# Patient Record
Sex: MALE | Race: WHITE | NOT HISPANIC OR LATINO | Employment: FULL TIME | URBAN - METROPOLITAN AREA
[De-identification: names, ages, dates, MRNs, and addresses within clinical notes are randomized per-mention and may not be internally consistent; named-entity substitution may affect disease eponyms.]

---

## 2018-01-22 ENCOUNTER — OFFICE VISIT CONVERTED (OUTPATIENT)
Dept: PULMONOLOGY | Facility: CLINIC | Age: 58
End: 2018-01-22
Attending: INTERNAL MEDICINE

## 2018-01-31 ENCOUNTER — OFFICE VISIT CONVERTED (OUTPATIENT)
Dept: FAMILY MEDICINE CLINIC | Age: 58
End: 2018-01-31
Attending: NURSE PRACTITIONER

## 2018-10-03 ENCOUNTER — OFFICE VISIT CONVERTED (OUTPATIENT)
Dept: FAMILY MEDICINE CLINIC | Age: 58
End: 2018-10-03
Attending: NURSE PRACTITIONER

## 2018-10-29 ENCOUNTER — OFFICE VISIT CONVERTED (OUTPATIENT)
Dept: PULMONOLOGY | Facility: CLINIC | Age: 58
End: 2018-10-29
Attending: INTERNAL MEDICINE

## 2019-06-04 ENCOUNTER — OFFICE VISIT CONVERTED (OUTPATIENT)
Dept: FAMILY MEDICINE CLINIC | Age: 59
End: 2019-06-04
Attending: NURSE PRACTITIONER

## 2019-06-08 ENCOUNTER — HOSPITAL ENCOUNTER (OUTPATIENT)
Dept: OTHER | Facility: HOSPITAL | Age: 59
Discharge: HOME | End: 2019-06-08
Attending: NURSE PRACTITIONER

## 2019-06-08 LAB
ALBUMIN SERPL-MCNC: 4.5 G/DL (ref 3.5–5)
ALBUMIN/GLOB SERPL: 1.7 {RATIO} (ref 1.4–2.6)
ALP SERPL-CCNC: 108 U/L (ref 56–119)
ALT SERPL-CCNC: 18 U/L (ref 10–40)
ANION GAP SERPL CALC-SCNC: 16 MMOL/L (ref 8–19)
AST SERPL-CCNC: 25 U/L (ref 15–50)
BILIRUB SERPL-MCNC: 0.62 MG/DL (ref 0.2–1.3)
BUN SERPL-MCNC: 11 MG/DL (ref 5–25)
BUN/CREAT SERPL: 13 {RATIO} (ref 6–20)
CALCIUM SERPL-MCNC: 9.7 MG/DL (ref 8.7–10.4)
CHLORIDE SERPL-SCNC: 99 MMOL/L (ref 99–111)
CHOLEST SERPL-MCNC: 143 MG/DL (ref 107–200)
CHOLEST/HDLC SERPL: 2.6 {RATIO} (ref 3–6)
CONV CO2: 31 MMOL/L (ref 22–32)
CONV TOTAL PROTEIN: 7.2 G/DL (ref 6.3–8.2)
CREAT UR-MCNC: 0.83 MG/DL (ref 0.7–1.2)
GFR SERPLBLD BASED ON 1.73 SQ M-ARVRAT: >60 ML/MIN/{1.73_M2}
GLOBULIN UR ELPH-MCNC: 2.7 G/DL (ref 2–3.5)
GLUCOSE SERPL-MCNC: 92 MG/DL (ref 70–99)
HDLC SERPL-MCNC: 54 MG/DL (ref 40–60)
LDLC SERPL CALC-MCNC: 74 MG/DL (ref 70–100)
OSMOLALITY SERPL CALC.SUM OF ELEC: 293 MOSM/KG (ref 273–304)
POTASSIUM SERPL-SCNC: 3.8 MMOL/L (ref 3.5–5.3)
SODIUM SERPL-SCNC: 142 MMOL/L (ref 135–147)
TRIGL SERPL-MCNC: 73 MG/DL (ref 40–150)
VLDLC SERPL-MCNC: 15 MG/DL (ref 5–37)

## 2019-10-28 ENCOUNTER — OFFICE VISIT CONVERTED (OUTPATIENT)
Dept: PULMONOLOGY | Facility: CLINIC | Age: 59
End: 2019-10-28
Attending: INTERNAL MEDICINE

## 2020-01-15 ENCOUNTER — OFFICE VISIT CONVERTED (OUTPATIENT)
Dept: FAMILY MEDICINE CLINIC | Age: 60
End: 2020-01-15
Attending: NURSE PRACTITIONER

## 2020-01-25 ENCOUNTER — HOSPITAL ENCOUNTER (OUTPATIENT)
Dept: OTHER | Facility: HOSPITAL | Age: 60
Discharge: HOME | End: 2020-01-25
Attending: NURSE PRACTITIONER

## 2020-01-27 LAB
ALBUMIN SERPL-MCNC: 4.2 G/DL (ref 3.5–5)
ALBUMIN/GLOB SERPL: 1.4 {RATIO} (ref 1.4–2.6)
ALP SERPL-CCNC: 114 U/L (ref 56–119)
ALT SERPL-CCNC: 20 U/L (ref 10–40)
ANION GAP SERPL CALC-SCNC: 18 MMOL/L (ref 8–19)
AST SERPL-CCNC: 28 U/L (ref 15–50)
BILIRUB SERPL-MCNC: 0.41 MG/DL (ref 0.2–1.3)
BUN SERPL-MCNC: 13 MG/DL (ref 5–25)
BUN/CREAT SERPL: 14 {RATIO} (ref 6–20)
CALCIUM SERPL-MCNC: 9.7 MG/DL (ref 8.7–10.4)
CHLORIDE SERPL-SCNC: 98 MMOL/L (ref 99–111)
CHOLEST SERPL-MCNC: 138 MG/DL (ref 107–200)
CHOLEST/HDLC SERPL: 2.9 {RATIO} (ref 3–6)
CONV CO2: 26 MMOL/L (ref 22–32)
CONV TOTAL PROTEIN: 7.1 G/DL (ref 6.3–8.2)
CREAT UR-MCNC: 0.92 MG/DL (ref 0.7–1.2)
GFR SERPLBLD BASED ON 1.73 SQ M-ARVRAT: >60 ML/MIN/{1.73_M2}
GLOBULIN UR ELPH-MCNC: 2.9 G/DL (ref 2–3.5)
GLUCOSE SERPL-MCNC: 85 MG/DL (ref 70–99)
HDLC SERPL-MCNC: 48 MG/DL (ref 40–60)
LDLC SERPL CALC-MCNC: 75 MG/DL (ref 70–100)
OSMOLALITY SERPL CALC.SUM OF ELEC: 285 MOSM/KG (ref 273–304)
POTASSIUM SERPL-SCNC: 4.2 MMOL/L (ref 3.5–5.3)
SODIUM SERPL-SCNC: 138 MMOL/L (ref 135–147)
TRIGL SERPL-MCNC: 74 MG/DL (ref 40–150)
VLDLC SERPL-MCNC: 15 MG/DL (ref 5–37)

## 2020-01-28 ENCOUNTER — HOSPITAL ENCOUNTER (OUTPATIENT)
Dept: OTHER | Facility: HOSPITAL | Age: 60
Discharge: HOME | End: 2020-01-28
Attending: NURSE PRACTITIONER

## 2020-01-31 ENCOUNTER — HOSPITAL ENCOUNTER (OUTPATIENT)
Dept: OTHER | Facility: HOSPITAL | Age: 60
Discharge: HOME | End: 2020-01-31
Attending: NURSE PRACTITIONER

## 2020-02-12 ENCOUNTER — HOSPITAL ENCOUNTER (OUTPATIENT)
Dept: PET IMAGING | Facility: HOSPITAL | Age: 60
Discharge: HOME | End: 2020-02-12
Attending: INTERNAL MEDICINE

## 2020-02-17 ENCOUNTER — OFFICE VISIT CONVERTED (OUTPATIENT)
Dept: PULMONOLOGY | Facility: CLINIC | Age: 60
End: 2020-02-17
Attending: INTERNAL MEDICINE

## 2020-04-21 ENCOUNTER — HOSPITAL ENCOUNTER (OUTPATIENT)
Dept: OTHER | Facility: HOSPITAL | Age: 60
Discharge: HOME | End: 2020-04-21
Attending: INTERNAL MEDICINE

## 2020-05-07 ENCOUNTER — OFFICE VISIT CONVERTED (OUTPATIENT)
Dept: PULMONOLOGY | Facility: CLINIC | Age: 60
End: 2020-05-07
Attending: INTERNAL MEDICINE

## 2020-07-15 ENCOUNTER — OFFICE VISIT CONVERTED (OUTPATIENT)
Dept: FAMILY MEDICINE CLINIC | Age: 60
End: 2020-07-15
Attending: NURSE PRACTITIONER

## 2020-07-18 ENCOUNTER — HOSPITAL ENCOUNTER (OUTPATIENT)
Dept: OTHER | Facility: HOSPITAL | Age: 60
Discharge: HOME | End: 2020-07-18
Attending: NURSE PRACTITIONER

## 2020-07-18 LAB
ALBUMIN SERPL-MCNC: 4.2 G/DL (ref 3.5–5)
ALBUMIN/GLOB SERPL: 1.5 {RATIO} (ref 1.4–2.6)
ALP SERPL-CCNC: 106 U/L (ref 56–119)
ALT SERPL-CCNC: 22 U/L (ref 10–40)
ANION GAP SERPL CALC-SCNC: 16 MMOL/L (ref 8–19)
AST SERPL-CCNC: 29 U/L (ref 15–50)
BILIRUB SERPL-MCNC: 0.45 MG/DL (ref 0.2–1.3)
BUN SERPL-MCNC: 11 MG/DL (ref 5–25)
BUN/CREAT SERPL: 11 {RATIO} (ref 6–20)
CALCIUM SERPL-MCNC: 9.8 MG/DL (ref 8.7–10.4)
CHLORIDE SERPL-SCNC: 99 MMOL/L (ref 99–111)
CHOLEST SERPL-MCNC: 137 MG/DL (ref 107–200)
CHOLEST/HDLC SERPL: 2.9 {RATIO} (ref 3–6)
CONV CO2: 29 MMOL/L (ref 22–32)
CONV TOTAL PROTEIN: 7 G/DL (ref 6.3–8.2)
CREAT UR-MCNC: 0.96 MG/DL (ref 0.7–1.2)
GFR SERPLBLD BASED ON 1.73 SQ M-ARVRAT: >60 ML/MIN/{1.73_M2}
GLOBULIN UR ELPH-MCNC: 2.8 G/DL (ref 2–3.5)
GLUCOSE SERPL-MCNC: 108 MG/DL (ref 70–99)
HDLC SERPL-MCNC: 48 MG/DL (ref 40–60)
LDLC SERPL CALC-MCNC: 70 MG/DL (ref 70–100)
OSMOLALITY SERPL CALC.SUM OF ELEC: 290 MOSM/KG (ref 273–304)
POTASSIUM SERPL-SCNC: 3.8 MMOL/L (ref 3.5–5.3)
SODIUM SERPL-SCNC: 140 MMOL/L (ref 135–147)
TRIGL SERPL-MCNC: 96 MG/DL (ref 40–150)
VLDLC SERPL-MCNC: 19 MG/DL (ref 5–37)

## 2020-09-01 ENCOUNTER — HOSPITAL ENCOUNTER (OUTPATIENT)
Dept: OTHER | Facility: HOSPITAL | Age: 60
Discharge: HOME | End: 2020-09-01
Attending: INTERNAL MEDICINE

## 2020-10-22 ENCOUNTER — OFFICE VISIT CONVERTED (OUTPATIENT)
Dept: PULMONOLOGY | Facility: CLINIC | Age: 60
End: 2020-10-22
Attending: NURSE PRACTITIONER

## 2021-03-02 ENCOUNTER — HOSPITAL ENCOUNTER (OUTPATIENT)
Dept: OTHER | Facility: HOSPITAL | Age: 61
Discharge: HOME | End: 2021-03-02
Attending: NURSE PRACTITIONER

## 2021-03-09 ENCOUNTER — OFFICE VISIT CONVERTED (OUTPATIENT)
Dept: FAMILY MEDICINE CLINIC | Age: 61
End: 2021-03-09
Attending: NURSE PRACTITIONER

## 2021-03-13 ENCOUNTER — HOSPITAL ENCOUNTER (OUTPATIENT)
Dept: OTHER | Facility: HOSPITAL | Age: 61
Discharge: HOME | End: 2021-03-13
Attending: NURSE PRACTITIONER

## 2021-03-13 LAB
ALBUMIN SERPL-MCNC: 4.3 G/DL (ref 3.5–5)
ALBUMIN/GLOB SERPL: 1.7 {RATIO} (ref 1.4–2.6)
ALP SERPL-CCNC: 113 U/L (ref 56–119)
ALT SERPL-CCNC: 20 U/L (ref 10–40)
ANION GAP SERPL CALC-SCNC: 11 MMOL/L (ref 8–19)
AST SERPL-CCNC: 30 U/L (ref 15–50)
BILIRUB SERPL-MCNC: 0.37 MG/DL (ref 0.2–1.3)
BUN SERPL-MCNC: 11 MG/DL (ref 5–25)
BUN/CREAT SERPL: 13 {RATIO} (ref 6–20)
CALCIUM SERPL-MCNC: 9.5 MG/DL (ref 8.7–10.4)
CHLORIDE SERPL-SCNC: 100 MMOL/L (ref 99–111)
CHOLEST SERPL-MCNC: 138 MG/DL (ref 107–200)
CHOLEST/HDLC SERPL: 2.4 {RATIO} (ref 3–6)
CONV CO2: 32 MMOL/L (ref 22–32)
CONV TOTAL PROTEIN: 6.9 G/DL (ref 6.3–8.2)
CREAT UR-MCNC: 0.83 MG/DL (ref 0.7–1.2)
GFR SERPLBLD BASED ON 1.73 SQ M-ARVRAT: >60 ML/MIN/{1.73_M2}
GLOBULIN UR ELPH-MCNC: 2.6 G/DL (ref 2–3.5)
GLUCOSE SERPL-MCNC: 103 MG/DL (ref 70–99)
HDLC SERPL-MCNC: 57 MG/DL (ref 40–60)
LDLC SERPL CALC-MCNC: 67 MG/DL (ref 70–100)
OSMOLALITY SERPL CALC.SUM OF ELEC: 288 MOSM/KG (ref 273–304)
POTASSIUM SERPL-SCNC: 4.1 MMOL/L (ref 3.5–5.3)
SODIUM SERPL-SCNC: 139 MMOL/L (ref 135–147)
TRIGL SERPL-MCNC: 72 MG/DL (ref 40–150)
VLDLC SERPL-MCNC: 14 MG/DL (ref 5–37)

## 2021-04-08 ENCOUNTER — OFFICE VISIT CONVERTED (OUTPATIENT)
Dept: PULMONOLOGY | Facility: CLINIC | Age: 61
End: 2021-04-08
Attending: NURSE PRACTITIONER

## 2021-05-18 NOTE — PROGRESS NOTES
Sumeet Gomes  1960     Office/Outpatient Visit    Visit Date: Wed, Silviano 15, 2020 04:05 pm    Provider: Mirtha Alexander N.P. (Assistant: Miladis Vu MA)    Location: [facility]        Electronically signed by Mirtha Alexander N.P. on  01/15/2020 05:56:19 PM                             Electronically signed by Mirtha Alexander N.P. on  01/31/2020 03:42:16 PM                             Subjective:        CC: Olman is a 59 year old White male.  This is a follow-up visit.  check up         HPI:           PHQ-9 Depression Screening: Completed form scanned and in chart; Total Score 1           In regard to the essential (primary) hypertension, his current cardiac medication regimen includes a diuretic ( Hydrochlorothiazide (HCTZ) ).  Olman does not check his blood pressure other than at his clinic appointments.  He is tolerating the medication well without side effects.  Compliance with treatment has been good; he takes his medication as directed.            Hyperlipidemia, unspecified details; current treatment includes Zocor.  Compliance with treatment has been good; he takes his medication as directed.  He denies experiencing any hypercholesterolemia related symptoms.  Most recent lab tests include Total Cholesterol:  143 (mg/dL) (06/08/2019), HDL:  54 (mg/dL) (06/08/2019), Triglycerides:  73 (mg/dL) (06/08/2019), LDL:  74 (mg/dL) (06/08/2019), Glucose, Serum:  92 (mg/dL) (06/08/2019), ALT (SGPT):  18 (U/L) (06/08/2019).      ROS:     CONSTITUTIONAL:  Negative for chills, fatigue, fever, and weight change.      CARDIOVASCULAR:  Negative for chest pain, palpitations, tachycardia, orthopnea, and edema.      RESPIRATORY:  Positive for quit 1-1-20 smoking using wellbutrin, continuing.   Negative for dyspnea.      NEUROLOGICAL:  Negative for dizziness, headaches, paresthesias, and weakness.          Past Medical History / Family History / Social History:         Last Reviewed on 1/15/2020 05:52  PM by Mirtha Alexander    Past Medical History:             PAST MEDICAL HISTORY         COPD     Hospitalizations: back surgery         PREVENTIVE HEALTH MAINTENANCE             Hepatitis C Medicare Screening: was last done ; negative         Surgical History:         Arthroscopy: left knee  and right shoulder ;     Vasectomy     Other Surgeries:    Laminectomy: lumbar region; ;     Procedures:    Colonoscopy ( 12 )     COLONOSCOPY: was last done 12 with normal results Boo         Family History:     Father: Hypertension     Mother:  at age 54; Cause of death was stroke;  Hypertension;  COPD         Social History:     Occupation: [employer]     Marital Status:      Children: 3 children         Tobacco/Alcohol/Supplements:     Last Reviewed on 2019 03:56 PM by Miladis Vu    Tobacco: He has a past history of cigarette smoking; quit date:  20.          Alcohol: Frequency: Socially He typically consumes beer.          Substance Abuse History:     Last Reviewed on 10/03/2018 01:02 PM by Raquel Alexander    None         Mental Health History:     Last Reviewed on 10/03/2018 01:02 PM by Raquel Alexander        Communicable Diseases (eg STDs):     Last Reviewed on 10/03/2018 01:02 PM by Raquel Alexander        Immunizations:     Prevnar 13 (Pneumococcal PCV 13) 2018    Fluzone Quadrivalent (3+ years) 10/3/2017    Fluzone High-Dose pf (>=65 yr) 10/3/2018    Adacel (Tdap) 2012    Zostavax (Zoster live) 2012        Allergies:     Last Reviewed on 1/15/2020 04:08 PM by Miladis Vu    No Known Allergies.        Current Medications:     Last Reviewed on 1/15/2020 04:08 PM by Miladis Vu    hydroCHLOROthiazide 25 mg oral tablet [TAKE 1 TABLET BY MOUTH ONCE DAILY]    simvastatin 20 mg oral tablet [TAKE 1 TABLET BY MOUTH ONCE DAILY]    Stiolto Respimat 2.5-2.5 mcg/actuation Inhalation Mist [One puff daily]    Arnuity Ellipta 100  mcg/actuation Inhalation Blister, With Inhalation Device [Take 1 inhalation(s) by mouth daily]    buPROPion  mg oral tablet, sustained-release 12 hr [take 1 tablet (150 mg) by oral route once daily]        Objective:        Vitals:         Current: 1/15/2020 4:09:08 PM    Ht:  5 ft, 7 in;  Wt: 165.8 lbs;  BMI: 26.0T: 98.2 F (tympanic);  BP: 121/74 mm Hg (right arm, sitting);  P: 80 bpm (right arm (BP Cuff), sitting);  sCr: 0.83 mg/dL;  GFR: 87.90        Exams:     PHYSICAL EXAM:     GENERAL: Vitals recorded well developed, well nourished;  well groomed;  no apparent distress;     NECK: carotid exam reveals no bruits;     RESPIRATORY: normal respiratory rate and pattern with no distress; inspiratory wheezes in the heard faintly;     CARDIOVASCULAR: normal rate; rhythm is regular;  no systolic murmur; no edema;     PSYCHIATRIC:  appropriate affect and demeanor; normal speech pattern; grossly normal memory;         Assessment:         Z13.31   Encounter for screening for depression       I10   Essential (primary) hypertension       E78.5   Hyperlipidemia, unspecified       J44.1   Chronic obstructive pulmonary disease with (acute) exacerbation           ORDERS:         Meds Prescribed:       [Refilled] hydroCHLOROthiazide 25 mg oral tablet [TAKE 1 TABLET BY MOUTH ONCE DAILY], #90 (ninety) each, Refills: 1 (one)       [Refilled] simvastatin 20 mg oral tablet [TAKE 1 TABLET BY MOUTH ONCE DAILY], #90 (ninety) each, Refills: 0 (zero)         Lab Orders:       FUTURE  Future order to be done at patients convenience  (Send-Out)            66431  [facility] CMP AND LIPID: [id number]  (Send-Out)              Procedures Ordered:         Low Dose CT scan (LDCT) for lung cancer screening  (Send-Out)              Other Orders:         Depression screen negative  (In-House)                      Plan:         Encounter for screening for depression    MIPS PHQ-9 Depression Screening: Completed form scanned and in  chart; Total Score 1; Negative Depression Screen           Orders:         Depression screen negative  (In-House)              [facility]        FOLLOW-UP TESTING #1: FOLLOW-UP LABORATORY:  Labs to be scheduled in the future include HTN/Lipid Panel: CMP, Lipid.            Prescriptions:       [Refilled] hydroCHLOROthiazide 25 mg oral tablet [TAKE 1 TABLET BY MOUTH ONCE DAILY], #90 (ninety) each, Refills: 1 (one)           Orders:       FUTURE  Future order to be done at patients convenience  (Send-Out)            91961  [facility] CMP AND LIPID: [id number]  (Send-Out)              Hyperlipidemia, unspecified        RECOMMENDATIONS given include: exercise and low cholesterol/low fat diet.      FOLLOW-UP: fasting for labs in the next week           Prescriptions:       [Refilled] simvastatin 20 mg oral tablet [TAKE 1 TABLET BY MOUTH ONCE DAILY], #90 (ninety) each, Refills: 0 (zero)         Chronic obstructive pulmonary disease with (acute) exacerbationreviewed last pulm note, congratulated him on smoking cessation, the pulm put him on home oxygen at night (he also ordered LDCT chest but he has not had, will set this up        RADIOLOGY:  I have ordered Low Dose CT scan for lung cancer screening to be done today.            Orders:         Low Dose CT scan (LDCT) for lung cancer screening  (Send-Out)                  Patient Recommendations:        For  Essential (primary) hypertension:            The following laboratory testing has been ordered:         For  Hyperlipidemia, unspecified:    Maintain a regular exercise program. Reduce the amount of cholesterol and saturated fat in your diet.              Charge Capture:         Primary Diagnosis:     Z13.31  Encounter for screening for depression           Orders:      68334  Office/outpatient visit; established patient, level 4  (In-House)              Depression screen negative  (In-House)               I10  Essential (primary) hypertension     E78.5  Hyperlipidemia, unspecified     J44.1  Chronic obstructive pulmonary disease with (acute) exacerbation         ADDENDUMS:      ____________________________________    Addendum: 02/06/2020 11:28 AM - Two, Team         Visit Note Faxed to:        Collins Chapa  (Pulmonary Critical Care Medicine); Number [phone]       Collins Chapa  (Pulmonary Critical Care Medicine); Number [phone]

## 2021-05-18 NOTE — PROGRESS NOTES
Sumeet Gomes 1960     Office/Outpatient Visit    Visit Date:  04:46 pm    Provider: Mirtha Alexander N.P. (Assistant: Cassie Joe MA)    Location: [facility]        Electronically signed by Mirtha Alexander N.P. on  2018 05:33:53 PM                             SUBJECTIVE:        CC:     Olman is a 57 year old White male.  This is a follow-up visit.  check up for med refills         HPI:         Olman presents with hypertension.  His current cardiac medication regimen includes a diuretic ( HCTZ ).  He is tolerating the medication well without side effects.  Compliance with treatment has been good; he takes his medication as directed.          Additionally, he presents with history of hyperlipidemia.  current treatment includes Zocor.  Compliance with treatment has been good; he takes his medication as directed.  He denies experiencing any hypercholesterolemia related symptoms.      ROS:     CONSTITUTIONAL:  Negative for chills, fatigue, fever, and weight change.      CARDIOVASCULAR:  Negative for chest pain, palpitations, tachycardia, orthopnea, and edema.      RESPIRATORY:  Positive for wheezing ( at times ).      NEUROLOGICAL:  Negative for dizziness, headaches, paresthesias, and weakness.          PMH/FM/SH:     Last Reviewed on 2018 05:32 PM by Mirtha Alexander    Past Medical History:             PAST MEDICAL HISTORY         COPD     Hospitalizations: back surgery         Surgical History:         Arthroscopy: left knee  and right shoulder ;     Vasectomy     Other Surgeries:    Laminectomy: lumbar region; ;     Procedures:    Colonoscopy ( 12 )     COLONOSCOPY: was last done 12 with normal results Haddad         Family History:     Father: Hypertension     Mother:  at age 54; Cause of death was stroke;  Hypertension;  COPD         Social History:     Occupation: [employer]     Marital Status:      Children: 3 children          Tobacco/Alcohol/Supplements:     Last Reviewed on 1/31/2018 04:49 PM by Cassie Joe    Tobacco: Current Smoker: He currently smokes some days, 1-5 cigarettes per day.          Alcohol: Frequency: Socially He typically consumes beer.              Immunizations:     Adacel (Tdap) 6/13/2012     Zostavax (Zoster) 6/13/2012         Allergies:     Last Reviewed on 1/31/2018 04:49 PM by Cassie Joe      No Known Drug Allergies.         Current Medications:     Last Reviewed on 1/31/2018 04:50 PM by Cassie Joe    Simvastatin 20mg Tablet 1 tab daily     Hydrochlorothiazide (HCTZ) 25mg Tablet 1 tab daily     ProAir HFA 90mcg/1actuation Oral Inhaler 1-2 puffs qid prn     Stiolto Respimat 2.5mcg/2.5mcg per 1actuation Oral Inhaler One puff daily     Arnuity Ellipta 100mcg/1actuation Inhalation Powder Take 1 inhalation(s) by mouth daily         OBJECTIVE:        Vitals:         Current: 1/31/2018 4:48:46 PM    Ht:  5 ft, 7 in;  Wt: 177 lbs;  BMI: 27.7    T: 96.8 F (tympanic);  BP: 126/78 mm Hg (left arm, sitting);  P: 81 bpm (left arm (BP Cuff), sitting);  sCr: 0.81 mg/dL;  GFR: 94.80        Exams:     PHYSICAL EXAM:     GENERAL: Vitals recorded well developed, well nourished;  well groomed;  no apparent distress;     NECK: carotid exam reveals no bruits;     RESPIRATORY: normal respiratory rate and pattern with no distress; expiratory wheezes in the LLL;     CARDIOVASCULAR: normal rate; rhythm is regular;  no systolic murmur; no edema;     PSYCHIATRIC:  appropriate affect and demeanor; normal speech pattern; grossly normal memory;         ASSESSMENT           401.1   I10  Hypertension              DDx:     272.4   E78.2  Hyperlipidemia              DDx:     V69.8   Z72.89  Other problems related to lifestyle              DDx:         ORDERS:         Meds Prescribed:       Refill of: Simvastatin 20mg Tablet 1 tab daily  #90 (Ninety) tablet(s) Refills: 1       Refill of: Hydrochlorothiazide  (HCTZ) 25mg Tablet 1 tab daily  #90 (Ninety) tablet(s) Refills: 1         Lab Orders:       FUTURE  Future order to be done at patients convenience  (Send-Out)         74202  [facility] CMP AND LIPID: [id number], [id number]  (Send-Out)         94263  [facility] Hepatitis C antibody  (Send-Out)           Other Orders:       4004F  Pt scrnd tobacco use rcvd tobacco cessation talk  (In-House)                   PLAN:          Hypertension     MIPS Smoking cessation encouraged. Counseling for less than 3 minutes.      FOLLOW-UP: Schedule a follow-up visit in 6 months..      FOLLOW-UP TESTING #1: FOLLOW-UP LABORATORY:  Labs to be scheduled in the future include HTN/Lipid Panel: CMP, Lipid.            Prescriptions:       Refill of: Hydrochlorothiazide (HCTZ) 25mg Tablet 1 tab daily  #90 (Ninety) tablet(s) Refills: 1           Orders:       FUTURE  Future order to be done at patients convenience  (Send-Out)         24913  [facility] CMP AND LIPID: [id number], [id number]  (Send-Out)         4004F  Pt scrnd tobacco use rcvd tobacco cessation talk  (In-House)            Hyperlipidemia           Prescriptions:       Refill of: Simvastatin 20mg Tablet 1 tab daily  #90 (Ninety) tablet(s) Refills: 1          Other problems related to lifestyle     LABORATORY:  Labs ordered to be performed today include Hepatitis C Antibody.            Orders:       18753  [facility] Hepatitis C antibody  (Send-Out)             Patient Education Handouts:       Hepatitis C              Patient Recommendations:        For  Hypertension:     Schedule a follow-up visit in 6 months.                APPOINTMENT INFORMATION:        Monday Tuesday Wednesday Thursday Friday Saturday Sunday            Time:___________________AM  PM   Date:_____________________         The following laboratory testing has been ordered:             CHARGE CAPTURE           **Please note: ICD descriptions below are intended for billing purposes only and may not represent clinical  diagnoses**        Primary Diagnosis:         401.1 Hypertension            I10    Essential (primary) hypertension              Orders:          00358   Office/outpatient visit; established patient, level 4  (In-House)             4004F   Pt scrnd tobacco use rcvd tobacco cessation talk  (In-House)           272.4 Hyperlipidemia            E78.2    Mixed hyperlipidemia    V69.8 Other problems related to lifestyle            Z72.89    Other problems related to lifestyle        ADDENDUMS:      ____________________________________    Addendum: 03/02/2018 09:52 AM - Two, Team         Visit Note Faxed to:        User Entered Recipient; Number [phone]            Addendum: 03/02/2018 09:53 AM - Two, Team         Visit Note Faxed to:        User Entered Recipient; Number [phone]            Addendum: 03/02/2018 09:56 AM - Two, Team        Faxed to Brandyn [phone] and [phone]/er

## 2021-05-18 NOTE — PROGRESS NOTES
Sumeet Gomes 1960     Office/Outpatient Visit    Visit Date: Wed, Oct 3, 2018 01:02 pm    Provider: Mirtha Alexander N.P. (Assistant: Raquel Alexander MA)    Location: [facility]        Electronically signed by Mirtha Alexander N.P. on  10/03/2018 01:48:45 PM                             SUBJECTIVE:        CC:     Olman is a 57 year old White male.  Patient is here for routine check up and medication refills.          HPI:         Patient presents with hypertension.  His current cardiac medication regimen includes a diuretic ( HCTZ ).  He is tolerating the medication well without side effects.  Compliance with treatment has been good; he takes his medication as directed.          Concerning hyperlipidemia, current treatment includes Zocor.  Compliance with treatment has been good; he takes his medication as directed.  He denies experiencing any hypercholesterolemia related symptoms.  Most recent lab tests include Total Cholesterol:  152 (mg/dL) (01/31/2018), HDL:  56 (mg/dL) (01/31/2018), Triglycerides:  68 (mg/dL) (01/31/2018), LDL:  82 (mg/dL) (01/31/2018), VLDL Cholesterol:  14 (mg/dl) (01/31/2018), CHOL/HDLC RATIO:  2.7 (NA) (01/31/2018), ALT (SGPT):  25 (U/L) (01/31/2018), Glucose, Serum:  88 (mg/dL) (01/31/2018).      ROS:     CONSTITUTIONAL:  Negative for chills, fatigue, fever, and weight change.      CARDIOVASCULAR:  Negative for chest pain, palpitations, tachycardia, orthopnea, and edema.      RESPIRATORY:  Positive for COPD.      NEUROLOGICAL:  Negative for dizziness, headaches, paresthesias, and weakness.          PMH/FMH/SH:     Last Reviewed on 10/03/2018 01:19 PM by Mirtha Alexander    Past Medical History:             PAST MEDICAL HISTORY         COPD     Hospitalizations: back surgery         PREVENTIVE HEALTH MAINTENANCE             Hepatitis C Medicare Screening: was last done 1-2018; negative         Surgical History:         Arthroscopy: left knee 1995 and right shoulder  ;     Vasectomy     Other Surgeries:    Laminectomy: lumbar region; ;     Procedures:    Colonoscopy ( 12 )     COLONOSCOPY: was last done 12 with normal results Haddad         Family History:     Father: Hypertension     Mother:  at age 54; Cause of death was stroke;  Hypertension;  COPD         Social History:     Occupation: [employer]     Marital Status:      Children: 3 children         Tobacco/Alcohol/Supplements:     Last Reviewed on 10/03/2018 01:05 PM by Raquel Alexadner    Tobacco: Current Smoker: He currently smokes some days, 1-5 cigarettes per day.          Alcohol: Frequency: Socially He typically consumes beer.          Substance Abuse History:     Last Reviewed on 10/03/2018 01:02 PM by Raquel Alexander    None         Mental Health History:     Last Reviewed on 10/03/2018 01:02 PM by Raquel Alexander        Communicable Diseases (eg STDs):     Last Reviewed on 10/03/2018 01:02 PM by Raquel Alexander            Immunizations:     Adacel (Tdap) 2012     Zostavax (Zoster) 2012         Allergies:     Last Reviewed on 10/03/2018 01:02 PM by Raquel Alexander      No Known Drug Allergies.         Current Medications:     Last Reviewed on 10/03/2018 01:02 PM by Raquel Alexander    Simvastatin 20mg Tablet 1 tab daily     Hydrochlorothiazide (HCTZ) 25mg Tablet 1 tab daily     ProAir HFA 90mcg/1actuation Oral Inhaler 1-2 puffs qid prn     Arnuity Ellipta 100mcg/1actuation Inhalation Powder Take 1 inhalation(s) by mouth daily     Stiolto Respimat 2.5mcg/2.5mcg per 1actuation Oral Inhaler One puff daily         OBJECTIVE:        Vitals:         Current: 10/3/2018 1:03:32 PM    Ht:  5 ft, 7 in;  Wt: 170.8 lbs;  BMI: 26.8    T: 98 F (tympanic);  BP: 131/82 mm Hg (left arm, sitting);  P: 82 bpm (left arm (BP Cuff), sitting);  sCr: 0.86 mg/dL;  GFR: 87.95        Exams:     PHYSICAL EXAM:     GENERAL: Vitals recorded well developed, well nourished;  well groomed;  no  apparent distress;     NECK: carotid exam reveals no bruits;     RESPIRATORY: normal respiratory rate and pattern with no distress; inspiratory wheezes in the apices;     CARDIOVASCULAR: normal rate; rhythm is regular;  no systolic murmur; no edema;     PSYCHIATRIC:  appropriate affect and demeanor; normal speech pattern; grossly normal memory;         Procedures:     Vaccination against other viral diseases, Influenza     1. Influenza high dose 0.5 ml unit dose given IM in the left upper arm; administered by mlb;  lot number [id number]; expires April 29, 2019; Information sheet given ./mlb Regarding contraindications to an Influenza vaccine: Denies moderate/severe illness with/without fever; serious reaction to eggs, egg proteins, gentamicin, gelatin, arginine, neomycin or polymixin; serious reaction after recieving previous influenza vaccines; and history of Guillain-Worth Syndrome.              ASSESSMENT           V04.81   Z23  Vaccination against other viral diseases, Influenza              DDx:     401.1   I10  Hypertension              DDx:     272.4   E78.2  Hyperlipidemia              DDx:         ORDERS:         Meds Prescribed:       Refill of: Simvastatin 20mg Tablet 1 tab daily  #90 (Ninety) tablet(s) Refills: 0       Refill of: Hydrochlorothiazide (HCTZ) 25mg Tablet 1 tab daily  #90 (Ninety) tablet(s) Refills: 0         Lab Orders:       FUTURE  Future order to be done at patients convenience  (Send-Out)         47452  [facility] CMP AND LIPID: [id number], [id number]  (Send-Out)           Procedures Ordered:       77677  Immunization administration; one vaccine  (In-House)         88272  Fluzone High Dose  (In-House)           Other Orders:       4004F  Pt scrnd tobacco use rcvd tobacco cessation talk  (In-House)                   PLAN:          Vaccination against other viral diseases, Influenza           Orders:       16145  Immunization administration; one vaccine  (In-House)         47057  Fluzone High Dose   (In-House)            Hypertension advised to get hep a vaccines, says he rarely eats out     MIPS Smoking cessation encouraged. Counseling for less than 3 minutes.      FOLLOW-UP TESTING #1: FOLLOW-UP LABORATORY:  Labs to be scheduled in the future include HTN/Lipid Panel: CMP, Lipid.            Prescriptions:       Refill of: Hydrochlorothiazide (HCTZ) 25mg Tablet 1 tab daily  #90 (Ninety) tablet(s) Refills: 0           Orders:       4004F  Pt scrnd tobacco use rcvd tobacco cessation talk  (In-House)         FUTURE  Future order to be done at patients convenience  (Send-Out)         59519  [facility] CMP AND LIPID: [id number], [id number]  (Send-Out)             Patient Education Handouts:       Hepatitis A           Hyperlipidemia         RECOMMENDATIONS given include: low cholesterol/low fat diet.      FOLLOW-UP: fasting for labs in the next week           Prescriptions:       Refill of: Simvastatin 20mg Tablet 1 tab daily  #90 (Ninety) tablet(s) Refills: 0             Patient Recommendations:        For  Hypertension:             The following laboratory testing has been ordered:         For  Hyperlipidemia:     Reduce the amount of cholesterol and saturated fat in your diet.              CHARGE CAPTURE           **Please note: ICD descriptions below are intended for billing purposes only and may not represent clinical diagnoses**        Primary Diagnosis:         V04.81 Vaccination against other viral diseases, Influenza            Z23    Encounter for immunization              Orders:          76783   Office/outpatient visit; established patient, level 4  (In-House)             84774   Immunization administration; one vaccine  (In-House)             79584   Fluzone High Dose  (In-House)           401.1 Hypertension            I10    Essential (primary) hypertension              Orders:          4004F   Pt scrnd tobacco use rcvd tobacco cessation talk  (In-House)           272.4 Hyperlipidemia            E78.2    Mixed  hyperlipidemia

## 2021-05-18 NOTE — PROGRESS NOTES
Sumeet Gomes  1960     Office/Outpatient Visit    Visit Date: Wed, Jul 15, 2020 04:22 pm    Provider: Mirtha Alexander N.P. (Assistant: Miladis Vu MA)    Location: [facility]        Electronically signed by Mirtha Alexander N.P. on  07/15/2020 07:04:38 PM                             Subjective:        CC: Olman is a 59 year old White male.  This is a follow-up visit.  check up         HPI:           Patient to be evaluated for essential (primary) hypertension.  His current cardiac medication regimen includes a diuretic ( Hydrochlorothiazide (HCTZ) ).  Olman does not check his blood pressure other than at his clinic appointments.  He is tolerating the medication well without side effects.  Compliance with treatment has been good; he takes his medication as directed.            Dx with hyperlipidemia, unspecified; current treatment includes Zocor.  Compliance with treatment has been good; he takes his medication as directed.  He denies experiencing any hypercholesterolemia related symptoms.  Most recent lab tests include Total Cholesterol:  138 (mg/dL) (01/25/2020), HDL:  48 (mg/dL) (01/25/2020), Triglycerides:  74 (mg/dL) (01/25/2020), LDL:  75 (mg/dL) (01/25/2020), Glucose, Serum:  85 (mg/dL) (01/25/2020), ALT (SGPT):  20 (U/L) (01/25/2020), Weight (lb):  164.6 (07/15/2020).      ROS:     CONSTITUTIONAL:  Negative for chills, fatigue, fever, and weight change.      CARDIOVASCULAR:  Negative for chest pain, palpitations, tachycardia, orthopnea, and edema.      RESPIRATORY:  Positive for history of pulmonary nodule / sees pulm.   Negative for recent cough.      NEUROLOGICAL:  Negative for dizziness, headaches, paresthesias, and weakness.      PSYCHIATRIC:  Positive for was on wellbutrin, was also trying to quit smoking, helped, ran out rx.          Past Medical History / Family History / Social History:         Last Reviewed on 7/15/2020 07:01 PM by Mirtha Alexander    Past Medical  History:             PAST MEDICAL HISTORY         COPD     Hospitalizations: back surgery         PREVENTIVE HEALTH MAINTENANCE             Hepatitis C Medicare Screening: was last done ; negative         Surgical History:         Arthroscopy: left knee  and right shoulder ;     Vasectomy     Other Surgeries:    Laminectomy: lumbar region; ;     Procedures:    Colonoscopy ( 12 )     COLONOSCOPY: was last done 12 with normal results Boo         Family History:     Father: Hypertension     Mother:  at age 54; Cause of death was stroke;  Hypertension;  COPD         Social History:     Occupation: [employer]     Marital Status:      Children: 3 children         Tobacco/Alcohol/Supplements:     Last Reviewed on 2019 03:56 PM by Miladis Vu    Tobacco: He has a past history of cigarette smoking; quit date:  20.          Alcohol: Frequency: Socially He typically consumes beer.          Substance Abuse History:     Last Reviewed on 10/03/2018 01:02 PM by Raquel Alexander    None         Mental Health History:     Last Reviewed on 10/03/2018 01:02 PM by Raquel Alexander        Communicable Diseases (eg STDs):     Last Reviewed on 10/03/2018 01:02 PM by Raquel Alexander        Immunizations:     Prevnar 13 (Pneumococcal PCV 13) 2018    Fluzone Quadrivalent (3+ years) 10/3/2017    Fluzone High-Dose pf (>=65 yr) 10/3/2018    Adacel (Tdap) 2012    Zostavax (Zoster live) 2012        Allergies:     Last Reviewed on 7/15/2020 04:24 PM by Miladis Vu    No Known Allergies.        Current Medications:     Last Reviewed on 7/15/2020 04:23 PM by Miladis Vu    buPROPion  mg oral tablet, sustained-release 12 hr [take 1 tablet (150 mg) by oral route once daily]not taking / ran out     hydroCHLOROthiazide 25 mg oral tablet [TAKE 1 TABLET BY MOUTH ONCE DAILY]    simvastatin 20 mg oral tablet [Take 1 tablet by mouth once daily]    ANORO  AOPIWM51.5-25 AER [INHALE 1 PUFF BY MOUTH ONCE DAILY]    FLOVENT HFA 220MCG  AER [INHALE 2 PUFFS BY MOUTH TWICE DAILY (TO REPLACE ARNUITY)]    ALBUTEROL    AER HFA        Objective:        Vitals:         Current: 7/15/2020 4:23:14 PM    Ht:  5 ft, 7 in;  Wt: 164.6 lbs;  BMI: 25.8T: 98 F (temporal);  BP: 119/76 mm Hg (right arm, sitting);  P: 86 bpm (right arm (BP Cuff), sitting);  sCr: 0.92 mg/dL;  GFR: 79.06        Exams:     PHYSICAL EXAM:     GENERAL: Vitals recorded well developed, well nourished;  well groomed;  no apparent distress;     NECK: carotid exam reveals no bruits;     RESPIRATORY: normal respiratory rate and pattern with no distress; normal breath sounds with no rales, rhonchi, wheezes or rubs;     CARDIOVASCULAR: normal rate; rhythm is regular;  no systolic murmur; no edema;     PSYCHIATRIC:  appropriate affect and demeanor; normal speech pattern; grossly normal memory;         Assessment:         I10   Essential (primary) hypertension       E78.5   Hyperlipidemia, unspecified       J44.9   Chronic obstructive pulmonary disease, unspecified           ORDERS:         Meds Prescribed:       [Refilled] buPROPion  mg oral tablet, sustained-release 12 hr [take 1 tablet (150 mg) by oral route once daily], #90 (ninety) tablets, Refills: 3 (three)       [Refilled] hydroCHLOROthiazide 25 mg oral tablet [TAKE 1 TABLET BY MOUTH ONCE DAILY], #90 (ninety) each, Refills: 1 (one)       [Refilled] simvastatin 20 mg oral tablet [Take 1 tablet by mouth once daily], #90 (ninety) tablets, Refills: 1 (one)         Lab Orders:       [id number]  [facility] Comp. Metabolic Panel  (Send-Out)            [id number]  [facility] Lipid Panel  (Send-Out)                      Plan:         Essential (primary) hypertension    LABORATORY:  Labs ordered to be performed today include Comprehensive metabolic panel and lipid panel.  MIPS Smoking cessation encouraged. Counseling for less than 3 minutes.      RECOMMENDATIONS given include:  perform routine monitoring of blood pressure with home blood pressure cuff.      FOLLOW-UP: fasting for labs           Prescriptions:       [Refilled] hydroCHLOROthiazide 25 mg oral tablet [TAKE 1 TABLET BY MOUTH ONCE DAILY], #90 (ninety) each, Refills: 1 (one)           Orders:       [id number]  [facility] Comp. Metabolic Panel  (Send-Out)            [id number]  [facility] Lipid Panel  (Send-Out)              Hyperlipidemia, unspecified        RECOMMENDATIONS given include: smoking cessation and low cholesterol/low fat diet.            Prescriptions:       [Refilled] simvastatin 20 mg oral tablet [Take 1 tablet by mouth once daily], #90 (ninety) tablets, Refills: 1 (one)         Chronic obstructive pulmonary disease, unspecifiedcontinuing to monitor pulmonary nodule, follow up with pulm, reviewed previous CXR and CT of chest, 2008, copies of report given /  will refill wellbutrin, discussed smoking cessation            Other Prescriptions:       [Refilled] buPROPion  mg oral tablet, sustained-release 12 hr [take 1 tablet (150 mg) by oral route once daily], #90 (ninety) tablets, Refills: 3 (three)         Patient Recommendations:        For  Essential (primary) hypertension:    Begin monitoring your blood pressure by brief nurse visits at our office, a home blood pressure monitor, or by checking on the machines in pharmacies or stores.  Keep a log of the readings.          For  Hyperlipidemia, unspecified:    Stop smoking! Reduce the amount of cholesterol and saturated fat in your diet.              Charge Capture:         Primary Diagnosis:     I10  Essential (primary) hypertension           Orders:      [id number]  Office/outpatient visit; established patient, level 4  (In-House)              E78.5  Hyperlipidemia, unspecified     J44.9  Chronic obstructive pulmonary disease, unspecified

## 2021-05-18 NOTE — PROGRESS NOTES
Sumeet Gomes 1960     Office/Outpatient Visit    Visit Date: Tue, Jun 4, 2019 03:53 pm    Provider: Mirtha Alexander N.P. (Assistant: Miladis Vu MA)    Location: [facility]        Electronically signed by Mirtha Alexander N.P. on  06/04/2019 06:16:55 PM                             SUBJECTIVE:        CC:     Olman is a 58 year old White male.  This is a follow-up visit.  check up         HPI:         Olman presents with hypertension.  His current cardiac medication regimen includes a diuretic ( HCTZ ).  Olman does not check his blood pressure other than at his clinic appointments.  He is tolerating the medication well without side effects.  Compliance with treatment has been good; he takes his medication as directed.          Additionally, he presents with history of hyperlipidemia.  current treatment includes Zocor.  Compliance with treatment has been good; he takes his medication as directed.  Most recent lab tests include Total Cholesterol:  148 (mg/dL) (10/06/2018), HDL:  49 (mg/dL) (10/06/2018), Triglycerides:  65 (mg/dL) (10/06/2018), LDL:  86 (mg/dL) (10/06/2018), Glucose, Serum:  103 (mg/dL) (10/06/2018), ALT (SGPT):  22 (U/L) (10/06/2018).      ROS:     CONSTITUTIONAL:  Negative for chills, fatigue, fever, and weight change.      CARDIOVASCULAR:  Negative for chest pain, palpitations, tachycardia, orthopnea, and edema.      RESPIRATORY:  Negative for cough, dyspnea, and hemoptysis.      INTEGUMENTARY:  Positive for rash on left hand, is right handed / itches some, tiny bumps on sides of  fingers, had 1-2 years, is intermittent, palm only.      NEUROLOGICAL:  Negative for dizziness, headaches, paresthesias, and weakness.          PMH/FMH/SH:     Last Reviewed on 10/03/2018 01:19 PM by Mirtha Alexander    Past Medical History:             PAST MEDICAL HISTORY         COPD     Hospitalizations: back surgery         PREVENTIVE HEALTH MAINTENANCE             Hepatitis C Medicare  Screening: was last done ; negative         Surgical History:         Arthroscopy: left knee  and right shoulder ;     Vasectomy     Other Surgeries:    Laminectomy: lumbar region; ;     Procedures:    Colonoscopy ( 12 )     COLONOSCOPY: was last done 12 with normal results Boo         Family History:     Father: Hypertension     Mother:  at age 54; Cause of death was stroke;  Hypertension;  COPD         Social History:     Occupation: [employer]     Marital Status:      Children: 3 children         Tobacco/Alcohol/Supplements:     Last Reviewed on 10/03/2018 01:05 PM by Raquel Alexander    Tobacco: Current Smoker: He currently smokes some days, 1-5 cigarettes per day.          Alcohol: Frequency: Socially He typically consumes beer.          Substance Abuse History:     Last Reviewed on 10/03/2018 01:02 PM by Raquel Alexander    None         Mental Health History:     Last Reviewed on 10/03/2018 01:02 PM by Raquel Alexander        Communicable Diseases (eg STDs):     Last Reviewed on 10/03/2018 01:02 PM by Raquel Alexander            Immunizations:     Prevnar 13 (Pneumococcal PCV 13) 2018     Fluzone Quadrivalent (3+ years) 10/3/2017     Fluzone High-Dose pf (>=65 yr) 10/3/2018     Adacel (Tdap) 2012     Zostavax (Zoster live) 2012         Allergies:     Last Reviewed on 2019 03:56 PM by Miladis Vu      No Known Drug Allergies.         Current Medications:     Last Reviewed on 2019 03:56 PM by Miladis Vu    Hydrochlorothiazide (HCTZ) 25mg Tablet 1 tab daily     Simvastatin 20mg Tablet 1 tab daily     Arnuity Ellipta 100mcg/1actuation Inhalation Powder Take 1 inhalation(s) by mouth daily     Stiolto Respimat 2.5mcg/2.5mcg per 1actuation Oral Inhaler One puff daily         OBJECTIVE:        Vitals:         Current: 2019 3:56:58 PM    Ht:  5 ft, 7 in;  Wt: 170.6 lbs;  BMI: 26.7    T: 98.4 F (tympanic);  BP: 130/75 mm Hg (left  arm, sitting);  P: 87 bpm (left arm (BP Cuff), sitting);  sCr: 0.77 mg/dL;  GFR: 97.04        Exams:     PHYSICAL EXAM:     GENERAL: Vitals recorded well developed, well nourished;  well groomed;  no apparent distress;     NECK: carotid exam reveals no bruits;     RESPIRATORY: normal respiratory rate and pattern with no distress; normal breath sounds with no rales, rhonchi, wheezes or rubs;     CARDIOVASCULAR: normal rate; rhythm is regular;  no systolic murmur; no edema;     SKIN: a rash is noted on the left palm;  the color is mainly white;  it is best characterized as macular to scaling;     PSYCHIATRIC:  appropriate affect and demeanor; normal speech pattern; grossly normal memory;         ASSESSMENT           401.1   I10  Hypertension              DDx:     272.4   E78.5  Hyperlipidemia              DDx:     782.1   R21  Rash              DDx:         ORDERS:         Meds Prescribed:       Refill of: Hydrochlorothiazide (HCTZ) 25mg Tablet 1 tab daily  #90 (Ninety) tablet(s) Refills: 0       Refill of: Simvastatin 20mg Tablet 1 tab daily  #90 (Ninety) tablet(s) Refills: 0       Betamethasone/Clotrimazole 0.05%/1% Cream Apply small amount to affected area bid  #45 (Forty Five) gm Refills: 2         Lab Orders:       FUTURE  Future order to be done at patients convenience  (Send-Out)         [id number]  [facility] Comp. Metabolic Panel  (Send-Out)         [id number]  [facility] Lipid Panel  (Send-Out)           Other Orders:       1101F  Pt screen for fall risk; document no falls in past year or only 1 fall w/o injury in past year (TEJA)  (In-House)         4004F  Pt scrnd tobacco use rcvd tobacco cessation talk  (In-House)                   PLAN:          Hypertension     MIPS Has had no falls in the past year   Smoking cessation encouraged. Counseling for less than 3 minutes.      FOLLOW-UP TESTING #1: FOLLOW-UP LABORATORY:  Labs to be scheduled in the future include CMP and lipid panel.      RECOMMENDATIONS given include:  perform routine monitoring of blood pressure with home blood pressure cuff.      FOLLOW-UP: fasting for labs           Prescriptions:       Refill of: Hydrochlorothiazide (HCTZ) 25mg Tablet 1 tab daily  #90 (Ninety) tablet(s) Refills: 0           Orders:       FUTURE  Future order to be done at patients convenience  (Send-Out)         54931  [facility] Comp. Metabolic Panel  (Send-Out)         72013  [facility] Lipid Panel  (Send-Out)         1101F  Pt screen for fall risk; document no falls in past year or only 1 fall w/o injury in past year (TEJA)  (In-House)         4004F  Pt scrnd tobacco use rcvd tobacco cessation talk  (In-House)            Hyperlipidemia           Prescriptions:       Refill of: Simvastatin 20mg Tablet 1 tab daily  #90 (Ninety) tablet(s) Refills: 0          Rash call me if rash not clear in a month, will send to derm; discussed exposures, d eczema vs fungal rash            Prescriptions:       Betamethasone/Clotrimazole 0.05%/1% Cream Apply small amount to affected area bid  #45 (Forty Five) gm Refills: 2           Patient Education Handouts:       Eczema (Atopic Dermatitis)              Patient Recommendations:        For  Hypertension:             The following laboratory testing has been ordered: metabolic panel, comprehensive lipid panel Begin monitoring your blood pressure by brief nurse visits at our office, a home blood pressure monitor, or by checking on the machines in pharmacies or stores.  Keep a log of the readings.              CHARGE CAPTURE           **Please note: ICD descriptions below are intended for billing purposes only and may not represent clinical diagnoses**        Primary Diagnosis:         401.1 Hypertension            I10    Essential (primary) hypertension              Orders:          38899   Office/outpatient visit; established patient, level 4  (In-House)             1101F   Pt screen for fall risk; document no falls in past year or only 1 fall w/o injury in  past year (TEJA)  (In-House)             4004F   Pt scrnd tobacco use rcvd tobacco cessation talk  (In-House)           272.4 Hyperlipidemia            E78.5    Hyperlipidemia, unspecified    782.1 Rash            R21    Rash and other nonspecific skin eruption

## 2021-05-18 NOTE — PROGRESS NOTES
Sumeet Gomes  1960     Office/Outpatient Visit    Visit Date: Tue, Mar 9, 2021 02:43 pm    Provider: Mirtha Alexander N.P. (Assistant: Nadine Arnold MA)    Location: [facility]        Electronically signed by Mirtha Alexander N.P. on  03/09/2021 03:12:03 PM                             Subjective:        CC: Olman is a 60 year old White male.  This is a follow-up visit.          HPI:           Patient to be evaluated for hyperlipidemia, unspecified.  Current treatment includes Zocor.  Compliance with treatment has been good; he takes his medication as directed.  He denies experiencing any hypercholesterolemia related symptoms.  Most recent lab tests include Total Cholesterol:  137 (mg/dL) (07/18/2020), HDL:  48 (mg/dL) (07/18/2020), Triglycerides:  96 (mg/dL) (07/18/2020), LDL:  70 (mg/dL) (07/18/2020), Glucose, Serum:  108 (mg/dL) (07/18/2020), ALT (SGPT):  22 (U/L) (07/18/2020).            With regard to the essential (primary) hypertension, his current cardiac medication regimen includes a diuretic ( Hydrochlorothiazide (HCTZ) ).  Olman does not check his blood pressure other than at his clinic appointments.  He is tolerating the medication well without side effects.  Compliance with treatment has been good; he takes his medication as directed.      ROS:     CONSTITUTIONAL:  Negative for fever.      CARDIOVASCULAR:  Negative for chest pain, palpitations, tachycardia, orthopnea, and edema.      RESPIRATORY:  Negative for recent cough and dyspnea.      NEUROLOGICAL:  Negative for dizziness, headaches, paresthesias, and weakness.          Past Medical History / Family History / Social History:         Last Reviewed on 3/09/2021 02:58 PM by Mirtha Alexander    Past Medical History:             PAST MEDICAL HISTORY         COPD     Hospitalizations: back surgery         PREVENTIVE HEALTH MAINTENANCE             Hepatitis C Medicare Screening: was last done 1-2018; negative          Surgical History:         Arthroscopy: left knee  and right shoulder ;     Vasectomy     Other Surgeries:    Laminectomy: lumbar region; ;     Procedures:    Colonoscopy ( 12 )     COLONOSCOPY: was last done 12 with normal results Boo         Family History:     Father: Hypertension;  Dementia     Mother:  at age 54; Cause of death was stroke;  Hypertension;  COPD     Brother(s): Healthy; 3 brother(s) total     Sister(s): 2 sister(s) total         Social History:     Occupation: [employer]     Marital Status:      Children: 3 children         Tobacco/Alcohol/Supplements:     Last Reviewed on 3/09/2021 02:45 PM by Nadine Arnold    Tobacco: Current Smoker: He currently smokes every day, 1/2 pack per day.  smokes about 7 per day         Alcohol: Frequency: Socially He typically consumes beer.          Substance Abuse History:     Last Reviewed on 10/03/2018 01:02 PM by Raquel Alexander    None         Mental Health History:     Last Reviewed on 10/03/2018 01:02 PM by Raquel Alexander        Communicable Diseases (eg STDs):     Last Reviewed on 10/03/2018 01:02 PM by Raquel Alexander        Immunizations:     Prevnar 13 (Pneumococcal PCV 13) 2018    Fluzone Quadrivalent (3+ years) 10/3/2017    Fluzone High-Dose pf (>=65 yr) 10/3/2018    Adacel (Tdap) 2012    Zostavax (Zoster live) 2012        Allergies:     Last Reviewed on 3/09/2021 02:45 PM by Nadine Arnold    No Known Allergies.        Current Medications:     Last Reviewed on 3/09/2021 02:45 PM by Nadine Arnold    hydroCHLOROthiazide 25 mg oral tablet [TAKE 1 TABLET BY MOUTH EVERY DAY]    simvastatin 20 mg oral tablet [Take 1 tablet by mouth once daily]    ANORO PITFEJ71.5-25 AER  [INHALE 1 PUFF BY MOUTH ONCE DAILY]    FLOVENT HFA 220MCG  AER  [INHALE 2 PUFFS BY MOUTH TWICE DAILY (TO REPLACE ARNUITY)]    ALBUTEROL    AER HFA     buPROPion  mg oral tablet, sustained-release 12 hr [take 1  tablet (150 mg) by oral route once daily]        Objective:        Vitals:         Current: 3/9/2021 2:47:15 PM    Ht:  5 ft, 7 in;  Wt: 164.6 lbs;  BMI: 25.8T: 97.7 F (temporal);  BP: 132/85 mm Hg (right arm, sitting);  P: 86 bpm (right arm (BP Cuff), sitting);  sCr: 0.96 mg/dL;  GFR: 74.87        Exams:     PHYSICAL EXAM:     GENERAL: Vitals recorded well developed, well nourished;  well groomed;  no apparent distress;     NECK: carotid exam reveals no bruits;     RESPIRATORY: normal respiratory rate and pattern with no distress; normal breath sounds with no rales, rhonchi, wheezes or rubs;     CARDIOVASCULAR: normal rate; rhythm is regular;  no systolic murmur; no edema;     PSYCHIATRIC:  appropriate affect and demeanor; normal speech pattern; grossly normal memory;         Assessment:         E78.5   Hyperlipidemia, unspecified       I10   Essential (primary) hypertension       J44.9   Chronic obstructive pulmonary disease, unspecified           ORDERS:         Meds Prescribed:       [Refilled] simvastatin 20 mg oral tablet [Take 1 tablet by mouth once daily], #90 (ninety) tablets, Refills: 1 (one)       [Refilled] hydroCHLOROthiazide 25 mg oral tablet [TAKE 1 TABLET BY MOUTH EVERY DAY], #90 (ninety) each, Refills: 1 (one)         Lab Orders:       FUTURE  Future order to be done at patients convenience  (Send-Out)            73094  [facility] CMP AND LIPID: [id number], [id number]  (Send-Out)                      Plan:         Hyperlipidemia, unspecifiedhe is off work this Saturday / advised to get covid vaccines    MIPS Smoking cessation encouraged. Counseling for less than 3 minutes.      FOLLOW-UP TESTING #1: FOLLOW-UP LABORATORY:  Labs to be scheduled in the future include HTN/Lipid Panel: CMP, Lipid.      RECOMMENDATIONS given include: smoking cessation, exercise, and low cholesterol/low fat diet.      FOLLOW-UP: fasting for labs           Prescriptions:       [Refilled] simvastatin 20 mg oral tablet [Take 1 tablet  by mouth once daily], #90 (ninety) tablets, Refills: 1 (one)           Orders:       FUTURE  Future order to be done at patients convenience  (Send-Out)            02430  [facility] CMP AND LIPID: [id number], [id number]  (Send-Out)              Essential (primary) hypertension        RECOMMENDATIONS given include: perform routine monitoring of blood pressure with home blood pressure cuff.            Prescriptions:       [Refilled] hydroCHLOROthiazide 25 mg oral tablet [TAKE 1 TABLET BY MOUTH EVERY DAY], #90 (ninety) each, Refills: 1 (one)         Chronic obstructive pulmonary disease, unspecifiedlast pulm note /send for CT  chest / recent CT, has started smoking again            Patient Recommendations:        For  Hyperlipidemia, unspecified:            The following laboratory testing has been ordered: Stop smoking! Maintain a regular exercise program. Reduce the amount of cholesterol and saturated fat in your diet.          For  Essential (primary) hypertension:    Begin monitoring your blood pressure by brief nurse visits at our office, a home blood pressure monitor, or by checking on the machines in pharmacies or stores.  Keep a log of the readings.              Charge Capture:         Primary Diagnosis:     E78.5  Hyperlipidemia, unspecified           Orders:      [id number]  Office/outpatient visit; established patient, level 4  (In-House)              I10  Essential (primary) hypertension     J44.9  Chronic obstructive pulmonary disease, unspecified

## 2021-05-23 ENCOUNTER — TRANSCRIBE ORDERS (OUTPATIENT)
Dept: ADMINISTRATIVE | Facility: HOSPITAL | Age: 61
End: 2021-05-23

## 2021-05-23 DIAGNOSIS — R91.1 PULMONARY NODULE: Primary | ICD-10-CM

## 2021-05-28 VITALS
DIASTOLIC BLOOD PRESSURE: 76 MMHG | TEMPERATURE: 97.9 F | SYSTOLIC BLOOD PRESSURE: 128 MMHG | DIASTOLIC BLOOD PRESSURE: 77 MMHG | OXYGEN SATURATION: 90 % | SYSTOLIC BLOOD PRESSURE: 124 MMHG | WEIGHT: 167 LBS | HEIGHT: 67 IN | HEART RATE: 85 BPM | HEIGHT: 67 IN | WEIGHT: 163.12 LBS | OXYGEN SATURATION: 93 % | BODY MASS INDEX: 25.6 KG/M2 | HEART RATE: 84 BPM | RESPIRATION RATE: 16 BRPM | TEMPERATURE: 97.4 F | RESPIRATION RATE: 15 BRPM | BODY MASS INDEX: 26.21 KG/M2

## 2021-05-28 VITALS
BODY MASS INDEX: 25.94 KG/M2 | HEART RATE: 75 BPM | SYSTOLIC BLOOD PRESSURE: 120 MMHG | OXYGEN SATURATION: 92 % | RESPIRATION RATE: 14 BRPM | DIASTOLIC BLOOD PRESSURE: 74 MMHG | TEMPERATURE: 98 F | HEART RATE: 94 BPM | TEMPERATURE: 97.2 F | SYSTOLIC BLOOD PRESSURE: 113 MMHG | BODY MASS INDEX: 25.76 KG/M2 | TEMPERATURE: 98.2 F | HEIGHT: 67 IN | RESPIRATION RATE: 12 BRPM | WEIGHT: 171.12 LBS | BODY MASS INDEX: 30.32 KG/M2 | HEART RATE: 80 BPM | TEMPERATURE: 98 F | HEIGHT: 63 IN | RESPIRATION RATE: 14 BRPM | HEIGHT: 67 IN | DIASTOLIC BLOOD PRESSURE: 79 MMHG | SYSTOLIC BLOOD PRESSURE: 114 MMHG | WEIGHT: 164.12 LBS | WEIGHT: 176.12 LBS | WEIGHT: 165.25 LBS | OXYGEN SATURATION: 91 % | HEART RATE: 91 BPM | OXYGEN SATURATION: 92 % | DIASTOLIC BLOOD PRESSURE: 81 MMHG | RESPIRATION RATE: 12 BRPM | OXYGEN SATURATION: 92 % | DIASTOLIC BLOOD PRESSURE: 74 MMHG | HEIGHT: 63 IN | BODY MASS INDEX: 31.21 KG/M2 | SYSTOLIC BLOOD PRESSURE: 111 MMHG

## 2021-05-28 NOTE — PROGRESS NOTES
Patient: SUMEET GOMES     Acct: [id number]     Report: #[id number]  UNIT #: [id number]     : 1960    Encounter Date:2020  PRIMARY CARE: SHEA MCFADDEN  ***Signed***  --------------------------------------------------------------------------------------------------------------------  TELEHEALTH NOTE      History of Present Illness      Chief Complaint: f/u chest ct results            Sumeet Gomes is presenting for evaluation via Telehealth visit. Verbal consent     obtained before beginning visit.            Provider spent 21 minutes with the patient during telehealth visit including     discussing the case with the patient over the phone and personally reviewing all    pertinent labs, imaging and provider notes.            The following staff were present during the visit: lakesha/ kamran leslie/ md            The patient is a 59 year old  male former cigarettes smoker with severe    chronic obstructive pulmonary disease and solitary lung nodule  who presents for    Telehealth visit today. At his last office visit we noticed a new 9 mm nodule in    the left lower lobe. I did an interval 2 month follow up CT scan of the chest     showing no change in size. He is on anoro, arnuity and his symptoms are at     baseline. 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     He gets short of breath walking about 500-750 feet, he can go up a flight of     step without having to stop. His dyspnea is moderate in severity, worse with ex    ertion, relieved with rest. He has occasional dry hacking cough but no wheezing,    sputum production or hemoptysis. He denies any chest pain. He denies any fever     or chills, headaches and hemoptysis or sick contacts. He has not had a cigarette    in 9 months. He is able to perform his ADL's without difficulty and denies any     swollen glands in his lymph nodes, head or neck.            I personally reviewed Review of  Systems, family, social, surgical and medical     history and agree with their findings.            Physical exam is deferred due to Telehealth visit.                           Past Med History               [facility] Diagnostic [facility]                PACS RADIOLOGY REPORT            Patient: UMU OWENS   Acct: #[id number]   Report: #[id number]            UNIT #: [id number]    DOS: 20 0858      INSURANCE:[employer] - O   ORDER #:[id number]      LOCATION:[facility]     : 1960            PROVIDERS      ADMITTING:     ATTENDING: LUKAS BABCOCK      FAMILY:  NONE,MD   ORDERING:  LUKAS BABCOCK         OTHER:    DICTATING:  DIAMOND GARCIA MD            REQ #:[id number]   [facility]      REASON FOR EXAM:  SOLITARY PULMONARY NODULE      REASON FOR VISIT:  SOL PULM NODULE            *******Signed******         PROCEDURE:   CT CHEST WITHOUT CONTRAST             COMPARISON:   [facility], CT, CT LOW DOSE CHEST SCREENING,     3/29/2017, 16:58.  [facility], CT, CT LOW DOSE CHEST SCREENING, 2020, 15:06.             INDICATIONS:   FOLLOW UP EXAM FOR A SOLITARY PULMONARY NODULE             TECHNIQUE:   CT images were created without the administration of contrast     material.               PROTOCOL:     Standard imaging protocol performed                RADIATION:     DLP: 370  mGy*cm          Automated exposure control was utilized to minimize radiation dose.              FINDINGS:            The 8 mm nodule in the left lower lobe near the bronchovascular structures on    image 39 is       unchanged.  There are no new pulmonary nodules or masses.  The patient has mild     emphysematous       changes.  There are no layering pleural effusions.  There is stable scarring in     the right middle       lobe, in the anterior basilar segment of the right lower lobe, and within the     inferior lingula.    "     There are calcified mediastinal and left hilar lymph nodes indicating old healed    granulomatous       disease.  There are atherosclerotic vascular calcifications which includes     involvement of the       coronary arteries.  There are no acute findings beneath the hemidiaphragms.      There are no       suspicious osteolytic or sclerotic lesions within the bony thorax.             CONCLUSION:         1. Stable 8 mm nodule in the left lower lobe.  I would recommend continued     follow-up to confirm 2       year stability.  Another follow-up exam in 9 months would seem reasonable.      2. Emphysema with chronic scarring.      3. Chronic calcific granulomatous disease.              DIAMOND GARCIA MD             Electronically Signed and Approved By: DIAMOND GARCIA MD on 4/21/2020 at 12:15                        Until signed, this is an unconfirmed preliminary report that may contain      errors and is subject to change.                                              WORBR:      D:04/21/20 1215      Overview of Symptoms      pt states \"  I am not soa coughing wheezing\"            Allergies/Medications      Allergies:        Coded Allergies:             NO KNOWN ALLERGIES (Unverified , 5/7/20)      Medications    Last Reconciled on 5/7/20 11:09 by LUKAS BABCOCK MD      Umeclidinium/Vilanterol 62.5-25 Mcg Inh (Anoro Ellipta 62.5-25 Mcg Inh) 1 Each     Blst.w.dev      1 PUFF INH QDAY, #1 INH 3 Refills         Prov: LUKAS BABCOCK         4/7/20       buPROPion SR (buPROPion SR) 150 Mg Tab.er.12h      150 MG PO ASDIR, #57 TAB.ER.12H         Prov: DENISA MANCERA [facility]         10/28/19       MDI-Albuterol (Proair HFA) 8.5 Gm Hfa.aer.ad      2 PUFFS INH Q4-6H PRN for SHORTNESS OF BREATH, #1 MDI 5 Refills         Prov: DENISA MANCERA [facility]         10/28/19       Fluticasone Furoate (Arnuity Ellipta) 200 Mcg Blst.w.dev      1 PUFF INH RTQDAY, #1 INH         Reported         10/28/19       Simvastatin (Simvastatin*) 20 Mg " Tablet      20 MG PO HS, #30 TAB 0 Refills         Reported         2/13/17       Hctz (hydroCHLOROthiazide) Unknown Strength Tablet      PO QDAY, #30 TAB 0 Refills         Reported         2/13/17            Plan/Instructions      Ambulatory Assessment/Plan:        Solitary lung nodule - R91.1            Notes      New Diagnostics      * Chest W/O Cont CT, 4 Months         Dx: Solitary lung nodule - R91.1      Plan/Instructions      * Plan Of Care: ()            * Chronic conditions reviewed and taken into consideration for today's treatment       plan.      * Patient instructed to seek medical attention urgently for new or worsening       symptoms.      * Patient was educated/instructed on their diagnosis, treatment and medications       prior to discharge from the clinic today.            IMPRESSION:      1. 9 mm solitary left lower lobe lung nodule stable in size X 2 months. He will     need close follow up given high risk of malignancy given this patient's     emphysema and prior smoking history. Too low to detect on PET scan for PET     avidity.       2. Severe chronic obstructive pulmonary disease with FEV1 30% predicted. He does     have significant bronchodilator response and alpha 1 antitrypsin heterozygote.     He is not smoking anymore. GOLD stage C chronic obstructive pulmonary disease     well controlled on triple inhaler therapy. Chronic obstructive pulmonary disease     assessment test score is 7 today signifying adequate disease control.       3. Alpha 1 antitrypsin heterozygote, genotype MZ and level 166, not a candidate     for treatment.       4. Chronic hypoxic respiratory failure.       5. Tobacco abuse of cigarettes in remission after greater than 50 pack smoking     history. He has not had a cigarette in about 8-9 months.             PLAN:      1. Repeat noncontrast CT scan of the chest in 4 months. If this nodule happens     to enlarge in size, we will take the patient for SBRT. Given the  location of the     nodule risk of pneumothorax would be high with biopsy and given enlarging lung     nodule and his severity in lung function, SBRT would be the most reasonable     option. If it is stable in size, we will follow up for 2 years stability per     Fleischner criteria in this high risk patient.       2. Continue arnuity, anoro and albuterol as needed.       3. Continued smoking cessation applauded.       4. I recommend he wear a mask at work and encouraged him to ambulate.       5. Up to date with  flu, Prevnar and Pneumovax.         6. Follow up with us in 4 months.      Codes:  Phone Eval 21-30 mi [id number]            Electronically signed by LUKAS BABCOCK  05/28/2020 18:29       Disclaimer: Converted document may not contain table formatting or lab diagrams. Please see [email or web address] for the authenticated document.

## 2021-05-28 NOTE — PROGRESS NOTES
Patient: UMU OWENS     Acct: [id number]     Report: #[id number]  UNIT #: [id number]     : 1960    Encounter Date:10/22/2020  PRIMARY CARE: SHEA MCFADDEN  ***Signed***  --------------------------------------------------------------------------------------------------------------------  Chief Complaint      Encounter Date      Oct 22, 2020            Primary Care Provider      SHEA MCFADDEN            Referring Provider      SHEA MCFADDEN            Patient Complaint      Patient is complaining of      PT here today for F/U, COPD, Pulmonary Nodule            VITALS      Height 5 ft 7 in / 170.18 cm      Weight 167 lbs  / 75.188096 kg      BSA 1.87 m2      BMI 26.2 kg/m2      Temperature 97.9 F / 36.61 C - Temporal      Pulse 84      Respirations 15      Blood Pressure 124/76 Sitting, Right Arm      Pulse Oximetry 93%, room air            HPI      The patient is a 59 year old male patient of Dr. Chapa who is a cigarette     smoker with severe COPD and solitary lung nodule who presents for follow up     visit today. The patient had a repeat chest CT scan recently completed on     2020 and it showed a stable CT scan of the chest, no evidence for acute     intrathoracic abnormality.  It showed a stable 8 mm nodule in the central aspect    of the left lower lobe adjacent to bronchi and vessels. The patient states he is    currently taking Anoro and Arnuity and uses albuterol inhaler as needed. The     patient states he does not have a nebulizer machine or nebulizer treatment to     complete as needed and would like to have those. The patient states     unfortunately he went back to smoking and is smoking seven cigarettes a day,     however he is taking Wellbutrin to try and quit smoking. The patient would like     to also have some nicotine gum as well. The patient states he does have     intermittent cough and wheezing at times. The patient denies any fever, chills,     night sweats, hemoptysis,  purulent sputum production, chest pain, chest     tightness, swollen glands in the head and neck, nausea, vomiting or diarrhea.      The patient denies any headaches, myalgias, sore throat, changes in sense of     taste and/or smell or any other coronavirus or flu-like symptoms.  The patient     states that he does have oxygen that he does wear at bedtime. The patient states    he has not had to take any antibiotics or steroids since last visit and denies     any hospitalizations since last visit. The patient states he is able to perform     his ADLs.            I have personally reviewed the review of systems, past family, social, surgical     and medical histories and I agree with those as entered in the chart.      Copies To:   LUKAS BABCOCK      Constitutional:  Denies: Fatigue, Fever, Weight gain, Weight loss, Chills,     Insomnia, Other      Respiratory/Breathing:  Complains of: Shortness of air, Wheezing, Cough; Denies:    Hemoptysis, Pleuritic pain, Other      Endocrine:  Denies: Polydipsia, Polyuria, Heat/cold intolerance, Diabetes, Other      Eyes:  Denies: Blurred vision, Vision Changes, Other      Ears, nose, mouth, throat:  Denies: Congestion, Dysphagia, Hearing Changes, Nose    Bleeding, Nasal Discharge, Throat pain, Tinnitus, Other      Cardiovascular:  Denies: Chest Pain, Exertional dyspnea, Peripheral Edema,     Palpitations, Syncope, Wake up Gasping for air, Orthopnea, Tachycardia, Other      Gastrointestinal:  Denies: Abdominal pain/cramping, Bloody stools, Constipation,    Diarrhea, Melena, Nausea, Vomiting, Other      Genitourinary:  Denies: Dysuria, Urinary frequency, Incontinence, Hematuria,     Urgency, Other      Musculoskeletal:  Denies: Joint Pain, Joint Stiffness, Joint Swelling, Myalgias,    Other      Hematologic/lymphatic:  DENIES: Lymphadenopathy, Bruising, Bleeding tendencies,     Other      Neurologic:  Denies: Headache, Numbness, Weakness, Seizures, Other       Psychiatric:  Denies: Anxiety, Appropriate Effect, Depression, Other      Sleep:  No: Excessive daytime sleep, Morning Headache?, Snoring, Insomnia?, Stop    breathing at sleep?, Other      Integumentary:  Denies: Rash, Dry skin, Skin Warm to Touch, Other            FAMILY/SOCIAL/MEDICAL HX      Surgical History:  Yes: Orthopedic Surgery (back surgerie 1990 knee 1995     shoulder 2007)      Stroke - Family Hx:  Mother      Heart - Family Hx:  Father      Other Family Medical History:  Mother      Smoking status:  Current every day smoker ( (.5 ppd x 40 y quit 1/20) but now     smokes 7-10 ciggs per day), Former smoker ( (.5 ppd x 40 y quit 1/20))      Anticoagulation Therapy:  No      Antibiotic Prophylaxis:  No      Medical History:  Yes: High Blood Pressure; No: Sinus Trouble      Psychiatric History      none            PREVENTION      Hx Influenza Vaccination:  Yes      Date Influenza Vaccine Given:  Nov 1, 2019      Influenza Vaccine Declined:  No      2 or More Falls in Past Year?:  No      Fall Past Year with Injury?:  No      Hx Pneumococcal Vaccination:  Yes      Encouraged to follow-up with:  PCP regarding preventative exams.      Chart initiated by      Mely Craft CMA            ALLERGIES/MEDICATIONS      Allergies:        Coded Allergies:             NO KNOWN ALLERGIES (Unverified , 10/22/20)      Medications    Last Reconciled on 10/22/20 15:37 by DENISA MANCERA       Albuterol/Ipratropium (Duoneb) 3 Ml Ampul.neb      3 ML INH Q4H PRN for SHORTNESS OF BREATH, #120 NEB 5 Refills         [facility]         10/22/20       Nicotine Polacrilex (Nicorette) 2 Mg Gum      2 MG PO Q4-6H, #180 BOX         Prov: DENISA MANCERA [facility]         10/22/20       MDI-Albuterol (Proair HFA) 8.5 Gm Hfa.aer.ad      2 PUFFS INH Q4-6H PRN for SHORTNESS OF BREATH, #1 MDI 3 Refills         Prov: LUKAS BABCOCK         7/16/20       Fluticasone Furoate (Arnuity Ellipta) 200 Mcg Blst.w.dev      1 PUFF  INH RTQDAY, #1 INH 3 Refills         Prov: LUKAS CHAPA         7/16/20       Umeclidinium/Vilanterol 62.5-25 Mcg Inh (Anoro Ellipta 62.5-25 Mcg Inh) 1 Each     Blst.w.dev      1 PUFF INH QDAY, #1 INH 3 Refills         Prov: LUKAS CHAPA         7/16/20       Simvastatin (Simvastatin*) 20 Mg Tablet      20 MG PO HS, #30 TAB 0 Refills         Reported         2/13/17       Hctz (hydroCHLOROthiazide) Unknown Strength Tablet      PO QDAY, #30 TAB 0 Refills         Reported         2/13/17      Current Medications      Current Medications Reviewed 10/22/20            EXAM      Vital Signs Reviewed.      General:  WDWN, Alert, NAD.      HEENT: PERRL, EOMI.  OP, nares clear, no sinus tenderness.      Neck: Supple, no JVD, no thyromegaly.      Lymph: No axillary, cervical, supraclavicular lymphadenopathy noted bilaterally.      Chest: Barrel chested, mildly decreased breath sounds throughout, no wheezes,     rales or rhonchi appreciated, normal work of breathing noted.  Patient is able     to speak full sentences without difficulty.        CV: RRR, no MGR, pulses 2+, equal.        Abd: Soft, NT, ND, +BS, no HSM.      EXT: No clubbing, no cyanosis, no edema, no joint tenderness.        Neuro:  A  Skin: No rashes or lesions.      Vitals      Vitals:             Height 5 ft 7 in / 170.18 cm           Weight 167 lbs  / 75.507550 kg           BSA 1.87 m2           BMI 26.2 kg/m2           Temperature 97.9 F / 36.61 C - Temporal           Pulse 84           Respirations 15           Blood Pressure 124/76 Sitting, Right Arm           Pulse Oximetry 93%, room air            REVIEW      Results Reviewed      PCCS Results Reviewed?:  Yes Prev Lab Results, Yes Prev Radiology Results, Yes     Previous Mecial Records      Lab Results      I reviewed patient's noncontrast chest CT dated for 09/01/2020.  It shows stable    lung nodule at 8 mm.  I reviewed Dr. Chapa's last office visit note.      Radiographic Results                [facility]                PACS RADIOLOGY REPORT            Patient: UMU OWENS   Acct: #[id number]   Report: #[id number]            UNIT #: [id number]    DOS: 20 1348      INSURANCE:[employer] - O   ORDER #:[id number]      LOCATION:[facility]     : 1960            PROVIDERS      ADMITTING:     ATTENDING: LUKAS BABCOCK      FAMILY:  SHEA MCFADDEN   ORDERING:  LUKAS BABCOCK         OTHER:    DICTATING:  Len Ayala MD            REQ #:[id number]   EXAM:[facility] - CT CHEST without CONTRAST      REASON FOR EXAM:  R91.1      REASON FOR VISIT:  SOLITARY PULM NODULE            *******Signed******         PROCEDURE:   CT CHEST WITHOUT CONTRAST             COMPARISON:   [facility], CT, CHEST W/O CONTRAST, 2020,     9:08.             INDICATIONS:   FOLLOW UP LUNG NODULE             TECHNIQUE:   CT images were created without the administration of contrast     material.               PROTOCOL:     Standard imaging protocol performed                RADIATION:     DLP: 373.4 mGy*cm          Automated exposure control was utilized to minimize radiation dose.              FINDINGS:      Moderate changes of emphysema are noted.  No new well-defined consolidations or     significant pleural       effusions are observed.  There is a stable 8 mm nodule in the central aspect of     the left lower lobe       adjacent to the bronchi and vessels.  No new dominant pulmonary nodules or abno    rmal pulmonary       masses are seen.              Note is made of mucoid secretions within the right lower lobe bronchi.             No significant hilar, mediastinal, or axillary lymphadenopathy is seen. A normal    aortic arch       branching pattern is noted. The thyroid gland is unremarkable. The esophagus is     unremarkable.             The limited evaluation of the upper abdomen demonstrates no definitive acute     abnormalities.  The        upper abdomen is stable.             No acute osseous abnormalities are seen.                CONCLUSION:         1. Stable CT of the chest.  No evidence for acute intrathoracic abnormality.      2. Stable 8 mm nodule in the central aspect of the left lower lobe adjacent to     the bronchi and       vessels.              JOSE JUAN HERNANDEZ MD             Electronically Signed and Approved By: JOSE JUAN HERNANDEZ MD on 9/01/2020 at 16:40                                  Until signed, this is an unconfirmed preliminary report that may contain      errors and is subject to change.                                              HAZDA:      D:09/01/20 1640            Assessment      Solitary lung nodule - R91.1            Severe chronic obstructive pulmonary disease - J44.9            Notes      New Medications      * Nicotine Polacrilex (Nicorette) 2 MG GUM: 2 MG PO Q4-6H #180      * Albuterol/Ipratropium (Duoneb) 3 ML AMPUL.NEB: 3 ML INH Q4H PRN SHORTNESS OF       BREATH #120         Instructions: J44.9         Dx: Severe chronic obstructive pulmonary disease - J44.9      * Umeclidinium/Vilanterol 62.5-25 Mcg Inh (Anoro Ellipta 62.5-25 Mcg Inh) 1 EACH      BLST.W.DEV          Sample - Qty 3      Discontinued Medications      * buPROPion  MG TAB.ER.12H: 150 MG PO ASDIR #57      * Umeclidinium/Vilanterol 62.5-25 Mcg Inh (Anoro Ellipta 62.5-25 Mcg Inh) 1 EACH      BLST.W.DEV: 1 PUFF INH QDAY #1      New Diagnostics      * Chest W/O Cont CT, 6 Months         Dx: Solitary lung nodule - R91.1      IMPRESSION:      1.  8 mm solitary left lower lobe nodule, stable in size.  The patient will need    close follow up given high risk of malignancy given this patient's emphysema and    prior smoking history.        2.  High risk of malignancy given patient's emphysema and prior smoking history.     Two low to detect on PET scan for PET avidity.      3. Severe COPD with FEV1 of 30% of predicted. The patient does have significant      bronchodilator response and alpha 1 antitrypsin heterozygote.  Gold stage C COPD    on triple inhaler therapy.      4. Alpha 1 antitrypsin heterozygote genotype MZ, level 166, not a candidate for     treatment.      5. Chronic hypoxic respiratory.  The patient on oxygen at bedtime.      6. Tobacco abuse with cigarettes.  The patient states he did start smoking again    and is smoking seven cigarettes a day.              PLAN:      1.  The patient to continue Arnuity and Anoro everyday as prescribed and rinse     his mouth out after each use.      2. I will prescribe patient nebulizer machine and use DuoNebs up to four times a    day as needed.      3. The patient to continue albuterol inhaler as needed.      4. I spent five minutes today counseling the patient on smoking cessation.  I     counseled the patient on the risks of continued smoking including the risk of     lung cancer, head and neck cancer, renal cancer, heart disease, stroke, and     early death.  Nicotine gum sent to the pharmacy today. The patient is advised to    decrease the number of cigarettes he is smoking up to the point to where he can     quit.      5. The patient again is encouraged to wear a mask at work and encouraged patient    to ambulate.      6. I will repeat a chest CT scan again in six months.      7. Patient is advised to call the office, 911 or go to the ER with any new or     worsening symptoms.      8. Up-to-date with flu, Prevnar and Pneumovax.      9. Follow up in 4-6 months, sooner if needed.            Patient Education      Tobacco Cessation Counseling:  for 3 - 10 minutes      Patient Education Provided:  COPD, Smoking Cessation      Time Spent:  > 50% /Coord Care            Electronically signed by DENISA MANCERA [facility]  10/26/2020 20:07       Disclaimer: Converted document may not contain table formatting or lab diagrams. Please see [employer] for the authenticated document.

## 2021-05-28 NOTE — PROGRESS NOTES
Patient: UMU OWENS     Acct: [id number]     Report: #[id number]  UNIT #: [id number]     : 1960    Encounter Date:2018  PRIMARY CARE: SHEA MCFADDEN  ***Signed***  --------------------------------------------------------------------------------------------------------------------  Chief Complaint      Encounter Date      2018            Referring Provider      SHEA MCFADDEN            Patient Complaint      Patient is complaining of      6 mo f/u            VITALS      Height 5 ft 3 in / 160.02 cm      Weight 176 lbs 2.000 oz / 79.758885 kg      BSA 1.91 m2      BMI 31.2 kg/m2      Temperature 97.2 F / 36.22 C - Temporal      Pulse 91      Respirations 12      Blood Pressure 114/74 Sitting, Left Arm      Pulse Oximetry 91%, room air      Exhaled Nitrous Oxide Testin            HPI      The patient is a very pleasant 57 year old  male cigarettes smoker who     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             He has severe chronic obstructive pulmonary disease and is currently on arnuity     as well as Stiolto. He notes he is doing much better and overall he hardly even     notices any breathing issues at this point. He can work without having any     issues with his breathing. He gets short of breath walking about 2000 feet, it     is worse with exertion, better with rest and inhalers. He has not had any cough     or sputum production. He denies any wheezing, chest pain or hemoptysis. He     denies any nausea and vomiting, fevers or chills or headaches. He only uses his     rescue inhaler roughly 1-2 days per week. At his last visit it was only using     it once a day.             He is still smoking 2-3 cigarettes a day. He is able to perform his activities     of daily living without difficulty and denies any swollen lymph nodes or glands     in her head and neck.             I  have personally reviewed the Review of Systems, past family, social, surgical     and medical histories and I agree with the findings.            ROS      Constitutional:  Denies: Fatigue, Fever, Weight gain, Weight loss, Chills,     Insomnia, Other      Respiratory/Breathing:  Complains of: Shortness of air, Wheezing, Cough, Denies    : Hemoptysis, Pleuritic pain, Other      Eyes:  Denies: Blurred vision, Vision Changes, Other      Ears, nose, mouth, throat:  Denies: Mouth lesions, Thrush, Throat pain,     Hoarseness, Allergies/Hay Fever, Post Nasal Drip, Headaches, Recent Head Injury    , Nose Bleeding, Neck Stiffness, Thyroid Mass, Hearing Loss, Ear Fullness, Dry     Mouth, Nasal or Sinus Pain, Dry Lips, Nasal discharge, Nasal congestion, Other      Cardiovascular:  Denies: Palpitations, Syncope, Claudication, Chest Pain, Wake     up Gasping for air, Leg Swelling, Irregular Heart Rate, Cyanosis, Dyspnea on     Exertion, Other      Gastrointestinal:  Denies: Nausea, Constipation, Diarrhea, Abdominal pain,     Vomiting, Difficulty Swallowing, Reflux/Heartburn, Dysphagia, Jaundice, Bloating    , Melena, Bloody stools, Other      Genitourinary:  Denies: Urinary frequency, Incontinence, Hematuria, Urgency,     Nocturia, Dysuria, Testicular problems, Other      Musculoskeletal:  Denies: Joint Pain, Joint Stiffness, Joint Swelling, Myalgias    , Other      Hematologic/lymphatic:  DENIES: Lymphadenopathy, Bruising, Bleeding tendencies,     Other      Neurological:  Denies: Headache, Numbness, Weakness, Seizures, Other      Psychiatric:  Denies: Anxiety, Appropriate Effect, Depression, Other      Sleep:  No: Excessive daytime sleep, Morning Headache?, Snoring, Insomnia?,     Stop breathing at sleep?, Other      Integumentary:  Denies: Rash, Dry skin, Skin Warm to Touch, Other      Immunologic/Allergic:  Denies: Latex allergy, Seasonal allergies, Asthma,     Urticaria, Eczema, Other      Immunization status:  No: Up to  date            FAMILY/SOCIAL/MEDICAL HX      Surgical History:  Yes: Orthopedic Surgery (back surgerie 1990 knee 1995     shoulder 2007)      Stroke - Family Hx:  Mother      Heart - Family Hx:  Father      Other Family Medical History:  Mother (copd)      Is Father Still Living?:  Yes      Is Mother Still Living?:  No      Social History:  Tobacco Use, No Alcohol Use, No Recreational Drug use      Smoking status:  Current every day smoker (1/2 ppd for 40 years )      Smoking history:  25-50 pack years      Anticoagulation Therapy:  No      Antibiotic Prophylaxis:  No      Medical History:  Yes: High Blood Pressure, No: Sinus Trouble            Hx Influenza Vaccination:  Yes      Date Influenza Vaccine Given:  Dec 1, 2017      Influenza Vaccine Declined:  No      2 or More Falls Past Year?:  No      Fall Past Year with Injury?:  No      Hx Pneumococcal Vaccination:  Yes      Encouraged to follow-up with:  PCP regarding preventative exams.      Chart initiated by      hiral andrew            ALLERGIES/MEDICATIONS      Allergies:        Coded Allergies:             NO KNOWN ALLERGIES (Unverified , 1/22/18)      Medications    Last Reconciled on 1/22/18 15:30 by LUKAS BABCOCK MD      Fluticasone Furoate (Arnuity Ellipta) 100 Mcg Blst.w.dev      1 PUFF INH RTQDAY, #1 INH 5 Refills         Prov: LUKAS BABCOCK         1/22/18       Tiotropium Br/Olodaterol HCl (Stiolto Respimat Inhal Spray) 4 Gm Mist.inhal      2 PUFFS INH RTQDAY, #1 INH 6 Refills         Prov: LUKAS BABCOCK         1/22/18       Simvastatin (Simvastatin*) 20 Mg Tablet      20 MG PO HS, #30 TAB 0 Refills         Reported         2/13/17       Hydrochlorothiazide (Hydrochlorothiazide*) Unknown Strength Tablet      PO QDAY, #30 TAB 0 Refills         Reported         2/13/17      Current Medications      Current Medications Reviewed 1/22/18            EXAM      Vital Signs Reviewed      Gen: WDWN, Alert, NAD.        HEENT:  PERRL, EOMI.  OP, nares  clear      Chest:  Good aeration, clear to auscultation bilaterally, tympanic to     percussion bilaterally, no work of breathing noted.      CV:  RRR, no MGR, pulses 2+, equal.      Abd:  Soft, NT, ND, + BS, no HSM.      EXT:  No clubbing, no cyanosis, no edema      Skin: No rashes or lesions.      Vtials      Vitals:             Height 5 ft 3 in / 160.02 cm           Weight 176 lbs 2.000 oz / 79.718656 kg           BSA 1.91 m2           BMI 31.2 kg/m2           Temperature 97.2 F / 36.22 C - Temporal           Pulse 91           Respirations 12           Blood Pressure 114/74 Sitting, Left Arm           Pulse Oximetry 91%, room air            REVIEW      Results Reviewed      [facility] Results Reviewed?:  [facility]            PLAN      Assessment      Notes      Renewed Medications      * Tiotropium Br/Olodaterol HCl (Stiolto Respimat Inhal [place]) 4 GM MIST.INHAL:     2 PUFFS INH RTQDAY #1       Instructions: to replace anoro      * Fluticasone Furoate (Arnuity Ellipta) 100 MCG BLST.W.DEV: 1 PUFF INH RTQDAY #1      IMPRESSION:       1. Severe chronic obstructive pulmonary disease. FEV1 37% predicted with     significant bronchodilator response, alpha-1 antitrypsin heterozygote, chronic     bronchitis phenotype, GOLD stage C chronic obstructive pulmonary disease. On     triple inhaler therapy. Chronic obstructive pulmonary disease assessment test     score markedly improved form 22 down to 3 today.       2. Alpha-1 antitrypsin heterozygote.  Genotype MZ with level 166. Not a     candidate for treatment.       3. Chronic hypercapnic respiratory failure secondary to chronic obstructive     pulmonary disease.       4. Tobacco abuse of cigarettes. Patient with greater than 50 pack year smoking     history and smoking 3 cigarettes a day and continues to cut back.             PLAN:       1. Patient is overall doing much better.       2. Continue Stiolto and arnuity at current does. Daily use encouraged.        3. Encouraged the patient to completely get off cigarettes given his alpha-1     antitrypsin mutation and severity of chronic obstructive pulmonary disease. He     verbalized understanding and continues to try.       4. Smoking cessation counseling provided. I spent 6 minutes today counseling     the patient on risks of smoking, including throat cancer, lung cancer, COPD,     heart disease and death. I also discussed the benefits of quitting. Cut back 1     cigarettes every week. 1-800 QUIT NOW number provided.       5. Refuses overnight oximetry and nocturnal oxygen.       6. Refuses pulmonary rehabilitation.       7.  Up to date with flu and Pneumovax. Prevnar when he is 66.       8. Patient to follow up with me in 9-12 months.            Patient Education      Patient Education Provided:  COPD, Smoking Cessation                 Disclaimer: Converted document may not contain table formatting or lab diagrams. Please see [employer] [facility] for the authenticated document.

## 2021-05-28 NOTE — PROGRESS NOTES
Patient: UMU OWENS     Acct: [id number]     Report: #[id number]  UNIT #: [id number]     : 1960    Encounter Date:2021  PRIMARY CARE: SHEA MCFADDEN  ***Signed***  --------------------------------------------------------------------------------------------------------------------  Chief Complaint      Encounter Date      2021            Primary Care Provider      SHEA MCFADDEN            Referring Provider      SHEA MCFADDEN            Patient Complaint      Patient is complaining of      Pt here for 6m f/u, ct results, copd            VITALS      Height 5 ft 7 in / 170.18 cm      Weight 163 lbs 2 oz / 73.11282 kg      BSA 1.85 m2      BMI 25.5 kg/m2      Temperature 97.4 F / 36.33 C - Temporal      Pulse 85      Respirations 16      Blood Pressure 128/77 Sitting, Right Arm      Pulse Oximetry 90%, room air            HPI      The patient is a 60 year old male patient of Dr. Chapa's who is a current     cigarette smoker with severe COPD and solitary lung nodule who presents for     follow up visit today.  The patient had a repeat chest CT scan completed on     2021 and it showed a stable 8 mm left lower lobe pulmonary nodule and     showed a new right upper lobe 8 mm ground glass nodule recommending a six month     follow up.  The patient is already scheduled to have repeat chest CT scan on     2021 at 10:45 a.m.  The patient states that since last visit his breathing    is doing well. The patient states at times he will get short of breath that is     worse with exertion, moderate in severity and improved with rest.  The patient     states at times he has intermittent cough and wheezing. The patient states he is    taking Anoro and Arnuity everyday as prescribed and uses DuoNebs and albuterol     inhaler as needed. The patient currently denies any fever, chills, night sweats,    hemoptysis, purulent sputum production, chest pain, chest tightness, swollen     glands in the  head and neck, abdominal pain, nausea, vomiting or diarrhea.   The    patient denies any headaches, myalgias, sore throat, changes in sense of taste     and/or smell or any other coronavirus or flu-like symptoms.  The patient denies     any leg swelling, paroxysmal nocturnal dyspnea or orthopnea.  The patient states    he is able to perform ADLs without difficulty.  The patient states he is still     smoking and he will try to quit on his own as he is getting ready to start     Wellbutrin.            I have personally reviewed the review of systems, past family, social, surgical     and medical histories and I agree with those as entered in the chart.      Copies To:   LUKAS BABCOCK      Constitutional:  Denies: Fatigue, Fever, Weight gain, Weight loss, Chills,     Insomnia, Other      Respiratory/Breathing:  Complains of: Shortness of air, Wheezing, Cough; Denies:    Hemoptysis, Pleuritic pain, Other      Endocrine:  Denies: Polydipsia, Polyuria, Heat/cold intolerance, Diabetes, Other      Eyes:  Denies: Blurred vision, Vision Changes, Other      Ears, nose, mouth, throat:  Denies: Congestion, Dysphagia, Hearing Changes, Nose    Bleeding, Nasal Discharge, Throat pain, Tinnitus, Other      Cardiovascular:  Denies: Chest Pain, Exertional dyspnea, Peripheral Edema,     Palpitations, Syncope, Wake up Gasping for air, Orthopnea, Tachycardia, Other      Gastrointestinal:  Denies: Abdominal pain/cramping, Bloody stools, Constipation,    Diarrhea, Melena, Nausea, Vomiting, Other      Genitourinary:  Denies: Dysuria, Urinary frequency, Incontinence, Hematuria,     Urgency, Other      Musculoskeletal:  Denies: Joint Pain, Joint Stiffness, Joint Swelling, Myalgias,    Other      Hematologic/lymphatic:  DENIES: Lymphadenopathy, Bruising, Bleeding tendencies,     Other      Neurologic:  Denies: Headache, Numbness, Weakness, Seizures, Other      Psychiatric:  Denies: Anxiety, Appropriate Effect, Depression, Other       Sleep:  No: Excessive daytime sleep, Morning Headache?, Snoring, Insomnia?, Stop    breathing at sleep?, Other      Integumentary:  Denies: Rash, Dry skin, Skin Warm to Touch, Other            FAMILY/SOCIAL/MEDICAL HX      Surgical History:  Yes: Orthopedic Surgery (back surgerie 1990 knee 1995     shoulder 2007)      Stroke - Family Hx:  Mother      Heart - Family Hx:  Father      Other Family Medical History:  Mother      Is Father Still Living?:  No      Is Mother Still Living?:  No      Social History:  Tobacco Use; No Alcohol Use, No Recreational Drug use      Smoking status:  Current every day smoker ( (.5 ppd x 40 y quit 1/20) but now     smokes 7-10 ciggs per day)      Anticoagulation Therapy:  No      Antibiotic Prophylaxis:  No      Medical History:  Yes: High Blood Pressure; No: Sinus Trouble      Psychiatric History      None            PREVENTION      Hx Influenza Vaccination:  Yes      Date Influenza Vaccine Given:  Nov 1, 2020      Influenza Vaccine Declined:  No      2 or More Falls in Past Year?:  No      Fall Past Year with Injury?:  No      Hx Pneumococcal Vaccination:  Yes      Encouraged to follow-up with:  PCP regarding preventative exams.      Chart initiated by      Catherine Adkins MA            ALLERGIES/MEDICATIONS      Allergies:        Coded Allergies:             NO KNOWN ALLERGIES (Unverified , 4/8/21)      Medications    Last Reconciled on 4/8/21 14:58 by DENISA MANCERA       Fluticasone (Flovent HFA) 220 Mcg Inhaler      2 PUFF INH BID for 30 Days, #1 INH 11 Refills         Prov: DENISA MANCERA [facility]         4/8/21       Umeclidinium/Vilanterol 62.5-25 Mcg Inh (Anoro Ellipta 62.5-25 Mcg Inh) 1 Each     Blst.w.dev      1 PUFF INH QDAY for 30 Days, #1 INH 11 Refills         Prov: DENISA MANCERA [facility]         4/8/21       MDI-Albuterol (Proair HFA) 8.5 Gm Hfa.aer.ad      2 PUFFS INH Q4-6H PRN for SHORTNESS OF BREATH for 30 Days, #1 MDI 11 Refills         Prov: DENISA MANCERA  [facility]S         4/8/21       Albuterol/Ipratropium (Duoneb) 3 Ml Ampul.neb      3 ML INH Q4H PRN for SHORTNESS OF BREATH, #120 NEB 5 Refills         Prov: DENISA MANCERA [facility]         4/8/21       Simvastatin (Simvastatin*) 20 Mg Tablet      20 MG PO HS, #30 TAB 0 Refills         Reported         2/13/17       Hctz (hydroCHLOROthiazide) Unknown Strength Tablet      PO QDAY, #30 TAB 0 Refills         Reported         2/13/17      Current Medications      Current Medications Reviewed 4/8/21            EXAM      Vital Signs Reviewed.      General:  WDWN, Alert, NAD.      HEENT: PERRL, EOMI.  OP, nares clear, no sinus tenderness.      Neck: Supple, no JVD, no thyromegaly.      Lymph: No axillary, cervical, supraclavicular lymphadenopathy noted bilaterally.      Chest: Barrel chested, mildly decreased breath sounds throughout, no wheezes,     rales or rhonchi appreciated, normal work of breathing noted.  Patient is able     to speak full sentences without difficulty.        CV: RRR, no MGR, pulses 2+, equal.        Abd: Soft, NT, ND, +BS, no HSM.      EXT: No clubbing, no cyanosis, no edema, no joint tenderness.        Neuro:  A  Skin: No rashes or lesions.      Vitals      Vitals:             Height 5 ft 7 in / 170.18 cm           Weight 163 lbs 2 oz / 73.14053 kg           BSA 1.85 m2           BMI 25.5 kg/m2           Temperature 97.4 F / 36.33 C - Temporal           Pulse 85           Respirations 16           Blood Pressure 128/77 Sitting, Right Arm           Pulse Oximetry 90%, room air            REVIEW      Results Reviewed      PCCS Results Reviewed?:  Yes Prev Lab Results, Yes Prev Radiology Results, Yes     [facility]      Lab Results      I reviewed my last office visit note.      Radiographic Results               [facility] Diagnostic [facility]                PACS RADIOLOGY REPORT            Patient: UMU OWENS   Acct: #[id number]   Report:  #[id number]            UNIT #: [id number]    DOS: 21 1030      INSURANCE:BLUE ACCESS [facility]   ORDER #:[id number]      LOCATION:[place]     : 1960            PROVIDERS      ADMITTING:     ATTENDING: DENISA MANCERA      FAMILY:  SHEA MCFADDEN   ORDERING:  DENISA MANCERA PCCS         OTHER:    DICTATING:  Isai Mckeon MD            REQ #:[id number]   EXAM:WO - CT CHEST without CONTRAST      REASON FOR EXAM:  SOLITARY PULMONARY NODULE R91.1      REASON FOR VISIT:  PULMONARY NODULE            *******Signed******         PROCEDURE:   CT CHEST WITHOUT CONTRAST             [facility]     9[facility]08.  [facility] 2020, 14:33.             INDICATIONS:   SOLITARY PULMONARY NODULE R91.1             TECHNIQUE:   CT images were created without the administration of contrast     material.               PROTOCOL:     Standard imaging protocol performed                RADIATION:     DLP: 335.3mGy*cm          Automated exposure control was utilized to minimize radiation dose.              FINDINGS:         8 mm nodule in the left lower lobe along the bronchovascular bundle on image     number 40 is unchanged       compared to multiple prior exams.  There is a new ground-glass nodule measuring     8 mm in the right       upper lobe on image number 26. Changes of emphysema are stable.  Thyroid is     normal.  No axillary or       mediastinal adenopathy.  Aorta and pulmonary artery are normal in caliber.  The     esophagus is       unremarkable.  Limited evaluation of the upper abdomen is unremarkable.  No     aggressive appearing       lytic or sclerotic bone lesions are identified.             CONCLUSION:         1. Stable 8 mm left lower lobe pulmonary nodule      2. New right upper lobe 8 mm ground-glass nodule.  Recommend 6 month follow-up.              ISAI MCKEON MD              Electronically Signed and Approved By: BETO STUART MD on 3/02/2021 at 11:34                               Until signed, this is an unconfirmed preliminary report that may contain      errors and is subject to change.                                              DUNS1:      D:03/02/21 1134            Assessment      Notes      Renewed Medications      * Albuterol/Ipratropium (Duoneb) 3 ML AMPUL.NEB: 3 ML INH Q4H PRN SHORTNESS OF       BREATH #120         Instructions: J44.9         Dx: Severe chronic obstructive pulmonary disease - J44.9      * MDI-Albuterol (Proair HFA) 8.5 GM HFA.AER.AD: 2 PUFFS INH Q4-6H PRN SHORTNESS       OF BREATH 30 Days #1      * Umeclidinium/Vilanterol 62.5-25 Mcg Inh (Anoro Ellipta 62.5-25 Mcg Inh) 1 EACH      BLST.W.DEV: 1 PUFF INH QDAY 30 Days #1      Changed Medications      * Fluticasone (Flovent HFA) 220 MCG INHALER:         From: 2 PUFF INH RTBID 30 Days #1         To: 2 PUFF INH BID 30 Days #1      Discontinued Medications      * Nicotine Polacrilex (Nicorette) 2 MG GUM: 2 MG PO Q4-6H #180      * Umeclidinium/Vilanterol 62.5-25 Mcg Inh (Anoro Ellipta 62.5-25 Mcg Inh) 1 EACH      BLST.W.DEV          Sample - Qty 3      * Fluticasone Furoate (Arnuity Ellipta) 200 MCG BLST.W.DEV: 1 PUFF INH RTQDAY #1      IMPRESSION:      1. 8 mm solitary left lower lobe nodule, stable in size.  The patient will need     close follow up given high risk of malignancy given this patient's emphysema and    prior smoking history.      2.  New right upper lobe 8 mm ground glass nodule. The patient is already     scheduled for repeat chest CT scan on 09/09/2021 at 10:45 a.m.      3.  High risk of malignancy given patient's emphysema and prior smoking history     to little to detect on PET scan for PET avidity.      4. Severe COPD with FEV1 of 30% of predicted. The patient does have significant     bronchodilator response.  Alpha 1 antitrypsin heterozygote, gold stage C COPD.     The patient on triple  inhaler therapy.      5. Alpha 1 antitrypsin heterozygote genotype MZ level 166, not a candidate for     treatment.      6. Chronic hypoxic respiratory failure, patient on oxygen at bedtime.      7. Tobacco abuse with cigarettes ongoing. The patient states he is getting ready    to restart Wellbutrin and will try to quit smoking on his own.              PLAN:      1.  The patient to continue Arnuity and Anoro everyday as prescribed and rinse     his mouth out after each use.      2. The patient to continue DuoNebs and albuterol inhaler as needed.      3. I spent three minutes today counseling the patient on smoking cessation.  I     counseled the patient on the risks of continued smoking including the risk of     lung cancer, head and neck cancer, renal cancer, heart disease, stroke, and     early death. The patient states he has Wellbutrin and is getting ready to     restart.  Patient is advised to decrease the number of cigarettes he is smoking     up to the point where he can quit.        4. Continue to encourage patient to wear a mask at work and encouraged patient     to ambulate.      5. The patient is already scheduled to have a repeat chest CT scan on 09/09/2021    at 10:45 a.m. and patient is advised to have this completed as scheduled.      6. Patient is advised to call the office, 911 or go to the ER with any new or     worsening symptoms.      7.  Up-to-date with flu, Prevnar and Pneumovax.  The patient is advised to     receive the COVID19 vaccine when it becomes available and to continue to follow     CDC recommendations of social distancing, wearing a mask and washing hands for     at least 20 seconds.        8.  Follow up in September with Dr. Chapa after CT scan completed, sooner if     needed.            Patient Education      Tobacco Cessation Counseling:  for 3 - 10 minutes      Patient Education Provided:  COPD, Smoking Cessation      Time Spent:  > 50% /[facility]             Electronically signed by DENISA MANCERA [facility]  04/13/2021 15:48       Disclaimer: Converted document may not contain table formatting or lab diagrams. Please see [employer] [facility] for the authenticated document.

## 2021-05-28 NOTE — PROGRESS NOTES
Patient: UMU OWENS     Acct: [id number]     Report: #[id number]  UNIT #: [id number]     : 1960    Encounter Date:10/28/2019  PRIMARY CARE: SHEA MCFADDEN  ***Signed***  --------------------------------------------------------------------------------------------------------------------  Chief Complaint      Encounter Date      Oct 28, 2019            Primary Care Provider      SHEA MCFADDEN            Referring Provider      SHEA MCFADDEN            Patient Complaint      Patient is complaining of      f/u copd            VITALS      Height 5 ft 7.00 in / 170.18 cm      Weight 165 lbs 4.000 oz / 74.919924 kg      BSA 1.86 m2      BMI 25.9 kg/m2      Temperature 98.0 F / 36.67 C - Oral      Pulse 94      Respirations 14      Blood Pressure 111/79 Sitting, Right Arm      Pulse Oximetry 92%, roomair      Initial Exhaled Nitrous Oxide      Exhaled Nitrous Oxide Results:  17            HPI      The patient is a very pleasant 68 year old  male cigarette smoker with     very severe COPD here for follow up. He has been doing well on triple inhaler     therapy with Arnuity and Anoro. He has no changes in symptoms since last year.     He gets very short of breath walking up a flight of steps when carrying heavy     cabinets. He has cough intermittent and dry with no sputum production or     hemoptysis.  He denies any wheezing. He gets short of breath on flat ground     walking about 500-600 feet, moderate in severity.  He denies any coughing,     wheezing, headaches, chest pain or hemoptysis. He is still smoking about one     pack of cigarettes a day and is trying to cut back.  He did have a sleep study t    hat was negative. He is asking about overnight oximetry to see if he needs     nighttime oxygen therapy.  He is able to perform his ADLs without difficulty.      Denies any swollen glands or lymph nodes of the head and neck.            I have personally reviewed the review of systems, past  family, social, surgical     and medical histories and I agree with the findings.            ROS      Constitutional:  Denies: Fatigue, Fever, Weight gain, Weight loss, Chills,     Insomnia, Other      Respiratory/Breathing:  Complains of: Shortness of air, Wheezing, Cough; Denies:    Hemoptysis, Pleuritic pain, Other      Endocrine:  Denies: Polydipsia, Polyuria, Heat/cold intolerance, Diabetes, Other      Eyes:  Denies: Blurred vision, Vision Changes, Other      Ears, nose, mouth, throat:  Denies: Congestion, Dysphagia, Hearing Changes, Nose    Bleeding, Nasal Discharge, Throat pain, Tinnitus, Other      Cardiovascular:  Denies: Chest Pain, Exertional dyspnea, Peripheral Edema,     Palpitations, Syncope, Wake up Gasping for air, Orthopnea, Tachycardia, Other      Gastrointestinal:  Denies: Abdominal pain/cramping, Bloody stools, Constipation,    Diarrhea, Melena, Nausea, Vomiting, Other      Genitourinary:  Denies: Dysuria, Urinary frequency, Incontinence, Hematuria,     Urgency, Other      Musculoskeletal:  Denies: Joint Pain, Joint Stiffness, Joint Swelling, Myalgias,    Other      Hematologic/lymphatic:  DENIES: Lymphadenopathy, Bruising, Bleeding tendencies,     Other      Neurologic:  Denies: Headache, Numbness, Weakness, Seizures, Other      Psychiatric:  Denies: Anxiety, Appropriate Effect, Depression, Other      Sleep:  No: Excessive daytime sleep, Morning Headache?, Snoring, Insomnia?, Stop    breathing at sleep?, Other      Integumentary:  Denies: Rash, Dry skin, Skin Warm to Touch, Other            FAMILY/SOCIAL/MEDICAL HX      Surgical History:  Yes: Orthopedic Surgery (back surgerie 1990 knee 1995     shoulder 2007)      Stroke - Family Hx:  Mother      Heart - Family Hx:  Father      Other Family Medical History:  Mother (copd)      Is Father Still Living?:  Yes      Is Mother Still Living?:  No       Family History:  Yes      Social History:  Tobacco Use; No Alcohol Use, No Recreational Drug use       Smoking status:  Current every day smoker (1/2 ppd for 40 years )      Smoking history:  25-50 pack years      Anticoagulation Therapy:  No      Antibiotic Prophylaxis:  No      Medical History:  Yes: High Blood Pressure; No: Sinus Trouble      Psychiatric History      none            PREVENTION      Date Influenza Vaccine Given:  Nov 1, 2018      Influenza Vaccine Declined:  No      2 or More Falls Past Year?:  No      Fall Past Year with Injury?:  No      Hx Pneumococcal Vaccination:  Yes      Encouraged to follow-up with:  PCP regarding preventative exams.      Chart initiated by      lakesha/ ma            ALLERGIES/MEDICATIONS      Allergies:        Coded Allergies:             NO KNOWN ALLERGIES (Unverified , 10/28/19)      Medications    Last Reconciled on 10/28/19 16:13 by LUKAS BABCOCK MD      buPROPion HCl (buPROPion HCl) 150 Mg Tab.er.12h      150 MG PO ASDIR, #57 TAB.ER.12H         Prov: DENISA MANCERA [facility]         10/28/19       MDI-Albuterol (Proair HFA) 8.5 Gm Hfa.aer.ad      2 PUFFS INH Q4-6H PRN for SHORTNESS OF BREATH, #1 MDI 5 Refills         Prov: DENISA MANCERA [facility]         10/28/19       Fluticasone Furoate (Arnuity Ellipta) 200 Mcg Blst.w.dev      1 PUFF INH RTQDAY, #1 INH         Reported         10/28/19       Umeclidinium/Vilanterol 62.5-25 Mcg Inh (Anoro Ellipta 62.5-25 Mcg Inh) 1 Each     Blst.w.dev      1 PUFF INH QDAY, #1 INH 0 Refills         Reported         10/28/19       Simvastatin (Simvastatin*) 20 Mg Tablet      20 MG PO HS, #30 TAB 0 Refills         Reported         2/13/17       Hydrochlorothiazide (Hydrochlorothiazide*) Unknown Strength Tablet      PO QDAY, #30 TAB 0 Refills         Reported         2/13/17      Current Medications      Current Medications Reviewed 10/28/19            EXAM      Vital Signs Reviewed.      General:  WDWN, Alert, NAD.      HEENT: PERRL, EOMI.  OP, nares clear, no sinus tenderness.      Chest: Good aeration, clear to auscultation  bilaterally, tympanic to percussion     bilaterally, no work of breathing noted.      CV: RRR, no MGR, pulses 2+, equal.        Abd: Soft, NT, ND, +BS, no HSM.      EXT: No clubbing, no cyanosis, no edema.        Neuro:  A  Skin: No rashes or lesions.      Vitals      Vitals:             Height 5 ft 7.00 in / 170.18 cm           Weight 165 lbs 4.000 oz / 74.707968 kg           BSA 1.86 m2           BMI 25.9 kg/m2           Temperature 98.0 F / 36.67 C - Oral           Pulse 94           Respirations 14           Blood Pressure 111/79 Sitting, Right Arm           Pulse Oximetry 92%, roomair            REVIEW      Results Reviewed      PCCS Results Reviewed?:  Yes Prev Lab Results, Yes Prev Radiology Results, Yes     Previous Mecial Records      Lab Results      I reviewed my last office note. I also personally reviewed labs from 06/2019     which are suggestive of chronic hypercapnic respiratory failure.            Assessment      Tobacco abuse - Z72.0            Severe chronic obstructive pulmonary disease - J44.9            Notes      New Medications      * Umeclidinium/Vilanterol 62.5-25 Mcg Inh (Anoro Ellipta 62.5-25 Mcg Inh) 1 EACH      BLST.W.DEV: 1 PUFF INH QDAY #1      * Fluticasone Furoate (Arnuity Ellipta) 200 MCG BLST.W.DEV: 1 PUFF INH RTQDAY #1      * MDI-Albuterol (Proair HFA) 8.5 GM HFA.AER.AD: 2 PUFFS INH Q4-6H PRN SHORTNESS       OF BREATH #1      * buPROPion HCl 150 MG TAB.ER.12H: 150 MG PO ASDIR #57      New Diagnostics      * LDCT for lung ca screening, SCHEDULED PROCEDURE         Dx: Tobacco abuse - Z72.0      * Overnight Oximetry, Routine         Dx: Severe chronic obstructive pulmonary disease - J44.9      New Office Procedures      * Flu Vacc Fluarix Quadrivalent, As Soon As Possible         Flu Vacc [id number](6Mos Up)/Pf (Fluarix Quadrivalent [id number] Syringe) 60        MCG/0.5 ML SYRINGE: 60 MICROGRAM INTRAMUSCULARLY Qty 1 SYRINGE      IMPRESSION:      1. Severe COPD.  FEV1 37% with  significant bronchodilator response. Alpha 1     antitrypsin heterozygote and still smoking. Gold stage C COPD on triple inhaler     therapy. COPD assessment test score is 12 today signifying borderline poor     control of underlying disease.      2. Alpha 1 antitrypsin heterozygote.  Genotype MZ level 166 and not a candidate     for treatment.      3. Chronic hypercapnic respiratory failure.      4. Tobacco abuse with cigarettes, greater than 50 pack year smoking history and     still smoking 5-10 cigarettes a day.              PLAN:      1.  Continue Stiolto and Arnuity at current dose.      2.  I offered pulmonary rehab, but he declined.      3. Encouraged ambulation and continue working.      4. I ordered 2018 LDCT screen for lung cancer screening.      5. Smoking cessation counseling provided.  I spent 5 minutes today counseling     the patient on risks of smoking, including throat cancer, lung cancer, COPD,     heart disease and death. I also discussed the benefits of quitting.  I started     him on Zyban.  Declines nicotine replacement therapy.      6. He is up-to-date with Prevnar and Pneumovax. I gave him flu vaccine today.      7. We will order overnight oximetry. Sleep workup including sleep study was     negative per patient report.      8. Follow up with me in one year.            Patient Education      Tobacco Cessation Counseling:  for 3 - 10 minutes      Patient Education Provided:  COPD            Electronically signed by LUKAS BABCOCK  11/01/2019 10:05       Disclaimer: Converted document may not contain table formatting or lab diagrams. Please see [employer] for the authenticated document.

## 2021-05-28 NOTE — PROGRESS NOTES
Patient: UMU OWENS     Acct: [id number]     Report: #[id number]  UNIT #: [id number]     : 1960    Encounter Date:2020  PRIMARY CARE: SHEA MCFADDEN  ***Signed***  --------------------------------------------------------------------------------------------------------------------  Chief Complaint      Encounter Date      2020            Primary Care Provider      SHEA MCFADDEN            Referring Provider      SHEA MCFADDEN            Patient Complaint      Patient is complaining of      Patient here today for f/u, COPD            VITALS      Height 5 ft 7.00 in / 170.18 cm      Weight 164 lbs 2.000 oz / 74.411065 kg      BSA 1.86 m2      BMI 25.7 kg/m2      Temperature 98.0 F / 36.67 C - Oral      Pulse 80      Respirations 12      Blood Pressure 113/81 Sitting, Right Arm      Pulse Oximetry 92%, roomair      Initial Exhaled Nitrous Oxide      Exhaled Nitrous Oxide Results:  17            HPI      The patient is a very pleasant 59 year old former cigarette smoker with very     severe COPD here for follow up. He has been doing well on triple inhaler therapy    with Anoro and Arnuity. He continues to work at a [employer] facility where he     inhales saw dust nonstop.  Respiratory status is at baseline. He gets short of     breath walking about 500 feet or up a flight of steps, moderate in severity,     worse with exertion and relieved with rest.  He denies any coughing, wheezing,     headaches, chest pain or hemoptysis. He has not had a cigarette in about five     months.  Since last office visit he had an LDCT screen for lung cancer screening    showing compared to the previous one a new 8 mm spiculated nodule. It is     abutting of subsegmental bronchus and a part of the left lower lobe.  PET scan     was done showing no PET uptake in any of these areas.  He denies any weight     loss, hemoptysis, fevers, chills or night sweats.  He denies any bony pain or     headaches.  He is  able to perform ADLs without difficulty.  Denies any swollen     glands or lymph nodes of the head and neck.            I have personally reviewed the review of systems, past family, social, surgical     and medical histories and I agree with the findings.            ROS      Constitutional:  Denies: Fatigue, Fever, Weight gain, Weight loss, Chills, Inso    mnia, Other      Respiratory/Breathing:  Complains of: Cough; Denies: Shortness of air, Wheezing,    Hemoptysis, Pleuritic pain, Other      Endocrine:  Denies: Polydipsia, Polyuria, Heat/cold intolerance, Diabetes, Other      Eyes:  Denies: Blurred vision, Vision Changes, Other      Ears, nose, mouth, throat:  Denies: Congestion, Dysphagia, Hearing Changes, Nose    Bleeding, Nasal Discharge, Throat pain, Tinnitus, Other      Cardiovascular:  Denies: Chest Pain, Exertional dyspnea, Peripheral Edema,     Palpitations, Syncope, Wake up Gasping for air, Orthopnea, Tachycardia, Other      Gastrointestinal:  Denies: Abdominal pain/cramping, Bloody stools, Constipation,    Diarrhea, Melena, Nausea, Vomiting, Other      Genitourinary:  Denies: Dysuria, Urinary frequency, Incontinence, Hematuria,     Urgency, Other      Musculoskeletal:  Denies: Joint Pain, Joint Stiffness, Joint Swelling, Myalgias,    Other      Hematologic/lymphatic:  DENIES: Lymphadenopathy, Bruising, Bleeding tendencies,     Other      Neurologic:  Denies: Headache, Numbness, Weakness, Seizures, Other      Psychiatric:  Denies: Anxiety, Appropriate Effect, Depression, Other      Sleep:  No: Excessive daytime sleep, Morning Headache?, Snoring, Insomnia?, Stop    breathing at sleep?, Other      Integumentary:  Denies: Rash, Dry skin, Skin Warm to Touch, Other            FAMILY/SOCIAL/MEDICAL HX      Surgical History:  Yes: Orthopedic Surgery (back surgerie 1990 knee 1995     shoulder 2007)      Stroke - Family Hx:  Mother      Heart - Family Hx:  Father      Other Family Medical History:  Mother (copd)       Is Father Still Living?:  Yes      Is Mother Still Living?:  No       Family History:  Yes      Social History:  No Tobacco Use, No Alcohol Use, No Recreational Drug use      Smoking status:  Former smoker (.5 ppd x 40 y quit 1/20)      Smoking history:  25-50 pack years      Anticoagulation Therapy:  No      Antibiotic Prophylaxis:  No      Medical History:  Yes: High Blood Pressure; No: Sinus Trouble      Psychiatric History      none            PREVENTION      Date Influenza Vaccine Given:  Nov 1, 2019      Influenza Vaccine Declined:  No      2 or More Falls Past Year?:  No      Fall Past Year with Injury?:  No      Hx Pneumococcal Vaccination:  Yes      Encouraged to follow-up with:  PCP regarding preventative exams.      Chart initiated by      Jose Steele MA            ALLERGIES/MEDICATIONS      Allergies:        Coded Allergies:             NO KNOWN ALLERGIES (Unverified , 2/17/20)      Medications    Last Reconciled on 2/17/20 14:31 by LUKAS BABCOCK MD      Umeclidinium/Vilanterol 62.5-25 Mcg Inh (Anoro Ellipta 62.5-25 Mcg Inh) 1 Each     Blst.w.dev      1 PUFF INH QDAY, #1 INH 3 Refills         Prov: LUKAS BABCOCK         12/17/19       buPROPion SR (buPROPion SR) 150 Mg Tab.er.12h      150 MG PO ASDIR, #57 TAB.ER.12H         Prov: DENISA MANCERA [facility]         10/28/19       MDI-Albuterol (Proair HFA) 8.5 Gm Hfa.aer.ad      2 PUFFS INH Q4-6H PRN for SHORTNESS OF BREATH, #1 MDI 5 Refills         Prov: DENISA MANCERA [facility]         10/28/19       Fluticasone Furoate (Arnuity Ellipta) 200 Mcg Blst.w.dev      1 PUFF INH RTQDAY, #1 INH         Reported         10/28/19       Simvastatin (Simvastatin*) 20 Mg Tablet      20 MG PO HS, #30 TAB 0 Refills         Reported         2/13/17       Hctz (hydroCHLOROthiazide) Unknown Strength Tablet      PO QDAY, #30 TAB 0 Refills         Reported         2/13/17      Current Medications      Current Medications Reviewed 2/17/20            EXAM      Vital Signs  Reviewed.      General:  WDWN, Alert, NAD.      HEENT: PERRL, EOMI.  OP, nares clear, no sinus tenderness.      Neck: Supple, no JVD, no thyromegaly.      Lymph: No axillary, cervical, supraclavicular lymphadenopathy noted bilaterally.      Chest: Barrel chested, poor aeration, clear to auscultation bilaterally, no work    of breathing noted.      CV: RRR, no MGR, pulses 2+, equal.        Abd: Soft, NT, ND, +BS, no HSM.      EXT: No clubbing, no cyanosis, no edema, no joint tenderness.        Neuro:  A  Skin: No rashes or lesions.      Vitals      Vitals:             Height 5 ft 7.00 in / 170.18 cm           Weight 164 lbs 2.000 oz / 74.583532 kg           BSA 1.86 m2           BMI 25.7 kg/m2           Temperature 98.0 F / 36.67 C - Oral           Pulse 80           Respirations 12           Blood Pressure 113/81 Sitting, Right Arm           Pulse Oximetry 92%, roomair            REVIEW      Results Reviewed      [facility]  Yes Prev Lab Results, Yes Prev Radiology Results, Yes     [facility]      Lab Results      I personally reviewed my last office note. I reviewed Mirtha Alexander's office     notes.  I personally reviewed a 2020 noncontrast chest CT and compared it to     the previous one.  I also reviewed a 2020 noncontrast chest CT.  I also     personally reviewed labs from 2020 showing no chronic hypercapnia. I also     reviewed a pulmonary function test from 2017 showing severe airflow obstruction     with FEV1 of 45% of predicted.      Radiographic Results               [facility]                PACS RADIOLOGY REPORT            Patient: UMU OWENS   Acct: #[id number]   Report: #[id number]            UNIT #: [id number]    DOS: 20 1211      INSURANCE:BLUE ACCESS NETWORK - O   ORDER #:[id number]      LOCATION:[facility]     : 1960            PROVIDERS      ADMITTING:     ATTENDING: LUKAS BABCOCK       FAMILY:  SHEA MCFADDEN   ORDERING:  LUKAS BABCOCK         OTHER:    DICTATING:  Ishmael Vargas MD            REQ #:[id number]   EXAM:ISKBSMIDTH - SKULL BASE-MIDTHIGH INIT fdg      REASON FOR EXAM:  R91.1      REASON FOR VISIT:  R91.1            *******Signed******         PROCEDURE:   PET CT SKULL BASE TO MID THIGH INITIAL             COMPARISON:   [facility], CT, CT LOW DOSE CHEST SCREENING,     1/31/2020, 15:06.             INDICATIONS:   SPN             TECHNIQUE:   F-18 FDG was administered intravenously.  A PET scan with     concurrent CT imaging was       performed from the skull to the thigh with multiplanar imaging.             RADIONUCLIDE:     12.95 MCI   F18 FDG- I.V.      LABS:                          Blood Glucose 95 mg/dl      UPTAKE TIME:        54 mins      Mediastinal background:   SUV 2             FINDINGS:         Physiologic uptake is seen in the head and neck.             The 8 mm spiculated nodule in the superior segment, left lower lobe is well seen    on series 2 image       108. No increased activity is evident.  This remains suspicious, and represents     a LUNG-RADS 4 B       lesion.  Follow-up CT scan of the chest is recommended in 3 months, if more     aggressive evaluation       is not undertaken.             No abnormal activity is seen in the abdomen and pelvis.             No abnormal skeletal uptake is evident.               CONCLUSION:         PET-CT demonstrating no abnormal disease specific uptake.             8 mm spiculated nodule in the superior segment, left lower lobe remains     suspicious.  Follow-up CT       scan of the chest is recommended in 3 months, if more aggressive evaluation is     not undertaken.              ISHMAEL VARGAS MD             Electronically Signed and Approved By: ISHMAEL VARGAS MD on 2/12/2020 at 14:58                           Until signed, this is an unconfirmed preliminary report that may contain      errors and is subject to  change.                                              COUST:      D:20 1458               [facility]                PACS RADIOLOGY REPORT            Patient: UMU OWENS   Acct: #[id number]   Report: #[id number]            UNIT #: [id number]    DOS: 20 1503      INSURANCE:BLUE ACCESS NETWORK - PPO   ORDER #:[id number]      LOCATION:[facility]     : 1960            PROVIDERS      ADMITTING:     ATTENDING: SHEA MCFADDEN      FAMILY:  NONE,MD   ORDERING:  SHEA MCFADDEN         OTHER:    DICTATING:  RAISA HUMPHREYS MD            REQ #:[id number]   [facility]      REASON FOR EXAM:  FORMER SMOKER      REASON FOR VISIT:  FORMER SMOKER            *******Signed******         PROCEDURE:   CT LOW DOSE CHEST SCREENING             COMPARISON:   [facility], CT, CT LOW DOSE CHEST SCREENING,     2018, 15:40.             REASON FOR SCREENING:   Patient is 59 years of age, asymptomatic, and has a     smoking history of more       than 30 pack years.      SMOKING STATUS:   Former smoker.  Years since quittin                SCREENING VISIT:   Year 2.                   TECHNIQUE:   Axial unenhanced LDCT images from the apices through mid-kidney     were obtained.       Evaluation of solid organs and vascular structures is suboptimal due to the lack    of IV contrast.       Imaging was performed on a [employer] 128 CT scanner.             RADIATION:   CT Dose Index Vol (CTDIvol):    3.085  mGy         Dose Length Product (DLP):    130.5  mGy-cm             DIAGNOSTIC QUALITY:   Satisfactory             FINDINGS:         LUNG NODULES:   On axial image number 71, there is an 8 mm spiculated pulmonary     nodule which appears       to be arising from the superior segment left lower lobe pulmonary bronchus.      This is also seen on       sagittal image 135 and coronal image 101.       LUNGS:          COPD:   There is mild diffuse emphysema      FIBROSIS:   None      LYMPH NODES:   None.        OTHER FINDINGS:   None.               RIGHT PLEURAL SPACE:         EFFUSION:   None.      CALCIFICATION:   None.      THICKENING:   None.      PNEUMOTHORAX:   None.             LEFT PLEURAL SPACE:         EFFUSION:   None.      CALCIFICATION:   None.      THICKENING:   None.      PNEUMOTHORAX:   None.             HEART:         SIZE:   Normal.      CORONARY CALC:   There is mild coronary artery calcific atherosclerosis.      PERICARDIAL EFFUSION:   None.      OTHER FINDINGS:   None.               UPPER ABDOMEN:   None.        THORAX:   None.        BASE OF NECK:   None.               LUNG-RADS CLASSIFICATION:   4 B. Solid pulmonary nodule, new and equal to 8 mm.              CONCLUSION:         1. Spiculated 8 mm superior segment left lower lobe pulmonary nodule lung rads     category 4 B.       Additional evaluation with nuclear medicine PET scanning and pulmonary     consultation recommended.              RAISA HUMPHREYS MD             Electronically Signed and Approved By: RAISA HUMPHREYS MD on 1/31/2020 at 16:31                        Until signed, this is an unconfirmed preliminary report that may contain      errors and is subject to change.                                              SERKE:      D:01/31/20 1631            Assessment      Solitary lung nodule - R91.1            Notes      New Diagnostics      * Chest W/O Cont CT, 2 Months         Dx: Solitary lung nodule - R91.1      IMPRESSION:      1.  Solitary lung nodule, new on lung cancer screening, 8 mm and partly     spiculated.  PET negative, but below the sensitivity for PET detection.      2.  Severe COPD with FEV1 of 30% of predicted with significant bronchodilator     response.  Alpha 1 antitrypsin heterozygote, has finally stopped smoking, gold     stage C COPD on triple inhaler therapy.  COPD assessment test score is 6 today.      3.  Alpha 1 antitrypsin  heterozygote with genotype MZ level of 166, not a     candidate for treatment.      4. Chronic hypoxemic respiratory failure.      5. Tobacco abuse with cigarettes, greater than 50 pack year smoking history and     quit smoking about five months ago, now in remission.              PLAN:      1.  Reviewed chest CT with patient.  This lung nodule is new and 8 mm in this     high risk patient, is also partly spiculated.  With that being said it is PET     negative. It is behold the threshold for PET detectability.  Also this area is     very deep within the lung and a transthoracic needle biopsy would almost     certainly result in pneumothorax and a high likelihood of not getting us an     answer.  Also this is next to an airway, but given how far out it is I think     radial probe ultrasound would be of little benefit as would navigation.      2.  At this point, we will do watchful waiting and recommend a noncontrast chest    CT in eight weeks. Given location and size of nodule, if at any point it     enlarges, we will just go ahead and refer the patient for SBRT therapy.  Given     this patient's lung function, he would not tolerate any lung resection.  Given     the depth of this nodule, I do think if it enlarges doing a biopsy would be     reasonable as it will certainly result in a pneumothorax and the only     intervention recommended would be SBRT anyway.      3. Continue triple inhaler therapy with albuterol as needed.      4. Encouraged ambulation and continue working.      5. Applauded patient for smoking cessation.      6. Up-to-date with flu, Prevnar and Pneumovax.      7. Follow up with me in eight weeks with noncontrast chest CT.            Patient Education      Patient Education Provided:  COPD, Lung Cancer            Electronically signed by LUKAS BABCOCK  02/26/2020 12:16       Disclaimer: Converted document may not contain table formatting or lab diagrams. Please see [employer] for the authenticated document.

## 2021-05-28 NOTE — PROGRESS NOTES
Patient: UMU OWENS     Acct: [id number]     Report: #[id number]  UNIT #: [id number]     : 1960    Encounter Date:10/29/2018  PRIMARY CARE: SHEA MCFADDEN  ***Signed***  --------------------------------------------------------------------------------------------------------------------  Chief Complaint      Encounter Date      Oct 29, 2018            Primary Care Provider      SHEA MCFADDEN            Referring Provider      SHEA MCFADDEN            Patient Complaint      Patient is complaining of      f/u copd            VITALS      Height 5 ft 3 in / 160.02 cm      Weight 171 lbs 2 oz / 77.036380 kg      BSA 1.81 m2      BMI 30.3 kg/m2      Temperature 98.2 F / 36.78 C - Oral      Pulse 75      Respirations 14      Blood Pressure 120/74 Sitting, Left Arm      Pulse Oximetry 92%, roomair      Initial Exhaled Nitrous Oxide      Exhaled Nitrous Oxide Results:  17            HPI      The patient is a very pleasant 57 year old  male cigarettes smoker with    severe chronic obstructive pulmonary disease here for follow up.             He has been doing well on Stiolto and Arnuity. He gets short of breath walking     about 5472-7600 feet, worse with exertion, moderate in severity, relieved with     rest. He denies any coughing, wheezing, chest pain or hemoptysis. He denies any     nausea and vomiting, fevers or chills or headaches. His rescue inhaler use is     roughly once a week and usually he only uses it at night. He is still smoking     about 3 cigarettes a day and is going on a stephanie and hopes to stop all together.    He is able to perform his activities of daily living without difficulty and     denies any swollen lymph nodes or glands in his head and neck.             I have personally reviewed the Review of Systems, past family, social, surgical     and medical histories and I agree with the findings.            ROS      Constitutional:  Denies: Fatigue, Fever, Weight gain, Weight loss,  Chills,     Insomnia, Other      Respiratory/Breathing:  Complains of: Shortness of air, Wheezing, Cough; Denies:    Hemoptysis, Pleuritic pain, Other      Endocrine:  Denies: Polydipsia, Polyuria, Heat/cold intolerance, Diabetes, Other      Eyes:  Denies: Blurred vision, Vision Changes, Other      Ears, nose, mouth, throat:  Denies: Congestion, Dysphagia, Hearing Changes, Nose    Bleeding, Nasal Discharge, Throat pain, Tinnitus, Other      Cardiovascular:  Denies: Chest Pain, Exertional dyspnea, Peripheral Edema,     Palpitations, Syncope, Wake up Gasping for air, Orthopnea, Tachycardia, Other      Gastrointestinal:  Denies: Abdominal pain/cramping, Bloody stools, Constipation,    Diarrhea, Melena, Nausea, Vomiting, Other      Genitourinary:  Denies: Dysuria, Urinary frequency, Incontinence, Hematuria,     Urgency, Other      Musculoskeletal:  Denies: Joint Pain, Joint Stiffness, Joint Swelling, Myalgias,    Other      Hematologic/lymphatic:  DENIES: Lymphadenopathy, Bruising, Bleeding tendencies,     Other      Neurologic:  Denies: Headache, Numbness, Weakness, Seizures, Other      Psychiatric:  Denies: Anxiety, Appropriate Effect, Depression, Other      Sleep:  No: Excessive daytime sleep, Morning Headache?, Snoring, Insomnia?, Stop    breathing at sleep?, Other      Integumentary:  Denies: Rash, Dry skin, Skin Warm to Touch, Other            FAMILY/SOCIAL/MEDICAL HX      Surgical History:  Yes: Orthopedic Surgery (back surgerie 1990 knee 1995     shoulder 2007)      Stroke - Family Hx:  Mother      Heart - Family Hx:  Father      Other Family Medical History:  Mother (copd)      Is Father Still Living?:  Yes      Is Mother Still Living?:  No      Social History:  Tobacco Use; No Alcohol Use, No Recreational Drug use      Smoking status:  Current every day smoker (1/2 ppd for 40 years )      Smoking history:  25-50 pack years      Anticoagulation Therapy:  No      Antibiotic Prophylaxis:  No      Medical History:   Yes: High Blood Pressure; No: Sinus Trouble      Psychiatric History      none            PREVENTION      Hx Influenza Vaccination:  Yes      Date Influenza Vaccine Given:  Nov 1, 2018      Influenza Vaccine Declined:  No      2 or More Falls Past Year?:  No      Fall Past Year with Injury?:  No      Hx Pneumococcal Vaccination:  Yes      Encouraged to follow-up with:  PCP regarding preventative exams.      Chart initiated by      kelly bach/ ma            ALLERGIES/MEDICATIONS      Allergies:        Coded Allergies:             NO KNOWN ALLERGIES (Unverified , 10/29/18)      Medications    Last Reconciled on 10/29/18 16:37 by LUKAS BABCOCK MD      Fluticasone Furoate (Arnuity Ellipta) 100 Mcg Blst.w.dev      1 PUFF INH RTQDAY, #1 INH 5 Refills         Prov: LUKAS BABCOCK         1/22/18       Tiotropium Br/Olodaterol HCl (Stiolto Respimat Inhal Spray) 4 Gm Mist.inhal      2 PUFFS INH RTQDAY, #1 INH 6 Refills         Prov: LUKAS BABCOCK         1/22/18       Simvastatin (Simvastatin*) 20 Mg Tablet      20 MG PO HS, #30 TAB 0 Refills         Reported         2/13/17       Hydrochlorothiazide (Hydrochlorothiazide*) Unknown Strength Tablet      PO QDAY, #30 TAB 0 Refills         Reported         2/13/17      Current Medications      Current Medications Reviewed 10/29/18            EXAM      Vital Signs Reviewed      Gen: WDWN, Alert, NAD.        HEENT:  PERRL, EOMI.  OP, nares clear      Chest:  Good aeration, clear to auscultation bilaterally, tympanic to percussion    bilaterally, no work of breathing noted.      CV:  RRR, no MGR, pulses 2+, equal.      Abd:  Soft, NT, ND, + BS, no HSM.      EXT:  No clubbing, no cyanosis, no edema      Skin: No rashes or lesions.      Vitals      Vitals:             Height 5 ft 3 in / 160.02 cm           Weight 171 lbs 2 oz / 77.236267 kg           BSA 1.81 m2           BMI 30.3 kg/m2           Temperature 98.2 F / 36.78 C - Oral           Pulse 75           Respirations 14            Blood Pressure 120/74 Sitting, Left Arm           Pulse Oximetry 92%, roomair            REVIEW      Results Reviewed      PCCS Results Reviewed?:  Yes Prev Radiology Results, Yes Previous Mecial Records      Radiographic Results               [facility] Diagnostic [facility]                PACS RADIOLOGY REPORT            Patient: UMU OWENS   Acct: #[id number]   Report: #[id number]            UNIT #: [id number]    DOS: 18 1537      INSURANCE:[employer]   ORDER #:[id number]      LOCATION:[facility]     : 1960            PROVIDERS      ADMITTING:     ATTENDING: LUKAS BABCOCK      FAMILY:  SHEA MCFADDEN   ORDERING:  LUKAS BABCOCK         OTHER:    DICTATING:  Ishmael Grider MD            REQ #:[id number]   EXAM:[facility] - LOW DOSE CHEST CT SCREENING      REASON FOR EXAM:  CURRENT SMOKER      REASON FOR VISIT:  CURRENT SMOKER            *******Signed******         PROCEDURE:   CT LOW DOSE CHEST SCREENING             COMPARISON:   [facility], CT, CT LOW DOSE CHEST SCREENING,     3/29/2017, 16:58.             REASON FOR SCREENING:   Patient is 57 years of age, asymptomatic, and has a     smoking history of more       than 30 pack years.      SMOKING STATUS:   Current smoker.                  SCREENING VISIT:   Year 1.  This is the patient's 2nd low-dose screening CT scan    of the chest.                   TECHNIQUE:   Axial unenhanced LDCT images from the apices through mid-kidney     were obtained.       Evaluation of solid organs and vascular structures is suboptimal due to the lack    of IV contrast.       Imaging was performed on a Siemens Emotion CT scanner.             RADIATION:   CT Dose Index Vol (CTDIvol):    3.06  mGy         Dose Length Product (DLP):    114  mGy-cm             DIAGNOSTIC QUALITY:   Satisfactory.               FINDINGS:         Lung window images reveal moderate centrilobular emphysema.             Mild  scarring at the lung bases is stable.             Mediastinal windows reveal no mediastinal, hilar, or axillary adenopathy.      Coronary artery       calcifications are evident.             Mild degenerative spurring is seen in the thoracic spine.             CONTINUED ON NEXT PAGE...             CONCLUSION:         Low-dose screening CT scan of the chest demonstrating no suspicious lung     nodules.             Low-dose screening CT of the chest is recommended in a year.             Lung RADS 1              ALAN VARGAS MD             Electronically Signed and Approved By: ALAN VARGAS MD on 4/13/2018 at 17:56                           Until signed, this is an unconfirmed preliminary report that may contain      errors and is subject to change.                                              COUST:      D:04/13/18 1756            Assessment      Tobacco abuse - Z72.0            Chronic hypercapnic respiratory failure - J96.12            Severe chronic obstructive pulmonary disease - J44.9            Alpha-1-antitrypsin deficiency carrier - E88.01            Notes      New Office Procedures      * Prevnar 0.5 PCV13, As Soon As Possible         Pneumoc 13-Rubi Conj-Dip CRm/Pf (Prevnar 13 Syringe) 0.5 ML SYRINGE: 0.5        MILLILITER INTRAMUSCULARLY Qty 1 SYRINGE         Dx: Tobacco abuse - Z72.0      IMPRESSION:       1. Severe chronic obstructive pulmonary disease. FEV1 37% predicted with     significant bronchodilator response, alpha-1 antitrypsin heterozygote, chronic     bronchitis phenotype, GOLD stage C chronic obstructive pulmonary disease. On     triple inhaler therapy. Chronic obstructive pulmonary disease assessment test     score  improved to 2 today.       2. Alpha-1 antitrypsin heterozygote.  Genotype MZ with level 166. Not a     candidate for treatment.       3. Chronic hypercapnic respiratory failure secondary to chronic obstructive     pulmonary disease.       4. Tobacco abuse of cigarettes. Patient  with greater than 50 pack year smoking     history and smoking 3 cigarettes a day             PLAN:      1. Continue Stiolto and Arnuity at current dose.       2. Continue annual low dose lung cancer screening for lung cancer screening.       3. Smoking cessation counseling provided. I spent 3 minutes today counseling the    patient on risks of smoking, including throat cancer, lung cancer, COPD, heart     disease and death. I also discussed the benefits of quitting. Again he refuses     nicotine replacement therapy or pharmacotherapy.       4. Patient refuses pulmonary rehabilitation.       5. Patient is up to date with flu and Pneumovax. We will give him Prevnar     vaccine today.       6. Follow up in 1 year.            Patient Education      Tobacco Cessation Counseling:  for under 3 minutes      Patient Education Provided:  COPD, Smoking Cessation                 Disclaimer: Converted document may not contain table formatting or lab diagrams. Please see [employer] for the authenticated document.

## 2021-06-24 RX ORDER — BUPROPION HYDROCHLORIDE 150 MG/1
150 TABLET, EXTENDED RELEASE ORAL DAILY
COMMUNITY
End: 2021-06-24 | Stop reason: SDUPTHER

## 2021-06-24 NOTE — TELEPHONE ENCOUNTER
[facility] pharmacy requesting a refill of Bupropion 150mg one daily. LV- 03/09/21, LF- 07/15/20, #90, 3 refills.

## 2021-06-25 RX ORDER — BUPROPION HYDROCHLORIDE 150 MG/1
150 TABLET, EXTENDED RELEASE ORAL DAILY
Qty: 90 TABLET | Refills: 0 | Status: SHIPPED | OUTPATIENT
Start: 2021-06-25 | End: 2021-10-25

## 2021-07-01 VITALS
HEIGHT: 67 IN | SYSTOLIC BLOOD PRESSURE: 131 MMHG | HEART RATE: 82 BPM | TEMPERATURE: 98 F | WEIGHT: 170.8 LBS | DIASTOLIC BLOOD PRESSURE: 82 MMHG | BODY MASS INDEX: 26.81 KG/M2

## 2021-07-01 VITALS
BODY MASS INDEX: 26.78 KG/M2 | DIASTOLIC BLOOD PRESSURE: 75 MMHG | HEIGHT: 67 IN | HEART RATE: 87 BPM | SYSTOLIC BLOOD PRESSURE: 130 MMHG | TEMPERATURE: 98.4 F | WEIGHT: 170.6 LBS

## 2021-07-01 VITALS
HEART RATE: 81 BPM | TEMPERATURE: 96.8 F | WEIGHT: 177 LBS | BODY MASS INDEX: 27.78 KG/M2 | DIASTOLIC BLOOD PRESSURE: 78 MMHG | HEIGHT: 67 IN | SYSTOLIC BLOOD PRESSURE: 126 MMHG

## 2021-07-02 VITALS
WEIGHT: 165.8 LBS | DIASTOLIC BLOOD PRESSURE: 74 MMHG | HEART RATE: 80 BPM | HEIGHT: 67 IN | TEMPERATURE: 98.2 F | BODY MASS INDEX: 26.02 KG/M2 | SYSTOLIC BLOOD PRESSURE: 121 MMHG

## 2021-07-02 VITALS
TEMPERATURE: 98 F | WEIGHT: 164.6 LBS | HEART RATE: 86 BPM | DIASTOLIC BLOOD PRESSURE: 76 MMHG | BODY MASS INDEX: 25.83 KG/M2 | SYSTOLIC BLOOD PRESSURE: 119 MMHG | HEIGHT: 67 IN

## 2021-07-02 VITALS
HEART RATE: 86 BPM | TEMPERATURE: 97.7 F | HEIGHT: 67 IN | WEIGHT: 164.6 LBS | DIASTOLIC BLOOD PRESSURE: 85 MMHG | BODY MASS INDEX: 25.83 KG/M2 | SYSTOLIC BLOOD PRESSURE: 132 MMHG

## 2021-08-13 DIAGNOSIS — J44.9 CHRONIC OBSTRUCTIVE PULMONARY DISEASE, UNSPECIFIED COPD TYPE (HCC): Primary | ICD-10-CM

## 2021-08-17 DIAGNOSIS — J44.9 CHRONIC OBSTRUCTIVE PULMONARY DISEASE, UNSPECIFIED COPD TYPE (HCC): ICD-10-CM

## 2021-08-17 RX ORDER — FLUTICASONE PROPIONATE 220 UG/1
2 AEROSOL, METERED RESPIRATORY (INHALATION) 2 TIMES DAILY
Qty: 144 G | Refills: 0 | Status: SHIPPED | OUTPATIENT
Start: 2021-08-17 | End: 2021-08-20 | Stop reason: SDUPTHER

## 2021-08-17 NOTE — TELEPHONE ENCOUNTER
Incoming Refill Request      Medication requested (name and dose): ANORO INHALER,PROAIR, FLOVENT    Pharmacy where request should be sent: [facility]    Additional details provided by patient: PATIENT WOULD LIKE 90 DAY SUPPLY; PATIENT INSURANCE MAKING PATIENT CHANGE TO ABOVE PHARMACY     Best call back number: [phone]     Does the patient have less than a 3 day supply:  [] Yes  [x] No    Vani Valentine Rep  08/17/21, 15:11 EDT

## 2021-08-20 DIAGNOSIS — J44.9 CHRONIC OBSTRUCTIVE PULMONARY DISEASE, UNSPECIFIED COPD TYPE (HCC): Primary | ICD-10-CM

## 2021-08-20 RX ORDER — ALBUTEROL SULFATE 90 UG/1
2 AEROSOL, METERED RESPIRATORY (INHALATION)
Qty: 18 G | Refills: 2 | Status: SHIPPED | OUTPATIENT
Start: 2021-08-20 | End: 2022-01-12

## 2021-08-20 RX ORDER — FLUTICASONE PROPIONATE 220 UG/1
2 AEROSOL, METERED RESPIRATORY (INHALATION) 2 TIMES DAILY
Qty: 144 G | Refills: 3 | Status: SHIPPED | OUTPATIENT
Start: 2021-08-20 | End: 2022-02-11 | Stop reason: SDUPTHER

## 2021-09-03 ENCOUNTER — HOSPITAL ENCOUNTER (OUTPATIENT)
Dept: CT IMAGING | Facility: HOSPITAL | Age: 61
Discharge: HOME | End: 2021-09-03
Admitting: NURSE PRACTITIONER

## 2021-09-03 DIAGNOSIS — R91.1 PULMONARY NODULE: ICD-10-CM

## 2021-09-03 PROCEDURE — 71250 CT THORAX DX C-: CPT

## 2021-09-13 DIAGNOSIS — R91.1 LUNG NODULE: Primary | ICD-10-CM

## 2021-09-13 RX ORDER — SIMVASTATIN 20 MG
20 TABLET ORAL DAILY
Qty: 90 TABLET | Refills: 0 | Status: CANCELLED | OUTPATIENT
Start: 2021-09-13

## 2021-10-14 RX ORDER — HYDROCHLOROTHIAZIDE 25 MG/1
TABLET ORAL
Qty: 90 TABLET | Refills: 0 | OUTPATIENT
Start: 2021-10-14

## 2021-10-25 ENCOUNTER — OFFICE VISIT (OUTPATIENT)
Dept: FAMILY MEDICINE CLINIC | Age: 61
End: 2021-10-25

## 2021-10-25 VITALS
SYSTOLIC BLOOD PRESSURE: 118 MMHG | DIASTOLIC BLOOD PRESSURE: 71 MMHG | HEART RATE: 87 BPM | BODY MASS INDEX: 25.5 KG/M2 | WEIGHT: 162.8 LBS

## 2021-10-25 DIAGNOSIS — E78.49 OTHER HYPERLIPIDEMIA: Primary | ICD-10-CM

## 2021-10-25 DIAGNOSIS — I10 HYPERTENSION, UNSPECIFIED TYPE: ICD-10-CM

## 2021-10-25 DIAGNOSIS — Z23 NEED FOR INFLUENZA VACCINATION: ICD-10-CM

## 2021-10-25 PROCEDURE — 90471 IMMUNIZATION ADMIN: CPT | Performed by: NURSE PRACTITIONER

## 2021-10-25 PROCEDURE — 90686 IIV4 VACC NO PRSV 0.5 ML IM: CPT | Performed by: NURSE PRACTITIONER

## 2021-10-25 PROCEDURE — 99214 OFFICE O/P EST MOD 30 MIN: CPT | Performed by: NURSE PRACTITIONER

## 2021-10-25 RX ORDER — SIMVASTATIN 20 MG
20 TABLET ORAL DAILY
Qty: 90 TABLET | Refills: 0 | Status: SHIPPED | OUTPATIENT
Start: 2021-10-25 | End: 2021-12-20

## 2021-10-25 RX ORDER — HYDROCHLOROTHIAZIDE 25 MG/1
25 TABLET ORAL DAILY
Qty: 90 TABLET | Refills: 0 | Status: SHIPPED | OUTPATIENT
Start: 2021-10-25 | End: 2022-03-07

## 2021-10-25 NOTE — ASSESSMENT & PLAN NOTE
Continue current medication and efforts with diet and exercise.   Advised to quit smoking, reviewed EMD chart and last CT of chest in May 2021  
Hypertension is stable. Continue to monitor BP at home. Continue current meds. Continue to modify diet and lifestyle. Will need labs every 6 months and follow up.     
none

## 2021-10-25 NOTE — PROGRESS NOTES
Sumeet Gomes presents to [facility].    Chief Complaint:  Hyperlipidemia and Hypertension         History of Present Illness:  Hypertension:  Current medication HCTZ  Tolerating Medication: Yes  Checking BP at home and it is : occ   Needs refills: Yes  Labs   Lab Results       Component                Value               Date                       BUN                      11                  03/13/2021                 CREATININE               0.83                03/13/2021                 BCR                      13                  03/13/2021                 K                        4.1                 03/13/2021                 CO2                      32                  03/13/2021                 CALCIUM                  9.5                 03/13/2021                 ALBUMIN                  4.3                 03/13/2021                 LABIL2                   1.7                 03/13/2021                 AST                      30                  03/13/2021                 ALT                      20                  03/13/2021              Hyperlipidemia  Current medication zocor  Tolerating medication: Yes  Needs Refill: Y/N    Lab Results       Component                Value               Date                       CHLPL                    138                 03/13/2021                 TRIG                     72                  03/13/2021                 HDL                      57                  03/13/2021                 LDL                      67 (L)              03/13/2021              PMH changes since 3-2021: none      PAST MEDICAL HISTORY         COPD     Hospitalizations: back surgery         PREVENTIVE HEALTH MAINTENANCE             Hepatitis C Medicare Screening: was last done 1-2018; negative         Surgical History:         Arthroscopy: left knee 1995 and right shoulder 2008;     Vasectomy     Other Surgeries:    Laminectomy: lumbar region; 1991;      Procedures:    Colonoscopy ( 12 )     COLONOSCOPY: was last done 12 with normal results Boo         Family History:     Father: Hypertension;  Dementia     Mother:  at age 54; Cause of death was stroke;  Hypertension;  COPD     Brother(s): Healthy; 3 brother(s) total     Sister(s): 2 sister(s) total         Social History:     Occupation: [employer]     Marital Status:      Children: 3 children                     Review of Systems:  Review of Systems   Constitutional: Negative for fatigue and fever.   Respiratory: Negative for cough and shortness of breath.    Cardiovascular: Negative for chest pain, palpitations and leg swelling.   Neurological: Negative for numbness.          Vital Signs:   /71 (BP Location: Left arm, Patient Position: Sitting)   Pulse 87   Wt 73.8 kg (162 lb 12.8 oz)   BMI 25.50 kg/m²       Physical Exam:  Physical Exam  Vitals reviewed.   Constitutional:       General: He is not in acute distress.     Appearance: Normal appearance.   Neck:      Vascular: No carotid bruit.   Cardiovascular:      Rate and Rhythm: Normal rate and regular rhythm.      Heart sounds: Normal heart sounds. No murmur heard.      Pulmonary:      Effort: Pulmonary effort is normal. No respiratory distress.      Breath sounds: Normal breath sounds.   Musculoskeletal:      Right lower leg: No edema.      Left lower leg: No edema.   Neurological:      Mental Status: He is alert.   Psychiatric:         Mood and Affect: Mood normal.         Behavior: Behavior normal.         Result Review      The following data was reviewed by: ZULY Segundo on 10/25/2021:    No results found for this or any previous visit.            Assessment and Plan:          Diagnoses and all orders for this visit:    1. Other hyperlipidemia (Primary)  Assessment & Plan:  Continue current medication and efforts with diet and exercise.   Advised to quit smoking, reviewed EMD chart and last CT of chest  in May 2021    Orders:  -     Comprehensive Metabolic Panel  -     Lipid Panel  -     simvastatin (ZOCOR) 20 MG tablet; Take 1 tablet by mouth Daily.  Dispense: 90 tablet; Refill: 0    2. Hypertension, unspecified type  Assessment & Plan:  Hypertension is stable. Continue to monitor BP at home. Continue current meds. Continue to modify diet and lifestyle. Will need labs every 6 months and follow up.       Orders:  -     hydroCHLOROthiazide (HYDRODIURIL) 25 MG tablet; Take 1 tablet by mouth Daily.  Dispense: 90 tablet; Refill: 0    3. Need for influenza vaccination  -     FluLaval/Fluarix/Fluzone >6 Months        Follow Up   Return for fasting for labs, will return for labs later this week .  Patient was given instructions and counseling regarding his condition or for health maintenance advice. Please see specific information pulled into the AVS if appropriate.

## 2021-12-20 DIAGNOSIS — E78.49 OTHER HYPERLIPIDEMIA: ICD-10-CM

## 2021-12-20 RX ORDER — SIMVASTATIN 20 MG
TABLET ORAL
Qty: 90 TABLET | Refills: 0 | Status: SHIPPED | OUTPATIENT
Start: 2021-12-20 | End: 2022-04-06

## 2022-01-12 RX ORDER — ALBUTEROL SULFATE 90 UG/1
2 AEROSOL, METERED RESPIRATORY (INHALATION)
Qty: 18 G | Refills: 2 | Status: SHIPPED | OUTPATIENT
Start: 2022-01-12 | End: 2022-04-06

## 2022-01-25 ENCOUNTER — HOSPITAL ENCOUNTER (OUTPATIENT)
Dept: GENERAL RADIOLOGY | Facility: HOSPITAL | Age: 62
Discharge: HOME | End: 2022-01-25
Admitting: NURSE PRACTITIONER

## 2022-01-25 ENCOUNTER — CLINICAL SUPPORT (OUTPATIENT)
Dept: FAMILY MEDICINE CLINIC | Age: 62
End: 2022-01-25

## 2022-01-25 DIAGNOSIS — R05.9 COUGH: Primary | ICD-10-CM

## 2022-01-25 DIAGNOSIS — R05.9 COUGH: ICD-10-CM

## 2022-01-25 PROCEDURE — 71046 X-RAY EXAM CHEST 2 VIEWS: CPT

## 2022-01-25 PROCEDURE — 99211 OFF/OP EST MAY X REQ PHY/QHP: CPT | Performed by: NURSE PRACTITIONER

## 2022-01-26 PROCEDURE — U0004 COV-19 TEST NON-CDC HGH THRU: HCPCS | Performed by: NURSE PRACTITIONER

## 2022-01-27 DIAGNOSIS — J18.9 PNEUMONITIS: ICD-10-CM

## 2022-01-27 DIAGNOSIS — U07.1 COVID: Primary | ICD-10-CM

## 2022-01-27 DIAGNOSIS — U07.1 COVID-19: ICD-10-CM

## 2022-01-27 DIAGNOSIS — J44.9 COPD, SEVERE: Primary | ICD-10-CM

## 2022-01-27 LAB — SARS-COV-2 RNA PNL SPEC NAA+PROBE: DETECTED

## 2022-01-27 RX ORDER — DEXAMETHASONE 6 MG/1
6 TABLET ORAL
Qty: 10 TABLET | Refills: 0 | Status: SHIPPED | OUTPATIENT
Start: 2022-01-27 | End: 2022-02-11

## 2022-01-28 ENCOUNTER — TELEPHONE (OUTPATIENT)
Dept: FAMILY MEDICINE CLINIC | Age: 62
End: 2022-01-28

## 2022-01-28 DIAGNOSIS — U07.1 COVID: Primary | ICD-10-CM

## 2022-01-28 DIAGNOSIS — U07.1 COVID-19: Primary | ICD-10-CM

## 2022-01-28 NOTE — TELEPHONE ENCOUNTER
Consulted with Dr Biggs, called several pharmacies to see if they have the Paxlovid. Can not find it. Dr Biggs said since we cant find a pharmacy with it send in Moninupiravir 200mg.    A Alexander informed of Dr Biggs recommendations. She said that's fine send it to the [facility] in [place] that has it. Hold cholesterol med while taking. Moninupiravir 200mg four caps bid for 5 days. Left detailed message on pts vm.

## 2022-01-29 NOTE — TELEPHONE ENCOUNTER
Patient is improving, breathing improved, taking steroids, getting anti viral today, pharmacy closed yesterday before he could pick it up.

## 2022-02-05 DIAGNOSIS — R05.9 COUGH: Primary | ICD-10-CM

## 2022-02-07 ENCOUNTER — TELEPHONE (OUTPATIENT)
Dept: PULMONOLOGY | Facility: CLINIC | Age: 62
End: 2022-02-07

## 2022-02-07 ENCOUNTER — HOSPITAL ENCOUNTER (OUTPATIENT)
Dept: GENERAL RADIOLOGY | Facility: HOSPITAL | Age: 62
Discharge: HOME | End: 2022-02-07
Admitting: NURSE PRACTITIONER

## 2022-02-07 DIAGNOSIS — R05.9 COUGH: ICD-10-CM

## 2022-02-07 PROCEDURE — 71046 X-RAY EXAM CHEST 2 VIEWS: CPT

## 2022-02-07 NOTE — TELEPHONE ENCOUNTER
Patient left  stating that his o2 was staying at 85%. He contacted his PCP and they advised that he calls our office. He usually sees  but saw Amber last.     I called patient and checked on him. He states that he is only on oxygen at night and that his breathing seems to have improved, but that he had not checked his o2 recently since making his original call.   He also stated that Mirtha Alexander ordered a CXR and he had that done today as well. Our office set him up with an appointment to see Julia Chin on 02/11/2022. I advised patient that if he needs us sooner, to give us a call and if his breathing should get worse or if his o2 gets down to 85 again, that he should go to urgent care/ER. I did also advise patient that he could use his oxygen if need be as well.    Patient verbalized understanding.

## 2022-02-08 DIAGNOSIS — R93.89 ABNORMAL CHEST X-RAY: ICD-10-CM

## 2022-02-08 DIAGNOSIS — R06.09 DYSPNEA ON EXERTION: Primary | ICD-10-CM

## 2022-02-09 ENCOUNTER — HOSPITAL ENCOUNTER (OUTPATIENT)
Dept: CT IMAGING | Facility: HOSPITAL | Age: 62
Discharge: HOME | End: 2022-02-09
Admitting: INTERNAL MEDICINE

## 2022-02-09 DIAGNOSIS — R93.89 ABNORMAL CHEST X-RAY: ICD-10-CM

## 2022-02-09 DIAGNOSIS — R06.09 DYSPNEA ON EXERTION: ICD-10-CM

## 2022-02-09 PROCEDURE — 71250 CT THORAX DX C-: CPT

## 2022-02-11 ENCOUNTER — OFFICE VISIT (OUTPATIENT)
Dept: PULMONOLOGY | Facility: CLINIC | Age: 62
End: 2022-02-11

## 2022-02-11 VITALS
SYSTOLIC BLOOD PRESSURE: 129 MMHG | HEIGHT: 67 IN | OXYGEN SATURATION: 90 % | TEMPERATURE: 99.1 F | WEIGHT: 157.5 LBS | BODY MASS INDEX: 24.72 KG/M2 | HEART RATE: 102 BPM | DIASTOLIC BLOOD PRESSURE: 82 MMHG | RESPIRATION RATE: 16 BRPM

## 2022-02-11 DIAGNOSIS — J43.9 PULMONARY EMPHYSEMA, UNSPECIFIED EMPHYSEMA TYPE: Primary | ICD-10-CM

## 2022-02-11 DIAGNOSIS — J44.9 CHRONIC OBSTRUCTIVE PULMONARY DISEASE, UNSPECIFIED COPD TYPE: ICD-10-CM

## 2022-02-11 DIAGNOSIS — R06.09 DYSPNEA ON EXERTION: ICD-10-CM

## 2022-02-11 PROCEDURE — 99213 OFFICE O/P EST LOW 20 MIN: CPT | Performed by: NURSE PRACTITIONER

## 2022-02-11 RX ORDER — FLUTICASONE PROPIONATE 220 UG/1
2 AEROSOL, METERED RESPIRATORY (INHALATION)
Qty: 144 G | Refills: 3 | Status: SHIPPED | OUTPATIENT
Start: 2022-02-11 | End: 2022-05-20 | Stop reason: SDUPTHER

## 2022-02-11 NOTE — PROGRESS NOTES
Primary Care Provider  Mirtha Alexander APRN   Referring Provider  No ref. provider found    Patient Complaint  Post covid, COPD, Shortness of Breath, Wheezing, and Cough      SUBJECTIVE    History of Presenting Illness  Sumeet Gomes is a 61 y.o. male who presents to [facility] for 6-month follow-up.  Patient has diagnosis of emphysema/COPD.  He is currently on albuterol, DuoNebs, and Anoro.  Patient recently had Covid January 25, 2022 and received Paxlovid.  Patient states he does not recall running a fever but he did have sweats and fatigue for about a day or 2.  He has since returned back to work and feels that he is doing well.  Does use oxygen at night.  He does continue to smoke less than half a pack a day.  States he is slowly weaning himself down off of cigarettes.  He does have shortness of air with exertion at times moderate intensity but states he rests and recovers easily.  In addition he does have a cough mostly in the mornings thick white sputum.  Occasionally wheeze at times clears with coughing.    Denies headaches, chest pain, weight loss or hemoptysis. Denies fevers, chills and night sweats. Sumeet Gomes is able to perform ADLs without difficulties and denies any swollen glands/lymph nodes in the head or neck.    I have personally reviewed the review of systems, past family, social, medical and surgical histories; and agree with their findings.    Review of Systems  Constitutional symptoms:  Denied complaints   Ear, nose, throat: Denied complaints  Cardiovascular:  Denied complaints  Respiratory: Shortness of air with exertion, coughing and wheezing at times  Gastrointestinal: Denied complaints  Musculoskeletal: Denied complaints    Family History   Problem Relation Age of Onset   • Asthma Mother    • Emphysema Mother    • Stroke Mother         Social History     Socioeconomic History   • Marital status:    Tobacco Use   •  "Smoking status: Current Every Day Smoker     Packs/day: 0.50     Years: 40.00     Pack years: 20.00     Types: Cigarettes   • Smokeless tobacco: Never Used   Vaping Use   • Vaping Use: Never used   Substance and Sexual Activity   • Alcohol use: Not Currently   • Drug use: Never   • Sexual activity: Defer        Past Medical History:   Diagnosis Date   • COPD (chronic obstructive pulmonary disease) (HCC)         Immunization History   Administered Date(s) Administered   • COVID-19 (MODERNA) 1st, 2nd, 3rd Dose Only 06/18/2021, 06/18/2021, 07/23/2021, 07/23/2021   • Flu Vaccine Quad PF >36MO 11/08/2019   • FluLaval/Fluarix/Fluzone >6 10/25/2021   • Hepatitis A 11/13/2018   • Pneumococcal Polysaccharide (PPSV23) 02/13/2017, 02/13/2017       No Known Allergies       Current Outpatient Medications:   •  albuterol sulfate  (90 Base) MCG/ACT inhaler, INHALE 2 PUFFS BY MOUTH EVERY 4 TO 6 HOURS AS NEEDED FOR SHORTNESS OF BREATH, Disp: 18 g, Rfl: 2  •  fluticasone (Flovent HFA) 220 MCG/ACT inhaler, Inhale 2 puffs by mouth 2 (two) times a day., Disp: 144 g, Rfl: 3  •  hydroCHLOROthiazide (HYDRODIURIL) 25 MG tablet, Take 1 tablet by mouth Daily., Disp: 90 tablet, Rfl: 0  •  ipratropium-albuterol (DUO-NEB) 0.5-2.5 mg/3 ml nebulizer, USE 1 VIAL IN NEBULIZER EVERY 4 HOURS AS NEEDED FOR SHORTNESS OF BREATH, Disp: 1800 mL, Rfl: 0  •  simvastatin (ZOCOR) 20 MG tablet, TAKE 1 TABLET BY MOUTH EVERY DAY, Disp: 90 tablet, Rfl: 0  •  umeclidinium-vilanterol (Anoro Ellipta) 62.5-25 MCG/INH aerosol powder  inhaler, Inhale 1 puff Daily., Disp: 180 each, Rfl: 3       OBJECTIVE    Vital Signs   /82 (BP Location: Right arm, Patient Position: Sitting, Cuff Size: Adult)   Pulse 102   Temp 99.1 °F (37.3 °C) (Tympanic)   Resp 16   Ht 170.2 cm (67\")   Wt 71.4 kg (157 lb 8 oz)   SpO2 90% Comment: room air  BMI 24.67 kg/m²     Physical Exam  Vital Signs Reviewed   WDWN, Alert, NAD.    HEENT:  PERRL, EOMI.  OP, nares clear  Neck:  " Supple, no JVD, no thyromegaly  Chest:   Diminished breath sounds bilateral lung fields no wheezing no rhonchi no rales no work of breathing noted  CV: RRR, no MGR, pulses 2+, equal.  Abd:  Soft, NT, ND, + BS, no HSM  EXT:  no clubbing, no cyanosis, no edema  Neuro:  A&Ox3, CN grossly intact, no focal deficits.  Skin: No rashes or lesions noted    Results Review  I have personally reviewed the office notes of Dr. Chapa as well and his chest x-ray and CT scan with the following findings:  Study Result    Narrative & Impression   PROCEDURE:  CT CHEST WO CONTRAST DIAGNOSTIC     COMPARISON: [facility], CT, CT CHEST WO CONTRAST DIAGNOSTIC, 9/03/2021, 11:05.     INDICATIONS:  Shorntess of air, abnormal chest xray     TECHNIQUE:    CT images were created without the administration of contrast material.       PROTOCOL:     Standard imaging protocol performed                 RADIATION:      DLP: 316.2 mGy*cm               Automated exposure control was utilized to minimize radiation dose.      FINDINGS:          Significant emphysema.  No suspicious pulmonary nodule.  Linear areas of scarring scattered in both   lungs.  No adenopathy in the chest.  No acute finding in the included upper abdomen.  No aggressive   appearing bone change.     IMPRESSION:               Emphysema.  Scattered areas of linear scarring.            CRISTINA SMITH MD            Study Result    Narrative & Impression   PROCEDURE:  XR CHEST 2 VW     COMPARISON: [facility], CHEST PA/AP & LAT 2V, 3/09/2016, 12:11.  [facility], CR, XR CHEST 2 VW, 1/25/2022, 18:04.     INDICATIONS:  SHORTNESS OF BREATH X 2 WEEKS, COVID + X 2 WEEKS     FINDINGS:          The heart size is within normal limits.  Again seen are bilateral perihilar interstitial opacities.    Diffuse chronic interstitial changes are stable.  There is stable right basilar scarring.  There   are degenerative changes of the thoracic spine.      IMPRESSION:               Diffuse interstitial lung disease as before.  Is difficult to exclude superimposed   pneumonia.            RAISA HUMPHREYS MD         Electronically Signed and Approved By: RAISA HUMPHREYS MD on 2/07/2022 at 16:07       ASSESSMENT & PLAN    Patient Active Problem List   Diagnosis   • Other hyperlipidemia   • Hypertension       Diagnoses and all orders for this visit:    1. Pulmonary emphysema, unspecified emphysema type (HCC) (Primary)  -     fluticasone (Flovent HFA) 220 MCG/ACT inhaler; Inhale 2 puffs by mouth 2 (two) times a day.  Dispense: 144 g; Refill: 3    2. Chronic obstructive pulmonary disease, unspecified COPD type (HCC)  -     fluticasone (Flovent HFA) 220 MCG/ACT inhaler; Inhale 2 puffs by mouth 2 (two) times a day.  Dispense: 144 g; Refill: 3    3. Dyspnea on exertion        Plan:  We will refill his albuterol today.  Continue with current medications and inhalers and nebulizers as prescribed. Encouraged patient to quit smoke, verbalizes he will try.  Recommend patient to return to office in 3 months to follow-up on status.    Smoking status:current  Vaccination status: up to date  Medications personally reviewed    Follow Up  Return in about 3 months (around 5/11/2022).  Return to office sooner if needed.  Patient was given instructions and counseling regarding his condition or for health maintenance advice. Please see specific information pulled into the AVS if appropriate.

## 2022-03-05 DIAGNOSIS — I10 HYPERTENSION, UNSPECIFIED TYPE: ICD-10-CM

## 2022-03-07 RX ORDER — HYDROCHLOROTHIAZIDE 25 MG/1
TABLET ORAL
Qty: 90 TABLET | Refills: 0 | Status: SHIPPED | OUTPATIENT
Start: 2022-03-07 | End: 2022-04-25 | Stop reason: SDUPTHER

## 2022-03-14 ENCOUNTER — APPOINTMENT (OUTPATIENT)
Dept: CT IMAGING | Facility: HOSPITAL | Age: 62
End: 2022-03-14

## 2022-03-24 DIAGNOSIS — E78.49 OTHER HYPERLIPIDEMIA: Primary | ICD-10-CM

## 2022-03-28 ENCOUNTER — LAB (OUTPATIENT)
Dept: LAB | Facility: HOSPITAL | Age: 62
End: 2022-03-28

## 2022-03-28 DIAGNOSIS — E78.49 OTHER HYPERLIPIDEMIA: ICD-10-CM

## 2022-03-28 LAB
ALBUMIN SERPL-MCNC: 4.3 G/DL (ref 3.5–5.2)
ALBUMIN/GLOB SERPL: 1.6 G/DL
ALP SERPL-CCNC: 147 U/L (ref 39–117)
ALT SERPL W P-5'-P-CCNC: 13 U/L (ref 1–41)
ANION GAP SERPL CALCULATED.3IONS-SCNC: 9 MMOL/L (ref 5–15)
AST SERPL-CCNC: 19 U/L (ref 1–40)
BILIRUB SERPL-MCNC: 0.5 MG/DL (ref 0–1.2)
BUN SERPL-MCNC: 9 MG/DL (ref 8–23)
BUN/CREAT SERPL: 10.6 (ref 7–25)
CALCIUM SPEC-SCNC: 10.1 MG/DL (ref 8.6–10.5)
CHLORIDE SERPL-SCNC: 98 MMOL/L (ref 98–107)
CHOLEST SERPL-MCNC: 129 MG/DL (ref 0–200)
CO2 SERPL-SCNC: 34 MMOL/L (ref 22–29)
CREAT SERPL-MCNC: 0.85 MG/DL (ref 0.76–1.27)
EGFRCR SERPLBLD CKD-EPI 2021: 99.5 ML/MIN/1.73
GLOBULIN UR ELPH-MCNC: 2.7 GM/DL
GLUCOSE SERPL-MCNC: 109 MG/DL (ref 65–99)
HDLC SERPL-MCNC: 53 MG/DL (ref 40–60)
LDLC SERPL CALC-MCNC: 61 MG/DL (ref 0–100)
LDLC/HDLC SERPL: 1.14 {RATIO}
POTASSIUM SERPL-SCNC: 3.8 MMOL/L (ref 3.5–5.2)
PROT SERPL-MCNC: 7 G/DL (ref 6–8.5)
SODIUM SERPL-SCNC: 141 MMOL/L (ref 136–145)
TRIGL SERPL-MCNC: 77 MG/DL (ref 0–150)
VLDLC SERPL-MCNC: 15 MG/DL (ref 5–40)

## 2022-03-28 PROCEDURE — 80053 COMPREHEN METABOLIC PANEL: CPT

## 2022-03-28 PROCEDURE — 80061 LIPID PANEL: CPT | Performed by: NURSE PRACTITIONER

## 2022-03-28 PROCEDURE — 36415 COLL VENOUS BLD VENIPUNCTURE: CPT

## 2022-04-05 DIAGNOSIS — E78.49 OTHER HYPERLIPIDEMIA: ICD-10-CM

## 2022-04-06 RX ORDER — ALBUTEROL SULFATE 90 UG/1
2 AEROSOL, METERED RESPIRATORY (INHALATION)
Qty: 18 G | Refills: 0 | Status: SHIPPED | OUTPATIENT
Start: 2022-04-06 | End: 2022-04-25 | Stop reason: SDUPTHER

## 2022-04-06 RX ORDER — SIMVASTATIN 20 MG
TABLET ORAL
Qty: 90 TABLET | Refills: 0 | Status: SHIPPED | OUTPATIENT
Start: 2022-04-06 | End: 2022-04-25 | Stop reason: SDUPTHER

## 2022-04-25 ENCOUNTER — OFFICE VISIT (OUTPATIENT)
Dept: FAMILY MEDICINE CLINIC | Age: 62
End: 2022-04-25

## 2022-04-25 ENCOUNTER — LAB (OUTPATIENT)
Dept: LAB | Facility: HOSPITAL | Age: 62
End: 2022-04-25

## 2022-04-25 VITALS
HEIGHT: 67 IN | WEIGHT: 146 LBS | DIASTOLIC BLOOD PRESSURE: 84 MMHG | BODY MASS INDEX: 22.91 KG/M2 | SYSTOLIC BLOOD PRESSURE: 127 MMHG | HEART RATE: 114 BPM | TEMPERATURE: 98 F

## 2022-04-25 DIAGNOSIS — I10 ESSENTIAL HYPERTENSION: ICD-10-CM

## 2022-04-25 DIAGNOSIS — J43.8 OTHER EMPHYSEMA: ICD-10-CM

## 2022-04-25 DIAGNOSIS — E78.49 OTHER HYPERLIPIDEMIA: Primary | ICD-10-CM

## 2022-04-25 DIAGNOSIS — R53.83 FATIGUE, UNSPECIFIED TYPE: ICD-10-CM

## 2022-04-25 LAB
BASOPHILS # BLD AUTO: 0.02 10*3/MM3 (ref 0–0.2)
BASOPHILS NFR BLD AUTO: 0.3 % (ref 0–1.5)
DEPRECATED RDW RBC AUTO: 47.1 FL (ref 37–54)
EOSINOPHIL # BLD AUTO: 0.08 10*3/MM3 (ref 0–0.4)
EOSINOPHIL NFR BLD AUTO: 1.3 % (ref 0.3–6.2)
ERYTHROCYTE [DISTWIDTH] IN BLOOD BY AUTOMATED COUNT: 13.9 % (ref 12.3–15.4)
HCT VFR BLD AUTO: 53.6 % (ref 37.5–51)
HGB BLD-MCNC: 16.7 G/DL (ref 13–17.7)
IMM GRANULOCYTES # BLD AUTO: 0 10*3/MM3 (ref 0–0.05)
IMM GRANULOCYTES NFR BLD AUTO: 0 % (ref 0–0.5)
LYMPHOCYTES # BLD AUTO: 1.34 10*3/MM3 (ref 0.7–3.1)
LYMPHOCYTES NFR BLD AUTO: 21.5 % (ref 19.6–45.3)
MCH RBC QN AUTO: 28.3 PG (ref 26.6–33)
MCHC RBC AUTO-ENTMCNC: 31.2 G/DL (ref 31.5–35.7)
MCV RBC AUTO: 90.7 FL (ref 79–97)
MONOCYTES # BLD AUTO: 0.48 10*3/MM3 (ref 0.1–0.9)
MONOCYTES NFR BLD AUTO: 7.7 % (ref 5–12)
NEUTROPHILS NFR BLD AUTO: 4.31 10*3/MM3 (ref 1.7–7)
NEUTROPHILS NFR BLD AUTO: 69.2 % (ref 42.7–76)
PLATELET # BLD AUTO: 194 10*3/MM3 (ref 140–450)
PMV BLD AUTO: 10.2 FL (ref 6–12)
RBC # BLD AUTO: 5.91 10*6/MM3 (ref 4.14–5.8)
TSH SERPL DL<=0.05 MIU/L-ACNC: 2.03 UIU/ML (ref 0.27–4.2)
WBC NRBC COR # BLD: 6.23 10*3/MM3 (ref 3.4–10.8)

## 2022-04-25 PROCEDURE — 99214 OFFICE O/P EST MOD 30 MIN: CPT | Performed by: NURSE PRACTITIONER

## 2022-04-25 PROCEDURE — 85025 COMPLETE CBC W/AUTO DIFF WBC: CPT

## 2022-04-25 PROCEDURE — 84443 ASSAY THYROID STIM HORMONE: CPT

## 2022-04-25 PROCEDURE — 36415 COLL VENOUS BLD VENIPUNCTURE: CPT

## 2022-04-25 RX ORDER — ALBUTEROL SULFATE 90 UG/1
2 AEROSOL, METERED RESPIRATORY (INHALATION) EVERY 6 HOURS PRN
Qty: 36 G | Refills: 1 | Status: SHIPPED | OUTPATIENT
Start: 2022-04-25 | End: 2022-05-20 | Stop reason: SDUPTHER

## 2022-04-25 RX ORDER — HYDROCHLOROTHIAZIDE 25 MG/1
25 TABLET ORAL DAILY
Qty: 90 TABLET | Refills: 1 | Status: SHIPPED | OUTPATIENT
Start: 2022-04-25 | End: 2022-07-23 | Stop reason: SDUPTHER

## 2022-04-25 RX ORDER — SIMVASTATIN 20 MG
20 TABLET ORAL DAILY
Qty: 90 TABLET | Refills: 1 | Status: SHIPPED | OUTPATIENT
Start: 2022-04-25 | End: 2023-01-16

## 2022-04-25 NOTE — PROGRESS NOTES
Sumeet Gomes presents to [facility].    Chief Complaint:  Hypertension and Hyperlipidemia         History of Present Illness:  Hyperlipidemia  Current medication: zocor  Tolerating medication: Yes   [facility]eds Ref[facility]: [facility]    Lab Results       Component                Value               Date                       CHOL                     129                 03/28/2022                 CHLPL                    138                 03/13/2021                 TRIG                     77                  03/28/2022                 HDL                      53                  03/28/2022                 LDL                      61                  03/28/2022              Hypertension:  Current medication: HCTZ  Tolerating Medication: Yes   Checking BP at home and it is: not checking   Needs refills: [facility]  Labs:  Lab Results       Component                Value               Date                       GLUCOSE                  109 (H)             03/28/2022                 BUN                      9                   03/28/2022                 CREATININE               0.85                03/28/2022                 BCR                      10.6                03/28/2022                 K                        3.8                 03/28/2022                 CO2                      34.0 (H)            03/28/2022                 CALCIUM                  10.1                03/28/2022                 ALBUMIN                  4.30                03/28/2022                 LABIL2                   1.7                 03/13/2021                 AST                      19                  03/28/2022                 ALT                      13                  03/28/2022              PMH changes since : no surgery or hospitalizations (still smoking)       1-25-22 covid           COPD     Hospitalizations: back surgery         PREVENTIVE HEALTH MAINTENANCE             Hepatitis C Medicare  Screening: was last done ; negative         Surgical History:         Arthroscopy: left knee  and right shoulder ;     Vasectomy     Other Surgeries:    Laminectomy: lumbar region; ;     Procedures:    Colonoscopy ( 12 )     COLONOSCOPY: was last done 12 with normal results Boo         Family History:     Father: Hypertension;  Dementia     Mother:  at age 54; Cause of death was stroke;  Hypertension;  COPD     Brother(s): Healthy; 3 brother(s) total     Sister(s): 2 sister(s) total         Social History:     Occupation: [employer]     Marital Status:      Children: 3 children                           Review of Systems:  Review of Systems   Constitutional: Positive for fatigue and unexpected weight loss. Negative for fever.   Respiratory: Positive for shortness of breath (worse since covid ) and wheezing (occ ). Cough: same.    Cardiovascular: Negative for chest pain, palpitations and leg swelling.   Neurological: Negative for numbness.          Current Outpatient Medications:   •  albuterol sulfate  (90 Base) MCG/ACT inhaler, Inhale 2 puffs Every 6 (Six) Hours As Needed for Wheezing., Disp: 36 g, Rfl: 1  •  fluticasone (Flovent HFA) 220 MCG/ACT inhaler, Inhale 2 puffs by mouth 2 (two) times a day., Disp: 144 g, Rfl: 3  •  hydroCHLOROthiazide (HYDRODIURIL) 25 MG tablet, Take 1 tablet by mouth Daily., Disp: 90 tablet, Rfl: 1  •  ipratropium-albuterol (DUO-NEB) 0.5-2.5 mg/3 ml nebulizer, USE 1 VIAL IN NEBULIZER EVERY 4 HOURS AS NEEDED FOR SHORTNESS OF BREATH, Disp: 1800 mL, Rfl: 0  •  simvastatin (ZOCOR) 20 MG tablet, Take 1 tablet by mouth Daily., Disp: 90 tablet, Rfl: 1  •  umeclidinium-vilanterol (Anoro Ellipta) 62.5-25 MCG/INH aerosol powder  inhaler, Inhale 1 puff Daily., Disp: 180 each, Rfl: 3    Vital Signs:   Vitals:    22 1514   BP: 127/84   BP Location: Right arm   Patient Position: Sitting   Pulse: 114   Temp: 98 °F (36.7 °C)   TempSrc: Oral  "  Weight: 66.2 kg (146 lb)   Height: 170.2 cm (67\")         Physical Exam:  Physical Exam  Vitals reviewed.   Constitutional:       General: He is not in acute distress.     Appearance: Normal appearance.   Neck:      Vascular: No carotid bruit.   Cardiovascular:      Rate and Rhythm: Normal rate and regular rhythm.      Heart sounds: Normal heart sounds. No murmur heard.  Pulmonary:      Effort: Pulmonary effort is normal. No respiratory distress.      Breath sounds: Normal breath sounds.   Musculoskeletal:      Right lower leg: Edema (trace) present.      Left lower leg: Edema (trace) present.   Neurological:      Mental Status: He is alert.   Psychiatric:         Mood and Affect: Mood normal.         Behavior: Behavior normal.         Result Review      The following data was reviewed by: ZULY Segundo on 04/25/2022:    Results for orders placed or performed in visit on 03/28/22   Comprehensive Metabolic Panel    Specimen: Blood   Result Value Ref Range    Glucose 109 (H) 65 - 99 mg/dL    BUN 9 8 - 23 mg/dL    Creatinine 0.85 0.76 - 1.27 mg/dL    Sodium 141 136 - 145 mmol/L    Potassium 3.8 3.5 - 5.2 mmol/L    Chloride 98 98 - 107 mmol/L    CO2 34.0 (H) 22.0 - 29.0 mmol/L    Calcium 10.1 8.6 - 10.5 mg/dL    Total Protein 7.0 6.0 - 8.5 g/dL    Albumin 4.30 3.50 - 5.20 g/dL    ALT (SGPT) 13 1 - 41 U/L    AST (SGOT) 19 1 - 40 U/L    Alkaline Phosphatase 147 (H) 39 - 117 U/L    Total Bilirubin 0.5 0.0 - 1.2 mg/dL    Globulin 2.7 gm/dL    A/G Ratio 1.6 g/dL    BUN/Creatinine Ratio 10.6 7.0 - 25.0    Anion Gap 9.0 5.0 - 15.0 mmol/L    eGFR 99.5 >60.0 mL/min/1.73               Assessment and Plan:          Diagnoses and all orders for this visit:    1. Other hyperlipidemia (Primary)  Assessment & Plan:  Reviewed labs, continue rx    Orders:  -     simvastatin (ZOCOR) 20 MG tablet; Take 1 tablet by mouth Daily.  Dispense: 90 tablet; Refill: 1    2. Essential hypertension  Assessment & Plan:  Hypertension is " stable. Continue to monitor BP at home. Continue current meds. Continue to modify diet and lifestyle. Will need labs every 6 months and follow up.       Orders:  -     hydroCHLOROthiazide (HYDRODIURIL) 25 MG tablet; Take 1 tablet by mouth Daily.  Dispense: 90 tablet; Refill: 1    3. Other emphysema (HCC)  Assessment & Plan:  Advised to quit smoking, his appt is 5-12-22 with pulm, refilled his albuterol   Reviewed last pulm note       Orders:  -     albuterol sulfate  (90 Base) MCG/ACT inhaler; Inhale 2 puffs Every 6 (Six) Hours As Needed for Wheezing.  Dispense: 36 g; Refill: 1    4. Fatigue, unspecified type  Assessment & Plan:  Weight down 15 pounds since , checking some labs     Orders:  -     CBC w AUTO Differential; Future  -     TSH; Future        Follow Up   Return for followup pending lab results.  Patient was given instructions and counseling regarding his condition or for health maintenance advice. Please see specific information pulled into the AVS if appropriate.

## 2022-04-25 NOTE — ASSESSMENT & PLAN NOTE
Advised to quit smoking, his appt is 5-12-22 with pulm, refilled his albuterol   Reviewed last pulm note

## 2022-04-27 DIAGNOSIS — I10 ESSENTIAL HYPERTENSION: Primary | ICD-10-CM

## 2022-05-20 ENCOUNTER — OFFICE VISIT (OUTPATIENT)
Dept: PULMONOLOGY | Facility: CLINIC | Age: 62
End: 2022-05-20

## 2022-05-20 VITALS
RESPIRATION RATE: 16 BRPM | OXYGEN SATURATION: 88 % | HEIGHT: 67 IN | BODY MASS INDEX: 22.91 KG/M2 | TEMPERATURE: 98 F | SYSTOLIC BLOOD PRESSURE: 126 MMHG | DIASTOLIC BLOOD PRESSURE: 79 MMHG | WEIGHT: 146 LBS | HEART RATE: 61 BPM

## 2022-05-20 DIAGNOSIS — J43.9 PULMONARY EMPHYSEMA, UNSPECIFIED EMPHYSEMA TYPE: Primary | ICD-10-CM

## 2022-05-20 DIAGNOSIS — U09.9 POST COVID-19 CONDITION, UNSPECIFIED: ICD-10-CM

## 2022-05-20 DIAGNOSIS — J44.9 CHRONIC OBSTRUCTIVE PULMONARY DISEASE, UNSPECIFIED COPD TYPE: ICD-10-CM

## 2022-05-20 PROCEDURE — 99213 OFFICE O/P EST LOW 20 MIN: CPT | Performed by: NURSE PRACTITIONER

## 2022-05-20 RX ORDER — ALBUTEROL SULFATE 90 UG/1
2 AEROSOL, METERED RESPIRATORY (INHALATION) EVERY 6 HOURS PRN
Qty: 36 G | Refills: 1 | Status: SHIPPED | OUTPATIENT
Start: 2022-05-20 | End: 2022-07-27

## 2022-05-20 RX ORDER — FLUTICASONE PROPIONATE 220 UG/1
2 AEROSOL, METERED RESPIRATORY (INHALATION)
Qty: 144 G | Refills: 3 | Status: SHIPPED | OUTPATIENT
Start: 2022-05-20 | End: 2022-12-22 | Stop reason: SDUPTHER

## 2022-05-20 RX ORDER — IPRATROPIUM BROMIDE AND ALBUTEROL SULFATE 2.5; .5 MG/3ML; MG/3ML
SOLUTION RESPIRATORY (INHALATION)
Qty: 1800 ML | Refills: 0 | Status: SHIPPED | OUTPATIENT
Start: 2022-05-20 | End: 2022-07-23 | Stop reason: SDUPTHER

## 2022-05-20 NOTE — PROGRESS NOTES
Primary Care Provider  Mirtha Alexander APRN   Referring Provider  No ref. provider found    Patient Complaint  COPD, Follow-up (3 month follow up), Shortness of Breath, Cough (Productive- yellow, green), and Wheezing      SUBJECTIVE    History of Presenting Illness  Sumeet Gomes is a pleasant 61 y.o. male who presents to [facility] for follow-up.  I saw the patient last 2/11/2022.  Patient had COVID-pneumonia January 25, 2022 with hospitalization.  Continues to have shortness of air with exertion of moderate severity.  He does have oxygen which he uses when his saturation drops low 88% he states.  And then he recovers easily with rest and O2.  Patient is a smoker smoking less than half a pack a day for 47 years.  He is up-to-date on his vaccines.  He continues to use DuoNeb and oral Flovent and albuterol at this time.  He states he does not have any wheezing, headaches, chest pain, weight loss or hemoptysis. Denies fevers, chills and night sweats.  Ever he feels like he does not have quite the stamina or endurance as he did pre-COVID.  He tells he has improved quite a bit but he still fatigues easily.  He needs a work note stating that he needs to limit his hours at work to 8 hours only due to his fatigue and decreased endurance post-COVID.     Sumeet Gomes is able to perform ADLs without difficulties and denies any swollen glands/lymph nodes in the head or neck.    I have personally reviewed the review of systems, past family, social, medical and surgical histories; and agree with their findings.    Review of Systems   Constitutional: Positive for fatigue.   HENT: Negative.    Respiratory: Positive for shortness of breath.    Cardiovascular: Negative.    Musculoskeletal: Negative.    Skin: Negative.         Family History   Problem Relation Age of Onset   • Asthma Mother    • Emphysema Mother    • Stroke Mother         Social History     Socioeconomic  "History   • Marital status:    Tobacco Use   • Smoking status: Current Every Day Smoker     Packs/day: 0.50     Years: 47.00     Pack years: 23.50     Types: Cigarettes     Start date: 1975   • Smokeless tobacco: Never Used   Vaping Use   • Vaping Use: Never used   Substance and Sexual Activity   • Alcohol use: Not Currently   • Drug use: Never   • Sexual activity: Defer        Past Medical History:   Diagnosis Date   • COPD (chronic obstructive pulmonary disease) (HCC)         Immunization History   Administered Date(s) Administered   • COVID-19 (MODERNA) 1st, 2nd, 3rd Dose Only 06/18/2021, 06/18/2021, 07/23/2021, 07/23/2021   • Flu Vaccine Quad PF >36MO 11/08/2019   • FluLaval/Fluarix/Fluzone >6 10/25/2021   • Hepatitis A 11/13/2018   • Pneumococcal Polysaccharide (PPSV23) 02/13/2017, 02/13/2017       No Known Allergies       Current Outpatient Medications:   •  albuterol sulfate  (90 Base) MCG/ACT inhaler, Inhale 2 puffs Every 6 (Six) Hours As Needed for Wheezing., Disp: 36 g, Rfl: 1  •  fluticasone (Flovent HFA) 220 MCG/ACT inhaler, Inhale 2 puffs by mouth 2 (two) times a day., Disp: 144 g, Rfl: 3  •  hydroCHLOROthiazide (HYDRODIURIL) 25 MG tablet, Take 1 tablet by mouth Daily., Disp: 90 tablet, Rfl: 1  •  ipratropium-albuterol (DUO-NEB) 0.5-2.5 mg/3 ml nebulizer, USE 1 VIAL IN NEBULIZER EVERY 4 HOURS AS NEEDED FOR SHORTNESS OF BREATH, Disp: 1800 mL, Rfl: 0  •  simvastatin (ZOCOR) 20 MG tablet, Take 1 tablet by mouth Daily., Disp: 90 tablet, Rfl: 1  •  umeclidinium-vilanterol (Anoro Ellipta) 62.5-25 MCG/INH aerosol powder  inhaler, Inhale 1 puff Daily., Disp: 180 each, Rfl: 3       OBJECTIVE    Vital Signs   /79 (BP Location: Left arm, Patient Position: Sitting, Cuff Size: Adult)   Pulse 61   Temp 98 °F (36.7 °C) (Temporal)   Resp 16   Ht 170.2 cm (67\")   Wt 66.2 kg (146 lb)   SpO2 (!) 88% Comment: room air  BMI 22.87 kg/m²     Physical Exam  Vitals reviewed.   Constitutional:       " Appearance: Normal appearance.   HENT:      Head: Normocephalic and atraumatic.      Nose: Nose normal.      Mouth/Throat:      Mouth: Mucous membranes are moist.      Pharynx: Oropharynx is clear.   Eyes:      Extraocular Movements: Extraocular movements intact.      Conjunctiva/sclera: Conjunctivae normal.      Pupils: Pupils are equal, round, and reactive to light.   Cardiovascular:      Rate and Rhythm: Normal rate and regular rhythm.      Pulses: Normal pulses.      Heart sounds: Normal heart sounds.   Pulmonary:      Effort: Pulmonary effort is normal. No respiratory distress.      Breath sounds: Wheezing present. No rhonchi or rales.   Abdominal:      General: Bowel sounds are normal.   Musculoskeletal:         General: Normal range of motion.      Cervical back: Normal range of motion and neck supple.   Skin:     General: Skin is warm and dry.   Neurological:      General: No focal deficit present.      Mental Status: He is alert and oriented to person, place, and time.   Psychiatric:         Mood and Affect: Mood normal.         Behavior: Behavior normal.          Results Review  I have personally reviewed the CT scan from 2/9/2022 with the following:  Study Result    Narrative & Impression   PROCEDURE:  CT CHEST WO CONTRAST DIAGNOSTIC     COMPARISON: [facility], CT, CT CHEST WO CONTRAST DIAGNOSTIC, 9/03/2021, 11:05.     INDICATIONS:  Shorntess of air, abnormal chest xray     TECHNIQUE:    CT images were created without the administration of contrast material.       PROTOCOL:     Standard imaging protocol performed                 RADIATION:      DLP: 316.2 mGy*cm               Automated exposure control was utilized to minimize radiation dose.      FINDINGS:          Significant emphysema.  No suspicious pulmonary nodule.  Linear areas of scarring scattered in both   lungs.  No adenopathy in the chest.  No acute finding in the included upper abdomen.  No aggressive   appearing bone change.      IMPRESSION:               Emphysema.  Scattered areas of linear scarring.            RCISTINA SMITH MD         Electronically Signed and Approved By: CRISTINA SMITH MD on 2/09/2022 at 13:14                 ASSESSMENT & PLAN    Patient Active Problem List   Diagnosis   • Other hyperlipidemia   • Essential hypertension   • COPD (chronic obstructive pulmonary disease) (HCC)   • Fatigue       Diagnoses and all orders for this visit:    1. Pulmonary emphysema, unspecified emphysema type (HCC) (Primary)  -     albuterol sulfate  (90 Base) MCG/ACT inhaler; Inhale 2 puffs Every 6 (Six) Hours As Needed for Wheezing.  Dispense: 36 g; Refill: 1  -     fluticasone (Flovent HFA) 220 MCG/ACT inhaler; Inhale 2 puffs by mouth 2 (two) times a day.  Dispense: 144 g; Refill: 3  -     ipratropium-albuterol (DUO-NEB) 0.5-2.5 mg/3 ml nebulizer; USE 1 VIAL IN NEBULIZER EVERY 4 HOURS AS NEEDED FOR SHORTNESS OF BREATH  Dispense: 1800 mL; Refill: 0  -     Pulmonary Function Test; Future    2. Chronic obstructive pulmonary disease, unspecified COPD type (HCC)  -     fluticasone (Flovent HFA) 220 MCG/ACT inhaler; Inhale 2 puffs by mouth 2 (two) times a day.  Dispense: 144 g; Refill: 3  -     umeclidinium-vilanterol (Anoro Ellipta) 62.5-25 MCG/INH aerosol powder  inhaler; Inhale 1 puff Daily.  Dispense: 180 each; Refill: 3  -     Pulmonary Function Test; Future    3. Post covid-19 condition, unspecified  -     Pulmonary Function Test; Future       Plan:  At this time we will provide a work note for patient to limit hours of work to 8 hours only due to post COVID condition fatigue reduced endurance at this time.  We will reevaluate him at his follow-up in 3 months.  In addition we will schedule patient for pulmonary function test.  Patient to continue with his DuoNeb Anoro Flovent and albuterol as prescribed patient struck to rinse his mouth out after each use to prevent oral thrush.  Encourage patient in smoking cessation.    Smoking  status: Current  Vaccination status: Up to date  Medications personally reviewed    Follow Up  Return in about 3 months (around 8/20/2022).    Patient was given instructions and counseling regarding his condition or for health maintenance advice. Please see specific information pulled into the AVS if appropriate.

## 2022-06-08 ENCOUNTER — TELEPHONE (OUTPATIENT)
Dept: FAMILY MEDICINE CLINIC | Age: 62
End: 2022-06-08

## 2022-06-13 ENCOUNTER — TELEPHONE (OUTPATIENT)
Dept: FAMILY MEDICINE CLINIC | Age: 62
End: 2022-06-13

## 2022-06-13 NOTE — TELEPHONE ENCOUNTER
Called [employer] PIP claim is open. Insurance updated in chart and scanned in. Claim number is [id number]     [employer]  [address] [place] [address].

## 2022-06-13 NOTE — TELEPHONE ENCOUNTER
Hub to read     I have reached out to [employer] insurance [phone] ext [phone] in regards to seeing if PIP claim is open. Papers are in folder in office if Adjustor calls back. I did advise pt I would have to call to make sure PIP was open. PT appts were made.

## 2022-06-14 ENCOUNTER — OFFICE VISIT (OUTPATIENT)
Dept: FAMILY MEDICINE CLINIC | Age: 62
End: 2022-06-14

## 2022-06-14 VITALS
HEART RATE: 97 BPM | DIASTOLIC BLOOD PRESSURE: 70 MMHG | BODY MASS INDEX: 21.96 KG/M2 | SYSTOLIC BLOOD PRESSURE: 101 MMHG | WEIGHT: 140.2 LBS

## 2022-06-14 DIAGNOSIS — J43.8 OTHER EMPHYSEMA: ICD-10-CM

## 2022-06-14 DIAGNOSIS — S20.211A CHEST WALL CONTUSION, RIGHT, INITIAL ENCOUNTER: Primary | ICD-10-CM

## 2022-06-14 DIAGNOSIS — S80.01XA CONTUSION OF RIGHT KNEE, INITIAL ENCOUNTER: ICD-10-CM

## 2022-06-14 DIAGNOSIS — S32.2XXA CLOSED FRACTURE OF COCCYX, INITIAL ENCOUNTER: ICD-10-CM

## 2022-06-14 PROCEDURE — 99214 OFFICE O/P EST MOD 30 MIN: CPT | Performed by: NURSE PRACTITIONER

## 2022-06-14 NOTE — ASSESSMENT & PLAN NOTE
To use his pulm rx's as directed, spirometry regularly as directed , continue to monitor his oxygen, reviewed ER records
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
## 2022-06-14 NOTE — ASSESSMENT & PLAN NOTE
Reviewed ER records he brought, to use flexeril he has at home and the voltaren OTC topical that has helped, off work until 6-27-22, may try PT, may need to see neurosurgeon or ortho, Dr Mares at [facility]

## 2022-06-28 DIAGNOSIS — J43.9 PULMONARY EMPHYSEMA, UNSPECIFIED EMPHYSEMA TYPE: ICD-10-CM

## 2022-06-28 RX ORDER — ALBUTEROL SULFATE 90 UG/1
AEROSOL, METERED RESPIRATORY (INHALATION)
Refills: 1 | OUTPATIENT
Start: 2022-06-28

## 2022-06-29 ENCOUNTER — LAB (OUTPATIENT)
Dept: LAB | Facility: HOSPITAL | Age: 62
End: 2022-06-29

## 2022-06-29 DIAGNOSIS — I10 ESSENTIAL HYPERTENSION: ICD-10-CM

## 2022-06-29 PROCEDURE — 85027 COMPLETE CBC AUTOMATED: CPT

## 2022-06-29 PROCEDURE — 36415 COLL VENOUS BLD VENIPUNCTURE: CPT

## 2022-06-29 PROCEDURE — 85007 BL SMEAR W/DIFF WBC COUNT: CPT

## 2022-06-30 LAB
DEPRECATED RDW RBC AUTO: 41.2 FL (ref 37–54)
EOSINOPHIL # BLD MANUAL: 0.06 10*3/MM3 (ref 0–0.4)
EOSINOPHIL NFR BLD MANUAL: 1 % (ref 0.3–6.2)
ERYTHROCYTE [DISTWIDTH] IN BLOOD BY AUTOMATED COUNT: 13.7 % (ref 12.3–15.4)
HCT VFR BLD AUTO: 51.4 % (ref 37.5–51)
HGB BLD-MCNC: 17.4 G/DL (ref 13–17.7)
LYMPHOCYTES # BLD MANUAL: 1.62 10*3/MM3 (ref 0.7–3.1)
LYMPHOCYTES NFR BLD MANUAL: 8.3 % (ref 5–12)
MCH RBC QN AUTO: 28.4 PG (ref 26.6–33)
MCHC RBC AUTO-ENTMCNC: 33.9 G/DL (ref 31.5–35.7)
MCV RBC AUTO: 83.8 FL (ref 79–97)
MONOCYTES # BLD: 0.54 10*3/MM3 (ref 0.1–0.9)
NEUTROPHILS # BLD AUTO: 4.26 10*3/MM3 (ref 1.7–7)
NEUTROPHILS NFR BLD MANUAL: 65.6 % (ref 42.7–76)
PLAT MORPH BLD: NORMAL
PLATELET # BLD AUTO: 243 10*3/MM3 (ref 140–450)
PMV BLD AUTO: 10.5 FL (ref 6–12)
RBC # BLD AUTO: 6.13 10*6/MM3 (ref 4.14–5.8)
RBC MORPH BLD: NORMAL
VARIANT LYMPHS NFR BLD MANUAL: 25 % (ref 19.6–45.3)
WBC MORPH BLD: NORMAL
WBC NRBC COR # BLD: 6.49 10*3/MM3 (ref 3.4–10.8)

## 2022-07-23 DIAGNOSIS — I10 ESSENTIAL HYPERTENSION: ICD-10-CM

## 2022-07-23 DIAGNOSIS — J43.9 PULMONARY EMPHYSEMA, UNSPECIFIED EMPHYSEMA TYPE: ICD-10-CM

## 2022-07-25 RX ORDER — HYDROCHLOROTHIAZIDE 25 MG/1
25 TABLET ORAL DAILY
Qty: 90 TABLET | Refills: 1 | Status: SHIPPED | OUTPATIENT
Start: 2022-07-25 | End: 2022-09-18 | Stop reason: HOSPADM

## 2022-07-25 RX ORDER — IPRATROPIUM BROMIDE AND ALBUTEROL SULFATE 2.5; .5 MG/3ML; MG/3ML
SOLUTION RESPIRATORY (INHALATION)
Qty: 1800 ML | Refills: 0 | Status: SHIPPED | OUTPATIENT
Start: 2022-07-25 | End: 2022-11-23 | Stop reason: SDUPTHER

## 2022-07-26 DIAGNOSIS — J43.9 PULMONARY EMPHYSEMA, UNSPECIFIED EMPHYSEMA TYPE: ICD-10-CM

## 2022-07-27 RX ORDER — ALBUTEROL SULFATE 90 UG/1
AEROSOL, METERED RESPIRATORY (INHALATION)
Qty: 36 G | Refills: 1 | Status: SHIPPED | OUTPATIENT
Start: 2022-07-27 | End: 2022-11-08 | Stop reason: SDUPTHER

## 2022-07-27 NOTE — TELEPHONE ENCOUNTER
Rx Refill Note  Requested Prescriptions     Pending Prescriptions Disp Refills   • albuterol sulfate  (90 Base) MCG/ACT inhaler [Pharmacy Med Name: ALBUTEROL HFA (VENTOLIN) INH]  1     Sig: INHALE 2 PUFFS EVERY 6 HOURS AS NEEDED FOR WHEEZING      Last office visit with prescribing clinician: 6/14/2022      Next office visit with prescribing clinician: Visit date not found       Miladis Vu  07/27/22, 09:19 EDT

## 2022-09-12 ENCOUNTER — HOSPITAL ENCOUNTER (INPATIENT)
Facility: HOSPITAL | Age: 62
LOS: 6 days | Discharge: HOME | End: 2022-09-18
Attending: EMERGENCY MEDICINE | Admitting: FAMILY MEDICINE

## 2022-09-12 ENCOUNTER — APPOINTMENT (OUTPATIENT)
Dept: GENERAL RADIOLOGY | Facility: HOSPITAL | Age: 62
End: 2022-09-12

## 2022-09-12 DIAGNOSIS — J96.22 ACUTE ON CHRONIC RESPIRATORY FAILURE WITH HYPOXIA AND HYPERCAPNIA: ICD-10-CM

## 2022-09-12 DIAGNOSIS — J96.01 ACUTE RESPIRATORY FAILURE WITH HYPOXIA AND HYPERCAPNIA: ICD-10-CM

## 2022-09-12 DIAGNOSIS — I27.20 PULMONARY HYPERTENSION: ICD-10-CM

## 2022-09-12 DIAGNOSIS — J44.9 CHRONIC OBSTRUCTIVE PULMONARY DISEASE, UNSPECIFIED COPD TYPE: ICD-10-CM

## 2022-09-12 DIAGNOSIS — R26.2 DIFFICULTY WALKING: ICD-10-CM

## 2022-09-12 DIAGNOSIS — J96.02 ACUTE RESPIRATORY FAILURE WITH HYPOXIA AND HYPERCAPNIA: ICD-10-CM

## 2022-09-12 DIAGNOSIS — J96.21 ACUTE ON CHRONIC RESPIRATORY FAILURE WITH HYPOXIA AND HYPERCAPNIA: ICD-10-CM

## 2022-09-12 DIAGNOSIS — J43.8 OTHER EMPHYSEMA: ICD-10-CM

## 2022-09-12 DIAGNOSIS — I50.9 ACUTE CONGESTIVE HEART FAILURE, UNSPECIFIED HEART FAILURE TYPE: ICD-10-CM

## 2022-09-12 DIAGNOSIS — R09.02 HYPOXIA: Primary | ICD-10-CM

## 2022-09-12 PROBLEM — Z72.0 TOBACCO ABUSE: Status: ACTIVE | Noted: 2022-09-12

## 2022-09-12 LAB
ALBUMIN SERPL-MCNC: 4.2 G/DL (ref 3.5–5.2)
ALBUMIN/GLOB SERPL: 1.6 G/DL
ALP SERPL-CCNC: 156 U/L (ref 39–117)
ALT SERPL W P-5'-P-CCNC: 13 U/L (ref 1–41)
ANION GAP SERPL CALCULATED.3IONS-SCNC: 7.3 MMOL/L (ref 5–15)
AST SERPL-CCNC: 24 U/L (ref 1–40)
BASOPHILS # BLD AUTO: 0.02 10*3/MM3 (ref 0–0.2)
BASOPHILS NFR BLD AUTO: 0.2 % (ref 0–1.5)
BILIRUB SERPL-MCNC: 0.6 MG/DL (ref 0–1.2)
BUN SERPL-MCNC: 20 MG/DL (ref 8–23)
BUN/CREAT SERPL: 29 (ref 7–25)
CALCIUM SPEC-SCNC: 9.5 MG/DL (ref 8.6–10.5)
CHLORIDE SERPL-SCNC: 96 MMOL/L (ref 98–107)
CO2 SERPL-SCNC: 34.7 MMOL/L (ref 22–29)
CREAT SERPL-MCNC: 0.69 MG/DL (ref 0.76–1.27)
D DIMER PPP FEU-MCNC: 0.63 MCGFEU/ML (ref 0–0.57)
D-LACTATE SERPL-SCNC: 0.8 MMOL/L (ref 0.5–2)
DEPRECATED RDW RBC AUTO: 50 FL (ref 37–54)
EGFRCR SERPLBLD CKD-EPI 2021: 105.9 ML/MIN/1.73
EOSINOPHIL # BLD AUTO: 0 10*3/MM3 (ref 0–0.4)
EOSINOPHIL NFR BLD AUTO: 0 % (ref 0.3–6.2)
ERYTHROCYTE [DISTWIDTH] IN BLOOD BY AUTOMATED COUNT: 14.9 % (ref 12.3–15.4)
GLOBULIN UR ELPH-MCNC: 2.6 GM/DL
GLUCOSE SERPL-MCNC: 127 MG/DL (ref 65–99)
HCT VFR BLD AUTO: 51.2 % (ref 37.5–51)
HGB BLD-MCNC: 16.5 G/DL (ref 13–17.7)
HOLD SPECIMEN: NORMAL
HOLD SPECIMEN: NORMAL
IMM GRANULOCYTES # BLD AUTO: 0.02 10*3/MM3 (ref 0–0.05)
IMM GRANULOCYTES NFR BLD AUTO: 0.2 % (ref 0–0.5)
LYMPHOCYTES # BLD AUTO: 0.7 10*3/MM3 (ref 0.7–3.1)
LYMPHOCYTES NFR BLD AUTO: 8.6 % (ref 19.6–45.3)
MCH RBC QN AUTO: 29.4 PG (ref 26.6–33)
MCHC RBC AUTO-ENTMCNC: 32.2 G/DL (ref 31.5–35.7)
MCV RBC AUTO: 91.1 FL (ref 79–97)
MONOCYTES # BLD AUTO: 0.96 10*3/MM3 (ref 0.1–0.9)
MONOCYTES NFR BLD AUTO: 11.8 % (ref 5–12)
NEUTROPHILS NFR BLD AUTO: 6.42 10*3/MM3 (ref 1.7–7)
NEUTROPHILS NFR BLD AUTO: 79.2 % (ref 42.7–76)
NRBC BLD AUTO-RTO: 0 /100 WBC (ref 0–0.2)
NT-PROBNP SERPL-MCNC: 5360 PG/ML (ref 0–900)
PLATELET # BLD AUTO: 159 10*3/MM3 (ref 140–450)
PMV BLD AUTO: 10.9 FL (ref 6–12)
POTASSIUM SERPL-SCNC: 3.9 MMOL/L (ref 3.5–5.2)
PROT SERPL-MCNC: 6.8 G/DL (ref 6–8.5)
RBC # BLD AUTO: 5.62 10*6/MM3 (ref 4.14–5.8)
SODIUM SERPL-SCNC: 138 MMOL/L (ref 136–145)
TROPONIN T SERPL-MCNC: <0.01 NG/ML (ref 0–0.03)
WBC NRBC COR # BLD: 8.12 10*3/MM3 (ref 3.4–10.8)
WHOLE BLOOD HOLD COAG: NORMAL
WHOLE BLOOD HOLD SPECIMEN: NORMAL

## 2022-09-12 PROCEDURE — 83880 ASSAY OF NATRIURETIC PEPTIDE: CPT | Performed by: EMERGENCY MEDICINE

## 2022-09-12 PROCEDURE — 71045 X-RAY EXAM CHEST 1 VIEW: CPT

## 2022-09-12 PROCEDURE — 99285 EMERGENCY DEPT VISIT HI MDM: CPT

## 2022-09-12 PROCEDURE — 85025 COMPLETE CBC W/AUTO DIFF WBC: CPT | Performed by: EMERGENCY MEDICINE

## 2022-09-12 PROCEDURE — 93005 ELECTROCARDIOGRAM TRACING: CPT | Performed by: EMERGENCY MEDICINE

## 2022-09-12 PROCEDURE — 25010000002 METHYLPREDNISOLONE PER 125 MG: Performed by: EMERGENCY MEDICINE

## 2022-09-12 PROCEDURE — G0432 EIA HIV-1/HIV-2 SCREEN: HCPCS | Performed by: NURSE PRACTITIONER

## 2022-09-12 PROCEDURE — 85379 FIBRIN DEGRADATION QUANT: CPT | Performed by: FAMILY MEDICINE

## 2022-09-12 PROCEDURE — 25010000002 FUROSEMIDE PER 20 MG: Performed by: EMERGENCY MEDICINE

## 2022-09-12 PROCEDURE — 36415 COLL VENOUS BLD VENIPUNCTURE: CPT

## 2022-09-12 PROCEDURE — 82785 ASSAY OF IGE: CPT | Performed by: NURSE PRACTITIONER

## 2022-09-12 PROCEDURE — 94799 UNLISTED PULMONARY SVC/PX: CPT

## 2022-09-12 PROCEDURE — 93005 ELECTROCARDIOGRAM TRACING: CPT

## 2022-09-12 PROCEDURE — 93010 ELECTROCARDIOGRAM REPORT: CPT | Performed by: INTERNAL MEDICINE

## 2022-09-12 PROCEDURE — 94640 AIRWAY INHALATION TREATMENT: CPT

## 2022-09-12 PROCEDURE — 87040 BLOOD CULTURE FOR BACTERIA: CPT | Performed by: EMERGENCY MEDICINE

## 2022-09-12 PROCEDURE — 83605 ASSAY OF LACTIC ACID: CPT | Performed by: EMERGENCY MEDICINE

## 2022-09-12 PROCEDURE — U0004 COV-19 TEST NON-CDC HGH THRU: HCPCS | Performed by: EMERGENCY MEDICINE

## 2022-09-12 PROCEDURE — 80053 COMPREHEN METABOLIC PANEL: CPT | Performed by: EMERGENCY MEDICINE

## 2022-09-12 PROCEDURE — 84484 ASSAY OF TROPONIN QUANT: CPT | Performed by: EMERGENCY MEDICINE

## 2022-09-12 PROCEDURE — 99223 1ST HOSP IP/OBS HIGH 75: CPT | Performed by: FAMILY MEDICINE

## 2022-09-12 RX ORDER — SODIUM CHLORIDE 0.9 % (FLUSH) 0.9 %
10 SYRINGE (ML) INJECTION AS NEEDED
Status: DISCONTINUED | OUTPATIENT
Start: 2022-09-12 | End: 2022-09-18 | Stop reason: HOSPADM

## 2022-09-12 RX ORDER — METHYLPREDNISOLONE SODIUM SUCCINATE 125 MG/2ML
125 INJECTION, POWDER, LYOPHILIZED, FOR SOLUTION INTRAMUSCULAR; INTRAVENOUS ONCE
Status: COMPLETED | OUTPATIENT
Start: 2022-09-12 | End: 2022-09-12

## 2022-09-12 RX ORDER — FUROSEMIDE 10 MG/ML
40 INJECTION INTRAMUSCULAR; INTRAVENOUS ONCE
Status: COMPLETED | OUTPATIENT
Start: 2022-09-12 | End: 2022-09-12

## 2022-09-12 RX ORDER — IPRATROPIUM BROMIDE AND ALBUTEROL SULFATE 2.5; .5 MG/3ML; MG/3ML
3 SOLUTION RESPIRATORY (INHALATION) ONCE
Status: COMPLETED | OUTPATIENT
Start: 2022-09-12 | End: 2022-09-12

## 2022-09-12 RX ADMIN — IPRATROPIUM BROMIDE AND ALBUTEROL SULFATE 3 ML: 2.5; .5 SOLUTION RESPIRATORY (INHALATION) at 17:58

## 2022-09-12 RX ADMIN — FUROSEMIDE 40 MG: 10 INJECTION, SOLUTION INTRAMUSCULAR; INTRAVENOUS at 19:58

## 2022-09-12 RX ADMIN — METHYLPREDNISOLONE SODIUM SUCCINATE 125 MG: 125 INJECTION, POWDER, FOR SOLUTION INTRAMUSCULAR; INTRAVENOUS at 17:52

## 2022-09-12 NOTE — H&P
History and Physical   Sumeet Gomes , :  1960, MRN:  [id number]    Chief complaint: Shortness of breath    Assessment/Plan       Acute respiratory failure with hypoxia and hypercapnia (HCC)    Other hyperlipidemia    Essential hypertension    COPD (chronic obstructive pulmonary disease) (HCC)    Acute CHF (HCC)    Tobacco abuse      • Acute respiratory failure with hypoxia and hypercapnia: Presents from home with hypoxia, reports oxygen saturation in the mid 50s.  He states he typically runs in the 70s at home.  Likely multifactorial.  Continue on oxygen, wean as tolerated.  Likely will require home O2 based on history.    • Acute CHF unspecified: BNP 5360, bilateral lower extremity edema.  No pulmonary edema on chest x-ray.  No orthopnea.  Obtain echocardiogram in the morning for evaluation.  Will obtain D-dimer to evaluate for possible blood clot.    • COPD with acute exacerbation: Continues to smoke daily.  Resume home Anoro Ellipta.  Duo nebs as needed.  Prednisone daily.    • Tobacco abuse: Smokes half a pack of cigarettes per day.  Counseled on cessation.    • Hypertension: /68.  On hydrochlorothiazide at home.  We will continue.    • Hyperlipidemia: Resume home simvastatin.    • Clinical course will dictate further medical management.    DVT Prophylaxis: Lovenox  Code Status: Full    Reviewed patients labs and imaging, and discussed with patient and nurse at bedside.    Patient risk level:  Patient comorbidities and risk factors make patient a poor candidate for outpatient management due to concerns of deterioration.    Total time spent on admission: 70 minutes    History of Present illness     Sumeet Gomes is a 61 y.o. old male patient who presents with progressive shortness of breath over the past 2 to 3 days.  History of hypertension, chronic obstructive pulmonary disease, hyperlipidemia, chronic tobacco abuse.    Patient presents from home, reports increasing shortness of breath over  the past few days.  He reports he wears oxygen at night when he sleeps.  He monitors his O2 sats at home, reports that he is normally in the mid 70s on home pulse oximetry.  He reports over the weekend that his pulse ox dropped into the 50s and he had increasing shortness of breath.  He reports developing lower extremity edema over the past several months.  Denies orthopnea.  Denies fevers or chills.  Reports cough productive of yellow sputum.    ED course: In ED , RR 28, oxygen saturation 67% on room air.  Bicarbonate 35, BNP 5360.  Normal troponin.  Chest x-ray also normal.  Started on furosemide, duo nebs.    Old records were reviewed which revealed no previous admissions to the hospital.    Review of Systems     General:  Denies fevers, chills, weight loss/gain or night sweats.  HEENT: Denies dysphagia, hearing changes, rhinorrhea, visual changes or nasal congestion.  Respiratory: Reports productive cough, dyspnea.  Reports green/yellow sputum changes.  Reports stable wheezing.  Denies hemoptysis.  Denies pleuritic chest pain.  Cardiovascular: Denies chest pain, palpitations.  Reports dyspnea on exertion without orthopnea. Denies chest pressure.  Reports stable lower extremity edema.  Gastrointestinal: Denies abdominal pain, nausea, vomiting or diarrhea.  Denies bright red blood per rectum, melena or cramping.  Genitourinary: Denies dysuria, frequency or hesitancy. Denies incontinence, urgency or hematuria.  Musculoskeletal: Denies joint effusions, joint tenderness, joint stiffness or myalgias.  Endocrine: Denies heat or cold intolerance.  Reports fatigue.  Hematologic: Denies bleeding, bruising or swollen glands.  Psychiatric: Denies anxiety or depression.  Neurologic: Denies confusion, headaches, numbness or visual changes.  Skin: Denies rash or open wounds.    Past Medical/Surgical/Social/Family History/Allergies     Past Medical History:   Diagnosis Date   • COPD (chronic obstructive pulmonary disease)  ([facility])        Past Surgical History:   Procedure Laterality Date   • BACK SURGERY     • OTHER SURGICAL HISTORY      knee    • OTHER SURGICAL HISTORY      shoulder, rotator cuff       Social History     Socioeconomic History   • Marital status:    Tobacco Use   • Smoking status: Current Every Day Smoker     Packs/day: 0.50     Years: 47.00     Pack years: 23.50     Types: Cigarettes     Start date: 1975   • Smokeless tobacco: Never Used   Vaping Use   • Vaping Use: Never used   Substance and Sexual Activity   • Alcohol use: Not Currently   • Drug use: Never   • Sexual activity: Defer       Family History   Problem Relation Age of Onset   • Asthma Mother    • Emphysema Mother    • Stroke Mother        No Known Allergies    The above past medical history, past surgical history, social history, family history allergies and home medications were personally reviewed by me today and corrected as needed  Home Medications   (Not in a hospital admission)    Physical Exam   Vital signs:  Temperature Blood Pressure Heart Rate Resp Rate SpO2   Temp: 97.6 °F (36.4 °C) BP: 107/77 Heart Rate: 99 Resp: 20 SpO2: 93 %     HEENT: Head is atraumatic, extraocular movements are intact. Oropharynx is normal.  Neck: Supple, no cervical lymphadenopathy.  Cardiovascular: Tachycardic rate and regular rhythm. No murmurs, gallops or rubs.  2+ bilateral lower extremity pitting peripheral edema.   Lungs: Scattered expiratory wheezes auscultated.  Abdomen: Normal bowel sounds in all 4 quadrants. No rebound, guarding or tenderness.  No masses palpated.  Chest: Normal inspection. Equal rise and fall.  Increased work of breathing.  Constitutional: Well-nourished, well-developed, in no apparent distress.   Lymphatic: No cervical lymphadenopathy.  Extremities: 5/5 upper and lower extremity strength. Normal range of motion.  No clubbing, cyanosis.  Neurological: Alert and oriented x3. No numbness or tingling.  Psychological: Normal mood and affect.  Well kempt. Normal eye contact.  Skin: No rash, cellulitis or bruising.    Labs     Results from last 7 days   Lab Units 09/12/22  1656   WBC 10*3/mm3 8.12   HEMOGLOBIN g/dL 16.5   HEMATOCRIT % 51.2*   PLATELETS 10*3/mm3 159     Results from last 7 days   Lab Units 09/12/22  1656   SODIUM mmol/L 138   POTASSIUM mmol/L 3.9   CHLORIDE mmol/L 96*   CO2 mmol/L 34.7*   BUN mg/dL 20   CREATININE mg/dL 0.69*   CALCIUM mg/dL 9.5     Results from last 7 days   Lab Units 09/12/22  1656   ALT (SGPT) U/L 13   AST (SGOT) U/L 24   ALK PHOS U/L 156*   BILIRUBIN mg/dL 0.6                   Invalid input(s): CPK, MASSCKMB        I reviewed patient's labs from today and also previous labs while reviewing old records   Imaging and Other procedures     Chest X ray: My independent assessment showed no infiltrates or effusions  EKG: My independent evaluation showed heart rate 95, normal sinus rhythm, no ST -T changes    I reviewed patient's imaging and other testing from today and also in the past including but not limited to imaging, previous imaging, EKG, previous EKGs, echocardiogram, and other procedures if any and previously done by reviewing old records  Current Medications   Scheduled Meds:furosemide, 40 mg, Intravenous, Once      Continuous Infusions:     Prior to Admission Medications     Prescriptions Last Dose Informant Patient Reported? Taking?    albuterol sulfate  (90 Base) MCG/ACT inhaler 9/11/2022  No Yes    INHALE 2 PUFFS EVERY 6 HOURS AS NEEDED FOR WHEEZING    fluticasone (Flovent HFA) 220 MCG/ACT inhaler 9/11/2022  No Yes    Inhale 2 puffs by mouth 2 (two) times a day.    hydroCHLOROthiazide (HYDRODIURIL) 25 MG tablet 9/11/2022  No Yes    Take 1 tablet by mouth Daily.    ipratropium-albuterol (DUO-NEB) 0.5-2.5 mg/3 ml nebulizer 9/11/2022  No Yes    USE 1 VIAL IN NEBULIZER EVERY 4 HOURS AS NEEDED FOR SHORTNESS OF BREATH    simvastatin (ZOCOR) 20 MG tablet 9/11/2022  No Yes    Take 1 tablet by mouth Daily.     umeclidinium-vilanterol (Anoro Ellipta) 62.5-25 MCG/INH aerosol powder  inhaler 9/11/2022  No Yes    Inhale 1 puff Daily.        09/12/22  19:55 EDT

## 2022-09-12 NOTE — ED PROVIDER NOTES
Time: 5:16 PM EDT  Arrived by: private car  Chief Complaint: shortness of breath  History provided by: patient  History is limited by: N/A     History of Present Illness:  Patient is a 61 y.o. year old male who presents to the emergency department with shortness of breath. Pt has a medical history of chronic obstructive pulmonary disease and emphysema.     Pt complains of increasing shortness of breath with exertion for the last several days. Pt states that his oxygen saturations have been in the low 50's and 60's. Pt also reports a productive cough as well. Pt denies fever, chest pain, body aches, diarrhea, and chills.  Symptoms worse with exertion.    Pt claims tobacco use, but denies alcohol and drug use.     Similar Symptoms Previously: no  Recently seen: no      Patient Care Team  Primary Care Provider: Mirtha Alexander APRN    Past Medical History:     No Known Allergies  Past Medical History:   Diagnosis Date   • COPD (chronic obstructive pulmonary disease) (HCC)      Past Surgical History:   Procedure Laterality Date   • BACK SURGERY     • OTHER SURGICAL HISTORY      knee    • OTHER SURGICAL HISTORY      shoulder, rotator cuff     Family History   Problem Relation Age of Onset   • Asthma Mother    • Emphysema Mother    • Stroke Mother        Home Medications:  Prior to Admission medications    Medication Sig Start Date End Date Taking? Authorizing Provider   albuterol sulfate  (90 Base) MCG/ACT inhaler INHALE 2 PUFFS EVERY 6 HOURS AS NEEDED FOR WHEEZING 7/27/22   Mirtha Alexander APRN   fluticasone (Flovent HFA) 220 MCG/ACT inhaler Inhale 2 puffs by mouth 2 (two) times a day. 5/20/22   Julia Chin APRN   hydroCHLOROthiazide (HYDRODIURIL) 25 MG tablet Take 1 tablet by mouth Daily. 7/25/22   Mirtha Alexander APRN   ipratropium-albuterol (DUO-NEB) 0.5-2.5 mg/3 ml nebulizer USE 1 VIAL IN NEBULIZER EVERY 4 HOURS AS NEEDED FOR SHORTNESS OF BREATH 7/25/22   Julia Chin APRN  "  simvastatin (ZOCOR) 20 MG tablet Take 1 tablet by mouth Daily. 4/25/22   Mirtha Alexander APRN   umeclidinium-vilanterol (Anoro Ellipta) 62.5-25 MCG/INH aerosol powder  inhaler Inhale 1 puff Daily. 5/20/22   Julia Chin APRN        Social History:   Social History     Tobacco Use   • Smoking status: Current Every Day Smoker     Packs/day: 0.50     Years: 47.00     Pack years: 23.50     Types: Cigarettes     Start date: 1975   • Smokeless tobacco: Never Used   Vaping Use   • Vaping Use: Never used   Substance Use Topics   • Alcohol use: Not Currently   • Drug use: Never     Recent travel: not applicable     Review of Systems:  Review of Systems   Constitutional: Negative for chills and fever.   HENT: Negative for congestion, rhinorrhea and sore throat.    Eyes: Negative for photophobia.   Respiratory: Positive for cough and shortness of breath. Negative for apnea and chest tightness.    Cardiovascular: Negative for chest pain and palpitations.   Gastrointestinal: Negative for abdominal pain, diarrhea, nausea and vomiting.   Endocrine: Negative.    Genitourinary: Negative for difficulty urinating and dysuria.   Musculoskeletal: Negative for back pain, joint swelling and myalgias.   Skin: Negative for color change and wound.   Allergic/Immunologic: Negative.    Neurological: Negative for seizures and headaches.   Psychiatric/Behavioral: Negative.    All other systems reviewed and are negative.       Physical Exam:  /76   Pulse 101   Temp 97.6 °F (36.4 °C) (Oral)   Resp 18   Ht 170.2 cm (67\")   Wt 60.6 kg (133 lb 9.6 oz)   SpO2 97%   BMI 20.92 kg/m²     Physical Exam  Vitals and nursing note reviewed.   Constitutional:       General: He is awake.      Appearance: Normal appearance.   HENT:      Head: Normocephalic and atraumatic.      Nose: Nose normal.      Mouth/Throat:      Mouth: Mucous membranes are moist.   Eyes:      Extraocular Movements: Extraocular movements intact.      Pupils: Pupils " are equal, round, and reactive to light.   Cardiovascular:      Rate and Rhythm: Normal rate and regular rhythm.      Heart sounds: Normal heart sounds.   Pulmonary:      Effort: Pulmonary effort is normal. No respiratory distress.      Breath sounds: Examination of the right-upper field reveals wheezing. Examination of the left-upper field reveals wheezing. Examination of the right-middle field reveals wheezing. Examination of the left-middle field reveals wheezing. Examination of the right-lower field reveals wheezing. Examination of the left-lower field reveals wheezing. Wheezing present. No rhonchi or rales.      Comments: Bilateral expiratory wheezing.   Abdominal:      General: Bowel sounds are normal.      Palpations: Abdomen is soft.      Tenderness: There is no abdominal tenderness. There is no guarding or rebound.      Comments: No rigidity   Musculoskeletal:         General: No tenderness. Normal range of motion.      Cervical back: Normal range of motion and neck supple.      Comments: Mild non pitting edema of the ankles only bilaterally     Skin:     General: Skin is warm and dry.      Coloration: Skin is not jaundiced.   Neurological:      General: No focal deficit present.      Mental Status: He is alert and oriented to person, place, and time. Mental status is at baseline.      Sensory: Sensation is intact.      Motor: Motor function is intact.      Coordination: Coordination is intact.   Psychiatric:         Attention and Perception: Attention and perception normal.         Mood and Affect: Mood and affect normal.         Speech: Speech normal.         Behavior: Behavior normal.         Judgment: Judgment normal.                Medications in the Emergency Department:  Medications   sodium chloride 0.9 % flush 10 mL (has no administration in time range)   sodium chloride 0.9 % flush 10 mL (has no administration in time range)   ipratropium-albuterol (DUO-NEB) nebulizer solution 3 mL (has no  administration in time range)   predniSONE (DELTASONE) tablet 40 mg (has no administration in time range)   hydroCHLOROthiazide (HYDRODIURIL) tablet 25 mg (has no administration in time range)   atorvastatin (LIPITOR) tablet 10 mg (has no administration in time range)   ipratropium-albuterol (DUO-NEB) nebulizer solution 3 mL (has no administration in time range)   sodium chloride 0.9 % flush 10 mL (has no administration in time range)   sodium chloride 0.9 % flush 10 mL (has no administration in time range)   sodium chloride 0.9 % infusion 40 mL (has no administration in time range)   acetaminophen (TYLENOL) tablet 650 mg (has no administration in time range)     Or   acetaminophen (TYLENOL) 160 MG/5ML solution 650 mg (has no administration in time range)     Or   acetaminophen (TYLENOL) suppository 650 mg (has no administration in time range)   calcium carbonate (TUMS) chewable tablet 500 mg (200 mg elemental) (has no administration in time range)   sennosides-docusate (PERICOLACE) 8.6-50 MG per tablet 2 tablet (has no administration in time range)     And   polyethylene glycol (MIRALAX) packet 17 g (has no administration in time range)     And   bisacodyl (DULCOLAX) EC tablet 5 mg (has no administration in time range)     And   bisacodyl (DULCOLAX) suppository 10 mg (has no administration in time range)   ondansetron (ZOFRAN) tablet 4 mg (has no administration in time range)     Or   ondansetron (ZOFRAN) injection 4 mg (has no administration in time range)   melatonin tablet 5 mg (has no administration in time range)   Pharmacy to Dose enoxaparin (LOVENOX) (has no administration in time range)   Enoxaparin Sodium (LOVENOX) syringe 40 mg (has no administration in time range)   ipratropium-albuterol (DUO-NEB) nebulizer solution 3 mL (3 mL Nebulization Given 9/12/22 1758)   methylPREDNISolone sodium succinate (SOLU-Medrol) injection 125 mg (125 mg Intravenous Given 9/12/22 1752)   furosemide (LASIX) injection 40 mg  (40 mg Intravenous Given 9/12/22 1958)        Labs  Lab Results (last 24 hours)     Procedure Component Value Units Date/Time    CBC & Differential [[id number]]  (Abnormal) Collected: 09/12/22 1656    Specimen: Blood from Arm, Right Updated: 09/12/22 1721    Narrative:      The following orders were created for panel order CBC & Differential.  Procedure                               Abnormality         Status                     ---------                               -----------         ------                     CBC Auto Differential[[id number]]        Abnormal            Final result                 Please view results for these tests on the individual orders.    Comprehensive Metabolic Panel [[id number]]  (Abnormal) Collected: 09/12/22 1656    Specimen: Blood from Arm, Right Updated: 09/12/22 1740     Glucose 127 mg/dL      BUN 20 mg/dL      Creatinine 0.69 mg/dL      Sodium 138 mmol/L      Potassium 3.9 mmol/L      Chloride 96 mmol/L      CO2 34.7 mmol/L      Calcium 9.5 mg/dL      Total Protein 6.8 g/dL      Albumin 4.20 g/dL      ALT (SGPT) 13 U/L      AST (SGOT) 24 U/L      Alkaline Phosphatase 156 U/L      Total Bilirubin 0.6 mg/dL      Globulin 2.6 gm/dL      A/G Ratio 1.6 g/dL      BUN/Creatinine Ratio 29.0     Anion Gap 7.3 mmol/L      eGFR 105.9 mL/min/1.73      Comment: National Kidney Foundation and American Society of Nephrology (ASN) Task Force recommended calculation based on the Chronic Kidney Disease Epidemiology Collaboration (CKD-EPI) equation refit without adjustment for race.       Narrative:      GFR Normal >60  Chronic Kidney Disease <60  Kidney Failure <15      BNP [[id number]]  (Abnormal) Collected: 09/12/22 1656    Specimen: Blood from Arm, Right Updated: 09/12/22 1746     proBNP 5,360.0 pg/mL     Narrative:      Among patients with dyspnea, NT-proBNP is highly sensitive for the detection of acute congestive heart failure. In addition NT-proBNP of <300 pg/ml effectively rules out acute  congestive heart failure with 99% negative predictive value.    Results may be falsely decreased if patient taking Biotin.      Troponin [[id number]]  (Normal) Collected: 09/12/22 1656    Specimen: Blood from Arm, Right Updated: 09/12/22 1746     Troponin T <0.010 ng/mL     Narrative:      Troponin T Reference Range:  <= 0.03 ng/mL-   Negative for AMI  >0.03 ng/mL-     Abnormal for myocardial necrosis.  Clinicians would have to utilize clinical acumen, EKG, Troponin and serial changes to determine if it is an Acute Myocardial Infarction or myocardial injury due to an underlying chronic condition.       Results may be falsely decreased if patient taking Biotin.      Lactic Acid, Plasma [[id number]]  (Normal) Collected: 09/12/22 1656    Specimen: Blood from Arm, Right Updated: 09/12/22 1738     Lactate 0.8 mmol/L     Blood Culture - Blood, Arm, Right [[id number]] Collected: 09/12/22 1656    Specimen: Blood from Arm, Right Updated: 09/12/22 1718    CBC Auto Differential [[id number]]  (Abnormal) Collected: 09/12/22 1656    Specimen: Blood from Arm, Right Updated: 09/12/22 1721     WBC 8.12 10*3/mm3      RBC 5.62 10*6/mm3      Hemoglobin 16.5 g/dL      Hematocrit 51.2 %      MCV 91.1 fL      MCH 29.4 pg      MCHC 32.2 g/dL      RDW 14.9 %      RDW-SD 50.0 fl      MPV 10.9 fL      Platelets 159 10*3/mm3      Neutrophil % 79.2 %      Lymphocyte % 8.6 %      Monocyte % 11.8 %      Eosinophil % 0.0 %      Basophil % 0.2 %      Immature Grans % 0.2 %      Neutrophils, Absolute 6.42 10*3/mm3      Lymphocytes, Absolute 0.70 10*3/mm3      Monocytes, Absolute 0.96 10*3/mm3      Eosinophils, Absolute 0.00 10*3/mm3      Basophils, Absolute 0.02 10*3/mm3      Immature Grans, Absolute 0.02 10*3/mm3      nRBC 0.0 /100 WBC     Blood Culture - Blood, Arm, Left [[id number]] Collected: 09/12/22 1752    Specimen: Blood from Arm, Left Updated: 09/12/22 1800    COVID-19,APTIMA PANTHER(HERLINDA),KASHIF YOON/ MELIZA, NP/OP SWAB IN UTM/VTM/[facility] [employer]SPORT  MEDIA,24 HR TAT - Swab, Nasal Cavity [[id number]]  (Normal) Collected: 09/12/22 1752    Specimen: Swab from Nasal Cavity Updated: 09/13/22 0121     COVID19 Not Detected    Narrative:      Fact sheet for providers: https://www.fda.gov/media/233847/download     Fact sheet for patients: https://www.fda.gov/media/460712/download    Test performed by RT PCR.    D-dimer, Quantitative [[id number]]  (Abnormal) Collected: 09/12/22 2114    Specimen: Blood Updated: 09/12/22 2148     D-Dimer, Quantitative 0.63 MCGFEU/mL     Narrative:      The Stago D-Dimer test used in conjunction with a clinical pretest probability (PTP) assessment model, has been approved by the FDA to rule out the presence of venous thromboembolism (VTE) in outpatients suspected of deep venous thrombosis (DVT) or pulmonary embolism (PE). The cut-off for negative predictive value is <0.50 MCGFEU/mL.           Imaging:  XR Chest 1 View    Result Date: 9/12/2022  PROCEDURE: XR CHEST 1 VW  [facility] CHEST WO CONTRAST DIAGNOSTIC, 2/09/2022, 11:23.  [facility]ST 2 VW, 2/07/2022, 15:57.  INDICATIONS: SOA Triage Protocol  FINDINGS:   The lungs are well-expanded. The heart and pulmonary vasculature are within normal limits. No pleural effusions are identified. There are no active appearing infiltrates.  Emphysema is present.  IMPRESSION: No active disease.  PONCE ZAPATA MD       Electronically Signed and Approved By: PONCE ZAPATA MD on 9/12/2022 at 18:12               Procedures:  Procedures    Progress  ED Course as of 09/13/22 0317   Mon Sep 12, 2022   1933 EKG interpretation: Normal sinus rhythm, heart rate 95, normal OR and QT intervals, normal QRS duration, right axis deviation, nonspecific ST segment changes with no acute ischemia. [RP]      ED Course User Index  [RP] Tyron Copeland MD                            Medical Decision Making:  MDM  Number of Diagnoses or Management  Options  Acute congestive heart failure, unspecified heart failure type (HCC): new and requires workup  Chronic obstructive pulmonary disease, unspecified COPD type (HCC): established and worsening  Hypoxia: new and requires workup     Amount and/or Complexity of Data Reviewed  Clinical lab tests: reviewed  Tests in the medicine section of CPT®: reviewed  Independent visualization of images, tracings, or specimens: yes    Risk of Complications, Morbidity, and/or Mortality  Presenting problems: moderate  Management options: low    Patient Progress  Patient progress: stable       Final diagnoses:   Hypoxia   Acute congestive heart failure, unspecified heart failure type (HCC)   Chronic obstructive pulmonary disease, unspecified COPD type (HCC)        Disposition:  ED Disposition     ED Disposition   Decision to Admit    Condition   --    Comment   Level of Care: Telemetry [5]   Diagnosis: Hypoxia [497424]   Admitting Physician: NURY ROSE [[id number]]   Attending Physician: NURY ROSE [[id number]]   Isolate for COVID?: No [0]   Certification: I Certify That Inpatient Hospital Services Are Medically Necessary For Greater Than 2 Midnights               This medical record created using voice recognition software.        Documentation assistance provided by SAUL MURGUIA acting as scribe for Tyron Copeland MD. Information recorded by the scribe was done at my direction and has been verified and validated by me.          Saul Murguia  09/12/22 5260       Tyron Copeland MD  09/13/22 6412

## 2022-09-13 ENCOUNTER — APPOINTMENT (OUTPATIENT)
Dept: CT IMAGING | Facility: HOSPITAL | Age: 62
End: 2022-09-13

## 2022-09-13 ENCOUNTER — APPOINTMENT (OUTPATIENT)
Dept: CARDIOLOGY | Facility: HOSPITAL | Age: 62
End: 2022-09-13

## 2022-09-13 LAB
ALBUMIN SERPL-MCNC: 4.1 G/DL (ref 3.5–5.2)
ALBUMIN/GLOB SERPL: 1.9 G/DL
ALP SERPL-CCNC: 156 U/L (ref 39–117)
ALT SERPL W P-5'-P-CCNC: 14 U/L (ref 1–41)
ANION GAP SERPL CALCULATED.3IONS-SCNC: 5.9 MMOL/L (ref 5–15)
ARTERIAL PATENCY WRIST A: POSITIVE
AST SERPL-CCNC: 24 U/L (ref 1–40)
BASE EXCESS BLDA CALC-SCNC: 13.2 MMOL/L (ref -2–2)
BASOPHILS # BLD AUTO: 0 10*3/MM3 (ref 0–0.2)
BASOPHILS NFR BLD AUTO: 0 % (ref 0–1.5)
BDY SITE: ABNORMAL
BH CV ECHO MEAS - AO ROOT DIAM: 3.2 CM
BH CV ECHO MEAS - EDV(MOD-SP2): 165 ML
BH CV ECHO MEAS - EDV(MOD-SP4): 108 ML
BH CV ECHO MEAS - EF(MOD-BP): 56 %
BH CV ECHO MEAS - ESV(MOD-SP2): 83 ML
BH CV ECHO MEAS - ESV(MOD-SP4): 38 ML
BH CV ECHO MEAS - IVSD: 0.8 CM
BH CV ECHO MEAS - LA DIMENSION: 3.3 CM
BH CV ECHO MEAS - LAT PEAK E' VEL: 8.6 CM/SEC
BH CV ECHO MEAS - LVIDD: 4.4 CM
BH CV ECHO MEAS - LVIDS: 3.4 CM
BH CV ECHO MEAS - LVOT DIAM: 2 CM
BH CV ECHO MEAS - LVPWD: 0.9 CM
BH CV ECHO MEAS - MED PEAK E' VEL: 6.74 CM/SEC
BH CV ECHO MEAS - MV A MAX VEL: 77 CM/SEC
BH CV ECHO MEAS - MV DEC TIME: 232 MSEC
BH CV ECHO MEAS - MV E MAX VEL: 66 CM/SEC
BH CV ECHO MEAS - MV E/A: 0.9
BH CV ECHO MEAS - RAP SYSTOLE: 10 MMHG
BH CV ECHO MEAS - RVDD: 3.4 CM
BH CV ECHO MEAS - RVSP: 82 MMHG
BH CV ECHO MEAS - TR MAX PG: 72 MMHG
BH CV ECHO MEAS - TR MAX VEL: 425 CM/SEC
BH CV ECHO MEASUREMENTS AVERAGE E/E' RATIO: 8.6
BH CV XLRA - RV BASE: 4.9 CM
BH CV XLRA - RV LENGTH: 8.3 CM
BH CV XLRA - RV MID: 4.4 CM
BILIRUB SERPL-MCNC: 0.5 MG/DL (ref 0–1.2)
BUN SERPL-MCNC: 20 MG/DL (ref 8–23)
BUN/CREAT SERPL: 29.9 (ref 7–25)
CALCIUM SPEC-SCNC: 9.1 MG/DL (ref 8.6–10.5)
CHLORIDE SERPL-SCNC: 94 MMOL/L (ref 98–107)
CHROMATIN AB SERPL-ACNC: <10 IU/ML (ref 0–14)
CO2 SERPL-SCNC: 38.1 MMOL/L (ref 22–29)
COHGB MFR BLD: 2.6 % (ref 0–1.5)
CREAT SERPL-MCNC: 0.67 MG/DL (ref 0.76–1.27)
CRP SERPL-MCNC: 3.43 MG/DL (ref 0–0.5)
DEPRECATED RDW RBC AUTO: 48.4 FL (ref 37–54)
EGFRCR SERPLBLD CKD-EPI 2021: 106.9 ML/MIN/1.73
EOSINOPHIL # BLD AUTO: 0 10*3/MM3 (ref 0–0.4)
EOSINOPHIL NFR BLD AUTO: 0 % (ref 0.3–6.2)
ERYTHROCYTE [DISTWIDTH] IN BLOOD BY AUTOMATED COUNT: 14.4 % (ref 12.3–15.4)
ERYTHROCYTE [SEDIMENTATION RATE] IN BLOOD: 5 MM/HR (ref 0–20)
FHHB: 7.3 % (ref 0–5)
GAS FLOW AIRWAY: 3 LPM
GLOBULIN UR ELPH-MCNC: 2.2 GM/DL
GLUCOSE SERPL-MCNC: 118 MG/DL (ref 65–99)
HCO3 BLDA-SCNC: 43.4 MMOL/L (ref 22–26)
HCT VFR BLD AUTO: 50.9 % (ref 37.5–51)
HGB BLD-MCNC: 16.1 G/DL (ref 13–17.7)
HGB BLDA-MCNC: 16.5 G/DL (ref 13.8–16.4)
HIV1+2 AB SER QL: NORMAL
IGA1 MFR SER: 127 MG/DL (ref 70–400)
IMM GRANULOCYTES # BLD AUTO: 0.02 10*3/MM3 (ref 0–0.05)
IMM GRANULOCYTES NFR BLD AUTO: 0.3 % (ref 0–0.5)
INHALED O2 CONCENTRATION: 32 %
IVRT: 95 MSEC
L PNEUMO1 AG UR QL IA: NEGATIVE
LEFT ATRIUM VOLUME INDEX: 33.1 ML/M2
LYMPHOCYTES # BLD AUTO: 0.5 10*3/MM3 (ref 0.7–3.1)
LYMPHOCYTES NFR BLD AUTO: 6.9 % (ref 19.6–45.3)
MAXIMAL PREDICTED HEART RATE: 160 BPM
MCH RBC QN AUTO: 29.1 PG (ref 26.6–33)
MCHC RBC AUTO-ENTMCNC: 31.6 G/DL (ref 31.5–35.7)
MCV RBC AUTO: 91.9 FL (ref 79–97)
METHGB BLD QL: 0.3 % (ref 0–1.5)
MODALITY: ABNORMAL
MONOCYTES # BLD AUTO: 0.73 10*3/MM3 (ref 0.1–0.9)
MONOCYTES NFR BLD AUTO: 10.1 % (ref 5–12)
NEUTROPHILS NFR BLD AUTO: 5.96 10*3/MM3 (ref 1.7–7)
NEUTROPHILS NFR BLD AUTO: 82.7 % (ref 42.7–76)
NRBC BLD AUTO-RTO: 0 /100 WBC (ref 0–0.2)
OXYHGB MFR BLDV: 89.8 % (ref 94–99)
PCO2 BLDA: 79.3 MM HG (ref 35–45)
PH BLDA: 7.36 PH UNITS (ref 7.35–7.45)
PLATELET # BLD AUTO: 151 10*3/MM3 (ref 140–450)
PMV BLD AUTO: 10.4 FL (ref 6–12)
PO2 BLD: 203 MM[HG] (ref 0–500)
PO2 BLDA: 65 MM HG (ref 80–100)
POTASSIUM SERPL-SCNC: 4.3 MMOL/L (ref 3.5–5.2)
PROCALCITONIN SERPL-MCNC: 0.04 NG/ML (ref 0–0.25)
PROT SERPL-MCNC: 6.3 G/DL (ref 6–8.5)
RBC # BLD AUTO: 5.54 10*6/MM3 (ref 4.14–5.8)
S PNEUM AG SPEC QL LA: NEGATIVE
SAO2 % BLDCOA: 92.5 % (ref 95–99)
SARS-COV-2 RNA PNL SPEC NAA+PROBE: NOT DETECTED
SODIUM SERPL-SCNC: 138 MMOL/L (ref 136–145)
STRESS TARGET HR: 136 BPM
WBC NRBC COR # BLD: 7.21 10*3/MM3 (ref 3.4–10.8)

## 2022-09-13 PROCEDURE — 87449 NOS EACH ORGANISM AG IA: CPT | Performed by: NURSE PRACTITIONER

## 2022-09-13 PROCEDURE — 86235 NUCLEAR ANTIGEN ANTIBODY: CPT | Performed by: NURSE PRACTITIONER

## 2022-09-13 PROCEDURE — 86037 ANCA TITER EACH ANTIBODY: CPT | Performed by: NURSE PRACTITIONER

## 2022-09-13 PROCEDURE — 83516 IMMUNOASSAY NONANTIBODY: CPT | Performed by: NURSE PRACTITIONER

## 2022-09-13 PROCEDURE — 86038 ANTINUCLEAR ANTIBODIES: CPT | Performed by: NURSE PRACTITIONER

## 2022-09-13 PROCEDURE — 25010000002 ENOXAPARIN PER 10 MG: Performed by: FAMILY MEDICINE

## 2022-09-13 PROCEDURE — 94799 UNLISTED PULMONARY SVC/PX: CPT

## 2022-09-13 PROCEDURE — 86140 C-REACTIVE PROTEIN: CPT | Performed by: NURSE PRACTITIONER

## 2022-09-13 PROCEDURE — 0 IOPAMIDOL PER 1 ML: Performed by: FAMILY MEDICINE

## 2022-09-13 PROCEDURE — 87899 AGENT NOS ASSAY W/OPTIC: CPT | Performed by: NURSE PRACTITIONER

## 2022-09-13 PROCEDURE — 82784 ASSAY IGA/IGD/IGG/IGM EACH: CPT | Performed by: NURSE PRACTITIONER

## 2022-09-13 PROCEDURE — 83050 HGB METHEMOGLOBIN QUAN: CPT | Performed by: FAMILY MEDICINE

## 2022-09-13 PROCEDURE — 71260 CT THORAX DX C+: CPT

## 2022-09-13 PROCEDURE — 25010000002 CEFTRIAXONE PER 250 MG: Performed by: FAMILY MEDICINE

## 2022-09-13 PROCEDURE — 87070 CULTURE OTHR SPECIMN AEROBIC: CPT | Performed by: FAMILY MEDICINE

## 2022-09-13 PROCEDURE — 25010000002 SULFUR HEXAFLUORIDE MICROSPH 60.7-25 MG RECONSTITUTED SUSPENSION: Performed by: FAMILY MEDICINE

## 2022-09-13 PROCEDURE — 93306 TTE W/DOPPLER COMPLETE: CPT

## 2022-09-13 PROCEDURE — 82805 BLOOD GASES W/O2 SATURATION: CPT | Performed by: FAMILY MEDICINE

## 2022-09-13 PROCEDURE — 93306 TTE W/DOPPLER COMPLETE: CPT | Performed by: INTERNAL MEDICINE

## 2022-09-13 PROCEDURE — 85652 RBC SED RATE AUTOMATED: CPT | Performed by: NURSE PRACTITIONER

## 2022-09-13 PROCEDURE — 36600 WITHDRAWAL OF ARTERIAL BLOOD: CPT | Performed by: FAMILY MEDICINE

## 2022-09-13 PROCEDURE — 94760 N-INVAS EAR/PLS OXIMETRY 1: CPT

## 2022-09-13 PROCEDURE — 87205 SMEAR GRAM STAIN: CPT | Performed by: FAMILY MEDICINE

## 2022-09-13 PROCEDURE — 86431 RHEUMATOID FACTOR QUANT: CPT | Performed by: NURSE PRACTITIONER

## 2022-09-13 PROCEDURE — 25010000002 FUROSEMIDE PER 20 MG: Performed by: NURSE PRACTITIONER

## 2022-09-13 PROCEDURE — 82787 IGG 1 2 3 OR 4 EACH: CPT | Performed by: NURSE PRACTITIONER

## 2022-09-13 PROCEDURE — 85025 COMPLETE CBC W/AUTO DIFF WBC: CPT | Performed by: FAMILY MEDICINE

## 2022-09-13 PROCEDURE — 86200 CCP ANTIBODY: CPT | Performed by: NURSE PRACTITIONER

## 2022-09-13 PROCEDURE — 99233 SBSQ HOSP IP/OBS HIGH 50: CPT | Performed by: FAMILY MEDICINE

## 2022-09-13 PROCEDURE — 63710000001 PREDNISONE PER 1 MG: Performed by: FAMILY MEDICINE

## 2022-09-13 PROCEDURE — 94660 CPAP INITIATION&MGMT: CPT

## 2022-09-13 PROCEDURE — 94664 DEMO&/EVAL PT USE INHALER: CPT

## 2022-09-13 PROCEDURE — 25010000002 METHYLPREDNISOLONE PER 40 MG: Performed by: NURSE PRACTITIONER

## 2022-09-13 PROCEDURE — 80053 COMPREHEN METABOLIC PANEL: CPT | Performed by: FAMILY MEDICINE

## 2022-09-13 PROCEDURE — 82375 ASSAY CARBOXYHB QUANT: CPT | Performed by: FAMILY MEDICINE

## 2022-09-13 PROCEDURE — 84145 PROCALCITONIN (PCT): CPT | Performed by: NURSE PRACTITIONER

## 2022-09-13 RX ORDER — SODIUM CHLORIDE 0.9 % (FLUSH) 0.9 %
10 SYRINGE (ML) INJECTION EVERY 12 HOURS SCHEDULED
Status: DISCONTINUED | OUTPATIENT
Start: 2022-09-13 | End: 2022-09-18 | Stop reason: HOSPADM

## 2022-09-13 RX ORDER — IPRATROPIUM BROMIDE AND ALBUTEROL SULFATE 2.5; .5 MG/3ML; MG/3ML
3 SOLUTION RESPIRATORY (INHALATION)
Status: DISCONTINUED | OUTPATIENT
Start: 2022-09-13 | End: 2022-09-13

## 2022-09-13 RX ORDER — ACETAMINOPHEN 160 MG/5ML
650 SOLUTION ORAL EVERY 4 HOURS PRN
Status: DISCONTINUED | OUTPATIENT
Start: 2022-09-13 | End: 2022-09-18 | Stop reason: HOSPADM

## 2022-09-13 RX ORDER — CEFTRIAXONE SODIUM 1 G/50ML
1 INJECTION, SOLUTION INTRAVENOUS EVERY 24 HOURS
Status: COMPLETED | OUTPATIENT
Start: 2022-09-13 | End: 2022-09-18

## 2022-09-13 RX ORDER — ONDANSETRON 4 MG/1
4 TABLET, FILM COATED ORAL EVERY 6 HOURS PRN
Status: DISCONTINUED | OUTPATIENT
Start: 2022-09-13 | End: 2022-09-16 | Stop reason: SDUPTHER

## 2022-09-13 RX ORDER — FUROSEMIDE 10 MG/ML
40 INJECTION INTRAMUSCULAR; INTRAVENOUS EVERY 12 HOURS
Status: DISCONTINUED | OUTPATIENT
Start: 2022-09-13 | End: 2022-09-13

## 2022-09-13 RX ORDER — CALCIUM CARBONATE 200(500)MG
2 TABLET,CHEWABLE ORAL 2 TIMES DAILY PRN
Status: DISCONTINUED | OUTPATIENT
Start: 2022-09-13 | End: 2022-09-18 | Stop reason: HOSPADM

## 2022-09-13 RX ORDER — ACETAMINOPHEN 325 MG/1
650 TABLET ORAL EVERY 4 HOURS PRN
Status: DISCONTINUED | OUTPATIENT
Start: 2022-09-13 | End: 2022-09-18 | Stop reason: HOSPADM

## 2022-09-13 RX ORDER — SODIUM CHLORIDE 0.9 % (FLUSH) 0.9 %
10 SYRINGE (ML) INJECTION AS NEEDED
Status: DISCONTINUED | OUTPATIENT
Start: 2022-09-13 | End: 2022-09-18 | Stop reason: HOSPADM

## 2022-09-13 RX ORDER — POLYETHYLENE GLYCOL 3350 17 G
2 POWDER IN PACKET (EA) ORAL
Status: DISCONTINUED | OUTPATIENT
Start: 2022-09-13 | End: 2022-09-18 | Stop reason: HOSPADM

## 2022-09-13 RX ORDER — POLYETHYLENE GLYCOL 3350 17 G/17G
17 POWDER, FOR SOLUTION ORAL DAILY PRN
Status: DISCONTINUED | OUTPATIENT
Start: 2022-09-13 | End: 2022-09-18 | Stop reason: HOSPADM

## 2022-09-13 RX ORDER — IPRATROPIUM BROMIDE AND ALBUTEROL SULFATE 2.5; .5 MG/3ML; MG/3ML
3 SOLUTION RESPIRATORY (INHALATION)
Status: DISCONTINUED | OUTPATIENT
Start: 2022-09-13 | End: 2022-09-18 | Stop reason: HOSPADM

## 2022-09-13 RX ORDER — ACETAMINOPHEN 650 MG/1
650 SUPPOSITORY RECTAL EVERY 4 HOURS PRN
Status: DISCONTINUED | OUTPATIENT
Start: 2022-09-13 | End: 2022-09-18 | Stop reason: HOSPADM

## 2022-09-13 RX ORDER — NICOTINE 21 MG/24HR
1 PATCH, TRANSDERMAL 24 HOURS TRANSDERMAL DAILY PRN
Status: DISCONTINUED | OUTPATIENT
Start: 2022-09-13 | End: 2022-09-18 | Stop reason: HOSPADM

## 2022-09-13 RX ORDER — SODIUM CHLORIDE 9 MG/ML
40 INJECTION, SOLUTION INTRAVENOUS AS NEEDED
Status: DISCONTINUED | OUTPATIENT
Start: 2022-09-13 | End: 2022-09-18 | Stop reason: HOSPADM

## 2022-09-13 RX ORDER — ENOXAPARIN SODIUM 100 MG/ML
40 INJECTION SUBCUTANEOUS NIGHTLY
Status: DISCONTINUED | OUTPATIENT
Start: 2022-09-13 | End: 2022-09-13

## 2022-09-13 RX ORDER — FUROSEMIDE 10 MG/ML
40 INJECTION INTRAMUSCULAR; INTRAVENOUS
Status: DISCONTINUED | OUTPATIENT
Start: 2022-09-14 | End: 2022-09-16

## 2022-09-13 RX ORDER — ARFORMOTEROL TARTRATE 15 UG/2ML
15 SOLUTION RESPIRATORY (INHALATION)
Status: DISCONTINUED | OUTPATIENT
Start: 2022-09-13 | End: 2022-09-18 | Stop reason: HOSPADM

## 2022-09-13 RX ORDER — ENOXAPARIN SODIUM 100 MG/ML
40 INJECTION SUBCUTANEOUS DAILY
Status: DISCONTINUED | OUTPATIENT
Start: 2022-09-13 | End: 2022-09-15

## 2022-09-13 RX ORDER — ONDANSETRON 2 MG/ML
4 INJECTION INTRAMUSCULAR; INTRAVENOUS EVERY 6 HOURS PRN
Status: DISCONTINUED | OUTPATIENT
Start: 2022-09-13 | End: 2022-09-16 | Stop reason: SDUPTHER

## 2022-09-13 RX ORDER — HYDROCHLOROTHIAZIDE 25 MG/1
25 TABLET ORAL DAILY
Status: DISCONTINUED | OUTPATIENT
Start: 2022-09-13 | End: 2022-09-16

## 2022-09-13 RX ORDER — ATORVASTATIN CALCIUM 10 MG/1
10 TABLET, FILM COATED ORAL DAILY
Status: DISCONTINUED | OUTPATIENT
Start: 2022-09-13 | End: 2022-09-18 | Stop reason: HOSPADM

## 2022-09-13 RX ORDER — BUDESONIDE 0.5 MG/2ML
0.5 INHALANT ORAL
Status: DISCONTINUED | OUTPATIENT
Start: 2022-09-13 | End: 2022-09-18 | Stop reason: HOSPADM

## 2022-09-13 RX ORDER — METHYLPREDNISOLONE SODIUM SUCCINATE 40 MG/ML
40 INJECTION, POWDER, LYOPHILIZED, FOR SOLUTION INTRAMUSCULAR; INTRAVENOUS EVERY 12 HOURS
Status: DISCONTINUED | OUTPATIENT
Start: 2022-09-13 | End: 2022-09-17

## 2022-09-13 RX ORDER — CHOLECALCIFEROL (VITAMIN D3) 125 MCG
5 CAPSULE ORAL NIGHTLY PRN
Status: DISCONTINUED | OUTPATIENT
Start: 2022-09-13 | End: 2022-09-18 | Stop reason: HOSPADM

## 2022-09-13 RX ORDER — PREDNISONE 20 MG/1
40 TABLET ORAL
Status: DISCONTINUED | OUTPATIENT
Start: 2022-09-13 | End: 2022-09-13

## 2022-09-13 RX ORDER — ALBUTEROL SULFATE 2.5 MG/3ML
2.5 SOLUTION RESPIRATORY (INHALATION)
Status: DISCONTINUED | OUTPATIENT
Start: 2022-09-13 | End: 2022-09-18 | Stop reason: HOSPADM

## 2022-09-13 RX ORDER — IPRATROPIUM BROMIDE AND ALBUTEROL SULFATE 2.5; .5 MG/3ML; MG/3ML
3 SOLUTION RESPIRATORY (INHALATION) EVERY 4 HOURS PRN
Status: DISCONTINUED | OUTPATIENT
Start: 2022-09-13 | End: 2022-09-13

## 2022-09-13 RX ORDER — BISACODYL 10 MG
10 SUPPOSITORY, RECTAL RECTAL DAILY PRN
Status: DISCONTINUED | OUTPATIENT
Start: 2022-09-13 | End: 2022-09-18 | Stop reason: HOSPADM

## 2022-09-13 RX ORDER — BISACODYL 5 MG/1
5 TABLET, DELAYED RELEASE ORAL DAILY PRN
Status: DISCONTINUED | OUTPATIENT
Start: 2022-09-13 | End: 2022-09-18 | Stop reason: HOSPADM

## 2022-09-13 RX ORDER — AMOXICILLIN 250 MG
2 CAPSULE ORAL 2 TIMES DAILY
Status: DISCONTINUED | OUTPATIENT
Start: 2022-09-13 | End: 2022-09-18 | Stop reason: HOSPADM

## 2022-09-13 RX ADMIN — METHYLPREDNISOLONE SODIUM SUCCINATE 40 MG: 40 INJECTION, POWDER, FOR SOLUTION INTRAMUSCULAR; INTRAVENOUS at 18:10

## 2022-09-13 RX ADMIN — ATORVASTATIN CALCIUM 10 MG: 10 TABLET, FILM COATED ORAL at 08:33

## 2022-09-13 RX ADMIN — BUDESONIDE 0.5 MG: 0.5 SUSPENSION RESPIRATORY (INHALATION) at 11:12

## 2022-09-13 RX ADMIN — ARFORMOTEROL TARTRATE 15 MCG: 15 SOLUTION RESPIRATORY (INHALATION) at 11:12

## 2022-09-13 RX ADMIN — IPRATROPIUM BROMIDE AND ALBUTEROL SULFATE 3 ML: 2.5; .5 SOLUTION RESPIRATORY (INHALATION) at 14:02

## 2022-09-13 RX ADMIN — IOPAMIDOL 100 ML: 755 INJECTION, SOLUTION INTRAVENOUS at 20:14

## 2022-09-13 RX ADMIN — BUDESONIDE 0.5 MG: 0.5 SUSPENSION RESPIRATORY (INHALATION) at 18:35

## 2022-09-13 RX ADMIN — ACETAMINOPHEN 650 MG: 325 TABLET ORAL at 06:34

## 2022-09-13 RX ADMIN — SENNOSIDES AND DOCUSATE SODIUM 2 TABLET: 8.6; 5 TABLET ORAL at 08:32

## 2022-09-13 RX ADMIN — SULFUR HEXAFLUORIDE 5 ML: KIT at 08:31

## 2022-09-13 RX ADMIN — ARFORMOTEROL TARTRATE 15 MCG: 15 SOLUTION RESPIRATORY (INHALATION) at 18:35

## 2022-09-13 RX ADMIN — FUROSEMIDE 40 MG: 10 INJECTION, SOLUTION INTRAMUSCULAR; INTRAVENOUS at 18:10

## 2022-09-13 RX ADMIN — NICOTINE 1 PATCH: 21 PATCH, EXTENDED RELEASE TRANSDERMAL at 21:52

## 2022-09-13 RX ADMIN — Medication 10 ML: at 20:45

## 2022-09-13 RX ADMIN — IPRATROPIUM BROMIDE AND ALBUTEROL SULFATE 3 ML: 2.5; .5 SOLUTION RESPIRATORY (INHALATION) at 07:41

## 2022-09-13 RX ADMIN — CEFTRIAXONE SODIUM 1 G: 1 INJECTION, SOLUTION INTRAVENOUS at 12:03

## 2022-09-13 RX ADMIN — Medication 5 MG: at 21:46

## 2022-09-13 RX ADMIN — ENOXAPARIN SODIUM 40 MG: 100 INJECTION SUBCUTANEOUS at 12:03

## 2022-09-13 RX ADMIN — HYDROCHLOROTHIAZIDE 25 MG: 25 TABLET ORAL at 08:33

## 2022-09-13 RX ADMIN — Medication 10 ML: at 08:33

## 2022-09-13 RX ADMIN — PREDNISONE 40 MG: 20 TABLET ORAL at 08:32

## 2022-09-13 RX ADMIN — IPRATROPIUM BROMIDE AND ALBUTEROL SULFATE 3 ML: 2.5; .5 SOLUTION RESPIRATORY (INHALATION) at 18:35

## 2022-09-13 NOTE — PLAN OF CARE
Problem: Adult Inpatient Plan of Care  Goal: Plan of Care Review  Outcome: Ongoing, Progressing  Goal: Patient-Specific Goal (Individualized)  Outcome: Ongoing, Progressing  Goal: Absence of Hospital-Acquired Illness or Injury  Outcome: Ongoing, Progressing  Goal: Optimal Comfort and Wellbeing  Outcome: Ongoing, Progressing  Goal: Readiness for Transition of Care  Outcome: Ongoing, Progressing  Intervention: Mutually Develop Transition Plan  Recent Flowsheet Documentation  Taken 9/13/2022 0616 by Ember Jackson, RN  Transportation Anticipated: family or friend will provide  Patient/Family Anticipated Services at Transition: none  Patient/Family Anticipates Transition to: home  Taken 9/13/2022 0610 by Ember Jackson, RN  Equipment Currently Used at Home: none     Problem: Pain Acute  Goal: Acceptable Pain Control and Functional Ability  Outcome: Ongoing, Progressing   Goal Outcome Evaluation:

## 2022-09-13 NOTE — CASE MANAGEMENT/SOCIAL WORK
Discharge Planning Assessment   [facility]     Patient Name: Sumeet Gomes  MRN: [id number]  Today's Date: 9/13/2022    Admit Date: 9/12/2022     Discharge Needs Assessment     Row Name 09/13/22 0851       Living Environment    People in Home spouse;child(cyril), adult    Name(s) of People in Home LIVES WITH WIFE AND TWO DAUGHTER    Current Living Arrangements home    Primary Care Provided by self    Provides Primary Care For no one    Family Caregiver if Needed spouse    Quality of Family Relationships helpful;involved;supportive    Able to Return to Prior Arrangements yes       Resource/Environmental Concerns    Resource/Environmental Concerns none    Transportation Concerns none  PATIENT AND WIFE DRIVES       Transition Planning    Patient/Family Anticipates Transition to home with family    Patient/Family Anticipated Services at Transition none    Transportation Anticipated family or friend will provide       Discharge Needs Assessment    Readmission Within the Last 30 Days no previous admission in last 30 days    Equipment Currently Used at Home oxygen;nebulizer  HOME O2 2L/NC AT NIGHT ONLY, PRIVATE PURCHASE    Concerns to be Addressed discharge planning    Anticipated Changes Related to Illness none    Equipment Needed After Discharge none               Discharge Plan     Row Name 09/13/22 0951       Plan    Plan CM assessment completed in room with patient.  CM role explained and discharge planning discussed. Demographics, PCP and Pharmacy verified and updated as appropriate. Patient is current with listed PCP.    Patient sitting up in bed eating breakfast. Patient lives with wife and two daughters, works fulltime and drives. Patient uses O2 2L/NC at night. Patient currently on O2 at 3L/NC. Might need 6MWT prior to discharge.              Continued Care and Services - Admitted Since 9/12/2022    Coordination has not been started for this encounter.          Demographic Summary     Row Name 09/13/22 0849        General Information    Admission Type inpatient    Arrived From emergency department    Referral Source admission list    Reason for Consult discharge planning    Preferred Language English               Functional Status     Row Name 09/13/22 0849       Functional Status    Usual Activity Tolerance fair    Current Activity Tolerance poor       Functional Status, IADL    Medications independent    Meal Preparation independent    Housekeeping independent    Laundry independent    Shopping independent       Mental Status    General Appearance WDL WDL       Mental Status Summary    Recent Changes in Mental Status/Cognitive Functioning no changes       Employment/    Employment Status employed full-time               Psychosocial    No documentation.                Abuse/Neglect    No documentation.                Legal    No documentation.                Substance Abuse    No documentation.                Patient Forms    No documentation.                   Joan Montoya

## 2022-09-13 NOTE — CONSULTS
Pulmonary / Critical Care Consult Note      Patient Name: Sumeet Gomes  : 1960  MRN: [id number]  Primary Care Physician:  Mirtha Alexander APRN  Referring Physician: No ref. provider found  Date of admission: 2022    Subjective   Subjective     Reason for Consult/ Chief Complaint: Acute exacerbation of COPD.    HPI:  Sumeet Gomes is a 61 y.o. male with past medical history for COPD with FEV1 30%, chronic hypoxic respiratory failure requiring nocturnal O2 2 L, and current tobacco abuse presented to the ED with complaints of worsening shortness of breath with O2 sats dropping into the 70s.  In the ED, O2 sats were found to be 67% on room air and was placed on 4 L nasal cannula.  proBNP was elevated at 5360 given a dose of IV Lasix.  This a.m., ABG PCO2 78.  Echocardiogram-year-old right heart enlargement, EF 56%, and RVSP greater than 55.  Because of the above our services was consulted for further evaluation and treatment.  Upon exam, he is lying in bed on 4 L nasal cannula with mild distress noted.  Very audible wheezing.  According to wife he has been more sleepy at home over the last several days.  Patient reports worsening shortness of breath, with decreased O2 sats at home, cough with yellow sputum, wheezing, and leg swelling.  He denies any fever, chills, chest pain, hemoptysis, nausea, vomiting, or diarrhea.  Denies being around any sick contacts.  His last PFT was in  with FEV1 30%.  Scheduled to have repeat PFT today.  Continues to smoke approximately 1 pack/day.    Review of Systems  General: Fatigue, unintentional weight loss 40 pounds in last 8 months, otherwise denied complaints  HEENT: Denied complaints  Respiratory: Dyspnea, wheezing, cough with yellow sputum production, otherwise denied complaints  Cardiovascular: AGUILAR, leg swelling, orthopnea, otherwise denied complaints  GI: Denied complaints  : Denied complaints  MSK: Weakness, otherwise denied complaints  Endocrine:  Denied complaints  Hematologic: Denied complaints  Psychiatric: Denied complaints  Neurologic: Denied complaints  Skin: Denied complaints    Personal History     Past Medical History:   Diagnosis Date   • COPD (chronic obstructive pulmonary disease) (HCC)        Past Surgical History:   Procedure Laterality Date   • BACK SURGERY     • OTHER SURGICAL HISTORY      knee    • OTHER SURGICAL HISTORY      shoulder, rotator cuff       Family History: family history includes Asthma in his mother; Emphysema in his mother; Stroke in his mother.     Social History:  reports that he has been smoking cigarettes. He started smoking about 47 years ago. He has a 23.50 pack-year smoking history. He has never used smokeless tobacco. He reports previous alcohol use. He reports that he does not use drugs.  Current 1 pack/day smoker with 50 pack years smoking history.    Home Medications:  albuterol sulfate HFA, fluticasone, hydroCHLOROthiazide, ipratropium-albuterol, simvastatin, and umeclidinium-vilanterol    Allergies:  No Known Allergies    Objective    Objective     Vitals:   Temp:  [97.6 °F (36.4 °C)-98.4 °F (36.9 °C)] 98.4 °F (36.9 °C)  Heart Rate:  [] 90  Resp:  [18-28] 18  BP: ()/(61-82) 112/76  Flow (L/min):  [3-4.5] 3    Physical Exam:  Vital Signs Reviewed   General: Thin cachetic male, Awake and Alert, NAD on 4 L NC    HEENT:  PERRL, EOMI.  OP, nares clear, no sinus tenderness  Neck:  Supple, no JVD, no thyromegaly  Lymph: no axillary, cervical, supraclavicular lymphadenopathy noted bilaterally  Chest: barrel chested, poor aeration, coarse wheezing bilaterally, tympanic to percussion bilaterally, increased work of breathing noted, pursed lip breathing  CV: RRR, no MGR, pulses 2+, equal  Abd:  Soft, NT, ND, + BS, no HSM  EXT:  no clubbing, no cyanosis, trace BLE edema, no joint tenderness  Neuro:  A&Ox3, CN grossly intact, no focal deficits.  Skin: No rashes or lesions noted    Result Review    Result Review:  I  have personally reviewed the results from the time of this admission to 9/13/2022 15:42 EDT and agree with these findings:  [x]  Laboratory  [x]  Microbiology  [x]  Radiology  [x]  EKG/Telemetry   [x]  Cardiology/Vascular   []  Pathology  [x]  Old records  []  Other:  Most notable findings include: D-dimer 0.63  WBC 7.21, lactic 0.8  proBNP 5360  Potassium 4.3, creatinine 0.67    9/13 1120 ABG 7.35, 79.3, 65.0, 43.4 on 3 L nasal cannula    9/13 echocardiogram EF 56%, moderate tricuspid valve regurg, RVSP greater than 55    9/12 CXR chronic emphysema changes, no acute disease    9/12 COVID-negative  Blood cultures pending    2020 PFT with FEV1 30% of predicted    Assessment & Plan   Assessment / Plan     Active Hospital Problems:  Active Hospital Problems    Diagnosis    • **Acute respiratory failure with hypoxia and hypercapnia (HCC)    • Acute CHF (HCC)    • Tobacco abuse    • COPD (chronic obstructive pulmonary disease) (HCC)    • Essential hypertension    • Other hyperlipidemia      Impression:  Acute on chronic hypoxic respiratory failure: 2 L nasal cannula at home  Acute hypercapnic respiratory failure  Acute exacerbation of COPD  Volume overload  Acute decompensation of heart failure  Severe pulmonary hypertension: RVSP >55  Elevated D-dimer  Current tobacco abuse of cigarettes  History of COVID January 2022    Plan:  Continue to wean O2 to keep sats greater than 90%.  Baseline home O2 requirement is 2 L nasal cannula.  Will start on NIPPV 14/7.   Repeat ABG in a.m.  D-dimer 0.63.  Pending CT of chest with contrast to rule out PE.  May need to consider bronchoscopy if breathing does not improve.  Will discontinue Prednisone and start on Solu-medrol 40 mg IV BID.  proBNP 5600.  We will start on Lasix 40 mg IV twice daily.  Trend renal panel and electrolytes.  Echocardiogram with EF 56%, severely dilated right ventricle, moderate tricuspid valve regurgitation, RVSP >55.  Concerns for severe pulmonary  hypertension. Will need work-up.  Will check HIV, IgE, IgG subclasses, work-up for lupus and schleroderma, rheumatoid factor.  May need cardiology consult with [facility] to evaluate pulmonary hypertension.  We will check Pro-Saw.  Will obtain sputum culture.  Check urinary antigen for strep and Legionella.  Continue ceftriaxone.  Can de-escalate based on cultures.  Continue Brovana, Pulmicort, and DuoNebs.  Continue bronchopulmonary hygiene.  Add I-S and flutter valve.  Continue Lovenox for DVT prophylaxis.  Encourage activity.  Out of bed to chair.    Labs, microbiology, radiology, medications, and provider notes personally reviewed.  Discussed with primary services and bedside RN.    Thank you for this consult and allowing me to participate in the care of Mr. Gomes.    Electronically signed by ZULY Capps, 09/13/22, 5:01 PM EDT.    This visit was performed by BOTH a physician and an APC. I personally evaluated and examined the patient. I performed all aspects of MDM as documented. , I have reviewed and confirmed the accuracy of the patient's history as documented in this note. and I have reexamined the patient and the results are consistent with the previously documented exam. I have updated the documentation as necessary. I have spent more than 50% of time in assessing and decision making regarding this patients pulmonary issues.     Electronically signed by Ulices Moya MD, 09/14/22, 7:11 AM EDT.       Electronically signed by Ulices Moya MD, 9/13/2022, 15:42 EDT.

## 2022-09-14 ENCOUNTER — ANESTHESIA EVENT (OUTPATIENT)
Dept: GASTROENTEROLOGY | Facility: HOSPITAL | Age: 62
End: 2022-09-14

## 2022-09-14 ENCOUNTER — ANESTHESIA (OUTPATIENT)
Dept: GASTROENTEROLOGY | Facility: HOSPITAL | Age: 62
End: 2022-09-14

## 2022-09-14 LAB
ACB CMPLX DNA BAL NAA+NON-PRB-NCNCRNG: NOT DETECTED
ARTERIAL PATENCY WRIST A: POSITIVE
BASE EXCESS BLDA CALC-SCNC: 17.9 MMOL/L (ref -2–2)
BDY SITE: ABNORMAL
BLACTX-M ISLT/SPM QL: ABNORMAL
BLAIMP ISLT/SPM QL: ABNORMAL
BLAKPC ISLT/SPM QL: ABNORMAL
BLAOXA-48-LIKE ISLT/SPM QL: ABNORMAL
BLAVIM ISLT/SPM QL: ABNORMAL
C PNEUM DNA NPH QL NAA+NON-PROBE: NOT DETECTED
COHGB MFR BLD: 1.7 % (ref 0–1.5)
E CLOAC COMP DNA BAL NAA+NON-PRB-NCNCRNG: NOT DETECTED
E COLI DNA BAL NAA+NON-PRB-NCNCRNG: NOT DETECTED
FHHB: 6.6 % (ref 0–5)
FLUAV SUBTYP SPEC NAA+PROBE: NOT DETECTED
FLUBV RNA ISLT QL NAA+PROBE: NOT DETECTED
GP B STREP DNA BAL NAA+NON-PRB-NCNCRNG: NOT DETECTED
HADV DNA SPEC NAA+PROBE: NOT DETECTED
HAEM INFLU DNA BAL NAA+NON-PRB-NCNCRNG: DETECTED
HCO3 BLDA-SCNC: 47.6 MMOL/L (ref 22–26)
HCOV RNA LOWER RESP QL NAA+NON-PROBE: NOT DETECTED
HGB BLDA-MCNC: 16 G/DL (ref 13.8–16.4)
HMPV RNA NPH QL NAA+NON-PROBE: NOT DETECTED
HPIV RNA LOWER RESP QL NAA+NON-PROBE: NOT DETECTED
IGE SERPL-ACNC: 15.7 KU/L
INHALED O2 CONCENTRATION: 30 %
K AEROGENES DNA BAL NAA+NON-PRB-NCNCRNG: NOT DETECTED
K OXYTOCA DNA BAL NAA+NON-PRB-NCNCRNG: NOT DETECTED
K PNEU GRP DNA BAL NAA+NON-PRB-NCNCRNG: NOT DETECTED
L PNEUMO DNA LOWER RESP QL NAA+NON-PROBE: NOT DETECTED
LYMPHOCYTES NFR FLD MANUAL: 7 %
M CATARRHALIS DNA BAL NAA+NON-PRB-NCNCRNG: NOT DETECTED
M PNEUMO IGG SER IA-ACNC: NOT DETECTED
MECA+MECC ISLT/SPM QL: ABNORMAL
METHGB BLD QL: 0.3 % (ref 0–1.5)
MODALITY: ABNORMAL
NDM GENE: ABNORMAL
NEUTROPHILS NFR FLD MANUAL: 93 %
NOTE: ABNORMAL
OXYHGB MFR BLDV: 91.4 % (ref 94–99)
P AERUGINOSA DNA BAL NAA+NON-PRB-NCNCRNG: NOT DETECTED
PCO2 BLDA: 77 MM HG (ref 35–45)
PH BLDA: 7.41 PH UNITS (ref 7.35–7.45)
PO2 BLD: 218 MM[HG] (ref 0–500)
PO2 BLDA: 65.3 MM HG (ref 80–100)
PROTEUS SP DNA BAL NAA+NON-PRB-NCNCRNG: NOT DETECTED
RHINOVIRUS RNA SPEC NAA+PROBE: DETECTED
RSV RNA NPH QL NAA+NON-PROBE: NOT DETECTED
S AUREUS DNA BAL NAA+NON-PRB-NCNCRNG: NOT DETECTED
S MARCESCENS DNA BAL NAA+NON-PRB-NCNCRNG: NOT DETECTED
S PNEUM DNA BAL NAA+NON-PRB-NCNCRNG: DETECTED
S PYO DNA BAL NAA+NON-PRB-NCNCRNG: NOT DETECTED
SAO2 % BLDCOA: 93.3 % (ref 95–99)
VISUAL PRESENCE OF BLOOD: NORMAL

## 2022-09-14 PROCEDURE — 88312 SPECIAL STAINS GROUP 1: CPT | Performed by: INTERNAL MEDICINE

## 2022-09-14 PROCEDURE — 87206 SMEAR FLUORESCENT/ACID STAI: CPT | Performed by: INTERNAL MEDICINE

## 2022-09-14 PROCEDURE — 0BCB8ZZ EXTIRPATION OF MATTER FROM LEFT LOWER LOBE BRONCHUS, VIA NATURAL OR ARTIFICIAL OPENING ENDOSCOPIC: ICD-10-PCS | Performed by: INTERNAL MEDICINE

## 2022-09-14 PROCEDURE — 83050 HGB METHEMOGLOBIN QUAN: CPT | Performed by: NURSE PRACTITIONER

## 2022-09-14 PROCEDURE — 36600 WITHDRAWAL OF ARTERIAL BLOOD: CPT | Performed by: NURSE PRACTITIONER

## 2022-09-14 PROCEDURE — 87116 MYCOBACTERIA CULTURE: CPT | Performed by: INTERNAL MEDICINE

## 2022-09-14 PROCEDURE — 89051 BODY FLUID CELL COUNT: CPT | Performed by: INTERNAL MEDICINE

## 2022-09-14 PROCEDURE — 0BC88ZZ EXTIRPATION OF MATTER FROM LEFT UPPER LOBE BRONCHUS, VIA NATURAL OR ARTIFICIAL OPENING ENDOSCOPIC: ICD-10-PCS | Performed by: INTERNAL MEDICINE

## 2022-09-14 PROCEDURE — 87071 CULTURE AEROBIC QUANT OTHER: CPT | Performed by: INTERNAL MEDICINE

## 2022-09-14 PROCEDURE — 25010000002 CEFTRIAXONE PER 250 MG: Performed by: INTERNAL MEDICINE

## 2022-09-14 PROCEDURE — 25010000002 METHYLPREDNISOLONE PER 40 MG: Performed by: INTERNAL MEDICINE

## 2022-09-14 PROCEDURE — 0B9J8ZX DRAINAGE OF LEFT LOWER LUNG LOBE, VIA NATURAL OR ARTIFICIAL OPENING ENDOSCOPIC, DIAGNOSTIC: ICD-10-PCS | Performed by: INTERNAL MEDICINE

## 2022-09-14 PROCEDURE — 25010000002 PROPOFOL 10 MG/ML EMULSION: Performed by: NURSE ANESTHETIST, CERTIFIED REGISTERED

## 2022-09-14 PROCEDURE — 94799 UNLISTED PULMONARY SVC/PX: CPT

## 2022-09-14 PROCEDURE — 25010000002 FUROSEMIDE PER 20 MG: Performed by: INTERNAL MEDICINE

## 2022-09-14 PROCEDURE — 87205 SMEAR GRAM STAIN: CPT | Performed by: INTERNAL MEDICINE

## 2022-09-14 PROCEDURE — 99233 SBSQ HOSP IP/OBS HIGH 50: CPT | Performed by: INTERNAL MEDICINE

## 2022-09-14 PROCEDURE — 25010000002 ENOXAPARIN PER 10 MG: Performed by: INTERNAL MEDICINE

## 2022-09-14 PROCEDURE — 94761 N-INVAS EAR/PLS OXIMETRY MLT: CPT

## 2022-09-14 PROCEDURE — 82375 ASSAY CARBOXYHB QUANT: CPT | Performed by: NURSE PRACTITIONER

## 2022-09-14 PROCEDURE — 82805 BLOOD GASES W/O2 SATURATION: CPT | Performed by: NURSE PRACTITIONER

## 2022-09-14 PROCEDURE — 87102 FUNGUS ISOLATION CULTURE: CPT | Performed by: INTERNAL MEDICINE

## 2022-09-14 PROCEDURE — 87633 RESP VIRUS 12-25 TARGETS: CPT | Performed by: INTERNAL MEDICINE

## 2022-09-14 PROCEDURE — 0B918ZX DRAINAGE OF TRACHEA, VIA NATURAL OR ARTIFICIAL OPENING ENDOSCOPIC, DIAGNOSTIC: ICD-10-PCS | Performed by: INTERNAL MEDICINE

## 2022-09-14 PROCEDURE — 0BC48ZZ EXTIRPATION OF MATTER FROM RIGHT UPPER LOBE BRONCHUS, VIA NATURAL OR ARTIFICIAL OPENING ENDOSCOPIC: ICD-10-PCS | Performed by: INTERNAL MEDICINE

## 2022-09-14 PROCEDURE — 87070 CULTURE OTHR SPECIMN AEROBIC: CPT | Performed by: INTERNAL MEDICINE

## 2022-09-14 PROCEDURE — 31624 DX BRONCHOSCOPE/LAVAGE: CPT | Performed by: INTERNAL MEDICINE

## 2022-09-14 PROCEDURE — 25010000002 METHYLPREDNISOLONE PER 40 MG: Performed by: NURSE PRACTITIONER

## 2022-09-14 PROCEDURE — 88108 CYTOPATH CONCENTRATE TECH: CPT | Performed by: INTERNAL MEDICINE

## 2022-09-14 PROCEDURE — 0BC68ZZ EXTIRPATION OF MATTER FROM RIGHT LOWER LOBE BRONCHUS, VIA NATURAL OR ARTIFICIAL OPENING ENDOSCOPIC: ICD-10-PCS | Performed by: INTERNAL MEDICINE

## 2022-09-14 PROCEDURE — 87107 FUNGI IDENTIFICATION MOLD: CPT | Performed by: INTERNAL MEDICINE

## 2022-09-14 PROCEDURE — 99233 SBSQ HOSP IP/OBS HIGH 50: CPT | Performed by: FAMILY MEDICINE

## 2022-09-14 RX ORDER — MAGNESIUM HYDROXIDE 1200 MG/15ML
LIQUID ORAL AS NEEDED
Status: DISCONTINUED | OUTPATIENT
Start: 2022-09-14 | End: 2022-09-14 | Stop reason: HOSPADM

## 2022-09-14 RX ORDER — PROPOFOL 10 MG/ML
VIAL (ML) INTRAVENOUS AS NEEDED
Status: DISCONTINUED | OUTPATIENT
Start: 2022-09-14 | End: 2022-09-14 | Stop reason: SURG

## 2022-09-14 RX ORDER — LIDOCAINE HYDROCHLORIDE 40 MG/ML
INJECTION, SOLUTION RETROBULBAR; TOPICAL AS NEEDED
Status: DISCONTINUED | OUTPATIENT
Start: 2022-09-14 | End: 2022-09-14 | Stop reason: HOSPADM

## 2022-09-14 RX ORDER — IPRATROPIUM BROMIDE AND ALBUTEROL SULFATE 2.5; .5 MG/3ML; MG/3ML
3 SOLUTION RESPIRATORY (INHALATION) ONCE
Status: COMPLETED | OUTPATIENT
Start: 2022-09-14 | End: 2022-09-14

## 2022-09-14 RX ORDER — LIDOCAINE HYDROCHLORIDE 20 MG/ML
INJECTION, SOLUTION EPIDURAL; INFILTRATION; INTRACAUDAL; PERINEURAL AS NEEDED
Status: DISCONTINUED | OUTPATIENT
Start: 2022-09-14 | End: 2022-09-14 | Stop reason: SURG

## 2022-09-14 RX ORDER — SODIUM CHLORIDE, SODIUM LACTATE, POTASSIUM CHLORIDE, CALCIUM CHLORIDE 600; 310; 30; 20 MG/100ML; MG/100ML; MG/100ML; MG/100ML
30 INJECTION, SOLUTION INTRAVENOUS CONTINUOUS
Status: DISCONTINUED | OUTPATIENT
Start: 2022-09-14 | End: 2022-09-14

## 2022-09-14 RX ADMIN — IPRATROPIUM BROMIDE AND ALBUTEROL SULFATE 3 ML: 2.5; .5 SOLUTION RESPIRATORY (INHALATION) at 06:38

## 2022-09-14 RX ADMIN — Medication 10 ML: at 12:17

## 2022-09-14 RX ADMIN — METHYLPREDNISOLONE SODIUM SUCCINATE 40 MG: 40 INJECTION, POWDER, FOR SOLUTION INTRAMUSCULAR; INTRAVENOUS at 17:56

## 2022-09-14 RX ADMIN — NICOTINE 1 PATCH: 21 PATCH, EXTENDED RELEASE TRANSDERMAL at 21:00

## 2022-09-14 RX ADMIN — CEFTRIAXONE SODIUM 1 G: 1 INJECTION, SOLUTION INTRAVENOUS at 12:17

## 2022-09-14 RX ADMIN — LIDOCAINE HYDROCHLORIDE 100 MG: 20 INJECTION, SOLUTION EPIDURAL; INFILTRATION; INTRACAUDAL; PERINEURAL at 10:11

## 2022-09-14 RX ADMIN — IPRATROPIUM BROMIDE AND ALBUTEROL SULFATE 3 ML: 2.5; .5 SOLUTION RESPIRATORY (INHALATION) at 14:00

## 2022-09-14 RX ADMIN — ACETAMINOPHEN 650 MG: 325 TABLET ORAL at 08:22

## 2022-09-14 RX ADMIN — ATORVASTATIN CALCIUM 10 MG: 10 TABLET, FILM COATED ORAL at 12:17

## 2022-09-14 RX ADMIN — METHYLPREDNISOLONE SODIUM SUCCINATE 40 MG: 40 INJECTION, POWDER, FOR SOLUTION INTRAMUSCULAR; INTRAVENOUS at 05:59

## 2022-09-14 RX ADMIN — ENOXAPARIN SODIUM 40 MG: 100 INJECTION SUBCUTANEOUS at 12:16

## 2022-09-14 RX ADMIN — ARFORMOTEROL TARTRATE 15 MCG: 15 SOLUTION RESPIRATORY (INHALATION) at 06:38

## 2022-09-14 RX ADMIN — IPRATROPIUM BROMIDE AND ALBUTEROL SULFATE 3 ML: 2.5; .5 SOLUTION RESPIRATORY (INHALATION) at 09:33

## 2022-09-14 RX ADMIN — BUDESONIDE 0.5 MG: 0.5 SUSPENSION RESPIRATORY (INHALATION) at 06:38

## 2022-09-14 RX ADMIN — ARFORMOTEROL TARTRATE 15 MCG: 15 SOLUTION RESPIRATORY (INHALATION) at 19:04

## 2022-09-14 RX ADMIN — PROPOFOL 150 MCG/KG/MIN: 10 INJECTION, EMULSION INTRAVENOUS at 10:11

## 2022-09-14 RX ADMIN — FUROSEMIDE 40 MG: 10 INJECTION, SOLUTION INTRAMUSCULAR; INTRAVENOUS at 12:17

## 2022-09-14 RX ADMIN — HYDROCHLOROTHIAZIDE 25 MG: 25 TABLET ORAL at 12:17

## 2022-09-14 RX ADMIN — SODIUM CHLORIDE, POTASSIUM CHLORIDE, SODIUM LACTATE AND CALCIUM CHLORIDE 30 ML/HR: 600; 310; 30; 20 INJECTION, SOLUTION INTRAVENOUS at 14:27

## 2022-09-14 RX ADMIN — FUROSEMIDE 40 MG: 10 INJECTION, SOLUTION INTRAMUSCULAR; INTRAVENOUS at 17:56

## 2022-09-14 RX ADMIN — SENNOSIDES AND DOCUSATE SODIUM 2 TABLET: 8.6; 5 TABLET ORAL at 12:17

## 2022-09-14 RX ADMIN — IPRATROPIUM BROMIDE AND ALBUTEROL SULFATE 3 ML: 2.5; .5 SOLUTION RESPIRATORY (INHALATION) at 19:03

## 2022-09-14 RX ADMIN — SODIUM CHLORIDE, POTASSIUM CHLORIDE, SODIUM LACTATE AND CALCIUM CHLORIDE 30 ML/HR: 600; 310; 30; 20 INJECTION, SOLUTION INTRAVENOUS at 09:25

## 2022-09-14 RX ADMIN — BUDESONIDE 0.5 MG: 0.5 SUSPENSION RESPIRATORY (INHALATION) at 19:04

## 2022-09-14 RX ADMIN — Medication 10 ML: at 21:00

## 2022-09-14 RX ADMIN — PROPOFOL 50 MG: 10 INJECTION, EMULSION INTRAVENOUS at 10:11

## 2022-09-14 NOTE — PROGRESS NOTES
[facility]   Hospitalist Progress Note  Date: 2022  Patient Name: Sumeet Gomes  : 1960  MRN: [id number]  Date of admission: 2022      Subjective   Subjective     Chief Complaint: Shortness of breath    Summary: Sumeet Gomes is a 61 y.o. old male patient who presents with progressive shortness of breath over the past 2 to 3 days.  History of hypertension, chronic obstructive pulmonary disease, hyperlipidemia, chronic tobacco abuse.     Patient presents from home, reports increasing shortness of breath over the past few days.  He reports he wears oxygen at night when he sleeps.  He monitors his O2 sats at home, reports that he is normally in the mid 70s on home pulse oximetry.  He reports over the weekend that his pulse ox dropped into the 50s and he had increasing shortness of breath.  He reports developing lower extremity edema over the past several months.  Denies orthopnea.  Denies fevers or chills.  Reports cough productive of yellow sputum.     ED course: In ED , RR 28, oxygen saturation 67% on room air.  Bicarbonate 35, BNP 5360.  Normal troponin.  Chest x-ray also normal.  Started on furosemide, duo nebs.  ABG demonstrated pH 7.35, PCO2 79, PO2 65, bicarb 43 on 3 L nasal cannula.  Patient's pulmonology group consulted. Echocardiogram demonstrated normal left ventricular ejection fraction with elevated right ventricular systolic pressures greater than 55 mmHg.  CT of the chest reviewed and negative for PE.  Did demonstrate findings consistent with emphysema.     Interval Followup: Patient sitting up resting comfortably at the bedside with family with BiPAP on.  Patient states overall he is feeling pretty good. Continues to endorse productive cough.  Denies any further issues with lower extremity swelling.  Afebrile overnight.  Sinus rhythm 90s with a short burst of SVT on telemetry review.  Blood pressure within normal limits.  Requiring 3 to 6 L nasal cannula to keep sats  greater than 90%.  Satting well on 30% FiO2 on BiPAP.  Repeat ABG this morning continues to show chronic hypercapnia.  Bicarb trending up.    Bronchoscopy performed demonstrated mucous plugging in the trachea and bilateral bronchial tubes left more than right with scattered purulent secretions and friable airway.  BAL PCR positive for strep pneumo and haemophilus influenza as well as rhinovirus.  Blood cultures negative to date.   Sputum culture pending.  No other issues per nursing.    Review of Systems   Constitutional: Negative for fatigue and fever.   HENT: Negative for sore throat and trouble swallowing.    Eyes: Negative for pain and discharge.   Respiratory: Positive for cough and shortness of breath.    Cardiovascular: Negative for chest pain and palpitations.   Gastrointestinal: Negative for abdominal pain, nausea and vomiting.   Endocrine: Negative for cold intolerance and heat intolerance.   Genitourinary: Negative for difficulty urinating and dysuria.   Musculoskeletal: Negative for back pain and neck stiffness.   Skin: Negative for color change and rash.   Neurological: Negative for syncope and headaches.   Hematological: Negative for adenopathy.   Psychiatric/Behavioral: Negative for confusion and hallucinations.       Objective   Objective     Vitals:   Temp:  [97.5 °F (36.4 °C)-98.7 °F (37.1 °C)] 97.5 °F (36.4 °C)  Heart Rate:  [] 96  Resp:  [16-26] 20  BP: ()/(60-77) 104/71  Flow (L/min):  [3-6] 4  Physical Exam   Gen. well-developed appearing stated age in no acute distress, thin frail appearing  HEENT: Normocephalic atraumatic moist membranes pupils equal round reactive light, no scleral icterus no conjunctival injection  Cardiovascular: regular rate and rhythm no murmurs rubs or gallops S1-S2, no lower extremity edema appreciated  Pulmonary: Diminished to auscultation bilaterally bilateral expiratory wheezes no rales or rhonchi symmetric chest expansion, unlabored, no conversational  dyspnea appreciated, increased AP diameter  Gastrointestinal: Soft nontender nondistended positive bowel sounds all 4 quadrants no rebound or guarding  Musculoskeletal: No clubbing cyanosis, warm and well-perfused, calves soft symmetric nontender bilaterally  Skin: Clean dry without rashs  Neuro: Cranial nerves II through XII intact grossly no sensorimotor deficits appreciated bilateral upper and lower extremities  Psych: Patient is calm cooperative and appropriate with exam not responding to internal stimuli  : No Sharpe catheter no bladder distention no suprapubic tenderness    Result Review    Result Review:  I have personally reviewed the results from the time of this admission to 9/14/2022 18:30 EDT and agree with these findings:  [x]  Laboratory   LAB RESULTS:      Lab 09/13/22  0845 09/12/22  2114 09/12/22  1656   WBC 7.21  --  8.12   HEMOGLOBIN 16.1  --  16.5   HEMATOCRIT 50.9  --  51.2*   PLATELETS 151  --  159   NEUTROS ABS 5.96  --  6.42   IMMATURE GRANS (ABS) 0.02  --  0.02   LYMPHS ABS 0.50*  --  0.70   MONOS ABS 0.73  --  0.96*   EOS ABS 0.00  --  0.00   MCV 91.9  --  91.1   SED RATE 5  --   --    CRP 3.43*  --   --    PROCALCITONIN 0.04  --   --    LACTATE  --   --  0.8   D DIMER QUANT  --  0.63*  --          Lab 09/13/22  0845 09/12/22  1656   SODIUM 138 138   POTASSIUM 4.3 3.9   CHLORIDE 94* 96*   CO2 38.1* 34.7*   ANION GAP 5.9 7.3   BUN 20 20   CREATININE 0.67* 0.69*   EGFR 106.9 105.9   GLUCOSE 118* 127*   CALCIUM 9.1 9.5         Lab 09/13/22  0845 09/12/22  1656   TOTAL PROTEIN 6.3 6.8   ALBUMIN 4.10 4.20   GLOBULIN 2.2 2.6   ALT (SGPT) 14 13   AST (SGOT) 24 24   BILIRUBIN 0.5 0.6   ALK PHOS 156* 156*         Lab 09/12/22  1656   PROBNP 5,360.0*   TROPONIN T <0.010                 Lab 09/14/22  0651 09/13/22  1120   PH, ARTERIAL 7.409 7.356   PCO2, ARTERIAL 77.0* 79.3*   PO2 ART 65.3* 65.0*   O2 SATURATION ART 93.3* 92.5*   FIO2 30 32   HCO3 ART 47.6* 43.4*   BASE EXCESS ART 17.9* 13.2*    CARBOXYHEMOGLOBIN 1.7* 2.6*     Brief Urine Lab Results     None        Microbiology Results (last 10 days)     Procedure Component Value - Date/Time    Respiratory Culture - Wash, Bronchus [[id number]] Collected: 09/14/22 1023    Lab Status: Preliminary result Specimen: Wash from Bronchus Updated: 09/14/22 1240     Gram Stain Few (2+) WBCs seen      No organisms seen    AFB Culture - Lavage, Lung, Left Lower Lobe [[id number]] Collected: 09/14/22 1016    Lab Status: Preliminary result Specimen: Lavage from Lung, Left Lower Lobe Updated: 09/14/22 1456     AFB Stain No acid fast bacilli seen on direct smear    BAL Culture, Quantitative - Lavage, Lung, Left Lower Lobe [[id number]] Collected: 09/14/22 1016    Lab Status: Preliminary result Specimen: Lavage from Lung, Left Lower Lobe Updated: 09/14/22 1239     Gram Stain Many (4+) WBCs seen      Rare (1+) Yeast      Rare (1+) Fungal elements    Pneumonia Panel - Lavage, Lung, Left Lower Lobe [[id number]]  (Abnormal) Collected: 09/14/22 1016    Lab Status: Final result Specimen: Lavage from Lung, Left Lower Lobe Updated: 09/14/22 1339     Escherichia coli PCR Not Detected     Acinetobacter calcoaceticus-baumannii complex PCR Not Detected     Enterobacter cloacae PCR Not Detected     Klebsiella oxytoca PCR Not Detected     Klebsiella pneumoniae group PCR Not Detected     Klebsiella aerogenes PCR Not Detected     Moraxella catarrhalis PCR Not Detected     Proteus species PCR Not Detected     Pseudomonas aeroginosa PCR Not Detected     Serratia marcescens PCR Not Detected     Staphylococcus aureus PCR Not Detected     Streptococcus pyogenes PCR Not Detected     Haemophilus influenzae PCR Detected     Comment: 10^5 Bin copies/mL        Streptococcus agalactiae PCR Not Detected     Streptococcus pneumoniae PCR Detected     Comment: 10^6 Bin copies/mL        Chlamydophila pneumoniae PCR Not Detected     Legionella pneumophilia PCR Not Detected     Mycoplasma pneumo by PCR Not  Detected     ADENOVIRUS, PCR Not Detected     CTX-M Gene N/A     IMP Gene N/A     KPC Gene N/A     mecA/C and MREJ Gene N/A     NDM Gene N/A     OXA-48-like Gene N/A     VIM Gene N/A     Coronavirus Not Detected     Human Metapneumovirus Not Detected     Human Rhinovirus/Enterovirus Detected     Influenza A PCR Not Detected     Influenza B PCR Not Detected     RSV, PCR Not Detected     Parainfluenza virus PCR Not Detected    Respiratory Culture - Sputum, Cough [[id number]] Collected: 09/13/22 1852    Lab Status: Preliminary result Specimen: Sputum from Cough Updated: 09/14/22 1027     Respiratory Culture Growth present, too young to evaluate     Gram Stain Less than 10 Epithelial cells per low power field      Greater than 25 WBCs per low power field      Rare (1+) Gram positive cocci in pairs and clusters    S. Pneumo Ag Urine or CSF - Urine, Urine, Clean Catch [[id number]]  (Normal) Collected: 09/13/22 1817    Lab Status: Final result Specimen: Urine, Clean Catch Updated: 09/13/22 2011     Strep Pneumo Ag Negative    Legionella Antigen, Urine - Urine, Urine, Clean Catch [[id number]]  (Normal) Collected: 09/13/22 1817    Lab Status: Final result Specimen: Urine, Clean Catch Updated: 09/13/22 2010     LEGIONELLA ANTIGEN, URINE Negative    Blood Culture - Blood, Arm, Left [[id number]]  (Normal) Collected: 09/12/22 1752    Lab Status: Preliminary result Specimen: Blood from Arm, Left Updated: 09/14/22 1800     Blood Culture No growth at 2 days    COVID-19,APTIMA PANTHER(HERLINDA),[facility], NP/OP SWAB IN UTM/VTM/[facility] TRANSPORT MEDIA,24 HR TAT - Swab, Nasal Cavity [[id number]]  (Normal) Collected: 09/12/22 1752    Lab Status: Final result Specimen: Swab from Nasal Cavity Updated: 09/13/22 0121     COVID19 Not Detected    Narrative:      Fact sheet for providers: https://www.fda.gov/media/505304/download     Fact sheet for patients: https://www.fda.gov/media/874718/download    Test performed by RT PCR.    Blood Culture -  Blood, Arm, Right [[id number]]  (Normal) Collected: 09/12/22 1656    Lab Status: Preliminary result Specimen: Blood from Arm, Right Updated: 09/14/22 1732     Blood Culture No growth at 2 days          [x]  Microbiology  [x]  Radiology  CT Chest With Contrast Diagnostic    Result Date: 9/13/2022    CT scan of the chest with IV contrast demonstrating no findings of PE.  Emphysema.  Mild anterior wedging compression fracture of the superior endplate of a single midthoracic vertebral body with faint sclerosis, suggesting healing fracture.      ALAN VARGAS MD       Electronically Signed and Approved By: ALAN VARGAS MD on 9/13/2022 at 20:25               [x]  EKG/Telemetry  SR with brief bursts of supraventricular tachycardia  [x]  Cardiology/Vascular  Echo reviewed as above  []  Pathology  [x]  Old records pulmonology clinic notes  [x]  Other:  Scheduled Meds:arformoterol, 15 mcg, Nebulization, BID - RT  atorvastatin, 10 mg, Oral, Daily  budesonide, 0.5 mg, Nebulization, BID - RT  cefTRIAXone, 1 g, Intravenous, Q24H  enoxaparin, 40 mg, Subcutaneous, Daily  furosemide, 40 mg, Intravenous, BID  hydroCHLOROthiazide, 25 mg, Oral, Daily  ipratropium-albuterol, 3 mL, Nebulization, Q6H While Awake - RT  methylPREDNISolone sodium succinate, 40 mg, Intravenous, Q12H  senna-docusate sodium, 2 tablet, Oral, BID  sodium chloride, 10 mL, Intravenous, Q12H      Continuous Infusions:Pharmacy to Dose enoxaparin (LOVENOX),       PRN Meds:.•  acetaminophen **OR** acetaminophen **OR** acetaminophen  •  albuterol  •  senna-docusate sodium **AND** polyethylene glycol **AND** bisacodyl **AND** bisacodyl  •  calcium carbonate  •  melatonin  •  nicotine  •  nicotine polacrilex  •  ondansetron **OR** ondansetron  •  Pharmacy to Dose enoxaparin (LOVENOX)  •  sodium chloride  •  sodium chloride  •  sodium chloride  •  sodium chloride      Assessment & Plan   Assessment / Plan     Assessment/Plan:  COPD with acute exacerbation  Acute on  chronic hypoxic respiratory failure  Mucous plugging of the airway  Chronic hypercapnic respiratory failure  Volume overload  Acute decompensated diastolic heart failure  Severe pulmonary hypertension  Elevated D-dimer negative for PE on CT scan  Tobacco abuse of cigarettes ongoing  History of COVID-19 in January 2020    Patient admitted for further evaluation treatment  Pulmonology consulted thank you for your assistance  Continue Brovana, Pulmicort scheduled  Continue DuoNeb scheduled  Continue albuterol as needed  Continue Solu-Medrol and taper per pulmonology  Continue ceftriaxone due to increased periods.  Sputum culture pending  Continue bronchopulmonary hygiene protocol  Continue bronchodilator protocol  Continue supplemental oxygen titrate keep sats greater than 90%  Continue BiPAP at night and as needed per pulmonology  Consult respiratory case management for NIPPV at home and arrange outpatient PFTs per pulmonology  Continue furosemide 40 mg IV twice daily  Further work-up pulmonary hypertension per pulmonology  Continue Lovenox for DVT prophylaxis  Patient counseled importance of tobacco cessation  Will provide nicotine replacement patient  Further inpatient orders recommendations pending clinical course.         Discussed plan with RN as well as pulmonology attending.    DVT prophylaxis:  Medical and mechanical DVT prophylaxis orders are present.    CODE STATUS:   Level Of Support Discussed With: Patient  Code Status (Patient has no pulse and is not breathing): CPR (Attempt to Resuscitate)  Medical Interventions (Patient has pulse or is breathing): Full

## 2022-09-14 NOTE — CASE MANAGEMENT/SOCIAL WORK
Pt alert and oriented. Pt admitted for acute resp failure. Pt wearing pap device at time of visit. Pts son Jeff at bedside. BNP 5360 neg trop EF 56% CO2 79 on admission. Now 77. Pt had bronch today. Pulm following. Currently no cardiology consulted.     Pt lives a home with wife, independent. Pt is compliant with PCP appts and medications. Pt has supportive family. Pt is current smoker. Encouraged pt to quit smoking. Explained effects of nicotine and the heart.     Heart failure handouts and education provided. Discussed right sided heart failure and causes, treatment plan, medications, low Na diet, fluid restrictions, daily weights and s/s to report. Pt does not see cardiologist. Encouraged pt to call pcp with hf s/s. Pt and pts son v/u.     Pt started on lasix. Discussed mechanism of medication and importance of taking.     RT cm following for home pap setup.     Pt and pts son denies any questions at this time. Provided son with HF cm contact information to call if needed after discharge.     Will continue to follow.

## 2022-09-14 NOTE — OP NOTE
Bronchoscopy Procedure Note    Procedure:  Bronchoscopy with mucous plug removal  Bronchoscopy with bronchoalveolar lavage left lower lobe  Bronchoscopy with bronchial washings tracheobronchial tree    Pre-Operative Diagnosis: COPD exacerbation, persistent bronchitis  Post-Operative Diagnosis: COPD exacerbation, persistent bronchitis mucous plugging    Indication:  COPD exacerbation, mucous plugging    Anesthesia: MAC anesthesia    Procedure Details: Patient was consented for the procedure with all risks and benefits of the procedure explained in detail. Patient was given the opportunity to ask questions and all concerns were answered.    The bronchoscope was inserted into the main airway via the mouth. An anatomical survey was done of the main airways and the subsegmental bronchus. The findings are reported below.  Significant mucus plugging was seen bilaterally, starting from upper middle and lower bronchial tubes.  It was more in the left lower lobe bronchial tube.  A bronchoalveolar lavage was performed using 60 mL aliquots of normal saline x2 instilled into the airways then aspirated back from left lower lobe of lung with 45 mL serosanguineous fluid in return.  Bronchial washing was obtained from entire tracheobronchial tree.  No significant bleeding or oozing after the procedure.    Findings:  Significant mucus plugging in trachea, bilateral bronchial tubes, more so in left lower lobe  Friable mucosa, easy bleeding with suction  Scattered purulent secretions    Estimated Blood Loss: Insignificant    Specimens:  BAL left lower lobe  Bronchial brushing left lower lobe  Transbronchial biopsies left lower lobe  Bronchial washings tracheobronchial tree    Complications: None; patient tolerated the procedure well.    Disposition: To floor, once stable in recovery.    Patient tolerated the procedure well.      Electronically signed by Ulices Moya MD, 9/14/2022, 10:20 EDT.

## 2022-09-14 NOTE — ANESTHESIA POSTPROCEDURE EVALUATION
Patient: Sumeet Gomes    Procedure Summary     Date: 09/14/22 Room / Location: [facility] 3 / [facility]    Anesthesia Start: 1006 Anesthesia Stop: 1033    Procedure: BRONCHOSCOPY WITH BRONCHOALVEOLAR LAVAGE AND BRONCHIAL WASHINGS (N/A Bronchus) Diagnosis:       Acute respiratory failure with hypoxia and hypercapnia (HCC)      (Acute respiratory failure with hypoxia and hypercapnia (HCC) [J96.01, J96.02])    Surgeons: Ulices Moya MD Provider: Fadi Horton MD    Anesthesia Type: general ASA Status: 3          Anesthesia Type: general    Vitals  No vitals data found for the desired time range.          Post Anesthesia Care and Evaluation    Patient location during evaluation: bedside  Patient participation: complete - patient participated  Level of consciousness: awake  Pain management: adequate    Airway patency: patent  Anesthetic complications: No anesthetic complications  PONV Status: none  Cardiovascular status: acceptable  Respiratory status: acceptable  Hydration status: acceptable    Comments: An Anesthesiologist personally participated in the most demanding procedures (including induction and emergence if applicable) in the anesthesia plan, monitored the course of anesthesia administration at frequent intervals and remained physically present and available for immediate diagnosis and treatment of emergencies.

## 2022-09-14 NOTE — CONSULTS
RT CM consulted to arrange PFT and home NIV for patient with chronic respiratory failure secondary to COPD.  Mr Gomes states he has a known hx of COPD and has been following with Dr Chapa for management.  He is a current smoker of a half pack a day and has tried to quit numerous times using medications, NRT and hypnosis.  He is still working as a , but does endorse exertional dyspnea.  He states he is only able to ambulate about 100 feet before having to stop for breath, mMRC score 4, CAT 21. He has never attended pulmonary rehab. He states he has had a sleep study in the past, but was not found to have TAISHA.  He sleeps soundly on one pillow, no orthopnea.  He does use nocturnal oxygen, setting of 2 on pulse dose POC.  RT CM counseled pt on use of pulse dose with sleep as it is not recommended.  Last PFT was in 2020 with FEV1 noted to be 30%b of predicted.  He takes Anoro and Flovent daily and reports using his rescue Albuterol up to 6 times a day.  He states he is very fatigued throughout the day and has lost 40 lbs since January.  RT CM discussed use of NIV for treatment of respiratory failure with Mr Gomes and his son at the bedside.  He understands this treatment is crucial in improving his quality of life and is willing to do what is necessary.  RT CM will continue to follow and offer additional COPD education as necessary.

## 2022-09-14 NOTE — PROGRESS NOTES
Pulmonary / Critical Care Progress Note      Patient Name: Sumeet Gomes  : 1960  MRN: [id number]  Primary Care Physician:  Mirtha Alexander APRN  Date of admission: 2022    Subjective   Subjective   Follow-up for acute exacerbation of COPD.    Over past 24 hours, continues on steroids, nebulizers, and antibiotics.    No acute events overnight.     This morning,   Lying in bed on 2 L nasal cannula  Feels breathing is unchanged  Continues with productive cough with yellow sputum  Continues with coarse wheezing  Was able to wear NIPPV for a few hours  Diuresing well  No chest pain  No fever or chills  Weak and fatigued    Review of Systems  General: Fatigue, unintentional weight loss 40 pounds in last 8 months, otherwise denied complaints  HEENT: Denied complaints  Respiratory: Dyspnea, wheezing, cough with yellow sputum production, otherwise denied complaints  Cardiovascular: AGUILAR, leg swelling, orthopnea, otherwise denied complaints  GI: Denied complaints  : Denied complaints  MSK: Weakness, otherwise denied complaints    Objective   Objective     Vitals:   Temp:  [97.6 °F (36.4 °C)-98.7 °F (37.1 °C)] 97.6 °F (36.4 °C)  Heart Rate:  [] 90  Resp:  [16-24] 18  BP: ()/(61-76) 106/73  Flow (L/min):  [3-4] 3    Physical Exam   Vital Signs Reviewed   General: Thin cachetic male, Awake and Alert, NAD on 2 L NC    HEENT:  PERRL, EOMI.  OP, nares clear, no sinus tenderness  Neck:  Supple, no JVD, no thyromegaly  Lymph: no axillary, cervical, supraclavicular lymphadenopathy noted bilaterally  Chest: barrel chested, poor aeration, coarse wheezing bilaterally, tympanic to percussion bilaterally, increased work of breathing noted, pursed lip breathing  CV: RRR, no MGR, pulses 2+, equal  Abd:  Soft, NT, ND, + BS, no HSM  EXT:  no clubbing, no cyanosis, trace BLE edema, no joint tenderness  Neuro:  A&Ox3, CN grossly intact, no focal deficits  Skin: No rashes or lesions noted    Result Review     Result Review:  I have personally reviewed the results from the time of this admission to 9/14/2022 07:13 EDT and agree with these findings:  [x]  Laboratory  [x]  Microbiology  [x]  Radiology  [x]  EKG/Telemetry   []  Cardiology/Vascular   []  Pathology  [x]  Old records  []  Other:  Most notable findings include:  CRP 3.43, sed rate 5, Pro-Saw 0.04  Pending rheumatoid and lupus work-up    9/14 0650 ABG 7.40, 77, 65.3, 47.6 on BiPAP FiO2 30%  9/13 1120 ABG 7.35, 79.3, 65.0, 43.4 on 3 L nasal cannula    9/13 CT chest with contrast revealed no PE, emphysema changes    9/13 sputum culture pending  Strep and Legionella negative  Blood cultures no growth at 24 hours  COVID negative    Assessment & Plan   Assessment / Plan     Active Hospital Problems:  Active Hospital Problems    Diagnosis    • **Acute respiratory failure with hypoxia and hypercapnia (HCC)    • Acute CHF (HCC)    • Tobacco abuse    • COPD (chronic obstructive pulmonary disease) (HCC)    • Essential hypertension    • Other hyperlipidemia      Impression:   Acute on chronic hypoxic respiratory failure: 2 L nasal cannula at home  Acute hypercapnic respiratory failure  Acute exacerbation of COPD  Volume overload  Acute decompensation of heart failure  Severe pulmonary hypertension: RVSP >55  Elevated D-dimer  Current tobacco abuse of cigarettes  History of COVID January 2022    Plan:   Continues to have coarse wheezing.  CT of chest reviewed and discussed with patient revealing no pulmonary embolism, severe emphysema changes.  Discussed with patient proceeding forward with bronchoscopy this AM.  Will take for bronchoscopy with airway inspection, brushings, bronchoalveolar lavage.  I have discussed the risks of the procedure with the patient including pneumothorax, hemothorax, bleeding, hypoxia, required mechanical ventilation and death. The patient recognizes these findings, acknowledges these findings and is agreeable to the procedure.  We will make  NPO.  Hold Lovenox until after procedure.  Continue to wean O2 to keep sats greater than 90%.  Baseline home O2 requirement is 2 L nasal cannula.  Continue NIPPV 14/7 at night and as needed days.  Continue Solu-medrol 40 mg IV BID.  Continue Lasix 40 mg IV twice daily.  Trend renal panel and electrolytes.  Echocardiogram with EF 56%, severely dilated right ventricle, moderate tricuspid valve regurgitation, RVSP >55.Concerns for severe pulmonary hypertension. Will need work-up.  Pending HIV, IgE, IgG subclasses, work-up for lupus and schleroderma, rheumatoid factor.  May need cardiology consult with [facility] to evaluate pulmonary hypertension.  Infectious work-up negative to date. Continue ceftriaxone.  Can de-escalate based on cultures.  Continue Brovana, Pulmicort, and DuoNebs.  Continue bronchopulmonary hygiene.  Add I-S and flutter valve.  Continue Lovenox for DVT prophylaxis.  Encourage activity.  Out of bed to chair.  Will consult RT  for NIPPV set up for home and schedule outpatient PFTs.     DVT prophylaxis:  Medical and mechanical DVT prophylaxis orders are present.    CODE STATUS:   Level Of Support Discussed With: Patient  Code Status (Patient has no pulse and is not breathing): CPR (Attempt to Resuscitate)  Medical Interventions (Patient has pulse or is breathing): Full    I personally reviewed pertinent labs, imaging and provider notes. Discussed with bedside nurse and will discuss with primary service.     Electronically signed by ZULY Capps, 09/14/22, 7:55 AM EDT.    This visit was performed by BOTH a physician and an APC. I personally evaluated and examined the patient. I performed all aspects of MDM as documented. , I have reviewed and confirmed the accuracy of the patient's history as documented in this note. and I have reexamined the patient and the results are consistent with the previously documented exam. I have updated the documentation as necessary. I spent more than 50% of  clinical time in assessing and medical decision making for the patient.     Electronically signed by Ulices Moya MD, 09/14/22, 9:43 AM EDT.       Electronically signed by Ulices Moya MD, 9/14/2022, 07:13 EDT.

## 2022-09-14 NOTE — PROGRESS NOTES
[facility]   Hospitalist Progress Note  Date: 2022  Patient Name: Sumeet Gomes  : 1960  MRN: [id number]  Date of admission: 2022      Subjective   Subjective     Chief Complaint: Shortness of breath    Summary: Sumeet Gomes is a 61 y.o. old male patient who presents with progressive shortness of breath over the past 2 to 3 days.  History of hypertension, chronic obstructive pulmonary disease, hyperlipidemia, chronic tobacco abuse.     Patient presents from home, reports increasing shortness of breath over the past few days.  He reports he wears oxygen at night when he sleeps.  He monitors his O2 sats at home, reports that he is normally in the mid 70s on home pulse oximetry.  He reports over the weekend that his pulse ox dropped into the 50s and he had increasing shortness of breath.  He reports developing lower extremity edema over the past several months.  Denies orthopnea.  Denies fevers or chills.  Reports cough productive of yellow sputum.     ED course: In ED , RR 28, oxygen saturation 67% on room air.  Bicarbonate 35, BNP 5360.  Normal troponin.  Chest x-ray also normal.  Started on furosemide, duo nebs.  ABG demonstrated pH 7.35, PCO2 79, PO2 65, bicarb 43 on 3 L nasal cannula.  Patient's pulmonology group consulted.    Interval Followup: Patient sitting up resting comfortably at the bedside with family.  Patient states overall he is feeling better.  Continues to endorse shortness of breath with activity.  Continues to endorse productive cough.  States lower extremity swelling much improved.  Bicarb trending up.  Potassium within normal limits.  CRP elevated.  Pro-Saw within normal limits.  D-dimer slightly elevated to 0.63.  CBC within normal limits.  Echocardiogram demonstrated normal left ventricular ejection fraction with elevated right ventricular systolic pressures greater than 55 mmHg.  CT of the chest reviewed and negative for PE.  Did demonstrate findings consistent  with emphysema.  Strep pneumo urinary antigen and Legionella urinary antigen negative.  Blood cultures negative to date.  COVID not detected.  Sputum culture pending.  No other issues per nursing.    Review of Systems   Constitutional: Negative for fatigue and fever.   HENT: Negative for sore throat and trouble swallowing.    Eyes: Negative for pain and discharge.   Respiratory: Positive for cough and shortness of breath.    Cardiovascular: Negative for chest pain and palpitations.   Gastrointestinal: Negative for abdominal pain, nausea and vomiting.   Endocrine: Negative for cold intolerance and heat intolerance.   Genitourinary: Negative for difficulty urinating and dysuria.   Musculoskeletal: Negative for back pain and neck stiffness.   Skin: Negative for color change and rash.   Neurological: Negative for syncope and headaches.   Hematological: Negative for adenopathy.   Psychiatric/Behavioral: Negative for confusion and hallucinations.       Objective   Objective     Vitals:   Temp:  [97.9 °F (36.6 °C)-98.7 °F (37.1 °C)] 98.7 °F (37.1 °C)  Heart Rate:  [] 100  Resp:  [16-24] 18  BP: ()/(61-82) 108/68  Flow (L/min):  [3-4] 3  Physical Exam   Gen. well-developed appearing stated age in no acute distress, thin frail appearing  HEENT: Normocephalic atraumatic moist membranes pupils equal round reactive light, no scleral icterus no conjunctival injection  Cardiovascular: regular rate and rhythm no murmurs rubs or gallops S1-S2, no lower extremity edema appreciated  Pulmonary: Diminished to auscultation bilaterally bilateral expiratory wheezes no rales or rhonchi symmetric chest expansion, unlabored, no conversational dyspnea appreciated, increased AP diameter  Gastrointestinal: Soft nontender nondistended positive bowel sounds all 4 quadrants no rebound or guarding  Musculoskeletal: No clubbing cyanosis, warm and well-perfused, calves soft symmetric nontender bilaterally  Skin: Clean dry without  rashs  Neuro: Cranial nerves II through XII intact grossly no sensorimotor deficits appreciated bilateral upper and lower extremities  Psych: Patient is calm cooperative and appropriate with exam not responding to internal stimuli  : No Sharpe catheter no bladder distention no suprapubic tenderness    Result Review    Result Review:  I have personally reviewed the results from the time of this admission to 9/13/2022 20:41 EDT and agree with these findings:  [x]  Laboratory   LAB RESULTS:      Lab 09/13/22  0845 09/12/22  2114 09/12/22  1656   WBC 7.21  --  8.12   HEMOGLOBIN 16.1  --  16.5   HEMATOCRIT 50.9  --  51.2*   PLATELETS 151  --  159   NEUTROS ABS 5.96  --  6.42   IMMATURE GRANS (ABS) 0.02  --  0.02   LYMPHS ABS 0.50*  --  0.70   MONOS ABS 0.73  --  0.96*   EOS ABS 0.00  --  0.00   MCV 91.9  --  91.1   SED RATE 5  --   --    CRP 3.43*  --   --    PROCALCITONIN 0.04  --   --    LACTATE  --   --  0.8   D DIMER QUANT  --  0.63*  --          Lab 09/13/22  0845 09/12/22  1656   SODIUM 138 138   POTASSIUM 4.3 3.9   CHLORIDE 94* 96*   CO2 38.1* 34.7*   ANION GAP 5.9 7.3   BUN 20 20   CREATININE 0.67* 0.69*   EGFR 106.9 105.9   GLUCOSE 118* 127*   CALCIUM 9.1 9.5         Lab 09/13/22  0845 09/12/22  1656   TOTAL PROTEIN 6.3 6.8   ALBUMIN 4.10 4.20   GLOBULIN 2.2 2.6   ALT (SGPT) 14 13   AST (SGOT) 24 24   BILIRUBIN 0.5 0.6   ALK PHOS 156* 156*         Lab 09/12/22  1656   PROBNP 5,360.0*   TROPONIN T <0.010                 Lab 09/13/22  1120   PH, ARTERIAL 7.356   PCO2, ARTERIAL 79.3*   PO2 ART 65.0*   O2 SATURATION ART 92.5*   FIO2 32   HCO3 ART 43.4*   BASE EXCESS ART 13.2*   CARBOXYHEMOGLOBIN 2.6*     Brief Urine Lab Results     None        Microbiology Results (last 10 days)     Procedure Component Value - Date/Time    S. Pneumo Ag Urine or CSF - Urine, Urine, Clean Catch [[id number]]  (Normal) Collected: 09/13/22 1817    Lab Status: Final result Specimen: Urine, Clean Catch Updated: 09/13/22 2011     Strep  Pneumo Ag Negative    Legionella Antigen, Urine - Urine, Urine, Clean Catch [[id number]]  (Normal) Collected: 09/13/22 1817    Lab Status: Final result Specimen: Urine, Clean Catch Updated: 09/13/22 2010     LEGIONELLA ANTIGEN, URINE Negative    Blood Culture - Blood, Arm, Left [[id number]]  (Normal) Collected: 09/12/22 1752    Lab Status: Preliminary result Specimen: Blood from Arm, Left Updated: 09/13/22 1803     Blood Culture No growth at 24 hours    COVID-19,APTIMA PANTHER(HERLINDA),[facility], NP/OP SWAB IN UTM/VTM/SALINE TRANSPORT MEDIA,24 HR TAT - Swab, Nasal Cavity [[id number]]  (Normal) Collected: 09/12/22 1752    Lab Status: Final result Specimen: Swab from Nasal Cavity Updated: 09/13/22 0121     COVID19 Not Detected    Narrative:      Fact sheet for providers: https://www.fda.gov/media/956817/download     Fact sheet for patients: https://www.fda.gov/media/428473/download    Test performed by RT PCR.    Blood Culture - Blood, Arm, Right [[id number]]  (Normal) Collected: 09/12/22 1656    Lab Status: Preliminary result Specimen: Blood from Arm, Right Updated: 09/13/22 1734     Blood Culture No growth at 24 hours          [x]  Microbiology  [x]  Radiology  CT Chest With Contrast Diagnostic    Result Date: 9/13/2022    CT scan of the chest with IV contrast demonstrating no findings of PE.  Emphysema.  Mild anterior wedging compression fracture of the superior endplate of a single midthoracic vertebral body with faint sclerosis, suggesting healing fracture.      ALAN VARGAS MD       Electronically Signed and Approved By: ALAN VARGAS MD on 9/13/2022 at 20:25               [x]  EKG/Telemetry  SR with brief bursts of supraventricular tachycardia  [x]  Cardiology/Vascular  Echo reviewed as above  []  Pathology  [x]  Old records pulmonology clinic notes  [x]  Other:  Scheduled Meds:arformoterol, 15 mcg, Nebulization, BID - RT  atorvastatin, 10 mg, Oral, Daily  budesonide, 0.5 mg, Nebulization, BID - RT  cefTRIAXone,  1 g, Intravenous, Q24H  enoxaparin, 40 mg, Subcutaneous, Daily  furosemide, 40 mg, Intravenous, Q12H  hydroCHLOROthiazide, 25 mg, Oral, Daily  ipratropium-albuterol, 3 mL, Nebulization, Q6H While Awake - RT  methylPREDNISolone sodium succinate, 40 mg, Intravenous, Q12H  senna-docusate sodium, 2 tablet, Oral, BID  sodium chloride, 10 mL, Intravenous, Q12H      Continuous Infusions:Pharmacy to Dose enoxaparin (LOVENOX),       PRN Meds:.•  acetaminophen **OR** acetaminophen **OR** acetaminophen  •  albuterol  •  senna-docusate sodium **AND** polyethylene glycol **AND** bisacodyl **AND** bisacodyl  •  calcium carbonate  •  melatonin  •  ondansetron **OR** ondansetron  •  Pharmacy to Dose enoxaparin (LOVENOX)  •  sodium chloride  •  sodium chloride  •  sodium chloride  •  sodium chloride      Assessment & Plan   Assessment / Plan     Assessment/Plan:  COPD with acute exacerbation  Acute on chronic hypoxic respiratory failure  Chronic hypercapnic respiratory failure  Volume overload  Acute decompensated diastolic heart failure  Severe pulmonary hypertension  Elevated D-dimer negative for PE on CT scan  Tobacco abuse of cigarettes ongoing  History of COVID-19 in January 2020    Patient admitted for further evaluation treatment  Pulmonology consulted thank you for your assistance  Start Brovana, Pulmicort scheduled  Start DuoNeb scheduled  Start albuterol as needed  Start Solu-Medrol and taper per pulmonology  Start ceftriaxone due to increased periods.  Sputum culture pending  Start bronchopulmonary hygiene protocol  Start bronchodilator protocol  Continue supplemental oxygen titrate keep sats greater than 90%  Start BiPAP at night and as needed per pulmonology  Consult respiratory case management for possible BiPAP versus trilogy at home per pulmonology  Continue furosemide 40 mg IV twice daily  Further work-up pulmonary hypertension per pulmonology  Continue Lovenox for DVT prophylaxis  Patient counseled importance of  tobacco cessation  Will provide nicotine replacement patient  Further inpatient orders recommendations pending clinical course.         Discussed plan with RN as well as pulmonology attending.    DVT prophylaxis:  Medical and mechanical DVT prophylaxis orders are present.    CODE STATUS:   Level Of Support Discussed With: Patient  Code Status (Patient has no pulse and is not breathing): CPR (Attempt to Resuscitate)  Medical Interventions (Patient has pulse or is breathing): Full

## 2022-09-14 NOTE — ANESTHESIA PREPROCEDURE EVALUATION
Anesthesia Evaluation     Patient summary reviewed and Nursing notes reviewed   no history of anesthetic complications:  NPO Solid Status: > 8 hours  NPO Liquid Status: > 2 hours           Airway   Mallampati: I  TM distance: >3 FB  Neck ROM: full  No difficulty expected  Dental      Pulmonary - normal exam    breath sounds clear to auscultation  (+) COPD,   Cardiovascular - normal exam  Exercise tolerance: good (4-7 METS)    Rhythm: regular  Rate: normal    (+) hypertension, CHF , hyperlipidemia,       Neuro/Psych- negative ROS  GI/Hepatic/Renal/Endo - negative ROS     Musculoskeletal (-) negative ROS    Abdominal    Substance History - negative use     OB/GYN negative ob/gyn ROS         Other - negative ROS       ROS/Med Hx Other: PAT Nursing Notes unavailable.                   Anesthesia Plan    ASA 3     general     (Total IV Anesthesia    Patient understands anesthesia not responsible for dental damage.    Pt on oxygen at night  )  intravenous induction     Anesthetic plan, risks, benefits, and alternatives have been provided, discussed and informed consent has been obtained with: patient.    Plan discussed with CRNA.        CODE STATUS:    Level Of Support Discussed With: Patient  Code Status (Patient has no pulse and is not breathing): CPR (Attempt to Resuscitate)  Medical Interventions (Patient has pulse or is breathing): Full

## 2022-09-14 NOTE — PLAN OF CARE
Goal Outcome Evaluation:  Plan of Care Reviewed With: patient           Outcome Evaluation: Patient A/O x 4. Bronch procedure done today and tolerated well. VSS. Family at bedside attentive to patient. No complaints or issues throughout shift. Will continue with the plan of care.

## 2022-09-15 PROBLEM — E43 SEVERE MALNUTRITION: Status: ACTIVE | Noted: 2022-09-15

## 2022-09-15 LAB
ANA SER QL: NEGATIVE
ANION GAP SERPL CALCULATED.3IONS-SCNC: 5.5 MMOL/L (ref 5–15)
BACTERIA SPEC RESP CULT: NORMAL
BUN SERPL-MCNC: 22 MG/DL (ref 8–23)
BUN/CREAT SERPL: 31.9 (ref 7–25)
C-ANCA TITR SER IF: NORMAL TITER
CALCIUM SPEC-SCNC: 9.3 MG/DL (ref 8.6–10.5)
CHLORIDE SERPL-SCNC: 85 MMOL/L (ref 98–107)
CO2 SERPL-SCNC: 49.5 MMOL/L (ref 22–29)
CREAT SERPL-MCNC: 0.69 MG/DL (ref 0.76–1.27)
EGFRCR SERPLBLD CKD-EPI 2021: 105.9 ML/MIN/1.73
ENA SCL70 AB SER-ACNC: <0.2 AI (ref 0–0.9)
GLUCOSE SERPL-MCNC: 109 MG/DL (ref 65–99)
GRAM STN SPEC: NORMAL
P-ANCA ATYPICAL TITR SER IF: NORMAL TITER
P-ANCA TITR SER IF: NORMAL TITER
POTASSIUM SERPL-SCNC: 3.6 MMOL/L (ref 3.5–5.2)
SODIUM SERPL-SCNC: 140 MMOL/L (ref 136–145)
WHOLE BLOOD HOLD SPECIMEN: 11

## 2022-09-15 PROCEDURE — 94799 UNLISTED PULMONARY SVC/PX: CPT

## 2022-09-15 PROCEDURE — 25010000002 METHYLPREDNISOLONE PER 40 MG: Performed by: INTERNAL MEDICINE

## 2022-09-15 PROCEDURE — 25010000002 ENOXAPARIN PER 10 MG: Performed by: INTERNAL MEDICINE

## 2022-09-15 PROCEDURE — 99233 SBSQ HOSP IP/OBS HIGH 50: CPT | Performed by: FAMILY MEDICINE

## 2022-09-15 PROCEDURE — 25010000002 CEFTRIAXONE PER 250 MG: Performed by: INTERNAL MEDICINE

## 2022-09-15 PROCEDURE — 25010000002 FUROSEMIDE PER 20 MG: Performed by: INTERNAL MEDICINE

## 2022-09-15 PROCEDURE — 99233 SBSQ HOSP IP/OBS HIGH 50: CPT | Performed by: INTERNAL MEDICINE

## 2022-09-15 PROCEDURE — 80048 BASIC METABOLIC PNL TOTAL CA: CPT | Performed by: FAMILY MEDICINE

## 2022-09-15 PROCEDURE — 99221 1ST HOSP IP/OBS SF/LOW 40: CPT | Performed by: INTERNAL MEDICINE

## 2022-09-15 RX ORDER — ENOXAPARIN SODIUM 100 MG/ML
40 INJECTION SUBCUTANEOUS DAILY
Status: DISCONTINUED | OUTPATIENT
Start: 2022-09-17 | End: 2022-09-18 | Stop reason: HOSPADM

## 2022-09-15 RX ADMIN — METHYLPREDNISOLONE SODIUM SUCCINATE 40 MG: 40 INJECTION, POWDER, FOR SOLUTION INTRAMUSCULAR; INTRAVENOUS at 05:35

## 2022-09-15 RX ADMIN — IPRATROPIUM BROMIDE AND ALBUTEROL SULFATE 3 ML: 2.5; .5 SOLUTION RESPIRATORY (INHALATION) at 12:04

## 2022-09-15 RX ADMIN — ACETAMINOPHEN 650 MG: 325 TABLET ORAL at 03:12

## 2022-09-15 RX ADMIN — ENOXAPARIN SODIUM 40 MG: 100 INJECTION SUBCUTANEOUS at 09:09

## 2022-09-15 RX ADMIN — FUROSEMIDE 40 MG: 10 INJECTION, SOLUTION INTRAMUSCULAR; INTRAVENOUS at 09:09

## 2022-09-15 RX ADMIN — ATORVASTATIN CALCIUM 10 MG: 10 TABLET, FILM COATED ORAL at 09:09

## 2022-09-15 RX ADMIN — FUROSEMIDE 40 MG: 10 INJECTION, SOLUTION INTRAMUSCULAR; INTRAVENOUS at 17:27

## 2022-09-15 RX ADMIN — CEFTRIAXONE SODIUM 1 G: 1 INJECTION, SOLUTION INTRAVENOUS at 13:04

## 2022-09-15 RX ADMIN — Medication 10 ML: at 20:10

## 2022-09-15 RX ADMIN — ARFORMOTEROL TARTRATE 15 MCG: 15 SOLUTION RESPIRATORY (INHALATION) at 18:47

## 2022-09-15 RX ADMIN — Medication 10 ML: at 09:10

## 2022-09-15 RX ADMIN — METOPROLOL TARTRATE 12.5 MG: 25 TABLET, FILM COATED ORAL at 20:09

## 2022-09-15 RX ADMIN — IPRATROPIUM BROMIDE AND ALBUTEROL SULFATE 3 ML: 2.5; .5 SOLUTION RESPIRATORY (INHALATION) at 07:50

## 2022-09-15 RX ADMIN — METHYLPREDNISOLONE SODIUM SUCCINATE 40 MG: 40 INJECTION, POWDER, FOR SOLUTION INTRAMUSCULAR; INTRAVENOUS at 17:45

## 2022-09-15 RX ADMIN — ARFORMOTEROL TARTRATE 15 MCG: 15 SOLUTION RESPIRATORY (INHALATION) at 07:50

## 2022-09-15 RX ADMIN — HYDROCHLOROTHIAZIDE 25 MG: 25 TABLET ORAL at 09:09

## 2022-09-15 RX ADMIN — BUDESONIDE 0.5 MG: 0.5 SUSPENSION RESPIRATORY (INHALATION) at 07:50

## 2022-09-15 RX ADMIN — BUDESONIDE 0.5 MG: 0.5 SUSPENSION RESPIRATORY (INHALATION) at 18:47

## 2022-09-15 RX ADMIN — IPRATROPIUM BROMIDE AND ALBUTEROL SULFATE 3 ML: 2.5; .5 SOLUTION RESPIRATORY (INHALATION) at 18:47

## 2022-09-15 NOTE — CONSULTS
"Nutrition Services    Patient Name: Sumeet Gomes  YOB: 1960  MRN: [id number]  Admission date: 9/12/2022      CLINICAL NUTRITION ASSESSMENT      Reason for Assessment  Unintentional weight loss   H&P:    Past Medical History:   Diagnosis Date   • COPD (chronic obstructive pulmonary disease) (HCC)         Current Problems:   Active Hospital Problems    Diagnosis    • **Acute respiratory failure with hypoxia and hypercapnia (HCC)    • Acute CHF (HCC)    • Tobacco abuse    • COPD (chronic obstructive pulmonary disease) (HCC)    • Essential hypertension    • Other hyperlipidemia         Nutrition/Diet History         Narrative     RD consult for unintentional wt loss of 40 since January.  In talking with pt & review of chart UBW was 180# last year, however back in 2021 see a wt of only 162#.  Pt has lost 24# since February, a 15.3% clinically significant change.  Pt has hx of COPD, therefore stressed importance of small frequent meals & snacks to meet estimated needs.  NFPE completed showing areas of moderate & severe malnutrition.  Based on wt & NFPE pt meets ASPEN criteria for severe malnutrition.  Pt agreeable to snacks & trial of Boost [facility].  Pt stated had been wanting to trial a supplement just didn't want to pay the cost if didn't like.       Anthropometrics        Current Height, Weight Height: 170.2 cm (67\")  Weight: 60.6 kg (133 lb 9.6 oz)   Current BMI Body mass index is 20.92 kg/m².       Weight Hx  Wt Readings from Last 30 Encounters:   09/12/22 1628 60.6 kg (133 lb 9.6 oz)   06/14/22 1319 63.6 kg (140 lb 3.2 oz)   05/20/22 1431 66.2 kg (146 lb)   04/25/22 1514 66.2 kg (146 lb)   02/11/22 1353 71.4 kg (157 lb 8 oz)   10/25/21 1526 73.8 kg (162 lb 12.8 oz)   04/08/21 1434 74 kg (163 lb 2 oz)   03/09/21 1447 74.7 kg (164 lb 9.6 oz)   10/22/20 1519 75.7 kg (167 lb)   07/15/20 1623 74.7 kg (164 lb 9.6 oz)   02/17/20 1308 74.4 kg (164 lb 2 oz)   01/15/20 1609 75.2 kg (165 lb 12.8 oz)   10/28/19 " 1531 75 kg (165 lb 4 oz)   06/04/19 1556 77.4 kg (170 lb 9.6 oz)   10/29/18 1549 77.6 kg (171 lb 2 oz)   10/03/18 1303 77.5 kg (170 lb 12.8 oz)   01/31/18 1648 80.3 kg (177 lb)   01/22/18 1458 79.9 kg (176 lb 2 oz)            Wt Change Observation 24# wt loss since Feb, 15.3%     Estimated/Assessed Needs       Energy Requirements    EST Needs (kcal/day) 2314 (35 kcal/IBW)       Protein Requirements    EST Daily Needs (g/day) 66-99 (1-1.5)       Fluid Requirements     Estimated Needs (mL/day) 1653     Labs/Medications         Pertinent Labs Reviewed.   Results from last 7 days   Lab Units 09/15/22  0538 09/13/22  0845 09/12/22  1656   SODIUM mmol/L 140 138 138   POTASSIUM mmol/L 3.6 4.3 3.9   CHLORIDE mmol/L 85* 94* 96*   CO2 mmol/L 49.5* 38.1* 34.7*   BUN mg/dL 22 20 20   CREATININE mg/dL 0.69* 0.67* 0.69*   CALCIUM mg/dL 9.3 9.1 9.5   BILIRUBIN mg/dL  --  0.5 0.6   ALK PHOS U/L  --  156* 156*   ALT (SGPT) U/L  --  14 13   AST (SGOT) U/L  --  24 24   GLUCOSE mg/dL 109* 118* 127*     Results from last 7 days   Lab Units 09/13/22  0845   HEMOGLOBIN g/dL 16.1   HEMATOCRIT % 50.9       Pertinent Medications Reviewed.     Current Nutrition Orders & Evaluation of Intake       Oral Nutrition     Current PO Diet Diet Regular; Cardiac   Supplement No active supplement orders       Malnutrition Severity Assessment      Patient meets criteria for : Severe Malnutrition  Malnutrition Type (last 8 hours)     Malnutrition Severity Assessment     Row Name 09/15/22 1131       Malnutrition Severity Assessment    Malnutrition Type Chronic Disease - Related Malnutrition    Row Name 09/15/22 1131       Unintentional Weight Loss     Unintentional Weight Loss Findings Severe    Unintentional Weight Loss  Weight loss greater than 10% in six months    Row Name 09/15/22 1131       Muscle Loss    Loss of Muscle Mass Findings Severe    Pentecostal Region Moderate - slight depression    Clavicle Bone Region Severe - protruding prominent bone     Acromion Bone Region Severe - squared shoulders, bones, and acromion process protrusion prominent    Patellar Region Severe - prominent bone, square looking, very little muscle definition    Anterior Thigh Region Severe - line/depression along thigh, obviously thin    Posterior Calf Region Moderate - some roundness, slight firmness    Row Name 09/15/22 1131       Fat Loss    Subcutaneous Fat Loss Findings Severe    Orbital Region  Severe - pronounced hollowness/depression, dark circles, loose saggy skin    Upper Arm Region Severe - mostly skin, very little space between folds, fingers touch    Row Name 09/15/22 1131       Criteria Met (Must meet criteria for severity in at least 2 of these categories: M Wasting, Fat Loss, Fluid, Secondary Signs, Wt. Status, Intake)    Patient meets criteria for  Severe Malnutrition                 Nutrition Diagnosis         Nutrition Dx Problem 1 Severe malnutrition related to inadequate energy Intake as evidenced by unintended wt change., body composition changes. and patient report.     Nutrition Intervention         Continue cardiac diet  Add Boost [facility] BID (1060 kcal & 22 gm protein)  Snacks between meals at 2 & 8 pm     Medical Nutrition Therapy/Nutrition Education          Learner     Readiness Patient  Acceptance     Method     Response Explanation  Verbalizes understanding     Monitor/Evaluation        Monitor PO intake, Supplement intake, Pertinent labs, Weight       Nutrition Discharge Plan         small frequent meals & snacks; continue oral nutrition supplement     Electronically signed by:  Verona Lira RD  09/15/22 11:33 EDT

## 2022-09-15 NOTE — PLAN OF CARE
Problem: Adult Inpatient Plan of Care  Goal: Plan of Care Review  Outcome: Ongoing, Progressing  Flowsheets (Taken 9/15/2022 1518)  Progress: improving  Plan of Care Reviewed With: patient  Outcome Evaluation:   increased tolerance to activity   ambulates in room on 2-3liters nc   appetite good   plan for npo after mn for right sided heart cath tomorrow   tolerating breathing treatments and antibiotics with no adverse reactions   vital signs remain stable   will cont current treatment plan  Goal: Patient-Specific Goal (Individualized)  Outcome: Ongoing, Progressing  Goal: Absence of Hospital-Acquired Illness or Injury  Outcome: Ongoing, Progressing  Intervention: Identify and Manage Fall Risk  Recent Flowsheet Documentation  Taken 9/15/2022 1400 by Mari Schmid RN  Safety Promotion/Fall Prevention:   safety round/check completed   nonskid shoes/slippers when out of bed  Taken 9/15/2022 1000 by Mari Schmid RN  Safety Promotion/Fall Prevention:   safety round/check completed   nonskid shoes/slippers when out of bed  Intervention: Prevent Skin Injury  Recent Flowsheet Documentation  Taken 9/15/2022 1400 by Mari Schmid RN  Body Position: position changed independently  Taken 9/15/2022 1000 by Mari Schmid RN  Body Position: position changed independently  Intervention: Prevent and Manage VTE (Venous Thromboembolism) Risk  Recent Flowsheet Documentation  Taken 9/15/2022 1400 by Mari Schmid RN  Activity Management: activity adjusted per tolerance  Taken 9/15/2022 1000 by Mari Schmid RN  Activity Management: activity adjusted per tolerance  Range of Motion: active ROM (range of motion) encouraged  Intervention: Prevent Infection  Recent Flowsheet Documentation  Taken 9/15/2022 1400 by Mari Schmid RN  Infection Prevention:   hand hygiene promoted   single patient room provided  Taken 9/15/2022 1000 by Mari Schmid RN  Infection Prevention:   single patient room provided   hand hygiene  promoted  Goal: Optimal Comfort and Wellbeing  Outcome: Ongoing, Progressing  Intervention: Provide Person-Centered Care  Recent Flowsheet Documentation  Taken 9/15/2022 1000 by Mari Schmid RN  Trust Relationship/Rapport:   care explained   questions answered   questions encouraged   reassurance provided  Goal: Readiness for Transition of Care  Outcome: Ongoing, Progressing     Problem: Pain Acute  Goal: Acceptable Pain Control and Functional Ability  Outcome: Ongoing, Progressing  Intervention: Prevent or Manage Pain  Recent Flowsheet Documentation  Taken 9/15/2022 1400 by Mari Schmid RN  Medication Review/Management: medications reviewed  Taken 9/15/2022 1000 by Mari Schmid RN  Medication Review/Management: medications reviewed  Intervention: Optimize Psychosocial Wellbeing  Recent Flowsheet Documentation  Taken 9/15/2022 1000 by Mari Schmid RN  Supportive Measures:   active listening utilized   positive reinforcement provided   self-care encouraged   self-reflection promoted   verbalization of feelings encouraged  Diversional Activities:   smartphone   television     Problem: Adjustment to Illness (Heart Failure)  Goal: Optimal Coping  Outcome: Ongoing, Progressing  Intervention: Support Psychosocial Response  Recent Flowsheet Documentation  Taken 9/15/2022 1000 by Mari Schmid RN  Supportive Measures:   active listening utilized   positive reinforcement provided   self-care encouraged   self-reflection promoted   verbalization of feelings encouraged     Problem: Cardiac Output Decreased (Heart Failure)  Goal: Optimal Cardiac Output  Outcome: Ongoing, Progressing  Intervention: Optimize Cardiac Output  Recent Flowsheet Documentation  Taken 9/15/2022 1000 by Mari Schmid RN  Environmental Support:   calm environment promoted   rest periods encouraged     Problem: Dysrhythmia (Heart Failure)  Goal: Stable Heart Rate and Rhythm  Outcome: Ongoing, Progressing     Problem: Fluid Imbalance  (Heart Failure)  Goal: Fluid Balance  Outcome: Ongoing, Progressing     Problem: Functional Ability Impaired (Heart Failure)  Goal: Optimal Functional Ability  Outcome: Ongoing, Progressing  Intervention: Optimize Functional Ability  Recent Flowsheet Documentation  Taken 9/15/2022 1400 by Mari Schmid RN  Activity Management: activity adjusted per tolerance  Taken 9/15/2022 1000 by Mari Schmid RN  Activity Management: activity adjusted per tolerance     Problem: Oral Intake Inadequate (Heart Failure)  Goal: Optimal Nutrition Intake  Outcome: Ongoing, Progressing     Problem: Respiratory Compromise (Heart Failure)  Goal: Effective Oxygenation and Ventilation  Outcome: Ongoing, Progressing  Intervention: Promote Airway Secretion Clearance  Recent Flowsheet Documentation  Taken 9/15/2022 1000 by Mari Schmid RN  Breathing Techniques/Airway Clearance: deep/controlled cough encouraged  Cough And Deep Breathing: done independently per patient  Intervention: Optimize Oxygenation and Ventilation  Recent Flowsheet Documentation  Taken 9/15/2022 1000 by Mari Schmid RN  Airway/Ventilation Management: airway patency maintained     Problem: Sleep Disordered Breathing (Heart Failure)  Goal: Effective Breathing Pattern During Sleep  Outcome: Ongoing, Progressing   Goal Outcome Evaluation:  Plan of Care Reviewed With: patient        Progress: improving  Outcome Evaluation: increased tolerance to activity; ambulates in room on 2-3liters nc; appetite good; plan for npo after mn for right sided heart cath tomorrow; tolerating breathing treatments and antibiotics with no adverse reactions; vital signs remain stable; will cont current treatment plan

## 2022-09-15 NOTE — PROGRESS NOTES
[facility]   Hospitalist Progress Note  Date: 9/15/2022  Patient Name: Sumeet Gomes  : 1960  MRN: [id number]  Date of admission: 2022      Subjective   Subjective     Chief Complaint: Shortness of breath    Summary: Sumeet Gomes is a 61 y.o. old male patient who presents with progressive shortness of breath over the past 2 to 3 days.  History of hypertension, chronic obstructive pulmonary disease, hyperlipidemia, chronic tobacco abuse.     Patient presents from home, reports increasing shortness of breath over the past few days.  He reports he wears oxygen at night when he sleeps.  He monitors his O2 sats at home, reports that he is normally in the mid 70s on home pulse oximetry.  He reports over the weekend that his pulse ox dropped into the 50s and he had increasing shortness of breath.  He reports developing lower extremity edema over the past several months.  Denies orthopnea.  Denies fevers or chills.  Reports cough productive of yellow sputum.     ED course: In ED , RR 28, oxygen saturation 67% on room air.  Bicarbonate 35, BNP 5360.  Normal troponin.  Chest x-ray also normal.  Started on furosemide, duo nebs.  ABG demonstrated pH 7.35, PCO2 79, PO2 65, bicarb 43 on 3 L nasal cannula.  Patient's pulmonology group consulted. Echocardiogram demonstrated normal left ventricular ejection fraction with elevated right ventricular systolic pressures greater than 55 mmHg.  CT of the chest reviewed and negative for PE.  Did demonstrate findings consistent with emphysema. Bronchoscopy performed demonstrated mucous plugging in the trachea and bilateral bronchial tubes left more than right with scattered purulent secretions and friable airway.  BAL PCR positive for strep pneumo and haemophilus influenza as well as rhinovirus. Cardiology consulted for right heart cath.  Home NIPPV arranged by respiratory case management.    Interval Followup: Patient sitting up resting comfortably in bed.   Patient states overall he is feeling about the same.  States cough has become less productive. Afebrile overnight.  Sinus rhythm 90s with an 11 beat burst of  V. tach on telemetry review.  Blood pressure within normal limits.  Requiring 5 L nasal cannula to keep sats greater than 90%.  Tolerated bipap some overnight.  Bicarb trending up.  Blood cultures negative to date.   Sputum culture growing normal respiratory jessica on prelim results.  No other issues per nursing.    Review of Systems   Constitutional: Negative for fatigue and fever.   HENT: Negative for sore throat and trouble swallowing.    Eyes: Negative for pain and discharge.   Respiratory: Positive for cough and shortness of breath.    Cardiovascular: Negative for chest pain and palpitations.   Gastrointestinal: Negative for abdominal pain, nausea and vomiting.   Endocrine: Negative for cold intolerance and heat intolerance.   Genitourinary: Negative for difficulty urinating and dysuria.   Musculoskeletal: Negative for back pain and neck stiffness.   Skin: Negative for color change and rash.   Neurological: Negative for syncope and headaches.   Hematological: Negative for adenopathy.   Psychiatric/Behavioral: Negative for confusion and hallucinations.       Objective   Objective     Vitals:   Temp:  [97.9 °F (36.6 °C)-98.5 °F (36.9 °C)] 98.5 °F (36.9 °C)  Heart Rate:  [] 101  Resp:  [18-20] 20  BP: (110-123)/(68-84) 123/71  Flow (L/min):  [3-5] 5  Physical Exam   Gen. well-developed appearing stated age in no acute distress, thin frail appearing  HEENT: Normocephalic atraumatic moist membranes pupils equal round reactive light, no scleral icterus no conjunctival injection  Cardiovascular: regular rate and rhythm no murmurs rubs or gallops S1-S2, no lower extremity edema appreciated  Pulmonary: Diminished to auscultation bilaterally bilateral expiratory wheezes no rales or rhonchi symmetric chest expansion, unlabored, no conversational dyspnea  appreciated, increased AP diameter  Gastrointestinal: Soft nontender nondistended positive bowel sounds all 4 quadrants no rebound or guarding  Musculoskeletal: No clubbing cyanosis, warm and well-perfused, calves soft symmetric nontender bilaterally  Skin: Clean dry without rashs  Neuro: Cranial nerves II through XII intact grossly no sensorimotor deficits appreciated bilateral upper and lower extremities  Psych: Patient is calm cooperative and appropriate with exam not responding to internal stimuli  : No Sharpe catheter no bladder distention no suprapubic tenderness    Result Review    Result Review:  I have personally reviewed the results from the time of this admission to 9/15/2022 18:26 EDT and agree with these findings:  [x]  Laboratory   LAB RESULTS:      Lab 09/13/22  0845 09/12/22  2114 09/12/22  1656   WBC 7.21  --  8.12   HEMOGLOBIN 16.1  --  16.5   HEMATOCRIT 50.9  --  51.2*   PLATELETS 151  --  159   NEUTROS ABS 5.96  --  6.42   IMMATURE GRANS (ABS) 0.02  --  0.02   LYMPHS ABS 0.50*  --  0.70   MONOS ABS 0.73  --  0.96*   EOS ABS 0.00  --  0.00   MCV 91.9  --  91.1   SED RATE 5  --   --    CRP 3.43*  --   --    PROCALCITONIN 0.04  --   --    LACTATE  --   --  0.8   D DIMER QUANT  --  0.63*  --          Lab 09/15/22  0538 09/13/22  0845 09/12/22  1656   SODIUM 140 138 138   POTASSIUM 3.6 4.3 3.9   CHLORIDE 85* 94* 96*   CO2 49.5* 38.1* 34.7*   ANION GAP 5.5 5.9 7.3   BUN 22 20 20   CREATININE 0.69* 0.67* 0.69*   EGFR 105.9 106.9 105.9   GLUCOSE 109* 118* 127*   CALCIUM 9.3 9.1 9.5         Lab 09/13/22  0845 09/12/22  1656   TOTAL PROTEIN 6.3 6.8   ALBUMIN 4.10 4.20   GLOBULIN 2.2 2.6   ALT (SGPT) 14 13   AST (SGOT) 24 24   BILIRUBIN 0.5 0.6   ALK PHOS 156* 156*         Lab 09/12/22  1656   PROBNP 5,360.0*   TROPONIN T <0.010                 Lab 09/14/22  0651 09/13/22  1120   PH, ARTERIAL 7.409 7.356   PCO2, ARTERIAL 77.0* 79.3*   PO2 ART 65.3* 65.0*   O2 SATURATION ART 93.3* 92.5*   FIO2 30 32   HCO3  ART 47.6* 43.4*   BASE EXCESS ART 17.9* 13.2*   CARBOXYHEMOGLOBIN 1.7* 2.6*     Brief Urine Lab Results     None        Microbiology Results (last 10 days)     Procedure Component Value - Date/Time    Respiratory Culture - Wash, Bronchus [[id number]] Collected: 09/14/22 1023    Lab Status: Preliminary result Specimen: Wash from Bronchus Updated: 09/15/22 0944     Respiratory Culture Light growth (2+) The culture consists of normal respiratory jessica. This is a preliminary report; final report to follow.     Gram Stain Few (2+) WBCs seen      No organisms seen    AFB Culture - Lavage, Lung, Left Lower Lobe [[id number]] Collected: 09/14/22 1016    Lab Status: Preliminary result Specimen: Lavage from Lung, Left Lower Lobe Updated: 09/15/22 1144     AFB Stain No acid fast bacilli seen on direct smear      No acid fast bacilli seen on concentrated smear    BAL Culture, Quantitative - Lavage, Lung, Left Lower Lobe [[id number]] Collected: 09/14/22 1016    Lab Status: Preliminary result Specimen: Lavage from Lung, Left Lower Lobe Updated: 09/15/22 0954     BAL Culture <25,000 CFU/mL The culture consists of normal respiratory jessica. This is a preliminary report; final report to follow.     Gram Stain Many (4+) WBCs seen      Rare (1+) Yeast      Rare (1+) Fungal elements    Pneumonia Panel - Lavage, Lung, Left Lower Lobe [[id number]]  (Abnormal) Collected: 09/14/22 1016    Lab Status: Final result Specimen: Lavage from Lung, Left Lower Lobe Updated: 09/14/22 1339     Escherichia coli PCR Not Detected     Acinetobacter calcoaceticus-baumannii complex PCR Not Detected     Enterobacter cloacae PCR Not Detected     Klebsiella oxytoca PCR Not Detected     Klebsiella pneumoniae group PCR Not Detected     Klebsiella aerogenes PCR Not Detected     Moraxella catarrhalis PCR Not Detected     Proteus species PCR Not Detected     Pseudomonas aeroginosa PCR Not Detected     Serratia marcescens PCR Not Detected     Staphylococcus aureus PCR  Not Detected     Streptococcus pyogenes PCR Not Detected     Haemophilus influenzae PCR Detected     Comment: 10^5 Bin copies/mL        Streptococcus agalactiae PCR Not Detected     Streptococcus pneumoniae PCR Detected     Comment: 10^6 Bin copies/mL        Chlamydophila pneumoniae PCR Not Detected     Legionella pneumophilia PCR Not Detected     Mycoplasma pneumo by PCR Not Detected     ADENOVIRUS, PCR Not Detected     CTX-M Gene N/A     IMP Gene N/A     KPC Gene N/A     mecA/C and MREJ Gene N/A     NDM Gene N/A     OXA-48-like Gene N/A     VIM Gene N/A     Coronavirus Not Detected     Human Metapneumovirus Not Detected     Human Rhinovirus/Enterovirus Detected     Influenza A PCR Not Detected     Influenza B PCR Not Detected     RSV, PCR Not Detected     Parainfluenza virus PCR Not Detected    Respiratory Culture - Sputum, Cough [[id number]] Collected: 09/13/22 1852    Lab Status: Final result Specimen: Sputum from Cough Updated: 09/15/22 0936     Respiratory Culture Moderate growth (3+) Normal respiratory jessica. No S. aureus or Pseudomonas aeruginosa detected. Final report.     Gram Stain Less than 10 Epithelial cells per low power field      Greater than 25 WBCs per low power field      Rare (1+) Gram positive cocci in pairs and clusters    S. Pneumo Ag Urine or CSF - Urine, Urine, Clean Catch [[id number]]  (Normal) Collected: 09/13/22 1817    Lab Status: Final result Specimen: Urine, Clean Catch Updated: 09/13/22 2011     Strep Pneumo Ag Negative    Legionella Antigen, Urine - Urine, Urine, Clean Catch [[id number]]  (Normal) Collected: 09/13/22 1817    Lab Status: Final result Specimen: Urine, Clean Catch Updated: 09/13/22 2010     LEGIONELLA ANTIGEN, URINE Negative    Blood Culture - Blood, Arm, Left [[id number]]  (Normal) Collected: 09/12/22 1752    Lab Status: Preliminary result Specimen: Blood from Arm, Left Updated: 09/15/22 1802     Blood Culture No growth at 3 days    COVID-19,APTIMA PANTHER(HERLINDA),[facility], NP/OP SWAB IN UTM/VTM/SALINE TRANSPORT MEDIA,24 HR TAT - Swab, Nasal Cavity [[id number]]  (Normal) Collected: 09/12/22 1752    Lab Status: Final result Specimen: Swab from Nasal Cavity Updated: 09/13/22 0121     COVID19 Not Detected    Narrative:      Fact sheet for providers: https://www.fda.gov/media/436342/download     Fact sheet for patients: https://www.fda.gov/media/058004/download    Test performed by RT PCR.    Blood Culture - Blood, Arm, Right [[id number]]  (Normal) Collected: 09/12/22 1656    Lab Status: Preliminary result Specimen: Blood from Arm, Right Updated: 09/15/22 1733     Blood Culture No growth at 3 days          [x]  Microbiology  [x]  Radiology  CT Chest With Contrast Diagnostic    Result Date: 9/13/2022    CT scan of the chest with IV contrast demonstrating no findings of PE.  Emphysema.  Mild anterior wedging compression fracture of the superior endplate of a single midthoracic vertebral body with faint sclerosis, suggesting healing fracture.      ALAN VARGAS MD       Electronically Signed and Approved By: ALAN VARGAS MD on 9/13/2022 at 20:25               [x]  EKG/Telemetry   [x]  Cardiology/Vascular  Echo reviewed as above  []  Pathology  [x]  Old records pulmonology clinic notes  [x]  Other:  Scheduled Meds:arformoterol, 15 mcg, Nebulization, BID - RT  atorvastatin, 10 mg, Oral, Daily  budesonide, 0.5 mg, Nebulization, BID - RT  cefTRIAXone, 1 g, Intravenous, Q24H  enoxaparin, 40 mg, Subcutaneous, Daily  furosemide, 40 mg, Intravenous, BID  hydroCHLOROthiazide, 25 mg, Oral, Daily  ipratropium-albuterol, 3 mL, Nebulization, Q6H While Awake - RT  methylPREDNISolone sodium succinate, 40 mg, Intravenous, Q12H  senna-docusate sodium, 2 tablet, Oral, BID  sodium chloride, 10 mL, Intravenous, Q12H      Continuous Infusions:Pharmacy to Dose enoxaparin (LOVENOX),       PRN Meds:.•  acetaminophen **OR** acetaminophen **OR** acetaminophen  •  albuterol  •  senna-docusate sodium **AND**  polyethylene glycol **AND** bisacodyl **AND** bisacodyl  •  calcium carbonate  •  melatonin  •  nicotine  •  nicotine polacrilex  •  ondansetron **OR** ondansetron  •  Pharmacy to Dose enoxaparin (LOVENOX)  •  sodium chloride  •  sodium chloride  •  sodium chloride  •  sodium chloride      Assessment & Plan   Assessment / Plan     Assessment/Plan:  COPD with acute exacerbation  Acute on chronic hypoxic respiratory failure  Mucous plugging of the airway  Chronic hypercapnic respiratory failure  Volume overload  Acute decompensated diastolic heart failure  Severe pulmonary hypertension  Elevated D-dimer negative for PE on CT scan  Tobacco abuse of cigarettes ongoing  History of COVID-19 in January 2020  Nonsustained V. tach    Patient admitted for further evaluation treatment  Pulmonology and cardiology consulted thank you for your assistance  Continue Brovana, Pulmicort scheduled  Continue DuoNeb scheduled  Continue albuterol as needed  Continue Solu-Medrol and taper per pulmonology  Continue ceftriaxone due to increased periods.  Sputum culture pending  Continue bronchopulmonary hygiene protocol  Continue bronchodilator protocol  Continue supplemental oxygen titrate keep sats greater than 90%  Continue BiPAP at night and as needed per pulmonology  Consult respiratory case management for NIPPV at home and arrange outpatient PFTs per pulmonology  Continue furosemide 40 mg IV twice daily  Cardiology consulted for right heart cath tomorrow to better evaluate pulmonary hypertension and suitability for vasodilator trial  Further work-up pulmonary hypertension per pulmonology and cardiology  Continue Lovenox for DVT prophylaxis hold right heart cath tomorrow  Patient counseled importance of tobacco cessation  Will provide nicotine replacement patient  Start low-dose metoprolol due to nonsustained V. tach  Further inpatient orders recommendations pending clinical course.         Discussed plan with RN as well as pulmonology  and cardiology attendings.    DVT prophylaxis:  Medical and mechanical DVT prophylaxis orders are present.    CODE STATUS:   Level Of Support Discussed With: Patient  Code Status (Patient has no pulse and is not breathing): CPR (Attempt to Resuscitate)  Medical Interventions (Patient has pulse or is breathing): Full

## 2022-09-15 NOTE — CONSULTS
[facility]   Cardiology Consult Note    Patient Name: Sumeet Gomes  : 1960  MRN: [id number]  Primary Care Physician:  Mirtha Alexander APRN  Referring Physician: Paco Covington MD  Date of admission: 2022    Subjective   Subjective     Reason for Consultation : Pulmonary hypertension, evaluate for right heart cath    Chief Complaint : Shortness of breath    HPI:  Sumeet Gomes is a 61 y.o. male with COPD, hyperlipidemia, tobacco use and hypertension who was admitted to the hospital on 2022 when he presented with shortness of breath.  On admission he was hypoxic and hypercarbic with an elevated proBNP as well.  He was started on treatment IV diuretics along with bronchodilators.  Subsequent bronchoscopy was performed which demonstrated mucous plugging.  BAL PCR is positive for strep pneumo and haemophilus influenza as well as rhinovirus.  An echocardiogram was performed which showed moderate to severe pulm hypertension.  Cardiology is consulted to evaluate the patient for right heart catheterization with vasodilator challenge.    Patient reports minimal improvement in shortness of breath since admission.  He is comfortable at rest on oxygen however gets significant shortness of breath on any activities.  He denies having any chest pain.  Denies palpitations.  Pedal edema completely subsided since admission.  He has no history of any cardiac illness.    Review of Systems   All systems were reviewed and negative except for shortness of breath, cough, fatigue and weakness.    Personal History     Past Medical History:   Diagnosis Date   • COPD (chronic obstructive pulmonary disease) (HCC)    Hyperlipidemia      Family History: family history includes Asthma in his mother; Emphysema in his mother; Stroke in his mother. Otherwise pertinent FHx was reviewed and not pertinent to current issue.    Social History:  reports that he has been smoking cigarettes. He started smoking about 47  years ago. He has a 23.50 pack-year smoking history. He has never used smokeless tobacco. He reports previous alcohol use. He reports that he does not use drugs.    Home Medications:  albuterol sulfate HFA, fluticasone, hydroCHLOROthiazide, ipratropium-albuterol, simvastatin, and umeclidinium-vilanterol    Allergies:  No Known Allergies    Objective    Objective     Vitals:   Temp:  [97.9 °F (36.6 °C)-98.5 °F (36.9 °C)] 98.5 °F (36.9 °C)  Heart Rate:  [] 101  Resp:  [18-20] 20  BP: (110-123)/(68-84) 123/71  Flow (L/min):  [3-5] 5      Physical Exam:   Constitutional: Awake, alert, No acute distress    Eyes: PERRLA, sclerae anicteric, no conjunctival injection   HENT: NCAT, mucous membranes moist   Neck: Supple, no thyromegaly, no lymphadenopathy, trachea midline   Respiratory: Bilateral extensive wheezing heard, no crackles   Cardiovascular: RRR, no murmurs, rubs, or gallops, palpable pedal pulses bilaterally   Gastrointestinal: Positive bowel sounds, soft, nontender, nondistended   Musculoskeletal: No bilateral ankle edema, no clubbing or cyanosis to extremities   Psychiatric: Appropriate affect, cooperative   Neurologic: Oriented x 3, strength symmetric in all extremities, Cranial Nerves grossly intact to confrontation, speech clear   Skin: No rashes     Result Review    Result Review:  I have personally reviewed the results from the time of this admission to 9/15/2022 16:47 EDT and agree with these findings:  [x]  Laboratory  []  Microbiology  [x]  Radiology  [x]  EKG/Telemetry   [x]  Cardiology/Vascular   []  Pathology  [x]  Old records  []  Other:  Most notable findings include:     CMP    CMP 9/12/22 9/13/22 9/15/22   Glucose 127 (A) 118 (A) 109 (A)   BUN 20 20 22   Creatinine 0.69 (A) 0.67 (A) 0.69 (A)   Sodium 138 138 140   Potassium 3.9 4.3 3.6   Chloride 96 (A) 94 (A) 85 (A)   Calcium 9.5 9.1 9.3   Albumin 4.20 4.10    Total Bilirubin 0.6 0.5    Alkaline Phosphatase 156 (A) 156 (A)    AST (SGOT) 24  24    ALT (SGPT) 13 14    (A) Abnormal value             CBC    CBC 6/29/22 9/12/22 9/13/22   WBC 6.49 8.12 7.21   RBC 6.13 (A) 5.62 5.54   Hemoglobin 17.4 16.5 16.1   Hematocrit 51.4 (A) 51.2 (A) 50.9   MCV 83.8 91.1 91.9   MCH 28.4 29.4 29.1   MCHC 33.9 32.2 31.6   RDW 13.7 14.9 14.4   Platelets 243 159 151   (A) Abnormal value             Lab Results   Component Value Date    TROPONINT <0.010 09/12/2022         EKG done at the time of admission showed sinus rhythm, normal axis, right ventricular hypertrophy, borderline T wave normalities 90 leads, abnormal EKG    Results for orders placed during the hospital encounter of 09/12/22    Adult Transthoracic Echo Complete W/ Cont if Necessary Per Protocol    Interpretation Summary  · Calculated left ventricular EF = 56% Estimated left ventricular EF was in agreement with the calculated left ventricular EF.  · The right ventricular cavity is moderate to severely dilated.  · The right atrial cavity is moderately dilated.  · Moderate tricuspid valve regurgitation is present.  · Estimated right ventricular systolic pressure from tricuspid regurgitation is markedly elevated (>55 mmHg).  · Moderate to severe pulmonary hypertension is present.      Assessment & Plan   Assessment / Plan     Brief Patient Summary:  Sumeet Gomes is a 61 y.o. male with COPD, hyperlipidemia, tobacco use and hypertension who was admitted to the hospital on 9/12/2022 with shortness of breath.  Currently being treated for acute on chronic hypoxic respiratory failure, COPD exacerbation, diastolic heart failure and pulmonary hypertension.  BAL cultures growing H. influenzae and strep pneumo    Active Hospital Problems:  Active Hospital Problems    Diagnosis    • **Acute respiratory failure with hypoxia and hypercapnia (HCC)    • Severe malnutrition (HCC)    • Acute CHF (HCC)    • Tobacco abuse    • COPD (chronic obstructive pulmonary disease) (HCC)    • Essential hypertension    • Other  hyperlipidemia    Pulmonary hypertension  Chronic diastolic heart failure      Plan:     It is reasonable to proceed with right heart catheterization and vasodilator challenge if indicated for further evaluation of pulmonary hypertension.  The risks, benefits and alternatives the test were explained detail to the patient and spouse and they agreed to proceed.    We will keep n.p.o. after midnight  Hold morning dose of Lovenox    Continue IV diuretics  Check BNP, BMP a.m.    Electronically signed by Drew Rodriguez MD, 09/15/22, 4:47 PM EDT.

## 2022-09-15 NOTE — PROGRESS NOTES
Pulmonary / Critical Care Progress Note      Patient Name: Sumeet Gomes  : 1960  MRN: [id number]  Primary Care Physician:  Mirtha Alexander APRN  Date of admission: 2022    Subjective   Subjective   Follow-up for acute exacerbation of COPD.    Over past 24 hours, continues on steroids, nebulizers, and antibiotics. Had bronchoscopy with muchs plug removal. Remained on lasix 40 iv bid.     No acute events overnight.     This morning,   Lying in bed on 2 L nasal cannula  Feels breathing is some better.   Cough and phlegm improved.   Wheezing about the same.   Did wear bipap with sleep.   Diuresing well.  No chest pain  No fever or chills  Weak and fatigued    Review of Systems  General: Fatigue, unintentional weight loss 40 pounds in last 8 months, otherwise denied complaints  HEENT: Denied complaints  Respiratory: Dyspnea, wheezing, cough with yellow sputum production, otherwise denied complaints  Cardiovascular: AGUILAR, leg swelling, orthopnea, otherwise denied complaints  GI: Denied complaints  : Denied complaints  MSK: Weakness, otherwise denied complaints    Objective   Objective     Vitals:   Temp:  [97.5 °F (36.4 °C)-98.4 °F (36.9 °C)] 98 °F (36.7 °C)  Heart Rate:  [] 81  Resp:  [18-26] 18  BP: ()/(60-84) 118/84  Flow (L/min):  [3-6] 4    Physical Exam   Vital Signs Reviewed   General: Thin cachetic male, Awake and Alert, NAD on 2 L NC    HEENT:  PERRL, EOMI.  OP, nares clear, no sinus tenderness  Neck:  Supple, no JVD, no thyromegaly  Lymph: no axillary, cervical, supraclavicular lymphadenopathy noted bilaterally  Chest: barrel chested, poor aeration, coarse wheezing bilaterally, tympanic to percussion bilaterally, increased work of breathing noted, pursed lip breathing  CV: RRR, no MGR, pulses 2+, equal  Abd:  Soft, NT, ND, + BS, no HSM  EXT:  no clubbing, no cyanosis, trace BLE edema, no joint tenderness  Neuro:  A&Ox3, CN grossly intact, no focal deficits  Skin: No rashes or  lesions noted    Result Review    Result Review:  I have personally reviewed the results from the time of this admission to 9/15/2022 07:22 EDT and agree with these findings:  [x]  Laboratory  [x]  Microbiology  [x]  Radiology  [x]  EKG/Telemetry   []  Cardiology/Vascular   []  Pathology  [x]  Old records  []  Other:  Most notable findings include:  CRP 3.43, sed rate 5, Pro-Saw 0.04  Pending rheumatoid and lupus work-up    9/14 0650 ABG 7.40, 77, 65.3, 47.6 on BiPAP FiO2 30%  9/13 1120 ABG 7.35, 79.3, 65.0, 43.4 on 3 L nasal cannula    9/13 CT chest with contrast revealed no PE, emphysema changes    9/13 sputum culture pending  Strep and Legionella negative  Blood cultures no growth at 24 hours  COVID negative    Assessment & Plan   Assessment / Plan     Active Hospital Problems:  Active Hospital Problems    Diagnosis    • **Acute respiratory failure with hypoxia and hypercapnia (HCC)    • Acute CHF (HCC)    • Tobacco abuse    • COPD (chronic obstructive pulmonary disease) (HCC)    • Essential hypertension    • Other hyperlipidemia      Impression:   Acute on chronic hypoxic respiratory failure: 2 L nasal cannula at home  Acute hypercapnic respiratory failure  Acute exacerbation of COPD  Volume overload  Acute decompensation of heart failure  Severe pulmonary hypertension: RVSP >55  Elevated D-dimer  Current tobacco abuse of cigarettes  History of COVID January 2022    Plan:   Continues to have coarse wheezing.    S/p bronchoscopy with mucus plug removal.   Growing H influenzae and Strep pneumo.   Continue to wean O2 to keep sats greater than 90%.  Baseline home O2 requirement is 2 L nasal cannula.  Continue NIPPV 14/7 at night and as needed days. RT  consult. Would need bipap with sleep at home.   Continue Solu-medrol 40 mg IV BID.  Continue Lasix 40 mg IV twice daily.  Trend renal panel and electrolytes.  Echocardiogram with EF 56%, severely dilated right ventricle, moderate tricuspid valve  regurgitation, RVSP >55.Concerns for severe pulmonary hypertension. Will need work-up.  Pending HIV, IgE, IgG subclasses, work-up for lupus and schleroderma, rheumatoid factor.  Cardiology consult for posible right heart cath.   Infectious work-up negative to date. Continue ceftriaxone.  Can de-escalate based on cultures.  Continue Brovana, Pulmicort, and DuoNebs.  Continue bronchopulmonary hygiene.  Add I-S and flutter valve.  Continue Lovenox for DVT prophylaxis.  Encourage activity.  Out of bed to chair.  Will consult RT  for NIPPV set up for home and schedule outpatient PFTs.     DVT prophylaxis:  Medical and mechanical DVT prophylaxis orders are present.    CODE STATUS:   Level Of Support Discussed With: Patient  Code Status (Patient has no pulse and is not breathing): CPR (Attempt to Resuscitate)  Medical Interventions (Patient has pulse or is breathing): Full    I personally reviewed pertinent labs, imaging and provider notes.   Discussed with bedside nurse and will discuss with primary service.       Electronically signed by Ulices Moya MD, 9/15/2022, 07:22 EDT.

## 2022-09-16 PROBLEM — I27.20 PULMONARY HYPERTENSION: Status: ACTIVE | Noted: 2022-09-12

## 2022-09-16 LAB
ANION GAP SERPL CALCULATED.3IONS-SCNC: ABNORMAL MMOL/L
BACTERIA SPEC AEROBE CULT: NORMAL
BACTERIA SPEC RESP CULT: NORMAL
BASE EXCESS BLDV CALC-SCNC: 10.8 MMOL/L (ref -2–2)
BASE EXCESS BLDV CALC-SCNC: 14.2 MMOL/L (ref -2–2)
BDY SITE: ABNORMAL
BDY SITE: ABNORMAL
BUN SERPL-MCNC: 35 MG/DL (ref 8–23)
BUN/CREAT SERPL: 52.2 (ref 7–25)
CALCIUM SPEC-SCNC: 9.9 MG/DL (ref 8.6–10.5)
CCP IGA+IGG SERPL IA-ACNC: 1 UNITS (ref 0–19)
CHLORIDE SERPL-SCNC: 81 MMOL/L (ref 98–107)
CO2 SERPL-SCNC: >50 MMOL/L (ref 22–29)
COHGB MFR BLD: 0.7 % (ref 0–1.5)
COHGB MFR BLD: 0.7 % (ref 0–1.5)
CREAT SERPL-MCNC: 0.67 MG/DL (ref 0.76–1.27)
EGFRCR SERPLBLD CKD-EPI 2021: 106.9 ML/MIN/1.73
FHHB: 17.4 % (ref 0–5)
FHHB: 18 % (ref 0–5)
GLUCOSE SERPL-MCNC: 140 MG/DL (ref 65–99)
GRAM STN SPEC: NORMAL
HCO3 BLDV-SCNC: 42.9 MMOL/L (ref 22–26)
HCO3 BLDV-SCNC: 47.5 MMOL/L (ref 22–26)
HGB BLDA-MCNC: 16.7 G/DL (ref 13.8–16.4)
HGB BLDA-MCNC: 17.4 G/DL (ref 13.8–16.4)
IGG SERPL-MCNC: 880 MG/DL (ref 603–1613)
IGG1 SER-MCNC: 459 MG/DL (ref 248–810)
IGG2 SER-MCNC: 260 MG/DL (ref 130–555)
IGG3 SER-MCNC: 110 MG/DL (ref 15–102)
IGG4 SER-MCNC: 71 MG/DL (ref 2–96)
INHALED O2 CONCENTRATION: 28 %
INHALED O2 CONCENTRATION: 28 %
METHGB BLD QL: 0.3 % (ref 0–1.5)
METHGB BLD QL: 0.3 % (ref 0–1.5)
MODALITY: ABNORMAL
MODALITY: ABNORMAL
NOTE: ABNORMAL
NOTE: ABNORMAL
NT-PROBNP SERPL-MCNC: 926.1 PG/ML (ref 0–900)
OXYHGB MFR BLDV: 81 % (ref 94–99)
OXYHGB MFR BLDV: 81.6 % (ref 94–99)
PCO2 BLDV: 103.7 MM HG (ref 41–51)
PCO2 BLDV: 94.4 MM HG (ref 41–51)
PH BLDV: 7.28 PH UNITS (ref 7.31–7.41)
PH BLDV: 7.28 PH UNITS (ref 7.31–7.41)
PO2 BLDV: 47.9 MM HG (ref 35–42)
PO2 BLDV: 50.2 MM HG (ref 35–42)
POTASSIUM SERPL-SCNC: 4 MMOL/L (ref 3.5–5.2)
SAO2 % BLDCOV: 81.8 % (ref 45–75)
SAO2 % BLDCOV: 82.4 % (ref 45–75)
SODIUM SERPL-SCNC: 139 MMOL/L (ref 136–145)

## 2022-09-16 PROCEDURE — 25010000002 METHYLPREDNISOLONE PER 40 MG: Performed by: INTERNAL MEDICINE

## 2022-09-16 PROCEDURE — 80048 BASIC METABOLIC PNL TOTAL CA: CPT | Performed by: FAMILY MEDICINE

## 2022-09-16 PROCEDURE — 82820 HEMOGLOBIN-OXYGEN AFFINITY: CPT | Performed by: INTERNAL MEDICINE

## 2022-09-16 PROCEDURE — C1769 GUIDE WIRE: HCPCS | Performed by: INTERNAL MEDICINE

## 2022-09-16 PROCEDURE — 99233 SBSQ HOSP IP/OBS HIGH 50: CPT | Performed by: INTERNAL MEDICINE

## 2022-09-16 PROCEDURE — 93451 RIGHT HEART CATH: CPT | Performed by: INTERNAL MEDICINE

## 2022-09-16 PROCEDURE — 99233 SBSQ HOSP IP/OBS HIGH 50: CPT | Performed by: FAMILY MEDICINE

## 2022-09-16 PROCEDURE — C1894 INTRO/SHEATH, NON-LASER: HCPCS | Performed by: INTERNAL MEDICINE

## 2022-09-16 PROCEDURE — 94799 UNLISTED PULMONARY SVC/PX: CPT

## 2022-09-16 PROCEDURE — 25010000002 CEFTRIAXONE PER 250 MG: Performed by: INTERNAL MEDICINE

## 2022-09-16 PROCEDURE — 83880 ASSAY OF NATRIURETIC PEPTIDE: CPT | Performed by: INTERNAL MEDICINE

## 2022-09-16 PROCEDURE — 25010000002 ONDANSETRON PER 1 MG: Performed by: INTERNAL MEDICINE

## 2022-09-16 PROCEDURE — 99232 SBSQ HOSP IP/OBS MODERATE 35: CPT | Performed by: INTERNAL MEDICINE

## 2022-09-16 PROCEDURE — 25010000002 ADENOSINE (DIAGNOSTIC) PER 30 MG: Performed by: INTERNAL MEDICINE

## 2022-09-16 PROCEDURE — 4A023N6 MEASUREMENT OF CARDIAC SAMPLING AND PRESSURE, RIGHT HEART, PERCUTANEOUS APPROACH: ICD-10-PCS | Performed by: INTERNAL MEDICINE

## 2022-09-16 PROCEDURE — 94761 N-INVAS EAR/PLS OXIMETRY MLT: CPT

## 2022-09-16 PROCEDURE — 82805 BLOOD GASES W/O2 SATURATION: CPT | Performed by: INTERNAL MEDICINE

## 2022-09-16 RX ORDER — ONDANSETRON 2 MG/ML
4 INJECTION INTRAMUSCULAR; INTRAVENOUS EVERY 6 HOURS PRN
Status: DISCONTINUED | OUTPATIENT
Start: 2022-09-16 | End: 2022-09-18 | Stop reason: HOSPADM

## 2022-09-16 RX ORDER — LIDOCAINE HYDROCHLORIDE 20 MG/ML
INJECTION, SOLUTION INFILTRATION; PERINEURAL AS NEEDED
Status: DISCONTINUED | OUTPATIENT
Start: 2022-09-16 | End: 2022-09-16 | Stop reason: HOSPADM

## 2022-09-16 RX ORDER — ONDANSETRON 4 MG/1
4 TABLET, FILM COATED ORAL EVERY 6 HOURS PRN
Status: DISCONTINUED | OUTPATIENT
Start: 2022-09-16 | End: 2022-09-18 | Stop reason: HOSPADM

## 2022-09-16 RX ADMIN — BUDESONIDE 0.5 MG: 0.5 SUSPENSION RESPIRATORY (INHALATION) at 19:28

## 2022-09-16 RX ADMIN — METHYLPREDNISOLONE SODIUM SUCCINATE 40 MG: 40 INJECTION, POWDER, FOR SOLUTION INTRAMUSCULAR; INTRAVENOUS at 06:05

## 2022-09-16 RX ADMIN — IPRATROPIUM BROMIDE AND ALBUTEROL SULFATE 3 ML: 2.5; .5 SOLUTION RESPIRATORY (INHALATION) at 19:28

## 2022-09-16 RX ADMIN — CEFTRIAXONE SODIUM 1 G: 1 INJECTION, SOLUTION INTRAVENOUS at 11:00

## 2022-09-16 RX ADMIN — ONDANSETRON 4 MG: 2 INJECTION INTRAMUSCULAR; INTRAVENOUS at 19:56

## 2022-09-16 RX ADMIN — METOPROLOL TARTRATE 12.5 MG: 25 TABLET, FILM COATED ORAL at 20:00

## 2022-09-16 RX ADMIN — Medication 10 ML: at 20:00

## 2022-09-16 RX ADMIN — ARFORMOTEROL TARTRATE 15 MCG: 15 SOLUTION RESPIRATORY (INHALATION) at 19:28

## 2022-09-16 RX ADMIN — METHYLPREDNISOLONE SODIUM SUCCINATE 40 MG: 40 INJECTION, POWDER, FOR SOLUTION INTRAMUSCULAR; INTRAVENOUS at 17:59

## 2022-09-16 RX ADMIN — ACETAMINOPHEN 650 MG: 325 TABLET ORAL at 11:00

## 2022-09-16 RX ADMIN — IPRATROPIUM BROMIDE AND ALBUTEROL SULFATE 3 ML: 2.5; .5 SOLUTION RESPIRATORY (INHALATION) at 11:37

## 2022-09-16 NOTE — PROGRESS NOTES
[facility]   Hospitalist Progress Note  Date: 2022  Patient Name: Sumeet Gomes  : 1960  MRN: [id number]  Date of admission: 2022      Subjective   Subjective     Chief Complaint: Shortness of breath    Summary: Sumeet Gomes is a 61 y.o. old male patient who presents with progressive shortness of breath over the past 2 to 3 days.  History of hypertension, chronic obstructive pulmonary disease, hyperlipidemia, chronic tobacco abuse.     Patient presents from home, reports increasing shortness of breath over the past few days.  He reports he wears oxygen at night when he sleeps.  He monitors his O2 sats at home, reports that he is normally in the mid 70s on home pulse oximetry.  He reports over the weekend that his pulse ox dropped into the 50s and he had increasing shortness of breath.  He reports developing lower extremity edema over the past several months.  Denies orthopnea.  Denies fevers or chills.  Reports cough productive of yellow sputum.     ED course: In ED , RR 28, oxygen saturation 67% on room air.  Bicarbonate 35, BNP 5360.  Normal troponin.  Chest x-ray also normal.  Started on furosemide, duo nebs.  ABG demonstrated pH 7.35, PCO2 79, PO2 65, bicarb 43 on 3 L nasal cannula.  Patient's pulmonology group consulted. Echocardiogram demonstrated normal left ventricular ejection fraction with elevated right ventricular systolic pressures greater than 55 mmHg.  CT of the chest reviewed and negative for PE.  Did demonstrate findings consistent with emphysema. Bronchoscopy performed demonstrated mucous plugging in the trachea and bilateral bronchial tubes left more than right with scattered purulent secretions and friable airway.  BAL PCR positive for strep pneumo and haemophilus influenza as well as rhinovirus. Cardiology consulted for right heart cath.  Home NIPPV arranged by respiratory case management.  Patient taken for right heart cath which demonstrated moderate pulmonary  hypertension.    Interval Followup: Patient sitting up resting comfortably in bed resting with family at the bedside.  Patient very sleepy earlier this morning per staff prior to right heart cath and required minimal sedation.  Patient evidently fell asleep without his BiPAP on at some time over the night.  Patient states overall he is okay.  States cough has become less productive. Afebrile overnight.  Sinus rhythm 60s to 90s on telemetry review.  Blood pressure within normal limits.  Requiring 3 L nasal cannula to keep sats greater than 90%.   Bicarb trending up.  Blood cultures negative to date.   Sputum culture growing normal respiratory jessica on prelim results.  No other issues per nursing.    Review of Systems   Constitutional: Negative for fatigue and fever.   HENT: Negative for sore throat and trouble swallowing.    Eyes: Negative for pain and discharge.   Respiratory: Positive for cough and shortness of breath.    Cardiovascular: Negative for chest pain and palpitations.   Gastrointestinal: Negative for abdominal pain, nausea and vomiting.   Endocrine: Negative for cold intolerance and heat intolerance.   Genitourinary: Negative for difficulty urinating and dysuria.   Musculoskeletal: Negative for back pain and neck stiffness.   Skin: Negative for color change and rash.   Neurological: Negative for syncope and headaches.   Hematological: Negative for adenopathy.   Psychiatric/Behavioral: Negative for confusion and hallucinations.       Objective   Objective     Vitals:   Temp:  [97.2 °F (36.2 °C)-98.9 °F (37.2 °C)] 97.7 °F (36.5 °C)  Heart Rate:  [62-97] 84  Resp:  [16-22] 20  BP: (100-124)/(56-97) 117/73  Flow (L/min):  [3-5] 3  Physical Exam   Gen. well-developed appearing stated age in no acute distress, thin frail appearing  HEENT: Normocephalic atraumatic moist membranes pupils equal round reactive light, no scleral icterus no conjunctival injection  Cardiovascular: regular rate and rhythm no murmurs  rubs or gallops S1-S2, no lower extremity edema appreciated  Pulmonary: Diminished to auscultation bilaterally bilateral expiratory wheezes no rales or rhonchi symmetric chest expansion, unlabored, no conversational dyspnea appreciated, increased AP diameter  Gastrointestinal: Soft nontender nondistended positive bowel sounds all 4 quadrants no rebound or guarding  Musculoskeletal: No clubbing cyanosis, warm and well-perfused, calves soft symmetric nontender bilaterally  Skin: Clean dry without rashs  Neuro: Cranial nerves II through XII intact grossly no sensorimotor deficits appreciated bilateral upper and lower extremities  Psych: Patient is calm cooperative and appropriate with exam not responding to internal stimuli  : No Sharpe catheter no bladder distention no suprapubic tenderness    Result Review    Result Review:  I have personally reviewed the results from the time of this admission to 9/16/2022 16:50 EDT and agree with these findings:  [x]  Laboratory   LAB RESULTS:      Lab 09/13/22  0845 09/12/22  2114 09/12/22  1656   WBC 7.21  --  8.12   HEMOGLOBIN 16.1  --  16.5   HEMATOCRIT 50.9  --  51.2*   PLATELETS 151  --  159   NEUTROS ABS 5.96  --  6.42   IMMATURE GRANS (ABS) 0.02  --  0.02   LYMPHS ABS 0.50*  --  0.70   MONOS ABS 0.73  --  0.96*   EOS ABS 0.00  --  0.00   MCV 91.9  --  91.1   SED RATE 5  --   --    CRP 3.43*  --   --    PROCALCITONIN 0.04  --   --    LACTATE  --   --  0.8   D DIMER QUANT  --  0.63*  --          Lab 09/16/22  0638 09/15/22  0538 09/13/22  0845 09/12/22  1656   SODIUM 139 140 138 138   POTASSIUM 4.0 3.6 4.3 3.9   CHLORIDE 81* 85* 94* 96*   CO2 >50.0* 49.5* 38.1* 34.7*   ANION GAP  --  5.5 5.9 7.3   BUN 35* 22 20 20   CREATININE 0.67* 0.69* 0.67* 0.69*   EGFR 106.9 105.9 106.9 105.9   GLUCOSE 140* 109* 118* 127*   CALCIUM 9.9 9.3 9.1 9.5         Lab 09/13/22  0845 09/12/22  1656   TOTAL PROTEIN 6.3 6.8   ALBUMIN 4.10 4.20   GLOBULIN 2.2 2.6   ALT (SGPT) 14 13   AST (SGOT) 24  24   BILIRUBIN 0.5 0.6   ALK PHOS 156* 156*         Lab 09/16/22  0638 09/12/22  1656   PROBNP 926.1* 5,360.0*   TROPONIN T  --  <0.010                 Lab 09/16/22  0818 09/16/22  0814 09/14/22  0651 09/13/22  1120   PH, ARTERIAL  --   --  7.409 7.356   PCO2, ARTERIAL  --   --  77.0* 79.3*   PO2 ART  --   --  65.3* 65.0*   O2 SATURATION ART  --   --  93.3* 92.5*   FIO2 28 28 30 32   HCO3 ART  --   --  47.6* 43.4*   BASE EXCESS ART  --   --  17.9* 13.2*   CARBOXYHEMOGLOBIN 0.7 0.7 1.7* 2.6*     Brief Urine Lab Results     None        Microbiology Results (last 10 days)     Procedure Component Value - Date/Time    Respiratory Culture - Wash, Bronchus [[id number]] Collected: 09/14/22 1023    Lab Status: Final result Specimen: Wash from Bronchus Updated: 09/16/22 0931     Respiratory Culture Light growth (2+) Normal respiratory jessica. No S. aureus or Pseudomonas aeruginosa detected. Final report.     Gram Stain Few (2+) WBCs seen      No organisms seen    AFB Culture - Lavage, Lung, Left Lower Lobe [[id number]] Collected: 09/14/22 1016    Lab Status: Preliminary result Specimen: Lavage from Lung, Left Lower Lobe Updated: 09/15/22 1144     AFB Stain No acid fast bacilli seen on direct smear      No acid fast bacilli seen on concentrated smear    BAL Culture, Quantitative - Lavage, Lung, Left Lower Lobe [[id number]] Collected: 09/14/22 1016    Lab Status: Final result Specimen: Lavage from Lung, Left Lower Lobe Updated: 09/16/22 0925     BAL Culture <25,000 CFU/mL Normal respiratory jessica. No S. aureus or Pseudomonas aeruginosa detected. Final report.     Gram Stain Many (4+) WBCs seen      Rare (1+) Yeast      Rare (1+) Fungal elements    Pneumonia Panel - Lavage, Lung, Left Lower Lobe [[id number]]  (Abnormal) Collected: 09/14/22 1016    Lab Status: Final result Specimen: Lavage from Lung, Left Lower Lobe Updated: 09/14/22 1339     Escherichia coli PCR Not Detected     Acinetobacter calcoaceticus-baumannii complex PCR Not  Detected     Enterobacter cloacae PCR Not Detected     Klebsiella oxytoca PCR Not Detected     Klebsiella pneumoniae group PCR Not Detected     Klebsiella aerogenes PCR Not Detected     Moraxella catarrhalis PCR Not Detected     Proteus species PCR Not Detected     Pseudomonas aeroginosa PCR Not Detected     Serratia marcescens PCR Not Detected     Staphylococcus aureus PCR Not Detected     Streptococcus pyogenes PCR Not Detected     Haemophilus influenzae PCR Detected     Comment: 10^5 Bin copies/mL        Streptococcus agalactiae PCR Not Detected     Streptococcus pneumoniae PCR Detected     Comment: 10^6 Bin copies/mL        Chlamydophila pneumoniae PCR Not Detected     Legionella pneumophilia PCR Not Detected     Mycoplasma pneumo by PCR Not Detected     ADENOVIRUS, PCR Not Detected     CTX-M Gene N/A     IMP Gene N/A     KPC Gene N/A     mecA/C and MREJ Gene N/A     NDM Gene N/A     OXA-48-like Gene N/A     VIM Gene N/A     Coronavirus Not Detected     Human Metapneumovirus Not Detected     Human Rhinovirus/Enterovirus Detected     Influenza A PCR Not Detected     Influenza B PCR Not Detected     RSV, PCR Not Detected     Parainfluenza virus PCR Not Detected    Respiratory Culture - Sputum, Cough [[id number]] Collected: 09/13/22 1852    Lab Status: Final result Specimen: Sputum from Cough Updated: 09/15/22 0936     Respiratory Culture Moderate growth (3+) Normal respiratory jessica. No S. aureus or Pseudomonas aeruginosa detected. Final report.     Gram Stain Less than 10 Epithelial cells per low power field      Greater than 25 WBCs per low power field      Rare (1+) Gram positive cocci in pairs and clusters    S. Pneumo Ag Urine or CSF - Urine, Urine, Clean Catch [[id number]]  (Normal) Collected: 09/13/22 1817    Lab Status: Final result Specimen: Urine, Clean Catch Updated: 09/13/22 2011     Strep Pneumo Ag Negative    Legionella Antigen, Urine - Urine, Urine, Clean Catch [[id number]]  (Normal) Collected:  09/13/22 1817    Lab Status: Final result Specimen: Urine, Clean Catch Updated: 09/13/22 2010     LEGIONELLA ANTIGEN, URINE Negative    Blood Culture - Blood, Arm, Left [[id number]]  (Normal) Collected: 09/12/22 1752    Lab Status: Preliminary result Specimen: Blood from Arm, Left Updated: 09/15/22 1802     Blood Culture No growth at 3 days    COVID-19,APTIMA PANTHER(HERLINDA),[facility], NP/OP SWAB IN UTM/VTM/[facility] TRANSPORT MEDIA,24 HR TAT - Swab, Nasal Cavity [[id number]]  (Normal) Collected: 09/12/22 1752    Lab Status: Final result Specimen: Swab from Nasal Cavity Updated: 09/13/22 0121     COVID19 Not Detected    Narrative:      Fact sheet for providers: https://www.fda.gov/media/521538/download     Fact sheet for patients: https://www.fda.gov/media/245004/download    Test performed by RT PCR.    Blood Culture - Blood, Arm, Right [[id number]]  (Normal) Collected: 09/12/22 1656    Lab Status: Preliminary result Specimen: Blood from Arm, Right Updated: 09/15/22 1733     Blood Culture No growth at 3 days          [x]  Microbiology  [x]  Radiology  CT Chest With Contrast Diagnostic    Result Date: 9/13/2022    CT scan of the chest with IV contrast demonstrating no findings of PE.  Emphysema.  Mild anterior wedging compression fracture of the superior endplate of a single midthoracic vertebral body with faint sclerosis, suggesting healing fracture.      ALAN VARGAS MD       Electronically Signed and Approved By: ALAN VARGAS MD on 9/13/2022 at 20:25               [x]  EKG/Telemetry   [x]  Cardiology/Vascular  Echo reviewed as above  []  Pathology  [x]  Old records pulmonology clinic notes  [x]  Other:  Scheduled Meds:arformoterol, 15 mcg, Nebulization, BID - RT  atorvastatin, 10 mg, Oral, Daily  budesonide, 0.5 mg, Nebulization, BID - RT  cefTRIAXone, 1 g, Intravenous, Q24H  [START ON 9/17/2022] enoxaparin, 40 mg, Subcutaneous, Daily  ipratropium-albuterol, 3 mL, Nebulization, Q6H While Awake -  RT  methylPREDNISolone sodium succinate, 40 mg, Intravenous, Q12H  metoprolol tartrate, 12.5 mg, Oral, Q12H  senna-docusate sodium, 2 tablet, Oral, BID  sodium chloride, 10 mL, Intravenous, Q12H      Continuous Infusions:Pharmacy to Dose enoxaparin (LOVENOX),       PRN Meds:.•  acetaminophen **OR** acetaminophen **OR** acetaminophen  •  albuterol  •  senna-docusate sodium **AND** polyethylene glycol **AND** bisacodyl **AND** bisacodyl  •  calcium carbonate  •  melatonin  •  nicotine  •  nicotine polacrilex  •  ondansetron **OR** ondansetron  •  Pharmacy to Dose enoxaparin (LOVENOX)  •  sodium chloride  •  sodium chloride  •  sodium chloride  •  sodium chloride      Assessment & Plan   Assessment / Plan     Assessment/Plan:  COPD with acute exacerbation  Acute on chronic hypoxic respiratory failure  Mucous plugging of the airway  Chronic hypercapnic respiratory failure  Volume overload  Acute decompensated diastolic heart failure  Severe pulmonary hypertension  Elevated D-dimer negative for PE on CT scan  Tobacco abuse of cigarettes ongoing  History of COVID-19 in January 2020  Nonsustained V. tach    Patient admitted for further evaluation treatment  Pulmonology and cardiology consulted thank you for your assistance  Continue Brovana, Pulmicort scheduled  Continue DuoNeb scheduled  Continue albuterol as needed  Continue Solu-Medrol and taper per pulmonology  Continue ceftriaxone for haemophilus and strep pneumo coverage per PCR  Sputum culture negative  Continue bronchopulmonary hygiene protocol  Continue bronchodilator protocol  Continue supplemental oxygen titrate keep sats greater than 90%  Continue BiPAP at night and as needed per pulmonology  Respiratory case management has arranged for NIPPV at home which was delivered to the bedside today  Further work-up pulmonary hypertension per pulmonology and cardiology  Continue Lovenox for DVT prophylaxis  Patient counseled importance of tobacco cessation  Will provide  nicotine replacement patient  Continue low-dose metoprolol due to nonsustained V. tach  Further inpatient orders recommendations pending clinical course.         Discussed plan with RN as well as pulmonology and cardiology attendings.    DVT prophylaxis:  Medical and mechanical DVT prophylaxis orders are present.    CODE STATUS:   Level Of Support Discussed With: Patient  Code Status (Patient has no pulse and is not breathing): CPR (Attempt to Resuscitate)  Medical Interventions (Patient has pulse or is breathing): Full

## 2022-09-16 NOTE — PROGRESS NOTES
Pulmonary / Critical Care Progress Note      Patient Name: Sumeet Gomes  : 1960  MRN: [id number]  Primary Care Physician:  Mirtha Alexander APRN  Date of admission: 2022    Subjective   Subjective   Follow-up for acute exacerbation of COPD.    Over past 24 hours, went from 3-5 L nc during the day and NIPPV overnight.  Continues on steroids, nebulizers.      No acute events overnight.     This morning,   Lying in bed on 5 L nasal cannula  Wheezing about the same.   Did wear bipap with sleep.   No chest pain  No fever or chills  Weak and fatigued  Cardiology at bedside, plan for Right Heart Cath today     Review of Systems  General: Fatigue, unintentional weight loss 40 pounds in last 8 months, otherwise denied complaints  HEENT: Denied complaints  Respiratory: Dyspnea, wheezing, cough with yellow sputum production, otherwise denied complaints  Cardiovascular: AGUILAR, leg swelling, orthopnea, otherwise denied complaints  GI: Denied complaints  : Denied complaints  MSK: Weakness, otherwise denied complaints    Objective   Objective     Vitals:   Temp:  [98.2 °F (36.8 °C)-98.9 °F (37.2 °C)] 98.5 °F (36.9 °C)  Heart Rate:  [] 91  Resp:  [18-22] 18  BP: (100-123)/(56-80) 107/56  Flow (L/min):  [3-5] 3    Physical Exam   Vital Signs Reviewed   General: Thin cachetic male, Awake and Alert, NAD on 2 L NC    HEENT:  PERRL, EOMI.  OP, nares clear, no sinus tenderness  Neck:  Supple, no JVD, no thyromegaly  Lymph: no axillary, cervical, supraclavicular lymphadenopathy noted bilaterally  Chest: barrel chested, poor aeration, coarse wheezing bilaterally, tympanic to percussion bilaterally, increased work of breathing noted, pursed lip breathing  CV: RRR, no MGR, pulses 2+, equal  Abd:  Soft, NT, ND, + BS, no HSM  EXT:  no clubbing, no cyanosis, trace BLE edema, no joint tenderness  Neuro:  A&Ox3, CN grossly intact, no focal deficits  Skin: No rashes or lesions noted    Result Review    Result Review:  I  have personally reviewed the results from the time of this admission to 9/16/2022 07:25 EDT and agree with these findings:  [x]  Laboratory  [x]  Microbiology  [x]  Radiology  [x]  EKG/Telemetry   []  Cardiology/Vascular   []  Pathology  [x]  Old records  []  Other:  Most notable findings include: fluctuating between 3-5 L nc, pC02 103.7, pH 7.279, pO2 50.2, spo2 82.4., Right heart cath with signs of Moderate Pulmonary HTN  CRP 3.43, sed rate 5, Pro-Saw 0.04  Pending rheumatoid and lupus work-up    9/14 0650 ABG 7.40, 77, 65.3, 47.6 on BiPAP FiO2 30%  9/13 1120 ABG 7.35, 79.3, 65.0, 43.4 on 3 L nasal cannula    9/13 CT chest with contrast revealed no PE, emphysema changes    9/13 sputum culture pending  Strep and Legionella negative  Blood cultures no growth at 24 hours  COVID negative    Assessment & Plan   Assessment / Plan     Active Hospital Problems:  Active Hospital Problems    Diagnosis    • **Acute respiratory failure with hypoxia and hypercapnia (HCC)    • Severe malnutrition (HCC)    • Acute CHF (HCC)    • Tobacco abuse    • COPD (chronic obstructive pulmonary disease) (HCC)    • Essential hypertension    • Other hyperlipidemia      Impression:   Acute on chronic hypoxic respiratory failure: 2 L nasal cannula at home  Acute hypercapnic respiratory failure  Acute exacerbation of COPD  Volume overload  Acute decompensation of heart failure  Moderate pulmonary hypertension: RVSP >55  Elevated D-dimer  Current tobacco abuse of cigarettes  History of COVID January 2022    Plan:   With worsening Hypercapnia, consider adjusting BIPAP pressures, work-up in process, consider Flolan  Continues to have coarse wheezing.    S/p bronchoscopy with mucus plug removal.   Growing H influenzae and Strep pneumo.   Continue to wean O2 to keep sats greater than 90%.  Baseline home O2 requirement is 2 L nasal cannula.  Adjusting pressures, NIPPV 14/7 at night and as needed days. RT  consult. Would need bipap with  sleep at home.   Continue Solu-medrol 40 mg IV BID.  Continue Lasix 40 mg IV twice daily.  Trend renal panel and electrolytes.  Echocardiogram with EF 56%, severely dilated right ventricle, moderate tricuspid valve regurgitation, RVSP >55.Concerns for severe pulmonary hypertension. Will need work-up.  Pending HIV, IgE, IgG subclasses, work-up for lupus and schleroderma, rheumatoid factor.  Underwent right heart cath today, with signs of moderate pulmonary hypertension, likely related to his severe COPD.  Infectious work-up negative to date. Continue ceftriaxone.  Can de-escalate based on cultures.  Continue Brovana, Pulmicort, and DuoNebs.  Continue bronchopulmonary hygiene.  Add I-S and flutter valve.  Continue Lovenox for DVT prophylaxis.  Encourage activity.  Out of bed to chair.  Will consult RT  for NIPPV set up for home and schedule outpatient PFTs.  Patient has chronic hypoxic and hypercapnic respiratory failure with severe COPD.  He has been on BiPAP and so far has been suboptimal to help with his respiratory failure.  He will need astral vent to help him with work of breathing, hypercapnic respiratory failure and decreased hospitalization.  Inability to get noninvasive positive pressure ventilation with adjusted tidal volume will increase chances of his rehospitalization's and could be life-threatening and increased chance of mortality.     DVT prophylaxis:  Medical and mechanical DVT prophylaxis orders are present.    CODE STATUS:   Level Of Support Discussed With: Patient  Code Status (Patient has no pulse and is not breathing): CPR (Attempt to Resuscitate)  Medical Interventions (Patient has pulse or is breathing): Full    I personally reviewed pertinent labs, imaging and provider notes.   Discussed with bedside nurse and will discuss with primary service.     This visit was performed by BOTH a physician and an APC. I personally evaluated and examined the patient. I performed all aspects of  MDM as documented. , I have reviewed and confirmed the accuracy of the patient's history as documented in this note. and I have reexamined the patient and the results are consistent with the previously documented exam. I have updated the documentation as necessary.     Electronically signed by Ulices Moya MD, 09/16/22, 1:48 PM EDT.     Electronically signed by Ulices Moya MD, 9/16/2022, 07:25 EDT.

## 2022-09-16 NOTE — PRE-SEDATION DOCUMENTATION
Sedation Plan    ASA 3     Mallampati class: II.    Risks, benefits, and alternatives discussed with patient and spouse.

## 2022-09-16 NOTE — CASE MANAGEMENT/SOCIAL WORK
HF cm reviewed chart.     Followed up with pt and family at bedside.     Dr Rodriguez following Pt had cardiac cath today that showed pulm HTN with no response to vasodilator challenge.     Pt and family denies any questions.     Will continue to follow.

## 2022-09-16 NOTE — PROGRESS NOTES
[facility]     Cardiology Progress Note    Patient Name: Sumeet Gomes  : 1960  MRN: [id number]  Primary Care Physician:  Mirtha Alexander APRN  Date of admission: 2022    Subjective   Subjective      Chief Complaint: Follow-up visit for congestive heart failure, pulmonary hypertension    Interval HPI:    Patient reports feeling the same.  Shortness of breath slightly improved.  Reports cough.  Generalized weakness present denies any chest pain.    Review of Systems   All systems were reviewed and negative except for: Cough and shortness of breath along with wheezing    Objective   Objective     Vitals:   Temp:  [97.2 °F (36.2 °C)-98.9 °F (37.2 °C)] 97.3 °F (36.3 °C)  Heart Rate:  [] 62  Resp:  [16-22] 18  BP: (100-124)/(56-97) 108/78  Flow (L/min):  [3-5] 5  Physical Exam      General : Alert, awake, no acute distress  CVS : Regular rate and rhythm, no murmur, rubs or gallops  Lungs: Bilateral wheezing heard, no crackles  Abdomen: Soft, nontender, bowel sounds heard in all 4 quadrants  Extremities: Warm, well-perfused, no pedal edema    Scheduled Meds:arformoterol, 15 mcg, Nebulization, BID - RT  atorvastatin, 10 mg, Oral, Daily  budesonide, 0.5 mg, Nebulization, BID - RT  cefTRIAXone, 1 g, Intravenous, Q24H  [START ON 2022] enoxaparin, 40 mg, Subcutaneous, Daily  ipratropium-albuterol, 3 mL, Nebulization, Q6H While Awake - RT  methylPREDNISolone sodium succinate, 40 mg, Intravenous, Q12H  metoprolol tartrate, 12.5 mg, Oral, Q12H  senna-docusate sodium, 2 tablet, Oral, BID  sodium chloride, 10 mL, Intravenous, Q12H      Continuous Infusions:Pharmacy to Dose enoxaparin (LOVENOX),            Result Review    Result Review:  I have personally reviewed the results from the time of this admission to 2022 10:08 EDT and agree with these findings:  [x]  Laboratory  []  Microbiology  [x]  Radiology  [x]  EKG/Telemetry   [x]  Cardiology/Vascular   []  Pathology  []  Old records  []   Other:  Most notable findings include:     CBC    CBC 6/29/22 9/12/22 9/13/22   WBC 6.49 8.12 7.21   RBC 6.13 (A) 5.62 5.54   Hemoglobin 17.4 16.5 16.1   Hematocrit 51.4 (A) 51.2 (A) 50.9   MCV 83.8 91.1 91.9   MCH 28.4 29.4 29.1   MCHC 33.9 32.2 31.6   RDW 13.7 14.9 14.4   Platelets 243 159 151   (A) Abnormal value            CMP    CMP 9/13/22 9/15/22 9/16/22   Glucose 118 (A) 109 (A) 140 (A)   BUN 20 22 35 (A)   Creatinine 0.67 (A) 0.69 (A) 0.67 (A)   Sodium 138 140 139   Potassium 4.3 3.6 4.0   Chloride 94 (A) 85 (A) 81 (A)   Calcium 9.1 9.3 9.9   Albumin 4.10     Total Bilirubin 0.5     Alkaline Phosphatase 156 (A)     AST (SGOT) 24     ALT (SGPT) 14     (A) Abnormal value             CARDIAC LABS:      Lab 09/12/22  1656   PROBNP 5,360.0*   TROPONIN T <0.010        Right heart cath done today showed    Right Atrium: Mean right atrial pressure is 7 mmHg  Right Ventricle: RV systolic pressure is 49 mmHg  Pulmonary Artery: 48/18 with a mean of 31 mmHg  Pulmonary Wedge: Mean wedge pressure is 14 mmHg  Cardiac Output/Index by thermodilution method: 6.07 L/min with a cardiac index of 3.56 L/min/m2  Cardiac Output/Index by Kurtis's principle: 7.41 L/min with a cardiac index of 4.34 L/min/m2  PA Sat: 82%  RA Sat: 82%  AO Sat: 96%        Assessment & Plan   Assessment / Plan     Brief Patient Summary:  Sumeet Gomes is a 61 y.o. male with COPD, hyperlipidemia, tobacco use and hypertension who was admitted to the hospital on 9/12/2022 with shortness of breath.  Currently being treated for acute on chronic hypoxic respiratory failure, COPD exacerbation, diastolic heart failure and pulmonary hypertension.  BAL cultures growing H. influenzae and strep pneumo.  Echocardiogram showed evidence of pulmonary hypertension    Active Hospital Problems:  Active Hospital Problems    Diagnosis    • **Acute respiratory failure with hypoxia and hypercapnia (HCC)    • Pulmonary hypertension (HCC)      Added automatically from request for  surgery [id number]     • Severe malnutrition (HCC)    • Acute CHF (HCC)    • Tobacco abuse    • COPD (chronic obstructive pulmonary disease) (HCC)    • Essential hypertension    • Other hyperlipidemia        Pulmonary hypertension : Today's right heart cath showed moderate pulm hypertension with no response to vasodilator challenge.  Mean PA pressure is 31 mmHg.  LVEDP is 14    Plan:     Continue management of COPD, pneumonia hypoxic hypercarbic respiratory failure per pulmonology and primary team.    Will hold IV diuretics .  Oral diuretics can be initiated at the time of discharge    We will see as needed while inpatient.  Please call with questions       CODE STATUS:   Level Of Support Discussed With: Patient  Code Status (Patient has no pulse and is not breathing): CPR (Attempt to Resuscitate)  Medical Interventions (Patient has pulse or is breathing): Full      Electronically signed by Drew Rodriguez MD, 09/16/22, 10:06 AM EDT.

## 2022-09-16 NOTE — PLAN OF CARE
Goal Outcome Evaluation:  Plan of Care Reviewed With: patient        Progress: improving  Outcome Evaluation: NPO since 0000 for heart cath this am

## 2022-09-17 LAB
ANION GAP SERPL CALCULATED.3IONS-SCNC: ABNORMAL MMOL/L
BACTERIA SPEC AEROBE CULT: NORMAL
BACTERIA SPEC AEROBE CULT: NORMAL
BUN SERPL-MCNC: 39 MG/DL (ref 8–23)
BUN/CREAT SERPL: 60.9 (ref 7–25)
CALCIUM SPEC-SCNC: 10 MG/DL (ref 8.6–10.5)
CHLORIDE SERPL-SCNC: 84 MMOL/L (ref 98–107)
CO2 SERPL-SCNC: >50 MMOL/L (ref 22–29)
CREAT SERPL-MCNC: 0.64 MG/DL (ref 0.76–1.27)
CYTO UR: NORMAL
DEPRECATED RDW RBC AUTO: 46.8 FL (ref 37–54)
EGFRCR SERPLBLD CKD-EPI 2021: 108.4 ML/MIN/1.73
ERYTHROCYTE [DISTWIDTH] IN BLOOD BY AUTOMATED COUNT: 13.4 % (ref 12.3–15.4)
GLUCOSE SERPL-MCNC: 132 MG/DL (ref 65–99)
HCT VFR BLD AUTO: 54.8 % (ref 37.5–51)
HGB BLD-MCNC: 17 G/DL (ref 13–17.7)
LAB AP CASE REPORT: NORMAL
LAB AP CLINICAL INFORMATION: NORMAL
LAB AP SPECIAL STAINS: NORMAL
MCH RBC QN AUTO: 29.2 PG (ref 26.6–33)
MCHC RBC AUTO-ENTMCNC: 31 G/DL (ref 31.5–35.7)
MCV RBC AUTO: 94 FL (ref 79–97)
PATH REPORT.FINAL DX SPEC: NORMAL
PATH REPORT.GROSS SPEC: NORMAL
PLATELET # BLD AUTO: 179 10*3/MM3 (ref 140–450)
PMV BLD AUTO: 10.1 FL (ref 6–12)
POTASSIUM SERPL-SCNC: 4 MMOL/L (ref 3.5–5.2)
RBC # BLD AUTO: 5.83 10*6/MM3 (ref 4.14–5.8)
SODIUM SERPL-SCNC: 138 MMOL/L (ref 136–145)
STAT OF ADQ CVX/VAG CYTO-IMP: NORMAL
WBC NRBC COR # BLD: 8.05 10*3/MM3 (ref 3.4–10.8)

## 2022-09-17 PROCEDURE — 94799 UNLISTED PULMONARY SVC/PX: CPT

## 2022-09-17 PROCEDURE — 99233 SBSQ HOSP IP/OBS HIGH 50: CPT | Performed by: INTERNAL MEDICINE

## 2022-09-17 PROCEDURE — 85027 COMPLETE CBC AUTOMATED: CPT | Performed by: INTERNAL MEDICINE

## 2022-09-17 PROCEDURE — 25010000002 METHYLPREDNISOLONE PER 40 MG: Performed by: INTERNAL MEDICINE

## 2022-09-17 PROCEDURE — 25010000002 CEFTRIAXONE PER 250 MG: Performed by: INTERNAL MEDICINE

## 2022-09-17 PROCEDURE — 99233 SBSQ HOSP IP/OBS HIGH 50: CPT | Performed by: FAMILY MEDICINE

## 2022-09-17 PROCEDURE — 63710000001 PREDNISONE PER 1 MG: Performed by: INTERNAL MEDICINE

## 2022-09-17 PROCEDURE — 80048 BASIC METABOLIC PNL TOTAL CA: CPT | Performed by: INTERNAL MEDICINE

## 2022-09-17 PROCEDURE — 25010000002 ENOXAPARIN PER 10 MG: Performed by: INTERNAL MEDICINE

## 2022-09-17 RX ORDER — PREDNISONE 20 MG/1
40 TABLET ORAL
Status: DISCONTINUED | OUTPATIENT
Start: 2022-09-17 | End: 2022-09-18 | Stop reason: HOSPADM

## 2022-09-17 RX ORDER — CEFTRIAXONE SODIUM 1 G/50ML
1 INJECTION, SOLUTION INTRAVENOUS EVERY 24 HOURS
Status: DISCONTINUED | OUTPATIENT
Start: 2022-09-18 | End: 2022-09-18 | Stop reason: HOSPADM

## 2022-09-17 RX ADMIN — METHYLPREDNISOLONE SODIUM SUCCINATE 40 MG: 40 INJECTION, POWDER, FOR SOLUTION INTRAMUSCULAR; INTRAVENOUS at 05:41

## 2022-09-17 RX ADMIN — Medication 10 ML: at 20:23

## 2022-09-17 RX ADMIN — METOPROLOL TARTRATE 12.5 MG: 25 TABLET, FILM COATED ORAL at 20:23

## 2022-09-17 RX ADMIN — METOPROLOL TARTRATE 12.5 MG: 25 TABLET, FILM COATED ORAL at 09:35

## 2022-09-17 RX ADMIN — CEFTRIAXONE SODIUM 1 G: 1 INJECTION, SOLUTION INTRAVENOUS at 11:58

## 2022-09-17 RX ADMIN — SENNOSIDES AND DOCUSATE SODIUM 2 TABLET: 8.6; 5 TABLET ORAL at 20:23

## 2022-09-17 RX ADMIN — BUDESONIDE 0.5 MG: 0.5 SUSPENSION RESPIRATORY (INHALATION) at 18:30

## 2022-09-17 RX ADMIN — ATORVASTATIN CALCIUM 10 MG: 10 TABLET, FILM COATED ORAL at 09:35

## 2022-09-17 RX ADMIN — Medication 10 ML: at 09:35

## 2022-09-17 RX ADMIN — BUDESONIDE 0.5 MG: 0.5 SUSPENSION RESPIRATORY (INHALATION) at 07:10

## 2022-09-17 RX ADMIN — PREDNISONE 40 MG: 20 TABLET ORAL at 09:35

## 2022-09-17 RX ADMIN — ARFORMOTEROL TARTRATE 15 MCG: 15 SOLUTION RESPIRATORY (INHALATION) at 07:10

## 2022-09-17 RX ADMIN — ARFORMOTEROL TARTRATE 15 MCG: 15 SOLUTION RESPIRATORY (INHALATION) at 18:30

## 2022-09-17 RX ADMIN — IPRATROPIUM BROMIDE AND ALBUTEROL SULFATE 3 ML: 2.5; .5 SOLUTION RESPIRATORY (INHALATION) at 07:10

## 2022-09-17 RX ADMIN — IPRATROPIUM BROMIDE AND ALBUTEROL SULFATE 3 ML: 2.5; .5 SOLUTION RESPIRATORY (INHALATION) at 18:30

## 2022-09-17 RX ADMIN — ENOXAPARIN SODIUM 40 MG: 100 INJECTION SUBCUTANEOUS at 09:35

## 2022-09-17 RX ADMIN — IPRATROPIUM BROMIDE AND ALBUTEROL SULFATE 3 ML: 2.5; .5 SOLUTION RESPIRATORY (INHALATION) at 11:34

## 2022-09-17 NOTE — PROGRESS NOTES
Pulmonary / Critical Care Progress Note      Patient Name: Sumeet Gomes  : 1960  MRN: [id number]  Primary Care Physician:  Mirtha Alexander APRN  Date of admission: 2022    Subjective   Subjective   Follow-up for acute exacerbation of COPD.    Over past 24 hours, went from 3 L nc during the day and NIPPV overnight.  Underwent Heart cath that showed moderate Pulmonary HTN, with patient's COPD not a candidate for a pulmonary vasodilator     No acute events overnight.      This morning,   Lying in bed on 3 L nasal cannula  No wheezing  Dyspnea improved.  Also has dry cough with no sputum production or hemoptysis  Tolerating NIPPV at night  No chest pain  No fever or chills  Weak and fatigued  Inquiring about going home versus rehab       Review of Systems  General: Fatigue, unintentional weight loss 40 pounds in last 8 months, otherwise denied complaints  HEENT: Denied complaints  Respiratory: Dyspnea, wheezing, cough with yellow sputum production, otherwise denied complaints  Cardiovascular: AGUILAR, leg swelling, orthopnea, otherwise denied complaints  GI: Denied complaints  : Denied complaints  MSK: Weakness, otherwise denied complaints    Objective   Objective     Vitals:   Temp:  [97.2 °F (36.2 °C)-97.9 °F (36.6 °C)] 97.9 °F (36.6 °C)  Heart Rate:  [62-84] 67  Resp:  [16-20] 18  BP: (101-117)/(65-80) 104/75  Flow (L/min):  [3] 3    Physical Exam   Vital Signs Reviewed   General: Thin cachetic male, Awake and Alert, NAD  HEENT:  PERRL, EOMI.  OP, nares clear, no sinus tenderness  Neck:  Supple, no JVD, no thyromegaly  Chest: barrel chested, poor aeration, coarse wheezing bilaterally, tympanic to percussion bilaterally, increased work of breathing noted, pursed lip breathing  CV: RRR, no MGR, pulses 2+, equal  Abd:  Soft, NT, ND, + BS, no HSM  EXT:  no clubbing, no cyanosis, trace BLE edema, no joint tenderness, muscle wasting noted in all 4 extremities  Neuro:  A&Ox3, CN grossly intact, no focal  deficits  Skin: No rashes or lesions noted    Result Review    Result Review:  I have personally reviewed the results from the time of this admission to 9/17/2022 12:31 EDT and agree with these findings:  [x]  Laboratory  [x]  Microbiology  [x]  Radiology  [x]  EKG/Telemetry   [x]  Cardiology/Vascular   []  Pathology  [x]  Old records  []  Other:  Most notable findings include:    Right heart cath with signs of Moderate Pulmonary HTN, CO2 >50, RA <10, NORMA (-),   CRP 3.43, sed rate 5, Pro-Saw 0.04      9/14 0650 ABG 7.40, 77, 65.3, 47.6 on BiPAP FiO2 30%  9/13 1120 ABG 7.35, 79.3, 65.0, 43.4 on 3 L nasal cannula    9/13 CT chest with contrast revealed no PE, emphysema changes    9/13 sputum culture pending  Strep and Legionella negative  Blood cultures no growth at 24 hours  COVID negative        Assessment & Plan   Assessment / Plan     Active Hospital Problems:  Active Hospital Problems    Diagnosis    • **Acute respiratory failure with hypoxia and hypercapnia (HCC)    • Severe malnutrition (HCC)    • Acute CHF (HCC)    • Tobacco abuse    • Pulmonary hypertension (HCC)      Added automatically from request for surgery [id number]     • COPD (chronic obstructive pulmonary disease) (HCC)    • Essential hypertension    • Other hyperlipidemia      Impression:   Acute on chronic hypoxic respiratory failure: 2 L nasal cannula at home  Acute hypercapnic respiratory failure  Acute exacerbation of COPD  Volume overload  Acute decompensation of heart failure  Moderate pulmonary hypertension: RVSP >55.  Confirmed on right heart cath.  Secondary to WHO class III severe COPD and emphysema  Elevated D-dimer  Current tobacco abuse of cigarettes  History of COVID January 2022    Plan:   Right Heart cath on 9/16 showed moderate Pulmonary HTN secondary to WHO class III chronic lung disease with severe Iram.  Patient not a candidate for pulmonary vasodilator secondary to severe COPD  Transition steroids to prednisone 40 mg daily.   Decrease by 10 mg every 3 days until off steroids   Continue nebulizers and bronchopulmonary hygiene   Continue NIPPV nightly with naps on current settings.  RT  arranging home NIPPV  Consider Pulmonary Rehab.    S/p bronchoscopy with mucus plug removal.   Growing H influenzae and Strep pneumo.  Finish 7 days of Rocephin   Pending HIV, IgE pending, IgG subclasses 3 elevated, NORMA (-), rheumatoid factor (-)  Encourage activity.  Out of bed to chair.   Patient has chronic hypoxic and hypercapnic respiratory failure with severe COPD.  He has been on BiPAP and so far has been suboptimal to help with his respiratory failure.  He will need astral vent to help him with work of breathing, hypercapnic respiratory failure and decreased hospitalization.  Inability to get noninvasive positive pressure ventilation with adjusted tidal volume will increase chances of his rehospitalization's and could be life-threatening and increased chance of mortality.  Pulmonary Rehab versus Home, PT/OT consulted for evaluation      DVT prophylaxis:  Medical and mechanical DVT prophylaxis orders are present.    CODE STATUS:   Level Of Support Discussed With: Patient  Code Status (Patient has no pulse and is not breathing): CPR (Attempt to Resuscitate)  Medical Interventions (Patient has pulse or is breathing): Full    I personally reviewed pertinent labs, imaging and provider notes.   Discussed with bedside nurse and will discuss with primary service.     I, Dr. Collins Chapa, have spent more than 50% of the total time managing the patient in this encounter today.  This included personally reviewing all pertinent labs, imaging, microbiology and documentation. Also discussing the case with the patient and any available family, the admitting physician and any available ancillary staff.    Electronically signed by Collins Chapa MD, 09/17/22, 12:33 PM EDT.

## 2022-09-17 NOTE — PROGRESS NOTES
[facility]   Hospitalist Progress Note  Date: 2022  Patient Name: Sumeet Gomes  : 1960  MRN: [id number]  Date of admission: 2022      Subjective   Subjective     Chief Complaint: Shortness of breath    Summary: Sumeet Gomes is a 61 y.o. old male patient who presents with progressive shortness of breath over the past 2 to 3 days.  History of hypertension, chronic obstructive pulmonary disease, hyperlipidemia, chronic tobacco abuse.     Patient presents from home, reports increasing shortness of breath over the past few days.  He reports he wears oxygen at night when he sleeps.  He monitors his O2 sats at home, reports that he is normally in the mid 70s on home pulse oximetry.  He reports over the weekend that his pulse ox dropped into the 50s and he had increasing shortness of breath.  He reports developing lower extremity edema over the past several months.  Denies orthopnea.  Denies fevers or chills.  Reports cough productive of yellow sputum.     ED course: In ED , RR 28, oxygen saturation 67% on room air.  Bicarbonate 35, BNP 5360.  Normal troponin.  Chest x-ray also normal.  Started on furosemide, duo nebs.  ABG demonstrated pH 7.35, PCO2 79, PO2 65, bicarb 43 on 3 L nasal cannula.  Patient's pulmonology group consulted. Echocardiogram demonstrated normal left ventricular ejection fraction with elevated right ventricular systolic pressures greater than 55 mmHg.  CT of the chest reviewed and negative for PE.  Did demonstrate findings consistent with emphysema. Bronchoscopy performed demonstrated mucous plugging in the trachea and bilateral bronchial tubes left more than right with scattered purulent secretions and friable airway.  BAL PCR positive for strep pneumo and haemophilus influenza as well as rhinovirus. Cardiology consulted for right heart cath.  Home NIPPV arranged by respiratory case management.  Patient taken for right heart cath which demonstrated moderate pulmonary  hypertension.     Interval Followup: Patient sitting up resting comfortably in bed resting with family at the bedside.  Patient endorsing increased fatigue.  Patient states that his NIPPV mask kept falling off overnight.  Patient states overall he is okay.  States cough less productive. Afebrile overnight.  Sinus rhythm 60s to 80s on telemetry review with a brief burst of supraventricular tachycardia around 01:00.  Blood pressure within normal limits.  Requiring 3 L nasal cannula to keep sats greater than 90%.   Bicarb remains high.  Blood cultures negative to date.   Sputum culture growing normal respiratory jessica.  No other issues per nursing.    Review of Systems   Constitutional: Positive for fatigue. Negative for fever.   HENT: Negative for sore throat and trouble swallowing.    Eyes: Negative for pain and discharge.   Respiratory: Positive for cough and shortness of breath.    Cardiovascular: Negative for chest pain and palpitations.   Gastrointestinal: Negative for abdominal pain, nausea and vomiting.   Endocrine: Negative for cold intolerance and heat intolerance.   Genitourinary: Negative for difficulty urinating and dysuria.   Musculoskeletal: Negative for back pain and neck stiffness.   Skin: Negative for color change and rash.   Neurological: Negative for syncope and headaches.   Hematological: Negative for adenopathy.   Psychiatric/Behavioral: Negative for confusion and hallucinations.       Objective   Objective     Vitals:   Temp:  [97.2 °F (36.2 °C)-97.9 °F (36.6 °C)] 97.9 °F (36.6 °C)  Heart Rate:  [62-84] 67  Resp:  [16-20] 18  BP: (101-117)/(65-80) 104/75  Flow (L/min):  [3] 3  Physical Exam   Gen. well-developed appearing stated age in no acute distress, thin frail appearing  HEENT: Normocephalic atraumatic moist membranes pupils equal round reactive light, no scleral icterus no conjunctival injection  Cardiovascular: regular rate and rhythm no murmurs rubs or gallops S1-S2, no lower extremity  edema appreciated  Pulmonary: Diminished to auscultation bilaterally bilateral expiratory wheezes no rales or rhonchi symmetric chest expansion, unlabored, no conversational dyspnea appreciated, increased AP diameter  Gastrointestinal: Soft nontender nondistended positive bowel sounds all 4 quadrants no rebound or guarding  Musculoskeletal: No clubbing cyanosis, warm and well-perfused, calves soft symmetric nontender bilaterally  Skin: Clean dry without rashs  Neuro: Cranial nerves II through XII intact grossly no sensorimotor deficits appreciated bilateral upper and lower extremities  Psych: Patient is calm cooperative and appropriate with exam not responding to internal stimuli  : No Sharpe catheter no bladder distention no suprapubic tenderness    Result Review    Result Review:  I have personally reviewed the results from the time of this admission to 9/17/2022 14:06 EDT and agree with these findings:  [x]  Laboratory   LAB RESULTS:      Lab 09/17/22 0433 09/13/22  0845 09/12/22  2114 09/12/22  1656   WBC 8.05 7.21  --  8.12   HEMOGLOBIN 17.0 16.1  --  16.5   HEMATOCRIT 54.8* 50.9  --  51.2*   PLATELETS 179 151  --  159   NEUTROS ABS  --  5.96  --  6.42   IMMATURE GRANS (ABS)  --  0.02  --  0.02   LYMPHS ABS  --  0.50*  --  0.70   MONOS ABS  --  0.73  --  0.96*   EOS ABS  --  0.00  --  0.00   MCV 94.0 91.9  --  91.1   SED RATE  --  5  --   --    CRP  --  3.43*  --   --    PROCALCITONIN  --  0.04  --   --    LACTATE  --   --   --  0.8   D DIMER QUANT  --   --  0.63*  --          Lab 09/17/22  0433 09/16/22  0638 09/15/22  0538 09/13/22  0845 09/12/22  1656   SODIUM 138 139 140 138 138   POTASSIUM 4.0 4.0 3.6 4.3 3.9   CHLORIDE 84* 81* 85* 94* 96*   CO2 >50.0* >50.0* 49.5* 38.1* 34.7*   ANION GAP  --   --  5.5 5.9 7.3   BUN 39* 35* 22 20 20   CREATININE 0.64* 0.67* 0.69* 0.67* 0.69*   EGFR 108.4 106.9 105.9 106.9 105.9   GLUCOSE 132* 140* 109* 118* 127*   CALCIUM 10.0 9.9 9.3 9.1 9.5         Lab 09/13/22  0845  09/12/22  1656   TOTAL PROTEIN 6.3 6.8   ALBUMIN 4.10 4.20   GLOBULIN 2.2 2.6   ALT (SGPT) 14 13   AST (SGOT) 24 24   BILIRUBIN 0.5 0.6   ALK PHOS 156* 156*         Lab 09/16/22  0638 09/12/22  1656   PROBNP 926.1* 5,360.0*   TROPONIN T  --  <0.010                 Lab 09/16/22  0818 09/16/22  0814 09/14/22  0651 09/13/22  1120   PH, ARTERIAL  --   --  7.409 7.356   PCO2, ARTERIAL  --   --  77.0* 79.3*   PO2 ART  --   --  65.3* 65.0*   O2 SATURATION ART  --   --  93.3* 92.5*   FIO2 28 28 30 32   HCO3 ART  --   --  47.6* 43.4*   BASE EXCESS ART  --   --  17.9* 13.2*   CARBOXYHEMOGLOBIN 0.7 0.7 1.7* 2.6*     Brief Urine Lab Results     None        Microbiology Results (last 10 days)     Procedure Component Value - Date/Time    Respiratory Culture - Wash, Bronchus [[id number]] Collected: 09/14/22 1023    Lab Status: Final result Specimen: Wash from Bronchus Updated: 09/16/22 0931     Respiratory Culture Light growth (2+) Normal respiratory jessica. No S. aureus or Pseudomonas aeruginosa detected. Final report.     Gram Stain Few (2+) WBCs seen      No organisms seen    AFB Culture - Lavage, Lung, Left Lower Lobe [[id number]] Collected: 09/14/22 1016    Lab Status: Preliminary result Specimen: Lavage from Lung, Left Lower Lobe Updated: 09/15/22 1144     AFB Stain No acid fast bacilli seen on direct smear      No acid fast bacilli seen on concentrated smear    BAL Culture, Quantitative - Lavage, Lung, Left Lower Lobe [[id number]] Collected: 09/14/22 1016    Lab Status: Final result Specimen: Lavage from Lung, Left Lower Lobe Updated: 09/16/22 0925     BAL Culture <25,000 CFU/mL Normal respiratory jessica. No S. aureus or Pseudomonas aeruginosa detected. Final report.     Gram Stain Many (4+) WBCs seen      Rare (1+) Yeast      Rare (1+) Fungal elements    Pneumonia Panel - Lavage, Lung, Left Lower Lobe [[id number]]  (Abnormal) Collected: 09/14/22 1016    Lab Status: Final result Specimen: Lavage from Lung, Left Lower Lobe  Updated: 09/14/22 1339     Escherichia coli PCR Not Detected     Acinetobacter calcoaceticus-baumannii complex PCR Not Detected     Enterobacter cloacae PCR Not Detected     Klebsiella oxytoca PCR Not Detected     Klebsiella pneumoniae group PCR Not Detected     Klebsiella aerogenes PCR Not Detected     Moraxella catarrhalis PCR Not Detected     Proteus species PCR Not Detected     Pseudomonas aeroginosa PCR Not Detected     Serratia marcescens PCR Not Detected     Staphylococcus aureus PCR Not Detected     Streptococcus pyogenes PCR Not Detected     Haemophilus influenzae PCR Detected     Comment: 10^5 Bin copies/mL        Streptococcus agalactiae PCR Not Detected     Streptococcus pneumoniae PCR Detected     Comment: 10^6 Bin copies/mL        Chlamydophila pneumoniae PCR Not Detected     Legionella pneumophilia PCR Not Detected     Mycoplasma pneumo by PCR Not Detected     ADENOVIRUS, PCR Not Detected     CTX-M Gene N/A     IMP Gene N/A     KPC Gene N/A     mecA/C and MREJ Gene N/A     NDM Gene N/A     OXA-48-like Gene N/A     VIM Gene N/A     Coronavirus Not Detected     Human Metapneumovirus Not Detected     Human Rhinovirus/Enterovirus Detected     Influenza A PCR Not Detected     Influenza B PCR Not Detected     RSV, PCR Not Detected     Parainfluenza virus PCR Not Detected    Respiratory Culture - Sputum, Cough [[id number]] Collected: 09/13/22 1852    Lab Status: Final result Specimen: Sputum from Cough Updated: 09/15/22 0936     Respiratory Culture Moderate growth (3+) Normal respiratory jessica. No S. aureus or Pseudomonas aeruginosa detected. Final report.     Gram Stain Less than 10 Epithelial cells per low power field      Greater than 25 WBCs per low power field      Rare (1+) Gram positive cocci in pairs and clusters    S. Pneumo Ag Urine or CSF - Urine, Urine, Clean Catch [[id number]]  (Normal) Collected: 09/13/22 1817    Lab Status: Final result Specimen: Urine, Clean Catch Updated: 09/13/22 2011      Strep Pneumo Ag Negative    Legionella Antigen, Urine - Urine, Urine, Clean Catch [[id number]]  (Normal) Collected: 09/13/22 1817    Lab Status: Final result Specimen: Urine, Clean Catch Updated: 09/13/22 2010     LEGIONELLA ANTIGEN, URINE Negative    Blood Culture - Blood, Arm, Left [[id number]]  (Normal) Collected: 09/12/22 1752    Lab Status: Preliminary result Specimen: Blood from Arm, Left Updated: 09/16/22 1802     Blood Culture No growth at 4 days    COVID-19,APTIMA PANTHER(HERLINAD),[facility], NP/OP SWAB IN UTM/VTM/SALINE TRANSPORT MEDIA,24 HR TAT - Swab, Nasal Cavity [[id number]]  (Normal) Collected: 09/12/22 1752    Lab Status: Final result Specimen: Swab from Nasal Cavity Updated: 09/13/22 0121     COVID19 Not Detected    Narrative:      Fact sheet for providers: https://www.fda.gov/media/684672/download     Fact sheet for patients: https://www.fda.gov/media/810818/download    Test performed by RT PCR.    Blood Culture - Blood, Arm, Right [[id number]]  (Normal) Collected: 09/12/22 1656    Lab Status: Preliminary result Specimen: Blood from Arm, Right Updated: 09/16/22 1733     Blood Culture No growth at 4 days          [x]  Microbiology  [x]  Radiology  CT Chest With Contrast Diagnostic    Result Date: 9/13/2022    CT scan of the chest with IV contrast demonstrating no findings of PE.  Emphysema.  Mild anterior wedging compression fracture of the superior endplate of a single midthoracic vertebral body with faint sclerosis, suggesting healing fracture.      ALAN VARGAS MD       Electronically Signed and Approved By: ALAN VARGAS MD on 9/13/2022 at 20:25               [x]  EKG/Telemetry   [x]  Cardiology/Vascular  Echo reviewed as above  []  Pathology  [x]  Old records pulmonology clinic notes  [x]  Other:  Scheduled Meds:arformoterol, 15 mcg, Nebulization, BID - RT  atorvastatin, 10 mg, Oral, Daily  budesonide, 0.5 mg, Nebulization, BID - RT  enoxaparin, 40 mg, Subcutaneous, Daily  ipratropium-albuterol,  3 mL, Nebulization, Q6H While Awake - RT  metoprolol tartrate, 12.5 mg, Oral, Q12H  predniSONE, 40 mg, Oral, Daily With Breakfast  senna-docusate sodium, 2 tablet, Oral, BID  sodium chloride, 10 mL, Intravenous, Q12H      Continuous Infusions:Pharmacy to Dose enoxaparin (LOVENOX),       PRN Meds:.•  acetaminophen **OR** acetaminophen **OR** acetaminophen  •  albuterol  •  senna-docusate sodium **AND** polyethylene glycol **AND** bisacodyl **AND** bisacodyl  •  calcium carbonate  •  melatonin  •  nicotine  •  nicotine polacrilex  •  ondansetron **OR** ondansetron  •  Pharmacy to Dose enoxaparin (LOVENOX)  •  sodium chloride  •  sodium chloride  •  sodium chloride  •  sodium chloride      Assessment & Plan   Assessment / Plan     Assessment/Plan:  COPD with acute exacerbation  Acute on chronic hypoxic respiratory failure  Mucous plugging of the airway  Chronic hypercapnic respiratory failure  Volume overload  Acute decompensated diastolic heart failure  Severe pulmonary hypertension  Elevated D-dimer negative for PE on CT scan  Tobacco abuse of cigarettes ongoing  History of COVID-19 in January 2020  Nonsustained V. tach    Patient admitted for further evaluation treatment  Pulmonology and cardiology consulted thank you for your assistance  Continue Leeann Pulmicmari scheduled  Continue DuoNeb scheduled  Continue albuterol as needed  Stop Solu-Medrol per pulmonology  Start prednisone taper 40 mg daily and decrease by 10 mg every 3 days until off per pulmonology  Continue ceftriaxone for haemophilus and strep pneumo coverage per PCR to complete a 7-day course  Sputum culture negative  Continue bronchopulmonary hygiene protocol  Continue bronchodilator protocol  Continue supplemental oxygen titrate keep sats greater than 90%  Continue BiPAP at night and as needed per pulmonology  Respiratory case management has arranged for NIPPV at home which was delivered to the bedside   Mask keeps falling off during sleep per  patient.  Discharge planning contacted to try to get a hold of [employer] to have a technician come out and work to better fit the mask  Further work-up pulmonary hypertension per pulmonology and cardiology  Continue Lovenox for DVT prophylaxis  Patient counseled importance of tobacco cessation  Will provide nicotine replacement patient  Continue low-dose metoprolol due to nonsustained V. tach  Further inpatient orders recommendations pending clinical course.         Discussed plan with RN as well as pulmonology attending.    DVT prophylaxis:  Medical and mechanical DVT prophylaxis orders are present.    CODE STATUS:   Level Of Support Discussed With: Patient  Code Status (Patient has no pulse and is not breathing): CPR (Attempt to Resuscitate)  Medical Interventions (Patient has pulse or is breathing): Full

## 2022-09-18 ENCOUNTER — READMISSION MANAGEMENT (OUTPATIENT)
Dept: CALL CENTER | Facility: HOSPITAL | Age: 62
End: 2022-09-18

## 2022-09-18 VITALS
SYSTOLIC BLOOD PRESSURE: 103 MMHG | OXYGEN SATURATION: 95 % | HEART RATE: 64 BPM | TEMPERATURE: 97.3 F | DIASTOLIC BLOOD PRESSURE: 76 MMHG | RESPIRATION RATE: 16 BRPM | BODY MASS INDEX: 21.14 KG/M2 | WEIGHT: 134.7 LBS | HEIGHT: 67 IN

## 2022-09-18 PROCEDURE — 94799 UNLISTED PULMONARY SVC/PX: CPT

## 2022-09-18 PROCEDURE — 94761 N-INVAS EAR/PLS OXIMETRY MLT: CPT

## 2022-09-18 PROCEDURE — 99233 SBSQ HOSP IP/OBS HIGH 50: CPT | Performed by: INTERNAL MEDICINE

## 2022-09-18 PROCEDURE — 63710000001 PREDNISONE PER 1 MG: Performed by: INTERNAL MEDICINE

## 2022-09-18 PROCEDURE — 97161 PT EVAL LOW COMPLEX 20 MIN: CPT

## 2022-09-18 PROCEDURE — 94760 N-INVAS EAR/PLS OXIMETRY 1: CPT

## 2022-09-18 PROCEDURE — 25010000002 CEFTRIAXONE PER 250 MG: Performed by: FAMILY MEDICINE

## 2022-09-18 PROCEDURE — 99239 HOSP IP/OBS DSCHRG MGMT >30: CPT | Performed by: FAMILY MEDICINE

## 2022-09-18 PROCEDURE — 25010000002 ENOXAPARIN PER 10 MG: Performed by: INTERNAL MEDICINE

## 2022-09-18 PROCEDURE — 94618 PULMONARY STRESS TESTING: CPT

## 2022-09-18 RX ORDER — AMOXICILLIN AND CLAVULANATE POTASSIUM 875; 125 MG/1; MG/1
1 TABLET, FILM COATED ORAL 2 TIMES DAILY
Qty: 4 TABLET | Refills: 0 | Status: SHIPPED | OUTPATIENT
Start: 2022-09-19 | End: 2022-09-21

## 2022-09-18 RX ORDER — NICOTINE 21 MG/24HR
1 PATCH, TRANSDERMAL 24 HOURS TRANSDERMAL DAILY PRN
Qty: 30 PATCH | Refills: 0 | Status: SHIPPED | OUTPATIENT
Start: 2022-09-18 | End: 2022-10-18

## 2022-09-18 RX ORDER — PREDNISONE 10 MG/1
TABLET ORAL
Qty: 30 TABLET | Refills: 0 | Status: SHIPPED | OUTPATIENT
Start: 2022-09-18 | End: 2022-09-30

## 2022-09-18 RX ORDER — IPRATROPIUM BROMIDE AND ALBUTEROL SULFATE 2.5; .5 MG/3ML; MG/3ML
3 SOLUTION RESPIRATORY (INHALATION) EVERY 4 HOURS PRN
Qty: 540 ML | Refills: 0 | Status: SHIPPED | OUTPATIENT
Start: 2022-09-18 | End: 2022-09-20 | Stop reason: SDUPTHER

## 2022-09-18 RX ADMIN — PREDNISONE 40 MG: 20 TABLET ORAL at 08:35

## 2022-09-18 RX ADMIN — ENOXAPARIN SODIUM 40 MG: 100 INJECTION SUBCUTANEOUS at 08:36

## 2022-09-18 RX ADMIN — METOPROLOL TARTRATE 12.5 MG: 25 TABLET, FILM COATED ORAL at 08:35

## 2022-09-18 RX ADMIN — Medication 10 ML: at 08:36

## 2022-09-18 RX ADMIN — IPRATROPIUM BROMIDE AND ALBUTEROL SULFATE 3 ML: 2.5; .5 SOLUTION RESPIRATORY (INHALATION) at 06:16

## 2022-09-18 RX ADMIN — CEFTRIAXONE SODIUM 1 G: 1 INJECTION, SOLUTION INTRAVENOUS at 13:16

## 2022-09-18 RX ADMIN — IPRATROPIUM BROMIDE AND ALBUTEROL SULFATE 3 ML: 2.5; .5 SOLUTION RESPIRATORY (INHALATION) at 11:47

## 2022-09-18 RX ADMIN — SENNOSIDES AND DOCUSATE SODIUM 2 TABLET: 8.6; 5 TABLET ORAL at 08:36

## 2022-09-18 RX ADMIN — ARFORMOTEROL TARTRATE 15 MCG: 15 SOLUTION RESPIRATORY (INHALATION) at 06:17

## 2022-09-18 RX ADMIN — ATORVASTATIN CALCIUM 10 MG: 10 TABLET, FILM COATED ORAL at 08:35

## 2022-09-18 RX ADMIN — BUDESONIDE 0.5 MG: 0.5 SUSPENSION RESPIRATORY (INHALATION) at 06:17

## 2022-09-18 NOTE — NURSING NOTE
Exercise Oximetry    Patient Name:Sumeet Gomes   MRN: [id number]   Date: 09/18/22             ROOM AIR BASELINE   SpO2% 88   Heart Rate 67   Blood Pressure      EXERCISE ON ROOM AIR SpO2% EXERCISE ON O2 @ 3 LPM SpO2%   1 MINUTE 88 1 MINUTE    2 MINUTES 86 2 MINUTES    3 MINUTES  3 MINUTES 86   4 MINUTES  4 MINUTES 87   5 MINUTES  5 MINUTES 88   6 MINUTES  6 MINUTES 90              Distance Walked  1ft Distance Walked   Dyspnea (Laxmi Scale)   Dyspnea (Laxmi Scale)   Fatigue (Laxmi Scale)   Fatigue (Laxmi Scale)   SpO2% Post Exercise  90 SpO2% Post Exercise   HR Post Exercise  72 HR Post Exercise   Time to Recovery  4min Time to Recovery     Comments:

## 2022-09-18 NOTE — DISCHARGE SUMMARY
[facility]         HOSPITALIST  DISCHARGE SUMMARY    Patient Name: Sumeet Gomes  : 1960  MRN: [id number]    Date of Admission: 2022  Date of Discharge: 2022  Primary Care Physician: Mirtha Alexander APRN    Consults     Date and Time Order Name Status Description    9/15/2022  1:54 PM Inpatient Cardiology Consult Completed     2022 10:24 AM Inpatient Pulmonology Consult Completed     2022  7:42 PM Hospitalist (on-call MD unless specified)            Active and Resolved Hospital Problems:  COPD with acute exacerbation  Acute on chronic hypoxic respiratory failure  Mucous plugging of the airway  Chronic hypercapnic respiratory failure  Volume overload  Acute decompensated diastolic heart failure  Severe pulmonary hypertension  Elevated D-dimer negative for PE on CT scan  Tobacco abuse of cigarettes ongoing  History of COVID-19 in 2020  Nonsustained V. tach  Active Hospital Problems    Diagnosis POA   • **Acute respiratory failure with hypoxia and hypercapnia (HCC) [J96.01, J96.02] Yes   • Severe malnutrition (HCC) [E43] Yes   • Acute CHF (HCC) [I50.9] Yes   • Tobacco abuse [Z72.0] Yes   • Pulmonary hypertension (HCC) [I27.20] Yes   • COPD (chronic obstructive pulmonary disease) (HCC) [J44.9] Yes   • Essential hypertension [I10] Yes   • Other hyperlipidemia [E78.49] Yes      Resolved Hospital Problems   No resolved problems to display.       Hospital Course     Hospital Course:  Sumeet Gomes is a 61 y.o. male who presents with progressive shortness of breath over the past 2 to 3 days.  History of hypertension, chronic obstructive pulmonary disease, hyperlipidemia, chronic tobacco abuse.     Patient presents from home, reports increasing shortness of breath over the past few days.  He reports he wears oxygen at night when he sleeps.  He monitors his O2 sats at home, reports that he is normally in the mid 70s on home pulse oximetry.  He reports over the  weekend that his pulse ox dropped into the 50s and he had increasing shortness of breath.  He reports developing lower extremity edema over the past several months.  Denies orthopnea.  Denies fevers or chills.  Reports cough productive of yellow sputum.     ED course: In ED , RR 28, oxygen saturation 67% on room air.  Bicarbonate 35, BNP 5360.  Normal troponin.  Chest x-ray also normal.  Started on furosemide, duo nebs.  ABG demonstrated pH 7.35, PCO2 79, PO2 65, bicarb 43 on 3 L nasal cannula.  Patient's pulmonology group consulted. Echocardiogram demonstrated normal left ventricular ejection fraction with elevated right ventricular systolic pressures greater than 55 mmHg.  CT of the chest reviewed and negative for PE.  Did demonstrate findings consistent with emphysema. Bronchoscopy performed demonstrated mucous plugging in the trachea and bilateral bronchial tubes left more than right with scattered purulent secretions and friable airway.  BAL PCR positive for strep pneumo and haemophilus influenza as well as rhinovirus. Cardiology consulted for right heart cath.  Home NIPPV arranged by respiratory case management.  Patient taken for right heart cath which demonstrated moderate pulmonary hypertension not responsive to vasodilators.  Respiratory function stabilized.  Patient seen evaluated on day discharge by myself and Dr. Chapa pulmonology critical care and thought stable for discharge home on continuous home O2 with an NIMV at night to complete a prednisone taper, course of antibiotics follow-up with his primary care provider and pulmonology as well as pulmonary rehab.        DISCHARGE Follow Up Recommendations for labs and diagnostics: As above      Day of Discharge     Vital Signs:  Temp:  [97.3 °F (36.3 °C)-98.1 °F (36.7 °C)] 97.3 °F (36.3 °C)  Heart Rate:  [60-71] 64  Resp:  [16-18] 16  BP: ()/(65-76) 103/76  Flow (L/min):  [2-3] 2  Physical Exam:   Gen. well-developed appearing stated age in  no acute distress, thin frail appearing  HEENT: Normocephalic atraumatic moist membranes pupils equal round reactive light, no scleral icterus no conjunctival injection  Cardiovascular: regular rate and rhythm no murmurs rubs or gallops S1-S2, no lower extremity edema appreciated  Pulmonary: Diminished to auscultation bilaterally bilateral expiratory wheezes no rales or rhonchi symmetric chest expansion, unlabored, no conversational dyspnea appreciated, increased AP diameter  Gastrointestinal: Soft nontender nondistended positive bowel sounds all 4 quadrants no rebound or guarding  Musculoskeletal: No clubbing cyanosis, warm and well-perfused, calves soft symmetric nontender bilaterally  Skin: Clean dry without rashs  Neuro: Cranial nerves II through XII intact grossly no sensorimotor deficits appreciated bilateral upper and lower extremities  Psych: Patient is calm cooperative and appropriate with exam not responding to internal stimuli  : No Sharpe catheter no bladder distention no suprapubic tenderness      Discharge Details        Discharge Medications      New Medications      Instructions Start Date   amoxicillin-clavulanate 875-125 MG per tablet  Commonly known as: Augmentin   1 tablet, Oral, 2 Times Daily   Start Date: September 19, 2022     metoprolol tartrate 25 MG tablet  Commonly known as: LOPRESSOR   12.5 mg, Oral, Every 12 Hours Scheduled      nicotine 21 MG/24HR patch  Commonly known as: NICODERM CQ   1 patch, Transdermal, Daily PRN      predniSONE 10 MG tablet  Commonly known as: DELTASONE   Take 4 tablets by mouth Daily for 3 days, THEN 3 tablets Daily for 3 days, THEN 2 tablets Daily for 3 days, THEN 1 tablet Daily for 3 days.   Start Date: September 18, 2022        Changes to Medications      Instructions Start Date   ipratropium-albuterol 0.5-2.5 mg/3 ml nebulizer  Commonly known as: DUO-NEB  What changed: Another medication with the same name was added. Make sure you understand how and when to  take each.   USE 1 VIAL IN NEBULIZER EVERY 4 HOURS AS NEEDED FOR SHORTNESS OF BREATH      ipratropium-albuterol 0.5-2.5 mg/3 ml nebulizer  Commonly known as: DUO-NEB  What changed: You were already taking a medication with the same name, and this prescription was added. Make sure you understand how and when to take each.   3 mL, Nebulization, Every 4 Hours PRN         Continue These Medications      Instructions Start Date   albuterol sulfate  (90 Base) MCG/ACT inhaler  Commonly known as: PROVENTIL HFA;VENTOLIN HFA;PROAIR HFA   INHALE 2 PUFFS EVERY 6 HOURS AS NEEDED FOR WHEEZING      Anoro Ellipta 62.5-25 MCG/INH aerosol powder  inhaler  Generic drug: umeclidinium-vilanterol   1 puff, Inhalation, Daily      Flovent  MCG/ACT inhaler  Generic drug: fluticasone   Inhale 2 puffs by mouth 2 (two) times a day.      simvastatin 20 MG tablet  Commonly known as: ZOCOR   20 mg, Oral, Daily         Stop These Medications    hydroCHLOROthiazide 25 MG tablet  Commonly known as: HYDRODIURIL            No Known Allergies    Discharge Disposition:  Home or Self Care    Diet:  Hospital:  Diet Order   Procedures   • Diet Regular; Cardiac       Discharge Activity:   Activity Instructions     Gradually Increase Activity Until at Pre-Hospitalization Level      Work Restrictions      Type of Restriction: Work    May Return to Work: After Next Appointment    With / Without Restrictions: With Restrictions    Explain Work Restrictions: Avoid dust and noxious inhalants          CODE STATUS:  Code Status and Medical Interventions:   Ordered at: 09/12/22 2000     Level Of Support Discussed With:    Patient     Code Status (Patient has no pulse and is not breathing):    CPR (Attempt to Resuscitate)     Medical Interventions (Patient has pulse or is breathing):    Full         Future Appointments   Date Time Provider Department Center   9/19/2022  2:15 PM Julia Chin APRN [facility]       Additional Instructions for the  Follow-ups that You Need to Schedule     Ambulatory Referral to Pulmonary Rehab   As directed      Follow-up needed: Yes         Call MD With Problems / Concerns   As directed      Instructions: Seek immediate medical attention for increased shortness of breath, increased lethargy, fever, chills, chest pain, palpitations, increased lower extremity swelling    Order Comments: Instructions: Seek immediate medical attention for increased shortness of breath, increased lethargy, fever, chills, chest pain, palpitations, increased lower extremity swelling          Discharge Follow-up with PCP   As directed       Currently Documented PCP:    Mirtha Alexander APRN    PCP Phone Number:    [phone]     Follow Up Details: Hospital discharge follow-up 1 week for acute on chronic hypoxic and hypercapnic respiratory failure due to COPD with acute exacerbation         Discharge Follow-up with Specified Provider: Dr. Chapa; 2 Weeks   As directed      To: Dr. Chapa    Follow Up: 2 Weeks    Follow Up Details: Hospital discharge follow-up acute on chronic hypoxic hypercapnic respiratory failure, COPD, pulmonary hypertension         Renal Function Panel    Sep 25, 2022 (Approximate)      Please send results to Dr. Chapa    Order Comments: Please send results to Dr. Chapa     Release to patient: Routine Release               Pertinent  and/or Most Recent Results     PROCEDURES:     Ulices Moya MD    Physician   Pulmonology   Op Note      Signed   Date of Service:  09/14/22 1013   Creation Time:  09/14/22 1020           Case Time:  Procedures:  Surgeons:    09/14/22 1013 BRONCHOSCOPY WITH BRONCHOALVEOLAR LAVAGE AND BRONCHIAL WASHINGS    Ulices Moya MD               Signed              Show:Clear all  [x]Manual[x]Template[]Copied    Added by:  [x]Ulices Moya MD      []Janna for details    Bronchoscopy Procedure Note     Procedure:  Bronchoscopy with mucous plug removal  Bronchoscopy with bronchoalveolar  lavage left lower lobe  Bronchoscopy with bronchial washings tracheobronchial tree     Pre-Operative Diagnosis: COPD exacerbation, persistent bronchitis  Post-Operative Diagnosis: COPD exacerbation, persistent bronchitis mucous plugging     Indication:  COPD exacerbation, mucous plugging     Anesthesia: MAC anesthesia     Procedure Details: Patient was consented for the procedure with all risks and benefits of the procedure explained in detail. Patient was given the opportunity to ask questions and all concerns were answered.     The bronchoscope was inserted into the main airway via the mouth. An anatomical survey was done of the main airways and the subsegmental bronchus. The findings are reported below.  Significant mucus plugging was seen bilaterally, starting from upper middle and lower bronchial tubes.  It was more in the left lower lobe bronchial tube.  A bronchoalveolar lavage was performed using 60 mL aliquots of normal saline x2 instilled into the airways then aspirated back from left lower lobe of lung with 45 mL serosanguineous fluid in return.  Bronchial washing was obtained from entire tracheobronchial tree.  No significant bleeding or oozing after the procedure.     Findings:  Significant mucus plugging in trachea, bilateral bronchial tubes, more so in left lower lobe  Friable mucosa, easy bleeding with suction  Scattered purulent secretions     Estimated Blood Loss: Insignificant     Specimens:  BAL left lower lobe  Bronchial brushing left lower lobe  Transbronchial biopsies left lower lobe  Bronchial washings tracheobronchial tree     Complications: None; patient tolerated the procedure well.     Disposition: To floor, once stable in recovery.     Patient tolerated the procedure well.        Electronically signed by Ulices Moya MD, 2022, 10:20 EDT.                CARDIAC CATHETERIZATION PROCEDURE REPORT     Patient: Sumeet Gomes  : 1960  MRN: [id number]  Procedure Date:  09/16/22     Referring Physician:   Ulices Moya MD     Interventional Cardiologist:   Drew Rodriguez MD     Indication:  1. Severe pulmonary hypertension     Clinical Presentation:  Mr. Gomes has severe COPD and admitted with the hypoxic hypercarbic respiratory failure.  Echocardiogram was suggestive of severe pulmonary hypertension.  Today he was brought to the cardiac Cath Lab to perform right heart catheterization and vasodilator challenge if indicated.     Procedure performed:     1. Diagnostic Right Heart Catheterization  2. Vasodilator challenge using adenosine infusion        Access Sites:  1. Right femoral vein     Findings:     1. Hemodynamics:  Right Atrium: Mean right atrial pressure is 7 mmHg  Right Ventricle: RV systolic pressure is 49 mmHg  Pulmonary Artery: 48/18 with a mean of 31 mmHg  Pulmonary Wedge: Mean wedge pressure is 14 mmHg  Cardiac Output/Index by thermodilution method: 6.07 L/min with a cardiac index of 3.56 L/min/m2  Cardiac Output/Index by Kurtis's principle: 7.41 L/min with a cardiac index of 4.34 L/min/m2  PA Sat: 82%  RA Sat: 82%  AO Sat: 96%     2.  Vasodilator challenge:     There was no significant drop in coronary artery pressures with vasodilator challenge using graded adenosine infusion.     Conclusions:  1. Moderate pulmonary artery hypertension   2. Near normal left-sided filling pressures  3. Patient is a nonresponder to vasodilator challenge     Recommendations:   1. Continue management for pulmonary hypertension and respiratory failure per pulmonology          LAB RESULTS:      Lab 09/17/22  0433 09/13/22  0845 09/12/22  2114 09/12/22  1656   WBC 8.05 7.21  --  8.12   HEMOGLOBIN 17.0 16.1  --  16.5   HEMATOCRIT 54.8* 50.9  --  51.2*   PLATELETS 179 151  --  159   NEUTROS ABS  --  5.96  --  6.42   IMMATURE GRANS (ABS)  --  0.02  --  0.02   LYMPHS ABS  --  0.50*  --  0.70   MONOS ABS  --  0.73  --  0.96*   EOS ABS  --  0.00  --  0.00   MCV 94.0 91.9  --  91.1   SED RATE  --   5  --   --    CRP  --  3.43*  --   --    PROCALCITONIN  --  0.04  --   --    LACTATE  --   --   --  0.8   D DIMER QUANT  --   --  0.63*  --          Lab 09/17/22  0433 09/16/22  0638 09/15/22  0538 09/13/22  0845 09/12/22  1656   SODIUM 138 139 140 138 138   POTASSIUM 4.0 4.0 3.6 4.3 3.9   CHLORIDE 84* 81* 85* 94* 96*   CO2 >50.0* >50.0* 49.5* 38.1* 34.7*   ANION GAP  --   --  5.5 5.9 7.3   BUN 39* 35* 22 20 20   CREATININE 0.64* 0.67* 0.69* 0.67* 0.69*   EGFR 108.4 106.9 105.9 106.9 105.9   GLUCOSE 132* 140* 109* 118* 127*   CALCIUM 10.0 9.9 9.3 9.1 9.5         Lab 09/13/22  0845 09/12/22  1656   TOTAL PROTEIN 6.3 6.8   ALBUMIN 4.10 4.20   GLOBULIN 2.2 2.6   ALT (SGPT) 14 13   AST (SGOT) 24 24   BILIRUBIN 0.5 0.6   ALK PHOS 156* 156*         Lab 09/16/22  0638 09/12/22  1656   PROBNP 926.1* 5,360.0*   TROPONIN T  --  <0.010                 Lab 09/16/22  0818 09/16/22  0814 09/14/22  0651 09/13/22  1120   PH, ARTERIAL  --   --  7.409 7.356   PCO2, ARTERIAL  --   --  77.0* 79.3*   PO2 ART  --   --  65.3* 65.0*   O2 SATURATION ART  --   --  93.3* 92.5*   FIO2 28 28 30 32   HCO3 ART  --   --  47.6* 43.4*   BASE EXCESS ART  --   --  17.9* 13.2*   CARBOXYHEMOGLOBIN 0.7 0.7 1.7* 2.6*     Brief Urine Lab Results     None        Microbiology Results (last 10 days)     Procedure Component Value - Date/Time    Respiratory Culture - Wash, Bronchus [[id number]] Collected: 09/14/22 1023    Lab Status: Final result Specimen: Wash from Bronchus Updated: 09/16/22 0931     Respiratory Culture Light growth (2+) Normal respiratory jessica. No S. aureus or Pseudomonas aeruginosa detected. Final report.     Gram Stain Few (2+) WBCs seen      No organisms seen    AFB Culture - Lavage, Lung, Left Lower Lobe [[id number]] Collected: 09/14/22 1016    Lab Status: Preliminary result Specimen: Lavage from Lung, Left Lower Lobe Updated: 09/15/22 1144     AFB Stain No acid fast bacilli seen on direct smear      No acid fast bacilli seen on  concentrated smear    BAL Culture, Quantitative - Lavage, Lung, Left Lower Lobe [[id number]] Collected: 09/14/22 1016    Lab Status: Final result Specimen: Lavage from Lung, Left Lower Lobe Updated: 09/16/22 0925     BAL Culture <25,000 CFU/mL Normal respiratory jessica. No S. aureus or Pseudomonas aeruginosa detected. Final report.     Gram Stain Many (4+) WBCs seen      Rare (1+) Yeast      Rare (1+) Fungal elements    Pneumonia Panel - Lavage, Lung, Left Lower Lobe [[id number]]  (Abnormal) Collected: 09/14/22 1016    Lab Status: Final result Specimen: Lavage from Lung, Left Lower Lobe Updated: 09/14/22 1339     Escherichia coli PCR Not Detected     Acinetobacter calcoaceticus-baumannii complex PCR Not Detected     Enterobacter cloacae PCR Not Detected     Klebsiella oxytoca PCR Not Detected     Klebsiella pneumoniae group PCR Not Detected     Klebsiella aerogenes PCR Not Detected     Moraxella catarrhalis PCR Not Detected     Proteus species PCR Not Detected     Pseudomonas aeroginosa PCR Not Detected     Serratia marcescens PCR Not Detected     Staphylococcus aureus PCR Not Detected     Streptococcus pyogenes PCR Not Detected     Haemophilus influenzae PCR Detected     Comment: 10^5 Bin copies/mL        Streptococcus agalactiae PCR Not Detected     Streptococcus pneumoniae PCR Detected     Comment: 10^6 Bin copies/mL        Chlamydophila pneumoniae PCR Not Detected     Legionella pneumophilia PCR Not Detected     Mycoplasma pneumo by PCR Not Detected     ADENOVIRUS, PCR Not Detected     CTX-M Gene N/A     IMP Gene N/A     KPC Gene N/A     mecA/C and MREJ Gene N/A     NDM Gene N/A     OXA-48-like Gene N/A     VIM Gene N/A     Coronavirus Not Detected     Human Metapneumovirus Not Detected     Human Rhinovirus/Enterovirus Detected     Influenza A PCR Not Detected     Influenza B PCR Not Detected     RSV, PCR Not Detected     Parainfluenza virus PCR Not Detected    Respiratory Culture - Sputum, Cough [[id number]]  Collected: 09/13/22 1852    Lab Status: Final result Specimen: Sputum from Cough Updated: 09/15/22 0936     Respiratory Culture Moderate growth (3+) Normal respiratory jessica. No S. aureus or Pseudomonas aeruginosa detected. Final report.     Gram Stain Less than 10 Epithelial cells per low power field      Greater than 25 WBCs per low power field      Rare (1+) Gram positive cocci in pairs and clusters    S. Pneumo Ag Urine or CSF - Urine, Urine, Clean Catch [[id number]]  (Normal) Collected: 09/13/22 1817    Lab Status: Final result Specimen: Urine, Clean Catch Updated: 09/13/22 2011     Strep Pneumo Ag Negative    Legionella Antigen, Urine - Urine, Urine, Clean Catch [[id number]]  (Normal) Collected: 09/13/22 1817    Lab Status: Final result Specimen: Urine, Clean Catch Updated: 09/13/22 2010     LEGIONELLA ANTIGEN, URINE Negative    Blood Culture - Blood, Arm, Left [[id number]]  (Normal) Collected: 09/12/22 1752    Lab Status: Final result Specimen: Blood from Arm, Left Updated: 09/17/22 1803     Blood Culture No growth at 5 days    COVID-19,APTIMA PANTHER(HERLINDA),[facility], NP/OP SWAB IN UTM/VTM/[facility] TRANSPORT MEDIA,24 HR TAT - Swab, Nasal Cavity [[id number]]  (Normal) Collected: 09/12/22 1752    Lab Status: Final result Specimen: Swab from Nasal Cavity Updated: 09/13/22 0121     COVID19 Not Detected    Narrative:      Fact sheet for providers: https://www.fda.gov/media/978395/download     Fact sheet for patients: https://www.fda.gov/media/809302/download    Test performed by RT PCR.    Blood Culture - Blood, Arm, Right [[id number]]  (Normal) Collected: 09/12/22 1656    Lab Status: Final result Specimen: Blood from Arm, Right Updated: 09/17/22 1733     Blood Culture No growth at 5 days          CT Chest With Contrast Diagnostic    Result Date: 9/13/2022  Impression:   CT scan of the chest with IV contrast demonstrating no findings of PE.  Emphysema.  Mild anterior wedging compression fracture of the superior  endplate of a single midthoracic vertebral body with faint sclerosis, suggesting healing fracture.      ALAN VARGAS MD       Electronically Signed and Approved By: ALAN VARGAS MD on 9/13/2022 at 20:25                       Results for orders placed during the hospital encounter of 09/12/22    Adult Transthoracic Echo Complete W/ Cont if Necessary Per Protocol    Interpretation Summary  · Calculated left ventricular EF = 56% Estimated left ventricular EF was in agreement with the calculated left ventricular EF.  · The right ventricular cavity is moderate to severely dilated.  · The right atrial cavity is moderately dilated.  · Moderate tricuspid valve regurgitation is present.  · Estimated right ventricular systolic pressure from tricuspid regurgitation is markedly elevated (>55 mmHg).  · Moderate to severe pulmonary hypertension is present.      Labs Pending at Discharge:  Pending Labs     Order Current Status    Fungus Culture - Lavage, Lung, Left Lower Lobe In process    Myositis Panel III Plus In process    AFB Culture - Lavage, Lung, Left Lower Lobe Preliminary result            Time spent on Discharge including face to face service: Greater than 35 minutes    Electronically signed by Paco Covington MD, 09/18/22, 2:18 PM EDT.

## 2022-09-18 NOTE — PROGRESS NOTES
Pulmonary / Critical Care Progress Note      Patient Name: Sumeet Gomes  : 1960  MRN: [id number]  Primary Care Physician:  Mirtha Alexander APRN  Date of admission: 2022    Subjective   Subjective   Follow-up for acute exacerbation of COPD, Pulmonary Hypertension.      Over past 24 hours, remains stable on 2-3L nc.       No acute events overnight.      This morning,   Lying in bed on 2 L nasal cannula  No wheezing  Reports breathing about the same   No chest pain  No fever or chills  Weak and fatigued  Reports has not been out of bed.  Plan is for patient to go home with outpatient rehab       Review of Systems  General: Fatigue, unintentional weight loss 40 pounds in last 8 months, otherwise denied complaints  HEENT: Denied complaints  Respiratory: Dyspnea, cough, otherwise denied complaints  Cardiovascular: denied complaints  GI: Denied complaints  MSK: Weakness, otherwise denied complaints    Objective   Objective     Vitals:   Temp:  [97.3 °F (36.3 °C)-98.1 °F (36.7 °C)] 97.3 °F (36.3 °C)  Heart Rate:  [60-71] 64  Resp:  [16-18] 16  BP: ()/(65-76) 103/76  Flow (L/min):  [2-3] 2    Physical Exam   Vital Signs Reviewed   General: Thin cachetic male, Awake and Alert, NAD  HEENT:  PERRL, EOMI.  OP, nares clear, no sinus tenderness  Neck:  Supple, no JVD, no thyromegaly  Chest: barrel chested, poor aeration, decreased rhonchi bilaterally, tympanic to percussion bilaterally, pursed lip breathing  CV: RRR, no MGR, pulses 2+, equal  Abd:  Soft, NT, ND, + BS, no HSM  EXT:  no clubbing, no cyanosis, no edema, muscle wasting noted in all 4 extremities  Neuro:  A&Ox3, CN grossly intact, no focal deficits  Skin: No rashes or lesions noted    Result Review    Result Review:  I have personally reviewed the results from the time of this admission to 2022 12:57 EDT and agree with these findings:  [x]  Laboratory  [x]  Microbiology  [x]  Radiology  [x]  EKG/Telemetry   [x]  Cardiology/Vascular    []  Pathology  [x]  Old records  []  Other:  Most notable findings include:  Co2 >50   Right heart cath with signs of Moderate Pulmonary HTN, CO2 >50, RA <10, NORMA (-),   CRP 3.43, sed rate 5, Pro-Saw 0.04      9/14 0650 ABG 7.40, 77, 65.3, 47.6 on BiPAP FiO2 30%  9/13 1120 ABG 7.35, 79.3, 65.0, 43.4 on 3 L nasal cannula    9/13 CT chest with contrast revealed no PE, emphysema changes    9/13 sputum culture pending  Strep and Legionella negative  Blood cultures no growth at 24 hours  COVID negative        Lab 09/17/22  0433 09/16/22  0638 09/15/22  0538 09/13/22  0845 09/12/22  1656   WBC 8.05  --   --  7.21 8.12   HEMOGLOBIN 17.0  --   --  16.1 16.5   HEMATOCRIT 54.8*  --   --  50.9 51.2*   PLATELETS 179  --   --  151 159   SODIUM 138 139 140 138 138   POTASSIUM 4.0 4.0 3.6 4.3 3.9   CHLORIDE 84* 81* 85* 94* 96*   CO2 >50.0* >50.0* 49.5* 38.1* 34.7*   BUN 39* 35* 22 20 20   CREATININE 0.64* 0.67* 0.69* 0.67* 0.69*   GLUCOSE 132* 140* 109* 118* 127*   CALCIUM 10.0 9.9 9.3 9.1 9.5   TOTAL PROTEIN  --   --   --  6.3 6.8   ALBUMIN  --   --   --  4.10 4.20   GLOBULIN  --   --   --  2.2 2.6            Assessment & Plan   Assessment / Plan     Active Hospital Problems:  Active Hospital Problems    Diagnosis    • **Acute respiratory failure with hypoxia and hypercapnia (HCC)    • Severe malnutrition (HCC)    • Acute CHF (HCC)    • Tobacco abuse    • Pulmonary hypertension (HCC)      Added automatically from request for surgery [id number]     • COPD (chronic obstructive pulmonary disease) (HCC)    • Essential hypertension    • Other hyperlipidemia      Impression:   Acute on chronic hypoxic respiratory failure: 2 L nasal cannula at home  Acute hypercapnic respiratory failure  Acute exacerbation of COPD  Volume overload  Acute decompensation of heart failure  Moderate pulmonary hypertension: RVSP >55.  Confirmed on right heart cath.  Secondary to WHO class III severe COPD and emphysema  Elevated D-dimer  Current tobacco abuse  of cigarettes  History of COVID January 2022    Plan:   Right Heart cath on 9/16 showed moderate Pulmonary HTN secondary to WHO class III chronic lung disease with severe Iram.  Patient not a candidate for pulmonary vasodilator secondary to severe COPD  Continue prednisone 40 mg daily.  Decrease by 10 mg every 3 days until off steroids   Serum bicarb is markedly elevated.  He does not appear volume contracted so we will hold off on giving Diamox at this time.  This should auto equilibrate on its own over the the upcoming days.  Would repeat renal panel in about 1 week as an outpatient  Continue nebulizers and bronchopulmonary hygiene   Continue NIPPV nightly with naps on current settings.  RT  arranging home NIPPV  Would benefit from pulmonary rehab.  Also arranging home rehab  S/p bronchoscopy with mucus plug removal.   Finish 7 days of Rocephin for H. influenzae and strep pneumo pneumonia  HIV immunoglobulins with IgG subclasses, connective tissue disease serologies all negative  Encourage activity.  Out of bed to chair.   Patient has chronic hypoxic and hypercapnic respiratory failure with severe COPD.  He has been on BiPAP and so far has been suboptimal to help with his respiratory failure.  He will need astral vent to help him with work of breathing, hypercapnic respiratory failure and decreased hospitalization.  Inability to get noninvasive positive pressure ventilation with adjusted tidal volume will increase chances of his rehospitalization's and could be life-threatening and increased chance of mortality.     DVT prophylaxis:  Medical and mechanical DVT prophylaxis orders are present.    CODE STATUS:   Level Of Support Discussed With: Patient  Code Status (Patient has no pulse and is not breathing): CPR (Attempt to Resuscitate)  Medical Interventions (Patient has pulse or is breathing): Full    Okay to discharge from pulmonary perspective.  Follow-up with us as outpatient  1 week after  discharge      I personally reviewed pertinent labs, imaging and provider notes.   Discussed with bedside nurse and will discuss with primary service.     I, Dr. Collins Chapa, have spent more than 50% of the total time managing the patient in this encounter today.  This included personally reviewing all pertinent labs, imaging, microbiology and documentation. Also discussing the case with the patient and any available family, the admitting physician and any available ancillary staff.    Electronically signed by Collins Chapa MD, 09/18/22, 12:58 PM EDT.

## 2022-09-18 NOTE — OUTREACH NOTE
Prep Survey    Flowsheet Row Responses   [facility] patient discharged from? [facility]   Is LACE score < 7 ? No   Emergency Room discharge w/ pulse ox? No   Eligibility [facility]   Date of Admission 09/12/22   Date of Discharge 09/18/22   Discharge Disposition Home or Self Care   Discharge diagnosis COPD exacerbation   Does the patient have one of the following disease processes/diagnoses(primary or secondary)? COPD   Does the patient have Home health ordered? No   Is there a DME ordered? Yes   What DME was ordered? [employer] - [employer]   Prep survey completed? Yes          VLADIMIR FREEMAN - Registered Nurse

## 2022-09-18 NOTE — THERAPY EVALUATION
Acute Care - Physical Therapy Initial Evaluation   [facility]     Patient Name: Sumeet Gomes  : 1960  MRN: [id number]  Today's Date: 2022      Visit Dx:     ICD-10-CM ICD-9-CM   1. Hypoxia  R09.02 799.02   2. Acute congestive heart failure, unspecified heart failure type (HCC)  I50.9 428.0   3. Chronic obstructive pulmonary disease, unspecified COPD type (HCC)  J44.9 496   4. Acute respiratory failure with hypoxia and hypercapnia (HCC)  J96.01 518.81    J96.02    5. Pulmonary hypertension (HCC)  I27.20 416.8   6. Difficulty walking  R26.2 719.7     Patient Active Problem List   Diagnosis   • Other hyperlipidemia   • Essential hypertension   • COPD (chronic obstructive pulmonary disease) (HCC)   • Fatigue   • Chest wall contusion, right, initial encounter   • Closed fracture of coccyx (HCC)   • Contusion of right knee   • Acute respiratory failure with hypoxia and hypercapnia (HCC)   • Acute CHF (HCC)   • Tobacco abuse   • Hypoxia   • Severe malnutrition (HCC)   • Pulmonary hypertension (HCC)     Past Medical History:   Diagnosis Date   • COPD (chronic obstructive pulmonary disease) (HCC)      Past Surgical History:   Procedure Laterality Date   • BACK SURGERY     • BRONCHOSCOPY N/A 2022    Procedure: BRONCHOSCOPY WITH BRONCHOALVEOLAR LAVAGE AND BRONCHIAL WASHINGS;  Surgeon: Ulices Moya MD;  Location: [facility];  Service: Pulmonary;  Laterality: N/A;  MUCUS PLUGGING, COPD EXACERBATION   • OTHER SURGICAL HISTORY      knee    • OTHER SURGICAL HISTORY      shoulder, rotator cuff     PT Assessment (last 12 hours)     PT Evaluation and Treatment     Row Name 22 1100          Physical Therapy Time and Intention    Subjective Information complains of;fatigue  -DP     Document Type evaluation  -DP     Mode of Treatment individual therapy;physical therapy  -DP     Patient Effort good  -DP     Row Name 22 1100          General Information    Patient Profile Reviewed yes  -DP      Patient Observations alert;cooperative;agree to therapy  -DP     Prior Level of Function independent:;gait;transfer;bed mobility;ADL's  -DP     Equipment Currently Used at Home none;oxygen  2L at night  -DP     Existing Precautions/Restrictions oxygen therapy device and L/min  monitor O2 sats  -DP     Row Name 09/18/22 1100          Living Environment    Current Living Arrangements home  -DP     People in Home spouse  -DP     Row Name 09/18/22 1100          Range of Motion (ROM)    Range of Motion bilateral lower extremities;ROM is WFL  -DP     Row Name 09/18/22 1100          Strength (Manual Muscle Testing)    Strength (Manual Muscle Testing) bilateral lower extremities;strength is WFL  -DP     Row Name 09/18/22 1100          Bed Mobility    Bed Mobility supine-sit-supine  -DP     Supine-Sit-Supine Rooks (Bed Mobility) modified independence  -DP     Row Name 09/18/22 1100          Transfers    Transfers sit-stand transfer  -DP     Sit-Stand Rooks (Transfers) supervision  -DP     Row Name 09/18/22 1100          Gait/Stairs (Locomotion)    Gait/Stairs Locomotion gait/ambulation independence  -DP     Rooks Level (Gait) standby assist  -DP     Assistive Device (Gait) other (see comments)  none  -DP     Distance in Feet (Gait) 40  -DP     Comment, (Gait/Stairs) Patient ambulated on room air and his spO2 dropped to 81%  -DP     Row Name 09/18/22 1100          Balance    Balance Assessment standing dynamic balance  -DP     Dynamic Standing Balance supervision  -DP     Row Name 09/18/22 1100          Plan of Care Review    Plan of Care Reviewed With patient  -DP     Outcome Evaluation Patient is able tocomplete all transfesr independently in his room and able to ambulate ad derek in his room. Patient does get SOA easily and will benefit from O2 at home and pulmonary rehab upon retunr home.  -DP     Row Name 09/18/22 1100          Therapy Assessment/Plan (PT)    Criteria for Skilled Interventions Met (PT)  no  -DP     Therapy Frequency (PT) evaluation only  -DP     Row Name 09/18/22 1100          PT Evaluation Complexity    History, PT Evaluation Complexity no personal factors and/or comorbidities  -DP     Examination of Body Systems (PT Eval Complexity) total of 4 or more elements  -DP     Clinical Presentation (PT Evaluation Complexity) stable  -DP     Clinical Decision Making (PT Evaluation Complexity) low complexity  -DP     Overall Complexity (PT Evaluation Complexity) low complexity  -DP           User Key  (r) = Recorded By, (t) = Taken By, (c) = Cosigned By    Initials Name Provider Type    Ruslan Thomas, PT Physical Therapist                  PT Recommendation and Plan  Anticipated Discharge Disposition (PT): other (see comments), home with home health (pulmonary rehab)  Therapy Frequency (PT): evaluation only  Plan of Care Reviewed With: patient  Outcome Evaluation: Patient is able tocomplete all transfesr independently in his room and able to ambulate ad derek in his room. Patient does get SOA easily and will benefit from O2 at home and pulmonary rehab upon retunr home.   Outcome Measures     Row Name 09/18/22 1100             How much help from another person do you currently need...    Turning from your back to your side while in flat bed without using bedrails? 4  -DP      Moving from lying on back to sitting on the side of a flat bed without bedrails? 4  -DP      Moving to and from a bed to a chair (including a wheelchair)? 4  -DP      Standing up from a chair using your arms (e.g., wheelchair, bedside chair)? 4  -DP      Climbing 3-5 steps with a railing? 4  -DP      To walk in hospital room? 4  -DP      AM-PAC 6 Clicks Score (PT) 24  -DP              Functional Assessment    Outcome Measure Options AM-PAC 6 Clicks Basic Mobility (PT)  -DP            User Key  (r) = Recorded By, (t) = Taken By, (c) = Cosigned By    Initials Name Provider Type    Ruslan Thomas PT Physical Therapist                  Time Calculation:    PT Charges     Row Name 09/18/22 1126             Time Calculation    PT Received On 09/18/22  -DP              Untimed Charges    PT Eval/Re-eval Minutes 40  -DP              Total Minutes    Untimed Charges Total Minutes 40  -DP       Total Minutes 40  -DP            User Key  (r) = Recorded By, (t) = Taken By, (c) = Cosigned By    Initials Name Provider Type    DP Ruslan Brink, PT Physical Therapist              Therapy Charges for Today     Code Description Service Date Service Provider Modifiers Qty    98662626243 HC PT EVAL LOW COMPLEXITY 3 9/18/2022 Ruslan Brink, PT GP 1          PT G-Codes  Outcome Measure Options: AM-PAC 6 Clicks Basic Mobility (PT)  AM-PAC 6 Clicks Score (PT): 24    Ruslan Brink PT  9/18/2022

## 2022-09-18 NOTE — PLAN OF CARE
Goal Outcome Evaluation:  Plan of Care Reviewed With: patient           Outcome Evaluation: Patient is able tocomplete all transfesr independently in his room and able to ambulate ad derek in his room. Patient does get SOA easily and will benefit from O2 at home and pulmonary rehab upon retunr home.

## 2022-09-19 ENCOUNTER — TRANSITIONAL CARE MANAGEMENT TELEPHONE ENCOUNTER (OUTPATIENT)
Dept: CALL CENTER | Facility: HOSPITAL | Age: 62
End: 2022-09-19

## 2022-09-19 ENCOUNTER — TELEPHONE (OUTPATIENT)
Dept: FAMILY MEDICINE CLINIC | Age: 62
End: 2022-09-19

## 2022-09-19 NOTE — CASE MANAGEMENT/SOCIAL WORK
Pt discharged home over the weekend.     RT cm following up regarding PFT and pulmonary rehab after dc.     Pt has follow up appt with pcp 9/20 @ 2:30    No orders to follow up with Cardiology outpt.

## 2022-09-19 NOTE — OUTREACH NOTE
Call Center TCM Note    Flowsheet Row Responses   [facility] patient discharged from? [facility]   Does the patient have one of the following disease processes/diagnoses(primary or secondary)? COPD   TCM attempt successful? Yes   Call start time 1001   Call end time 1006   Discharge diagnosis COPD exacerbation   Meds reviewed with patient/caregiver? Yes   Is the patient having any side effects they believe may be caused by any medication additions or changes? No   Does the patient have all medications ordered at discharge? Yes   Is the patient taking all medications as directed (includes completed medication regime)? Yes   Comments HOSP DC FU appt 9/20/22 @ 2:30 pm.    Has home health visited the patient within 72 hours of discharge? N/A   Has all DME been delivered? Yes   Pulse Ox monitoring Intermittent   Pulse Ox device source Patient   O2 Sat: education provided Sat levels, Monitoring frequency, When to seek care   Psychosocial issues? No   Is the patient able to teach back COPD zones? Yes   Nursing interventions Education provided on various zones   Patient reports what zone on this call? Green Zone   Green Zone Reports doing well, Breathing without shortness of breath   Green Zone interventions: Take daily medications, Use oxygen as prescribed   TCM call completed? Yes   Wrap up additional comments Pt reports he is eating BRK at this time and seems to be doing well.           Junie Donaldson, DILMA    9/19/2022, 10:07 EDT

## 2022-09-19 NOTE — TELEPHONE ENCOUNTER
ED course: In ED , RR 28, oxygen saturation 67% on room air.  Bicarbonate 35, BNP 5360.  Normal troponin.  Chest x-ray also normal.  Started on furosemide, duo nebs.  ABG demonstrated pH 7.35, PCO2 79, PO2 65, bicarb 43 on 3 L nasal cannula.  Patient's pulmonology group consulted. Echocardiogram demonstrated normal left ventricular ejection fraction with elevated right ventricular systolic pressures greater than 55 mmHg.  CT of the chest reviewed and negative for PE.  Did demonstrate findings consistent with emphysema. Bronchoscopy performed demonstrated mucous plugging in the trachea and bilateral bronchial tubes left more than right with scattered purulent secretions and friable airway.  BAL PCR positive for strep pneumo and haemophilus influenza as well as rhinovirus. Cardiology consulted for right heart cath.  Home NIPPV arranged by respiratory case management.  Patient taken for right heart cath which demonstrated moderate pulmonary hypertension not responsive to vasodilators.  Respiratory function stabilized.  Patient seen evaluated on day discharge by myself and Dr. Chapa pulmonology critical care and thought stable for discharge home on continuous home O2 with an NIMV at night to complete a prednisone taper, course of antibiotics follow-up with his primary care provider and pulmonology as well as pulmonary rehab.

## 2022-09-20 ENCOUNTER — OFFICE VISIT (OUTPATIENT)
Dept: FAMILY MEDICINE CLINIC | Age: 62
End: 2022-09-20

## 2022-09-20 VITALS
WEIGHT: 127.2 LBS | RESPIRATION RATE: 16 BRPM | SYSTOLIC BLOOD PRESSURE: 98 MMHG | DIASTOLIC BLOOD PRESSURE: 62 MMHG | OXYGEN SATURATION: 96 % | HEART RATE: 71 BPM | HEIGHT: 67 IN | BODY MASS INDEX: 19.97 KG/M2

## 2022-09-20 DIAGNOSIS — E43 SEVERE MALNUTRITION: ICD-10-CM

## 2022-09-20 DIAGNOSIS — I27.20 PULMONARY HYPERTENSION: ICD-10-CM

## 2022-09-20 DIAGNOSIS — R53.83 OTHER FATIGUE: ICD-10-CM

## 2022-09-20 DIAGNOSIS — J44.1 CHRONIC OBSTRUCTIVE PULMONARY DISEASE WITH ACUTE EXACERBATION: Primary | ICD-10-CM

## 2022-09-20 PROCEDURE — 99495 TRANSJ CARE MGMT MOD F2F 14D: CPT | Performed by: NURSE PRACTITIONER

## 2022-09-20 NOTE — PROGRESS NOTES
Transitional Care Follow Up Visit  Subjective     Sumeet Gomes is a 61 y.o. male who presents for a transitional care management visit.      Within 48 business hours after discharge our office contacted him via telephone to coordinate his care and needs.      I reviewed and discussed the details of that call along with the discharge summary, hospital problems, inpatient lab results, inpatient diagnostic studies, and consultation reports with Sumeet.     Current outpatient and discharge medications have been reconciled for the patient.  Reviewed by: ZULY Segundo      Date of TCM Phone Call 9/18/2022   [facility]   Date of Admission 9/12/2022   Date of Discharge 9/18/2022   Discharge Disposition Home or Self Care       Risk for Readmission (LACE) Score: 11 (9/18/2022  6:00 AM)      History of Present Illness  MILA follow up for :   Acute congestive heart failure, unspecified heart failure type (HCC)           Chronic obstructive pulmonary disease, unspecified COPD type (HCC)          Acute respiratory failure with hypoxia and hypercapnia (HCC)          Pulmonary hypertension (HCC)          Difficulty walking          Other emphysema (HCC)          Acute on chronic respiratory failure with hypoxia and hypercapnia (HCC)       ED course: In ED , RR 28, oxygen saturation 67% on room air.  Bicarbonate 35, BNP 5360.  Normal troponin.  Chest x-ray also normal.  Started on furosemide, duo nebs.  ABG demonstrated pH 7.35, PCO2 79, PO2 65, bicarb 43 on 3 L nasal cannula.  Patient's pulmonology group consulted. Echocardiogram demonstrated normal left ventricular ejection fraction with elevated right ventricular systolic pressures greater than 55 mmHg.  CT of the chest reviewed and negative for PE.  Did demonstrate findings consistent with emphysema. Bronchoscopy performed demonstrated mucous plugging in the trachea and bilateral bronchial tubes left more than right with scattered  purulent secretions and friable airway.  BAL PCR positive for strep pneumo and haemophilus influenza as well as rhinovirus. Cardiology consulted for right heart cath.  Home NIPPV arranged by respiratory case management.  Patient taken for right heart cath which demonstrated moderate pulmonary hypertension not responsive to vasodilators.  Respiratory function stabilized.  Patient seen evaluated on day discharge by myself and Dr. Chapa pulmonology critical care and thought stable for discharge home on continuous home O2 with an NIMV at night to complete a prednisone taper, course of antibiotics follow-up with his primary care provider and pulmonology as well as pulmonary rehab.     condition : improved  He was discharged home on augmentin and prednisone taper  His wife is with him today.  She and his 3 children are helping.  He is using his pulm rx.  He has a pulm appt coming up next month.  Due labs first of next week.  Is suppose to be staring pulm rehab.  He has stayed OFF cigarettes and using nicoderm patches.  No appetite but family is making sure he is eating and drinking.  He is using home oxygen, has a big oxygen portable with him now, his wife would like a smaller portable oxygen for him to carry.      PMH changes since :        22 covid            COPD       Hospitalizations:   Copd   back surgery         PREVENTIVE HEALTH MAINTENANCE             Hepatitis C Medicare Screening: was last done ; negative         Surgical History:         Arthroscopy: left knee  and right shoulder ;     Vasectomy     Other Surgeries:    Laminectomy: lumbar region; ;     Procedures:    Colonoscopy ( 12 )     COLONOSCOPY: was last done 12 with normal results Haddad         Family History:     Father: Hypertension;  Dementia     Mother:  at age 54; Cause of death was stroke;  Hypertension;  COPD     Brother(s): Healthy; 3 brother(s) total     Sister(s): 2 sister(s) total          Social History:     Occupation: [employer]     Marital Status:      Children: 3 children                     Course During Hospital Stay(Copied from D/C Summary and reviewed during encounter on 09/20/2022 by ZULY Segundo):      The following portions of the patient's history were reviewed and updated as appropriate: allergies, current medications, past family history, past medical history, past social history, past surgical history and problem list.      Current Outpatient Medications:   •  albuterol sulfate  (90 Base) MCG/ACT inhaler, INHALE 2 PUFFS EVERY 6 HOURS AS NEEDED FOR WHEEZING, Disp: 36 g, Rfl: 1  •  amoxicillin-clavulanate (Augmentin) 875-125 MG per tablet, Take 1 tablet by mouth 2 (Two) Times a Day for 2 days., Disp: 4 tablet, Rfl: 0  •  fluticasone (Flovent HFA) 220 MCG/ACT inhaler, Inhale 2 puffs by mouth 2 (two) times a day., Disp: 144 g, Rfl: 3  •  ipratropium-albuterol (DUO-NEB) 0.5-2.5 mg/3 ml nebulizer, USE 1 VIAL IN NEBULIZER EVERY 4 HOURS AS NEEDED FOR SHORTNESS OF BREATH, Disp: 1800 mL, Rfl: 0  •  metoprolol tartrate (LOPRESSOR) 25 MG tablet, Take 0.5 tablets by mouth Every 12 (Twelve) Hours for 30 days., Disp: 30 tablet, Rfl: 0  •  nicotine (NICODERM CQ) 21 MG/24HR patch, Place 1 patch on the skin as directed by provider Daily As Needed (nicotine withdrawal) for up to 30 days., Disp: 30 patch, Rfl: 0  •  predniSONE (DELTASONE) 10 MG tablet, Take 4 tablets by mouth Daily for 3 days, THEN 3 tablets Daily for 3 days, THEN 2 tablets Daily for 3 days, THEN 1 tablet Daily for 3 days., Disp: 30 tablet, Rfl: 0  •  simvastatin (ZOCOR) 20 MG tablet, Take 1 tablet by mouth Daily., Disp: 90 tablet, Rfl: 1  •  umeclidinium-vilanterol (Anoro Ellipta) 62.5-25 MCG/INH aerosol powder  inhaler, Inhale 1 puff Daily., Disp: 180 each, Rfl: 3     Vitals:    09/20/22 1445   BP: 98/62   BP Location: Right arm   Patient Position: Sitting   Cuff Size: Adult   Pulse: 71   Resp: 16  "  SpO2: 96%  Comment: 2L 02   Weight: 57.7 kg (127 lb 3.2 oz)   Height: 170.2 cm (67\")   PainSc: 0-No pain       Advance Care Planning     Review of Systems   Constitutional: Positive for fatigue. Negative for fever.   Respiratory: Negative for wheezing.    Cardiovascular: Negative for chest pain, palpitations and leg swelling.        Objective   Physical Exam  Vitals reviewed.   Constitutional:       General: He is not in acute distress.     Comments: thin   Neck:      Vascular: No carotid bruit.   Cardiovascular:      Rate and Rhythm: Normal rate and regular rhythm.      Heart sounds: Normal heart sounds. No murmur heard.  Pulmonary:      Effort: Pulmonary effort is normal. No respiratory distress.      Breath sounds: Normal breath sounds.   Musculoskeletal:      Right lower leg: No edema.      Left lower leg: No edema.   Skin:     Findings: Bruising (on bilateral arms) present.   Neurological:      Mental Status: He is alert.   Psychiatric:         Mood and Affect: Mood normal.         Behavior: Behavior normal.         DATA REVIEWED: 9-20-22    Data Reviewed:H&P, discharge summary, heart cath, labs and CT     CT Chest With Contrast Diagnostic    Result Date: 9/13/2022  Impression:   CT scan of the chest with IV contrast demonstrating no findings of PE.  Emphysema.  Mild anterior wedging compression fracture of the superior endplate of a single midthoracic vertebral body with faint sclerosis, suggesting healing fracture.      ALAN VARGAS MD       Electronically Signed and Approved By: ALAN VARGAS MD on 9/13/2022 at 20:25               Assessment & Plan     Diagnoses and all orders for this visit:    1. Chronic obstructive pulmonary disease with acute exacerbation (HCC) (Primary)  Assessment & Plan:  Scheduling his 6 min walk test/ advised to get his pulm rehab scheduled   Congratulated him on smoking cessation, reviewed records, complete prednisone and Augmentin, contact either pulm or pulm and his oxygen " co to get a lighter portable oxygen tank; continue his other pulm rx/treatments as directed  Come in first of next week for labs   Completed a handicap parking form for him.       2. Pulmonary hypertension (HCC)  Assessment & Plan:  Reviewed cardiology procedure, he needs to follow up with cardiology, he said he is to see pulm first then to see cardiology, he was taken off HCTZ and put on beta blocker       3. Other fatigue  Assessment & Plan:  Reviewed labs, check a B12 and folic acid     Orders:  -     Vitamin B12; Future  -     Folate; Future    4. Severe malnutrition (HCC)  Assessment & Plan:  Reviewed his weight synopsis over last year, discussed his diet with he and his wife         Follow Up      Return if symptoms worsen or fail to improve, for follow up for labs 9-25-22.

## 2022-09-20 NOTE — ASSESSMENT & PLAN NOTE
Reviewed cardiology procedure, he needs to follow up with cardiology, he said he is to see pulm first then to see cardiology, he was taken off HCTZ and put on beta blocker

## 2022-09-20 NOTE — ASSESSMENT & PLAN NOTE
Scheduling his 6 min walk test/ advised to get his pulm rehab scheduled   Congratulated him on smoking cessation, reviewed records, complete prednisone and Augmentin, contact either pulm or pulm and his oxygen co to get a lighter portable oxygen tank; continue his other pulm rx/treatments as directed  Come in first of next week for labs   Completed a handicap parking form for him.

## 2022-09-26 ENCOUNTER — LAB (OUTPATIENT)
Dept: LAB | Facility: HOSPITAL | Age: 62
End: 2022-09-26

## 2022-09-26 DIAGNOSIS — R53.83 OTHER FATIGUE: ICD-10-CM

## 2022-09-26 DIAGNOSIS — J96.21 ACUTE ON CHRONIC RESPIRATORY FAILURE WITH HYPOXIA AND HYPERCAPNIA: ICD-10-CM

## 2022-09-26 DIAGNOSIS — J96.22 ACUTE ON CHRONIC RESPIRATORY FAILURE WITH HYPOXIA AND HYPERCAPNIA: ICD-10-CM

## 2022-09-26 LAB
ALBUMIN SERPL-MCNC: 3.9 G/DL (ref 3.5–5.2)
ANION GAP SERPL CALCULATED.3IONS-SCNC: 4 MMOL/L (ref 5–15)
BUN SERPL-MCNC: 19 MG/DL (ref 8–23)
BUN/CREAT SERPL: 30.2 (ref 7–25)
CALCIUM SPEC-SCNC: 9.2 MG/DL (ref 8.6–10.5)
CHLORIDE SERPL-SCNC: 100 MMOL/L (ref 98–107)
CO2 SERPL-SCNC: 33 MMOL/L (ref 22–29)
CREAT SERPL-MCNC: 0.63 MG/DL (ref 0.76–1.27)
EGFRCR SERPLBLD CKD-EPI 2021: 108.9 ML/MIN/1.73
FOLATE SERPL-MCNC: 8.38 NG/ML (ref 4.78–24.2)
GLUCOSE SERPL-MCNC: 87 MG/DL (ref 65–99)
PHOSPHATE SERPL-MCNC: 3.7 MG/DL (ref 2.5–4.5)
POTASSIUM SERPL-SCNC: 4.3 MMOL/L (ref 3.5–5.2)
SODIUM SERPL-SCNC: 137 MMOL/L (ref 136–145)
VIT B12 BLD-MCNC: 1082 PG/ML (ref 211–946)

## 2022-09-26 PROCEDURE — 36415 COLL VENOUS BLD VENIPUNCTURE: CPT

## 2022-09-26 PROCEDURE — 82607 VITAMIN B-12: CPT

## 2022-09-26 PROCEDURE — 82746 ASSAY OF FOLIC ACID SERUM: CPT

## 2022-09-26 PROCEDURE — 80069 RENAL FUNCTION PANEL: CPT

## 2022-09-29 LAB
EJ AB SER QL: NEGATIVE
ENA JO1 AB SER IA-ACNC: <20 UNITS
ENA PM/SCL AB SER-ACNC: <20 UNITS
ENA SS-A 52KD IGG SER IA-ACNC: <20 UNITS
FIBRILLARIN AB SER QL: NEGATIVE
KU AB SER QL: NEGATIVE
MDA5 AB SER LINE BLOT-ACNC: <20 UNITS
MI2 AB SER QL: NEGATIVE
MJ AB SER LINE BLOT-ACNC: <20 UNITS
OJ AB SER QL: NEGATIVE
PL12 AB SER QL: NEGATIVE
PL7 AB SER QL: NEGATIVE
QT INTERVAL: 379 MS
SAE1 IGG SER QL LINE BLOT: <20 UNITS
SRP AB SERPL QL: NEGATIVE
TIF1-GAMMA AB SER IA-ACNC: <20 UNITS
U1 SNRNP AB SER IA-ACNC: <20 UNITS
U2 SNRNP AB SER QL: NEGATIVE

## 2022-10-01 LAB
FUNGUS ISLT: NORMAL
FUNGUS ISLT: NORMAL

## 2022-10-05 LAB
FUNGUS WND CULT: ABNORMAL
FUNGUS WND CULT: ABNORMAL

## 2022-10-06 ENCOUNTER — OFFICE VISIT (OUTPATIENT)
Dept: PULMONOLOGY | Facility: CLINIC | Age: 62
End: 2022-10-06

## 2022-10-06 VITALS
HEART RATE: 72 BPM | WEIGHT: 139 LBS | HEIGHT: 67 IN | OXYGEN SATURATION: 90 % | DIASTOLIC BLOOD PRESSURE: 71 MMHG | RESPIRATION RATE: 18 BRPM | TEMPERATURE: 98.4 F | SYSTOLIC BLOOD PRESSURE: 116 MMHG | BODY MASS INDEX: 21.82 KG/M2

## 2022-10-06 DIAGNOSIS — J96.11 CHRONIC RESPIRATORY FAILURE WITH HYPOXIA AND HYPERCAPNIA: ICD-10-CM

## 2022-10-06 DIAGNOSIS — J43.9 PULMONARY EMPHYSEMA, UNSPECIFIED EMPHYSEMA TYPE: Primary | ICD-10-CM

## 2022-10-06 DIAGNOSIS — F17.201 TOBACCO ABUSE, IN REMISSION: ICD-10-CM

## 2022-10-06 DIAGNOSIS — I27.20 PULMONARY HYPERTENSION: ICD-10-CM

## 2022-10-06 DIAGNOSIS — J96.12 CHRONIC RESPIRATORY FAILURE WITH HYPOXIA AND HYPERCAPNIA: ICD-10-CM

## 2022-10-06 DIAGNOSIS — J44.9 CHRONIC OBSTRUCTIVE PULMONARY DISEASE, UNSPECIFIED COPD TYPE: ICD-10-CM

## 2022-10-06 PROCEDURE — 99214 OFFICE O/P EST MOD 30 MIN: CPT | Performed by: INTERNAL MEDICINE

## 2022-10-06 PROCEDURE — 90686 IIV4 VACC NO PRSV 0.5 ML IM: CPT | Performed by: INTERNAL MEDICINE

## 2022-10-06 PROCEDURE — 90471 IMMUNIZATION ADMIN: CPT | Performed by: INTERNAL MEDICINE

## 2022-10-06 NOTE — PROGRESS NOTES
Pulmonary Office Follow-up    Subjective     Sumeet Gomes is seen today at the office for   Chief Complaint   Patient presents with   • COPD   • Cough   • Wheezing   • Shortness of Breath         HPI  Sumeet Gomes is a 61 y.o. male with a PMH significant for severe COPD tobacco abuse and acute respiratory failure requiring hospitalization presents for follow-up patient seems to be doing better but does complain of dyspnea on exertion he is on home oxygen along with BiPAP and is compliant with his medications he has now quit smoking  Patient feels improvement in his energy and appetite and swelling of his extremities is almost resolved      Tobacco use history:  Former smoker      Patient Active Problem List   Diagnosis   • Other hyperlipidemia   • Essential hypertension   • COPD (chronic obstructive pulmonary disease) (HCC)   • Fatigue   • Chest wall contusion, right, initial encounter   • Closed fracture of coccyx (HCC)   • Contusion of right knee   • Acute respiratory failure with hypoxia and hypercapnia (HCC)   • Acute CHF (HCC)   • Tobacco abuse   • Hypoxia   • Severe malnutrition (HCC)   • Pulmonary hypertension (HCC)   • Other fatigue       Review of Systems  Review of Systems   Respiratory: Positive for cough, shortness of breath and wheezing.    All other systems reviewed and are negative.    As described in the HPI. Otherwise, remainder of ROS (14 systems) were negative.    Medications, Allergies, Social, and Family Histories reviewed as per EMR.    Objective     Vitals:    10/06/22 0901   BP:    Pulse:    Resp:    Temp:    SpO2: 90%         10/06/22  0855   Weight: 63 kg (139 lb)       Physical Exam  Vitals and nursing note reviewed.   Constitutional:       Appearance: He is ill-appearing.   HENT:      Head: Normocephalic and atraumatic.      Nose: Nose normal.      Mouth/Throat:      Mouth: Mucous membranes are moist.      Pharynx: Oropharynx is clear.   Eyes:      Conjunctiva/sclera: Conjunctivae  normal.      Pupils: Pupils are equal, round, and reactive to light.   Cardiovascular:      Rate and Rhythm: Normal rate and regular rhythm.      Pulses: Normal pulses.      Heart sounds: Normal heart sounds.   Pulmonary:      Effort: Pulmonary effort is normal.      Breath sounds: Wheezing, rhonchi and rales present.   Abdominal:      General: Abdomen is flat. Bowel sounds are normal.      Palpations: Abdomen is soft.   Musculoskeletal:         General: Normal range of motion.      Cervical back: Normal range of motion and neck supple.      Right lower leg: Edema present.      Left lower leg: Edema present.   Skin:     General: Skin is warm.      Capillary Refill: Capillary refill takes less than 2 seconds.   Neurological:      General: No focal deficit present.      Mental Status: He is alert and oriented to person, place, and time.   Psychiatric:         Mood and Affect: Mood normal.         Behavior: Behavior normal.         CT Chest With Contrast Diagnostic    Result Date: 9/13/2022    CT scan of the chest with IV contrast demonstrating no findings of PE.  Emphysema.  Mild anterior wedging compression fracture of the superior endplate of a single midthoracic vertebral body with faint sclerosis, suggesting healing fracture.      ALAN VARGAS MD       Electronically Signed and Approved By: ALAN VARGAS MD on 9/13/2022 at 20:25                Assessment & Plan     Diagnoses and all orders for this visit:    1. Pulmonary emphysema, unspecified emphysema type (HCC) (Primary)    2. Chronic obstructive pulmonary disease, unspecified COPD type (HCC)    3. Chronic respiratory failure with hypoxia and hypercapnia (HCC)    4. Pulmonary hypertension (HCC)    5. Tobacco abuse, in remission    Other orders  -     FluLaval/Fluzone >6 mos ([id number])         Discussion/ Recommendations:   Patient was counseled to avoid secondhand smoke  Patient is advised to continue his home medication including oxygen nebulizer  treatments  Continue BiPAP  Continue Anoro daily  Will order flu shot  Will order chest x-ray  Patient is scheduled for PFT later this month  Scheduled for 6-minute walk  X-rays and pathology were reviewed showing severe emphysema with severe bronchitis and mucous plugging requiring bronchoscopy  2D echo showed evidence of pulmonary hypertension which is likely secondary to his respiratory failure and severe COPD  Vaccinations discussed and recommended    BMI is within normal parameters. No other follow-up for BMI required.        Return in about 4 weeks (around 11/3/2022).          This document has been electronically signed by Uriel Root MD on October 6, 2022 09:14 EDT

## 2022-10-07 ENCOUNTER — HOSPITAL ENCOUNTER (OUTPATIENT)
Dept: GENERAL RADIOLOGY | Facility: HOSPITAL | Age: 62
Discharge: HOME | End: 2022-10-07
Admitting: INTERNAL MEDICINE

## 2022-10-07 DIAGNOSIS — J43.9 PULMONARY EMPHYSEMA, UNSPECIFIED EMPHYSEMA TYPE: ICD-10-CM

## 2022-10-07 DIAGNOSIS — J96.11 CHRONIC RESPIRATORY FAILURE WITH HYPOXIA AND HYPERCAPNIA: ICD-10-CM

## 2022-10-07 DIAGNOSIS — J44.9 CHRONIC OBSTRUCTIVE PULMONARY DISEASE, UNSPECIFIED COPD TYPE: ICD-10-CM

## 2022-10-07 DIAGNOSIS — J96.12 CHRONIC RESPIRATORY FAILURE WITH HYPOXIA AND HYPERCAPNIA: ICD-10-CM

## 2022-10-07 DIAGNOSIS — I27.20 PULMONARY HYPERTENSION: ICD-10-CM

## 2022-10-07 PROCEDURE — 71046 X-RAY EXAM CHEST 2 VIEWS: CPT

## 2022-10-13 ENCOUNTER — HOSPITAL ENCOUNTER (OUTPATIENT)
Dept: RESPIRATORY THERAPY | Facility: HOSPITAL | Age: 62
Discharge: HOME | End: 2022-10-13
Admitting: NURSE PRACTITIONER

## 2022-10-13 DIAGNOSIS — J44.9 CHRONIC OBSTRUCTIVE PULMONARY DISEASE, UNSPECIFIED COPD TYPE: ICD-10-CM

## 2022-10-13 PROCEDURE — 94729 DIFFUSING CAPACITY: CPT | Performed by: INTERNAL MEDICINE

## 2022-10-13 PROCEDURE — 94060 EVALUATION OF WHEEZING: CPT

## 2022-10-13 PROCEDURE — 94726 PLETHYSMOGRAPHY LUNG VOLUMES: CPT | Performed by: INTERNAL MEDICINE

## 2022-10-13 PROCEDURE — 94060 EVALUATION OF WHEEZING: CPT | Performed by: INTERNAL MEDICINE

## 2022-10-13 PROCEDURE — 94729 DIFFUSING CAPACITY: CPT

## 2022-10-13 PROCEDURE — 94726 PLETHYSMOGRAPHY LUNG VOLUMES: CPT

## 2022-10-13 RX ORDER — ALBUTEROL SULFATE 2.5 MG/3ML
2.5 SOLUTION RESPIRATORY (INHALATION) EVERY 6 HOURS PRN
Status: DISCONTINUED | OUTPATIENT
Start: 2022-10-13 | End: 2022-10-14 | Stop reason: HOSPADM

## 2022-10-13 RX ADMIN — ALBUTEROL SULFATE 2.5 MG: 2.5 SOLUTION RESPIRATORY (INHALATION) at 07:23

## 2022-10-18 ENCOUNTER — TELEPHONE (OUTPATIENT)
Dept: FAMILY MEDICINE CLINIC | Age: 62
End: 2022-10-18

## 2022-10-18 NOTE — TELEPHONE ENCOUNTER
Is there a nurse navigator for pulm medicine :   This pt was hospitalized and needs the results of PFT, to be set up with PULM rehab and to see cardiology, I believe the  or nurse navigator could help expedite this process.

## 2022-10-21 ENCOUNTER — OFFICE VISIT (OUTPATIENT)
Dept: CARDIAC REHAB | Facility: HOSPITAL | Age: 62
End: 2022-10-21

## 2022-10-21 VITALS
OXYGEN SATURATION: 93 % | SYSTOLIC BLOOD PRESSURE: 106 MMHG | RESPIRATION RATE: 18 BRPM | HEART RATE: 83 BPM | HEIGHT: 67 IN | BODY MASS INDEX: 21.49 KG/M2 | DIASTOLIC BLOOD PRESSURE: 60 MMHG | WEIGHT: 136.91 LBS

## 2022-10-21 DIAGNOSIS — J96.21 ACUTE ON CHRONIC RESPIRATORY FAILURE WITH HYPOXIA: ICD-10-CM

## 2022-10-21 DIAGNOSIS — J44.1 COPD WITH ACUTE EXACERBATION: Primary | ICD-10-CM

## 2022-10-21 PROCEDURE — 94626 PHY/QHP OP PULM RHB W/MNTR: CPT

## 2022-10-21 PROCEDURE — 94625 PHY/QHP OP PULM RHB W/O MNTR: CPT

## 2022-10-21 NOTE — PROGRESS NOTES
Pt tolerated exercises see scanned report. Pt instructed on the use of the Pulmonary Rehab equipment, pt is physically able to participate in the Pulmonary Rehab program at [facility].

## 2022-10-21 NOTE — PROGRESS NOTES
Phase II and III Education    Discipline Teaching:  Cardiac Rehab Phase II []      Cardiac Rehab Phase III []      Pulmonary Rehab II [x]      Pulmonary Rehab III []      Peripheral Artery Disease []        Class Given:  Asthma Management []      Beginners Cardiac Rehab []      Beginners Pulmonary Rehab []      CAD I []      CAD II []      CHF []      Diabetes Mellitus []     Diet & Cholesterol []     Diet Consult []      Energy Conservation []     Exercise []     Grocery Store Tour []     Harmonica Therapy []     High Blood Pressure []     Living with COPD [x]     Medication Safety [x]     Peripheral Artery Disease []     S & S of Infection []      Stress [x]        Education Topics:  Angina []      Arrhythmias []      Asthma Management []      Beginning Cardiac Rehab []      Blood Pressure []     Blood Sugar []     Breathing Retrainment [x]     CHF []     Cooking []     Daily Weight []     Diabetes Mellitus []      Diet []     Energy Conservation [x]     Exercise [x]      Foot Care []      Grocery Store Tour []      Heart Disease []      Heart Function []      Incision Care []     Living with COPD [x]     Medication Safety [x]     PAD Symptoms/Treatment []     Reconditioning Exercises []     S & S Infection []     Smoking Consult []     Stress Management [x]     Test Results []       Teaching Aids:  Video [x]      PAD Handout []      Pulmonary Workbook [x]      CAD Teaching Packet []      Stress Teaching Packet [x]     Exercise Teaching Packet [x]      Diet Teaching Packet []      CHF Teaching Packet []      Blood Pressure Teaching Packet []      Smoking Cessation Packet []      Medication Safety Handout []      Pflex Training []      Diabetes Teaching Packet []      Harmonica Therapy Packet []        Teaching Method:  Discussion [x]      Written Information [x]      Audio Visual [x]      Demonstration [x]        Teaching Recipient:  Patient [x]      Family []      Friend []      Legal Guardian []      Primary  Care Giver []      Significant Other []        Barriers To Learning:  None [x]      Auditory []      Cultural []      Emotional []      Financial []      Language []    Mental []      Motivation []      Physical []      Reading Skills []      Nondenominational []      Verbal/Cognitive []      Visual []      Written/Cognitive []        Teaching Response:  Verified Via Teach back [x]      Return Demo Via Teach Back [x]      Reinforcement Needed []      Unable to Return Demo []      Unable to Comprehend []      Declines Teaching  []        Additional Education Topics:       Follow Up Instruction Comment:

## 2022-10-25 ENCOUNTER — TREATMENT (OUTPATIENT)
Dept: CARDIAC REHAB | Facility: HOSPITAL | Age: 62
End: 2022-10-25

## 2022-10-25 DIAGNOSIS — J44.1 COPD WITH ACUTE EXACERBATION: Primary | ICD-10-CM

## 2022-10-25 PROCEDURE — 94625 PHY/QHP OP PULM RHB W/O MNTR: CPT

## 2022-10-26 LAB
MYCOBACTERIUM SPEC CULT: NORMAL
NIGHT BLUE STAIN TISS: NORMAL
NIGHT BLUE STAIN TISS: NORMAL

## 2022-10-27 ENCOUNTER — TREATMENT (OUTPATIENT)
Dept: CARDIAC REHAB | Facility: HOSPITAL | Age: 62
End: 2022-10-27

## 2022-10-27 DIAGNOSIS — J44.1 COPD WITH ACUTE EXACERBATION: Primary | ICD-10-CM

## 2022-10-27 PROCEDURE — 94625 PHY/QHP OP PULM RHB W/O MNTR: CPT

## 2022-11-01 ENCOUNTER — TREATMENT (OUTPATIENT)
Dept: CARDIAC REHAB | Facility: HOSPITAL | Age: 62
End: 2022-11-01

## 2022-11-01 DIAGNOSIS — J44.1 COPD WITH ACUTE EXACERBATION: Primary | ICD-10-CM

## 2022-11-01 PROCEDURE — 94625 PHY/QHP OP PULM RHB W/O MNTR: CPT

## 2022-11-03 ENCOUNTER — TREATMENT (OUTPATIENT)
Dept: CARDIAC REHAB | Facility: HOSPITAL | Age: 62
End: 2022-11-03

## 2022-11-03 DIAGNOSIS — J44.1 COPD WITH ACUTE EXACERBATION: Primary | ICD-10-CM

## 2022-11-03 PROCEDURE — 94625 PHY/QHP OP PULM RHB W/O MNTR: CPT

## 2022-11-07 NOTE — PROGRESS NOTES
Primary Care Provider  Mirtha Alexander APRN   Referring Provider  No ref. provider found    Patient Complaint  Follow-up, COPD, Shortness of Breath, Cough, Wheezing, and HYPOXIA (2 LITERS NC )      SUBJECTIVE    History of Presenting Illness  Sumeet Gomes is a pleasant 61 y.o. male who presents to [facility]ICINE for one month followup appointment.  Patient last saw Dr. Root on 10/6/2022.      Patient presented to [facility] ED 9/12/2022 with progressive shortness of breath with oxygen saturation dropping into the 60's. Patient was hospitalized 09/12/2022-09/18/2022. Hospital stay included the following:    In ED , RR 28, oxygen saturation 67% on room air.  Bicarbonate 35, BNP 5360.  Normal troponin.  Chest x-ray also normal.  Started on furosemide, duo nebs.  ABG demonstrated pH 7.35, PCO2 79, PO2 65, bicarb 43 on 3 L nasal cannula.  Patient's pulmonology group consulted. Echocardiogram demonstrated normal left ventricular ejection fraction with elevated right ventricular systolic pressures greater than 55 mmHg.  CT of the chest reviewed and negative for PE.  Did demonstrate findings consistent with emphysema. Bronchoscopy performed demonstrated mucous plugging in the trachea and bilateral bronchial tubes left more than right with scattered purulent secretions and friable airway.  BAL PCR positive for strep pneumo and haemophilus influenza as well as rhinovirus. Cardiology consulted for right heart cath.  Home NIPPV arranged by respiratory case management.  Patient taken for right heart cath which demonstrated moderate pulmonary hypertension not responsive to vasodilators.  Respiratory function stabilized.Patient was discharged home on continuous home O2 with an NIMV at night to complete a prednisone taper, course of antibiotics follow-up with his primary care provider and pulmonology as well as pulmonary rehab.    Additional history  includes patient had COVID-pneumonia 2022 with hospitalization.     Patient continues to go to pulmonary rehab. Patient continues on  at 2 l/m via NC. Patient has not returned to work and states he has applied for disability. Patient states he has not have a followup with cardiology as of yet.    Denies coughing, wheezing, headaches, chest pain, weight loss or hemoptysis. States his weight has been stable and has been taking protein shakes to increase caloric intake. Denies fevers, chills and night sweats. Sumeet Gomes is able to perform ADLs without difficulties and denies any swollen glands/lymph nodes in the head or neck.    I have personally reviewed the review of systems, past family, social, medical and surgical histories; and agree with their findings.    Review of Systems   Constitutional: Positive for fatigue. Negative for chills, diaphoresis, fever and unexpected weight change.   HENT: Negative.    Eyes: Negative.    Respiratory: Positive for shortness of breath. Negative for cough, wheezing and stridor.    Cardiovascular: Negative.  Negative for chest pain, palpitations and leg swelling.   Musculoskeletal: Negative.    Skin: Negative.         Family History   Problem Relation Age of Onset   • Asthma Mother    • Emphysema Mother    • Stroke Mother         Social History     Socioeconomic History   • Marital status:    Tobacco Use   • Smoking status: Every Day     Packs/day: 0.50     Years: 47.00     Pack years: 23.50     Types: Cigarettes     Start date:      Last attempt to quit: 2022     Years since quittin.1   • Smokeless tobacco: Never   Vaping Use   • Vaping Use: Never used   Substance and Sexual Activity   • Alcohol use: Not Currently   • Drug use: Never   • Sexual activity: Defer        Past Medical History:   Diagnosis Date   • COPD (chronic obstructive pulmonary disease) (HCC)         Immunization History   Administered Date(s) Administered   • COVID-19  "(MODERNA) 1st, 2nd, 3rd Dose Only 06/18/2021, 06/18/2021, 07/23/2021, 07/23/2021   • Flu Vaccine Quad PF >36MO 11/08/2019   • FluLaval/Fluzone >6mos 10/25/2021, 10/06/2022   • Hepatitis A 11/13/2018   • Pneumococcal Conjugate 20-Valent (PCV20) 11/08/2022   • Pneumococcal Polysaccharide (PPSV23) 02/13/2017, 02/13/2017       No Known Allergies       Current Outpatient Medications:   •  albuterol sulfate  (90 Base) MCG/ACT inhaler, Inhale 2 puffs Every 6 (Six) Hours As Needed for Wheezing or Shortness of Air. for wheezing, Disp: 36 g, Rfl: 5  •  fluticasone (Flovent HFA) 220 MCG/ACT inhaler, Inhale 2 puffs by mouth 2 (two) times a day., Disp: 144 g, Rfl: 3  •  ipratropium-albuterol (DUO-NEB) 0.5-2.5 mg/3 ml nebulizer, USE 1 VIAL IN NEBULIZER EVERY 4 HOURS AS NEEDED FOR SHORTNESS OF BREATH, Disp: 1800 mL, Rfl: 0  •  metoprolol tartrate (LOPRESSOR) 25 MG tablet, Take 0.5 tablets by mouth Every 12 (Twelve) Hours for 30 days., Disp: 30 tablet, Rfl: 1  •  simvastatin (ZOCOR) 20 MG tablet, Take 1 tablet by mouth Daily., Disp: 90 tablet, Rfl: 1  •  umeclidinium-vilanterol (Anoro Ellipta) 62.5-25 MCG/INH aerosol powder  inhaler, Inhale 1 puff Daily., Disp: 180 each, Rfl: 3  •  cefdinir (OMNICEF) 300 MG capsule, Take 1 capsule by mouth 2 (Two) Times a Day., Disp: 14 capsule, Rfl: 0  •  itraconazole (Sporanox) 100 MG capsule, Take 2 capsules by mouth 2 (Two) Times a Day., Disp: 120 capsule, Rfl: 3       OBJECTIVE    Vital Signs   BP 95/53 (BP Location: Right arm, Patient Position: Sitting, Cuff Size: Adult)   Pulse 62   Temp 98.2 °F (36.8 °C) (Tympanic)   Resp 18   Ht 170.2 cm (67\")   Wt 62.5 kg (137 lb 12.8 oz)   SpO2 95% Comment: room air  BMI 21.58 kg/m²     Physical Exam  Vitals reviewed.   Constitutional:       General: He is not in acute distress.     Appearance: Normal appearance. He is not ill-appearing.   HENT:      Head: Normocephalic and atraumatic.      Nose: Nose normal.      Mouth/Throat:      Mouth: " Mucous membranes are moist.      Pharynx: Oropharynx is clear.   Eyes:      Extraocular Movements: Extraocular movements intact.      Conjunctiva/sclera: Conjunctivae normal.      Pupils: Pupils are equal, round, and reactive to light.   Cardiovascular:      Rate and Rhythm: Normal rate and regular rhythm.      Pulses: Normal pulses.      Heart sounds: Normal heart sounds.   Pulmonary:      Effort: Pulmonary effort is normal. No respiratory distress.      Breath sounds: No stridor. No wheezing, rhonchi or rales.      Comments: Diminished breath sounds in bases of bilateral lung fields  Abdominal:      General: Bowel sounds are normal.   Musculoskeletal:         General: Normal range of motion.      Cervical back: Normal range of motion and neck supple.      Right lower leg: No edema.      Left lower leg: No edema.   Lymphadenopathy:      Cervical: No cervical adenopathy.   Skin:     General: Skin is warm and dry.   Neurological:      General: No focal deficit present.      Mental Status: He is alert and oriented to person, place, and time.   Psychiatric:         Mood and Affect: Mood normal.         Behavior: Behavior normal.          Results Review  I have personally reviewed the prior office notes, diagnostics as follows:  XR Chest 2 View [IMG36] (Order [id number])  Order  Status: Final result     Appointment Information    PACS Images     Radiology Images  Study Result    Narrative & Impression   PROCEDURE:  XR CHEST 2 VW     [facility]07/[facility], 15:57.     INDICATIONS:  SHORT OF BREATH     FINDINGS:          The cardiac silhouette is within normal limits.  Pulmonary vascularity appears normal.  There are   patchy interstitial opacities throughout the lung bases which could reflect mild acute infection or   bronchitis on superimposed chronic COPD/emphysema.  There is no significant pleural effusion.    There is no evidence of pneumothorax.  There are degenerative  changes of the thoracic spine.     IMPRESSION:                 1. Patchy bilateral interstitial opacities throughout the lung bases which could reflect mild   pneumonia or bronchitis superimposed on background chronic COPD/emphysema.              HELGA LOPEZ MD         Electronically Signed and Approved By: HELGA LOPEZ MD on 10/07/2022 at 11:05        CT Chest With Contrast Diagnostic [[id number]] (Order [id number])  Order  Status: Final result     Study Notes       Mirtha Way on 9/13/2022  8:14 PM EDT   Short of air   Hx COPD     Appointment Information    PACS Images     Radiology Images  Study Result    Narrative & Impression   PROCEDURE:  CT CHEST W CONTRAST DIAGNOSTIC     COMPARISON:  [facility], CR, XR CHEST 1 VW, 9/12/2022, 17:25.  [facility], CT, CT CHEST WO CONTRAST DIAGNOSTIC, 2/09/2022, 11:23.     INDICATIONS:  mildly elevated d-dimer, increased right ventricular systolic pressures     TECHNIQUE:    CT images were obtained with non-ionic intravenous contrast material.       PROTOCOL:     PE protocol performed.                 RADIATION:      DLP: 68mGy*cm               Automated exposure control was utilized to minimize radiation dose.   CONTRAST:      100cc Isovue 370 I.V.  LABS:   eGFR: >60ml/min/1.73m2     FINDINGS:          The pulmonary arteries are well opacified.  No filling defects are evident.  There are no findings   of PE.     Lung window images reveal moderately severe centrilobular emphysema.  No airspace disease is   evident.  No noncalcified lung nodule is seen.     Mediastinal windows reveal no mediastinal, hilar, or axillary adenopathy.  A few coronary artery   calcifications are evident.     Mild anterior wedging deformity of the superior endplate of a single mid thoracic vertebral body is   not evident on the prior study.  Faint sclerosis is evident, suggesting that this represents a   healing injury.     CONTINUED ON NEXT PAGE...                  IMPRESSION:                 CT scan of the chest with IV contrast demonstrating no findings of PE.     Emphysema.     Mild anterior wedging compression fracture of the superior endplate of a single midthoracic   vertebral body with faint sclerosis, suggesting healing fracture.              ALAN VARGAS MD         Electronically Signed and Approved By: ALAN VARGAS MD on 9/13/2022 at 20:25            Pulmonary Function Test: Patient Communication     Add Comments   Seen     Results  Pulmonary Function Test (Order [id number])  Order-Level Documents:    Scan on 10/13/2022 by Dacia Stauffer APRN: PULMONARY FUNCTION TEST, [facility], 10/13/2022         Author: -- Service: -- Author Type: --   Filed:  Date of Service:  Creation Time:    Status: (Other)   Pulmonary Function Test Interpretation  Sumeet Gomes  [id number]     10/25/22  09:38 EDT     Spirometry  Spirometry shows a forced vital capacity 1.38 which is 32% predicted FEV1 is 0.46 which is 14% of predicted FEV1 to FVC ratio was 33% postbronchodilator the FEV1 increased to 0.58 which is a change of 26%  Lung volumes show an increase of functional residual capacity with evidence of air trapping  Diffusion capacity was 4.84 which is 18% of predicted this is suggestive of severe airways obstruction with good response to bronchodilators and severe reduction in diffusion capacity  Flow volume loops are suggestive of severe airways obstruction         Electronically signed by Uriel Root MD, 10/25/22, 9:38 AM EDT.        Non-gynecologic Cytology: [id number]  Order: [id number]   Status: Final result      Visible to patient: Yes (not seen)      Next appt: 11/10/2022 at 09:00 AM in Cardiac Rehabilitation ([facility] REHAB/PULM REHAB - EXERCISE)      Dx: Acute respiratory failure with hypoxi...     Specimen Information: A: Lung, Left Lower Lobe; Lavage    B: Bronchus; Lavage    0 Result Notes  Component    Case Report   Medical Cytology Report                            Case: [id number]                                   Authorizing Provider:  Ulices Moya MD         Collected:           09/14/2022 10:16 AM           Ordering Location:     [facility] Received:            09/15/2022 09:21 AM                                  SUITES                                                                        Pathologist:           Collins Morales MD                                                             Specimens:   1) - Lung, Left Lower Lobe, LEFT LOWER LOBE BAL                                                      2) - Bronchus,  BRONCHIAL WASHINGS                                                         Specimen Adequacy Satisfactory for evaluation    Final Diagnosis   1. Lung, left lower lobe, BAL:               - Negative for malignant cells               - No viral inclusions               - Negative for fungal organisms on GMS special stain        2. Bronchial washings:               - Negative for malignant cells               - No viral inclusions               - Negative for fungal organisms on GMS special stain            Electronically signed by Collins Morales MD on 9/17/2022 at 1340        Contains abnormal data Fungus Culture - Lavage, Lung, Left Lower Lobe  Order: [id number]   Status: Final result      Visible to patient: Yes (not seen)      Next appt: 11/10/2022 at 09:00 AM in Cardiac Rehabilitation ([facility] CARD REHAB/PULM REHAB - EXERCISE)      Dx: Acute respiratory failure with hypoxi...     Specimen Information: Lung, Left Lower Lobe; Lavage    0 Result Notes  Fungus Culture Scant growth (1+) Aspergillus species Abnormal     Sent to reference lab for Id   Light growth (2+) Candida albicans Abnormal             Resulting Agency: [facility]           Specimen Collected: 09/14/22 10:16 EDT Last Resulted: 10/05/22 06:46 EDT           AFB Culture - Lavage, Lung, Left Lower Lobe  Order: [id number]   Status: Final result      Visible to patient: Yes (not seen)       Next appt: 11/10/2022 at 09:00 AM in Cardiac Rehabilitation ([facility] CARD REHAB/PULM REHAB - EXERCISE)      Dx: Acute respiratory failure with hypoxi...     Specimen Information: Lung, Left Lower Lobe; Lavage    0 Result Notes  AFB Culture No AFB isolated at 6 weeks                AFB Stain  No acid fast bacilli seen on direct smear      No acid fast bacilli seen on concentrated smear              Resulting Agency: [facility]           Specimen Collected: 09/14/22 10:16 EDT Last Resulted: 10/26/22 10:45 EDT           BAL Culture, Quantitative - Lavage, Lung, Left Lower Lobe  Order: [id number]   Status: Final result      Visible to patient: Yes (seen)      Next appt: 11/10/2022 at 09:00 AM in Cardiac Rehabilitation ([facility] CARD REHAB/PULM REHAB - EXERCISE)      Dx: Acute respiratory failure with hypoxi...     Specimen Information: Lung, Left Lower Lobe; Lavage    0 Result Notes  BAL Culture   Lab   <25,000 CFU/mL Normal respiratory jessica. No S. aureus or Pseudomonas aeruginosa detected. Final report.   [facility]               Gram Stain   Lab   Many (4+) WBCs seen [facility]      Rare (1+) Yeast [facility]      Rare (1+) Fungal elements [facility]                    Specimen Collected: 09/14/22 10:16 EDT Last Resulted: 09/16/22 09:25 EDT             Contains abnormal data Pneumonia Panel - Lavage, Lung, Left Lower Lobe  Order: [id number]   Status: Final result      Visible to patient: Yes (seen)      Next appt: 11/10/2022 at 09:00 AM in Cardiac Rehabilitation ([facility] CARD REHAB/PULM REHAB - EXERCISE)      Dx: Acute respiratory failure with hypoxi...     Specimen Information: Lung, Left Lower Lobe; Lavage    0 Result Notes  Component Ref Range & Units    Escherichia coli PCR Not Detected Not Detected    Acinetobacter calcoaceticus-baumannii complex PCR Not Detected Not Detected    Enterobacter cloacae PCR Not Detected Not Detected    Klebsiella oxytoca PCR Not Detected Not Detected    Klebsiella pneumoniae group PCR  Not Detected Not Detected    Klebsiella aerogenes PCR Not Detected Not Detected    Moraxella catarrhalis PCR Not Detected Not Detected    Proteus species PCR Not Detected Not Detected    Pseudomonas aeroginosa PCR Not Detected Not Detected    Serratia marcescens PCR Not Detected Not Detected    Staphylococcus aureus PCR Not Detected Not Detected    Streptococcus pyogenes PCR Not Detected Not Detected    Haemophilus influenzae PCR Not Detected Detected Abnormal     Comment: 10^5 Bin copies/mL   Streptococcus agalactiae PCR Not Detected Not Detected    Streptococcus pneumoniae PCR Not Detected Detected Abnormal     Comment: 10^6 Bin copies/mL   Chlamydophila pneumoniae PCR Not Detected Not Detected    Legionella pneumophilia PCR Not Detected Not Detected    Mycoplasma pneumo by PCR Not Detected Not Detected    ADENOVIRUS, PCR Not Detected Not Detected    CTX-M Gene Not Detected, N/A N/A    IMP Gene Not Detected, N/A N/A    KPC Gene Not Detected, N/A N/A    mecA/C and MREJ Gene Not Detected, N/A N/A    NDM Gene Not Detected, N/A N/A    OXA-48-like Gene Not Detected, N/A N/A    VIM Gene Not Detected, N/A N/A    Coronavirus Not Detected Not Detected    Human Metapneumovirus Not Detected Not Detected    Human Rhinovirus/Enterovirus Not Detected Detected Abnormal     Influenza A PCR Not Detected Not Detected    Influenza B PCR Not Detected Not Detected    RSV, PCR Not Detected Not Detected    Parainfluenza virus PCR Not Detected Not Detected    Resulting Agency  [facility]              Specimen Collected: 09/14/22 10:16 EDT Last Resulted: 09/14/22 13:39 EDT         Bronch Wash/BAL Differential - Lavage, Lung, Left Lower Lobe  Order: [id number]   Status: Final result      Visible to patient: Yes (seen)      Next appt: 11/10/2022 at 09:00 AM in Cardiac Rehabilitation ([facility] CARD REHAB/PULM REHAB - EXERCISE)      Dx: Acute respiratory failure with hypoxi...     Specimen Information: Lung, Left Lower Lobe; Lavage    0 Result  Notes  Component Ref Range & Units 1 mo ago    Visual Presence of Blood     Lymphocytes, Fluid % 7    Neutrophils, Fluid % 93    Resulting Agency  [facility]           Narrative  Performed by: [facility]  Normal Adults (Nonsmokers)     Alveolar Macrophages       >85%   Lymphocytes              10-15%   Neutrophils              <or=3%   Eosinophils              <or=1%   Bronchial Lining Cells   <or=5%       Clinical Interpretations for BAL     Eosinophils >or=25%       Eosinophilic Pneumoniae   Lymphocytes >or=50%       Consider Drug Rxn,Acute HP   Bloody lavage             Diffuse Alveolar Hemorrhage   Other Findings            Specific Dx or Non-diagnostic      Specimen Collected: 09/14/22 10:16 EDT Last Resulted: 09/14/22 11:55 EDT           Organism ID Mold - Isolate, Lung, Left Lower Lobe  Order: [id number]   Collected 9/14/2022 10:16      Status: Final result      Visible to patient: Yes (not seen)     Specimen Information: Lung, Left Lower Lobe; Isolate    0 Result Notes  Component    Organism ID, Mold Final report    Mold ID, Result 1 Aspergillus species    Resulting Agency [facility]           Narrative  Performed by: [facility]  Performed at:   - [facility]  [id number]   : Jonathan Flynn PhD, Phone:  [phone]      Specimen Collected: 09/14/22 10:16 EDT Last Resulted: 10/01/22 16:07 EDT             Respiratory Culture - Wash, Bronchus  Order: [id number]   Status: Final result      Visible to patient: Yes (seen)      Next appt: 11/10/2022 at 09:00 AM in Cardiac Rehabilitation ([facility] CARD REHAB/PULM REHAB - EXERCISE)      Dx: Acute respiratory failure with hypoxi...     Specimen Information: Bronchus; Wash    0 Result Notes  Respiratory Culture   Lab   Light growth (2+) Normal respiratory jessica. No S. aureus or Pseudomonas aeruginosa detected. Final report.   [facility]               Gram Stain   Lab   Few (2+) WBCs seen [facility]      No organisms seen [facility]                     Specimen Collected: 09/14/22 10:23 EDT Last Resulted: 09/16/22 09:31 EDT           Basic Metabolic Panel  Order: [id number]   Status: Final result      Visible to patient: Yes (seen)      Next appt: 11/10/2022 at 09:00 AM in Cardiac Rehabilitation ([facility] REHAB/PULM REHAB - EXERCISE)     Specimen Information: Blood    0 Result Notes  Component Ref Range & Units 1 mo ago   (9/17/22) 1 mo ago   (9/16/22) 1 mo ago   (9/15/22) 1 mo ago   (9/13/22) 1 mo ago   (9/12/22) 7 mo ago   (3/28/22) 1 yr ago   (3/13/21)   Glucose 65 - 99 mg/dL 132 High   140 High   109 High   118 High   127 High   109 High   103 High  R    BUN 8 - 23 mg/dL 39 High   35 High   22  20  20  9  11 R    Creatinine 0.76 - 1.27 mg/dL 0.64 Low   0.67 Low   0.69 Low   0.67 Low   0.69 Low   0.85  0.83 R    Sodium 136 - 145 mmol/L 138  139  140  138  138  141  139 R    Potassium 3.5 - 5.2 mmol/L 4.0  4.0  3.6  4.3  3.9  3.8 CM  4.1 R, CM    Chloride 98 - 107 mmol/L 84 Low   81 Low   85 Low   94 Low   96 Low   98  100 R    CO2 22.0 - 29.0 mmol/L >50.0 High   >50.0 High   49.5 High   38.1 High   34.7 High   34.0 High      Calcium 8.6 - 10.5 mg/dL 10.0  9.9  9.3  9.1  9.5  10.1  9.5 R    BUN/Creatinine Ratio 7.0 - 25.0 60.9 High   52.2 High   31.9 High   29.9 High   29.0 High   10.6  13 R    Anion Gap    CM  5.5 R  5.9 R  7.3 R  9.0 R  11 R    Comment: Unable to calculate Anion Gap.   eGFR >60.0 mL/min/1.73 108.4  106.9 CM  105.9 CM  106.9 CM  105.9 CM  99.5 CM     Comment: National Kidney Foundation and American Society of Nephrology (ASN) Task Force recommended calculation based on the Chronic Kidney Disease Epidemiology Collaboration (CKD-EPI) equation refit without adjustment for race.   Resulting Agency   [facility]  [facility]  [facility]  [facility]  [facility]  [facility]            Narrative  Performed by:  [facility]  GFR Normal >60   Chronic Kidney Disease <60   Kidney Failure <15       Specimen Collected: 09/17/22 04:33 EDT Last  Resulted: 09/17/22 05:57 EDT           Contains abnormal data CBC (No Diff)  Order: [id number]   Status: Final result      Visible to patient: Yes (seen)      Next appt: 11/10/2022 at 09:00 AM in Cardiac Rehabilitation ([facility] CARD REHAB/PULM REHAB - EXERCISE)     Specimen Information: Blood    0 Result Notes  Component Ref Range & Units 1 mo ago   (9/17/22) 1 mo ago   (9/13/22) 1 mo ago   (9/12/22) 4 mo ago   (6/29/22) 6 mo ago   (4/25/22)   WBC 3.40 - 10.80 10*3/mm3 8.05  7.21  8.12  6.49  6.23    RBC 4.14 - 5.80 10*6/mm3 5.83 High   5.54  5.62  6.13 High   5.91 High     Hemoglobin 13.0 - 17.7 g/dL 17.0  16.1  16.5  17.4  16.7    Hematocrit 37.5 - 51.0 % 54.8 High   50.9  51.2 High   51.4 High   53.6 High     MCV 79.0 - 97.0 fL 94.0  91.9  91.1  83.8  90.7    MCH 26.6 - 33.0 pg 29.2  29.1  29.4  28.4  28.3    MCHC 31.5 - 35.7 g/dL 31.0 Low   31.6  32.2  33.9  31.2 Low     RDW 12.3 - 15.4 % 13.4  14.4  14.9  13.7  13.9    RDW-SD 37.0 - 54.0 fl 46.8  48.4  50.0  41.2  47.1    MPV 6.0 - 12.0 fL 10.1  10.4  10.9  10.5  10.2    Platelets 140 - 450 10*3/mm3 179  151  159  243  194    Resulting Agency   [facility]  [facility]  [facility]  [facility]  [facility]              Specimen Collected: 09/17/22 04:33 EDT Last Resulted: 09/17/22 05:14 EDT           Contains abnormal data BNP  Order: [id number]   Status: Final result      Visible to patient: Yes (seen)      Next appt: 11/10/2022 at 09:00 AM in Cardiac Rehabilitation ([facility] CARD REHAB/PULM REHAB - EXERCISE)     Specimen Information: Blood    0 Result Notes  Component Ref Range & Units 1 mo ago   (9/16/22) 1 mo ago   (9/12/22)   proBNP 0.0 - 900.0 pg/mL 926.1 High   5,360.0 High     Resulting Agency   [facility]  [facility]           Narrative  Performed by: [facility]  Among patients with dyspnea, NT-proBNP is highly sensitive for the detection of acute congestive heart failure. In addition NT-proBNP of <300 pg/ml effectively rules out acute congestive heart failure with  99% negative predictive value.     Results may be falsely decreased if patient taking Biotin.       Specimen Collected: 09/16/22 06:38 EDT Last Resulted: 09/16/22 10:21 E               ASSESSMENT & PLAN    Patient Active Problem List   Diagnosis   • Other hyperlipidemia   • Essential hypertension   • COPD (chronic obstructive pulmonary disease) ([facility])   • Fatigue   • Chest wall contusion, right, initial encounter   • Closed fracture of coccyx (HCC)   • Contusion of right knee   • Acute respiratory failure with hypoxia and hypercapnia ([facility])   • Acute CHF ([facility])   • Tobacco abuse   • Hypoxia   • Severe malnutrition ([facility])   • Pulmonary hypertension (HCC)   • Other fatigue       Diagnoses and all orders for this visit:    1. Pulmonary emphysema, unspecified emphysema type ([facility]) (Primary)  -     albuterol sulfate  (90 Base) MCG/ACT inhaler; Inhale 2 puffs Every 6 (Six) Hours As Needed for Wheezing or Shortness of Air. for wheezing  Dispense: 36 g; Refill: 5    2. Chronic obstructive pulmonary disease, unspecified COPD type ([facility])    3. Chronic respiratory failure with hypoxia and hypercapnia ([facility])    4. Pulmonary hypertension ([facility])  -     Ambulatory Referral to Cardiology    5. Tobacco abuse, in remission    6. Pneumonia due to Streptococcus pneumoniae, unspecified laterality, unspecified part of lung ([facility])  -     cefdinir (OMNICEF) 300 MG capsule; Take 1 capsule by mouth 2 (Two) Times a Day.  Dispense: 14 capsule; Refill: 0  -     Comprehensive Metabolic Panel; Future  -     CBC & Differential; Future    7. Aspergillus (HCC)  -     itraconazole (Sporanox) 100 MG capsule; Take 2 capsules by mouth 2 (Two) Times a Day.  Dispense: 120 capsule; Refill: 3  -     Comprehensive Metabolic Panel; Future  -     CBC & Differential; Future    8. Acute diastolic congestive heart failure (HCC)  -     Ambulatory Referral to Cardiology    9. Medication management  -     Comprehensive Metabolic Panel; Future  -     CBC &  Differential; Future    10. Need for prophylactic vaccination with Streptococcus pneumoniae (Pneumococcus) and Influenza vaccines  -     Pneumococcal Conjugate Vaccine 20-Valent (PCV20)           Plan:  Patient to receive his Prevnar 20 vaccine today.  Discussed with patient findings from his bronchoscopy results.  We will treat patient with 7 days of cefdinir.  We will also treat patient for the Aspergillus with itraconazole twice daily for 3 months.  Patient is advised to stop Simvastatin while taking the itraconazole as it is contraindicated.Patient verbalizes understanding to stop Simvastatin at this time with treatment for Aspergillus with Itraconazole. We will get baseline labs today of CBC and CMP prior to starting treatment. Reviewed with patient his PFT results of severe airway obstruction. Referral to cardiology at this time.  Follow-up in 6 weeks to review response to medication treatment or sooner if needed    Smoking status:former  Vaccination status:up to date  Medications personally reviewed    Follow Up  Return in about 6 weeks (around 12/20/2022).    Patient was given instructions and counseling regarding his condition or for health maintenance advice. Please see specific information pulled into the AVS if appropriate.

## 2022-11-08 ENCOUNTER — OFFICE VISIT (OUTPATIENT)
Dept: PULMONOLOGY | Facility: CLINIC | Age: 62
End: 2022-11-08

## 2022-11-08 ENCOUNTER — TREATMENT (OUTPATIENT)
Dept: CARDIAC REHAB | Facility: HOSPITAL | Age: 62
End: 2022-11-08

## 2022-11-08 ENCOUNTER — LAB (OUTPATIENT)
Dept: LAB | Facility: HOSPITAL | Age: 62
End: 2022-11-08

## 2022-11-08 VITALS
RESPIRATION RATE: 18 BRPM | SYSTOLIC BLOOD PRESSURE: 95 MMHG | OXYGEN SATURATION: 95 % | HEART RATE: 62 BPM | BODY MASS INDEX: 21.63 KG/M2 | HEIGHT: 67 IN | TEMPERATURE: 98.2 F | WEIGHT: 137.8 LBS | DIASTOLIC BLOOD PRESSURE: 53 MMHG

## 2022-11-08 DIAGNOSIS — J43.9 PULMONARY EMPHYSEMA, UNSPECIFIED EMPHYSEMA TYPE: Primary | ICD-10-CM

## 2022-11-08 DIAGNOSIS — J44.1 COPD WITH ACUTE EXACERBATION: Primary | ICD-10-CM

## 2022-11-08 DIAGNOSIS — I50.31 ACUTE DIASTOLIC CONGESTIVE HEART FAILURE: ICD-10-CM

## 2022-11-08 DIAGNOSIS — J44.9 CHRONIC OBSTRUCTIVE PULMONARY DISEASE, UNSPECIFIED COPD TYPE: ICD-10-CM

## 2022-11-08 DIAGNOSIS — F17.201 TOBACCO ABUSE, IN REMISSION: ICD-10-CM

## 2022-11-08 DIAGNOSIS — J13 PNEUMONIA DUE TO STREPTOCOCCUS PNEUMONIAE, UNSPECIFIED LATERALITY, UNSPECIFIED PART OF LUNG: ICD-10-CM

## 2022-11-08 DIAGNOSIS — B44.9 ASPERGILLUS: ICD-10-CM

## 2022-11-08 DIAGNOSIS — Z79.899 MEDICATION MANAGEMENT: ICD-10-CM

## 2022-11-08 DIAGNOSIS — J96.12 CHRONIC RESPIRATORY FAILURE WITH HYPOXIA AND HYPERCAPNIA: ICD-10-CM

## 2022-11-08 DIAGNOSIS — I27.20 PULMONARY HYPERTENSION: ICD-10-CM

## 2022-11-08 DIAGNOSIS — Z23 NEED FOR PROPHYLACTIC VACCINATION WITH STREPTOCOCCUS PNEUMONIAE (PNEUMOCOCCUS) AND INFLUENZA VACCINES: ICD-10-CM

## 2022-11-08 DIAGNOSIS — J96.11 CHRONIC RESPIRATORY FAILURE WITH HYPOXIA AND HYPERCAPNIA: ICD-10-CM

## 2022-11-08 LAB
ALBUMIN SERPL-MCNC: 3.7 G/DL (ref 3.5–5.2)
ALBUMIN/GLOB SERPL: 1.3 G/DL
ALP SERPL-CCNC: 160 U/L (ref 39–117)
ALT SERPL W P-5'-P-CCNC: 13 U/L (ref 1–41)
ANION GAP SERPL CALCULATED.3IONS-SCNC: 6.5 MMOL/L (ref 5–15)
AST SERPL-CCNC: 21 U/L (ref 1–40)
BASOPHILS # BLD AUTO: 0.04 10*3/MM3 (ref 0–0.2)
BASOPHILS NFR BLD AUTO: 0.6 % (ref 0–1.5)
BILIRUB SERPL-MCNC: 0.3 MG/DL (ref 0–1.2)
BUN SERPL-MCNC: 15 MG/DL (ref 8–23)
BUN/CREAT SERPL: 20.8 (ref 7–25)
CALCIUM SPEC-SCNC: 9.6 MG/DL (ref 8.6–10.5)
CHLORIDE SERPL-SCNC: 101 MMOL/L (ref 98–107)
CO2 SERPL-SCNC: 33.5 MMOL/L (ref 22–29)
CREAT SERPL-MCNC: 0.72 MG/DL (ref 0.76–1.27)
DEPRECATED RDW RBC AUTO: 39 FL (ref 37–54)
EGFRCR SERPLBLD CKD-EPI 2021: 104.6 ML/MIN/1.73
EOSINOPHIL # BLD AUTO: 0.13 10*3/MM3 (ref 0–0.4)
EOSINOPHIL NFR BLD AUTO: 1.8 % (ref 0.3–6.2)
ERYTHROCYTE [DISTWIDTH] IN BLOOD BY AUTOMATED COUNT: 12.7 % (ref 12.3–15.4)
GLOBULIN UR ELPH-MCNC: 2.9 GM/DL
GLUCOSE SERPL-MCNC: 80 MG/DL (ref 65–99)
HCT VFR BLD AUTO: 47.1 % (ref 37.5–51)
HGB BLD-MCNC: 15.8 G/DL (ref 13–17.7)
IMM GRANULOCYTES # BLD AUTO: 0.02 10*3/MM3 (ref 0–0.05)
IMM GRANULOCYTES NFR BLD AUTO: 0.3 % (ref 0–0.5)
LYMPHOCYTES # BLD AUTO: 1.39 10*3/MM3 (ref 0.7–3.1)
LYMPHOCYTES NFR BLD AUTO: 19.5 % (ref 19.6–45.3)
MCH RBC QN AUTO: 29.2 PG (ref 26.6–33)
MCHC RBC AUTO-ENTMCNC: 33.5 G/DL (ref 31.5–35.7)
MCV RBC AUTO: 86.9 FL (ref 79–97)
MONOCYTES # BLD AUTO: 0.63 10*3/MM3 (ref 0.1–0.9)
MONOCYTES NFR BLD AUTO: 8.9 % (ref 5–12)
NEUTROPHILS NFR BLD AUTO: 4.9 10*3/MM3 (ref 1.7–7)
NEUTROPHILS NFR BLD AUTO: 68.9 % (ref 42.7–76)
NRBC BLD AUTO-RTO: 0 /100 WBC (ref 0–0.2)
PLATELET # BLD AUTO: 222 10*3/MM3 (ref 140–450)
PMV BLD AUTO: 10.1 FL (ref 6–12)
POTASSIUM SERPL-SCNC: 4.3 MMOL/L (ref 3.5–5.2)
PROT SERPL-MCNC: 6.6 G/DL (ref 6–8.5)
RBC # BLD AUTO: 5.42 10*6/MM3 (ref 4.14–5.8)
SODIUM SERPL-SCNC: 141 MMOL/L (ref 136–145)
WBC NRBC COR # BLD: 7.11 10*3/MM3 (ref 3.4–10.8)

## 2022-11-08 PROCEDURE — 85025 COMPLETE CBC W/AUTO DIFF WBC: CPT

## 2022-11-08 PROCEDURE — 90677 PCV20 VACCINE IM: CPT | Performed by: NURSE PRACTITIONER

## 2022-11-08 PROCEDURE — 94625 PHY/QHP OP PULM RHB W/O MNTR: CPT

## 2022-11-08 PROCEDURE — 99214 OFFICE O/P EST MOD 30 MIN: CPT | Performed by: NURSE PRACTITIONER

## 2022-11-08 PROCEDURE — 80053 COMPREHEN METABOLIC PANEL: CPT

## 2022-11-08 PROCEDURE — 36415 COLL VENOUS BLD VENIPUNCTURE: CPT

## 2022-11-08 PROCEDURE — 90471 IMMUNIZATION ADMIN: CPT | Performed by: NURSE PRACTITIONER

## 2022-11-08 RX ORDER — CEFDINIR 300 MG/1
300 CAPSULE ORAL 2 TIMES DAILY
Qty: 14 CAPSULE | Refills: 0 | Status: SHIPPED | OUTPATIENT
Start: 2022-11-08 | End: 2022-12-22

## 2022-11-08 RX ORDER — ALBUTEROL SULFATE 90 UG/1
2 AEROSOL, METERED RESPIRATORY (INHALATION) EVERY 6 HOURS PRN
Qty: 36 G | Refills: 5 | Status: SHIPPED | OUTPATIENT
Start: 2022-11-08 | End: 2022-12-22 | Stop reason: SDUPTHER

## 2022-11-08 RX ORDER — ITRACONAZOLE 100 MG/1
200 CAPSULE ORAL 2 TIMES DAILY
Qty: 120 CAPSULE | Refills: 3 | Status: SHIPPED | OUTPATIENT
Start: 2022-11-08 | End: 2023-02-27 | Stop reason: SDUPTHER

## 2022-11-10 ENCOUNTER — TREATMENT (OUTPATIENT)
Dept: CARDIAC REHAB | Facility: HOSPITAL | Age: 62
End: 2022-11-10

## 2022-11-10 DIAGNOSIS — J44.1 COPD WITH ACUTE EXACERBATION: Primary | ICD-10-CM

## 2022-11-10 PROCEDURE — 94625 PHY/QHP OP PULM RHB W/O MNTR: CPT

## 2022-11-14 ENCOUNTER — OFFICE VISIT (OUTPATIENT)
Dept: CARDIOLOGY | Facility: CLINIC | Age: 62
End: 2022-11-14

## 2022-11-14 VITALS
BODY MASS INDEX: 21.6 KG/M2 | HEART RATE: 74 BPM | SYSTOLIC BLOOD PRESSURE: 109 MMHG | DIASTOLIC BLOOD PRESSURE: 73 MMHG | HEIGHT: 67 IN | WEIGHT: 137.6 LBS

## 2022-11-14 DIAGNOSIS — Z99.81 ON HOME OXYGEN THERAPY: Primary | ICD-10-CM

## 2022-11-14 DIAGNOSIS — E78.49 OTHER HYPERLIPIDEMIA: ICD-10-CM

## 2022-11-14 DIAGNOSIS — I10 ESSENTIAL HYPERTENSION: ICD-10-CM

## 2022-11-14 PROCEDURE — 99214 OFFICE O/P EST MOD 30 MIN: CPT | Performed by: FAMILY MEDICINE

## 2022-11-15 ENCOUNTER — TELEPHONE (OUTPATIENT)
Dept: PULMONOLOGY | Facility: CLINIC | Age: 62
End: 2022-11-15

## 2022-11-15 ENCOUNTER — TREATMENT (OUTPATIENT)
Dept: CARDIAC REHAB | Facility: HOSPITAL | Age: 62
End: 2022-11-15

## 2022-11-15 DIAGNOSIS — J44.1 COPD WITH ACUTE EXACERBATION: Primary | ICD-10-CM

## 2022-11-15 PROCEDURE — 94625 PHY/QHP OP PULM RHB W/O MNTR: CPT

## 2022-11-15 NOTE — TELEPHONE ENCOUNTER
Authorization obtained for Itraconazole.  Auth # [id number] 11/15/2022 - 11/15/2023.  Called patient and advised prescription is available to [facility] in [place].    Advised patient, if copayment assistance is needed to contact this office to discuss further.  Patient verbalized understanding.

## 2022-11-17 ENCOUNTER — TREATMENT (OUTPATIENT)
Dept: CARDIAC REHAB | Facility: HOSPITAL | Age: 62
End: 2022-11-17

## 2022-11-17 DIAGNOSIS — J44.1 COPD WITH ACUTE EXACERBATION: Primary | ICD-10-CM

## 2022-11-17 PROCEDURE — 94625 PHY/QHP OP PULM RHB W/O MNTR: CPT

## 2022-11-17 NOTE — PROGRESS NOTES
Phase II and III Education    Discipline Teaching:  Cardiac Rehab Phase II []      Cardiac Rehab Phase III []      Pulmonary Rehab II [x]      Pulmonary Rehab III []      Peripheral Artery Disease []        Class Given:  Asthma Management []      Beginners Cardiac Rehab []      Beginners Pulmonary Rehab []      CAD I []      CAD II []      CHF []      Diabetes Mellitus []     Diet & Cholesterol []     Diet Consult []      Energy Conservation []     Exercise []     Grocery Store Tour []     Harmonica Therapy []     High Blood Pressure []     Living with COPD []     Medication Safety []     Peripheral Artery Disease []     S & S of Infection []      Stress []        Education Topics:  Angina []      Arrhythmias []      Asthma Management []      Beginning Cardiac Rehab []      Blood Pressure []     Blood Sugar []     Breathing Retrainment []     CHF []     Cooking []     Daily Weight []     Diabetes Mellitus []      Diet []     Energy Conservation []     Exercise []      Foot Care []      Grocery Store Tour []      Heart Disease []      Heart Function []      Incision Care []     Living with COPD []     Medication Safety []     PAD Symptoms/Treatment []     Reconditioning Exercises []     S & S Infection []     Smoking Consult []     Stress Management []     Test Results []       Teaching Aids:  Video []      PAD Handout []      Pulmonary Workbook []      CAD Teaching Packet []      Stress Teaching Packet []     Exercise Teaching Packet []      Diet Teaching Packet []      CHF Teaching Packet []      Blood Pressure Teaching Packet []      Smoking Cessation Packet []      Medication Safety Handout []      Pflex Training []      Diabetes Teaching Packet []      Harmonica Therapy Packet []        Teaching Method:  Discussion [x]      Written Information []      Audio Visual []      Demonstration [x]        Teaching Recipient:  Patient [x]      Family []      Friend []      Legal Guardian []      Primary Care Giver []       Significant Other []        Barriers To Learning:  None [x]      Auditory []      Cultural []      Emotional []      Financial []      Language []    Mental []      Motivation []      Physical []      Reading Skills []      Buddhism []      Verbal/Cognitive []      Visual []      Written/Cognitive []        Teaching Response:  Verified Via Teach back [x]      Return Demo Via Teach Back []      Reinforcement Needed []      Unable to Return Demo []      Unable to Comprehend []      Declines Teaching  []        Additional Education Topics:  aerochamber instruction and demonstration    Follow Up Instruction Comment:

## 2022-11-22 ENCOUNTER — TREATMENT (OUTPATIENT)
Dept: CARDIAC REHAB | Facility: HOSPITAL | Age: 62
End: 2022-11-22

## 2022-11-22 DIAGNOSIS — J44.1 COPD WITH ACUTE EXACERBATION: Primary | ICD-10-CM

## 2022-11-22 PROCEDURE — 94625 PHY/QHP OP PULM RHB W/O MNTR: CPT

## 2022-11-23 DIAGNOSIS — J43.9 PULMONARY EMPHYSEMA, UNSPECIFIED EMPHYSEMA TYPE: ICD-10-CM

## 2022-11-23 RX ORDER — IPRATROPIUM BROMIDE AND ALBUTEROL SULFATE 2.5; .5 MG/3ML; MG/3ML
3 SOLUTION RESPIRATORY (INHALATION)
Qty: 360 ML | Refills: 3 | Status: SHIPPED | OUTPATIENT
Start: 2022-11-23

## 2022-11-24 ENCOUNTER — APPOINTMENT (OUTPATIENT)
Dept: CARDIAC REHAB | Facility: HOSPITAL | Age: 62
End: 2022-11-24

## 2022-11-29 ENCOUNTER — TREATMENT (OUTPATIENT)
Dept: CARDIAC REHAB | Facility: HOSPITAL | Age: 62
End: 2022-11-29

## 2022-11-29 DIAGNOSIS — J44.1 COPD WITH ACUTE EXACERBATION: Primary | ICD-10-CM

## 2022-11-29 PROCEDURE — 94625 PHY/QHP OP PULM RHB W/O MNTR: CPT

## 2022-11-30 ENCOUNTER — OFFICE VISIT (OUTPATIENT)
Dept: PULMONOLOGY | Facility: CLINIC | Age: 62
End: 2022-11-30

## 2022-11-30 ENCOUNTER — HOSPITAL ENCOUNTER (EMERGENCY)
Facility: HOSPITAL | Age: 62
Discharge: HOME | End: 2022-11-30
Attending: EMERGENCY MEDICINE | Admitting: EMERGENCY MEDICINE

## 2022-11-30 ENCOUNTER — APPOINTMENT (OUTPATIENT)
Dept: GENERAL RADIOLOGY | Facility: HOSPITAL | Age: 62
End: 2022-11-30

## 2022-11-30 VITALS
WEIGHT: 138.8 LBS | RESPIRATION RATE: 16 BRPM | OXYGEN SATURATION: 83 % | HEIGHT: 67 IN | SYSTOLIC BLOOD PRESSURE: 99 MMHG | TEMPERATURE: 97.8 F | DIASTOLIC BLOOD PRESSURE: 43 MMHG | BODY MASS INDEX: 21.79 KG/M2 | HEART RATE: 67 BPM

## 2022-11-30 VITALS
HEART RATE: 65 BPM | SYSTOLIC BLOOD PRESSURE: 100 MMHG | OXYGEN SATURATION: 91 % | TEMPERATURE: 98.2 F | DIASTOLIC BLOOD PRESSURE: 65 MMHG | WEIGHT: 138.45 LBS | HEIGHT: 67 IN | RESPIRATION RATE: 20 BRPM | BODY MASS INDEX: 21.73 KG/M2

## 2022-11-30 DIAGNOSIS — J10.1 INFLUENZA DUE TO INFLUENZA VIRUS, TYPE A, HUMAN: Primary | ICD-10-CM

## 2022-11-30 DIAGNOSIS — R05.9 COUGH, UNSPECIFIED TYPE: ICD-10-CM

## 2022-11-30 DIAGNOSIS — J44.1 COPD EXACERBATION: ICD-10-CM

## 2022-11-30 DIAGNOSIS — J44.1 CHRONIC OBSTRUCTIVE PULMONARY DISEASE WITH ACUTE EXACERBATION: ICD-10-CM

## 2022-11-30 DIAGNOSIS — R06.00 DYSPNEA, UNSPECIFIED TYPE: ICD-10-CM

## 2022-11-30 DIAGNOSIS — R06.2 WHEEZING: ICD-10-CM

## 2022-11-30 DIAGNOSIS — I27.20 PULMONARY HYPERTENSION: Primary | ICD-10-CM

## 2022-11-30 DIAGNOSIS — B44.9 ASPERGILLUS: ICD-10-CM

## 2022-11-30 LAB
ALBUMIN SERPL-MCNC: 3.6 G/DL (ref 3.5–5.2)
ALBUMIN/GLOB SERPL: 1.4 G/DL
ALP SERPL-CCNC: 186 U/L (ref 39–117)
ALT SERPL W P-5'-P-CCNC: 22 U/L (ref 1–41)
ANION GAP SERPL CALCULATED.3IONS-SCNC: 7.7 MMOL/L (ref 5–15)
AST SERPL-CCNC: 28 U/L (ref 1–40)
BASOPHILS # BLD AUTO: 0.04 10*3/MM3 (ref 0–0.2)
BASOPHILS NFR BLD AUTO: 0.5 % (ref 0–1.5)
BILIRUB SERPL-MCNC: 0.8 MG/DL (ref 0–1.2)
BUN SERPL-MCNC: 15 MG/DL (ref 8–23)
BUN/CREAT SERPL: 23.4 (ref 7–25)
CALCIUM SPEC-SCNC: 8.9 MG/DL (ref 8.6–10.5)
CHLORIDE SERPL-SCNC: 100 MMOL/L (ref 98–107)
CO2 SERPL-SCNC: 29.3 MMOL/L (ref 22–29)
CREAT SERPL-MCNC: 0.64 MG/DL (ref 0.76–1.27)
DEPRECATED RDW RBC AUTO: 47.7 FL (ref 37–54)
EGFRCR SERPLBLD CKD-EPI 2021: 108.4 ML/MIN/1.73
EOSINOPHIL # BLD AUTO: 0 10*3/MM3 (ref 0–0.4)
EOSINOPHIL NFR BLD AUTO: 0 % (ref 0.3–6.2)
ERYTHROCYTE [DISTWIDTH] IN BLOOD BY AUTOMATED COUNT: 14.4 % (ref 12.3–15.4)
FLUAV AG NPH QL: POSITIVE
FLUBV AG NPH QL IA: NEGATIVE
GLOBULIN UR ELPH-MCNC: 2.5 GM/DL
GLUCOSE SERPL-MCNC: 105 MG/DL (ref 65–99)
HCT VFR BLD AUTO: 49.4 % (ref 37.5–51)
HGB BLD-MCNC: 15.7 G/DL (ref 13–17.7)
HOLD SPECIMEN: NORMAL
HOLD SPECIMEN: NORMAL
IMM GRANULOCYTES # BLD AUTO: 0.01 10*3/MM3 (ref 0–0.05)
IMM GRANULOCYTES NFR BLD AUTO: 0.1 % (ref 0–0.5)
LYMPHOCYTES # BLD AUTO: 0.53 10*3/MM3 (ref 0.7–3.1)
LYMPHOCYTES NFR BLD AUTO: 7 % (ref 19.6–45.3)
MCH RBC QN AUTO: 28.5 PG (ref 26.6–33)
MCHC RBC AUTO-ENTMCNC: 31.8 G/DL (ref 31.5–35.7)
MCV RBC AUTO: 89.8 FL (ref 79–97)
MONOCYTES # BLD AUTO: 1.01 10*3/MM3 (ref 0.1–0.9)
MONOCYTES NFR BLD AUTO: 13.4 % (ref 5–12)
NEUTROPHILS NFR BLD AUTO: 5.96 10*3/MM3 (ref 1.7–7)
NEUTROPHILS NFR BLD AUTO: 79 % (ref 42.7–76)
NRBC BLD AUTO-RTO: 0 /100 WBC (ref 0–0.2)
NT-PROBNP SERPL-MCNC: 3110 PG/ML (ref 0–900)
PLATELET # BLD AUTO: 147 10*3/MM3 (ref 140–450)
PMV BLD AUTO: 11.3 FL (ref 6–12)
POTASSIUM SERPL-SCNC: 4.5 MMOL/L (ref 3.5–5.2)
PROT SERPL-MCNC: 6.1 G/DL (ref 6–8.5)
RBC # BLD AUTO: 5.5 10*6/MM3 (ref 4.14–5.8)
SODIUM SERPL-SCNC: 137 MMOL/L (ref 136–145)
TROPONIN T SERPL-MCNC: <0.01 NG/ML (ref 0–0.03)
WBC NRBC COR # BLD: 7.55 10*3/MM3 (ref 3.4–10.8)
WHOLE BLOOD HOLD COAG: NORMAL
WHOLE BLOOD HOLD SPECIMEN: NORMAL

## 2022-11-30 PROCEDURE — 96374 THER/PROPH/DIAG INJ IV PUSH: CPT

## 2022-11-30 PROCEDURE — 84484 ASSAY OF TROPONIN QUANT: CPT

## 2022-11-30 PROCEDURE — 94640 AIRWAY INHALATION TREATMENT: CPT

## 2022-11-30 PROCEDURE — 93010 ELECTROCARDIOGRAM REPORT: CPT | Performed by: SPECIALIST

## 2022-11-30 PROCEDURE — 36415 COLL VENOUS BLD VENIPUNCTURE: CPT

## 2022-11-30 PROCEDURE — 93005 ELECTROCARDIOGRAM TRACING: CPT | Performed by: EMERGENCY MEDICINE

## 2022-11-30 PROCEDURE — 83880 ASSAY OF NATRIURETIC PEPTIDE: CPT

## 2022-11-30 PROCEDURE — 80053 COMPREHEN METABOLIC PANEL: CPT

## 2022-11-30 PROCEDURE — 85025 COMPLETE CBC W/AUTO DIFF WBC: CPT

## 2022-11-30 PROCEDURE — 93005 ELECTROCARDIOGRAM TRACING: CPT

## 2022-11-30 PROCEDURE — 99214 OFFICE O/P EST MOD 30 MIN: CPT | Performed by: NURSE PRACTITIONER

## 2022-11-30 PROCEDURE — 99284 EMERGENCY DEPT VISIT MOD MDM: CPT

## 2022-11-30 PROCEDURE — 71045 X-RAY EXAM CHEST 1 VIEW: CPT

## 2022-11-30 PROCEDURE — 25010000002 METHYLPREDNISOLONE PER 125 MG: Performed by: EMERGENCY MEDICINE

## 2022-11-30 PROCEDURE — 94799 UNLISTED PULMONARY SVC/PX: CPT

## 2022-11-30 PROCEDURE — 87804 INFLUENZA ASSAY W/OPTIC: CPT | Performed by: EMERGENCY MEDICINE

## 2022-11-30 PROCEDURE — 94664 DEMO&/EVAL PT USE INHALER: CPT

## 2022-11-30 RX ORDER — IPRATROPIUM BROMIDE AND ALBUTEROL SULFATE 2.5; .5 MG/3ML; MG/3ML
3 SOLUTION RESPIRATORY (INHALATION) ONCE
Status: COMPLETED | OUTPATIENT
Start: 2022-11-30 | End: 2022-11-30

## 2022-11-30 RX ORDER — OSELTAMIVIR PHOSPHATE 75 MG/1
75 CAPSULE ORAL 2 TIMES DAILY
Qty: 10 CAPSULE | Refills: 0 | Status: SHIPPED | OUTPATIENT
Start: 2022-11-30 | End: 2022-12-22

## 2022-11-30 RX ORDER — METHYLPREDNISOLONE 4 MG/1
TABLET ORAL
Qty: 21 TABLET | Refills: 0 | Status: SHIPPED | OUTPATIENT
Start: 2022-11-30 | End: 2022-12-22

## 2022-11-30 RX ORDER — SODIUM CHLORIDE 0.9 % (FLUSH) 0.9 %
10 SYRINGE (ML) INJECTION AS NEEDED
Status: DISCONTINUED | OUTPATIENT
Start: 2022-11-30 | End: 2022-12-01 | Stop reason: HOSPADM

## 2022-11-30 RX ORDER — METHYLPREDNISOLONE SODIUM SUCCINATE 125 MG/2ML
125 INJECTION, POWDER, LYOPHILIZED, FOR SOLUTION INTRAMUSCULAR; INTRAVENOUS ONCE
Status: COMPLETED | OUTPATIENT
Start: 2022-11-30 | End: 2022-11-30

## 2022-11-30 RX ADMIN — METHYLPREDNISOLONE SODIUM SUCCINATE 125 MG: 125 INJECTION, POWDER, FOR SOLUTION INTRAMUSCULAR; INTRAVENOUS at 16:14

## 2022-11-30 RX ADMIN — SODIUM CHLORIDE 500 ML: 9 INJECTION, SOLUTION INTRAVENOUS at 21:45

## 2022-11-30 RX ADMIN — IPRATROPIUM BROMIDE AND ALBUTEROL SULFATE 3 ML: .5; 3 SOLUTION RESPIRATORY (INHALATION) at 16:01

## 2022-11-30 NOTE — PROGRESS NOTES
Primary Care Provider  Mirtha Alexander APRN     Referring Provider  No ref. provider found     Chief Complaint  Cough, Shortness of Breath (Has has 2 liters of o2 on.), and Wheezing    Subjective          History of Presenting Illness  Patient is a 61-year-old male, patient of Dr. Figueroa who presents for management of pulmonary emphysema, strep pneumonia, and Aspergillus infection who presents for an acute visit today.  Patient's wife is present with the patient in the office today.  Patient states that since Thanksgiving he has been having increasing shortness of breath, productive cough with yellow to green sputum, and wheezing.  Patient states he is also having fatigue and weakness.  Patient states that his oxygen saturations at home on 2 L of oxygen per minute via nasal cannula is staying in the low 80s to high 70s.  Patient states that he does have NIPPV machine to use at night and with naps.  Patient states that he is taking Anoro and Flovent every day as prescribed and taking albuterol inhaler and DuoNeb nebulizer treatments as needed.  Patient is was treated with Omnicef for Streptococcus pneumonia and is currently taking Itraconazole for Aspergillus infection.  Patient denies any recent travel.  Patient denies any known exposure to any ill contacts.  Patient denies fever, chills, night sweats, swollen glands in the head and neck, unintentional weight loss, hemoptysis, dysphagia, chest pain, palpitations, abdominal pain, nausea, vomiting, and diarrhea.  Patient denies any leg swelling, orthopnea, paroxysmal nocturnal dyspnea.  Patient is able to perform activities of daily living.        Review of Systems   Constitutional: Positive for fatigue. Negative for activity change, appetite change, chills, diaphoresis, fever, unexpected weight gain and unexpected weight loss.        Negative for Insomnia   HENT: Negative for congestion (Nasal), mouth sores, nosebleeds, postnasal drip, sore throat, swollen  glands and trouble swallowing.         Negative for Thrush  Negative for Hoarseness  Negative for Allergies/Hay Fever  Negative for Recent Head injury  Negative for Ear Fullness  Negative for Nasal or Sinus pain  Negative for Dry lips  Negative for Nasal discharge   Respiratory: Positive for cough, chest tightness, shortness of breath and wheezing. Negative for apnea.         Negative for Hemoptysis  Negative for Pleuritic pain   Cardiovascular: Negative for chest pain, palpitations and leg swelling.        Negative for Claudication  Negative for Cyanosis  Negative for Dyspnea on exertion   Gastrointestinal: Negative for abdominal pain, diarrhea, nausea, vomiting and GERD.   Musculoskeletal: Negative for joint swelling and myalgias.        Negative for Joint pain  Negative for Joint stiffness   Skin: Negative for color change, dry skin, pallor and rash.   Neurological: Positive for weakness. Negative for syncope and headache.   Hematological: Negative for adenopathy. Does not bruise/bleed easily.        Family History   Problem Relation Age of Onset   • Asthma Mother    • Emphysema Mother    • Stroke Mother         Social History     Socioeconomic History   • Marital status:    Tobacco Use   • Smoking status: Every Day     Packs/day: 0.50     Years: 47.00     Pack years: 23.50     Types: Cigarettes     Start date:      Last attempt to quit: 2022     Years since quittin.2   • Smokeless tobacco: Never   Vaping Use   • Vaping Use: Never used   Substance and Sexual Activity   • Alcohol use: Not Currently   • Drug use: Never   • Sexual activity: Defer        Past Medical History:   Diagnosis Date   • COPD (chronic obstructive pulmonary disease) (HCC)         Immunization History   Administered Date(s) Administered   • COVID-19 (MODERNA) 1st, 2nd, 3rd Dose Only 2021, 2021, 2021, 2021   • Flu Vaccine Quad PF >36MO 2019   • FluLaval/Fluzone >6mos 10/25/2021, 10/06/2022   •  Hepatitis A 11/13/2018   • Pneumococcal Conjugate 20-Valent (PCV20) 11/08/2022   • Pneumococcal Polysaccharide (PPSV23) 02/13/2017, 02/13/2017       No Known Allergies     No current facility-administered medications for this visit.    Current Outpatient Medications:   •  albuterol sulfate  (90 Base) MCG/ACT inhaler, Inhale 2 puffs Every 6 (Six) Hours As Needed for Wheezing or Shortness of Air. for wheezing, Disp: 36 g, Rfl: 5  •  cefdinir (OMNICEF) 300 MG capsule, Take 1 capsule by mouth 2 (Two) Times a Day., Disp: 14 capsule, Rfl: 0  •  fluticasone (Flovent HFA) 220 MCG/ACT inhaler, Inhale 2 puffs by mouth 2 (two) times a day., Disp: 144 g, Rfl: 3  •  ipratropium-albuterol (DUO-NEB) 0.5-2.5 mg/3 ml nebulizer, Take 3 mL by nebulization 4 (Four) Times a Day., Disp: 360 mL, Rfl: 3  •  itraconazole (Sporanox) 100 MG capsule, Take 2 capsules by mouth 2 (Two) Times a Day., Disp: 120 capsule, Rfl: 3  •  umeclidinium-vilanterol (Anoro Ellipta) 62.5-25 MCG/INH aerosol powder  inhaler, Inhale 1 puff Daily., Disp: 180 each, Rfl: 3  •  metoprolol tartrate (LOPRESSOR) 25 MG tablet, Take 0.5 tablets by mouth Every 12 (Twelve) Hours for 30 days., Disp: 30 tablet, Rfl: 1  •  simvastatin (ZOCOR) 20 MG tablet, Take 1 tablet by mouth Daily., Disp: 90 tablet, Rfl: 1    Facility-Administered Medications Ordered in Other Visits:   •  sodium chloride 0.9 % flush 10 mL, 10 mL, Intravenous, PRN, Emergency, Triage Protocol, MD     Objective     Physical Exam  Constitutional:       Comments: Ill appearing, thin built   HENT:      Head: Normocephalic and atraumatic.      Mouth/Throat:      Mouth: Mucous membranes are moist.   Eyes:      Extraocular Movements: Extraocular movements intact.      Conjunctiva/sclera: Conjunctivae normal.      Pupils: Pupils are equal, round, and reactive to light.   Cardiovascular:      Rate and Rhythm: Normal rate and regular rhythm.   Pulmonary:      Breath sounds: Wheezing present.      Comments:  "Diminished breath sounds throughout, pursed lip breathing noted  Abdominal:      General: Bowel sounds are normal.      Palpations: Abdomen is soft.   Musculoskeletal:         General: Normal range of motion.   Skin:     General: Skin is warm and dry.   Neurological:      General: No focal deficit present.      Mental Status: He is alert and oriented to person, place, and time.         BP 99/43 (BP Location: Left arm, Patient Position: Sitting, Cuff Size: Small Adult)   Pulse 67   Temp 97.8 °F (36.6 °C) (Tympanic)   Resp 16   Ht 170.2 cm (67.01\")   Wt 63 kg (138 lb 12.8 oz)   SpO2 (!) 83% Comment: 2liters of o2  BMI 21.73 kg/m²         Result Review :   I have reviewed ZULY Chang last office visit note.    Procedures:         Assessment and Plan      Assessment:  1.  Pulmonary emphysema with acute exacerbation.  2.  Aspergillus infection.  Patient is on Itraconazole therapy.  3.  COPD with acute exacerbation.  4.  Pulmonary hypertension: Who class III secondary to severe COPD and emphysema.  Patient is not a candidate for pulmonary vasodilator therapy secondary to severe COPD.  5.  Dyspnea.  6.  Cough.  7.  Wheezing.  8.  Tobacco abuse of cigarettes in remission.      Plan:  1.  Patient's O2 saturation in the office on 2 L of oxygen per minute via nasal cannula is 83%.  Patient was placed on oxygen concentrator in the office today and 3 L were applied and oxygen saturation came back up to 90%.  Patient states that he still feels like he cannot get enough air in his lungs and feels like he is getting worse.  Blood pressure is low in the office today. Patient is advised to go to the ER immediately.  Did offer to call an ambulance, however patient declines and states that his wife will drive him.  Risk of refusing ambulance discussed with the patient and patient verbalized understanding.  Patient's wife states that she will take him to the ER immediately.  2.  Vaccination status: patient reports they " are up-to-date with flu, pneumonia, and Covid vaccines.  Patient is advised to continue to follow CDC recommendations such as social distancing wearing a mask and washing hands for at least 20 seconds.  3.  Follow-up after ER visit/hospital stay.            Follow Up   No follow-ups on file.  Patient was given instructions and counseling regarding his condition or for health maintenance advice. Please see specific information pulled into the AVS if appropriate.

## 2022-12-01 ENCOUNTER — APPOINTMENT (OUTPATIENT)
Dept: CARDIAC REHAB | Facility: HOSPITAL | Age: 62
End: 2022-12-01
Payer: COMMERCIAL

## 2022-12-01 LAB — QT INTERVAL: 432 MS

## 2022-12-01 NOTE — DISCHARGE INSTRUCTIONS
Rest at home.  Drink plenty of fluids.  Take Tylenol and/or Motrin as needed for any fevers or body aches.  Take prescriptions as prescribed.  Increase your home oxygen to 3 L if necessary.  Continue to use your normal home breathing medications and breathing treatments.  Take the prescriptions as prescribed.  Return to the ER for increasing shortness of breath, if you are unable to keep her oxygen levels greater than 90% after increasing your oxygen, or any other concerns issues that may arise.

## 2022-12-02 ENCOUNTER — TELEPHONE (OUTPATIENT)
Dept: PULMONOLOGY | Facility: CLINIC | Age: 62
End: 2022-12-02

## 2022-12-02 NOTE — TELEPHONE ENCOUNTER
Patient left a voicemail in regards to needing a follow up appointment, he was seen in office 11/30/2022 with Amber for an acute visit. He was sent to the ED and was discharged with Flu A and COPD exacerbation. He stated his paperwork from the ED wanted him to follow up in one week but he has a scheduled appointment with you on 12/22/2022. Would you like to get him in next week or wait until the 22nd? Please advise, thank you.

## 2022-12-05 RX ORDER — NICOTINE 21 MG/24HR
1 PATCH, TRANSDERMAL 24 HOURS TRANSDERMAL EVERY 24 HOURS
Qty: 28 EACH | Refills: 0 | Status: SHIPPED | OUTPATIENT
Start: 2022-12-05 | End: 2023-01-16

## 2022-12-06 ENCOUNTER — APPOINTMENT (OUTPATIENT)
Dept: CARDIAC REHAB | Facility: HOSPITAL | Age: 62
End: 2022-12-06
Payer: COMMERCIAL

## 2022-12-08 ENCOUNTER — TREATMENT (OUTPATIENT)
Dept: CARDIAC REHAB | Facility: HOSPITAL | Age: 62
End: 2022-12-08
Payer: COMMERCIAL

## 2022-12-08 DIAGNOSIS — J44.1 COPD WITH ACUTE EXACERBATION: Primary | ICD-10-CM

## 2022-12-08 PROCEDURE — 94625 PHY/QHP OP PULM RHB W/O MNTR: CPT

## 2022-12-13 ENCOUNTER — TREATMENT (OUTPATIENT)
Dept: CARDIAC REHAB | Facility: HOSPITAL | Age: 62
End: 2022-12-13
Payer: COMMERCIAL

## 2022-12-13 DIAGNOSIS — J44.1 COPD WITH ACUTE EXACERBATION: Primary | ICD-10-CM

## 2022-12-13 PROCEDURE — 94625 PHY/QHP OP PULM RHB W/O MNTR: CPT

## 2022-12-15 ENCOUNTER — TREATMENT (OUTPATIENT)
Dept: CARDIAC REHAB | Facility: HOSPITAL | Age: 62
End: 2022-12-15
Payer: COMMERCIAL

## 2022-12-15 DIAGNOSIS — J44.1 COPD WITH ACUTE EXACERBATION: Primary | ICD-10-CM

## 2022-12-15 PROCEDURE — 94625 PHY/QHP OP PULM RHB W/O MNTR: CPT

## 2022-12-15 NOTE — PROGRESS NOTES
Phase II and III Education    Discipline Teaching:  Cardiac Rehab Phase II []      Cardiac Rehab Phase III []      Pulmonary Rehab II [x]      Pulmonary Rehab III []      Peripheral Artery Disease []        Class Given:  Asthma Management []      Beginners Cardiac Rehab []      Beginners Pulmonary Rehab [x]      CAD I []      CAD II []      CHF []      Diabetes Mellitus []     Diet & Cholesterol []     Diet Consult []      Energy Conservation []     Exercise [x]     Grocery Store Tour []     Harmonica Therapy []     High Blood Pressure []     Living with COPD []     Medication Safety []     Peripheral Artery Disease []     S & S of Infection []      Stress []        Education Topics:  Angina []      Arrhythmias []      Asthma Management []      Beginning Cardiac Rehab []      Blood Pressure []     Blood Sugar []     Breathing Retrainment []     CHF []     Cooking []     Daily Weight []     Diabetes Mellitus []      Diet [x]     Energy Conservation []     Exercise [x]      Foot Care []      Grocery Store Tour []      Heart Disease []      Heart Function []      Incision Care []     Living with COPD []     Medication Safety []     PAD Symptoms/Treatment []     Reconditioning Exercises []     S & S Infection []     Smoking Consult []     Stress Management []     Test Results []       Teaching Aids:  Video []      PAD Handout []      Pulmonary Workbook []      CAD Teaching Packet []      Stress Teaching Packet []     Exercise Teaching Packet [x]      Diet Teaching Packet [x]      CHF Teaching Packet []      Blood Pressure Teaching Packet []      Smoking Cessation Packet []      Medication Safety Handout []      Pflex Training []      Diabetes Teaching Packet []      Harmonica Therapy Packet []        Teaching Method:  Discussion [x]      Written Information [x]      Audio Visual []      Demonstration [x]        Teaching Recipient:  Patient [x]      Family []      Friend []      Legal Guardian []      Primary Care Giver  []      Significant Other []        Barriers To Learning:  None [x]      Auditory []      Cultural []      Emotional []      Financial []      Language []    Mental []      Motivation []      Physical []      Reading Skills []      Yazidism []      Verbal/Cognitive []      Visual []      Written/Cognitive []        Teaching Response:  Verified Via Teach back [x]      Return Demo Via Teach Back []      Reinforcement Needed []      Unable to Return Demo []      Unable to Comprehend []      Declines Teaching  []        Additional Education Topics:  Instructed and educated patient on stretch and strengthening exercises and gave packet for him to do these at home.  Instructed and educated patient on high protein and high calorie diet. Gave written information and a print out of protein content in food information packet.       Follow Up Instruction Comment:

## 2022-12-20 ENCOUNTER — TREATMENT (OUTPATIENT)
Dept: CARDIAC REHAB | Facility: HOSPITAL | Age: 62
End: 2022-12-20
Payer: COMMERCIAL

## 2022-12-20 DIAGNOSIS — J44.1 COPD WITH ACUTE EXACERBATION: Primary | ICD-10-CM

## 2022-12-20 PROBLEM — Z99.81 ON HOME OXYGEN THERAPY: Status: ACTIVE | Noted: 2022-12-20

## 2022-12-20 PROCEDURE — 94625 PHY/QHP OP PULM RHB W/O MNTR: CPT

## 2022-12-20 NOTE — ASSESSMENT & PLAN NOTE
Echocardiogram shows EF of 56%   he appears euvolemic.  Continue his current beta-blocker, no current diuretics.  Encouraged him if he had worsening swelling, or weight gain to contact us.

## 2022-12-20 NOTE — ASSESSMENT & PLAN NOTE
LDL below goal at 61.  Continue simvastatin once he has completed his current course of antibiotics

## 2022-12-20 NOTE — PROGRESS NOTES
Primary Care Provider  Mirtha Alexander APRN   Referring Provider  No ref. provider found    Patient Complaint  Emphysema, Cough, Wheezing, OXYGEN, and Shortness of Breath      SUBJECTIVE    History of Presenting Illness  Sumeet Gomes is a pleasant 62 y.o. male who presents to [facility] for follow-up appointment.  Patient was last seen here 2022 by Amber Diaz NP.  He was sent to the emergency room where he was diagnosed with influenza A.  He was treated with Tamiflu Omnicef and Medrol Dosepak.  Patient states that today he feels much better than when he was here last visit.  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  He does have a diagnosis of pulmonary emphysema and is on an oral DuoNeb albuterol inhaler and Flovent.  Patient is on O2 at 2 L via nasal cannula.  Patient does continue to smoke half a pack a day.  He is up-to-date with his vaccines.  Patient does use a NIPPV and states he is compliant with its use.    He does have shortness of air with exertion of moderate severity but improves with O2 and rest.  At present time he is not having any coughing, wheezing, headaches, chest pain, weight loss or hemoptysis. Denies fevers, chills and night sweats. Sumeet Gomes is able to perform ADLs without difficulties and denies any swollen glands/lymph nodes in the head or neck.    I have personally reviewed the review of systems, past family, social, medical and surgical histories; and agree with their findings.    Review of Systems   Constitutional: Negative.    HENT: Negative.    Eyes: Negative.    Respiratory: Positive for shortness of breath. Negative for cough and wheezing.    Cardiovascular: Negative.    Musculoskeletal: Negative.    Skin: Negative.         Family History   Problem Relation Age of Onset   • Asthma Mother    • Emphysema Mother            • Stroke Mother         Social History     Socioeconomic  History   • Marital status:    Tobacco Use   • Smoking status: Every Day     Packs/day: 1.00     Years: 47.00     Pack years: 47.00     Types: Cigarettes     Start date: 1975     Last attempt to quit: 2022     Years since quittin.2   • Smokeless tobacco: Never   Vaping Use   • Vaping Use: Never used   Substance and Sexual Activity   • Alcohol use: Not Currently   • Drug use: Never   • Sexual activity: Defer        Past Medical History:   Diagnosis Date   • COPD (chronic obstructive pulmonary disease) (HCC)    • Coronary artery disease    • Diastolic heart failure    • Hyperlipidemia    • Hypertension    • Pulmonary hypertension (HCC)         Immunization History   Administered Date(s) Administered   • COVID-19 (MODERNA) 1st, 2nd, 3rd Dose Only 2021, 2021, 2021, 2021   • Flu Vaccine Quad PF >36MO 2019   • FluLaval/Fluzone >6mos 10/25/2021, 10/06/2022   • Hepatitis A 2018   • Pneumococcal Conjugate 20-Valent (PCV20) 2022   • Pneumococcal Polysaccharide (PPSV23) 2017, 2017       No Known Allergies       Current Outpatient Medications:   •  albuterol sulfate  (90 Base) MCG/ACT inhaler, Inhale 2 puffs Every 6 (Six) Hours As Needed for Wheezing or Shortness of Air. for wheezing, Disp: 36 g, Rfl: 5  •  fluticasone (Flovent HFA) 220 MCG/ACT inhaler, Inhale 2 puffs by mouth 2 (two) times a day., Disp: 144 g, Rfl: 3  •  ipratropium-albuterol (DUO-NEB) 0.5-2.5 mg/3 ml nebulizer, Take 3 mL by nebulization 4 (Four) Times a Day., Disp: 360 mL, Rfl: 3  •  itraconazole (Sporanox) 100 MG capsule, Take 2 capsules by mouth 2 (Two) Times a Day., Disp: 120 capsule, Rfl: 3  •  metoprolol tartrate (LOPRESSOR) 25 MG tablet, TAKE 1/2 TABLET BY MOUTH EVERY 12 HOURS, Disp: 30 tablet, Rfl: 0  •  nicotine (Nicoderm CQ) 14 MG/24HR patch, Place 1 patch on the skin as directed by provider Daily., Disp: 28 each, Rfl: 0  •  simvastatin (ZOCOR) 20 MG tablet, Take 1  "tablet by mouth Daily., Disp: 90 tablet, Rfl: 1  •  Umeclidinium-Vilanterol (Anoro Ellipta) 62.5-25 MCG/ACT aerosol powder  inhaler, Inhale 1 puff Daily., Disp: 180 each, Rfl: 3  •  cefdinir (OMNICEF) 300 MG capsule, Take 1 capsule by mouth 2 (Two) Times a Day., Disp: 20 capsule, Rfl: 0  •  predniSONE (DELTASONE) 10 MG tablet, 6 daily x 2 days, 5 daily x 2 days, 4 daily x 2 days, 3 daily x 2 days, 2 daily x 2 days, 1 daily x 2 days, Disp: 42 tablet, Rfl: 0       OBJECTIVE    Vital Signs   /76 (BP Location: Right arm, Patient Position: Sitting, Cuff Size: Adult)   Pulse 60   Temp 98 °F (36.7 °C) (Tympanic)   Resp 18   Ht 170.2 cm (67\")   Wt 63.1 kg (139 lb 3.2 oz)   SpO2 90% Comment: 2 LITERS  BMI 21.80 kg/m²     Physical Exam  Vitals reviewed.   Constitutional:       Appearance: Normal appearance.   HENT:      Head: Normocephalic and atraumatic.      Nose: Nose normal.      Mouth/Throat:      Mouth: Mucous membranes are moist.      Pharynx: Oropharynx is clear.   Eyes:      Extraocular Movements: Extraocular movements intact.      Conjunctiva/sclera: Conjunctivae normal.      Pupils: Pupils are equal, round, and reactive to light.   Cardiovascular:      Rate and Rhythm: Normal rate and regular rhythm.      Pulses: Normal pulses.      Heart sounds: Normal heart sounds.   Pulmonary:      Effort: Pulmonary effort is normal.      Breath sounds: Wheezing present.      Comments: Occasional inspiratory wheeze  Abdominal:      General: Bowel sounds are normal.   Musculoskeletal:         General: Normal range of motion.      Cervical back: Normal range of motion and neck supple.   Skin:     General: Skin is warm and dry.   Neurological:      Mental Status: He is alert and oriented to person, place, and time.   Psychiatric:         Behavior: Behavior normal.          Results Review  I have personally reviewed the prior office notes diagnostics last chest x-ray 11/30/2022 PFT 10/31/2022.      ASSESSMENT & " PLAN    Patient Active Problem List   Diagnosis   • Other hyperlipidemia   • Essential hypertension   • COPD (chronic obstructive pulmonary disease) ([facility])   • Fatigue   • Chest wall contusion, right, initial encounter   • Closed fracture of coccyx ([facility])   • Contusion of right knee   • Acute respiratory failure with hypoxia and hypercapnia ([facility])   • Acute CHF ([facility])   • Tobacco abuse   • Hypoxia   • Severe malnutrition ([facility])   • Pulmonary hypertension ([facility])   • Other fatigue   • Wheezing   • Dyspnea   • Cough   • Aspergillus ([facility])   • Diastolic heart failure       Diagnoses and all orders for this visit:    1. Pulmonary emphysema, unspecified emphysema type ([facility])  -     albuterol sulfate  (90 Base) MCG/ACT inhaler; Inhale 2 puffs Every 6 (Six) Hours As Needed for Wheezing or Shortness of Air. for wheezing  Dispense: 36 g; Refill: 5  -     fluticasone (Flovent HFA) 220 MCG/ACT inhaler; Inhale 2 puffs by mouth 2 (two) times a day.  Dispense: 144 g; Refill: 3  -     cefdinir (OMNICEF) 300 MG capsule; Take 1 capsule by mouth 2 (Two) Times a Day.  Dispense: 20 capsule; Refill: 0  -     predniSONE (DELTASONE) 10 MG tablet; 6 daily x 2 days, 5 daily x 2 days, 4 daily x 2 days, 3 daily x 2 days, 2 daily x 2 days, 1 daily x 2 days  Dispense: 42 tablet; Refill: 0    2. Chronic obstructive pulmonary disease, unspecified COPD type ([facility])  -     fluticasone (Flovent HFA) 220 MCG/ACT inhaler; Inhale 2 puffs by mouth 2 (two) times a day.  Dispense: 144 g; Refill: 3  -     Umeclidinium-Vilanterol (Anoro Ellipta) 62.5-25 MCG/ACT aerosol powder  inhaler; Inhale 1 puff Daily.  Dispense: 180 each; Refill: 3           Plan:  Patient to continue with Anoro DuoNeb albuterol Hailer and Flovent.  Patient to continue with his O2 titrate to keep his saturation above 90%.  Patient to continue with his NIPPV for naps and sleep at night.  Patient will follow back up in 2 months with MD or sooner if needed.    Smoking status:  Current  Vaccination status: Up to date  Medications personally reviewed    Follow Up  Return in about 2 months (around 2/22/2023) for with Dr. Moya.    Patient was given instructions and counseling regarding his condition or for health maintenance advice. Please see specific information pulled into the AVS if appropriate.

## 2022-12-22 ENCOUNTER — APPOINTMENT (OUTPATIENT)
Dept: CARDIAC REHAB | Facility: HOSPITAL | Age: 62
End: 2022-12-22
Payer: COMMERCIAL

## 2022-12-22 ENCOUNTER — OFFICE VISIT (OUTPATIENT)
Dept: PULMONOLOGY | Facility: CLINIC | Age: 62
End: 2022-12-22

## 2022-12-22 VITALS
TEMPERATURE: 98 F | HEART RATE: 60 BPM | SYSTOLIC BLOOD PRESSURE: 121 MMHG | WEIGHT: 139.2 LBS | DIASTOLIC BLOOD PRESSURE: 76 MMHG | BODY MASS INDEX: 21.85 KG/M2 | RESPIRATION RATE: 18 BRPM | HEIGHT: 67 IN | OXYGEN SATURATION: 90 %

## 2022-12-22 DIAGNOSIS — J44.9 CHRONIC OBSTRUCTIVE PULMONARY DISEASE, UNSPECIFIED COPD TYPE: ICD-10-CM

## 2022-12-22 DIAGNOSIS — J43.9 PULMONARY EMPHYSEMA, UNSPECIFIED EMPHYSEMA TYPE: ICD-10-CM

## 2022-12-22 PROCEDURE — 99213 OFFICE O/P EST LOW 20 MIN: CPT | Performed by: NURSE PRACTITIONER

## 2022-12-22 RX ORDER — CEFDINIR 300 MG/1
300 CAPSULE ORAL 2 TIMES DAILY
Qty: 20 CAPSULE | Refills: 0 | Status: SHIPPED | OUTPATIENT
Start: 2022-12-22 | End: 2023-02-01

## 2022-12-22 RX ORDER — PREDNISONE 10 MG/1
TABLET ORAL
Qty: 42 TABLET | Refills: 0 | Status: SHIPPED | OUTPATIENT
Start: 2022-12-22 | End: 2023-02-01

## 2022-12-22 RX ORDER — ALBUTEROL SULFATE 90 UG/1
2 AEROSOL, METERED RESPIRATORY (INHALATION) EVERY 6 HOURS PRN
Qty: 36 G | Refills: 5 | Status: SHIPPED | OUTPATIENT
Start: 2022-12-22

## 2022-12-22 RX ORDER — FLUTICASONE PROPIONATE 220 UG/1
2 AEROSOL, METERED RESPIRATORY (INHALATION)
Qty: 144 G | Refills: 3 | Status: SHIPPED | OUTPATIENT
Start: 2022-12-22

## 2022-12-27 ENCOUNTER — APPOINTMENT (OUTPATIENT)
Dept: CARDIAC REHAB | Facility: HOSPITAL | Age: 62
End: 2022-12-27
Payer: COMMERCIAL

## 2022-12-29 ENCOUNTER — APPOINTMENT (OUTPATIENT)
Dept: CARDIAC REHAB | Facility: HOSPITAL | Age: 62
End: 2022-12-29
Payer: COMMERCIAL

## 2023-01-03 ENCOUNTER — APPOINTMENT (OUTPATIENT)
Dept: CARDIAC REHAB | Facility: HOSPITAL | Age: 63
End: 2023-01-03
Payer: MEDICAID

## 2023-01-05 ENCOUNTER — APPOINTMENT (OUTPATIENT)
Dept: CARDIAC REHAB | Facility: HOSPITAL | Age: 63
End: 2023-01-05
Payer: MEDICAID

## 2023-01-10 ENCOUNTER — APPOINTMENT (OUTPATIENT)
Dept: CARDIAC REHAB | Facility: HOSPITAL | Age: 63
End: 2023-01-10
Payer: MEDICAID

## 2023-01-12 ENCOUNTER — APPOINTMENT (OUTPATIENT)
Dept: CARDIAC REHAB | Facility: HOSPITAL | Age: 63
End: 2023-01-12
Payer: MEDICAID

## 2023-01-14 DIAGNOSIS — E78.49 OTHER HYPERLIPIDEMIA: ICD-10-CM

## 2023-01-16 RX ORDER — NICOTINE 21 MG/24HR
1 PATCH, TRANSDERMAL 24 HOURS TRANSDERMAL EVERY 24 HOURS
Qty: 28 PATCH | Refills: 0 | Status: SHIPPED | OUTPATIENT
Start: 2023-01-16 | End: 2023-02-28 | Stop reason: DRUGHIGH

## 2023-01-16 RX ORDER — SIMVASTATIN 20 MG
TABLET ORAL
Qty: 90 TABLET | Refills: 1 | Status: SHIPPED | OUTPATIENT
Start: 2023-01-16 | End: 2023-02-01

## 2023-01-17 ENCOUNTER — APPOINTMENT (OUTPATIENT)
Dept: CARDIAC REHAB | Facility: HOSPITAL | Age: 63
End: 2023-01-17
Payer: MEDICAID

## 2023-01-19 ENCOUNTER — APPOINTMENT (OUTPATIENT)
Dept: CARDIAC REHAB | Facility: HOSPITAL | Age: 63
End: 2023-01-19
Payer: MEDICAID

## 2023-01-24 ENCOUNTER — TREATMENT (OUTPATIENT)
Dept: CARDIAC REHAB | Facility: HOSPITAL | Age: 63
End: 2023-01-24
Payer: MEDICAID

## 2023-01-24 DIAGNOSIS — J44.1 COPD WITH ACUTE EXACERBATION: Primary | ICD-10-CM

## 2023-01-24 PROCEDURE — 94625 PHY/QHP OP PULM RHB W/O MNTR: CPT

## 2023-01-26 ENCOUNTER — TREATMENT (OUTPATIENT)
Dept: CARDIAC REHAB | Facility: HOSPITAL | Age: 63
End: 2023-01-26
Payer: MEDICAID

## 2023-01-26 DIAGNOSIS — J44.1 COPD WITH ACUTE EXACERBATION: Primary | ICD-10-CM

## 2023-01-26 PROCEDURE — 94625 PHY/QHP OP PULM RHB W/O MNTR: CPT

## 2023-01-31 ENCOUNTER — APPOINTMENT (OUTPATIENT)
Dept: CARDIAC REHAB | Facility: HOSPITAL | Age: 63
End: 2023-01-31
Payer: MEDICAID

## 2023-02-01 ENCOUNTER — HOSPITAL ENCOUNTER (OUTPATIENT)
Dept: GENERAL RADIOLOGY | Facility: HOSPITAL | Age: 63
Discharge: HOME | End: 2023-02-01
Payer: MEDICAID

## 2023-02-01 ENCOUNTER — LAB (OUTPATIENT)
Dept: LAB | Facility: HOSPITAL | Age: 63
End: 2023-02-01
Payer: MEDICAID

## 2023-02-01 ENCOUNTER — OFFICE VISIT (OUTPATIENT)
Dept: FAMILY MEDICINE CLINIC | Age: 63
End: 2023-02-01
Payer: MEDICAID

## 2023-02-01 VITALS
OXYGEN SATURATION: 91 % | BODY MASS INDEX: 21.85 KG/M2 | SYSTOLIC BLOOD PRESSURE: 104 MMHG | WEIGHT: 139.2 LBS | RESPIRATION RATE: 18 BRPM | HEIGHT: 67 IN | HEART RATE: 62 BPM | DIASTOLIC BLOOD PRESSURE: 65 MMHG

## 2023-02-01 DIAGNOSIS — Z00.00 ROUTINE GENERAL MEDICAL EXAMINATION AT A HEALTH CARE FACILITY: Primary | ICD-10-CM

## 2023-02-01 DIAGNOSIS — Z12.5 SCREENING FOR PROSTATE CANCER: ICD-10-CM

## 2023-02-01 DIAGNOSIS — R05.3 CHRONIC COUGH: ICD-10-CM

## 2023-02-01 DIAGNOSIS — E78.49 OTHER HYPERLIPIDEMIA: ICD-10-CM

## 2023-02-01 DIAGNOSIS — Z00.00 ROUTINE GENERAL MEDICAL EXAMINATION AT A HEALTH CARE FACILITY: ICD-10-CM

## 2023-02-01 DIAGNOSIS — I27.20 PULMONARY HYPERTENSION: ICD-10-CM

## 2023-02-01 LAB
ALBUMIN SERPL-MCNC: 4.1 G/DL (ref 3.5–5.2)
ALBUMIN/GLOB SERPL: 2.6 G/DL
ALP SERPL-CCNC: 153 U/L (ref 39–117)
ALT SERPL W P-5'-P-CCNC: 32 U/L (ref 1–41)
ANION GAP SERPL CALCULATED.3IONS-SCNC: 3.8 MMOL/L (ref 5–15)
AST SERPL-CCNC: 31 U/L (ref 1–40)
BASOPHILS # BLD AUTO: 0.02 10*3/MM3 (ref 0–0.2)
BASOPHILS NFR BLD AUTO: 0.3 % (ref 0–1.5)
BILIRUB SERPL-MCNC: 0.7 MG/DL (ref 0–1.2)
BUN SERPL-MCNC: 14 MG/DL (ref 8–23)
BUN/CREAT SERPL: 20 (ref 7–25)
CALCIUM SPEC-SCNC: 9.4 MG/DL (ref 8.6–10.5)
CHLORIDE SERPL-SCNC: 99 MMOL/L (ref 98–107)
CHOLEST SERPL-MCNC: 185 MG/DL (ref 0–200)
CO2 SERPL-SCNC: 37.2 MMOL/L (ref 22–29)
CREAT SERPL-MCNC: 0.7 MG/DL (ref 0.76–1.27)
DEPRECATED RDW RBC AUTO: 54 FL (ref 37–54)
EGFRCR SERPLBLD CKD-EPI 2021: 104.8 ML/MIN/1.73
EOSINOPHIL # BLD AUTO: 0.03 10*3/MM3 (ref 0–0.4)
EOSINOPHIL NFR BLD AUTO: 0.4 % (ref 0.3–6.2)
ERYTHROCYTE [DISTWIDTH] IN BLOOD BY AUTOMATED COUNT: 16 % (ref 12.3–15.4)
GLOBULIN UR ELPH-MCNC: 1.6 GM/DL
GLUCOSE SERPL-MCNC: 107 MG/DL (ref 65–99)
HCT VFR BLD AUTO: 46.9 % (ref 37.5–51)
HDLC SERPL-MCNC: 87 MG/DL (ref 40–60)
HGB BLD-MCNC: 14.5 G/DL (ref 13–17.7)
IMM GRANULOCYTES # BLD AUTO: 0.01 10*3/MM3 (ref 0–0.05)
IMM GRANULOCYTES NFR BLD AUTO: 0.1 % (ref 0–0.5)
LDLC SERPL CALC-MCNC: 80 MG/DL (ref 0–100)
LDLC/HDLC SERPL: 0.9 {RATIO}
LYMPHOCYTES # BLD AUTO: 0.86 10*3/MM3 (ref 0.7–3.1)
LYMPHOCYTES NFR BLD AUTO: 11.1 % (ref 19.6–45.3)
MCH RBC QN AUTO: 28 PG (ref 26.6–33)
MCHC RBC AUTO-ENTMCNC: 30.9 G/DL (ref 31.5–35.7)
MCV RBC AUTO: 90.5 FL (ref 79–97)
MONOCYTES # BLD AUTO: 0.89 10*3/MM3 (ref 0.1–0.9)
MONOCYTES NFR BLD AUTO: 11.5 % (ref 5–12)
NEUTROPHILS NFR BLD AUTO: 5.95 10*3/MM3 (ref 1.7–7)
NEUTROPHILS NFR BLD AUTO: 76.6 % (ref 42.7–76)
PLATELET # BLD AUTO: 126 10*3/MM3 (ref 140–450)
PMV BLD AUTO: 10.7 FL (ref 6–12)
POTASSIUM SERPL-SCNC: 4.3 MMOL/L (ref 3.5–5.2)
PROT SERPL-MCNC: 5.7 G/DL (ref 6–8.5)
PSA SERPL-MCNC: 0.36 NG/ML (ref 0–4)
RBC # BLD AUTO: 5.18 10*6/MM3 (ref 4.14–5.8)
SODIUM SERPL-SCNC: 140 MMOL/L (ref 136–145)
TRIGL SERPL-MCNC: 100 MG/DL (ref 0–150)
TSH SERPL DL<=0.05 MIU/L-ACNC: 2.53 UIU/ML (ref 0.27–4.2)
VLDLC SERPL-MCNC: 18 MG/DL (ref 5–40)
WBC NRBC COR # BLD: 7.76 10*3/MM3 (ref 3.4–10.8)

## 2023-02-01 PROCEDURE — 80061 LIPID PANEL: CPT

## 2023-02-01 PROCEDURE — G0103 PSA SCREENING: HCPCS

## 2023-02-01 PROCEDURE — 71046 X-RAY EXAM CHEST 2 VIEWS: CPT

## 2023-02-01 PROCEDURE — 99396 PREV VISIT EST AGE 40-64: CPT | Performed by: NURSE PRACTITIONER

## 2023-02-01 PROCEDURE — 36415 COLL VENOUS BLD VENIPUNCTURE: CPT

## 2023-02-01 PROCEDURE — 80050 GENERAL HEALTH PANEL: CPT

## 2023-02-01 NOTE — TELEPHONE ENCOUNTER
Rx Refill Note  Requested Prescriptions     Pending Prescriptions Disp Refills   • metoprolol tartrate (LOPRESSOR) 25 MG tablet [Pharmacy Med Name: METOPROLOL TARTRATE 25 MG TAB] 30 tablet 0     Sig: TAKE 1/2 TABLET BY MOUTH EVERY 12 HOURS      Last office visit with prescribing clinician: 9/20/2022     Next office visit with prescribing clinician: Visit date not found    Erica Giang LPN  02/01/23, 08:54 EST     [facility] for an appt around 3/21/23

## 2023-02-01 NOTE — ASSESSMENT & PLAN NOTE
Follow a healthy diet and get regular exercise.  Always wear seat belts.    Discussed his weight, appetite, smoking, advised staying off cigarettes, he using nicoderm patches, try boost drinks  He did eat sausage egg biscuit this am  Discussed vaccines, to check with his pharmacy on Tdap   Discussed colonoscopy, declines at this time   Reviewed EMD chart and previous vaccines, would advise future shingrex vaccine

## 2023-02-01 NOTE — PROGRESS NOTES
Sumeet Gomes presents to [facility].    Chief Complaint:  Annual Exam/ here today with his wife Ida          History of Present Illness:  General Health Questions  Regular exercise as least 3 days a week: no but going to pulm rehab now again 2 days a week   Follows a healthy diet: no   Wears seat belt always: yes   Drinks Alcohol: none now   Uses Recreational drugs : no   Uses tobacco products: off an on   UTD on Tetanus vaccine: 2012  Last PSA: never   Last colon screen: normal     PMH changes since :         22 + covid            COPD /sees pulm next appt 2023 (he is on oral antifungal and is off zocor)        Hospitalizations:     Copd   back surgery         PREVENTIVE HEALTH MAINTENANCE             Hepatitis C Medicare Screening: was last done ; negative         Surgical History:         Heart cath   Arthroscopy: left knee  and right shoulder ;     Vasectomy     Other Surgeries:    Laminectomy: lumbar region; ;     Procedures:    Colonoscopy ( 12 )     COLONOSCOPY: was last done 12 with normal results Haddad         Family History:     Father: Hypertension;  Dementia     Mother:  at age 54; Cause of death was stroke;  Hypertension;  COPD     Brother(s): Healthy; 3 brother(s) total     Sister(s): 2 sister(s) total         Social History:     Occupation: [employer] /retired     Marital Status:      Children: 3 children                   Review of Systems:  Review of Systems   Constitutional: Negative for fatigue and fever.   HENT: Negative for ear pain and sore throat.    Eyes: Negative for blurred vision.   Respiratory: Positive for cough and shortness of breath (wears portable oxygen, pulse ox is 76-low 90's).         Occ sputum clear    Cardiovascular: Negative for chest pain, palpitations and leg swelling.   Gastrointestinal: Negative for abdominal pain, constipation, diarrhea, nausea  "and vomiting.   Musculoskeletal: Negative for arthralgias and myalgias.   Skin: Negative for rash.   Neurological: Negative for dizziness, weakness and headache.   Psychiatric/Behavioral: Negative for sleep disturbance and depressed mood.          Current Outpatient Medications:   •  albuterol sulfate  (90 Base) MCG/ACT inhaler, Inhale 2 puffs Every 6 (Six) Hours As Needed for Wheezing or Shortness of Air. for wheezing, Disp: 36 g, Rfl: 5  •  fluticasone (Flovent HFA) 220 MCG/ACT inhaler, Inhale 2 puffs by mouth 2 (two) times a day., Disp: 144 g, Rfl: 3  •  ipratropium-albuterol (DUO-NEB) 0.5-2.5 mg/3 ml nebulizer, Take 3 mL by nebulization 4 (Four) Times a Day., Disp: 360 mL, Rfl: 3  •  itraconazole (Sporanox) 100 MG capsule, Take 2 capsules by mouth 2 (Two) Times a Day., Disp: 120 capsule, Rfl: 3  •  metoprolol tartrate (LOPRESSOR) 25 MG tablet, TAKE 1/2 TABLET BY MOUTH EVERY 12 HOURS, Disp: 30 tablet, Rfl: 0  •  nicotine (NICODERM CQ) 14 MG/24HR patch, PLACE 1 PATCH ON THE SKIN AS DIRECTED BY PROVIDER DAILY., Disp: 28 patch, Rfl: 0  •  NON FORMULARY, V PAP, Disp: , Rfl:   •  Umeclidinium-Vilanterol (Anoro Ellipta) 62.5-25 MCG/ACT aerosol powder  inhaler, Inhale 1 puff Daily., Disp: 180 each, Rfl: 3    Vital Signs:   Vitals:    02/01/23 1403   BP: 104/65   BP Location: Right arm   Patient Position: Sitting   Cuff Size: Adult   Pulse: 62   Resp: 18   SpO2: 91%  Comment: 3 L O2   Weight: 63.1 kg (139 lb 3.2 oz)   Height: 170.2 cm (67\")   PainSc: 0-No pain         Physical Exam:  Physical Exam  Vitals reviewed.   Constitutional:       Appearance: Normal appearance. He is well-developed.      Comments: Thin    HENT:      Head: Normocephalic and atraumatic.      Right Ear: External ear normal.      Left Ear: External ear normal.      Mouth/Throat:      Pharynx: No oropharyngeal exudate.   Eyes:      Conjunctiva/sclera: Conjunctivae normal.      Pupils: Pupils are equal, round, and reactive to light. "   Cardiovascular:      Rate and Rhythm: Normal rate and regular rhythm.      Heart sounds: No murmur heard.    No friction rub. No gallop.   Pulmonary:      Effort: Pulmonary effort is normal.      Breath sounds: Decreased air movement (no adventitious sounds ) present. No wheezing or rhonchi.   Abdominal:      General: Bowel sounds are normal. There is no distension.      Palpations: Abdomen is soft.      Tenderness: There is no abdominal tenderness.   Skin:     General: Skin is warm and dry.      Findings: Bruising (scattered on his extremities ) present.   Neurological:      Mental Status: He is alert and oriented to person, place, and time.      Cranial Nerves: No cranial nerve deficit.   Psychiatric:         Mood and Affect: Mood and affect normal.         Behavior: Behavior normal.         Thought Content: Thought content normal.         Judgment: Judgment normal.         Result Review      The following data was reviewed by: ZULY Segundo on 02/01/2023:    Results for orders placed or performed during the hospital encounter of 11/30/22   Influenza Antigen, Rapid - Swab, Nasopharynx    Specimen: Nasopharynx; Swab   Result Value Ref Range    Influenza A Ag, EIA Positive (A) Negative    Influenza B Ag, EIA Negative Negative   Comprehensive Metabolic Panel    Specimen: Blood   Result Value Ref Range    Glucose 105 (H) 65 - 99 mg/dL    BUN 15 8 - 23 mg/dL    Creatinine 0.64 (L) 0.76 - 1.27 mg/dL    Sodium 137 136 - 145 mmol/L    Potassium 4.5 3.5 - 5.2 mmol/L    Chloride 100 98 - 107 mmol/L    CO2 29.3 (H) 22.0 - 29.0 mmol/L    Calcium 8.9 8.6 - 10.5 mg/dL    Total Protein 6.1 6.0 - 8.5 g/dL    Albumin 3.60 3.50 - 5.20 g/dL    ALT (SGPT) 22 1 - 41 U/L    AST (SGOT) 28 1 - 40 U/L    Alkaline Phosphatase 186 (H) 39 - 117 U/L    Total Bilirubin 0.8 0.0 - 1.2 mg/dL    Globulin 2.5 gm/dL    A/G Ratio 1.4 g/dL    BUN/Creatinine Ratio 23.4 7.0 - 25.0    Anion Gap 7.7 5.0 - 15.0 mmol/L    eGFR 108.4 >60.0  mL/min/1.73   BNP    Specimen: Blood   Result Value Ref Range    proBNP 3,110.0 (H) 0.0 - 900.0 pg/mL   Troponin    Specimen: Blood   Result Value Ref Range    Troponin T <0.010 0.000 - 0.030 ng/mL   CBC Auto Differential    Specimen: Blood   Result Value Ref Range    WBC 7.55 3.40 - 10.80 10*3/mm3    RBC 5.50 4.14 - 5.80 10*6/mm3    Hemoglobin 15.7 13.0 - 17.7 g/dL    Hematocrit 49.4 37.5 - 51.0 %    MCV 89.8 79.0 - 97.0 fL    MCH 28.5 26.6 - 33.0 pg    MCHC 31.8 31.5 - 35.7 g/dL    RDW 14.4 12.3 - 15.4 %    RDW-SD 47.7 37.0 - 54.0 fl    MPV 11.3 6.0 - 12.0 fL    Platelets 147 140 - 450 10*3/mm3    Neutrophil % 79.0 (H) 42.7 - 76.0 %    Lymphocyte % 7.0 (L) 19.6 - 45.3 %    Monocyte % 13.4 (H) 5.0 - 12.0 %    Eosinophil % 0.0 (L) 0.3 - 6.2 %    Basophil % 0.5 0.0 - 1.5 %    Immature Grans % 0.1 0.0 - 0.5 %    Neutrophils, Absolute 5.96 1.70 - 7.00 10*3/mm3    Lymphocytes, Absolute 0.53 (L) 0.70 - 3.10 10*3/mm3    Monocytes, Absolute 1.01 (H) 0.10 - 0.90 10*3/mm3    Eosinophils, Absolute 0.00 0.00 - 0.40 10*3/mm3    Basophils, Absolute 0.04 0.00 - 0.20 10*3/mm3    Immature Grans, Absolute 0.01 0.00 - 0.05 10*3/mm3    nRBC 0.0 0.0 - 0.2 /100 WBC   ECG 12 Lead ED Triage Standing Order; SOA   Result Value Ref Range    QT Interval 432 ms   Green Top (Gel)   Result Value Ref Range    Extra Tube Hold for add-ons.    Lavender Top   Result Value Ref Range    Extra Tube hold for add-on    Gold Top - SST   Result Value Ref Range    Extra Tube Hold for add-ons.    Light Blue Top   Result Value Ref Range    Extra Tube Hold for add-ons.                Assessment and Plan:          Diagnoses and all orders for this visit:    1. Routine general medical examination at a health care facility (Primary)  Assessment & Plan:  Follow a healthy diet and get regular exercise.  Always wear seat belts.    Discussed his weight, appetite, smoking, advised staying off cigarettes, he using nicoderm patches, try boost drinks  He did eat sausage  egg biscuit this am  Discussed vaccines, to check with his pharmacy on Tdap   Discussed colonoscopy, declines at this time   Reviewed EMD chart and previous vaccines, would advise future shingrex vaccine       Orders:  -     Comprehensive Metabolic Panel; Future  -     CBC & Differential; Future  -     TSH; Future  -     Lipid Panel; Future  -     PSA Screen; Future    2. Chronic cough  -     XR Chest 2 View    3. Screening for prostate cancer  Assessment & Plan:  Will screen for prostate cancer     Orders:  -     PSA Screen; Future    4. Other hyperlipidemia  Assessment & Plan:  He is off zocor, rechecking labs today       5. Pulmonary hypertension (HCC)  Assessment & Plan:  Continue to follow up with pulm and pulm rehab as directed, quit smoking           Follow Up   Return for followup pending lab results, follow up pending x-ray result.  Patient was given instructions and counseling regarding his condition or for health maintenance advice. Please see specific information pulled into the AVS if appropriate.

## 2023-02-02 ENCOUNTER — TREATMENT (OUTPATIENT)
Dept: CARDIAC REHAB | Facility: HOSPITAL | Age: 63
End: 2023-02-02
Payer: MEDICAID

## 2023-02-02 DIAGNOSIS — J44.1 COPD WITH ACUTE EXACERBATION: Primary | ICD-10-CM

## 2023-02-02 PROCEDURE — 94625 PHY/QHP OP PULM RHB W/O MNTR: CPT

## 2023-02-03 DIAGNOSIS — D69.6 THROMBOCYTOPENIA: Primary | ICD-10-CM

## 2023-02-07 ENCOUNTER — TREATMENT (OUTPATIENT)
Dept: CARDIAC REHAB | Facility: HOSPITAL | Age: 63
End: 2023-02-07
Payer: MEDICAID

## 2023-02-07 DIAGNOSIS — J44.1 COPD WITH ACUTE EXACERBATION: Primary | ICD-10-CM

## 2023-02-07 PROCEDURE — 94625 PHY/QHP OP PULM RHB W/O MNTR: CPT

## 2023-02-09 ENCOUNTER — TREATMENT (OUTPATIENT)
Dept: CARDIAC REHAB | Facility: HOSPITAL | Age: 63
End: 2023-02-09
Payer: MEDICAID

## 2023-02-09 DIAGNOSIS — J44.1 COPD WITH ACUTE EXACERBATION: Primary | ICD-10-CM

## 2023-02-09 PROCEDURE — 94625 PHY/QHP OP PULM RHB W/O MNTR: CPT

## 2023-02-14 ENCOUNTER — TREATMENT (OUTPATIENT)
Dept: CARDIAC REHAB | Facility: HOSPITAL | Age: 63
End: 2023-02-14
Payer: MEDICAID

## 2023-02-14 DIAGNOSIS — J44.1 COPD WITH ACUTE EXACERBATION: Primary | ICD-10-CM

## 2023-02-14 PROCEDURE — 94625 PHY/QHP OP PULM RHB W/O MNTR: CPT

## 2023-02-16 ENCOUNTER — TREATMENT (OUTPATIENT)
Dept: CARDIAC REHAB | Facility: HOSPITAL | Age: 63
End: 2023-02-16
Payer: MEDICAID

## 2023-02-16 DIAGNOSIS — J44.1 COPD WITH ACUTE EXACERBATION: Primary | ICD-10-CM

## 2023-02-16 PROCEDURE — 94625 PHY/QHP OP PULM RHB W/O MNTR: CPT

## 2023-02-20 ENCOUNTER — LAB (OUTPATIENT)
Dept: LAB | Facility: HOSPITAL | Age: 63
End: 2023-02-20
Payer: MEDICAID

## 2023-02-20 DIAGNOSIS — D69.6 THROMBOCYTOPENIA: ICD-10-CM

## 2023-02-20 LAB
BASOPHILS # BLD AUTO: 0.02 10*3/MM3 (ref 0–0.2)
BASOPHILS NFR BLD AUTO: 0.4 % (ref 0–1.5)
DEPRECATED RDW RBC AUTO: 55.7 FL (ref 37–54)
EOSINOPHIL # BLD AUTO: 0.03 10*3/MM3 (ref 0–0.4)
EOSINOPHIL NFR BLD AUTO: 0.6 % (ref 0.3–6.2)
ERYTHROCYTE [DISTWIDTH] IN BLOOD BY AUTOMATED COUNT: 16.5 % (ref 12.3–15.4)
HCT VFR BLD AUTO: 46.1 % (ref 37.5–51)
HGB BLD-MCNC: 14.5 G/DL (ref 13–17.7)
IMM GRANULOCYTES # BLD AUTO: 0.01 10*3/MM3 (ref 0–0.05)
IMM GRANULOCYTES NFR BLD AUTO: 0.2 % (ref 0–0.5)
LYMPHOCYTES # BLD AUTO: 1.06 10*3/MM3 (ref 0.7–3.1)
LYMPHOCYTES NFR BLD AUTO: 20.1 % (ref 19.6–45.3)
MCH RBC QN AUTO: 28.5 PG (ref 26.6–33)
MCHC RBC AUTO-ENTMCNC: 31.5 G/DL (ref 31.5–35.7)
MCV RBC AUTO: 90.6 FL (ref 79–97)
MONOCYTES # BLD AUTO: 0.58 10*3/MM3 (ref 0.1–0.9)
MONOCYTES NFR BLD AUTO: 11 % (ref 5–12)
NEUTROPHILS NFR BLD AUTO: 3.58 10*3/MM3 (ref 1.7–7)
NEUTROPHILS NFR BLD AUTO: 67.7 % (ref 42.7–76)
PLATELET # BLD AUTO: 129 10*3/MM3 (ref 140–450)
PMV BLD AUTO: 10.6 FL (ref 6–12)
RBC # BLD AUTO: 5.09 10*6/MM3 (ref 4.14–5.8)
WBC NRBC COR # BLD: 5.28 10*3/MM3 (ref 3.4–10.8)

## 2023-02-20 PROCEDURE — 36415 COLL VENOUS BLD VENIPUNCTURE: CPT

## 2023-02-20 PROCEDURE — 85025 COMPLETE CBC W/AUTO DIFF WBC: CPT

## 2023-02-21 ENCOUNTER — TREATMENT (OUTPATIENT)
Dept: CARDIAC REHAB | Facility: HOSPITAL | Age: 63
End: 2023-02-21
Payer: MEDICAID

## 2023-02-21 DIAGNOSIS — J44.1 COPD WITH ACUTE EXACERBATION: Primary | ICD-10-CM

## 2023-02-21 PROCEDURE — 94625 PHY/QHP OP PULM RHB W/O MNTR: CPT

## 2023-02-22 DIAGNOSIS — D69.6 THROMBOCYTOPENIA: Primary | ICD-10-CM

## 2023-02-23 ENCOUNTER — TREATMENT (OUTPATIENT)
Dept: CARDIAC REHAB | Facility: HOSPITAL | Age: 63
End: 2023-02-23
Payer: MEDICAID

## 2023-02-23 DIAGNOSIS — J44.1 COPD WITH ACUTE EXACERBATION: Primary | ICD-10-CM

## 2023-02-23 PROCEDURE — 94625 PHY/QHP OP PULM RHB W/O MNTR: CPT

## 2023-02-27 ENCOUNTER — OFFICE VISIT (OUTPATIENT)
Dept: PULMONOLOGY | Facility: CLINIC | Age: 63
End: 2023-02-27
Payer: MEDICAID

## 2023-02-27 VITALS
SYSTOLIC BLOOD PRESSURE: 111 MMHG | BODY MASS INDEX: 22.02 KG/M2 | TEMPERATURE: 98 F | DIASTOLIC BLOOD PRESSURE: 60 MMHG | HEIGHT: 67 IN | WEIGHT: 140.3 LBS | RESPIRATION RATE: 18 BRPM | HEART RATE: 68 BPM | OXYGEN SATURATION: 92 %

## 2023-02-27 DIAGNOSIS — R53.83 FATIGUE, UNSPECIFIED TYPE: ICD-10-CM

## 2023-02-27 DIAGNOSIS — R06.2 WHEEZING: ICD-10-CM

## 2023-02-27 DIAGNOSIS — J44.1 CHRONIC OBSTRUCTIVE PULMONARY DISEASE WITH ACUTE EXACERBATION: ICD-10-CM

## 2023-02-27 DIAGNOSIS — Z99.81 ON HOME OXYGEN THERAPY: ICD-10-CM

## 2023-02-27 DIAGNOSIS — E43 SEVERE MALNUTRITION: Primary | ICD-10-CM

## 2023-02-27 DIAGNOSIS — B44.9 ASPERGILLUS: ICD-10-CM

## 2023-02-27 DIAGNOSIS — R05.9 COUGH, UNSPECIFIED TYPE: ICD-10-CM

## 2023-02-27 DIAGNOSIS — R05.3 CHRONIC COUGH: ICD-10-CM

## 2023-02-27 PROCEDURE — 99214 OFFICE O/P EST MOD 30 MIN: CPT | Performed by: INTERNAL MEDICINE

## 2023-02-27 RX ORDER — ARFORMOTEROL TARTRATE 15 UG/2ML
15 SOLUTION RESPIRATORY (INHALATION)
Qty: 120 ML | Refills: 11 | Status: SHIPPED | OUTPATIENT
Start: 2023-02-27

## 2023-02-27 RX ORDER — FUROSEMIDE 20 MG/1
20 TABLET ORAL DAILY
Qty: 30 TABLET | Refills: 3 | Status: SHIPPED | OUTPATIENT
Start: 2023-02-27

## 2023-02-27 RX ORDER — VARENICLINE TARTRATE 1 MG/1
1 TABLET, FILM COATED ORAL 2 TIMES DAILY
Qty: 60 TABLET | Refills: 4 | Status: SHIPPED | OUTPATIENT
Start: 2023-02-27

## 2023-02-27 RX ORDER — PREDNISONE 10 MG/1
TABLET ORAL
Qty: 90 TABLET | Refills: 0 | Status: SHIPPED | OUTPATIENT
Start: 2023-02-27 | End: 2023-03-27

## 2023-02-27 RX ORDER — REVEFENACIN 175 UG/3ML
175 SOLUTION RESPIRATORY (INHALATION)
Qty: 60 ML | Refills: 11 | Status: SHIPPED | OUTPATIENT
Start: 2023-02-27

## 2023-02-27 RX ORDER — ITRACONAZOLE 100 MG/1
200 CAPSULE ORAL 2 TIMES DAILY
Qty: 120 CAPSULE | Refills: 3 | Status: SHIPPED | OUTPATIENT
Start: 2023-02-27

## 2023-02-27 RX ORDER — BUDESONIDE 0.5 MG/2ML
0.5 INHALANT ORAL
Qty: 60 EACH | Refills: 11 | Status: SHIPPED | OUTPATIENT
Start: 2023-02-27

## 2023-02-27 NOTE — PROGRESS NOTES
Primary Care Provider  Mirtha Alexander APRN     Referring Provider  No ref. provider found     Chief Complaint  COPD, Shortness of Breath, Wheezing, Cough, and Follow-up (2-3 month follow up)    Subjective          Sumeet Gomes presents to [facility]  History of Present Illness  Sumeet Gomes is a 62 y.o. male patient with history of emphysema, Aspergillus pneumonia, history of Streptococcus pneumonia in past, chronic smoking, severe protein calorie malnutrition, chronic hypoxic and hypercapnic respiratory failure.  Since his last office visit, patient has been on itraconazole twice daily for Aspergillus pneumonia.  He has taken it for 2 months now.  He has shortness of breath with minimal exertion.  He is currently using oxygen at 4 L/min with exertion at pulse dose.  He uses BiPAP with bleeding oxygen at 6 L/min with sleep.  He has shortness of breath with activities.  He is currently smoking about 3 cigarettes a day.  He continues to be very restricted with activities.  He has cough, does not produce much phlegm.  He has not needed any hospitalizations since last office visit.  He does feel like prednisone did help in past.  His oxygen saturation was in 80s when he walked to the office room today.  He is trying nicotine patch but has not been able to quit smoking.  He is willing to try Chantix.  His weight has been about stable.  He is complaining of significant leg swelling bilaterally which gets worse through the day.  He was on hydrochlorothiazide in past but has stopped using it.    Review of Systems     General:  No Fatigue, No Fever No weight loss or loss of appetite  HEENT: No dysphagia, No Visual Changes, no rhinorrhea  Respiratory:  + cough,+Dyspnea, + intermittent phlegm, No Pleuritic Pain, no wheezing, no hemoptysis.  Cardiovascular: Denies chest pain, denies palpitations,+AGUILAR, +Chest Pressure  Gastrointestinal:  No Abdominal Pain, No  Nausea, No Vomiting, No Diarrhea  Genitourinary:  No Dysuria, No Frequency, No Hesitancy  Musculoskeletal: No muscle pain or swelling  Endocrine:  No Heat Intolerance, No Cold Intolerance  Hematologic:  No Bleeding, No Bruising  Psychiatric:  No Anxiety, No Depression  Neurologic:  No Confusion, no Dysarthria, No Headaches  Skin:  No Rash, No Open Wounds    Family History   Problem Relation Age of Onset   • Asthma Mother    • Emphysema Mother            • Stroke Mother         Social History     Socioeconomic History   • Marital status:    Tobacco Use   • Smoking status: Every Day     Packs/day: 1.00     Years: 47.00     Pack years: 47.00     Types: Cigarettes     Start date: 1975     Last attempt to quit: 2022     Years since quittin.4   • Smokeless tobacco: Never   • Tobacco comments:     Pt stated he is smoking 3 cigarettes per day   Vaping Use   • Vaping Use: Never used   Substance and Sexual Activity   • Alcohol use: Not Currently   • Drug use: Never   • Sexual activity: Defer        Past Medical History:   Diagnosis Date   • COPD (chronic obstructive pulmonary disease) (HCC)    • Coronary artery disease    • Diastolic heart failure    • Hyperlipidemia    • Hypertension    • Pulmonary hypertension (HCC)         Immunization History   Administered Date(s) Administered   • COVID-19 (MODERNA) 1st, 2nd, 3rd Dose Only 2021, 2021, 2021, 2021   • Flu Vaccine Quad PF >36MO 2019   • FluLaval/Fluzone >6mos 10/25/2021, 10/06/2022   • Hepatitis A 2018   • Pneumococcal Conjugate 13-Valent (PCV13) 2018   • Pneumococcal Conjugate 20-Valent (PCV20) 2022   • Pneumococcal Polysaccharide (PPSV23) 2017   • Tdap 2012   • Zostavax 2012         No Known Allergies       Current Outpatient Medications:   •  albuterol sulfate  (90 Base) MCG/ACT inhaler, Inhale 2 puffs Every 6 (Six) Hours As Needed for Wheezing or Shortness of Air. for  "wheezing, Disp: 36 g, Rfl: 5  •  fluticasone (Flovent HFA) 220 MCG/ACT inhaler, Inhale 2 puffs by mouth 2 (two) times a day., Disp: 144 g, Rfl: 3  •  ipratropium-albuterol (DUO-NEB) 0.5-2.5 mg/3 ml nebulizer, Take 3 mL by nebulization 4 (Four) Times a Day., Disp: 360 mL, Rfl: 3  •  itraconazole (Sporanox) 100 MG capsule, Take 2 capsules by mouth 2 (Two) Times a Day., Disp: 120 capsule, Rfl: 3  •  metoprolol tartrate (LOPRESSOR) 25 MG tablet, TAKE 1/2 TABLET BY MOUTH EVERY 12 HOURS, Disp: 30 tablet, Rfl: 0  •  nicotine (NICODERM CQ) 14 MG/24HR patch, PLACE 1 PATCH ON THE SKIN AS DIRECTED BY PROVIDER DAILY., Disp: 28 patch, Rfl: 0  •  NON FORMULARY, V PAP, Disp: , Rfl:   •  Umeclidinium-Vilanterol (Anoro Ellipta) 62.5-25 MCG/ACT aerosol powder  inhaler, Inhale 1 puff Daily., Disp: 180 each, Rfl: 3  •  arformoterol (Brovana) 15 MCG/2ML nebulizer solution, Take 2 mL by nebulization 2 (Two) Times a Day., Disp: 120 mL, Rfl: 11  •  budesonide (Pulmicort) 0.5 MG/2ML nebulizer solution, Take 2 mL by nebulization Daily., Disp: 60 each, Rfl: 11  •  furosemide (Lasix) 20 MG tablet, Take 1 tablet by mouth Daily., Disp: 30 tablet, Rfl: 3  •  predniSONE (DELTASONE) 10 MG tablet, 50mg qday x 6 days, 40mg qday x 6 days, 30mg qday x 6 days, 20mg qday x 6 days, 10mg qday x 6 days, then stop, Disp: 90 tablet, Rfl: 0  •  revefenacin (Yupelri) 175 MCG/3ML nebulizer solution, Take 3 mL by nebulization Daily., Disp: 60 mL, Rfl: 11  •  varenicline (CHANTIX) 1 MG tablet, Take 1 tablet by mouth 2 (Two) Times a Day., Disp: 60 tablet, Rfl: 4     Objective   Vital Signs:   /60 (BP Location: Left arm, Patient Position: Sitting, Cuff Size: Adult)   Pulse 68   Temp 98 °F (36.7 °C) (Tympanic)   Resp 18   Ht 170.2 cm (67\")   Wt 63.6 kg (140 lb 4.8 oz)   SpO2 92% Comment: nasal cannula/ 2 liters continous  BMI 21.97 kg/m²     Mallampatti classification : 1  Physical Exam  Vital Signs Reviewed  Cachectic appearing male, in mild distress, " has conversational dyspnea, on nasal oxygen.   HEENT:  PERRL, EOMI.  OP, nares clear, no sinus tenderness  Neck:  Supple, no JVD, no thyromegaly  Lymph: no axillary, cervical, supraclavicular lymphadenopathy noted bilaterally  Chest: Bilateral severe diminished breath sounds, minimal end expiratory wheezing, no crackles or rhonchi, resonant to percussion bilaterally   CV: RRR, no MGR, pulses 2+, equal.  Abd:  Soft, NT, ND, + BS, no HSM  EXT:  no clubbing, no cyanosis, 1+ BLE edema  Neuro:  A&Ox3, CN grossly intact, no focal deficits.  Skin: No rashes or lesions noted     Result Review :   The following data was reviewed by: Ulices Moya MD on 02/27/2023:  Common labs    Common Labs 11/30/22 11/30/22 2/1/23 2/1/23 2/1/23 2/1/23 2/20/23    1611 1701 1458 1458 1458 1458    Glucose  105 (A)  107 (A)      BUN  15  14      Creatinine  0.64 (A)  0.70 (A)      Sodium  137  140      Potassium  4.5  4.3      Chloride  100  99      Calcium  8.9  9.4      Albumin  3.60  4.1      Total Bilirubin  0.8  0.7      Alkaline Phosphatase  186 (A)  153 (A)      AST (SGOT)  28  31      ALT (SGPT)  22  32      WBC 7.55  7.76    5.28   Hemoglobin 15.7  14.5    14.5   Hematocrit 49.4  46.9    46.1   Platelets 147  126 (A)    129 (A)   Total Cholesterol     185     Triglycerides     100     HDL Cholesterol     87 (A)     LDL Cholesterol      80     PSA      0.361    (A) Abnormal value       Comments are available for some flowsheets but are not being displayed.           CMP    CMP 11/8/22 11/30/22 2/1/23   Glucose 80 105 (A) 107 (A)   BUN 15 15 14   Creatinine 0.72 (A) 0.64 (A) 0.70 (A)   eGFR 104.6 108.4 104.8   Sodium 141 137 140   Potassium 4.3 4.5 4.3   Chloride 101 100 99   Calcium 9.6 8.9 9.4   Total Protein 6.6 6.1 5.7 (A)   Albumin 3.70 3.60 4.1   Globulin 2.9 2.5 1.6   Total Bilirubin 0.3 0.8 0.7   Alkaline Phosphatase 160 (A) 186 (A) 153 (A)   AST (SGOT) 21 28 31   ALT (SGPT) 13 22 32   Albumin/Globulin Ratio 1.3 1.4 2.6    BUN/Creatinine Ratio 20.8 23.4 20.0   Anion Gap 6.5 7.7 3.8 (A)   (A) Abnormal value       Comments are available for some flowsheets but are not being displayed.           CBC    CBC 11/30/22 2/1/23 2/20/23   WBC 7.55 7.76 5.28   RBC 5.50 5.18 5.09   Hemoglobin 15.7 14.5 14.5   Hematocrit 49.4 46.9 46.1   MCV 89.8 90.5 90.6   MCH 28.5 28.0 28.5   MCHC 31.8 30.9 (A) 31.5   RDW 14.4 16.0 (A) 16.5 (A)   Platelets 147 126 (A) 129 (A)   (A) Abnormal value            CBC w/diff    CBC w/Diff 11/30/22 2/1/23 2/20/23   WBC 7.55 7.76 5.28   RBC 5.50 5.18 5.09   Hemoglobin 15.7 14.5 14.5   Hematocrit 49.4 46.9 46.1   MCV 89.8 90.5 90.6   MCH 28.5 28.0 28.5   MCHC 31.8 30.9 (A) 31.5   RDW 14.4 16.0 (A) 16.5 (A)   Platelets 147 126 (A) 129 (A)   Neutrophil Rel % 79.0 (A) 76.6 (A) 67.7   Immature Granulocyte Rel % 0.1 0.1 0.2   Lymphocyte Rel % 7.0 (A) 11.1 (A) 20.1   Monocyte Rel % 13.4 (A) 11.5 11.0   Eosinophil Rel % 0.0 (A) 0.4 0.6   Basophil Rel % 0.5 0.3 0.4   (A) Abnormal value            Data reviewed: Radiologic studies Previous CT scan of the chest from September 2022 showed emphysema, no pulm embolism, mild anterior wedge compression fracture of the superior endplate of single mid thoracic vertebral body with faint sclerosis. and Cardiology studies Echocardiogram was concerning for severe pulm hypertension.  Right heart cath showed mild pulmonary hypertension with PA pressure of 31 mmHg.       Cardiac catheterization from September 2022 was reviewed.  Showed mean PA pressure of 31 mmHg with wedge pressure of 14.     Assessment and Plan    Diagnoses and all orders for this visit:    1. Severe malnutrition (HCC) (Primary)  -     arformoterol (Brovana) 15 MCG/2ML nebulizer solution; Take 2 mL by nebulization 2 (Two) Times a Day.  Dispense: 120 mL; Refill: 11  -     budesonide (Pulmicort) 0.5 MG/2ML nebulizer solution; Take 2 mL by nebulization Daily.  Dispense: 60 each; Refill: 11  -     revefenacin (Yupelri) 175  MCG/3ML nebulizer solution; Take 3 mL by nebulization Daily.  Dispense: 60 mL; Refill: 11  -     BNP; Future  -     Fungitell B-D Glucan; Future  -     predniSONE (DELTASONE) 10 MG tablet; 50mg qday x 6 days, 40mg qday x 6 days, 30mg qday x 6 days, 20mg qday x 6 days, 10mg qday x 6 days, then stop  Dispense: 90 tablet; Refill: 0  -     Overnight Sleep Oximetry Study; Future  -     varenicline (CHANTIX) 1 MG tablet; Take 1 tablet by mouth 2 (Two) Times a Day.  Dispense: 60 tablet; Refill: 4  -     furosemide (Lasix) 20 MG tablet; Take 1 tablet by mouth Daily.  Dispense: 30 tablet; Refill: 3  -     itraconazole (Sporanox) 100 MG capsule; Take 2 capsules by mouth 2 (Two) Times a Day.  Dispense: 120 capsule; Refill: 3  -     Comprehensive Metabolic Panel; Future  -     CT Chest Without Contrast; Future    2. Chronic obstructive pulmonary disease with acute exacerbation (HCC)  -     arformoterol (Brovana) 15 MCG/2ML nebulizer solution; Take 2 mL by nebulization 2 (Two) Times a Day.  Dispense: 120 mL; Refill: 11  -     budesonide (Pulmicort) 0.5 MG/2ML nebulizer solution; Take 2 mL by nebulization Daily.  Dispense: 60 each; Refill: 11  -     revefenacin (Yupelri) 175 MCG/3ML nebulizer solution; Take 3 mL by nebulization Daily.  Dispense: 60 mL; Refill: 11  -     BNP; Future  -     Fungitell B-D Glucan; Future  -     predniSONE (DELTASONE) 10 MG tablet; 50mg qday x 6 days, 40mg qday x 6 days, 30mg qday x 6 days, 20mg qday x 6 days, 10mg qday x 6 days, then stop  Dispense: 90 tablet; Refill: 0  -     Overnight Sleep Oximetry Study; Future  -     varenicline (CHANTIX) 1 MG tablet; Take 1 tablet by mouth 2 (Two) Times a Day.  Dispense: 60 tablet; Refill: 4  -     furosemide (Lasix) 20 MG tablet; Take 1 tablet by mouth Daily.  Dispense: 30 tablet; Refill: 3  -     itraconazole (Sporanox) 100 MG capsule; Take 2 capsules by mouth 2 (Two) Times a Day.  Dispense: 120 capsule; Refill: 3  -     Comprehensive Metabolic Panel;  Future  -     CT Chest Without Contrast; Future    3. Diastolic heart failure  -     arformoterol (Brovana) 15 MCG/2ML nebulizer solution; Take 2 mL by nebulization 2 (Two) Times a Day.  Dispense: 120 mL; Refill: 11  -     budesonide (Pulmicort) 0.5 MG/2ML nebulizer solution; Take 2 mL by nebulization Daily.  Dispense: 60 each; Refill: 11  -     revefenacin (Yupelri) 175 MCG/3ML nebulizer solution; Take 3 mL by nebulization Daily.  Dispense: 60 mL; Refill: 11  -     BNP; Future  -     Fungitell B-D Glucan; Future  -     predniSONE (DELTASONE) 10 MG tablet; 50mg qday x 6 days, 40mg qday x 6 days, 30mg qday x 6 days, 20mg qday x 6 days, 10mg qday x 6 days, then stop  Dispense: 90 tablet; Refill: 0  -     Overnight Sleep Oximetry Study; Future  -     varenicline (CHANTIX) 1 MG tablet; Take 1 tablet by mouth 2 (Two) Times a Day.  Dispense: 60 tablet; Refill: 4  -     furosemide (Lasix) 20 MG tablet; Take 1 tablet by mouth Daily.  Dispense: 30 tablet; Refill: 3  -     itraconazole (Sporanox) 100 MG capsule; Take 2 capsules by mouth 2 (Two) Times a Day.  Dispense: 120 capsule; Refill: 3  -     Comprehensive Metabolic Panel; Future    4. Chronic cough  -     arformoterol (Brovana) 15 MCG/2ML nebulizer solution; Take 2 mL by nebulization 2 (Two) Times a Day.  Dispense: 120 mL; Refill: 11  -     budesonide (Pulmicort) 0.5 MG/2ML nebulizer solution; Take 2 mL by nebulization Daily.  Dispense: 60 each; Refill: 11  -     revefenacin (Yupelri) 175 MCG/3ML nebulizer solution; Take 3 mL by nebulization Daily.  Dispense: 60 mL; Refill: 11  -     BNP; Future  -     Fungitell B-D Glucan; Future  -     predniSONE (DELTASONE) 10 MG tablet; 50mg qday x 6 days, 40mg qday x 6 days, 30mg qday x 6 days, 20mg qday x 6 days, 10mg qday x 6 days, then stop  Dispense: 90 tablet; Refill: 0  -     Overnight Sleep Oximetry Study; Future  -     varenicline (CHANTIX) 1 MG tablet; Take 1 tablet by mouth 2 (Two) Times a Day.  Dispense: 60 tablet;  Refill: 4  -     furosemide (Lasix) 20 MG tablet; Take 1 tablet by mouth Daily.  Dispense: 30 tablet; Refill: 3  -     itraconazole (Sporanox) 100 MG capsule; Take 2 capsules by mouth 2 (Two) Times a Day.  Dispense: 120 capsule; Refill: 3  -     Comprehensive Metabolic Panel; Future  -     CT Chest Without Contrast; Future    5. Cough, unspecified type  -     arformoterol (Brovana) 15 MCG/2ML nebulizer solution; Take 2 mL by nebulization 2 (Two) Times a Day.  Dispense: 120 mL; Refill: 11  -     budesonide (Pulmicort) 0.5 MG/2ML nebulizer solution; Take 2 mL by nebulization Daily.  Dispense: 60 each; Refill: 11  -     revefenacin (Yupelri) 175 MCG/3ML nebulizer solution; Take 3 mL by nebulization Daily.  Dispense: 60 mL; Refill: 11  -     BNP; Future  -     Fungitell B-D Glucan; Future  -     predniSONE (DELTASONE) 10 MG tablet; 50mg qday x 6 days, 40mg qday x 6 days, 30mg qday x 6 days, 20mg qday x 6 days, 10mg qday x 6 days, then stop  Dispense: 90 tablet; Refill: 0  -     Overnight Sleep Oximetry Study; Future  -     varenicline (CHANTIX) 1 MG tablet; Take 1 tablet by mouth 2 (Two) Times a Day.  Dispense: 60 tablet; Refill: 4  -     furosemide (Lasix) 20 MG tablet; Take 1 tablet by mouth Daily.  Dispense: 30 tablet; Refill: 3  -     itraconazole (Sporanox) 100 MG capsule; Take 2 capsules by mouth 2 (Two) Times a Day.  Dispense: 120 capsule; Refill: 3  -     Comprehensive Metabolic Panel; Future  -     CT Chest Without Contrast; Future    6. Wheezing  -     arformoterol (Brovana) 15 MCG/2ML nebulizer solution; Take 2 mL by nebulization 2 (Two) Times a Day.  Dispense: 120 mL; Refill: 11  -     budesonide (Pulmicort) 0.5 MG/2ML nebulizer solution; Take 2 mL by nebulization Daily.  Dispense: 60 each; Refill: 11  -     revefenacin (Yupelri) 175 MCG/3ML nebulizer solution; Take 3 mL by nebulization Daily.  Dispense: 60 mL; Refill: 11  -     BNP; Future  -     Fungitell B-D Glucan; Future  -     predniSONE (DELTASONE) 10  MG tablet; 50mg qday x 6 days, 40mg qday x 6 days, 30mg qday x 6 days, 20mg qday x 6 days, 10mg qday x 6 days, then stop  Dispense: 90 tablet; Refill: 0  -     Overnight Sleep Oximetry Study; Future  -     varenicline (CHANTIX) 1 MG tablet; Take 1 tablet by mouth 2 (Two) Times a Day.  Dispense: 60 tablet; Refill: 4  -     furosemide (Lasix) 20 MG tablet; Take 1 tablet by mouth Daily.  Dispense: 30 tablet; Refill: 3  -     CT Chest Without Contrast; Future    7. Fatigue, unspecified type  -     arformoterol (Brovana) 15 MCG/2ML nebulizer solution; Take 2 mL by nebulization 2 (Two) Times a Day.  Dispense: 120 mL; Refill: 11  -     budesonide (Pulmicort) 0.5 MG/2ML nebulizer solution; Take 2 mL by nebulization Daily.  Dispense: 60 each; Refill: 11  -     revefenacin (Yupelri) 175 MCG/3ML nebulizer solution; Take 3 mL by nebulization Daily.  Dispense: 60 mL; Refill: 11  -     BNP; Future  -     Fungitell B-D Glucan; Future  -     predniSONE (DELTASONE) 10 MG tablet; 50mg qday x 6 days, 40mg qday x 6 days, 30mg qday x 6 days, 20mg qday x 6 days, 10mg qday x 6 days, then stop  Dispense: 90 tablet; Refill: 0  -     Overnight Sleep Oximetry Study; Future  -     varenicline (CHANTIX) 1 MG tablet; Take 1 tablet by mouth 2 (Two) Times a Day.  Dispense: 60 tablet; Refill: 4  -     furosemide (Lasix) 20 MG tablet; Take 1 tablet by mouth Daily.  Dispense: 30 tablet; Refill: 3  -     itraconazole (Sporanox) 100 MG capsule; Take 2 capsules by mouth 2 (Two) Times a Day.  Dispense: 120 capsule; Refill: 3  -     Comprehensive Metabolic Panel; Future  -     CT Chest Without Contrast; Future    8. Aspergillus (HCC)  -     arformoterol (Brovana) 15 MCG/2ML nebulizer solution; Take 2 mL by nebulization 2 (Two) Times a Day.  Dispense: 120 mL; Refill: 11  -     budesonide (Pulmicort) 0.5 MG/2ML nebulizer solution; Take 2 mL by nebulization Daily.  Dispense: 60 each; Refill: 11  -     revefenacin (Yupelri) 175 MCG/3ML nebulizer solution;  Take 3 mL by nebulization Daily.  Dispense: 60 mL; Refill: 11  -     BNP; Future  -     Fungitell B-D Glucan; Future  -     predniSONE (DELTASONE) 10 MG tablet; 50mg qday x 6 days, 40mg qday x 6 days, 30mg qday x 6 days, 20mg qday x 6 days, 10mg qday x 6 days, then stop  Dispense: 90 tablet; Refill: 0  -     Overnight Sleep Oximetry Study; Future  -     varenicline (CHANTIX) 1 MG tablet; Take 1 tablet by mouth 2 (Two) Times a Day.  Dispense: 60 tablet; Refill: 4  -     furosemide (Lasix) 20 MG tablet; Take 1 tablet by mouth Daily.  Dispense: 30 tablet; Refill: 3  -     itraconazole (Sporanox) 100 MG capsule; Take 2 capsules by mouth 2 (Two) Times a Day.  Dispense: 120 capsule; Refill: 3  -     Comprehensive Metabolic Panel; Future  -     CT Chest Without Contrast; Future      Severe COPD: I will try to switch his Anoro and Flovent to Brovana, Pulmicort and Yupelri through [facility].  COPD exacerbation action plan was discussed in detail.  Have sent a prescription for prednisone taper to use now with worsening shortness of breath and hypoxia.    Chronic hypoxic and hypercapnic respiratory failure: Continue oxygen bleed in with BiPAP machine.  We will review the BiPAP usage data once available.  He is currently using oxygen at 6 L/min.  I advised him to decrease oxygen to 2 L/min time we will check overnight oximetry on BiPAP with oxygen at 2 L/min to see if it is enough for his oxygenation    Aspergillus pneumonia: Continue with itraconazole 200 mg twice daily.  He will need it for at least 5 to 6 months.  Check Fungitell.  Check CT scan of the chest without contrast.    Right heart failure, likely related to diastolic heart failure and COPD.  I will start him on Lasix 20 mg once daily.  Check NT proBNP.  Check CMP.    Smoking cessation counseling was done for more than 5 minutes.  He continues to use few cigarettes a day, he is willing to try Chantix twice daily.  He has tried nicotine patch but has not  helped.      Follow Up   Return in about 3 months (around 5/27/2023).  Patient was given instructions and counseling regarding his condition or for health maintenance advice. Please see specific information pulled into the AVS if appropriate.       Electronically signed by Ulices Moya MD, 2/27/2023, 09:25 EST.

## 2023-02-28 ENCOUNTER — TREATMENT (OUTPATIENT)
Dept: CARDIAC REHAB | Facility: HOSPITAL | Age: 63
End: 2023-02-28
Payer: MEDICAID

## 2023-02-28 ENCOUNTER — LAB (OUTPATIENT)
Dept: LAB | Facility: HOSPITAL | Age: 63
End: 2023-02-28
Payer: MEDICAID

## 2023-02-28 DIAGNOSIS — J44.1 CHRONIC OBSTRUCTIVE PULMONARY DISEASE WITH ACUTE EXACERBATION: ICD-10-CM

## 2023-02-28 DIAGNOSIS — J44.1 COPD WITH ACUTE EXACERBATION: Primary | ICD-10-CM

## 2023-02-28 DIAGNOSIS — B44.9 ASPERGILLUS: ICD-10-CM

## 2023-02-28 DIAGNOSIS — Z99.81 ON HOME OXYGEN THERAPY: ICD-10-CM

## 2023-02-28 DIAGNOSIS — E43 SEVERE MALNUTRITION: ICD-10-CM

## 2023-02-28 DIAGNOSIS — R06.2 WHEEZING: ICD-10-CM

## 2023-02-28 DIAGNOSIS — R05.9 COUGH, UNSPECIFIED TYPE: ICD-10-CM

## 2023-02-28 DIAGNOSIS — R53.83 FATIGUE, UNSPECIFIED TYPE: ICD-10-CM

## 2023-02-28 DIAGNOSIS — R05.3 CHRONIC COUGH: ICD-10-CM

## 2023-02-28 DIAGNOSIS — Z72.0 TOBACCO ABUSE: Primary | ICD-10-CM

## 2023-02-28 LAB
ALBUMIN SERPL-MCNC: 4.3 G/DL (ref 3.5–5.2)
ALBUMIN/GLOB SERPL: 2.2 G/DL
ALP SERPL-CCNC: 149 U/L (ref 39–117)
ALT SERPL W P-5'-P-CCNC: 23 U/L (ref 1–41)
ANION GAP SERPL CALCULATED.3IONS-SCNC: 7.6 MMOL/L (ref 5–15)
AST SERPL-CCNC: 31 U/L (ref 1–40)
BILIRUB SERPL-MCNC: 0.8 MG/DL (ref 0–1.2)
BUN SERPL-MCNC: 9 MG/DL (ref 8–23)
BUN/CREAT SERPL: 12 (ref 7–25)
CALCIUM SPEC-SCNC: 9.7 MG/DL (ref 8.6–10.5)
CHLORIDE SERPL-SCNC: 99 MMOL/L (ref 98–107)
CO2 SERPL-SCNC: 34.4 MMOL/L (ref 22–29)
CREAT SERPL-MCNC: 0.75 MG/DL (ref 0.76–1.27)
EGFRCR SERPLBLD CKD-EPI 2021: 102.7 ML/MIN/1.73
GLOBULIN UR ELPH-MCNC: 2 GM/DL
GLUCOSE SERPL-MCNC: 88 MG/DL (ref 65–99)
NT-PROBNP SERPL-MCNC: 2636 PG/ML (ref 0–900)
POTASSIUM SERPL-SCNC: 4.4 MMOL/L (ref 3.5–5.2)
PROT SERPL-MCNC: 6.3 G/DL (ref 6–8.5)
SODIUM SERPL-SCNC: 141 MMOL/L (ref 136–145)

## 2023-02-28 PROCEDURE — 94625 PHY/QHP OP PULM RHB W/O MNTR: CPT

## 2023-02-28 PROCEDURE — 87449 NOS EACH ORGANISM AG IA: CPT

## 2023-02-28 PROCEDURE — 36415 COLL VENOUS BLD VENIPUNCTURE: CPT

## 2023-02-28 PROCEDURE — 80053 COMPREHEN METABOLIC PANEL: CPT

## 2023-02-28 PROCEDURE — 83880 ASSAY OF NATRIURETIC PEPTIDE: CPT

## 2023-03-02 ENCOUNTER — TREATMENT (OUTPATIENT)
Dept: CARDIAC REHAB | Facility: HOSPITAL | Age: 63
End: 2023-03-02
Payer: MEDICAID

## 2023-03-02 DIAGNOSIS — J44.1 COPD WITH ACUTE EXACERBATION: Primary | ICD-10-CM

## 2023-03-02 LAB — 1,3 BETA GLUCAN SER-MCNC: <31 PG/ML

## 2023-03-02 PROCEDURE — 94625 PHY/QHP OP PULM RHB W/O MNTR: CPT

## 2023-03-07 ENCOUNTER — TREATMENT (OUTPATIENT)
Dept: CARDIAC REHAB | Facility: HOSPITAL | Age: 63
End: 2023-03-07
Payer: MEDICAID

## 2023-03-07 DIAGNOSIS — J44.1 COPD WITH ACUTE EXACERBATION: Primary | ICD-10-CM

## 2023-03-07 PROCEDURE — 94625 PHY/QHP OP PULM RHB W/O MNTR: CPT

## 2023-03-09 ENCOUNTER — TREATMENT (OUTPATIENT)
Dept: CARDIAC REHAB | Facility: HOSPITAL | Age: 63
End: 2023-03-09
Payer: MEDICAID

## 2023-03-09 ENCOUNTER — HOSPITAL ENCOUNTER (OUTPATIENT)
Dept: CT IMAGING | Facility: HOSPITAL | Age: 63
Discharge: HOME | End: 2023-03-09
Admitting: INTERNAL MEDICINE
Payer: MEDICAID

## 2023-03-09 DIAGNOSIS — B44.9 ASPERGILLUS: ICD-10-CM

## 2023-03-09 DIAGNOSIS — R06.2 WHEEZING: ICD-10-CM

## 2023-03-09 DIAGNOSIS — J44.1 COPD WITH ACUTE EXACERBATION: Primary | ICD-10-CM

## 2023-03-09 DIAGNOSIS — R05.9 COUGH, UNSPECIFIED TYPE: ICD-10-CM

## 2023-03-09 DIAGNOSIS — R05.3 CHRONIC COUGH: ICD-10-CM

## 2023-03-09 DIAGNOSIS — E43 SEVERE MALNUTRITION: ICD-10-CM

## 2023-03-09 DIAGNOSIS — J44.1 CHRONIC OBSTRUCTIVE PULMONARY DISEASE WITH ACUTE EXACERBATION: ICD-10-CM

## 2023-03-09 DIAGNOSIS — R53.83 FATIGUE, UNSPECIFIED TYPE: ICD-10-CM

## 2023-03-09 PROCEDURE — 94625 PHY/QHP OP PULM RHB W/O MNTR: CPT

## 2023-03-09 PROCEDURE — 71250 CT THORAX DX C-: CPT

## 2023-03-14 ENCOUNTER — TREATMENT (OUTPATIENT)
Dept: CARDIAC REHAB | Facility: HOSPITAL | Age: 63
End: 2023-03-14
Payer: MEDICAID

## 2023-03-14 DIAGNOSIS — J44.1 COPD WITH ACUTE EXACERBATION: Primary | ICD-10-CM

## 2023-03-14 PROCEDURE — 94625 PHY/QHP OP PULM RHB W/O MNTR: CPT

## 2023-03-16 ENCOUNTER — TREATMENT (OUTPATIENT)
Dept: CARDIAC REHAB | Facility: HOSPITAL | Age: 63
End: 2023-03-16
Payer: MEDICAID

## 2023-03-16 ENCOUNTER — CONSULT (OUTPATIENT)
Dept: ONCOLOGY | Facility: HOSPITAL | Age: 63
End: 2023-03-16
Payer: MEDICAID

## 2023-03-16 VITALS
DIASTOLIC BLOOD PRESSURE: 65 MMHG | RESPIRATION RATE: 18 BRPM | BODY MASS INDEX: 21.97 KG/M2 | HEIGHT: 67 IN | OXYGEN SATURATION: 99 % | TEMPERATURE: 97.7 F | WEIGHT: 139.99 LBS | SYSTOLIC BLOOD PRESSURE: 125 MMHG | HEART RATE: 76 BPM

## 2023-03-16 DIAGNOSIS — J44.1 COPD WITH ACUTE EXACERBATION: Primary | ICD-10-CM

## 2023-03-16 DIAGNOSIS — D69.6 THROMBOCYTOPENIA: Primary | ICD-10-CM

## 2023-03-16 PROCEDURE — 99214 OFFICE O/P EST MOD 30 MIN: CPT | Performed by: NURSE PRACTITIONER

## 2023-03-16 PROCEDURE — 94625 PHY/QHP OP PULM RHB W/O MNTR: CPT

## 2023-03-16 PROCEDURE — 3078F DIAST BP <80 MM HG: CPT | Performed by: NURSE PRACTITIONER

## 2023-03-16 PROCEDURE — G0463 HOSPITAL OUTPT CLINIC VISIT: HCPCS | Performed by: NURSE PRACTITIONER

## 2023-03-16 PROCEDURE — 3074F SYST BP LT 130 MM HG: CPT | Performed by: NURSE PRACTITIONER

## 2023-03-16 PROCEDURE — 1126F AMNT PAIN NOTED NONE PRSNT: CPT | Performed by: NURSE PRACTITIONER

## 2023-03-16 NOTE — PROGRESS NOTES
Chief Complaint/Reason for Referral:  thrombocytopenia    Benjamin, Mirtha Agarwal, *  Benjamin, Mirthabasilio Agarwal, APRN    Records Obtained:  Records of the patients history including those obtained from  were reviewed and summarized in detail.    Subjective    History of Present Illness   Mr. Sumeet Gomes is a very pleasant 62 year old  male presenting for new patient evaluation for mild thrombocytopenia.     He has co-morbid history of CAD, HLD, hypertension, CHF, COPD, hypoxia, pulmonary hypertension, tobacco abuse,  asperillosis pulmonary infection and has been taking Itraconazole for this. He has been following with Dr. Moya for his pulmonary issues. He has chronic hypoxia and uses continuous oxygen at 6 liters but his oxygen is sitting on 2 liters / NC current. Reports he is trying to quit smoking and is down to around 3 cigarettes a day using Chantix.     Recent lab work on 2/1/23 showed mild low platelet count down to 126,000 from 147,000 in November. Repeat lab work on 2/28/23 showed platelet count still decreased but slightly improved to 129,000. He has normal WBC and hemoglobin. Denies any persistent bleeding or bruising issues.       Oncology/Hematology History    No history exists.       Review of Systems   Constitutional: Positive for fatigue. Negative for appetite change, diaphoresis, fever, unexpected weight gain and unexpected weight loss.   HENT: Negative for hearing loss, sore throat and voice change.    Eyes: Negative for blurred vision, double vision, pain, redness and visual disturbance.   Respiratory: Negative for cough, shortness of breath and wheezing.    Cardiovascular: Negative for chest pain, palpitations and leg swelling.   Endocrine: Negative for cold intolerance, heat intolerance, polydipsia and polyuria.   Genitourinary: Negative for decreased urine volume, difficulty urinating, frequency and urinary incontinence.   Musculoskeletal: Negative for arthralgias, back pain, joint  swelling and myalgias.   Skin: Negative for color change, rash, skin lesions and wound.   Neurological: Negative for dizziness, seizures, numbness and headache.   Hematological: Negative for adenopathy. Does not bruise/bleed easily.   Psychiatric/Behavioral: Negative for depressed mood. The patient is not nervous/anxious.        Current Outpatient Medications on File Prior to Visit   Medication Sig Dispense Refill   • albuterol sulfate  (90 Base) MCG/ACT inhaler Inhale 2 puffs Every 6 (Six) Hours As Needed for Wheezing or Shortness of Air. for wheezing 36 g 5   • arformoterol (Brovana) 15 MCG/2ML nebulizer solution Take 2 mL by nebulization 2 (Two) Times a Day. 120 mL 11   • budesonide (Pulmicort) 0.5 MG/2ML nebulizer solution Take 2 mL by nebulization Daily. 60 each 11   • fluticasone (Flovent HFA) 220 MCG/ACT inhaler Inhale 2 puffs by mouth 2 (two) times a day. 144 g 3   • furosemide (Lasix) 20 MG tablet Take 1 tablet by mouth Daily. 30 tablet 3   • ipratropium-albuterol (DUO-NEB) 0.5-2.5 mg/3 ml nebulizer Take 3 mL by nebulization 4 (Four) Times a Day. 360 mL 3   • itraconazole (Sporanox) 100 MG capsule Take 2 capsules by mouth 2 (Two) Times a Day. 120 capsule 3   • metoprolol tartrate (LOPRESSOR) 25 MG tablet TAKE 1/2 TABLET BY MOUTH EVERY 12 HOURS 30 tablet 0   • nicotine (Nicoderm CQ) 7 MG/24HR patch Place 1 patch on the skin as directed by provider Daily. 28 patch 1   • NON FORMULARY V PAP     • predniSONE (DELTASONE) 10 MG tablet 50mg qday x 6 days, 40mg qday x 6 days, 30mg qday x 6 days, 20mg qday x 6 days, 10mg qday x 6 days, then stop 90 tablet 0   • revefenacin (Yupelri) 175 MCG/3ML nebulizer solution Take 3 mL by nebulization Daily. 60 mL 11   • tiotropium bromide monohydrate (SPIRIVA RESPIMAT) 2.5 MCG/ACT aerosol solution inhaler Inhale 2 puffs Daily. 1 each 11   • Umeclidinium-Vilanterol (Anoro Ellipta) 62.5-25 MCG/ACT aerosol powder  inhaler Inhale 1 puff Daily. 180 each 3   • varenicline  (CHANTIX) 1 MG tablet Take 1 tablet by mouth 2 (Two) Times a Day. 60 tablet 4     No current facility-administered medications on file prior to visit.       No Known Allergies  Past Medical History:   Diagnosis Date   • COPD (chronic obstructive pulmonary disease) (HCC)    • Coronary artery disease    • Diastolic heart failure    • Hyperlipidemia    • Hypertension    • Pulmonary hypertension (HCC)      Past Surgical History:   Procedure Laterality Date   • BACK SURGERY     • BRONCHOSCOPY N/A 2022    Procedure: BRONCHOSCOPY WITH BRONCHOALVEOLAR LAVAGE AND BRONCHIAL WASHINGS;  Surgeon: Ulices Moya MD;  Location: [facility] ENDOSCOPY;  Service: Pulmonary;  Laterality: N/A;  MUCUS PLUGGING, COPD EXACERBATION   • BRONCHOSCOPY     • CARDIAC CATHETERIZATION N/A 2022    Procedure: Right Heart Cath;  Surgeon: Drew Rodriguez MD;  Location: [facility] CATH INVASIVE LOCATION;  Service: Cardiovascular;  Laterality: N/A;   • OTHER SURGICAL HISTORY      knee    • OTHER SURGICAL HISTORY      shoulder, rotator cuff   • SHOULDER SURGERY Right 10/03/2022     Social History     Socioeconomic History   • Marital status:    Tobacco Use   • Smoking status: Every Day     Packs/day: 1.00     Years: 47.00     Pack years: 47.00     Types: Cigarettes     Start date: 1975     Last attempt to quit: 2022     Years since quittin.5   • Smokeless tobacco: Never   • Tobacco comments:     Pt stated he is smoking 3 cigarettes per day   Vaping Use   • Vaping Use: Never used   Substance and Sexual Activity   • Alcohol use: Not Currently   • Drug use: Never   • Sexual activity: Defer     Family History   Problem Relation Age of Onset   • Asthma Mother    • Emphysema Mother            • Stroke Mother      Immunization History   Administered Date(s) Administered   • COVID-19 (MODERNA) 1st, 2nd, 3rd Dose Only 2021, 2021, 2021, 2021   • Flu Vaccine Quad PF >36MO 2019   • FluLaval/Fluzone  ">6mos 10/25/2021, 10/06/2022   • Hepatitis A 11/13/2018   • Pneumococcal Conjugate 13-Valent (PCV13) 11/07/2018   • Pneumococcal Conjugate 20-Valent (PCV20) 11/08/2022   • Pneumococcal Polysaccharide (PPSV23) 02/13/2017   • Tdap 06/13/2012   • Zostavax 06/13/2012       Tobacco Use: High Risk   • Smoking Tobacco Use: Every Day   • Smokeless Tobacco Use: Never   • Passive Exposure: Not on file       Objective     Physical Exam  Vitals and nursing note reviewed.   Constitutional:       Appearance: Normal appearance. He is normal weight.   HENT:      Head: Normocephalic.      Nose: Nose normal.      Mouth/Throat:      Mouth: Mucous membranes are moist.   Eyes:      Pupils: Pupils are equal, round, and reactive to light.   Cardiovascular:      Rate and Rhythm: Normal rate and regular rhythm.      Pulses: Normal pulses.      Heart sounds: Normal heart sounds.   Pulmonary:      Effort: Pulmonary effort is normal.      Breath sounds: Normal breath sounds.   Abdominal:      General: Bowel sounds are normal. There is no distension.      Palpations: Abdomen is soft.      Tenderness: There is no abdominal tenderness.   Musculoskeletal:         General: Normal range of motion.      Cervical back: Normal range of motion and neck supple.   Skin:     General: Skin is warm and dry.      Capillary Refill: Capillary refill takes less than 2 seconds.      Findings: No bruising.   Neurological:      Mental Status: He is alert and oriented to person, place, and time.   Psychiatric:         Mood and Affect: Mood normal.         Behavior: Behavior normal.         Judgment: Judgment normal.         Vitals:    03/16/23 1059   BP: 125/65   Pulse: 76   Resp: 18   Temp: 97.7 °F (36.5 °C)   SpO2: 99%   Weight: 63.5 kg (139 lb 15.9 oz)   Height: 170.2 cm (67.01\")   PainSc: 0-No pain       Wt Readings from Last 3 Encounters:   03/16/23 63.5 kg (139 lb 15.9 oz)   02/27/23 63.6 kg (140 lb 4.8 oz)   02/01/23 63.1 kg (139 lb 3.2 oz)        BMI is " within normal parameters. No other follow-up for BMI required.       ECOG score: 0         ECOG: (1) Restricted in Physically Strenuous Activity, Ambulatory & Able to Do Work of Light Nature  Fall Risk Assessment was completed, and patient is at moderate risk for falls.  PHQ-9 Total Score:         The patient is  experiencing fatigue. Fatigue score: 5    PT/OT Functional Screening: PT fx screen: Difficulty Walking and No needs identified  Speech Functional Screening: Speech fx screen: No needs identified  Rehab to be ordered: Rehab to be ordered: No needs identified        Result Review :  The following data was reviewed by: ZULY Toro on 03/16/2023:  Lab Results   Component Value Date    HGB 14.5 02/20/2023    HCT 46.1 02/20/2023    MCV 90.6 02/20/2023     (L) 02/20/2023    WBC 5.28 02/20/2023    NEUTROABS 3.58 02/20/2023    LYMPHSABS 1.06 02/20/2023    MONOSABS 0.58 02/20/2023    EOSABS 0.03 02/20/2023    BASOSABS 0.02 02/20/2023     Lab Results   Component Value Date    GLUCOSE 88 02/28/2023    BUN 9 02/28/2023    CREATININE 0.75 (L) 02/28/2023     02/28/2023    K 4.4 02/28/2023    CL 99 02/28/2023    CO2 34.4 (H) 02/28/2023    CALCIUM 9.7 02/28/2023    PROTEINTOT 6.3 02/28/2023    ALBUMIN 4.3 02/28/2023    BILITOT 0.8 02/28/2023    ALKPHOS 149 (H) 02/28/2023    AST 31 02/28/2023    ALT 23 02/28/2023     Lab Results   Component Value Date    LTZYGSTI39 1,082 (H) 09/26/2022    FOLATE 8.38 09/26/2022     No results found for: IRON, LABIRON, TRANSFERRIN, TIBC  Lab Results   Component Value Date    JMAMFOGO02 1,082 (H) 09/26/2022    FOLATE 8.38 09/26/2022     Lab Results   Component Value Date    PSA 0.361 02/01/2023       XR Chest 2 View    Result Date: 2/1/2023    No acute cardiopulmonary process.      TERESE KILGORE MD       Electronically Signed and Approved By: TERESE KILGORE MD on 2/01/2023 at 15:09             CT Chest Without Contrast Diagnostic    Result Date: 3/9/2023    1. Mild  bibasilar atelectasis.  No evidence of active pneumonia. 2. Moderate emphysema.     JOSE JUAN GAN MD       Electronically Signed and Approved By: JOSE JUAN GAN MD on 3/09/2023 at 15:00             XR Chest 1 View    Result Date: 11/30/2022    1. Coarsened bilateral interstitial markings likely related to underlying chronic lung disease.  Findings of likely COPD/emphysema.        HELGA LOPEZ MD       Electronically Signed and Approved By: HELGA LOPEZ MD on 11/30/2022 at 17:23                    Assessment and Plan:  Diagnoses and all orders for this visit:    1. Thrombocytopenia (HCC) (Primary)  -     CBC & Differential; Future  -     Comprehensive Metabolic Panel; Future  -     Vitamin B12; Future  -     Folate; Future  -     Peripheral Blood Smear; Future      Mild thrombocytopenia in the range of 126 - 129,000. Previously platelet count was in the normal range with range of 147,000 to 243,000. Denies any acute bleeding or persistent bleeding issues. Thyroid function was normal on 2/1/23. May be secondary to his underlying chronic infection or possibly medication effect.  Platelet count is acceptable for hemostasis. Will recheck counts in 3 months 1 week prior to 3 month follow up with MD.     Will also check peripheral smear, folate and B-12.         I spent 30 minutes caring for Sumeet on this date of service. This time includes time spent by me in the following activities:preparing for the visit, reviewing tests, obtaining and/or reviewing a separately obtained history, performing a medically appropriate examination and/or evaluation , counseling and educating the patient/family/caregiver, ordering medications, tests, or procedures, referring and communicating with other health care professionals , documenting information in the medical record and independently interpreting results and communicating that information with the patient/family/caregiver    Patient Follow Up: 3 months with MD.      Patient was given instructions and counseling regarding his condition or for health maintenance advice. Please see specific information pulled into the AVS if appropriate.     Meggan Cuenca, APRN    3/16/2023    .tob

## 2023-03-16 NOTE — PROGRESS NOTES
Patient  Sumeet Gomes    Location  [facility] HEMATOLOGY & ONCOLOGY    Chief Complaint  thrombocytopenia    Referring Provider: VIRGINIA Segundo  PCP: Mirtha Alexander APRN    Subjective     {Problem List  Visit Diagnosis   Encounters  Notes  Medications  Labs  Result Review Imaging  Media :23}     Oncology/Hematology History    No history exists.   HPI  Mr. Sumeet Gomes presents for new patient evaluation for thrombocytopenia as referral from his PCP, ZULY Grande.     He has chronic co-morbid illness including COPD, asgergillus infection and currently is on Itraconazole. Other history includes: CAD, HLD, hypertension, acute respiratory failure, CHF, malnutrition, hypoxia, pulmonary hypertension, chronic fatigue. He is on continuous oxygen. He follows with Dr. Moya for his pulmonary issues.     Recently, on lab work found to have mild thrombocytopenia on 2/1/2023 with platelet count of 126,000. Recheck of lab work on 2/20/23 with slighly improving low platelet count back up to 129,000. Differential was normal on 2/20/23. Normal WBC count of 5.28 and normal hemoglobin of 14.5 and MCV of 90.     He denies any persistent bruising or GI blood loss or other type of blood loss noted. He is trying to quit smoking and is using Chantix and is down to about 3 cigs a day.       Review of Systems   Constitutional: Positive for fatigue (5/10). Negative for appetite change, diaphoresis, fever, unexpected weight gain and unexpected weight loss.   HENT: Negative for hearing loss, mouth sores, sore throat, swollen glands, trouble swallowing and voice change.    Eyes: Negative for blurred vision.   Respiratory: Positive for shortness of breath. Negative for cough and wheezing.    Cardiovascular: Negative for chest pain and palpitations.   Gastrointestinal: Negative for abdominal pain, blood in stool, constipation, diarrhea, nausea and vomiting.   Endocrine: Negative for cold  intolerance and heat intolerance.   Genitourinary: Negative for difficulty urinating, dysuria, frequency, hematuria and urinary incontinence.   Musculoskeletal: Negative for arthralgias, back pain and myalgias.   Skin: Negative for rash, skin lesions and wound.   Neurological: Negative for dizziness, seizures, weakness, numbness and headache.   Hematological: Does not bruise/bleed easily.   Psychiatric/Behavioral: Negative for depressed mood. The patient is not nervous/anxious.    All other systems reviewed and are negative.      Past Medical History:   Diagnosis Date   • COPD (chronic obstructive pulmonary disease) (HCC)    • Coronary artery disease    • Diastolic heart failure    • Hyperlipidemia    • Hypertension    • Pulmonary hypertension (HCC)      Past Surgical History:   Procedure Laterality Date   • BACK SURGERY     • BRONCHOSCOPY N/A 2022    Procedure: BRONCHOSCOPY WITH BRONCHOALVEOLAR LAVAGE AND BRONCHIAL WASHINGS;  Surgeon: Ulices Moya MD;  Location: [facility] ENDOSCOPY;  Service: Pulmonary;  Laterality: N/A;  MUCUS PLUGGING, COPD EXACERBATION   • BRONCHOSCOPY     • CARDIAC CATHETERIZATION N/A 2022    Procedure: Right Heart Cath;  Surgeon: Drew Rodriguez MD;  Location: [facility] CATH INVASIVE LOCATION;  Service: Cardiovascular;  Laterality: N/A;   • OTHER SURGICAL HISTORY      knee    • OTHER SURGICAL HISTORY      shoulder, rotator cuff   • SHOULDER SURGERY Right 10/03/2022     Social History     Socioeconomic History   • Marital status:    Tobacco Use   • Smoking status: Every Day     Packs/day: 1.00     Years: 47.00     Pack years: 47.00     Types: Cigarettes     Start date: 1975     Last attempt to quit: 2022     Years since quittin.5   • Smokeless tobacco: Never   • Tobacco comments:     Pt stated he is smoking 3 cigarettes per day   Vaping Use   • Vaping Use: Never used   Substance and Sexual Activity   • Alcohol use: Not Currently   • Drug use: Never   • Sexual  "activity: Defer     Family History   Problem Relation Age of Onset   • Asthma Mother    • Emphysema Mother            • Stroke Mother        Objective   Physical Exam  Vitals and nursing note reviewed.   Constitutional:       Appearance: Normal appearance. He is normal weight.   HENT:      Head: Normocephalic.      Nose: Nose normal.      Mouth/Throat:      Mouth: Mucous membranes are moist.   Eyes:      Pupils: Pupils are equal, round, and reactive to light.   Cardiovascular:      Rate and Rhythm: Normal rate and regular rhythm.      Pulses: Normal pulses.      Heart sounds: Normal heart sounds.   Pulmonary:      Effort: Pulmonary effort is normal. No respiratory distress.      Breath sounds: Normal breath sounds.   Abdominal:      General: Bowel sounds are normal.      Palpations: Abdomen is soft.   Musculoskeletal:         General: Normal range of motion.      Cervical back: Normal range of motion and neck supple.   Skin:     General: Skin is warm and dry.      Capillary Refill: Capillary refill takes less than 2 seconds.   Neurological:      General: No focal deficit present.      Mental Status: He is alert and oriented to person, place, and time.   Psychiatric:         Mood and Affect: Mood normal.         Behavior: Behavior normal.         Thought Content: Thought content normal.         Judgment: Judgment normal.       ***    Vitals:    23 1059   BP: 125/65   Pulse: 76   Resp: 18   Temp: 97.7 °F (36.5 °C)   SpO2: 99%   Weight: 63.5 kg (139 lb 15.9 oz)   Height: 170.2 cm (67.01\")   PainSc: 0-No pain     ECOG score: 0         PHQ-9 Total Score:         Result Review :{Labs  Result Review  Imaging  Med Tab  Media  Procedures :23}   The following data was reviewed by: ZULY Toro on 2023:  Lab Results   Component Value Date    HGB 14.5 2023    HCT 46.1 2023    MCV 90.6 2023     (L) 2023    WBC 5.28 2023    NEUTROABS 3.58 2023    " "LYMPHSABS 1.06 02/20/2023    MONOSABS 0.58 02/20/2023    EOSABS 0.03 02/20/2023    BASOSABS 0.02 02/20/2023     Lab Results   Component Value Date    GLUCOSE 88 02/28/2023    BUN 9 02/28/2023    CREATININE 0.75 (L) 02/28/2023     02/28/2023    K 4.4 02/28/2023    CL 99 02/28/2023    CO2 34.4 (H) 02/28/2023    CALCIUM 9.7 02/28/2023    PROTEINTOT 6.3 02/28/2023    ALBUMIN 4.3 02/28/2023    BILITOT 0.8 02/28/2023    ALKPHOS 149 (H) 02/28/2023    AST 31 02/28/2023    ALT 23 02/28/2023          Assessment and Plan {CC Problem List  Visit Diagnosis   ROS  Review (Popup)  Health Maintenance  Quality  BestPractice  Medications  SmartSets  SnapShot Encounters  Media :23}   Diagnoses and all orders for this visit:    1. Thrombocytopenia (HCC) (Primary)  -     CBC & Differential; Future  -     Comprehensive Metabolic Panel; Future  -     Vitamin B12; Future  -     Folate; Future  -     Peripheral Blood Smear; Future          Patient was given instructions and counseling regarding his condition or for health maintenance advice. Please see specific information pulled into the AVS if appropriate.       Transcribed from ambient dictation for ZULY Toro by Sofía Turner MA.  03/16/23   11:01 EDT    {[facility] Provider Statement:[id number]::\"Patient or patient representative verbalized consent to the visit recording.\",\"I have personally performed the services described in this document as transcribed by the above individual, and it is both accurate and complete.\"}      "

## 2023-03-21 ENCOUNTER — TREATMENT (OUTPATIENT)
Dept: CARDIAC REHAB | Facility: HOSPITAL | Age: 63
End: 2023-03-21
Payer: MEDICAID

## 2023-03-21 DIAGNOSIS — J44.1 COPD WITH ACUTE EXACERBATION: Primary | ICD-10-CM

## 2023-03-21 PROCEDURE — 94625 PHY/QHP OP PULM RHB W/O MNTR: CPT

## 2023-03-23 ENCOUNTER — TREATMENT (OUTPATIENT)
Dept: CARDIAC REHAB | Facility: HOSPITAL | Age: 63
End: 2023-03-23
Payer: MEDICAID

## 2023-03-23 DIAGNOSIS — J44.1 COPD WITH ACUTE EXACERBATION: Primary | ICD-10-CM

## 2023-03-23 PROCEDURE — 94625 PHY/QHP OP PULM RHB W/O MNTR: CPT

## 2023-03-27 DIAGNOSIS — B44.9 ASPERGILLUS: ICD-10-CM

## 2023-03-27 DIAGNOSIS — R05.3 CHRONIC COUGH: ICD-10-CM

## 2023-03-27 DIAGNOSIS — R53.83 FATIGUE, UNSPECIFIED TYPE: ICD-10-CM

## 2023-03-27 DIAGNOSIS — Z99.81 ON HOME OXYGEN THERAPY: ICD-10-CM

## 2023-03-27 DIAGNOSIS — R05.9 COUGH, UNSPECIFIED TYPE: ICD-10-CM

## 2023-03-27 DIAGNOSIS — J44.1 CHRONIC OBSTRUCTIVE PULMONARY DISEASE WITH ACUTE EXACERBATION: ICD-10-CM

## 2023-03-27 DIAGNOSIS — E43 SEVERE MALNUTRITION: ICD-10-CM

## 2023-03-27 DIAGNOSIS — R06.2 WHEEZING: ICD-10-CM

## 2023-03-27 RX ORDER — PREDNISONE 10 MG/1
TABLET ORAL
Qty: 90 TABLET | Refills: 0 | Status: SHIPPED | OUTPATIENT
Start: 2023-03-27

## 2023-03-28 ENCOUNTER — TREATMENT (OUTPATIENT)
Dept: CARDIAC REHAB | Facility: HOSPITAL | Age: 63
End: 2023-03-28
Payer: MEDICAID

## 2023-03-28 DIAGNOSIS — J44.1 COPD WITH ACUTE EXACERBATION: Primary | ICD-10-CM

## 2023-03-28 PROCEDURE — 94625 PHY/QHP OP PULM RHB W/O MNTR: CPT

## 2023-03-30 ENCOUNTER — TREATMENT (OUTPATIENT)
Dept: CARDIAC REHAB | Facility: HOSPITAL | Age: 63
End: 2023-03-30
Payer: MEDICAID

## 2023-03-30 DIAGNOSIS — J44.1 COPD WITH ACUTE EXACERBATION: Primary | ICD-10-CM

## 2023-03-30 PROCEDURE — 94625 PHY/QHP OP PULM RHB W/O MNTR: CPT

## 2023-04-04 ENCOUNTER — TREATMENT (OUTPATIENT)
Dept: CARDIAC REHAB | Facility: HOSPITAL | Age: 63
End: 2023-04-04
Payer: MEDICAID

## 2023-04-04 ENCOUNTER — TELEPHONE (OUTPATIENT)
Dept: PULMONOLOGY | Facility: CLINIC | Age: 63
End: 2023-04-04
Payer: MEDICAID

## 2023-04-04 DIAGNOSIS — J44.1 COPD WITH ACUTE EXACERBATION: Primary | ICD-10-CM

## 2023-04-04 PROCEDURE — 94625 PHY/QHP OP PULM RHB W/O MNTR: CPT

## 2023-04-06 ENCOUNTER — TREATMENT (OUTPATIENT)
Dept: CARDIAC REHAB | Facility: HOSPITAL | Age: 63
End: 2023-04-06
Payer: MEDICAID

## 2023-04-06 DIAGNOSIS — J44.1 COPD WITH ACUTE EXACERBATION: Primary | ICD-10-CM

## 2023-04-06 PROCEDURE — 94625 PHY/QHP OP PULM RHB W/O MNTR: CPT

## 2023-04-11 ENCOUNTER — TREATMENT (OUTPATIENT)
Dept: CARDIAC REHAB | Facility: HOSPITAL | Age: 63
End: 2023-04-11
Payer: MEDICAID

## 2023-04-11 DIAGNOSIS — J44.1 COPD WITH ACUTE EXACERBATION: Primary | ICD-10-CM

## 2023-04-11 PROCEDURE — 94625 PHY/QHP OP PULM RHB W/O MNTR: CPT

## 2023-04-13 ENCOUNTER — APPOINTMENT (OUTPATIENT)
Dept: CARDIAC REHAB | Facility: HOSPITAL | Age: 63
End: 2023-04-13
Payer: MEDICAID

## 2023-04-18 ENCOUNTER — APPOINTMENT (OUTPATIENT)
Dept: CARDIAC REHAB | Facility: HOSPITAL | Age: 63
End: 2023-04-18
Payer: MEDICAID

## 2023-04-20 ENCOUNTER — APPOINTMENT (OUTPATIENT)
Dept: CARDIAC REHAB | Facility: HOSPITAL | Age: 63
End: 2023-04-20
Payer: MEDICAID

## 2023-04-25 ENCOUNTER — APPOINTMENT (OUTPATIENT)
Dept: CARDIAC REHAB | Facility: HOSPITAL | Age: 63
End: 2023-04-25
Payer: MEDICAID

## 2023-04-27 ENCOUNTER — APPOINTMENT (OUTPATIENT)
Dept: CARDIAC REHAB | Facility: HOSPITAL | Age: 63
End: 2023-04-27
Payer: MEDICAID

## 2023-05-07 DIAGNOSIS — R53.83 FATIGUE, UNSPECIFIED TYPE: ICD-10-CM

## 2023-05-07 DIAGNOSIS — R06.2 WHEEZING: ICD-10-CM

## 2023-05-07 DIAGNOSIS — R05.3 CHRONIC COUGH: ICD-10-CM

## 2023-05-07 DIAGNOSIS — Z99.81 ON HOME OXYGEN THERAPY: ICD-10-CM

## 2023-05-07 DIAGNOSIS — J44.1 CHRONIC OBSTRUCTIVE PULMONARY DISEASE WITH ACUTE EXACERBATION: ICD-10-CM

## 2023-05-07 DIAGNOSIS — E43 SEVERE MALNUTRITION: ICD-10-CM

## 2023-05-07 DIAGNOSIS — B44.9 ASPERGILLUS: ICD-10-CM

## 2023-05-07 DIAGNOSIS — R05.9 COUGH, UNSPECIFIED TYPE: ICD-10-CM

## 2023-05-08 RX ORDER — PREDNISONE 10 MG/1
TABLET ORAL
Qty: 90 TABLET | Refills: 0 | OUTPATIENT
Start: 2023-05-08

## 2023-05-23 NOTE — PROGRESS NOTES
Primary Care Provider  Mirtha Alexander APRN   Referring Provider  No ref. provider found    Patient Complaint  COPD, pulmonary hypertension, oxygen, Cough, Wheezing, and diastolic heart failure      SUBJECTIVE    History of Presenting Illness  Sumeet Gomes is a pleasant 62 y.o. male who presents to [facility] for followup on CT results. Mr. Gomes has a history of emphysema, Aspergillus pneumonia, history of Streptococcus pneumonia in past, chronic smoking, severe protein calorie malnutrition, chronic hypoxic and hypercapnic respiratory failure.  Patient had a CT done on 3/9/2023 which showed mild bibasilar atelectasis.  No evidence of active pneumonia.  Moderate emphysema.  Patient did have a overnight pulse oximetry on BiPAP with O2 at 2 L and his saturation did drop down to 75%.  O2 at 2 L leading into his BiPAP is not sufficient to keep his saturation above 90% when he sleeps.     Patient continues to be on on itraconazole twice daily for Aspergillus pneumonia.  He has taken it for 5 months.  His last lab Fungitell was within normal range.     He has shortness of breath with minimal exertion.  He is currently using oxygen at 4 L/min with exertion at pulse dose.  He uses BiPAP with bleeding oxygen at 6 L/min with sleep.  He has shortness of breath with activities.  He is currently smoking about 3 cigarettes a day.  At last visit patient was switched to Brovana Pulmicort Yupelri nebulizers through [employer].  Patient continues to bleed oxygen into his BiPAP machine.    Patient was started on Lasix at his last visit and had an elevated BNP of 2636.  Patient continues to be under the care of cardiology for diastolic heart failure.  Continues to go to cardiopulmonary rehab.    Denies headaches, chest pain, weight loss or hemoptysis. Denies fevers, chills and night sweats. Sumeet Gomes is able to perform ADLs without difficulties and denies any swollen  glands/lymph nodes in the head or neck.    I have personally reviewed the review of systems, past family, social, medical and surgical histories; and agree with their findings.    Review of Systems  Constitutional symptoms:  Denied complaints   Ear, nose, throat: Denied complaints  Cardiovascular:  Denied complaints  Respiratory: shortness of breath, wheezing, cough  Gastrointestinal: Denied complaints  Musculoskeletal: Denied complaints    Family History   Problem Relation Age of Onset   • Asthma Mother    • Emphysema Mother            • Stroke Mother         Social History     Socioeconomic History   • Marital status:    Tobacco Use   • Smoking status: Every Day     Packs/day: 0.25     Years: 47.00     Pack years: 11.75     Types: Cigarettes     Start date: 1975   • Smokeless tobacco: Never   • Tobacco comments:     Pt stated he is smoking 3 cigarettes per day   Vaping Use   • Vaping Use: Never used   Substance and Sexual Activity   • Alcohol use: Not Currently   • Drug use: Never   • Sexual activity: Defer        Past Medical History:   Diagnosis Date   • COPD (chronic obstructive pulmonary disease)    • Coronary artery disease    • Diastolic heart failure    • Hyperlipidemia    • Hypertension    • Pulmonary hypertension         Immunization History   Administered Date(s) Administered   • COVID-19 (MODERNA) 1st,2nd,3rd Dose Monovalent 2021, 2021, 2021, 2021   • Flu Vaccine Quad PF >36MO 2019   • FluLaval/Fluzone >6mos 10/25/2021, 10/06/2022   • Hepatitis A 2018   • Pneumococcal Conjugate 13-Valent (PCV13) 2018   • Pneumococcal Conjugate 20-Valent (PCV20) 2022   • Pneumococcal Polysaccharide (PPSV23) 2017   • Tdap 2012   • Zostavax 2012       No Known Allergies       Current Outpatient Medications:   •  albuterol sulfate  (90 Base) MCG/ACT inhaler, Inhale 2 puffs Every 6 (Six) Hours As Needed for Wheezing or Shortness of  "Air. for wheezing, Disp: 36 g, Rfl: 5  •  arformoterol (Brovana) 15 MCG/2ML nebulizer solution, Take 2 mL by nebulization 2 (Two) Times a Day., Disp: 120 mL, Rfl: 11  •  budesonide (Pulmicort) 0.5 MG/2ML nebulizer solution, Take 2 mL by nebulization Daily., Disp: 60 each, Rfl: 11  •  furosemide (LASIX) 20 MG tablet, TAKE 1 TABLET BY MOUTH EVERY DAY, Disp: 90 tablet, Rfl: 1  •  ipratropium-albuterol (DUO-NEB) 0.5-2.5 mg/3 ml nebulizer, Take 3 mL by nebulization 4 (Four) Times a Day., Disp: 360 mL, Rfl: 3  •  itraconazole (Sporanox) 100 MG capsule, Take 2 capsules by mouth 2 (Two) Times a Day., Disp: 120 capsule, Rfl: 5  •  metoprolol tartrate (LOPRESSOR) 25 MG tablet, Take 0.5 tablets by mouth Every 12 (Twelve) Hours., Disp: 90 tablet, Rfl: 0  •  NON FORMULARY, V PAP, Disp: , Rfl:   •  revefenacin (Yupelri) 175 MCG/3ML nebulizer solution, Take 3 mL by nebulization Daily., Disp: 60 mL, Rfl: 11  •  varenicline (CHANTIX) 1 MG tablet, Take 1 tablet by mouth 2 (Two) Times a Day., Disp: 60 tablet, Rfl: 4           Vital Signs   /57 (BP Location: Left arm, Patient Position: Sitting, Cuff Size: Adult)   Pulse 56   Temp 98 °F (36.7 °C) (Tympanic)   Resp 18   Ht 170.2 cm (67\") Comment: per pt  Wt 63 kg (138 lb 14.4 oz)   SpO2 97% Comment: 3 liters pd  BMI 21.75 kg/m²       OBJECTIVE    Physical Exam  Vital Signs Reviewed   WDWN, Alert, NAD.    HEENT:  PERRL, EOMI.  OP, nares clear  Neck:  Supple, no JVD, no thyromegaly  Chest:  good aeration, clear to auscultation bilaterally, tympanic to percussion bilaterally, no work of breathing noted  CV: RRR, no MGR, pulses 2+, equal.  Abd:  Soft, NT, ND, + BS, no HSM  EXT:  no clubbing, no cyanosis, no edema  Neuro:  A&Ox3, CN grossly intact, no focal deficits.  Skin: No rashes or lesions noted    Results Review  I have personally reviewed the prior office notes, hospital records, labs, and diagnostics.  CT Chest Without Contrast Diagnostic [[id number]] (Order " [id number])  Order  Status: Final result     Appointment Information    Display Notes    V--MN                   PACS Images     Radiology Images    Study Result    Narrative & Impression   PROCEDURE:  CT CHEST WO CONTRAST DIAGNOSTIC     COMPARISON: [facility], CR, XR CHEST 2 VW, 2/01/2023, 15:06.  [facility], CT, CT CHEST W CONTRAST DIAGNOSTIC, 9/13/2022, 20:16.     INDICATIONS:  aspergilllus pneumonia, COPD     TECHNIQUE:    CT images were created without the administration of contrast material.       PROTOCOL:     Standard imaging protocol performed                 RADIATION:      DLP: 269.3 mGy*cm               Automated exposure control was utilized to minimize radiation dose.      FINDINGS:          The central tracheobronchial tree is clear.  There is moderate emphysema.  There is mild bibasilar   atelectasis.  There is no focal consolidation.  There is no pleural effusion.     The heart size appears normal, with evidence of some calcified coronary artery disease.  The great   vessels are normal in caliber.  No abnormally enlarged lymph nodes are identified.     Partial evaluation of the upper abdomen is unremarkable.     No aggressive osseous lesions are identified.  There are stable mild chronic compression   deformities in the midthoracic spine.     IMPRESSION:                 1. Mild bibasilar atelectasis.  No evidence of active pneumonia.  2. Moderate emphysema.            JOSE JUAN GAN MD         Electronically Signed and Approved By: JOSE JUAN GAN MD on 3/09/2023 at 15:00               SCANNED - PULMONARY RESULTS: Patient Communication     Add Comments   Seen     Results  SCANNED - PULMONARY RESULTS (Order [id number])  Order-Level Documents:    Scan on 3/2/2023 by Ulices Moya MD: OVERNIGHT OXIMETRY /TEST SMARTER         SCANNED - PULMONARY RESULTS  Order: [id number]   Status: Final result      Visible to patient: Yes (seen)      0 Result Notes              Last  Resulted: 03/02/23 00:00 EST        Order Details      View Encounter      Lab and Collection Details      Routing      Result History     View Encounter Conversation           Scans on Order [id number]    Scan on 3/2/2023 by Ulices Moya MD: OVERNIGHT OXIMETRY /TEST SMARTER         Result Care Coordination      Patient Communication     Add Comments   Seen Back to Top           All Reviewers List    Ulices Moya MD on 3/13/2023 16:54       SCANNED - PULMONARY RESULTS: Patient Communication     Add Comments   Seen     Specimen Date Taken Specimen Time Taken Specimen Received Date Specimen Received Time Result Date Result Time       Mar 2, 2023      Results Routing Tracking    View Results Routing Information     Order Report     Order Details    Comprehensive Metabolic Panel  Order: [id number]   Status: Final result      Visible to patient: Yes (seen)      Next appt: 05/26/2023 at 08:15 AM in Pulmonology (ZULY Chang)      Dx: Aspergillus; Chronic cough; Severe ma...     Specimen Information: Blood    0 Result Notes            Component  Ref Range & Units 2 mo ago  (2/28/23) 3 mo ago  (2/1/23) 5 mo ago  (11/30/22) 6 mo ago  (11/8/22) 8 mo ago  (9/26/22) 8 mo ago  (9/17/22) 8 mo ago  (9/16/22)   Glucose  65 - 99 mg/dL 88  107 High   105 High   80  87  132 High   140 High     BUN  8 - 23 mg/dL 9  14  15  15  19  39 High   35 High     Creatinine  0.76 - 1.27 mg/dL 0.75 Low   0.70 Low   0.64 Low   0.72 Low   0.63 Low   0.64 Low   0.67 Low     Sodium  136 - 145 mmol/L 141  140  137  141  137  138  139    Potassium  3.5 - 5.2 mmol/L 4.4  4.3  4.5 CM  4.3  4.3  4.0  4.0    Chloride  98 - 107 mmol/L 99  99  100  101  100  84 Low   81 Low     CO2  22.0 - 29.0 mmol/L 34.4 High   37.2 High   29.3 High   33.5 High   33.0 High   >50.0 High   >50.0 High     Calcium  8.6 - 10.5 mg/dL 9.7  9.4  8.9  9.6  9.2  10.0  9.9    Total Protein  6.0 - 8.5 g/dL 6.3  5.7 Low   6.1  6.6       Albumin  3.5 - 5.2 g/dL 4.3  4.1  3.60 R   3.70 R  3.90 R      ALT (SGPT)  1 - 41 U/L 23  32  22  13       AST (SGOT)  1 - 40 U/L 31  31  28 CM  21       Alkaline Phosphatase  39 - 117 U/L 149 High   153 High   186 High   160 High        Total Bilirubin  0.0 - 1.2 mg/dL 0.8  0.7  0.8  0.3       Globulin  gm/dL 2.0  1.6  2.5  2.9       A/G Ratio  g/dL 2.2  2.6  1.4  1.3       BUN/Creatinine Ratio  7.0 - 25.0 12.0  20.0  23.4  20.8  30.2 High   60.9 High   52.2 High     Anion Gap  5.0 - 15.0 mmol/L 7.6  3.8 Low   7.7  6.5  4.0 Low    R, CM   R, CM    eGFR  >60.0 mL/min/1.73 102.7  104.8  108.4 CM  104.6 CM  108.9 CM  108.4 CM  106.9 CM    Resulting Agency [facility]           Narrative  Performed by: [facility]  GFR Normal >60   Chronic Kidney Disease <60   Kidney Failure <15       Specimen Collected: 02/28/23 08:13 EST Last Resulted: 02/28/23 16:19 EST           Fungitell B-D Glucan  Order: [id number]   Status: Final result      Visible to patient: Yes (seen)      Next appt: 05/26/2023 at 08:15 AM in Pulmonology (ZULY Chang)      Dx: Wheezing; Aspergillus; Chronic cough;...     Specimen Information: Blood    0 Result Notes      Component  Ref Range & Units 2 mo ago   FUNGITELL RESULT  <80 pg/mL <31    Comment: Interpretation: The Fungitell assay does not detect   certain fungal species such as the genus Cryptococcus   (Hiwot et al. 1991) which produces very low levels of   (1-3)-Beta-D-Glucan. The assay also does not detect the   Zygomycetes such as Absidia, Mucor and Rhizopus ([place]   et al. 1994) which are not known to produce   (1-3)-Beta-D-Glucan. In addition, the yeast phase of   Blastomyces dermatitidis produces little   (1-3)-Beta-D-Glucan and may not be detected by the assay   (Raj et al. 2007).   Reference Range:   Less than 60 pg/mL. Glucan values of less than 60 pg/mL   are interpreted as negative.   Glucan values of 60 to 79 pg/mL are interpreted as   indeterminate,  and suggest a possible fungal infection.   Additional sampling and testing of sera is required to   interpret the results.   Glucan values of greater than or equal to 80 pg/mL are   interpreted as positive.   Due to the potential for environmental contamination when   transferred to pour-off tubes, which can lead to false   positive results, interpret positive results from samples   provided in pour-off tubes with caution.  Results should   be used in conjunction with clinical findings, and should   not form the sole basis for a diagnosis or treatment   decision. The Fungitell test is approved or cleared for in   vitro diagnostic use by the U.[place] Food and Drug   Administration. Modifications to the approved package   insert have been made and the performance characteristics   for these modifications were determined by [facility].   If sample result is greater than 500 pg/mL, physician may   order a titer of the sample. Please contact [facility] if you would like to order a retest of this sample   to obtain an actual value. Samples are held for 1 week   after initial testing date.   Resulting Agency [facility]           Narrative  Performed by: [facility]  Performed at:  [address]  [facility] [address]  [id number]   : FRANKLYN Raygoza PhD, Phone:  [phone]      Specimen Collected: 02/28/23 08:13 EST Last Resulted: 03/02/23 15:06 EST           Contains abnormal data BNP  Order: [id number]   Status: Final result      Visible to patient: Yes (seen)      Next appt: 05/26/2023 at 08:15 AM in Pulmonology (ZULY Chang)      Dx: Wheezing; Aspergillus; Chronic cough;...     Specimen Information: Blood    0 Result Notes         Component  Ref Range & Units 2 mo ago  (2/28/23) 5 mo ago  (11/30/22) 8 mo ago  (9/16/22) 8 mo ago  (9/12/22)   proBNP  0.0 - 900.0 pg/mL 2,636.0 High   3,110.0 High   926.1 High   5,360.0 High     Resulting Agency [facility]           Narrative  Performed by: [facility]  Among patients with dyspnea, NT-proBNP is highly sensitive for the detection of acute congestive heart failure. In addition NT-proBNP of <300 pg/ml effectively rules out acute congestive heart failure with 99% negative predictive value.     Results may be falsely decreased if patient taking Biotin.       Specimen Collected: 02/28/23 08:13 EST Last Resulted: 02/28/23 16:16 EST                ASSESSMENT         Patient Active Problem List   Diagnosis   • Other hyperlipidemia   • Essential hypertension   • COPD (chronic obstructive pulmonary disease)   • Fatigue   • Chest wall contusion, right, initial encounter   • Closed fracture of coccyx   • Contusion of right knee   • Acute respiratory failure with hypoxia and hypercapnia   • Acute CHF   • Tobacco abuse   • Hypoxia   • Severe malnutrition   • Pulmonary hypertension   • Other fatigue   • Wheezing   • Dyspnea   • Cough   • Aspergillus   • Diastolic heart failure   • Routine general medical examination at a health care facility   • Chronic cough   • Screening for prostate cancer       Encounter Diagnoses   Name Primary?   • Pulmonary emphysema, unspecified emphysema type Yes   • Pulmonary hypertension    • Chronic cough    • Chronic respiratory failure with hypoxia and hypercapnia    • Dyspnea on exertion    • Severe malnutrition    • Chronic obstructive pulmonary disease with acute exacerbation    • Diastolic heart failure    • Cough, unspecified type    • Fatigue, unspecified type    • Aspergillus       PLAN  Refills today  Reviewed CT from march and will proceed with ordering annual CT    Diagnoses and all orders for this visit:    1. Pulmonary emphysema, unspecified emphysema type (Primary)  -     albuterol sulfate  (90 Base) MCG/ACT inhaler; Inhale 2 puffs Every 6 (Six) Hours As Needed for Wheezing or Shortness of Air. for wheezing  Dispense: 36 g; Refill: 5  -     ipratropium-albuterol (DUO-NEB) 0.5-2.5  mg/3 ml nebulizer; Take 3 mL by nebulization 4 (Four) Times a Day.  Dispense: 360 mL; Refill: 3  -     CT Chest Without Contrast; Future    2. Pulmonary hypertension  -     CT Chest Without Contrast; Future    3. Chronic cough  -     CT Chest Without Contrast; Future  -     itraconazole (Sporanox) 100 MG capsule; Take 2 capsules by mouth 2 (Two) Times a Day.  Dispense: 120 capsule; Refill: 5    4. Chronic respiratory failure with hypoxia and hypercapnia  -     CT Chest Without Contrast; Future    5. Dyspnea on exertion  -     CT Chest Without Contrast; Future    6. Severe malnutrition  -     itraconazole (Sporanox) 100 MG capsule; Take 2 capsules by mouth 2 (Two) Times a Day.  Dispense: 120 capsule; Refill: 5    7. Chronic obstructive pulmonary disease with acute exacerbation  -     itraconazole (Sporanox) 100 MG capsule; Take 2 capsules by mouth 2 (Two) Times a Day.  Dispense: 120 capsule; Refill: 5    8. Diastolic heart failure  -     itraconazole (Sporanox) 100 MG capsule; Take 2 capsules by mouth 2 (Two) Times a Day.  Dispense: 120 capsule; Refill: 5    9. Cough, unspecified type  -     itraconazole (Sporanox) 100 MG capsule; Take 2 capsules by mouth 2 (Two) Times a Day.  Dispense: 120 capsule; Refill: 5    10. Fatigue, unspecified type  -     itraconazole (Sporanox) 100 MG capsule; Take 2 capsules by mouth 2 (Two) Times a Day.  Dispense: 120 capsule; Refill: 5    11. Aspergillus  -     itraconazole (Sporanox) 100 MG capsule; Take 2 capsules by mouth 2 (Two) Times a Day.  Dispense: 120 capsule; Refill: 5           Smoking status:current  Vaccination status:up to date  Medications personally reviewed    Follow Up  Return in about 6 months (around 11/26/2023) for Dr. Moya.    Patient was given instructions and counseling regarding his condition or for health maintenance advice. Please see specific information pulled into the AVS if appropriate.

## 2023-05-25 DIAGNOSIS — R06.2 WHEEZING: ICD-10-CM

## 2023-05-25 DIAGNOSIS — B44.9 ASPERGILLUS: ICD-10-CM

## 2023-05-25 DIAGNOSIS — J44.1 CHRONIC OBSTRUCTIVE PULMONARY DISEASE WITH ACUTE EXACERBATION: ICD-10-CM

## 2023-05-25 DIAGNOSIS — Z99.81 ON HOME OXYGEN THERAPY: ICD-10-CM

## 2023-05-25 DIAGNOSIS — E43 SEVERE MALNUTRITION: ICD-10-CM

## 2023-05-25 DIAGNOSIS — R05.9 COUGH, UNSPECIFIED TYPE: ICD-10-CM

## 2023-05-25 DIAGNOSIS — R05.3 CHRONIC COUGH: ICD-10-CM

## 2023-05-25 DIAGNOSIS — R53.83 FATIGUE, UNSPECIFIED TYPE: ICD-10-CM

## 2023-05-25 RX ORDER — FUROSEMIDE 20 MG/1
TABLET ORAL
Qty: 90 TABLET | Refills: 1 | Status: SHIPPED | OUTPATIENT
Start: 2023-05-25

## 2023-05-26 ENCOUNTER — OFFICE VISIT (OUTPATIENT)
Dept: PULMONOLOGY | Facility: CLINIC | Age: 63
End: 2023-05-26
Payer: MEDICAID

## 2023-05-26 VITALS
SYSTOLIC BLOOD PRESSURE: 101 MMHG | TEMPERATURE: 98 F | DIASTOLIC BLOOD PRESSURE: 57 MMHG | WEIGHT: 138.9 LBS | BODY MASS INDEX: 21.8 KG/M2 | RESPIRATION RATE: 18 BRPM | HEART RATE: 56 BPM | HEIGHT: 67 IN | OXYGEN SATURATION: 97 %

## 2023-05-26 DIAGNOSIS — J44.1 CHRONIC OBSTRUCTIVE PULMONARY DISEASE WITH ACUTE EXACERBATION: ICD-10-CM

## 2023-05-26 DIAGNOSIS — B44.9 ASPERGILLUS: ICD-10-CM

## 2023-05-26 DIAGNOSIS — J43.9 PULMONARY EMPHYSEMA, UNSPECIFIED EMPHYSEMA TYPE: Primary | ICD-10-CM

## 2023-05-26 DIAGNOSIS — R06.09 DYSPNEA ON EXERTION: ICD-10-CM

## 2023-05-26 DIAGNOSIS — J96.11 CHRONIC RESPIRATORY FAILURE WITH HYPOXIA AND HYPERCAPNIA: ICD-10-CM

## 2023-05-26 DIAGNOSIS — R05.3 CHRONIC COUGH: ICD-10-CM

## 2023-05-26 DIAGNOSIS — Z99.81 ON HOME OXYGEN THERAPY: ICD-10-CM

## 2023-05-26 DIAGNOSIS — R05.9 COUGH, UNSPECIFIED TYPE: ICD-10-CM

## 2023-05-26 DIAGNOSIS — R53.83 FATIGUE, UNSPECIFIED TYPE: ICD-10-CM

## 2023-05-26 DIAGNOSIS — E43 SEVERE MALNUTRITION: ICD-10-CM

## 2023-05-26 DIAGNOSIS — I27.20 PULMONARY HYPERTENSION: ICD-10-CM

## 2023-05-26 DIAGNOSIS — J96.12 CHRONIC RESPIRATORY FAILURE WITH HYPOXIA AND HYPERCAPNIA: ICD-10-CM

## 2023-05-26 RX ORDER — SIMVASTATIN 20 MG
1 TABLET ORAL DAILY
COMMUNITY
Start: 2023-04-16 | End: 2023-05-26

## 2023-05-26 RX ORDER — IPRATROPIUM BROMIDE AND ALBUTEROL SULFATE 2.5; .5 MG/3ML; MG/3ML
3 SOLUTION RESPIRATORY (INHALATION)
Qty: 360 ML | Refills: 3 | Status: SHIPPED | OUTPATIENT
Start: 2023-05-26

## 2023-05-26 RX ORDER — ALBUTEROL SULFATE 90 UG/1
2 AEROSOL, METERED RESPIRATORY (INHALATION) EVERY 6 HOURS PRN
Qty: 36 G | Refills: 5 | Status: SHIPPED | OUTPATIENT
Start: 2023-05-26

## 2023-05-26 RX ORDER — ITRACONAZOLE 100 MG/1
200 CAPSULE ORAL 2 TIMES DAILY
Qty: 120 CAPSULE | Refills: 5 | Status: SHIPPED | OUTPATIENT
Start: 2023-05-26

## 2023-06-02 DIAGNOSIS — R05.3 CHRONIC COUGH: ICD-10-CM

## 2023-06-02 DIAGNOSIS — B44.9 ASPERGILLUS: ICD-10-CM

## 2023-06-02 DIAGNOSIS — R53.83 FATIGUE, UNSPECIFIED TYPE: ICD-10-CM

## 2023-06-02 DIAGNOSIS — R06.2 WHEEZING: ICD-10-CM

## 2023-06-02 DIAGNOSIS — R05.9 COUGH, UNSPECIFIED TYPE: ICD-10-CM

## 2023-06-02 DIAGNOSIS — J44.1 CHRONIC OBSTRUCTIVE PULMONARY DISEASE WITH ACUTE EXACERBATION: ICD-10-CM

## 2023-06-02 DIAGNOSIS — Z99.81 ON HOME OXYGEN THERAPY: ICD-10-CM

## 2023-06-02 DIAGNOSIS — E43 SEVERE MALNUTRITION: ICD-10-CM

## 2023-06-05 RX ORDER — REVEFENACIN 175 UG/3ML
175 SOLUTION RESPIRATORY (INHALATION)
Qty: 60 ML | Refills: 11 | Status: SHIPPED | OUTPATIENT
Start: 2023-06-05

## 2023-06-19 ENCOUNTER — OFFICE VISIT (OUTPATIENT)
Dept: ONCOLOGY | Facility: HOSPITAL | Age: 63
End: 2023-06-19
Payer: MEDICAID

## 2023-06-19 ENCOUNTER — LAB (OUTPATIENT)
Dept: ONCOLOGY | Facility: HOSPITAL | Age: 63
End: 2023-06-19
Payer: MEDICAID

## 2023-06-19 VITALS
DIASTOLIC BLOOD PRESSURE: 73 MMHG | BODY MASS INDEX: 22.17 KG/M2 | OXYGEN SATURATION: 91 % | WEIGHT: 141.54 LBS | SYSTOLIC BLOOD PRESSURE: 138 MMHG | HEART RATE: 51 BPM | RESPIRATION RATE: 16 BRPM | TEMPERATURE: 97.5 F

## 2023-06-19 DIAGNOSIS — D69.6 THROMBOCYTOPENIA: Primary | ICD-10-CM

## 2023-06-19 DIAGNOSIS — D69.6 THROMBOCYTOPENIA: ICD-10-CM

## 2023-06-19 LAB
ALBUMIN SERPL-MCNC: 3.7 G/DL (ref 3.5–5.2)
ALBUMIN/GLOB SERPL: 1.8 G/DL
ALP SERPL-CCNC: 187 U/L (ref 39–117)
ALT SERPL W P-5'-P-CCNC: 26 U/L (ref 1–41)
ANION GAP SERPL CALCULATED.3IONS-SCNC: 6.6 MMOL/L (ref 5–15)
AST SERPL-CCNC: 43 U/L (ref 1–40)
BASOPHILS # BLD AUTO: 0.03 10*3/MM3 (ref 0–0.2)
BASOPHILS NFR BLD AUTO: 0.5 % (ref 0–1.5)
BILIRUB SERPL-MCNC: 0.7 MG/DL (ref 0–1.2)
BUN SERPL-MCNC: 7 MG/DL (ref 8–23)
BUN/CREAT SERPL: 11.1 (ref 7–25)
CALCIUM SPEC-SCNC: 9.3 MG/DL (ref 8.6–10.5)
CHLORIDE SERPL-SCNC: 94 MMOL/L (ref 98–107)
CO2 SERPL-SCNC: 38.4 MMOL/L (ref 22–29)
CREAT SERPL-MCNC: 0.63 MG/DL (ref 0.76–1.27)
CYTO UR: NORMAL
DEPRECATED RDW RBC AUTO: 49.2 FL (ref 37–54)
EGFRCR SERPLBLD CKD-EPI 2021: 108.2 ML/MIN/1.73
EOSINOPHIL # BLD AUTO: 0.07 10*3/MM3 (ref 0–0.4)
EOSINOPHIL # BLD MANUAL: 0.06 10*3/MM3 (ref 0–0.4)
EOSINOPHIL NFR BLD AUTO: 1.2 % (ref 0.3–6.2)
EOSINOPHIL NFR BLD MANUAL: 1 % (ref 0.3–6.2)
ERYTHROCYTE [DISTWIDTH] IN BLOOD BY AUTOMATED COUNT: 14.6 % (ref 12.3–15.4)
FOLATE SERPL-MCNC: 14 NG/ML (ref 4.78–24.2)
GLOBULIN UR ELPH-MCNC: 2.1 GM/DL
GLUCOSE SERPL-MCNC: 102 MG/DL (ref 65–99)
HCT VFR BLD AUTO: 43 % (ref 37.5–51)
HGB BLD-MCNC: 13.8 G/DL (ref 13–17.7)
IMM GRANULOCYTES # BLD AUTO: 0.02 10*3/MM3 (ref 0–0.05)
IMM GRANULOCYTES NFR BLD AUTO: 0.3 % (ref 0–0.5)
LAB AP CASE REPORT: NORMAL
LAB AP CLINICAL INFORMATION: NORMAL
LARGE PLATELETS: ABNORMAL
LYMPHOCYTES # BLD AUTO: 1.06 10*3/MM3 (ref 0.7–3.1)
LYMPHOCYTES # BLD MANUAL: 1 10*3/MM3 (ref 0.7–3.1)
LYMPHOCYTES NFR BLD AUTO: 18.1 % (ref 19.6–45.3)
LYMPHOCYTES NFR BLD MANUAL: 10 % (ref 5–12)
MCH RBC QN AUTO: 29 PG (ref 26.6–33)
MCHC RBC AUTO-ENTMCNC: 32.1 G/DL (ref 31.5–35.7)
MCV RBC AUTO: 90.3 FL (ref 79–97)
MONOCYTES # BLD AUTO: 0.67 10*3/MM3 (ref 0.1–0.9)
MONOCYTES # BLD: 0.59 10*3/MM3 (ref 0.1–0.9)
MONOCYTES NFR BLD AUTO: 11.4 % (ref 5–12)
NEUTROPHILS # BLD AUTO: 4.23 10*3/MM3 (ref 1.7–7)
NEUTROPHILS NFR BLD AUTO: 4.02 10*3/MM3 (ref 1.7–7)
NEUTROPHILS NFR BLD AUTO: 68.5 % (ref 42.7–76)
NEUTROPHILS NFR BLD MANUAL: 72 % (ref 42.7–76)
PATH REPORT.FINAL DX SPEC: NORMAL
PATH REPORT.GROSS SPEC: NORMAL
PATHOLOGY REVIEW: YES
PLATELET # BLD AUTO: 204 10*3/MM3 (ref 140–450)
PMV BLD AUTO: 9.9 FL (ref 6–12)
POTASSIUM SERPL-SCNC: 3.2 MMOL/L (ref 3.5–5.2)
PROT SERPL-MCNC: 5.8 G/DL (ref 6–8.5)
RBC # BLD AUTO: 4.76 10*6/MM3 (ref 4.14–5.8)
RBC MORPH BLD: NORMAL
SMALL PLATELETS BLD QL SMEAR: ADEQUATE
SODIUM SERPL-SCNC: 139 MMOL/L (ref 136–145)
VARIANT LYMPHS NFR BLD MANUAL: 17 % (ref 19.6–45.3)
VIT B12 BLD-MCNC: 879 PG/ML (ref 211–946)
WBC MORPH BLD: NORMAL
WBC NRBC COR # BLD: 5.87 10*3/MM3 (ref 3.4–10.8)

## 2023-06-19 PROCEDURE — 99213 OFFICE O/P EST LOW 20 MIN: CPT | Performed by: INTERNAL MEDICINE

## 2023-06-19 PROCEDURE — 82746 ASSAY OF FOLIC ACID SERUM: CPT

## 2023-06-19 PROCEDURE — 1160F RVW MEDS BY RX/DR IN RCRD: CPT | Performed by: INTERNAL MEDICINE

## 2023-06-19 PROCEDURE — 1159F MED LIST DOCD IN RCRD: CPT | Performed by: INTERNAL MEDICINE

## 2023-06-19 PROCEDURE — 3078F DIAST BP <80 MM HG: CPT | Performed by: INTERNAL MEDICINE

## 2023-06-19 PROCEDURE — 36415 COLL VENOUS BLD VENIPUNCTURE: CPT

## 2023-06-19 PROCEDURE — G0463 HOSPITAL OUTPT CLINIC VISIT: HCPCS | Performed by: INTERNAL MEDICINE

## 2023-06-19 PROCEDURE — 82607 VITAMIN B-12: CPT

## 2023-06-19 PROCEDURE — 1126F AMNT PAIN NOTED NONE PRSNT: CPT | Performed by: INTERNAL MEDICINE

## 2023-06-19 PROCEDURE — 80053 COMPREHEN METABOLIC PANEL: CPT

## 2023-06-19 PROCEDURE — 3075F SYST BP GE 130 - 139MM HG: CPT | Performed by: INTERNAL MEDICINE

## 2023-06-19 PROCEDURE — 85025 COMPLETE CBC W/AUTO DIFF WBC: CPT

## 2023-06-19 RX ORDER — TIOTROPIUM BROMIDE INHALATION SPRAY 3.12 UG/1
2 SPRAY, METERED RESPIRATORY (INHALATION) DAILY
COMMUNITY
Start: 2023-06-02

## 2023-06-19 NOTE — PROGRESS NOTES
Chief Complaint/Reason for Referral:  Thrombocytopenia     Benjamin, Mirtha Agarwal, *  Alexander, Mirtha Agarwal, APRN    Records Obtained:  Records of the patients history including those obtained from  were reviewed and summarized in detail.    Subjective    History of Present Illness   Mr. Sumeet Gomes is a very pleasant 62 year old  male presenting for follow up for mild thrombocytopenia. He is at baseline health. He has easy bruising. Denies bleeding per rectum or frequent nosebleeds. He says if he gets cut it does dry up fairly quick. Shortness of breath is chronic.     Hematology History    He has co-morbid history of CAD, HLD, hypertension, CHF, COPD, hypoxia, pulmonary hypertension, tobacco abuse,  asperillosis pulmonary infection and has been taking Itraconazole for this. He has been following with Dr. Moya for his pulmonary issues. He has chronic hypoxia and uses continuous oxygen at 6 liters but his oxygen is sitting on 2 liters / NC current. Reports he is trying to quit smoking and is down to around 3 cigarettes a day using Chantix.     2/1/23: Plt count of 126,000 from 147,000 in November.     2/28/23: Plt 129,000. He has normal WBC and hemoglobin. Denies any persistent bleeding or bruising issues.         Oncology/Hematology History    No history exists.       Review of Systems   Constitutional:  Positive for fatigue. Negative for appetite change, diaphoresis, fever, unexpected weight gain and unexpected weight loss.   HENT:  Negative for hearing loss, sore throat and voice change.    Eyes:  Negative for blurred vision, double vision, pain, redness and visual disturbance.   Respiratory:  Positive for shortness of breath. Negative for cough and wheezing.    Cardiovascular:  Negative for chest pain, palpitations and leg swelling.   Endocrine: Negative for cold intolerance, heat intolerance, polydipsia and polyuria.   Genitourinary:  Negative for decreased urine volume, difficulty urinating,  frequency and urinary incontinence.   Musculoskeletal:  Negative for arthralgias, back pain, joint swelling and myalgias.   Skin:  Negative for color change, rash, skin lesions and wound.   Neurological:  Negative for dizziness, seizures, numbness and headache.   Hematological:  Negative for adenopathy. Does not bruise/bleed easily.   Psychiatric/Behavioral:  Negative for depressed mood. The patient is not nervous/anxious.    All other systems reviewed and are negative.    Current Outpatient Medications on File Prior to Visit   Medication Sig Dispense Refill    albuterol sulfate  (90 Base) MCG/ACT inhaler Inhale 2 puffs Every 6 (Six) Hours As Needed for Wheezing or Shortness of Air. for wheezing 36 g 5    arformoterol (Brovana) 15 MCG/2ML nebulizer solution Take 2 mL by nebulization 2 (Two) Times a Day. 120 mL 11    budesonide (Pulmicort) 0.5 MG/2ML nebulizer solution Take 2 mL by nebulization Daily. 60 each 11    furosemide (LASIX) 20 MG tablet TAKE 1 TABLET BY MOUTH EVERY DAY 90 tablet 1    ipratropium-albuterol (DUO-NEB) 0.5-2.5 mg/3 ml nebulizer Take 3 mL by nebulization 4 (Four) Times a Day. 360 mL 3    itraconazole (Sporanox) 100 MG capsule Take 2 capsules by mouth 2 (Two) Times a Day. 120 capsule 5    metoprolol tartrate (LOPRESSOR) 25 MG tablet Take 0.5 tablets by mouth Every 12 (Twelve) Hours. 90 tablet 0    NON FORMULARY V PAP      revefenacin (Yupelri) 175 MCG/3ML nebulizer solution Take 3 mL by nebulization Daily. 60 mL 11    Spiriva Respimat 2.5 MCG/ACT aerosol solution inhaler Inhale 2 puffs Daily.      varenicline (CHANTIX) 1 MG tablet Take 1 tablet by mouth 2 (Two) Times a Day. 60 tablet 4     No current facility-administered medications on file prior to visit.       No Known Allergies  Past Medical History:   Diagnosis Date    COPD (chronic obstructive pulmonary disease)     Coronary artery disease     Diastolic heart failure     Hyperlipidemia     Hypertension     Pulmonary hypertension       Past Surgical History:   Procedure Laterality Date    BACK SURGERY      BRONCHOSCOPY N/A 2022    Procedure: BRONCHOSCOPY WITH BRONCHOALVEOLAR LAVAGE AND BRONCHIAL WASHINGS;  Surgeon: Ulices Moya MD;  Location: [facility] ENDOSCOPY;  Service: Pulmonary;  Laterality: N/A;  MUCUS PLUGGING, COPD EXACERBATION    BRONCHOSCOPY      CARDIAC CATHETERIZATION N/A 2022    Procedure: Right Heart Cath;  Surgeon: Drew Rodriguez MD;  Location: [facility] CATH INVASIVE LOCATION;  Service: Cardiovascular;  Laterality: N/A;    OTHER SURGICAL HISTORY      knee     OTHER SURGICAL HISTORY      shoulder, rotator cuff    SHOULDER SURGERY Right 10/03/2022     Social History     Socioeconomic History    Marital status:    Tobacco Use    Smoking status: Every Day     Packs/day: 0.25     Years: 47.00     Pack years: 11.75     Types: Cigarettes     Start date: 1975    Smokeless tobacco: Never    Tobacco comments:     Pt stated he is smoking 3 cigarettes per day   Vaping Use    Vaping Use: Never used   Substance and Sexual Activity    Alcohol use: Not Currently    Drug use: Never    Sexual activity: Defer     Family History   Problem Relation Age of Onset    Asthma Mother     Emphysema Mother             Stroke Mother      Immunization History   Administered Date(s) Administered    COVID-19 (MODERNA) 1st,2nd,3rd Dose Monovalent 2021, 2021, 2021, 2021    Flu Vaccine Quad PF >36MO 2019    FluLaval/Fluzone >6mos 2019, 10/25/2021, 10/06/2022    Hepatitis A 2018    Pneumococcal Conjugate 13-Valent (PCV13) 2018    Pneumococcal Conjugate 20-Valent (PCV20) 2022    Pneumococcal Polysaccharide (PPSV23) 2017    Tdap 2012    Zostavax 2012       Tobacco Use: High Risk    Smoking Tobacco Use: Every Day    Smokeless Tobacco Use: Never    Passive Exposure: Not on file       Objective     Physical Exam  Constitutional:       Appearance: Normal appearance.    HENT:      Head: Normocephalic and atraumatic.      Nose: Nose normal.   Eyes:      Conjunctiva/sclera: Conjunctivae normal.   Pulmonary:      Effort: Pulmonary effort is normal.   Skin:     Findings: Bruising present.   Neurological:      General: No focal deficit present.      Mental Status: He is alert. Mental status is at baseline.   Psychiatric:         Mood and Affect: Mood normal.         Behavior: Behavior normal.         Thought Content: Thought content normal.       Vitals:    06/19/23 0820   BP: 138/73   Pulse: 51   Resp: 16   Temp: 97.5 °F (36.4 °C)   TempSrc: Temporal   SpO2: 91%  Comment: 4L of O2   Weight: 64.2 kg (141 lb 8.6 oz)   PainSc: 0-No pain       Wt Readings from Last 3 Encounters:   05/26/23 63 kg (138 lb 14.4 oz)   03/16/23 63.5 kg (139 lb 15.9 oz)   02/27/23 63.6 kg (140 lb 4.8 oz)        BMI is within normal parameters. No other follow-up for BMI required.       ECOG score: 0         ECOG: (1) Restricted in Physically Strenuous Activity, Ambulatory & Able to Do Work of Light Nature  Fall Risk Assessment was completed, and patient is at moderate risk for falls.  PHQ-9 Total Score:         The patient is  experiencing fatigue. Fatigue score: 5    PT/OT Functional Screening: PT fx screen: Difficulty Walking and No needs identified  Speech Functional Screening: Speech fx screen: No needs identified  Rehab to be ordered: Rehab to be ordered: No needs identified        Result Review :  The following data was reviewed by: Elvis Pinzon MD on 06/19/23:  Lab Results   Component Value Date    HGB 14.5 02/20/2023    HCT 46.1 02/20/2023    MCV 90.6 02/20/2023     (L) 02/20/2023    WBC 5.28 02/20/2023    NEUTROABS 3.58 02/20/2023    LYMPHSABS 1.06 02/20/2023    MONOSABS 0.58 02/20/2023    EOSABS 0.03 02/20/2023    BASOSABS 0.02 02/20/2023     Lab Results   Component Value Date    GLUCOSE 88 02/28/2023    BUN 9 02/28/2023    CREATININE 0.75 (L) 02/28/2023     02/28/2023    K 4.4  02/28/2023    CL 99 02/28/2023    CO2 34.4 (H) 02/28/2023    CALCIUM 9.7 02/28/2023    PROTEINTOT 6.3 02/28/2023    ALBUMIN 4.3 02/28/2023    BILITOT 0.8 02/28/2023    ALKPHOS 149 (H) 02/28/2023    AST 31 02/28/2023    ALT 23 02/28/2023     Lab Results   Component Value Date    PREEUVJF01 1,082 (H) 09/26/2022    FOLATE 8.38 09/26/2022     No results found for: IRON, LABIRON, TRANSFERRIN, TIBC  Lab Results   Component Value Date    WZZELRSD10 1,082 (H) 09/26/2022    FOLATE 8.38 09/26/2022     Lab Results   Component Value Date    PSA 0.361 02/01/2023     Labs personally reviewed and notable for mild thrombocytopenia.                Assessment and Plan:  Diagnoses and all orders for this visit:    1. Thrombocytopenia (Primary)  -     CBC & Differential; Future      Mild thrombocytopenia in the range of 126 - 129,000. Previously platelet count was in the normal range with range of 147,000 to 243,000. Denies any acute bleeding or persistent bleeding issues. Thyroid function was normal on 2/1/23. Likely benign in etiology. Platelet count is acceptable for hemostasis. Smear, folate, B12 pending for today. Continue to trend plt count with repeat CBC in 6 months.      I spent 20 minutes caring for Sumeet on this date of service. This time includes time spent by me in the following activities:preparing for the visit, reviewing tests, obtaining and/or reviewing a separately obtained history, performing a medically appropriate examination and/or evaluation , counseling and educating the patient/family/caregiver, ordering medications, tests, or procedures, referring and communicating with other health care professionals , documenting information in the medical record and independently interpreting results and communicating that information with the patient/family/caregiver    Patient Follow Up: 6 months with repeat CBC    Patient was given instructions and counseling regarding his condition or for health maintenance advice.  Please see specific information pulled into the AVS if appropriate.

## 2023-08-02 ENCOUNTER — LAB (OUTPATIENT)
Dept: LAB | Facility: HOSPITAL | Age: 63
End: 2023-08-02
Payer: MEDICAID

## 2023-08-02 ENCOUNTER — HOSPITAL ENCOUNTER (OUTPATIENT)
Dept: GENERAL RADIOLOGY | Facility: HOSPITAL | Age: 63
Discharge: HOME | End: 2023-08-02
Payer: MEDICAID

## 2023-08-02 ENCOUNTER — OFFICE VISIT (OUTPATIENT)
Dept: FAMILY MEDICINE CLINIC | Age: 63
End: 2023-08-02
Payer: MEDICAID

## 2023-08-02 VITALS
HEART RATE: 50 BPM | DIASTOLIC BLOOD PRESSURE: 72 MMHG | WEIGHT: 137.6 LBS | HEIGHT: 67 IN | BODY MASS INDEX: 21.6 KG/M2 | SYSTOLIC BLOOD PRESSURE: 137 MMHG

## 2023-08-02 DIAGNOSIS — M25.461 SWELLING OF JOINT OF RIGHT KNEE: Primary | ICD-10-CM

## 2023-08-02 DIAGNOSIS — J44.9 CHRONIC OBSTRUCTIVE PULMONARY DISEASE, UNSPECIFIED COPD TYPE: ICD-10-CM

## 2023-08-02 DIAGNOSIS — Z99.81 ON HOME OXYGEN THERAPY: ICD-10-CM

## 2023-08-02 DIAGNOSIS — M25.461 SWELLING OF JOINT OF RIGHT KNEE: ICD-10-CM

## 2023-08-02 DIAGNOSIS — I10 ESSENTIAL HYPERTENSION: ICD-10-CM

## 2023-08-02 DIAGNOSIS — I10 ESSENTIAL HYPERTENSION: Primary | ICD-10-CM

## 2023-08-02 PROBLEM — B44.9 ASPERGILLUS: Status: RESOLVED | Noted: 2022-11-30 | Resolved: 2023-08-02

## 2023-08-02 PROBLEM — R06.2 WHEEZING: Status: RESOLVED | Noted: 2022-11-30 | Resolved: 2023-08-02

## 2023-08-02 PROBLEM — R05.3 CHRONIC COUGH: Status: RESOLVED | Noted: 2023-02-01 | Resolved: 2023-08-02

## 2023-08-02 LAB
ALBUMIN SERPL-MCNC: 4.3 G/DL (ref 3.5–5.2)
ALBUMIN/GLOB SERPL: 2.2 G/DL
ALP SERPL-CCNC: 130 U/L (ref 39–117)
ALT SERPL W P-5'-P-CCNC: 25 U/L (ref 1–41)
ANION GAP SERPL CALCULATED.3IONS-SCNC: 9.6 MMOL/L (ref 5–15)
AST SERPL-CCNC: 43 U/L (ref 1–40)
BILIRUB SERPL-MCNC: 0.6 MG/DL (ref 0–1.2)
BUN SERPL-MCNC: 12 MG/DL (ref 8–23)
BUN/CREAT SERPL: 15.4 (ref 7–25)
CALCIUM SPEC-SCNC: 9.6 MG/DL (ref 8.6–10.5)
CHLORIDE SERPL-SCNC: 99 MMOL/L (ref 98–107)
CO2 SERPL-SCNC: 35.4 MMOL/L (ref 22–29)
CREAT SERPL-MCNC: 0.78 MG/DL (ref 0.76–1.27)
EGFRCR SERPLBLD CKD-EPI 2021: 101.5 ML/MIN/1.73
GLOBULIN UR ELPH-MCNC: 2 GM/DL
GLUCOSE SERPL-MCNC: 87 MG/DL (ref 65–99)
POTASSIUM SERPL-SCNC: 4 MMOL/L (ref 3.5–5.2)
PROT SERPL-MCNC: 6.3 G/DL (ref 6–8.5)
SODIUM SERPL-SCNC: 144 MMOL/L (ref 136–145)

## 2023-08-02 PROCEDURE — 73562 X-RAY EXAM OF KNEE 3: CPT

## 2023-08-02 PROCEDURE — 80053 COMPREHEN METABOLIC PANEL: CPT

## 2023-08-02 PROCEDURE — 3075F SYST BP GE 130 - 139MM HG: CPT | Performed by: NURSE PRACTITIONER

## 2023-08-02 PROCEDURE — 1160F RVW MEDS BY RX/DR IN RCRD: CPT | Performed by: NURSE PRACTITIONER

## 2023-08-02 PROCEDURE — 1159F MED LIST DOCD IN RCRD: CPT | Performed by: NURSE PRACTITIONER

## 2023-08-02 PROCEDURE — 99214 OFFICE O/P EST MOD 30 MIN: CPT | Performed by: NURSE PRACTITIONER

## 2023-08-02 PROCEDURE — 3078F DIAST BP <80 MM HG: CPT | Performed by: NURSE PRACTITIONER

## 2023-08-02 PROCEDURE — 36415 COLL VENOUS BLD VENIPUNCTURE: CPT

## 2023-08-02 RX ORDER — FUROSEMIDE 20 MG/1
20 TABLET ORAL DAILY
Qty: 90 TABLET | Refills: 1 | Status: SHIPPED | OUTPATIENT
Start: 2023-08-02

## 2023-08-04 DIAGNOSIS — J44.9 CHRONIC OBSTRUCTIVE PULMONARY DISEASE, UNSPECIFIED COPD TYPE: ICD-10-CM

## 2023-08-04 DIAGNOSIS — J43.9 PULMONARY EMPHYSEMA, UNSPECIFIED EMPHYSEMA TYPE: ICD-10-CM

## 2023-08-04 RX ORDER — FLUTICASONE PROPIONATE 220 UG/1
AEROSOL, METERED RESPIRATORY (INHALATION)
Qty: 144 EACH | OUTPATIENT
Start: 2023-08-04

## 2023-08-14 ENCOUNTER — OFFICE VISIT (OUTPATIENT)
Dept: ORTHOPEDIC SURGERY | Facility: CLINIC | Age: 63
End: 2023-08-14
Payer: MEDICAID

## 2023-08-14 VITALS
OXYGEN SATURATION: 84 % | DIASTOLIC BLOOD PRESSURE: 76 MMHG | HEART RATE: 55 BPM | WEIGHT: 137 LBS | SYSTOLIC BLOOD PRESSURE: 146 MMHG | BODY MASS INDEX: 21.5 KG/M2 | HEIGHT: 67 IN

## 2023-08-14 DIAGNOSIS — M70.51 INFRAPATELLAR BURSITIS OF RIGHT KNEE: ICD-10-CM

## 2023-08-14 DIAGNOSIS — M17.11 PRIMARY OSTEOARTHRITIS OF RIGHT KNEE: Primary | ICD-10-CM

## 2023-08-14 NOTE — PROGRESS NOTES
"Chief Complaint  Pain and Initial Evaluation of the Right Knee    Subjective          Sumeet Gomes presents to [facility] for   History of Present Illness    The patient presents here today for evaluation of the right knee. The patient reports a bump to his knee for a couple months. He denies a specific injury. He is on full time O2. He has no other complaints. He denies fever, chills or redness.    No Known Allergies     Social History     Socioeconomic History    Marital status:    Tobacco Use    Smoking status: Every Day     Packs/day: 0.25     Years: 47.00     Pack years: 11.75     Types: Cigarettes     Start date: 1/1/1975    Smokeless tobacco: Never    Tobacco comments:     Pt stated he is smoking 3 cigarettes per day   Vaping Use    Vaping Use: Never used   Substance and Sexual Activity    Alcohol use: Not Currently    Drug use: Never    Sexual activity: Defer        I reviewed the patient's chief complaint, history of present illness, review of systems, past medical history, surgical history, family history, social history, medications, and allergy list.     REVIEW OF SYSTEMS    Constitutional: Denies fevers, chills, weight loss  Cardiovascular: Denies chest pain, shortness of breath  Skin: Denies rashes, acute skin changes  Neurologic: Denies headache, loss of consciousness  MSK: Right knee pain      Objective   Vital Signs:   /76   Pulse 55   Ht 170.2 cm (67\")   Wt 62.1 kg (137 lb)   SpO2 (!) 84%   BMI 21.46 kg/mý     Body mass index is 21.46 kg/mý.    Physical Exam    General: Alert. No acute distress.   Right knee- lacks 10 of extension. Flexion 110 degrees. Extensor Mechanism  intact. Stable to varus/valgus stress. Stable to anterior/posterior drawer. Positive EHL, FHL, GS and TA. Sensation intact to all 5 nerves of the foot. Positive pulses. Neurovascularly intact. 2x2cm focal to the infrapatella bursa.     Large Joint Arthrocentesis: R knee  Date/Time: " 8/14/2023 2:15 PM  Consent given by: patient  Site marked: site marked  Timeout: Immediately prior to procedure a time out was called to verify the correct patient, procedure, equipment, support staff and site/side marked as required   Supporting Documentation  Indications: pain   Procedure Details  Location: knee - R knee  Needle size: 18 G  Aspirate amount: 5 mL  Aspirate: clear  Patient tolerance: patient tolerated the procedure well with no immediate complications      Imaging Results (Most Recent)       None                     Assessment and Plan        XR Knee 3 View Right    Result Date: 8/2/2023  Narrative: PROCEDURE: XR KNEE 3 VW RIGHT  COMPARISON: None  INDICATIONS: NON-PAINFUL KNOT ON ANTERIOR RIGHT KNEE X 1 MONTH  FINDINGS:  There is moderate medial and patellofemoral joint space narrowing.  Small osteophytes at the patella and medial femoral condyle and tibial plateau are present.  There is a moderate enthesophyte at the insertion of the quadriceps tendon.  Adjacent to the anterior tibial Bertrand, there is focal soft tissue swelling/soft tissue mass with thickening of the patellar tendon at this level.  Is thickened to 2.2 cm.  A osseous lesion is not demonstrated.  No soft tissue air.  Chondrocalcinosis of the medial joint compartment is also present.  No significant joint effusion.  Mild degree of vascular calcification is present.  No acute fractures dislocations or joint subluxations.      Impression:  Moderate degenerative joint disease as well as chondrocalcinosis. There is thickening along the patellar tendon diffusely with focal soft tissue swelling/soft tissue mass adjacent and just cephalad to the anterior tibial tuberosity.  No significant bony hypertrophy or focal osseous lesion is seen at the level of the soft tissue prominence.      MARCELO AVILA DO       Electronically Signed and Approved By: MARCELO AVILA DO on 8/02/2023 at 10:58               Diagnoses and all orders for this  visit:    1. Primary osteoarthritis of right knee (Primary)    2. Infrapatellar bursitis of right knee        Discussed the treatment plan with the patient.  I reviewed the images with the patient. Discussed the risks and benefits of aspiration of the right knee knee bursa. The patient expressed understanding and wished to proceed. He tolerated the procedure well. I aspirated. 5 cc of clear fluid from the bursa.      Call or return if worsening symptoms.    Scribed for Yamil Rankin MD by Genie Matute  08/14/2023   13:54 EDT         Follow Up       6 weeks    Patient was given instructions and counseling regarding his condition or for health maintenance advice. Please see specific information pulled into the AVS if appropriate.       I have personally performed the services described in this document as scribed by the above individual and it is both accurate and complete.     Yamil Rankin MD  08/14/23  14:14 EDT

## 2023-09-12 ENCOUNTER — DOCUMENTATION (OUTPATIENT)
Dept: FAMILY MEDICINE CLINIC | Age: 63
End: 2023-09-12
Payer: MEDICAID

## 2023-09-12 ENCOUNTER — LAB (OUTPATIENT)
Dept: LAB | Facility: HOSPITAL | Age: 63
End: 2023-09-12
Payer: MEDICAID

## 2023-09-12 DIAGNOSIS — R35.0 URINARY FREQUENCY: Primary | ICD-10-CM

## 2023-09-12 DIAGNOSIS — D69.6 THROMBOCYTOPENIA: ICD-10-CM

## 2023-09-12 DIAGNOSIS — R35.0 URINARY FREQUENCY: ICD-10-CM

## 2023-09-12 DIAGNOSIS — H53.9 CHANGE IN VISION: ICD-10-CM

## 2023-09-12 LAB
ANION GAP SERPL CALCULATED.3IONS-SCNC: 6.9 MMOL/L (ref 5–15)
BASOPHILS # BLD AUTO: 0.02 10*3/MM3 (ref 0–0.2)
BASOPHILS NFR BLD AUTO: 0.3 % (ref 0–1.5)
BUN SERPL-MCNC: 8 MG/DL (ref 8–23)
BUN/CREAT SERPL: 13.3 (ref 7–25)
CALCIUM SPEC-SCNC: 9.4 MG/DL (ref 8.6–10.5)
CHLORIDE SERPL-SCNC: 100 MMOL/L (ref 98–107)
CO2 SERPL-SCNC: 38.1 MMOL/L (ref 22–29)
CREAT SERPL-MCNC: 0.6 MG/DL (ref 0.76–1.27)
DEPRECATED RDW RBC AUTO: 47.4 FL (ref 37–54)
EGFRCR SERPLBLD CKD-EPI 2021: 109.8 ML/MIN/1.73
EOSINOPHIL # BLD AUTO: 0.09 10*3/MM3 (ref 0–0.4)
EOSINOPHIL NFR BLD AUTO: 1.4 % (ref 0.3–6.2)
ERYTHROCYTE [DISTWIDTH] IN BLOOD BY AUTOMATED COUNT: 14 % (ref 12.3–15.4)
GLUCOSE SERPL-MCNC: 92 MG/DL (ref 65–99)
HBA1C MFR BLD: 5.5 % (ref 4.8–5.6)
HCT VFR BLD AUTO: 47.4 % (ref 37.5–51)
HGB BLD-MCNC: 15 G/DL (ref 13–17.7)
IMM GRANULOCYTES # BLD AUTO: 0.01 10*3/MM3 (ref 0–0.05)
IMM GRANULOCYTES NFR BLD AUTO: 0.2 % (ref 0–0.5)
LYMPHOCYTES # BLD AUTO: 1.13 10*3/MM3 (ref 0.7–3.1)
LYMPHOCYTES NFR BLD AUTO: 17.3 % (ref 19.6–45.3)
MCH RBC QN AUTO: 28.6 PG (ref 26.6–33)
MCHC RBC AUTO-ENTMCNC: 31.6 G/DL (ref 31.5–35.7)
MCV RBC AUTO: 90.3 FL (ref 79–97)
MONOCYTES # BLD AUTO: 0.68 10*3/MM3 (ref 0.1–0.9)
MONOCYTES NFR BLD AUTO: 10.4 % (ref 5–12)
NEUTROPHILS NFR BLD AUTO: 4.62 10*3/MM3 (ref 1.7–7)
NEUTROPHILS NFR BLD AUTO: 70.4 % (ref 42.7–76)
PLATELET # BLD AUTO: 155 10*3/MM3 (ref 140–450)
PMV BLD AUTO: 10.5 FL (ref 6–12)
POTASSIUM SERPL-SCNC: 3.5 MMOL/L (ref 3.5–5.2)
RBC # BLD AUTO: 5.25 10*6/MM3 (ref 4.14–5.8)
SODIUM SERPL-SCNC: 145 MMOL/L (ref 136–145)
WBC NRBC COR # BLD: 6.55 10*3/MM3 (ref 3.4–10.8)

## 2023-09-12 PROCEDURE — 83036 HEMOGLOBIN GLYCOSYLATED A1C: CPT

## 2023-09-12 PROCEDURE — 80048 BASIC METABOLIC PNL TOTAL CA: CPT

## 2023-09-12 PROCEDURE — 85025 COMPLETE CBC W/AUTO DIFF WBC: CPT

## 2023-09-12 PROCEDURE — 36415 COLL VENOUS BLD VENIPUNCTURE: CPT

## 2023-09-12 NOTE — PROGRESS NOTES
Dr Bullock called, patient was in his office for an eye exam and he noted patient had a myopic shift, he was having urinary frequency and he wanted patient tested for diabetes, his wife reports he has not been eating healthy.  I placed an order for BMP and A1C.

## 2023-10-17 DIAGNOSIS — J43.9 PULMONARY EMPHYSEMA, UNSPECIFIED EMPHYSEMA TYPE: ICD-10-CM

## 2023-10-17 RX ORDER — ALBUTEROL SULFATE 90 UG/1
2 AEROSOL, METERED RESPIRATORY (INHALATION) EVERY 6 HOURS PRN
Qty: 36 G | Refills: 5 | Status: SHIPPED | OUTPATIENT
Start: 2023-10-17

## 2023-10-17 NOTE — TELEPHONE ENCOUNTER
Caller: [facility] OWENS    Relationship: SELF    Best call back number: [phone]    Requested Prescriptions:   Requested Prescriptions     Pending Prescriptions Disp Refills    albuterol sulfate  (90 Base) MCG/ACT inhaler 36 g 5     Sig: Inhale 2 puffs Every 6 (Six) Hours As Needed for Wheezing or Shortness of Air. for wheezing        Pharmacy where request should be sent: [facility]Y #[id number] - [place], [place] - [address] - [phone] [facility] [phone] FX     Last office visit with prescribing clinician: 2/27/2023   Last telemedicine visit with prescribing clinician: Visit date not found   Next office visit with prescribing clinician: 2/22/2024     Additional details provided by patient:     Does the patient have less than a 3 day supply:  [x] Yes  [] No    Would you like a call back once the refill request has been completed: [x] Yes [] No    If the office needs to give you a call back, can they leave a voicemail: [x] Yes [] No    Vani Lynch Rep   10/17/23 11:30 EDT

## 2023-11-06 ENCOUNTER — TELEPHONE (OUTPATIENT)
Dept: PULMONOLOGY | Facility: CLINIC | Age: 63
End: 2023-11-06

## 2023-11-06 NOTE — TELEPHONE ENCOUNTER
Caller: TERRA    Relationship to patient: PREMIER    Best call back number: [phone]    Patient is needing: NEEDING LAST OFFICE NOTES FOR VENTILATOR CONTINUATION

## 2023-11-21 DIAGNOSIS — J43.9 PULMONARY EMPHYSEMA, UNSPECIFIED EMPHYSEMA TYPE: ICD-10-CM

## 2023-11-22 RX ORDER — ALBUTEROL SULFATE 90 UG/1
AEROSOL, METERED RESPIRATORY (INHALATION)
Refills: 11 | OUTPATIENT
Start: 2023-11-22

## 2023-11-25 ENCOUNTER — HOSPITAL ENCOUNTER (EMERGENCY)
Facility: HOSPITAL | Age: 63
Discharge: HOME | End: 2023-11-25
Attending: EMERGENCY MEDICINE
Payer: MEDICAID

## 2023-11-25 ENCOUNTER — APPOINTMENT (OUTPATIENT)
Dept: GENERAL RADIOLOGY | Facility: HOSPITAL | Age: 63
End: 2023-11-25
Payer: MEDICAID

## 2023-11-25 VITALS
WEIGHT: 139.11 LBS | HEIGHT: 67 IN | HEART RATE: 76 BPM | SYSTOLIC BLOOD PRESSURE: 116 MMHG | BODY MASS INDEX: 21.83 KG/M2 | TEMPERATURE: 98.8 F | DIASTOLIC BLOOD PRESSURE: 64 MMHG | RESPIRATION RATE: 17 BRPM | OXYGEN SATURATION: 93 %

## 2023-11-25 DIAGNOSIS — I50.9 ACUTE ON CHRONIC CONGESTIVE HEART FAILURE, UNSPECIFIED HEART FAILURE TYPE: Primary | ICD-10-CM

## 2023-11-25 DIAGNOSIS — J44.1 COPD EXACERBATION: ICD-10-CM

## 2023-11-25 LAB
ALBUMIN SERPL-MCNC: 3.9 G/DL (ref 3.5–5.2)
ALBUMIN/GLOB SERPL: 1.8 G/DL
ALP SERPL-CCNC: 152 U/L (ref 39–117)
ALT SERPL W P-5'-P-CCNC: 14 U/L (ref 1–41)
ANION GAP SERPL CALCULATED.3IONS-SCNC: 2.5 MMOL/L (ref 5–15)
AST SERPL-CCNC: 33 U/L (ref 1–40)
BASOPHILS # BLD AUTO: 0.03 10*3/MM3 (ref 0–0.2)
BASOPHILS NFR BLD AUTO: 0.5 % (ref 0–1.5)
BILIRUB SERPL-MCNC: 0.8 MG/DL (ref 0–1.2)
BUN SERPL-MCNC: 19 MG/DL (ref 8–23)
BUN/CREAT SERPL: 27.1 (ref 7–25)
CALCIUM SPEC-SCNC: 9.3 MG/DL (ref 8.6–10.5)
CHLORIDE SERPL-SCNC: 96 MMOL/L (ref 98–107)
CO2 SERPL-SCNC: 43.5 MMOL/L (ref 22–29)
CREAT SERPL-MCNC: 0.7 MG/DL (ref 0.76–1.27)
DEPRECATED RDW RBC AUTO: 52 FL (ref 37–54)
EGFRCR SERPLBLD CKD-EPI 2021: 104.8 ML/MIN/1.73
EOSINOPHIL # BLD AUTO: 0.02 10*3/MM3 (ref 0–0.4)
EOSINOPHIL NFR BLD AUTO: 0.3 % (ref 0.3–6.2)
ERYTHROCYTE [DISTWIDTH] IN BLOOD BY AUTOMATED COUNT: 15.1 % (ref 12.3–15.4)
FLUAV SUBTYP SPEC NAA+PROBE: NOT DETECTED
FLUBV RNA ISLT QL NAA+PROBE: NOT DETECTED
GLOBULIN UR ELPH-MCNC: 2.2 GM/DL
GLUCOSE SERPL-MCNC: 95 MG/DL (ref 65–99)
HCT VFR BLD AUTO: 48.1 % (ref 37.5–51)
HGB BLD-MCNC: 14.3 G/DL (ref 13–17.7)
HOLD SPECIMEN: NORMAL
HOLD SPECIMEN: NORMAL
IMM GRANULOCYTES # BLD AUTO: 0.02 10*3/MM3 (ref 0–0.05)
IMM GRANULOCYTES NFR BLD AUTO: 0.3 % (ref 0–0.5)
LYMPHOCYTES # BLD AUTO: 0.71 10*3/MM3 (ref 0.7–3.1)
LYMPHOCYTES NFR BLD AUTO: 11.6 % (ref 19.6–45.3)
MCH RBC QN AUTO: 27.7 PG (ref 26.6–33)
MCHC RBC AUTO-ENTMCNC: 29.7 G/DL (ref 31.5–35.7)
MCV RBC AUTO: 93.2 FL (ref 79–97)
MONOCYTES # BLD AUTO: 0.66 10*3/MM3 (ref 0.1–0.9)
MONOCYTES NFR BLD AUTO: 10.8 % (ref 5–12)
NEUTROPHILS NFR BLD AUTO: 4.67 10*3/MM3 (ref 1.7–7)
NEUTROPHILS NFR BLD AUTO: 76.5 % (ref 42.7–76)
NRBC BLD AUTO-RTO: 0 /100 WBC (ref 0–0.2)
NT-PROBNP SERPL-MCNC: 6288 PG/ML (ref 0–900)
PLATELET # BLD AUTO: 126 10*3/MM3 (ref 140–450)
PMV BLD AUTO: 11.2 FL (ref 6–12)
POTASSIUM SERPL-SCNC: 4 MMOL/L (ref 3.5–5.2)
PROT SERPL-MCNC: 6.1 G/DL (ref 6–8.5)
QT INTERVAL: 413 MS
QTC INTERVAL: 429 MS
RBC # BLD AUTO: 5.16 10*6/MM3 (ref 4.14–5.8)
RSV RNA NPH QL NAA+NON-PROBE: NOT DETECTED
SARS-COV-2 RNA RESP QL NAA+PROBE: NOT DETECTED
SODIUM SERPL-SCNC: 142 MMOL/L (ref 136–145)
TROPONIN T SERPL HS-MCNC: 19 NG/L
WBC NRBC COR # BLD AUTO: 6.11 10*3/MM3 (ref 3.4–10.8)
WHOLE BLOOD HOLD COAG: NORMAL
WHOLE BLOOD HOLD SPECIMEN: NORMAL

## 2023-11-25 PROCEDURE — 87637 SARSCOV2&INF A&B&RSV AMP PRB: CPT | Performed by: EMERGENCY MEDICINE

## 2023-11-25 PROCEDURE — 94799 UNLISTED PULMONARY SVC/PX: CPT

## 2023-11-25 PROCEDURE — 85025 COMPLETE CBC W/AUTO DIFF WBC: CPT | Performed by: EMERGENCY MEDICINE

## 2023-11-25 PROCEDURE — 83880 ASSAY OF NATRIURETIC PEPTIDE: CPT | Performed by: EMERGENCY MEDICINE

## 2023-11-25 PROCEDURE — 80053 COMPREHEN METABOLIC PANEL: CPT | Performed by: EMERGENCY MEDICINE

## 2023-11-25 PROCEDURE — 99284 EMERGENCY DEPT VISIT MOD MDM: CPT

## 2023-11-25 PROCEDURE — 71045 X-RAY EXAM CHEST 1 VIEW: CPT

## 2023-11-25 PROCEDURE — 25010000002 FUROSEMIDE PER 20 MG: Performed by: EMERGENCY MEDICINE

## 2023-11-25 PROCEDURE — 94761 N-INVAS EAR/PLS OXIMETRY MLT: CPT

## 2023-11-25 PROCEDURE — 93005 ELECTROCARDIOGRAM TRACING: CPT | Performed by: EMERGENCY MEDICINE

## 2023-11-25 PROCEDURE — 84484 ASSAY OF TROPONIN QUANT: CPT | Performed by: EMERGENCY MEDICINE

## 2023-11-25 PROCEDURE — 96374 THER/PROPH/DIAG INJ IV PUSH: CPT

## 2023-11-25 PROCEDURE — 96375 TX/PRO/DX INJ NEW DRUG ADDON: CPT

## 2023-11-25 PROCEDURE — 93005 ELECTROCARDIOGRAM TRACING: CPT

## 2023-11-25 PROCEDURE — 25010000002 METHYLPREDNISOLONE PER 125 MG: Performed by: EMERGENCY MEDICINE

## 2023-11-25 PROCEDURE — 94640 AIRWAY INHALATION TREATMENT: CPT

## 2023-11-25 RX ORDER — METHYLPREDNISOLONE SODIUM SUCCINATE 125 MG/2ML
125 INJECTION, POWDER, LYOPHILIZED, FOR SOLUTION INTRAMUSCULAR; INTRAVENOUS ONCE
Status: COMPLETED | OUTPATIENT
Start: 2023-11-25 | End: 2023-11-25

## 2023-11-25 RX ORDER — SODIUM CHLORIDE 0.9 % (FLUSH) 0.9 %
10 SYRINGE (ML) INJECTION AS NEEDED
Status: DISCONTINUED | OUTPATIENT
Start: 2023-11-25 | End: 2023-11-25 | Stop reason: HOSPADM

## 2023-11-25 RX ORDER — IPRATROPIUM BROMIDE AND ALBUTEROL SULFATE 2.5; .5 MG/3ML; MG/3ML
3 SOLUTION RESPIRATORY (INHALATION) ONCE
Status: COMPLETED | OUTPATIENT
Start: 2023-11-25 | End: 2023-11-25

## 2023-11-25 RX ORDER — FUROSEMIDE 10 MG/ML
40 INJECTION INTRAMUSCULAR; INTRAVENOUS ONCE
Status: COMPLETED | OUTPATIENT
Start: 2023-11-25 | End: 2023-11-25

## 2023-11-25 RX ORDER — PREDNISONE 50 MG/1
50 TABLET ORAL DAILY
Qty: 5 TABLET | Refills: 0 | Status: SHIPPED | OUTPATIENT
Start: 2023-11-25 | End: 2023-11-30

## 2023-11-25 RX ORDER — ALBUTEROL SULFATE 90 UG/1
2 AEROSOL, METERED RESPIRATORY (INHALATION) EVERY 4 HOURS PRN
Qty: 18 G | Refills: 0 | Status: ON HOLD | OUTPATIENT
Start: 2023-11-25

## 2023-11-25 RX ADMIN — IPRATROPIUM BROMIDE AND ALBUTEROL SULFATE 3 ML: .5; 3 SOLUTION RESPIRATORY (INHALATION) at 12:33

## 2023-11-25 RX ADMIN — FUROSEMIDE 40 MG: 10 INJECTION, SOLUTION INTRAMUSCULAR; INTRAVENOUS at 13:01

## 2023-11-25 RX ADMIN — METHYLPREDNISOLONE SODIUM SUCCINATE 125 MG: 125 INJECTION INTRAMUSCULAR; INTRAVENOUS at 13:01

## 2023-11-25 NOTE — DISCHARGE INSTRUCTIONS
Return to emergency department immediately for worsening of symptoms.  Take your diuretic as prescribed.

## 2023-11-25 NOTE — Clinical Note
[facility] EMERGENCY ROOM  [address]  [place] [address]  Phone: [phone]    Sumeet Gomes was seen and treated in our emergency department on 11/25/2023.  He may return to work on 11/26/2023.         Thank you for choosing [facility].    Tyron Copeland MD

## 2023-11-25 NOTE — ED PROVIDER NOTES
Time: 12:08 PM EST  Date of encounter:  2023  Independent Historian/Clinical History and Information was obtained by:   Patient    History is limited by: N/A    Chief Complaint: Shortness of breath      History of Present Illness:  Patient is a 62 y.o. year old male who presents to the emergency department for evaluation of difficulty breathing.  Family states he is really been worse for the past 3 to 4 weeks but the past week even more so.  Patient complains of increasing congestion.  Afebrile.  Family notes that he has been smoking more and not doing his breathing exercises.  When off oxygen he will have desaturations to the 50s which is not normal for him.  On oxygen 3 L nasal cannula he maintain normal oxygen saturations per their report.  Patient denies chest pain.    HPI    Patient Care Team  Primary Care Provider: Mirtha Alexander APRN    Past Medical History:     No Known Allergies  Past Medical History:   Diagnosis Date    COPD (chronic obstructive pulmonary disease)     Coronary artery disease     Diastolic heart failure     Hyperlipidemia     Hypertension     Pulmonary hypertension      Past Surgical History:   Procedure Laterality Date    BACK SURGERY      BRONCHOSCOPY N/A 2022    Procedure: BRONCHOSCOPY WITH BRONCHOALVEOLAR LAVAGE AND BRONCHIAL WASHINGS;  Surgeon: Ulices Moya MD;  Location: [facility] ENDOSCOPY;  Service: Pulmonary;  Laterality: N/A;  MUCUS PLUGGING, COPD EXACERBATION    BRONCHOSCOPY      CARDIAC CATHETERIZATION N/A 2022    Procedure: Right Heart Cath;  Surgeon: Drew Rodriguez MD;  Location: [facility] CATH INVASIVE LOCATION;  Service: Cardiovascular;  Laterality: N/A;    OTHER SURGICAL HISTORY      knee     OTHER SURGICAL HISTORY      shoulder, rotator cuff    SHOULDER SURGERY Right 10/03/2022     Family History   Problem Relation Age of Onset    Asthma Mother     Emphysema Mother             Stroke Mother        Home Medications:  Prior to Admission  medications    Medication Sig Start Date End Date Taking? Authorizing Provider   albuterol sulfate  (90 Base) MCG/ACT inhaler Inhale 2 puffs Every 6 (Six) Hours As Needed for Wheezing or Shortness of Air. for wheezing 10/17/23   Ulices Moya MD   arformoterol (Brovana) 15 MCG/2ML nebulizer solution Take 2 mL by nebulization 2 (Two) Times a Day. 2/27/23   Ulices Moya MD   budesonide (Pulmicort) 0.5 MG/2ML nebulizer solution Take 2 mL by nebulization Daily. 2/27/23   Ulices Moya MD   furosemide (LASIX) 20 MG tablet Take 1 tablet by mouth Daily. 8/2/23   Mirtha Alexander APRN   ipratropium-albuterol (DUO-NEB) 0.5-2.5 mg/3 ml nebulizer Take 3 mL by nebulization 4 (Four) Times a Day. 5/26/23   Julia Chin APRN   itraconazole (Sporanox) 100 MG capsule Take 2 capsules by mouth 2 (Two) Times a Day. 5/26/23   Julia Chin APRN   metoprolol tartrate (LOPRESSOR) 25 MG tablet Take 0.5 tablets by mouth Every 12 (Twelve) Hours. 8/2/23   Mirtha Alexander APRN   NON FORMULARY V PAP    ProviderTawnya MD   revefenacin (Yupelri) 175 MCG/3ML nebulizer solution Take 3 mL by nebulization Daily. 6/5/23   Julia Chin APRN        Social History:   Social History     Tobacco Use    Smoking status: Every Day     Packs/day: 0.25     Years: 47.00     Additional pack years: 0.00     Total pack years: 11.75     Types: Cigarettes     Start date: 1/1/1975    Smokeless tobacco: Never    Tobacco comments:     Pt stated he is smoking 3 cigarettes per day   Vaping Use    Vaping Use: Never used   Substance Use Topics    Alcohol use: Not Currently    Drug use: Never         Review of Systems:  Review of Systems   Constitutional:  Positive for fatigue. Negative for chills and fever.   HENT:  Positive for congestion. Negative for rhinorrhea and sore throat.    Eyes:  Negative for photophobia.   Respiratory:  Positive for cough and shortness of breath. Negative for apnea and chest tightness.    Cardiovascular:   "Negative for chest pain and palpitations.   Gastrointestinal:  Negative for abdominal pain, diarrhea, nausea and vomiting.   Endocrine: Negative.    Genitourinary:  Negative for difficulty urinating and dysuria.   Musculoskeletal:  Negative for back pain, joint swelling and myalgias.   Skin:  Negative for color change and wound.   Allergic/Immunologic: Negative.    Neurological:  Positive for weakness. Negative for seizures and headaches.   Psychiatric/Behavioral: Negative.     All other systems reviewed and are negative.       Physical Exam:  /64 (BP Location: Right arm, Patient Position: Lying)   Pulse 76   Temp 98.8 °F (37.1 °C) (Oral)   Resp 17   Ht 170.2 cm (67\")   Wt 63.1 kg (139 lb 1.8 oz)   SpO2 93%   BMI 21.79 kg/m²     Physical Exam  Vitals and nursing note reviewed.   Constitutional:       General: He is awake.      Appearance: Normal appearance. He is well-developed.   HENT:      Head: Normocephalic and atraumatic.      Nose: Nose normal.      Mouth/Throat:      Mouth: Mucous membranes are moist.   Eyes:      Extraocular Movements: Extraocular movements intact.      Pupils: Pupils are equal, round, and reactive to light.   Cardiovascular:      Rate and Rhythm: Normal rate and regular rhythm.      Heart sounds: Normal heart sounds.   Pulmonary:      Effort: Pulmonary effort is normal. No respiratory distress.      Breath sounds: Examination of the right-upper field reveals wheezing. Examination of the left-upper field reveals wheezing. Examination of the right-middle field reveals wheezing. Examination of the left-middle field reveals wheezing. Wheezing present. No rhonchi or rales.   Abdominal:      General: Bowel sounds are normal.      Palpations: Abdomen is soft.      Tenderness: There is no abdominal tenderness. There is no guarding or rebound.      Comments: No rigidity   Musculoskeletal:         General: No tenderness. Normal range of motion.      Cervical back: Normal range of motion " and neck supple.      Right lower leg: Edema present.      Left lower leg: Edema present.      Comments: Very mild pitting edema bilaterally   Skin:     General: Skin is warm and dry.      Coloration: Skin is not jaundiced.   Neurological:      General: No focal deficit present.      Mental Status: He is alert. Mental status is at baseline.   Psychiatric:         Mood and Affect: Mood normal.                  Procedures:  Procedures      Medical Decision Making:      Comorbidities that affect care:    COPD, hypertension, diastolic CHF, hyperlipidemia, pulmonary hypertension, CAD , thrombocytopenia    External Notes reviewed:    Previous Clinic Note: Cardiology office visit with Dr. Rodriguez on 6/27/2023 for diastolic heart failure, hypertension      The following orders were placed and all results were independently analyzed by me:  Orders Placed This Encounter   Procedures    COVID-19, FLU A/B, RSV PCR 1 HR TAT - Swab, Nasopharynx    XR Chest 1 View    Lambert Draw    Comprehensive Metabolic Panel    BNP    Single High Sensitivity Troponin T    CBC Auto Differential    NPO Diet NPO Type: Strict NPO    Undress & Gown    Continuous Pulse Oximetry    Vital Signs    Oxygen Therapy- Nasal Cannula; Titrate 1-6 LPM Per SpO2; 90 - 95%    ECG 12 Lead ED Triage Standing Order; SOA    Insert Peripheral IV    CBC & Differential    Green Top (Gel)    Lavender Top    Gold Top - SST    Light Blue Top       Medications Given in the Emergency Department:  Medications   sodium chloride 0.9 % flush 10 mL (has no administration in time range)   methylPREDNISolone sodium succinate (SOLU-Medrol) injection 125 mg (125 mg Intravenous Given 11/25/23 1301)   ipratropium-albuterol (DUO-NEB) nebulizer solution 3 mL (3 mL Nebulization Given 11/25/23 1233)   furosemide (LASIX) injection 40 mg (40 mg Intravenous Given 11/25/23 1301)        ED Course:    ED Course as of 11/25/23 1448   Sat Nov 25, 2023   1210 I have personally interpreted the EKG  today and it shows no evidence of any acute ischemia or heart arrhythmia.  Nonspecific changes unchanged from 11/30/2022 EKG [RP]   1448 Reevaluation: Patient stable and states he prefers to go home.  He is satting 95% on his home 3 L nasal cannula setting.  I did offer overnight observation/admission but he feels comfortable with the idea of going home. [RP]      ED Course User Index  [RP] Tyron Copeland MD       Labs:    Lab Results (last 24 hours)       Procedure Component Value Units Date/Time    CBC & Differential [[id number]]  (Abnormal) Collected: 11/25/23 1149    Specimen: Blood Updated: 11/25/23 1223    Narrative:      The following orders were created for panel order CBC & Differential.  Procedure                               Abnormality         Status                     ---------                               -----------         ------                     CBC Auto Differential[[id number]]        Abnormal            Final result               Scan Slide[[id number]]                                                                    Please view results for these tests on the individual orders.    Comprehensive Metabolic Panel [[id number]]  (Abnormal) Collected: 11/25/23 1149    Specimen: Blood Updated: 11/25/23 1215     Glucose 95 mg/dL      BUN 19 mg/dL      Creatinine 0.70 mg/dL      Sodium 142 mmol/L      Potassium 4.0 mmol/L      Chloride 96 mmol/L      CO2 43.5 mmol/L      Calcium 9.3 mg/dL      Total Protein 6.1 g/dL      Albumin 3.9 g/dL      ALT (SGPT) 14 U/L      AST (SGOT) 33 U/L      Alkaline Phosphatase 152 U/L      Total Bilirubin 0.8 mg/dL      Globulin 2.2 gm/dL      A/G Ratio 1.8 g/dL      BUN/Creatinine Ratio 27.1     Anion Gap 2.5 mmol/L      eGFR 104.8 mL/min/1.73     Narrative:      GFR Normal >60  Chronic Kidney Disease <60  Kidney Failure <15      BNP [[id number]]  (Abnormal) Collected: 11/25/23 1149    Specimen: Blood Updated: 11/25/23 1213     proBNP 6,288.0 pg/mL     Narrative:       This assay is used as an aid in the diagnosis of individuals suspected of having heart failure. It can be used as an aid in the diagnosis of acute decompensated heart failure (ADHF) in patients presenting with signs and symptoms of ADHF to the emergency department (ED). In addition, NT-proBNP of <300 pg/mL indicates ADHF is not likely.    Age Range Result Interpretation  NT-proBNP Concentration (pg/mL:      <50             Positive            >450                   Gray                 300-450                    Negative             <300    50-75           Positive            >900                  Gray                300-900                  Negative            <300      >75             Positive            >1800                  Gray                300-1800                  Negative            <300    Single High Sensitivity Troponin T [[id number]]  (Normal) Collected: 11/25/23 1149    Specimen: Blood Updated: 11/25/23 1215     HS Troponin T 19 ng/L     Narrative:      High Sensitive Troponin T Reference Range:  <14.0 ng/L- Negative Female for AMI  <22.0 ng/L- Negative Male for AMI  >=14 - Abnormal Female indicating possible myocardial injury.  >=22 - Abnormal Male indicating possible myocardial injury.   Clinicians would have to utilize clinical acumen, EKG, Troponin, and serial changes to determine if it is an Acute Myocardial Infarction or myocardial injury due to an underlying chronic condition.         CBC Auto Differential [[id number]]  (Abnormal) Collected: 11/25/23 1149    Specimen: Blood Updated: 11/25/23 1223     WBC 6.11 10*3/mm3      RBC 5.16 10*6/mm3      Hemoglobin 14.3 g/dL      Hematocrit 48.1 %      MCV 93.2 fL      MCH 27.7 pg      MCHC 29.7 g/dL      RDW 15.1 %      RDW-SD 52.0 fl      MPV 11.2 fL      Platelets 126 10*3/mm3      Neutrophil % 76.5 %      Lymphocyte % 11.6 %      Monocyte % 10.8 %      Eosinophil % 0.3 %      Basophil % 0.5 %      Immature Grans % 0.3 %      Neutrophils, Absolute  4.67 10*3/mm3      Lymphocytes, Absolute 0.71 10*3/mm3      Monocytes, Absolute 0.66 10*3/mm3      Eosinophils, Absolute 0.02 10*3/mm3      Basophils, Absolute 0.03 10*3/mm3      Immature Grans, Absolute 0.02 10*3/mm3      nRBC 0.0 /100 WBC     COVID-19, FLU A/B, RSV PCR 1 HR TAT - Swab, Nasopharynx [[id number]]  (Normal) Collected: 11/25/23 1236    Specimen: Swab from Nasopharynx Updated: 11/25/23 1320     COVID19 Not Detected     Influenza A PCR Not Detected     Influenza B PCR Not Detected     RSV, PCR Not Detected    Narrative:      Fact sheet for providers: https://www.fda.gov/media/859931/download    Fact sheet for patients: https://www.fda.gov/media/044945/download    Test performed by PCR.             Imaging:    XR Chest 1 View    Result Date: 11/25/2023  PROCEDURE: XR CHEST 1 VW  COMPARISON: [facility] MARANDA PAGE, XR CHEST 2 VW, 2/01/2023, 15:06.   INDICATIONS: Shortness of Breath  FINDINGS:   The lungs are well-expanded. The heart and pulmonary vasculature are within normal limits. No pleural effusions are identified. There are no active appearing infiltrates.  There are chronic appearing changes in the lung fields.  IMPRESSION: No active disease.  PONCE ZAPATA MD       Electronically Signed and Approved By: PONCE ZAPATA MD on 11/25/2023 at 12:00                Differential Diagnosis and Discussion:    Dyspnea: Differential diagnosis includes but is not limited to metabolic acidosis, neurological disorders, psychogenic, asthma, pneumothorax, upper airway obstruction, COPD, pneumonia, noncardiogenic pulmonary edema, interstitial lung disease, anemia, congestive heart failure, and pulmonary embolism    All labs were reviewed and interpreted by me.  All X-rays impressions were independently interpreted by me.  EKG was interpreted by me.    MDM     Amount and/or Complexity of Data Reviewed  Clinical lab tests: reviewed  Tests in the medicine section of CPT®: reviewed                 Patient  Care Considerations:    PERC: I used the PERC score to risk stratify the patient for PE and a CT of the chest was considered but ultimately not indicated in today's visit.      Consultants/Shared Management Plan:    None    Social Determinants of Health:    Patient is independent, reliable, and has access to care.       Disposition and Care Coordination:    Discharged: I considered escalation of care by admitting this patient to the hospital, however the patient has improved and is suitable and stable for discharge.    I have explained the patient´s condition, diagnoses and treatment plan based on the information available to me at this time. I have answered questions and addressed any concerns. The patient has a good  understanding of the patient´s diagnosis, condition, and treatment plan as can be expected at this point. The vital signs have been stable. The patient´s condition is stable and appropriate for discharge from the emergency department.      The patient will pursue further outpatient evaluation with the primary care physician or other designated or consulting physician as outlined in the discharge instructions. They are agreeable to this plan of care and follow-up instructions have been explained in detail. The patient has received these instructions in written format and have expressed an understanding of the discharge instructions. The patient is aware that any significant change in condition or worsening of symptoms should prompt an immediate return to this or the closest emergency department or call to 911.    Final diagnoses:   Acute on chronic congestive heart failure, unspecified heart failure type   COPD exacerbation        ED Disposition       ED Disposition   Discharge    Condition   Stable    Comment   --               This medical record created using voice recognition software.             Tyron Copeland MD  11/25/23 4437

## 2023-11-27 ENCOUNTER — TELEPHONE (OUTPATIENT)
Dept: FAMILY MEDICINE CLINIC | Age: 63
End: 2023-11-27
Payer: MEDICAID

## 2023-12-04 ENCOUNTER — HOSPITAL ENCOUNTER (INPATIENT)
Facility: HOSPITAL | Age: 63
LOS: 5 days | Discharge: HOME | DRG: 190 | End: 2023-12-09
Attending: EMERGENCY MEDICINE | Admitting: INTERNAL MEDICINE
Payer: MEDICAID

## 2023-12-04 ENCOUNTER — OFFICE VISIT (OUTPATIENT)
Dept: PULMONOLOGY | Facility: CLINIC | Age: 63
End: 2023-12-04
Payer: MEDICAID

## 2023-12-04 ENCOUNTER — APPOINTMENT (OUTPATIENT)
Dept: GENERAL RADIOLOGY | Facility: HOSPITAL | Age: 63
DRG: 190 | End: 2023-12-04
Payer: MEDICAID

## 2023-12-04 VITALS
HEART RATE: 57 BPM | WEIGHT: 142.6 LBS | HEIGHT: 67 IN | TEMPERATURE: 97.8 F | RESPIRATION RATE: 20 BRPM | DIASTOLIC BLOOD PRESSURE: 66 MMHG | BODY MASS INDEX: 22.38 KG/M2 | OXYGEN SATURATION: 90 % | SYSTOLIC BLOOD PRESSURE: 134 MMHG

## 2023-12-04 DIAGNOSIS — G47.33 OSA (OBSTRUCTIVE SLEEP APNEA): ICD-10-CM

## 2023-12-04 DIAGNOSIS — J44.1 COPD EXACERBATION: Primary | ICD-10-CM

## 2023-12-04 DIAGNOSIS — B44.9 ASPERGILLUS: ICD-10-CM

## 2023-12-04 DIAGNOSIS — R05.9 COUGH, UNSPECIFIED TYPE: ICD-10-CM

## 2023-12-04 DIAGNOSIS — R53.83 FATIGUE, UNSPECIFIED TYPE: ICD-10-CM

## 2023-12-04 DIAGNOSIS — R05.3 CHRONIC COUGH: ICD-10-CM

## 2023-12-04 DIAGNOSIS — I10 ESSENTIAL HYPERTENSION: ICD-10-CM

## 2023-12-04 DIAGNOSIS — J96.11 CHRONIC RESPIRATORY FAILURE WITH HYPOXIA AND HYPERCAPNIA: Primary | ICD-10-CM

## 2023-12-04 DIAGNOSIS — Z71.6 ENCOUNTER FOR SMOKING CESSATION COUNSELING: ICD-10-CM

## 2023-12-04 DIAGNOSIS — R06.2 WHEEZING: ICD-10-CM

## 2023-12-04 DIAGNOSIS — J44.1 CHRONIC OBSTRUCTIVE PULMONARY DISEASE WITH ACUTE EXACERBATION: ICD-10-CM

## 2023-12-04 DIAGNOSIS — E43 SEVERE MALNUTRITION: ICD-10-CM

## 2023-12-04 DIAGNOSIS — Z99.81 ON HOME OXYGEN THERAPY: ICD-10-CM

## 2023-12-04 DIAGNOSIS — Z72.0 TOBACCO ABUSE: ICD-10-CM

## 2023-12-04 DIAGNOSIS — R06.00 DYSPNEA, UNSPECIFIED TYPE: ICD-10-CM

## 2023-12-04 DIAGNOSIS — I27.20 PULMONARY HYPERTENSION: ICD-10-CM

## 2023-12-04 DIAGNOSIS — Z78.9 DECREASED ACTIVITIES OF DAILY LIVING (ADL): ICD-10-CM

## 2023-12-04 DIAGNOSIS — J96.22 ACUTE ON CHRONIC RESPIRATORY FAILURE WITH HYPERCAPNIA: ICD-10-CM

## 2023-12-04 DIAGNOSIS — R26.2 DIFFICULTY WALKING: ICD-10-CM

## 2023-12-04 DIAGNOSIS — J44.89 ASTHMA-COPD OVERLAP SYNDROME: ICD-10-CM

## 2023-12-04 DIAGNOSIS — J43.9 PULMONARY EMPHYSEMA, UNSPECIFIED EMPHYSEMA TYPE: ICD-10-CM

## 2023-12-04 DIAGNOSIS — R13.12 OROPHARYNGEAL DYSPHAGIA: ICD-10-CM

## 2023-12-04 DIAGNOSIS — J96.12 CHRONIC RESPIRATORY FAILURE WITH HYPOXIA AND HYPERCAPNIA: Primary | ICD-10-CM

## 2023-12-04 DIAGNOSIS — J96.21 ACUTE ON CHRONIC RESPIRATORY FAILURE WITH HYPOXIA: ICD-10-CM

## 2023-12-04 PROBLEM — J96.01 ACUTE RESPIRATORY FAILURE WITH HYPOXIA: Status: ACTIVE | Noted: 2023-12-04

## 2023-12-04 LAB
ALBUMIN SERPL-MCNC: 4.3 G/DL (ref 3.5–5.2)
ALBUMIN/GLOB SERPL: 1.7 G/DL
ALP SERPL-CCNC: 189 U/L (ref 39–117)
ALT SERPL W P-5'-P-CCNC: 37 U/L (ref 1–41)
ANION GAP SERPL CALCULATED.3IONS-SCNC: 10.1 MMOL/L (ref 5–15)
ARTERIAL PATENCY WRIST A: POSITIVE
AST SERPL-CCNC: 33 U/L (ref 1–40)
BASE EXCESS BLDA CALC-SCNC: 7.9 MMOL/L (ref -2–2)
BASOPHILS # BLD AUTO: 0.01 10*3/MM3 (ref 0–0.2)
BASOPHILS NFR BLD AUTO: 0.1 % (ref 0–1.5)
BDY SITE: ABNORMAL
BILIRUB SERPL-MCNC: 1.4 MG/DL (ref 0–1.2)
BUN SERPL-MCNC: 19 MG/DL (ref 8–23)
BUN/CREAT SERPL: 31.1 (ref 7–25)
CALCIUM SPEC-SCNC: 9.6 MG/DL (ref 8.6–10.5)
CHLORIDE SERPL-SCNC: 97 MMOL/L (ref 98–107)
CO2 SERPL-SCNC: 36.9 MMOL/L (ref 22–29)
COHGB MFR BLD: 3 % (ref 0–1.5)
CREAT SERPL-MCNC: 0.61 MG/DL (ref 0.76–1.27)
DEPRECATED RDW RBC AUTO: 50.4 FL (ref 37–54)
EGFRCR SERPLBLD CKD-EPI 2021: 109.3 ML/MIN/1.73
EOSINOPHIL # BLD AUTO: 0.01 10*3/MM3 (ref 0–0.4)
EOSINOPHIL NFR BLD AUTO: 0.1 % (ref 0.3–6.2)
ERYTHROCYTE [DISTWIDTH] IN BLOOD BY AUTOMATED COUNT: 14.6 % (ref 12.3–15.4)
ETHANOL BLD-MCNC: <10 MG/DL (ref 0–10)
ETHANOL UR QL: <0.01 %
FHHB: 3.5 % (ref 0–5)
FLUAV SUBTYP SPEC NAA+PROBE: NOT DETECTED
FLUBV RNA ISLT QL NAA+PROBE: NOT DETECTED
GAS FLOW AIRWAY: 2 LPM
GLOBULIN UR ELPH-MCNC: 2.5 GM/DL
GLUCOSE SERPL-MCNC: 101 MG/DL (ref 65–99)
HCO3 BLDA-SCNC: 37.7 MMOL/L (ref 22–26)
HCT VFR BLD AUTO: 53.1 % (ref 37.5–51)
HGB BLD-MCNC: 16 G/DL (ref 13–17.7)
HGB BLDA-MCNC: 15.4 G/DL (ref 13.8–16.4)
HOLD SPECIMEN: NORMAL
HOLD SPECIMEN: NORMAL
IMM GRANULOCYTES # BLD AUTO: 0.1 10*3/MM3 (ref 0–0.05)
IMM GRANULOCYTES NFR BLD AUTO: 1 % (ref 0–0.5)
INHALED O2 CONCENTRATION: 28 %
LACTATE BLDA-SCNC: ABNORMAL MMOL/L
LYMPHOCYTES # BLD AUTO: 0.42 10*3/MM3 (ref 0.7–3.1)
LYMPHOCYTES NFR BLD AUTO: 4.1 % (ref 19.6–45.3)
MCH RBC QN AUTO: 27.9 PG (ref 26.6–33)
MCHC RBC AUTO-ENTMCNC: 30.1 G/DL (ref 31.5–35.7)
MCV RBC AUTO: 92.7 FL (ref 79–97)
METHGB BLD QL: 0.1 % (ref 0–1.5)
MODALITY: ABNORMAL
MONOCYTES # BLD AUTO: 0.53 10*3/MM3 (ref 0.1–0.9)
MONOCYTES NFR BLD AUTO: 5.2 % (ref 5–12)
NEUTROPHILS NFR BLD AUTO: 89.5 % (ref 42.7–76)
NEUTROPHILS NFR BLD AUTO: 9.08 10*3/MM3 (ref 1.7–7)
NRBC BLD AUTO-RTO: 0 /100 WBC (ref 0–0.2)
NT-PROBNP SERPL-MCNC: 4590 PG/ML (ref 0–900)
OXYHGB MFR BLDV: 93.4 % (ref 94–99)
PCO2 BLDA: 77.7 MM HG (ref 35–45)
PH BLDA: 7.3 PH UNITS (ref 7.35–7.45)
PLATELET # BLD AUTO: 134 10*3/MM3 (ref 140–450)
PMV BLD AUTO: 10.7 FL (ref 6–12)
PO2 BLD: 325 MM[HG] (ref 0–500)
PO2 BLDA: 91 MM HG (ref 80–100)
POTASSIUM SERPL-SCNC: 4.4 MMOL/L (ref 3.5–5.2)
PROCALCITONIN SERPL-MCNC: 0.05 NG/ML (ref 0–0.25)
PROT SERPL-MCNC: 6.8 G/DL (ref 6–8.5)
RBC # BLD AUTO: 5.73 10*6/MM3 (ref 4.14–5.8)
RSV RNA NPH QL NAA+NON-PROBE: NOT DETECTED
SAO2 % BLDCOA: 96.4 % (ref 95–99)
SARS-COV-2 RNA RESP QL NAA+PROBE: NOT DETECTED
SODIUM SERPL-SCNC: 144 MMOL/L (ref 136–145)
TROPONIN T SERPL HS-MCNC: 23 NG/L
WBC NRBC COR # BLD AUTO: 10.15 10*3/MM3 (ref 3.4–10.8)
WHOLE BLOOD HOLD COAG: NORMAL
WHOLE BLOOD HOLD SPECIMEN: NORMAL

## 2023-12-04 PROCEDURE — 83050 HGB METHEMOGLOBIN QUAN: CPT | Performed by: EMERGENCY MEDICINE

## 2023-12-04 PROCEDURE — 80053 COMPREHEN METABOLIC PANEL: CPT | Performed by: EMERGENCY MEDICINE

## 2023-12-04 PROCEDURE — 71045 X-RAY EXAM CHEST 1 VIEW: CPT

## 2023-12-04 PROCEDURE — 82077 ASSAY SPEC XCP UR&BREATH IA: CPT | Performed by: EMERGENCY MEDICINE

## 2023-12-04 PROCEDURE — 83880 ASSAY OF NATRIURETIC PEPTIDE: CPT | Performed by: EMERGENCY MEDICINE

## 2023-12-04 PROCEDURE — 3078F DIAST BP <80 MM HG: CPT

## 2023-12-04 PROCEDURE — 25010000002 METHYLPREDNISOLONE PER 40 MG: Performed by: INTERNAL MEDICINE

## 2023-12-04 PROCEDURE — 99222 1ST HOSP IP/OBS MODERATE 55: CPT | Performed by: STUDENT IN AN ORGANIZED HEALTH CARE EDUCATION/TRAINING PROGRAM

## 2023-12-04 PROCEDURE — 87637 SARSCOV2&INF A&B&RSV AMP PRB: CPT | Performed by: EMERGENCY MEDICINE

## 2023-12-04 PROCEDURE — 94799 UNLISTED PULMONARY SVC/PX: CPT

## 2023-12-04 PROCEDURE — 99223 1ST HOSP IP/OBS HIGH 75: CPT | Performed by: INTERNAL MEDICINE

## 2023-12-04 PROCEDURE — 1159F MED LIST DOCD IN RCRD: CPT

## 2023-12-04 PROCEDURE — 93005 ELECTROCARDIOGRAM TRACING: CPT | Performed by: EMERGENCY MEDICINE

## 2023-12-04 PROCEDURE — 93005 ELECTROCARDIOGRAM TRACING: CPT

## 2023-12-04 PROCEDURE — 94761 N-INVAS EAR/PLS OXIMETRY MLT: CPT

## 2023-12-04 PROCEDURE — 94660 CPAP INITIATION&MGMT: CPT

## 2023-12-04 PROCEDURE — 36600 WITHDRAWAL OF ARTERIAL BLOOD: CPT | Performed by: EMERGENCY MEDICINE

## 2023-12-04 PROCEDURE — 99214 OFFICE O/P EST MOD 30 MIN: CPT

## 2023-12-04 PROCEDURE — 82375 ASSAY CARBOXYHB QUANT: CPT | Performed by: EMERGENCY MEDICINE

## 2023-12-04 PROCEDURE — 25810000003 SODIUM CHLORIDE 0.9 % SOLUTION: Performed by: INTERNAL MEDICINE

## 2023-12-04 PROCEDURE — 84484 ASSAY OF TROPONIN QUANT: CPT | Performed by: EMERGENCY MEDICINE

## 2023-12-04 PROCEDURE — 85025 COMPLETE CBC W/AUTO DIFF WBC: CPT

## 2023-12-04 PROCEDURE — 82805 BLOOD GASES W/O2 SATURATION: CPT | Performed by: EMERGENCY MEDICINE

## 2023-12-04 PROCEDURE — 94640 AIRWAY INHALATION TREATMENT: CPT

## 2023-12-04 PROCEDURE — 25010000002 ENOXAPARIN PER 10 MG: Performed by: INTERNAL MEDICINE

## 2023-12-04 PROCEDURE — 99285 EMERGENCY DEPT VISIT HI MDM: CPT

## 2023-12-04 PROCEDURE — 1160F RVW MEDS BY RX/DR IN RCRD: CPT

## 2023-12-04 PROCEDURE — 25010000002 METHYLPREDNISOLONE PER 125 MG: Performed by: EMERGENCY MEDICINE

## 2023-12-04 PROCEDURE — 3075F SYST BP GE 130 - 139MM HG: CPT

## 2023-12-04 PROCEDURE — 84145 PROCALCITONIN (PCT): CPT | Performed by: NURSE PRACTITIONER

## 2023-12-04 RX ORDER — SODIUM CHLORIDE 9 MG/ML
40 INJECTION, SOLUTION INTRAVENOUS AS NEEDED
Status: DISCONTINUED | OUTPATIENT
Start: 2023-12-04 | End: 2023-12-09 | Stop reason: HOSPADM

## 2023-12-04 RX ORDER — ARFORMOTEROL TARTRATE 15 UG/2ML
15 SOLUTION RESPIRATORY (INHALATION)
Status: DISCONTINUED | OUTPATIENT
Start: 2023-12-04 | End: 2023-12-09 | Stop reason: HOSPADM

## 2023-12-04 RX ORDER — IPRATROPIUM BROMIDE AND ALBUTEROL SULFATE 2.5; .5 MG/3ML; MG/3ML
3 SOLUTION RESPIRATORY (INHALATION) ONCE
Status: COMPLETED | OUTPATIENT
Start: 2023-12-04 | End: 2023-12-04

## 2023-12-04 RX ORDER — IPRATROPIUM BROMIDE AND ALBUTEROL SULFATE 2.5; .5 MG/3ML; MG/3ML
3 SOLUTION RESPIRATORY (INHALATION)
Status: DISCONTINUED | OUTPATIENT
Start: 2023-12-04 | End: 2023-12-09 | Stop reason: HOSPADM

## 2023-12-04 RX ORDER — NITROGLYCERIN 0.4 MG/1
0.4 TABLET SUBLINGUAL
Status: DISCONTINUED | OUTPATIENT
Start: 2023-12-04 | End: 2023-12-09 | Stop reason: HOSPADM

## 2023-12-04 RX ORDER — METHYLPREDNISOLONE SODIUM SUCCINATE 40 MG/ML
40 INJECTION, POWDER, LYOPHILIZED, FOR SOLUTION INTRAMUSCULAR; INTRAVENOUS EVERY 8 HOURS
Status: DISCONTINUED | OUTPATIENT
Start: 2023-12-04 | End: 2023-12-05

## 2023-12-04 RX ORDER — METHYLPREDNISOLONE SODIUM SUCCINATE 125 MG/2ML
125 INJECTION, POWDER, LYOPHILIZED, FOR SOLUTION INTRAMUSCULAR; INTRAVENOUS ONCE
Status: COMPLETED | OUTPATIENT
Start: 2023-12-04 | End: 2023-12-04

## 2023-12-04 RX ORDER — ACETAMINOPHEN 325 MG/1
650 TABLET ORAL EVERY 4 HOURS PRN
Status: DISCONTINUED | OUTPATIENT
Start: 2023-12-04 | End: 2023-12-09 | Stop reason: HOSPADM

## 2023-12-04 RX ORDER — SODIUM CHLORIDE 0.9 % (FLUSH) 0.9 %
10 SYRINGE (ML) INJECTION EVERY 12 HOURS SCHEDULED
Status: DISCONTINUED | OUTPATIENT
Start: 2023-12-04 | End: 2023-12-09 | Stop reason: HOSPADM

## 2023-12-04 RX ORDER — BISACODYL 10 MG
10 SUPPOSITORY, RECTAL RECTAL DAILY PRN
Status: DISCONTINUED | OUTPATIENT
Start: 2023-12-04 | End: 2023-12-09 | Stop reason: HOSPADM

## 2023-12-04 RX ORDER — ONDANSETRON 2 MG/ML
4 INJECTION INTRAMUSCULAR; INTRAVENOUS EVERY 6 HOURS PRN
Status: DISCONTINUED | OUTPATIENT
Start: 2023-12-04 | End: 2023-12-09 | Stop reason: HOSPADM

## 2023-12-04 RX ORDER — ONDANSETRON 4 MG/1
4 TABLET, FILM COATED ORAL EVERY 6 HOURS PRN
Status: DISCONTINUED | OUTPATIENT
Start: 2023-12-04 | End: 2023-12-09 | Stop reason: HOSPADM

## 2023-12-04 RX ORDER — CHOLECALCIFEROL (VITAMIN D3) 125 MCG
5 CAPSULE ORAL NIGHTLY PRN
Status: DISCONTINUED | OUTPATIENT
Start: 2023-12-04 | End: 2023-12-09 | Stop reason: HOSPADM

## 2023-12-04 RX ORDER — AMOXICILLIN 250 MG
2 CAPSULE ORAL 2 TIMES DAILY
Status: DISCONTINUED | OUTPATIENT
Start: 2023-12-04 | End: 2023-12-09 | Stop reason: HOSPADM

## 2023-12-04 RX ORDER — FUROSEMIDE 20 MG/1
20 TABLET ORAL DAILY
Status: DISCONTINUED | OUTPATIENT
Start: 2023-12-04 | End: 2023-12-07

## 2023-12-04 RX ORDER — SODIUM CHLORIDE 0.9 % (FLUSH) 0.9 %
10 SYRINGE (ML) INJECTION AS NEEDED
Status: DISCONTINUED | OUTPATIENT
Start: 2023-12-04 | End: 2023-12-09 | Stop reason: HOSPADM

## 2023-12-04 RX ORDER — POLYETHYLENE GLYCOL 3350 17 G/17G
17 POWDER, FOR SOLUTION ORAL DAILY PRN
Status: DISCONTINUED | OUTPATIENT
Start: 2023-12-04 | End: 2023-12-09 | Stop reason: HOSPADM

## 2023-12-04 RX ORDER — ITRACONAZOLE 100 MG/1
200 CAPSULE ORAL 2 TIMES DAILY
Status: DISCONTINUED | OUTPATIENT
Start: 2023-12-04 | End: 2023-12-09

## 2023-12-04 RX ORDER — ACETAMINOPHEN 160 MG/5ML
650 SOLUTION ORAL EVERY 4 HOURS PRN
Status: DISCONTINUED | OUTPATIENT
Start: 2023-12-04 | End: 2023-12-09 | Stop reason: HOSPADM

## 2023-12-04 RX ORDER — IPRATROPIUM BROMIDE AND ALBUTEROL SULFATE 2.5; .5 MG/3ML; MG/3ML
3 SOLUTION RESPIRATORY (INHALATION) EVERY 4 HOURS PRN
Status: DISCONTINUED | OUTPATIENT
Start: 2023-12-04 | End: 2023-12-09 | Stop reason: HOSPADM

## 2023-12-04 RX ORDER — BISACODYL 5 MG/1
5 TABLET, DELAYED RELEASE ORAL DAILY PRN
Status: DISCONTINUED | OUTPATIENT
Start: 2023-12-04 | End: 2023-12-09 | Stop reason: HOSPADM

## 2023-12-04 RX ORDER — ACETAMINOPHEN 650 MG/1
650 SUPPOSITORY RECTAL EVERY 4 HOURS PRN
Status: DISCONTINUED | OUTPATIENT
Start: 2023-12-04 | End: 2023-12-09 | Stop reason: HOSPADM

## 2023-12-04 RX ORDER — SODIUM CHLORIDE 9 MG/ML
100 INJECTION, SOLUTION INTRAVENOUS CONTINUOUS
Status: ACTIVE | OUTPATIENT
Start: 2023-12-04 | End: 2023-12-05

## 2023-12-04 RX ORDER — ENOXAPARIN SODIUM 100 MG/ML
40 INJECTION SUBCUTANEOUS DAILY
Status: DISCONTINUED | OUTPATIENT
Start: 2023-12-04 | End: 2023-12-09 | Stop reason: HOSPADM

## 2023-12-04 RX ADMIN — ENOXAPARIN SODIUM 40 MG: 100 INJECTION SUBCUTANEOUS at 17:34

## 2023-12-04 RX ADMIN — ITRACONAZOLE 200 MG: 100 CAPSULE, COATED PELLETS ORAL at 21:17

## 2023-12-04 RX ADMIN — IPRATROPIUM BROMIDE AND ALBUTEROL SULFATE 3 ML: .5; 3 SOLUTION RESPIRATORY (INHALATION) at 19:14

## 2023-12-04 RX ADMIN — SODIUM CHLORIDE 100 ML/HR: 9 INJECTION, SOLUTION INTRAVENOUS at 17:30

## 2023-12-04 RX ADMIN — IPRATROPIUM BROMIDE AND ALBUTEROL SULFATE 3 ML: .5; 3 SOLUTION RESPIRATORY (INHALATION) at 13:00

## 2023-12-04 RX ADMIN — METHYLPREDNISOLONE SODIUM SUCCINATE 40 MG: 40 INJECTION INTRAMUSCULAR; INTRAVENOUS at 21:17

## 2023-12-04 RX ADMIN — ARFORMOTEROL TARTRATE 15 MCG: 15 SOLUTION RESPIRATORY (INHALATION) at 19:14

## 2023-12-04 RX ADMIN — Medication 10 ML: at 21:17

## 2023-12-04 RX ADMIN — METHYLPREDNISOLONE SODIUM SUCCINATE 125 MG: 125 INJECTION INTRAMUSCULAR; INTRAVENOUS at 12:46

## 2023-12-04 NOTE — H&P
[facility] HOSPITALIST     Mirtha Alexander APRN    CHIEF COMPLAINT:     Shortness of breath, weakness    HISTORY OF PRESENT ILLNESS:    Patient is a 62 year old male with a past medical history of COPD, acute hypoxic/hypercapnic respiratory failure, pulmonary hypertension, hypertension, and continued tobacco use who initially presented to the pulmonology office today complaining of shortness of breath and was found to be hypoxic with sats in the 70s on his home oxygen. Patient's oxygen was increased to 6L NC and in the ED sats remained in the 70s. Patient is currently on bipap and majority of the history is taken from his daughter with some input from the patient.   Patient's daughter states patient has been more short of breath for the past month, becoming more severe over the past couple weeks. Over the past 2 weeks patient has mostly been lying in the bed with Astral vent in place. He is not eating or drinking much. He has been unable to take his medications or nebulizer treatments since Friday. Patient was seen in the ED about 9 days ago and was discharged home on prednisone for 5 days and has had no improvement in his symptoms. He denies any fever or chills. He has a nonproductive cough. Patient reports his lower extremity edema is improved from baseline. Patient states he gets dizzy upon standing. Patient continues to smoke about 1/2ppd, but has not had any cigarettes recently.       Past Medical History:   Diagnosis Date    COPD (chronic obstructive pulmonary disease)     COPD (chronic obstructive pulmonary disease)     Coronary artery disease     Diastolic heart failure     Hyperlipidemia     Hypertension     Pulmonary hypertension      Past Surgical History:   Procedure Laterality Date    BACK SURGERY      BRONCHOSCOPY N/A 09/14/2022    Procedure: BRONCHOSCOPY WITH BRONCHOALVEOLAR LAVAGE AND BRONCHIAL WASHINGS;  Surgeon: Ulices Moya MD;  Location: [facility] ENDOSCOPY;  Service:  Pulmonary;  Laterality: N/A;  MUCUS PLUGGING, COPD EXACERBATION    BRONCHOSCOPY      CARDIAC CATHETERIZATION N/A 2022    Procedure: Right Heart Cath;  Surgeon: Drew Rodriguez MD;  Location: [facility] CATH INVASIVE LOCATION;  Service: Cardiovascular;  Laterality: N/A;    OTHER SURGICAL HISTORY      knee     OTHER SURGICAL HISTORY      shoulder, rotator cuff    SHOULDER SURGERY Right 10/03/2022     Family History   Problem Relation Age of Onset    Asthma Mother     Emphysema Mother             Stroke Mother      Social History     Tobacco Use    Smoking status: Every Day     Packs/day: 0.25     Years: 47.00     Additional pack years: 0.00     Total pack years: 11.75     Types: Cigarettes     Start date: 1975    Smokeless tobacco: Never    Tobacco comments:     Pt stated he is smoking 3 cigarettes per day     23 hasn't smoked for a couple days   Vaping Use    Vaping Use: Never used   Substance Use Topics    Alcohol use: Not Currently    Drug use: Never     Medications Prior to Admission   Medication Sig Dispense Refill Last Dose    albuterol sulfate  (90 Base) MCG/ACT inhaler Inhale 2 puffs Every 4 (Four) Hours As Needed for Wheezing. 18 g 0 2023    arformoterol (Brovana) 15 MCG/2ML nebulizer solution Take 2 mL by nebulization 2 (Two) Times a Day. 120 mL 11 2023    budesonide (Pulmicort) 0.5 MG/2ML nebulizer solution Take 2 mL by nebulization Daily. 60 each 11 2023    furosemide (LASIX) 20 MG tablet Take 1 tablet by mouth Daily. 90 tablet 1 2023    ipratropium-albuterol (DUO-NEB) 0.5-2.5 mg/3 ml nebulizer Take 3 mL by nebulization 4 (Four) Times a Day. 360 mL 3 2023    itraconazole (Sporanox) 100 MG capsule Take 2 capsules by mouth 2 (Two) Times a Day. 120 capsule 5 2023    metoprolol tartrate (LOPRESSOR) 25 MG tablet Take 0.5 tablets by mouth Every 12 (Twelve) Hours. 90 tablet 1 2023    NON FORMULARY V PAP       revefenacin (Yupelri) 175 MCG/3ML  "nebulizer solution Take 3 mL by nebulization Daily. 60 mL 11 12/2/2023     Allergies:  Patient has no known allergies.    Immunization History   Administered Date(s) Administered    COVID-19 (MODERNA) 1st,2nd,3rd Dose Monovalent 06/18/2021, 06/18/2021, 07/23/2021, 07/23/2021    Flu Vaccine Quad PF >36MO 11/08/2019    Fluzone (or Fluarix & Flulaval for VFC) >6mos 11/08/2019, 10/25/2021, 10/06/2022    Hepatitis A 11/13/2018    Pneumococcal Conjugate 13-Valent (PCV13) 11/07/2018    Pneumococcal Conjugate 20-Valent (PCV20) 11/08/2022    Pneumococcal Polysaccharide (PPSV23) 02/13/2017    Tdap 06/13/2012    Zostavax 06/13/2012           REVIEW OF SYSTEMS:    Please see the above history of present illness for pertinent positives and negatives.  The remainder of the patient's systems have been reviewed and are negative.     Vital Signs  Temp:  [97.7 °F (36.5 °C)-97.8 °F (36.6 °C)] 97.7 °F (36.5 °C)  Heart Rate:  [45-75] 53  Resp:  [20-23] 20  BP: (104-173)/(61-98) 105/61    Oxygen Therapy  SpO2: 91 %  Pulse Oximetry Type: Continuous  Device (Oxygen Therapy): NPPV/NIV  Flow (L/min): 2  Oxygen Concentration (%): 26}    Body mass index is 21.06 kg/m².    Flowsheet Rows      Flowsheet Row First Filed Value   Admission Height 170.2 cm (67\") Documented at 12/04/2023 1207   Admission Weight 61 kg (134 lb 7.7 oz) Documented at 12/04/2023 1207                 Physical Exam:    Physical Exam  Vitals reviewed.   Constitutional:       General: He is not in acute distress.     Appearance: He is ill-appearing.      Comments: Bipap mask in place   HENT:      Head: Normocephalic and atraumatic.   Eyes:      General: No scleral icterus.     Pupils: Pupils are equal, round, and reactive to light.   Cardiovascular:      Rate and Rhythm: Normal rate and regular rhythm.   Pulmonary:      Comments: Diminished aeration throughout  Abdominal:      General: Bowel sounds are normal. There is no distension.      Palpations: Abdomen is soft.      " Tenderness: There is no abdominal tenderness.   Musculoskeletal:      Comments: +1 np b/l le edema   Skin:     General: Skin is warm and dry.   Neurological:      Mental Status: He is alert and oriented to person, place, and time.   Psychiatric:         Mood and Affect: Mood normal.         Behavior: Behavior normal.           Results Review:    I reviewed the patient's new clinical results.  Lab Results (most recent)       Procedure Component Value Units Date/Time    Ethanol [[id number]] Collected: 12/04/23 1210    Specimen: Blood Updated: 12/04/23 1440     Ethanol <10 mg/dL      Ethanol % <0.010 %     Narrative:      Ethanol (Plasma)  <10 Essentially Negative    Toxic Concentrations           mg/dL    Flushing, slowing of reflexes    Impaired visual activity         Depression of CNS              >100  Possible Coma                  >300       COVID-19, FLU A/B, RSV PCR 1 HR TAT - Swab, Nasopharynx [[id number]]  (Normal) Collected: 12/04/23 1242    Specimen: Swab from Nasopharynx Updated: 12/04/23 1341     COVID19 Not Detected     Influenza A PCR Not Detected     Influenza B PCR Not Detected     RSV, PCR Not Detected    Narrative:      Fact sheet for providers: https://www.fda.gov/media/374055/download    Fact sheet for patients: https://www.fda.gov/media/803627/download    Test performed by PCR.    Blood Gas, Arterial -With Co-Ox Panel: Yes [[id number]]  (Abnormal) Collected: 12/04/23 1300    Specimen: Arterial Blood from Arm, Right Updated: 12/04/23 1304     pH, Arterial 7.304 pH units      pCO2, Arterial 77.7 mm Hg      pO2, Arterial 91.0 mm Hg      HCO3, Arterial 37.7 mmol/L      Base Excess, Arterial 7.9 mmol/L      O2 Saturation, Arterial 96.4 %      Hemoglobin, Blood Gas 15.4 g/dL      Carboxyhemoglobin 3.0 %      Methemoglobin 0.10 %      Oxyhemoglobin 93.4 %      FHHB 3.5 %      Site Arterial: right radial     Modality Nasal Cannula     FIO2 28 %      Flow Rate 2 lpm      PO2/FIO2 325     Adebayo's  Test Positive     Lactate, Arterial --    Comprehensive Metabolic Panel [[id number]]  (Abnormal) Collected: 12/04/23 1210    Specimen: Blood Updated: 12/04/23 1244     Glucose 101 mg/dL      BUN 19 mg/dL      Creatinine 0.61 mg/dL      Sodium 144 mmol/L      Potassium 4.4 mmol/L      Chloride 97 mmol/L      CO2 36.9 mmol/L      Calcium 9.6 mg/dL      Total Protein 6.8 g/dL      Albumin 4.3 g/dL      ALT (SGPT) 37 U/L      AST (SGOT) 33 U/L      Alkaline Phosphatase 189 U/L      Total Bilirubin 1.4 mg/dL      Globulin 2.5 gm/dL      A/G Ratio 1.7 g/dL      BUN/Creatinine Ratio 31.1     Anion Gap 10.1 mmol/L      eGFR 109.3 mL/min/1.73     Narrative:      GFR Normal >60  Chronic Kidney Disease <60  Kidney Failure <15      Single High Sensitivity Troponin T [[id number]]  (Abnormal) Collected: 12/04/23 1210    Specimen: Blood Updated: 12/04/23 1244     HS Troponin T 23 ng/L     Narrative:      High Sensitive Troponin T Reference Range:  <14.0 ng/L- Negative Female for AMI  <22.0 ng/L- Negative Male for AMI  >=14 - Abnormal Female indicating possible myocardial injury.  >=22 - Abnormal Male indicating possible myocardial injury.   Clinicians would have to utilize clinical acumen, EKG, Troponin, and serial changes to determine if it is an Acute Myocardial Infarction or myocardial injury due to an underlying chronic condition.         BNP [[id number]]  (Abnormal) Collected: 12/04/23 1210    Specimen: Blood Updated: 12/04/23 1240     proBNP 4,590.0 pg/mL     Narrative:      This assay is used as an aid in the diagnosis of individuals suspected of having heart failure. It can be used as an aid in the diagnosis of acute decompensated heart failure (ADHF) in patients presenting with signs and symptoms of ADHF to the emergency department (ED). In addition, NT-proBNP of <300 pg/mL indicates ADHF is not likely.    Age Range Result Interpretation  NT-proBNP Concentration (pg/mL:      <50             Positive            >450                    Gray                 300-450                    Negative             <300    50-75           Positive            >900                  Gray                300-900                  Negative            <300      >75             Positive            >1800                  Gray                300-1800                  Negative            <300    CBC & Differential [[id number]]  (Abnormal) Collected: 12/04/23 1210    Specimen: Blood Updated: 12/04/23 1221    Narrative:      The following orders were created for panel order CBC & Differential.  Procedure                               Abnormality         Status                     ---------                               -----------         ------                     CBC Auto Differential[[id number]]        Abnormal            Final result               Scan Slide[[id number]]                                                                    Please view results for these tests on the individual orders.    CBC Auto Differential [[id number]]  (Abnormal) Collected: 12/04/23 1210    Specimen: Blood Updated: 12/04/23 1221     WBC 10.15 10*3/mm3      RBC 5.73 10*6/mm3      Hemoglobin 16.0 g/dL      Hematocrit 53.1 %      MCV 92.7 fL      MCH 27.9 pg      MCHC 30.1 g/dL      RDW 14.6 %      RDW-SD 50.4 fl      MPV 10.7 fL      Platelets 134 10*3/mm3      Neutrophil % 89.5 %      Lymphocyte % 4.1 %      Monocyte % 5.2 %      Eosinophil % 0.1 %      Basophil % 0.1 %      Immature Grans % 1.0 %      Neutrophils, Absolute 9.08 10*3/mm3      Lymphocytes, Absolute 0.42 10*3/mm3      Monocytes, Absolute 0.53 10*3/mm3      Eosinophils, Absolute 0.01 10*3/mm3      Basophils, Absolute 0.01 10*3/mm3      Immature Grans, Absolute 0.10 10*3/mm3      nRBC 0.0 /100 WBC     [place] Draw [[id number]] Collected: 12/04/23 1210    Specimen: Blood Updated: 12/04/23 1214    Narrative:      The following orders were created for panel order [place] Draw.  Procedure                                Abnormality         Status                     ---------                               -----------         ------                     Green Top (Gel)[[id number]]                                  Final result               Lavender Top[[id number]]                                     Final result               Gold Top - SST[[id number]]                                   Final result               Light Blue Top[[id number]]                                   Final result                 Please view results for these tests on the individual orders.    Green Top (Gel) [[id number]] Collected: 12/04/23 1210    Specimen: Blood Updated: 12/04/23 1214     Extra Tube Hold for add-ons.     Comment: Auto resulted.       Lavender Top [[id number]] Collected: 12/04/23 1210    Specimen: Blood Updated: 12/04/23 1214     Extra Tube hold for add-on     Comment: Auto resulted       Gold Top - SST [[id number]] Collected: 12/04/23 1210    Specimen: Blood Updated: 12/04/23 1214     Extra Tube Hold for add-ons.     Comment: Auto resulted.       Light Blue Top [[id number]] Collected: 12/04/23 1210    Specimen: Blood Updated: 12/04/23 1214     Extra Tube Hold for add-ons.     Comment: Auto resulted               Imaging Results (Most Recent)       Procedure Component Value Units Date/Time    XR Chest 1 View [[id number]] Collected: 12/04/23 1230     Updated: 12/04/23 1233    Narrative:      PROCEDURE: XR CHEST 1 VW     [facility]ST[facility], 3/09/2023, 12:28.    [facility], CR, XR CHEST 1 VW, 11/30/2022, 17:00.  [facility], CR, XR   CHEST 2 VW, 2/01/2023, 15:06.  [facility], CR, XR CHEST 1 VW, 11/25/2023, 12:01.     INDICATIONS: SOA Triage Protocol     FINDINGS:   No focal or diffuse infiltrate is identified.  There is emphysema, as before.  No pleural effusion   or pneumothorax. Heart size and mediastinal contour appear within normal limits.           Impression:         Emphysema.      No radiographic findings of acute cardiopulmonary abnormality.                  ZURI MARIE MD         Electronically Signed and Approved By: ZURI MARIE MD on 12/04/2023 at 12:29                           CXR personally reviewed and shows no focal consolidation     ECG/EMG Results (most recent)       Procedure Component Value Units Date/Time    ECG 12 Lead ED Triage Standing Order; SOA [[id number]] Collected: 12/04/23 1210     Updated: 12/04/23 1211     QT Interval 446 ms      QTC Interval 460 ms     Narrative:      HEART RATE= 64  bpm  RR Interval= 940  ms  FL Interval= 125  ms  P Horizontal Axis= 0  deg  P Front Axis= 57  deg  QRSD Interval= 106  ms  QT Interval= 446  ms  QTcB= 460  ms  QRS Axis= 0  deg  T Wave Axis= 0  deg  - BORDERLINE ECG -  Sinus rhythm  Indeterminate axis  Borderline low voltage, extremity leads  RSR' in V1 or V2, probably normal variant  Electronically Signed By:   Date and Time of Study: 2023-12-04 12:10:26          reviewed    Assessment & Plan   Active Hospital Problems    Diagnosis  POA    **Acute respiratory failure with hypoxia [J96.01]  Yes      Resolved Hospital Problems   No resolved problems to display.       Acute on Chronic Hypoxic Hypercapnic Respiratory Failure likely d/t AECOPD   - wears 3-4L NC at home and Astral vent  - procal pending  - covid, flu, rsv- negative  - full viral panel- pending  - afebrile, hold off on antibiotcs for now  - continue Solu-medrol IV q8h,   - duonebs scheduled and prn  - continue brovana  - continue bipap for now   - discussed with pulmonology and they will evaluate the patient    HFpEF  - continue lasix 20mg daily  - patient reports his swelling is better than usual   - echo- pending     Hypertension  - continue furosemide  - holding lopressor given bradycardia   - monitor with routine vs and make adjustments as needed     Aspergillus   - continue itraconazole     DVT Prophylaxis  - enoxaparin     Code  Status  - Full Code    I discussed the patient's findings and my recommendations with patient and his daughter at bedside.        Jonathan ELLIS Nasim,   12/04/23  16:27 EST        Note disclaimer: At [facility], we believe that sharing information builds trust and better relationships. You are receiving this note because you recently visited [facility]. It is possible you will see health information before a provider has talked with you about it. This kind of information can be easy to misunderstand. To help you fully understand what it means for your health, we urge you to discuss this note with your provider.

## 2023-12-04 NOTE — Clinical Note
Level of Care: Telemetry [5]   Diagnosis: Acute respiratory failure with hypoxia [407149]   Admitting Physician: JESS LOPEZ [[id number]]   Attending Physician: JESS LOPEZ [[id number]]   Certification: I Certify That Inpatient Hospital Services Are Medically Necessary For Greater Than 2 Midnights

## 2023-12-04 NOTE — PAYOR COMM NOTE
"Sumeet Gomes \"Olman\" (62 y.o. Male)     PATIENT INFORMATION  Name:  Sumeet Gomes  MRN#:     [id number]  :  1960       ADMISSION INFORMATION  CLASS: Inpatient   DOS:  2023    CURRENT ATTENDING PROVIDER INFORMATION  Name/NPI: Jonathan Rouse DO [[id number]]  Phone:  Phone: [phone]      RENDERING FACILITY  Name:  [facility]   NPI:  [id number]  TID:  [id number]  Address:      [facility] [address] [place] [address]  Phone  [phone]      CASE MANAGEMENT CONTACT INFORMATION  Phone:      [phone]  Fax:           [phone]              Date of Birth   1960    Social Security Number       Address   [address] [address]    Home Phone   [phone]    MRN   [id number]       Catholic   Confucianism    Marital Status                               Admission Date   23    Admission Type   Emergency    Admitting Provider   Jonathan Rouse DO    Attending Provider   Jonathan Rouse DO    Department, Room/Bed   [facility] EMERGENCY ROOM, FARTUN/FARTUN       Discharge Date       Discharge Disposition       Discharge Destination                                 Attending Provider: Jonathan Rouse DO    Allergies: No Known Allergies    Isolation: None   Infection: None   Code Status: Prior    Ht: 170.2 cm (67\")   Wt: 61 kg (134 lb 7.7 oz)    Admission Cmt: None   Principal Problem: Acute respiratory failure with hypoxia [J96.01]                   Active Insurance as of 2023       Primary Coverage       Payor Plan Insurance Group Employer/Plan Group    ANTHEM MEDICAID ANTHEM MEDICAID [id number]       Payor Plan Address Payor Plan Phone Number Payor Plan Fax Number Effective Dates    [address] [address] [phone]  2022 - None Entered    [facility] [id number]         Subscriber Name Subscriber Birth Date Member ID       SUMEET GOMES 1960 [id number]                    Respiratory Failure GRG " Clinical Indications for Admission to Inpatient Care       Indications Met   Last updated by Marla Alcantar RN on 12/4/2023 1606     Review Status Created By   Primary Completed Marla Alcantar RN      Criteria Review   Respiratory Failure GRG Clinical Indications for Admission to Inpatient Care     Overall Determination: Indications Met     Criteria:  [×] Hospital admission is needed for appropriate care of the patient because of  1 or more  of the following  (1) (2) (3) (4) (5) (6) (7) (8) (9) (10):      [×] Severe ventilation deficit, as indicated by  1 or more  of the following  (17) (22):          [×] Hypercarbia (PCO2 greater than 40 mm Hg (5.3 kPa))-induced respiratory acidosis (pH less than 7.35)              12/4/2023  4:06 PM                  -- 12/4/2023  4:06 PM by Marla Alcantar RN --                      pH 7.30; pCO2 77.7     Notes:  -- 12/4/2023  4:06 PM by Marla Alcantar RN --      To ED c/o SOA x 1 month. + gradual worsening. + cough & wheezing. O2 sats 77% on O2 6L. + BLE edema. On exam. Lungs with decreased breath sounds & bilateral wheezing.       PMHx: COPD; CAD; CHF; HTN: HLD; Pulm HTN; Is on O2 4l at home during day. 6L at night with BIPAP.       ED results: ; Total bilirubin 1.4; BNP 4590.0; Trop 23; ABGs on 2L: pH 7.30; pCO2 77.7; HCO3 37.7.       In ED: Switched to 8L High flow NC for a whille, then to BIPAP with O2. Normally uses Bipap at night only. Now using continuously. SoluMedrol 125 IV; Duoneb x1.       Admit. Telemetry; Pulmonary consult; O2; BIPAP. Additional orders pending.                   Emergency Department Notes        Tyron Copeland MD at 12/04/23 1242          Time: 12:42 PM EST  Date of encounter:  12/4/2023  Independent Historian/Clinical History and Information was obtained by:   Patient    History is limited by: N/A    Chief Complaint: Dyspnea      History of Present Illness:  Patient is a 62 y.o. year old male who presents to the emergency department for  evaluation of dyspnea for the past month.  Patient states there is no sudden change today and he presents because he has had no improvement or at the most gradual worsening.  He denies any chest pain.  Afebrile.  Does have cough and wheeze.  No abdominal pain, vomiting or any other symptoms.    HPI    Patient Care Team  Primary Care Provider: Mirtha Alexander APRN    Past Medical History:     No Known Allergies  Past Medical History:   Diagnosis Date    COPD (chronic obstructive pulmonary disease)     COPD (chronic obstructive pulmonary disease)     Coronary artery disease     Diastolic heart failure     Hyperlipidemia     Hypertension     Pulmonary hypertension      Past Surgical History:   Procedure Laterality Date    BACK SURGERY      BRONCHOSCOPY N/A 2022    Procedure: BRONCHOSCOPY WITH BRONCHOALVEOLAR LAVAGE AND BRONCHIAL WASHINGS;  Surgeon: Ulices Moya MD;  Location: [facility] ENDOSCOPY;  Service: Pulmonary;  Laterality: N/A;  MUCUS PLUGGING, COPD EXACERBATION    BRONCHOSCOPY      CARDIAC CATHETERIZATION N/A 2022    Procedure: Right Heart Cath;  Surgeon: Drew Rodriguez MD;  Location: [facility] CATH INVASIVE LOCATION;  Service: Cardiovascular;  Laterality: N/A;    OTHER SURGICAL HISTORY      knee     OTHER SURGICAL HISTORY      shoulder, rotator cuff    SHOULDER SURGERY Right 10/03/2022     Family History   Problem Relation Age of Onset    Asthma Mother     Emphysema Mother             Stroke Mother        Home Medications:  Prior to Admission medications    Medication Sig Start Date End Date Taking? Authorizing Provider   albuterol sulfate  (90 Base) MCG/ACT inhaler Inhale 2 puffs Every 4 (Four) Hours As Needed for Wheezing. 23   Tyron Copeland MD   arformoterol (Brovana) 15 MCG/2ML nebulizer solution Take 2 mL by nebulization 2 (Two) Times a Day. 23   Ulices Moya MD   budesonide (Pulmicort) 0.5 MG/2ML nebulizer solution Take 2 mL by nebulization Daily.  2/27/23   Ulices Moya MD   furosemide (LASIX) 20 MG tablet Take 1 tablet by mouth Daily. 8/2/23   Mirtha Alexander APRN   ipratropium-albuterol (DUO-NEB) 0.5-2.5 mg/3 ml nebulizer Take 3 mL by nebulization 4 (Four) Times a Day. 5/26/23   Julia Chin APRN   itraconazole (Sporanox) 100 MG capsule Take 2 capsules by mouth 2 (Two) Times a Day. 5/26/23   Julia Chin APRN   metoprolol tartrate (LOPRESSOR) 25 MG tablet Take 0.5 tablets by mouth Every 12 (Twelve) Hours. 8/2/23   Mirtha Alexander APRN   NON FORMULARY V PAP    ProviderTawnya MD   revefenacin (Yupelri) 175 MCG/3ML nebulizer solution Take 3 mL by nebulization Daily. 6/5/23   Julia Chin APRN   albuterol sulfate  (90 Base) MCG/ACT inhaler Inhale 2 puffs Every 6 (Six) Hours As Needed for Wheezing or Shortness of Air. for wheezing 10/17/23 12/4/23  Ulices Moya MD        Social History:   Social History     Tobacco Use    Smoking status: Every Day     Packs/day: 0.25     Years: 47.00     Additional pack years: 0.00     Total pack years: 11.75     Types: Cigarettes     Start date: 1/1/1975    Smokeless tobacco: Never    Tobacco comments:     Pt stated he is smoking 3 cigarettes per day     12/4/23 hasn't smoked for a couple days   Vaping Use    Vaping Use: Never used   Substance Use Topics    Alcohol use: Not Currently    Drug use: Never         Review of Systems:  Review of Systems   Constitutional:  Negative for chills and fever.   HENT:  Negative for congestion, rhinorrhea and sore throat.    Eyes:  Negative for photophobia.   Respiratory:  Positive for cough, shortness of breath and wheezing. Negative for apnea and chest tightness.    Cardiovascular:  Negative for chest pain and palpitations.   Gastrointestinal:  Negative for abdominal pain, diarrhea, nausea and vomiting.   Endocrine: Negative.    Genitourinary:  Negative for difficulty urinating and dysuria.   Musculoskeletal:  Negative for back pain, joint swelling  "and myalgias.   Skin:  Negative for color change and wound.   Allergic/Immunologic: Negative.    Neurological:  Negative for seizures and headaches.   Psychiatric/Behavioral: Negative.     All other systems reviewed and are negative.       Physical Exam:  /72   Pulse 52   Temp 97.7 °F (36.5 °C) (Oral)   Resp 20   Ht 170.2 cm (67\")   Wt 61 kg (134 lb 7.7 oz)   SpO2 91%   BMI 21.06 kg/m²     Physical Exam  Vitals and nursing note reviewed.   Constitutional:       Appearance: Normal appearance. He is well-developed.   HENT:      Head: Normocephalic and atraumatic.      Nose: Nose normal.      Mouth/Throat:      Mouth: Mucous membranes are dry.   Eyes:      Extraocular Movements: Extraocular movements intact.      Pupils: Pupils are equal, round, and reactive to light.   Cardiovascular:      Rate and Rhythm: Normal rate and regular rhythm.      Heart sounds: Normal heart sounds.   Pulmonary:      Effort: Tachypnea present.      Breath sounds: Examination of the right-upper field reveals decreased breath sounds and wheezing. Examination of the left-upper field reveals decreased breath sounds and wheezing.   Abdominal:      General: Bowel sounds are normal.      Palpations: Abdomen is soft.      Tenderness: There is no abdominal tenderness.   Musculoskeletal:         General: No swelling. Normal range of motion.      Cervical back: Normal range of motion and neck supple.      Right lower leg: Edema present.      Left lower leg: Edema present.   Skin:     General: Skin is warm and dry.      Coloration: Skin is not jaundiced.   Neurological:      General: No focal deficit present.      Mental Status: He is alert and oriented to person, place, and time. Mental status is at baseline.   Psychiatric:         Mood and Affect: Mood normal.         Behavior: Behavior normal.         Judgment: Judgment normal.                  Procedures:  Procedures      Medical Decision Making:      Comorbidities that affect " care:    COPD, hypertension, diastolic CHF, hyperlipidemia, CAD    External Notes reviewed:    Previous Clinic Note: Outpatient visit with pulmonology today as follows:  Impression and Plan     -Patient sent to ED via private vehicle due to hypoxia, acute shortness of breath, and difficulty taking deep breaths.  He may need increased supplemental oxygen, IV steroids, IV fluids, scheduled round-the-clock breathing treatments, and CPT.  I notified Jacquelin at [facility] ED of his arrival.  -CXR 11/25/2023 showed no active disease  -BNP 11/25/2023 greater than 6,000  -CT chest 3/9/2023 showed mild bibasilar atelectasis, moderate emphysema  -Echocardiogram 9/13/2022 showed EF 56%, RVSP markedly elevated greater than 55 mmHg, moderate to severe pulmonary hypertension present  -Patient underwent right heart cath September 2022 after which she was diagnosed with moderate pulmonary artery hypertension, nonresponder to vasodilator challenge  -PFTs 10/13/2022 showed severe obstructive disease FEV1 14% of predicted, significant response to bronchodilator with 26% improvement in FEV1.  Air trapping present, DLCO severely reduced 18% of predicted.  -Continue wearing supplemental oxygen 4 L to keep O2 saturation at or greater than 89%  -Continue wearing Astral vent nightly with 6 L oxygen bled in  -Continue using Brovana and Pulmicort twice daily as prescribed, reminded patient to rinse mouth after each use  -Continue using Yupelri daily  -Continue using albuterol and DuoNeb treatments as needed  -Continue taking itraconazole for Aspergillus infection  -Continue taking Lasix 20 mg daily  -Continue following up with cardiology for management of heart failure  -Patient to keep regularly scheduled appointment with Dr. Moya 2/22/2024     Smoking status: Reviewed  Vaccination status: Patient reports he is up-to-date with his flu, pneumonia, and Covid vaccines.  Patient is advised to continue to follow CDC recommendations  such as social distancing wearing a mask and washing hands for at least 20 seconds.  Medications personally reviewed     Follow Up   No follow-ups on file.  Patient was given instructions and counseling regarding his condition or for health maintenance advice. Please see specific information pulled into the AVS if appropriate.           The following orders were placed and all results were independently analyzed by me:  Orders Placed This Encounter   Procedures    COVID-19, FLU A/B, RSV PCR 1 HR TAT - Swab, Nasopharynx    Respiratory Panel PCR w/COVID-19(SARS-CoV-2) KARRIE/JUSTIN/ENZO/PAD/COR/JERSEY In-House, NP Swab in UTM/VTM, 2 HR TAT - Swab, Nasopharynx    XR Chest 1 View    [place] Draw    Comprehensive Metabolic Panel    BNP    Single High Sensitivity Troponin T    CBC Auto Differential    Blood Gas, Arterial -With Co-Ox Panel: Yes    Ethanol    NPO Diet NPO Type: Strict NPO    Undress & Gown    Continuous Pulse Oximetry    Vital Signs    Hospitalist (on-call MD unless specified)    Oxygen Therapy- Nasal Cannula; Titrate 1-6 LPM Per SpO2; 90 - 95%    ECG 12 Lead ED Triage Standing Order; SOA    Insert Peripheral IV    Inpatient Admission    CBC & Differential    Green Top (Gel)    Lavender Top    Gold Top - SST    Light Blue Top       Medications Given in the Emergency Department:  Medications   sodium chloride 0.9 % flush 10 mL (has no administration in time range)   ipratropium-albuterol (DUO-NEB) nebulizer solution 3 mL (3 mL Nebulization Given 12/4/23 1300)   methylPREDNISolone sodium succinate (SOLU-Medrol) injection 125 mg (125 mg Intravenous Given 12/4/23 1246)        ED Course:    ED Course as of 12/04/23 1438   Mon Dec 04, 2023   1237 I have personally interpreted the EKG today and it shows no evidence of any acute ischemia or heart arrhythmia. [RP]      ED Course User Index  [RP] Tyron Copeland MD       Labs:    Lab Results (last 24 hours)       Procedure Component Value Units Date/Time    CBC &  Differential [[id number]]  (Abnormal) Collected: 12/04/23 1210    Specimen: Blood Updated: 12/04/23 1221    Narrative:      The following orders were created for panel order CBC & Differential.  Procedure                               Abnormality         Status                     ---------                               -----------         ------                     CBC Auto Differential[[id number]]        Abnormal            Final result               Scan Slide[[id number]]                                                                    Please view results for these tests on the individual orders.    Comprehensive Metabolic Panel [[id number]]  (Abnormal) Collected: 12/04/23 1210    Specimen: Blood Updated: 12/04/23 1244     Glucose 101 mg/dL      BUN 19 mg/dL      Creatinine 0.61 mg/dL      Sodium 144 mmol/L      Potassium 4.4 mmol/L      Chloride 97 mmol/L      CO2 36.9 mmol/L      Calcium 9.6 mg/dL      Total Protein 6.8 g/dL      Albumin 4.3 g/dL      ALT (SGPT) 37 U/L      AST (SGOT) 33 U/L      Alkaline Phosphatase 189 U/L      Total Bilirubin 1.4 mg/dL      Globulin 2.5 gm/dL      A/G Ratio 1.7 g/dL      BUN/Creatinine Ratio 31.1     Anion Gap 10.1 mmol/L      eGFR 109.3 mL/min/1.73     Narrative:      GFR Normal >60  Chronic Kidney Disease <60  Kidney Failure <15      BNP [[id number]]  (Abnormal) Collected: 12/04/23 1210    Specimen: Blood Updated: 12/04/23 1240     proBNP 4,590.0 pg/mL     Narrative:      This assay is used as an aid in the diagnosis of individuals suspected of having heart failure. It can be used as an aid in the diagnosis of acute decompensated heart failure (ADHF) in patients presenting with signs and symptoms of ADHF to the emergency department (ED). In addition, NT-proBNP of <300 pg/mL indicates ADHF is not likely.    Age Range Result Interpretation  NT-proBNP Concentration (pg/mL:      <50             Positive            >450                   Gray                 300-450                     Negative             <300    50-75           Positive            >900                  Gray                300-900                  Negative            <300      >75             Positive            >1800                  Gray                300-1800                  Negative            <300    Single High Sensitivity Troponin T [[id number]]  (Abnormal) Collected: 12/04/23 1210    Specimen: Blood Updated: 12/04/23 1244     HS Troponin T 23 ng/L     Narrative:      High Sensitive Troponin T Reference Range:  <14.0 ng/L- Negative Female for AMI  <22.0 ng/L- Negative Male for AMI  >=14 - Abnormal Female indicating possible myocardial injury.  >=22 - Abnormal Male indicating possible myocardial injury.   Clinicians would have to utilize clinical acumen, EKG, Troponin, and serial changes to determine if it is an Acute Myocardial Infarction or myocardial injury due to an underlying chronic condition.         CBC Auto Differential [[id number]]  (Abnormal) Collected: 12/04/23 1210    Specimen: Blood Updated: 12/04/23 1221     WBC 10.15 10*3/mm3      RBC 5.73 10*6/mm3      Hemoglobin 16.0 g/dL      Hematocrit 53.1 %      MCV 92.7 fL      MCH 27.9 pg      MCHC 30.1 g/dL      RDW 14.6 %      RDW-SD 50.4 fl      MPV 10.7 fL      Platelets 134 10*3/mm3      Neutrophil % 89.5 %      Lymphocyte % 4.1 %      Monocyte % 5.2 %      Eosinophil % 0.1 %      Basophil % 0.1 %      Immature Grans % 1.0 %      Neutrophils, Absolute 9.08 10*3/mm3      Lymphocytes, Absolute 0.42 10*3/mm3      Monocytes, Absolute 0.53 10*3/mm3      Eosinophils, Absolute 0.01 10*3/mm3      Basophils, Absolute 0.01 10*3/mm3      Immature Grans, Absolute 0.10 10*3/mm3      nRBC 0.0 /100 WBC     Ethanol [[id number]] Collected: 12/04/23 1210    Specimen: Blood Updated: 12/04/23 1432    COVID-19, FLU A/B, RSV PCR 1 HR TAT - Swab, Nasopharynx [[id number]]  (Normal) Collected: 12/04/23 1242    Specimen: Swab from Nasopharynx Updated: 12/04/23 1341     COVID19 Not  Detected     Influenza A PCR Not Detected     Influenza B PCR Not Detected     RSV, PCR Not Detected    Narrative:      Fact sheet for providers: https://www.fda.gov/media/517009/download    Fact sheet for patients: https://www.fda.gov/media/886571/download    Test performed by PCR.    Blood Gas, Arterial -With Co-Ox Panel: Yes [[id number]]  (Abnormal) Collected: 12/04/23 1300    Specimen: Arterial Blood from Arm, Right Updated: 12/04/23 1304     pH, Arterial 7.304 pH units      pCO2, Arterial 77.7 mm Hg      pO2, Arterial 91.0 mm Hg      HCO3, Arterial 37.7 mmol/L      Base Excess, Arterial 7.9 mmol/L      O2 Saturation, Arterial 96.4 %      Hemoglobin, Blood Gas 15.4 g/dL      Carboxyhemoglobin 3.0 %      Methemoglobin 0.10 %      Oxyhemoglobin 93.4 %      FHHB 3.5 %      Site Arterial: right radial     Modality Nasal Cannula     FIO2 28 %      Flow Rate 2 lpm      PO2/FIO2 325     Adebayo's Test Positive     Lactate, Arterial --             Imaging:    XR Chest 1 View    Result Date: 12/4/2023  PROCEDURE: XR CHEST 1 VW  COMPARISON: [facility], CT, CT CHEST WO CONTRAST DIAGNOSTIC, 3/09/2023, 12:28.  [facility], CR, XR CHEST 1 VW, 11/30/2022, 17:00.  [facility], CR, XR CHEST 2 VW, 2/01/2023, 15:06.  [facility], CR, XR CHEST 1 VW, 11/25/2023, 12:01.  INDICATIONS: SOA Triage Protocol  FINDINGS:  No focal or diffuse infiltrate is identified.  There is emphysema, as before.  No pleural effusion or pneumothorax. Heart size and mediastinal contour appear within normal limits.         Emphysema.   No radiographic findings of acute cardiopulmonary abnormality.       ZURI MARIE MD       Electronically Signed and Approved By: ZURI MARIE MD on 12/04/2023 at 12:29                Differential Diagnosis and Discussion:    Dyspnea: Differential diagnosis includes but is not limited to metabolic acidosis, neurological disorders, psychogenic, asthma, pneumothorax, upper  airway obstruction, COPD, pneumonia, noncardiogenic pulmonary edema, interstitial lung disease, anemia, congestive heart failure, and pulmonary embolism    All labs were reviewed and interpreted by me.  All X-rays impressions were independently interpreted by me.  EKG was interpreted by me.    MDM     Amount and/or Complexity of Data Reviewed  Clinical lab tests: reviewed  Tests in the radiology section of CPT®: reviewed  Tests in the medicine section of CPT®: reviewed         Critical Care Note: Total Critical Care time of 40 minutes. Total critical care time documented does not include time spent on separately billed procedures for services of nurses or physician assistants. I personally saw and examined the patient. I have reviewed all diagnostic interpretations and treatment plans as written. I was present for the key portions of any procedures performed and the inclusive time noted in any critical care statement. Critical care time includes patient management by me, time spent at the patients bedside,  time to review lab and imaging results, discussing patient care, documentation in the medical record, and time spent with family or caregiver.    Patient required multiple levels of escalation of respiratory care, going from standard nasal cannula to high flow nasal cannula and later BiPAP.  He required multiple reassessments to ensure stability.        Patient Care Considerations:    PERC: I used the PERC score to risk stratify the patient for PE and a CT of the chest was considered but ultimately not indicated in today's visit.      Consultants/Shared Management Plan:    Hospitalist: I have discussed the case with Dr. Rouse who agrees to accept the patient for admission.    Social Determinants of Health:    Patient is independent, reliable, and has access to care.       Disposition and Care Coordination:    Admit:   Through independent evaluation of the patient's history, physical, and imperical data, the  patient meets criteria for observation/admission to the hospital.        Final diagnoses:   COPD exacerbation   Acute on chronic respiratory failure with hypercapnia   Acute on chronic respiratory failure with hypoxia        ED Disposition       ED Disposition   Decision to Admit    Condition   --    Comment   Level of Care: Telemetry [5]   Diagnosis: Acute respiratory failure with hypoxia [593265]   Admitting Physician: JESS LOPEZ [[id number]]   Attending Physician: JESS LOPEZ [[id number]]   Certification: I Certify That Inpatient Hospital Services Are Medically Necessary For Greater Than 2 Midnights                 This medical record created using voice recognition software.    Tyron Copeland MD  12/04/23 1438    Electronically signed by Tyron Copeland MD at 12/04/23 1438                 Adamrais Ray APRN   Nurse Practitioner  Specialty: Nurse Practitioner     Progress Notes     Signed     Encounter Date: 12/4/2023   Related encounter: Office Visit from 12/4/2023 in [facility] PULMONARY & CRITICAL CARE MEDICINE with Adamaris Ray APRN     Signed       Expand All Collapse All    Primary Care Provider  Mirtha Alexander APRN   Referring Provider  No ref. provider found        Patient Complaint  Acute, Shortness of Breath, Chest Congestion, and Trouble taking deep breath          Subjective   Sumeet Gomes presents to [facility] PULMONARY & CRITICAL CARE MEDICINE        History of Presenting Illness  Sumeet Gomes is a 62 y.o. male patient of Dr. Moya with hypoxic and hypercapnic respiratory failure, asthma/COPD overlap syndrome, emphysema, pulmonary hypertension, and tobacco abuse ongoing, here for acute visit.     Patient presents today with shortness of breath, congestion, and difficulty taking deep breaths.  He is accompanied today by his daughter.  Patient went to the ED a little over a week ago with similar symptoms, was not admitted, but  patient feels even worse today than he did the day he went to the ED.  Per patient, he was given a steroid and breathing treatment in the ED, which temporarily improved his symptoms.  He was negative for flu, COVID, and RSV.  His CXR was unremarkable.  Patient denies using any antibiotics for his lungs recently, denies any fevers or chills.  He continues to experience shortness of breath with minimal exertion.  He also has not been eating or drinking much at all.  Upon presentation today, patient's O2 saturation was 74% on 4 L pulsed dose, improved to 90% on 6 L with via oxygen concentrator.  Patient continues to use Brovana, Pulmicort, and Yupelri daily as well as his albuterol and DuoNeb treatments as needed.  He also continues to take itraconazole for Aspergillus infection.  He has been on this medication for about 1 year.  Patient normally wears 4 L supplemental oxygen.  He wears his Astral vent nightly with 6 L supplemental oxygen bled in.  Patient sees cardiology for management of heart failure.  He takes Lasix 20 mg daily, BNP mildly elevated at 6000 in the ED.  He is a current smoker but has not smoked for a couple of days, 12 pack years.  Patient denies any productive cough, hemoptysis, swollen lymph nodes, weight loss, or night sweats.  Patient has difficulty performing ADLs due to shortness of breath.  I have personally reviewed the review of systems, past family, social, medical and surgical histories; and agree with their findings.        Review of Systems     Review of Systems   Constitutional:  Positive for appetite change (no appetite). Negative for activity change, chills, fatigue, fever, unexpected weight gain and unexpected weight loss.   HENT:  Positive for congestion. Negative for ear discharge, ear pain, mouth sores, postnasal drip, rhinorrhea, sinus pressure, sore throat, swollen glands and trouble swallowing.    Eyes:  Negative for blurred vision, pain, discharge, itching and visual  disturbance.   Respiratory:  Positive for shortness of breath (at rest) and wheezing (intermittent). Negative for apnea, cough, chest tightness and stridor.    Cardiovascular:  Negative for chest pain, palpitations and leg swelling.   Gastrointestinal:  Negative for abdominal distention, abdominal pain, constipation, diarrhea, nausea, vomiting, GERD and indigestion.   Musculoskeletal:  Negative for arthralgias, joint swelling and myalgias.   Skin:  Negative for color change.   Neurological:  Negative for dizziness, weakness, light-headedness and headache.      Sleep: Negative for Excessive daytime sleepiness  Negative for morning headaches  Negative for Snoring              Family History   Problem Relation Age of Onset    Asthma Mother      Emphysema Mother               Stroke Mother           Social History   Social History            Socioeconomic History    Marital status:    Tobacco Use    Smoking status: Every Day       Packs/day: 0.25       Years: 47.00       Additional pack years: 0.00       Total pack years: 11.75       Types: Cigarettes       Start date: 1975    Smokeless tobacco: Never    Tobacco comments:       Pt stated he is smoking 3 cigarettes per day       24 hasn't smoked for a couple days   Vaping Use    Vaping Use: Never used   Substance and Sexual Activity    Alcohol use: Not Currently    Drug use: Never    Sexual activity: Defer            Medical History        Past Medical History:   Diagnosis Date    COPD (chronic obstructive pulmonary disease)      COPD (chronic obstructive pulmonary disease)      Coronary artery disease      Diastolic heart failure      Hyperlipidemia      Hypertension      Pulmonary hypertension                   Immunization History   Administered Date(s) Administered    COVID-19 (MODERNA) 1st,2nd,3rd Dose Monovalent 2021, 2021, 2021, 2021    Flu Vaccine Quad PF >36MO 2019    Fluzone (or Fluarix & Flulaval for VFC)  ">6mos 11/08/2019, 10/25/2021, 10/06/2022    Hepatitis A 11/13/2018    Pneumococcal Conjugate 13-Valent (PCV13) 11/07/2018    Pneumococcal Conjugate 20-Valent (PCV20) 11/08/2022    Pneumococcal Polysaccharide (PPSV23) 02/13/2017    Tdap 06/13/2012    Zostavax 06/13/2012         No Known Allergies         Current Outpatient Medications:     albuterol sulfate  (90 Base) MCG/ACT inhaler, Inhale 2 puffs Every 4 (Four) Hours As Needed for Wheezing., Disp: 18 g, Rfl: 0    arformoterol (Brovana) 15 MCG/2ML nebulizer solution, Take 2 mL by nebulization 2 (Two) Times a Day., Disp: 120 mL, Rfl: 11    budesonide (Pulmicort) 0.5 MG/2ML nebulizer solution, Take 2 mL by nebulization Daily., Disp: 60 each, Rfl: 11    furosemide (LASIX) 20 MG tablet, Take 1 tablet by mouth Daily., Disp: 90 tablet, Rfl: 1    ipratropium-albuterol (DUO-NEB) 0.5-2.5 mg/3 ml nebulizer, Take 3 mL by nebulization 4 (Four) Times a Day., Disp: 360 mL, Rfl: 3    itraconazole (Sporanox) 100 MG capsule, Take 2 capsules by mouth 2 (Two) Times a Day., Disp: 120 capsule, Rfl: 5    metoprolol tartrate (LOPRESSOR) 25 MG tablet, Take 0.5 tablets by mouth Every 12 (Twelve) Hours., Disp: 90 tablet, Rfl: 1    NON FORMULARY, V PAP, Disp: , Rfl:     revefenacin (Yupelri) 175 MCG/3ML nebulizer solution, Take 3 mL by nebulization Daily., Disp: 60 mL, Rfl: 11            Objective   Vital Signs:   /66 (BP Location: Left arm, Patient Position: Sitting, Cuff Size: Adult)   Pulse 57   Temp 97.8 °F (36.6 °C) (Tympanic)   Resp 20   Ht 170.2 cm (67\")   Wt 64.7 kg (142 lb 9.6 oz)   SpO2 90% Comment: 6L CONTINUOUS NASAL CANNULA. ZULY DAVIDSON AWARE  BMI 22.33 kg/m²      Physical Exam  Constitutional:       General: He is not in acute distress.     Appearance: Normal appearance. He is normal weight. He is not ill-appearing.   HENT:      Right Ear: Tympanic membrane and ear canal normal.      Left Ear: Tympanic membrane and ear canal normal.      Nose: Nose " normal.      Mouth/Throat:      Mouth: Mucous membranes are moist.      Pharynx: Oropharynx is clear.   Eyes:      Extraocular Movements: Extraocular movements intact.      Conjunctiva/sclera: Conjunctivae normal.      Pupils: Pupils are equal, round, and reactive to light.   Cardiovascular:      Rate and Rhythm: Normal rate and regular rhythm.      Pulses: Normal pulses.      Heart sounds: Normal heart sounds.   Pulmonary:      Effort: Pulmonary effort is normal. No respiratory distress.      Breath sounds: No stridor. No wheezing, rhonchi or rales.      Comments: Very diminished  Abdominal:      General: Bowel sounds are normal.      Palpations: Abdomen is soft.   Musculoskeletal:         General: No swelling. Normal range of motion.      Cervical back: Normal range of motion and neck supple.      Right lower leg: No edema.      Left lower leg: No edema.   Skin:     General: Skin is warm and dry.   Neurological:      General: No focal deficit present.      Mental Status: He is alert and oriented to person, place, and time.      Motor: No weakness.   Psychiatric:         Mood and Affect: Mood normal.         Behavior: Behavior normal.         Result Review   :   I have personally reviewed patient's labs and images.  I also reviewed the ED provider note 11/25/2023.       Assessment   Diagnoses and all orders for this visit:     1. Chronic respiratory failure with hypoxia and hypercapnia (Primary)     2. Pulmonary hypertension     3. Asthma-COPD overlap syndrome     4. Pulmonary emphysema, unspecified emphysema type     5. Aspergillus     6. TAISHA (obstructive sleep apnea)     7. Dyspnea, unspecified type     8. Wheezing     9. Cough, unspecified type     10. Fatigue, unspecified type     11. Diastolic heart failure     12. Tobacco abuse     13. Encounter for smoking cessation counseling        Impression and Plan     -Patient sent to ED via private vehicle due to hypoxia, acute shortness of breath, and difficulty  taking deep breaths.  He may need increased supplemental oxygen, IV steroids, IV fluids, scheduled round-the-clock breathing treatments, and CPT.  I notified Jacquelin at [facility] ED of his arrival.  -CXR 11/25/2023 showed no active disease  -BNP 11/25/2023 greater than 6,000  -CT chest 3/9/2023 showed mild bibasilar atelectasis, moderate emphysema  -Echocardiogram 9/13/2022 showed EF 56%, RVSP markedly elevated greater than 55 mmHg, moderate to severe pulmonary hypertension present  -Patient underwent right heart cath September 2022 after which she was diagnosed with moderate pulmonary artery hypertension, nonresponder to vasodilator challenge  -PFTs 10/13/2022 showed severe obstructive disease FEV1 14% of predicted, significant response to bronchodilator with 26% improvement in FEV1.  Air trapping present, DLCO severely reduced 18% of predicted.  -Continue wearing supplemental oxygen 4 L to keep O2 saturation at or greater than 89%  -Continue wearing Astral vent nightly with 6 L oxygen bled in  -Continue using Brovana and Pulmicort twice daily as prescribed, reminded patient to rinse mouth after each use  -Continue using Yupelri daily  -Continue using albuterol and DuoNeb treatments as needed  -Continue taking itraconazole for Aspergillus infection  -Continue taking Lasix 20 mg daily  -Continue following up with cardiology for management of heart failure  -Patient to keep regularly scheduled appointment with Dr. Moya 2/22/2024     Smoking status: Reviewed  Vaccination status: Patient reports he is up-to-date with his flu, pneumonia, and Covid vaccines.  Patient is advised to continue to follow CDC recommendations such as social distancing wearing a mask and washing hands for at least 20 seconds.  Medications personally reviewed     Follow Up   No follow-ups on file.  Patient was given instructions and counseling regarding his condition or for health maintenance advice. Please see specific information  pulled into the AVS if appropriate.

## 2023-12-04 NOTE — CONSULTS
Pulmonary / Critical Care Consult Note      Patient Name: Sumeet Gomes  : 1960  MRN: [id number]  Primary Care Physician:  Mirtha Alexander APRN  Referring Physician: No ref. provider found  Date of admission: 2023    Subjective   Subjective     Reason for Consult/ Chief Complaint: Acute on chronic hypoxic and hypercarbic respiratory failure    HPI:  Sumeet Gomes is a 62 y.o. male with past medical history of chronic hypoxic and hypercapnic respiratory failure, asthma/COPD overlap syndrome, emphysema, pulmonary hypertension, history of aspergillus infection, and ongoing tobacco abuse who initially presented to the pulmonary office today with reports of shortness of breath.  In the office patient was found to be hypoxic with SPO2 in mid 70s on home O2.  Patient's O2 was then increased to 6 L per nasal cannula and was advised to seek evaluation in the emergency department.  In the emergency department, patient was transitioned to BiPAP.  Labs of note include ABG 7.3 /, hematocrit 53, proBNP 4590, creatinine 0.61, and bicarb 36.9.  CXR revealed emphysematous changes but no acute cardiopulmonary abnormality.  Patient was then admitted to hospitalist service for acute on chronic hypoxic and hypercapnic respiratory failure with COPD exacerbation.  Pulmonology was then consulted for the above reason.    Upon exam, patient was noted to be resting comfortably in bed on continuous BiPAP  with 26% FiO2 with SPO2 94%.  Patient's daughter is at bedside who helps provide medical history.  The daughter reports patient has had worsening dyspnea for the last month.  Reports a ingestion, dry nonproductive cough and increased wheezing.  He has had decreased appetite and p.o. intake.  Patient is a current smoker.  Does have astral vent at home which he reports using nightly with 6 L supplemental O2.  Denies fever, chills, chest pain, hemoptysis or weight loss.  Denies any known sick contact  exposure.    Review of Systems:  All systems were reviewed and negative except as above    Personal History     Past Medical History:   Diagnosis Date    COPD (chronic obstructive pulmonary disease)     COPD (chronic obstructive pulmonary disease)     Coronary artery disease     Diastolic heart failure     Hyperlipidemia     Hypertension     Pulmonary hypertension        Past Surgical History:   Procedure Laterality Date    BACK SURGERY      BRONCHOSCOPY N/A 09/14/2022    Procedure: BRONCHOSCOPY WITH BRONCHOALVEOLAR LAVAGE AND BRONCHIAL WASHINGS;  Surgeon: Ulices Moya MD;  Location: formerly [facility] ENDOSCOPY;  Service: Pulmonary;  Laterality: N/A;  MUCUS PLUGGING, COPD EXACERBATION    BRONCHOSCOPY      CARDIAC CATHETERIZATION N/A 09/16/2022    Procedure: Right Heart Cath;  Surgeon: Drew Rodriguez MD;  Location: formerly [facility] CATH INVASIVE LOCATION;  Service: Cardiovascular;  Laterality: N/A;    OTHER SURGICAL HISTORY      knee     OTHER SURGICAL HISTORY      shoulder, rotator cuff    SHOULDER SURGERY Right 10/03/2022       Family History: family history includes Asthma in his mother; Emphysema in his mother; Stroke in his mother. Otherwise pertinent FHx was reviewed and not pertinent to current issue.    Social History:  reports that he has been smoking cigarettes. He started smoking about 48 years ago. He has a 11.75 pack-year smoking history. He has never used smokeless tobacco. He reports that he does not currently use alcohol. He reports that he does not use drugs.    Home Medications:  NON FORMULARY, albuterol sulfate HFA, arformoterol, budesonide, furosemide, ipratropium-albuterol, itraconazole, metoprolol tartrate, and revefenacin    Allergies:  No Known Allergies    Objective    Objective     Vitals:   Temp:  [97.7 °F (36.5 °C)-97.8 °F (36.6 °C)] 97.7 °F (36.5 °C)  Heart Rate:  [45-75] 53  Resp:  [20-23] 20  BP: (104-173)/(61-98) 105/61  Flow (L/min):  [2-8] 2    Physical Exam:  Vital Signs Reviewed   General:   Alert, NAD. Chronically ill appearing, lying in bed  HEENT:  PERRL, EOMI.    Neck:  No JVD, no thyromegaly  Lymph: no axillary, cervical, supraclavicular lymphadenopathy noted bilaterally  Chest:  Clear to auscultation bilaterally, no work of breathing noted on bipap  CV: RRR, no M/G/R, pulses 2+  Abd:  Soft, NT, ND, +BS  EXT:  no clubbing, no cyanosis, 1+ edema  Neuro:  A&Ox3, CN grossly intact, no focal deficits.  Skin: No rashes or lesions noted      Result Review    Result Review:  I have personally reviewed the results from the time of this admission to 12/4/2023 17:17 EST and agree with these findings:  [x]  Laboratory  [x]  Microbiology  [x]  Radiology  [x]  EKG/Telemetry   []  Cardiology/Vascular   []  Pathology  []  Old records  []  Other:  Most notable findings include:     ABG 7.30/77/91  proBNP 4590        Lab 12/04/23  1210   WBC 10.15   HEMOGLOBIN 16.0   HEMATOCRIT 53.1*   PLATELETS 134*   SODIUM 144   POTASSIUM 4.4   CHLORIDE 97*   CO2 36.9*   BUN 19   CREATININE 0.61*   GLUCOSE 101*   CALCIUM 9.6   TOTAL PROTEIN 6.8   ALBUMIN 4.3   GLOBULIN 2.5     XR Chest 1 View    Result Date: 12/4/2023  PROCEDURE: XR CHEST 1 VW  COMPARISON: [facility], CT, CT CHEST WO CONTRAST DIAGNOSTIC, 3/09/2023, 12:28.  [facility], CR, XR CHEST 1 VW, 11/30/2022, 17:00.  [facility], CR, XR CHEST 2 VW, 2/01/2023, 15:06.  [facility], CR, XR CHEST 1 VW, 11/25/2023, 12:01.  INDICATIONS: SOA Triage Protocol  FINDINGS:  No focal or diffuse infiltrate is identified.  There is emphysema, as before.  No pleural effusion or pneumothorax. Heart size and mediastinal contour appear within normal limits.       Impression:   Emphysema.   No radiographic findings of acute cardiopulmonary abnormality.       ZURI MARIE MD       Electronically Signed and Approved By: ZURI MARIE MD on 12/04/2023 at 12:29              Assessment & Plan   Assessment / Plan     Active Hospital  Problems:  Active Hospital Problems    Diagnosis     **Acute respiratory failure with hypoxia      Impression:   Acute on chronic hypoxic/hypercapnic respiratory failure  Acute exacerbation of COPD  Obstructive sleep apnea  Pulmonary hypertension  History of aspergillus infection (9/14/2022)  HFpEF  History of hypertension  Ongoing tobacco abuse of cigarettes    Plan:   -Currently on continuous NIPPV 12/6 with FiO2 26%, may remove for meals and drinks  -Repeat ABG in a.m.  -RT  consulted for assistance in evaluating home NIV.  -CXR reviewed revealing emphysematous changes without any acute cardiopulmonary abnormality  -Procalcitonin --> 0.05  -Check respiratory viral panel  -Continue Brovana and DuoNebs  -Start bronchopulmonary hygiene.  -Continue Solu-Medrol 40 mg IV every 8 hours  -Continue home itraconazole for Aspergillus  -Check echocardiogram  -proBNP 4590, Continue Lasix 20 mg p.o. daily  -Trend electrolytes and renal panel.  Replace electrolytes as needed.  -Encourage smoking cessation.  -Encourage mobility.  Out of bed to chair.  -PT/OT on board.  Appreciate assistance.    Electronically signed by ZULY Doyle, 12/04/23, 5:17 PM EST.  This patient was seen by both a physician and a NP. I, Yadira Lopez MD, spent >50% of time in accordance with split shared billing. This included personally reviewing all pertinent labs, imaging, microbiology and documentation. Also discussing the case with the patient and any available family, the admitting physician and any available ancillary staff.   Electronically signed by Yadira Lopez MD, 12/04/23, 6:38 PM EST.

## 2023-12-04 NOTE — PROGRESS NOTES
Primary Care Provider  Mirtha Alexander APRN   Referring Provider  No ref. provider found      Patient Complaint  Acute, Shortness of Breath, Chest Congestion, and Trouble taking deep breath      Subjective          Sumeet Gomes presents to [facility]      History of Presenting Illness  Sumeet Gomes is a 62 y.o. male patient of Dr. Moya with hypoxic and hypercapnic respiratory failure, asthma/COPD overlap syndrome, emphysema, pulmonary hypertension, and tobacco abuse ongoing, here for acute visit.    Patient presents today with shortness of breath, congestion, and difficulty taking deep breaths.  He is accompanied today by his daughter.  Patient went to the ED a little over a week ago with similar symptoms, was not admitted, but patient feels even worse today than he did the day he went to the ED.  Per patient, he was given a steroid and breathing treatment in the ED, which temporarily improved his symptoms.  He was negative for flu, COVID, and RSV.  His CXR was unremarkable.  Patient denies using any antibiotics for his lungs recently, denies any fevers or chills.  He continues to experience shortness of breath with minimal exertion.  He also has not been eating or drinking much at all.  Upon presentation today, patient's O2 saturation was 74% on 4 L pulsed dose, improved to 90% on 6 L with via oxygen concentrator.  Patient continues to use Brovana, Pulmicort, and Yupelri daily as well as his albuterol and DuoNeb treatments as needed.  He also continues to take itraconazole for Aspergillus infection.  He has been on this medication for about 1 year.  Patient normally wears 4 L supplemental oxygen.  He wears his Astral vent nightly with 6 L supplemental oxygen bled in.  Patient sees cardiology for management of heart failure.  He takes Lasix 20 mg daily, BNP mildly elevated at 6000 in the ED.  He is a current smoker but has not smoked for a couple of  days, 12 pack years.  Patient denies any productive cough, hemoptysis, swollen lymph nodes, weight loss, or night sweats.  Patient has difficulty performing ADLs due to shortness of breath.  I have personally reviewed the review of systems, past family, social, medical and surgical histories; and agree with their findings.      Review of Systems    Review of Systems   Constitutional:  Positive for appetite change (no appetite). Negative for activity change, chills, fatigue, fever, unexpected weight gain and unexpected weight loss.   HENT:  Positive for congestion. Negative for ear discharge, ear pain, mouth sores, postnasal drip, rhinorrhea, sinus pressure, sore throat, swollen glands and trouble swallowing.    Eyes:  Negative for blurred vision, pain, discharge, itching and visual disturbance.   Respiratory:  Positive for shortness of breath (at rest) and wheezing (intermittent). Negative for apnea, cough, chest tightness and stridor.    Cardiovascular:  Negative for chest pain, palpitations and leg swelling.   Gastrointestinal:  Negative for abdominal distention, abdominal pain, constipation, diarrhea, nausea, vomiting, GERD and indigestion.   Musculoskeletal:  Negative for arthralgias, joint swelling and myalgias.   Skin:  Negative for color change.   Neurological:  Negative for dizziness, weakness, light-headedness and headache.      Sleep: Negative for Excessive daytime sleepiness  Negative for morning headaches  Negative for Snoring      Family History   Problem Relation Age of Onset    Asthma Mother     Emphysema Mother             Stroke Mother         Social History     Socioeconomic History    Marital status:    Tobacco Use    Smoking status: Every Day     Packs/day: 0.25     Years: 47.00     Additional pack years: 0.00     Total pack years: 11.75     Types: Cigarettes     Start date: 1975    Smokeless tobacco: Never    Tobacco comments:     Pt stated he is smoking 3 cigarettes per day      12/4/23 hasn't smoked for a couple days   Vaping Use    Vaping Use: Never used   Substance and Sexual Activity    Alcohol use: Not Currently    Drug use: Never    Sexual activity: Defer        Past Medical History:   Diagnosis Date    COPD (chronic obstructive pulmonary disease)     COPD (chronic obstructive pulmonary disease)     Coronary artery disease     Diastolic heart failure     Hyperlipidemia     Hypertension     Pulmonary hypertension         Immunization History   Administered Date(s) Administered    COVID-19 (MODERNA) 1st,2nd,3rd Dose Monovalent 06/18/2021, 06/18/2021, 07/23/2021, 07/23/2021    Flu Vaccine Quad PF >36MO 11/08/2019    Fluzone (or Fluarix & Flulaval for VFC) >6mos 11/08/2019, 10/25/2021, 10/06/2022    Hepatitis A 11/13/2018    Pneumococcal Conjugate 13-Valent (PCV13) 11/07/2018    Pneumococcal Conjugate 20-Valent (PCV20) 11/08/2022    Pneumococcal Polysaccharide (PPSV23) 02/13/2017    Tdap 06/13/2012    Zostavax 06/13/2012       No Known Allergies       Current Outpatient Medications:     albuterol sulfate  (90 Base) MCG/ACT inhaler, Inhale 2 puffs Every 4 (Four) Hours As Needed for Wheezing., Disp: 18 g, Rfl: 0    arformoterol (Brovana) 15 MCG/2ML nebulizer solution, Take 2 mL by nebulization 2 (Two) Times a Day., Disp: 120 mL, Rfl: 11    budesonide (Pulmicort) 0.5 MG/2ML nebulizer solution, Take 2 mL by nebulization Daily., Disp: 60 each, Rfl: 11    furosemide (LASIX) 20 MG tablet, Take 1 tablet by mouth Daily., Disp: 90 tablet, Rfl: 1    ipratropium-albuterol (DUO-NEB) 0.5-2.5 mg/3 ml nebulizer, Take 3 mL by nebulization 4 (Four) Times a Day., Disp: 360 mL, Rfl: 3    itraconazole (Sporanox) 100 MG capsule, Take 2 capsules by mouth 2 (Two) Times a Day., Disp: 120 capsule, Rfl: 5    metoprolol tartrate (LOPRESSOR) 25 MG tablet, Take 0.5 tablets by mouth Every 12 (Twelve) Hours., Disp: 90 tablet, Rfl: 1    NON FORMULARY, V PAP, Disp: , Rfl:     revefenacin (Yupelri) 175 MCG/3ML  "nebulizer solution, Take 3 mL by nebulization Daily., Disp: 60 mL, Rfl: 11     Objective     Vital Signs:   /66 (BP Location: Left arm, Patient Position: Sitting, Cuff Size: Adult)   Pulse 57   Temp 97.8 °F (36.6 °C) (Tympanic)   Resp 20   Ht 170.2 cm (67\")   Wt 64.7 kg (142 lb 9.6 oz)   SpO2 90% Comment: 6L CONTINUOUS NASAL CANNULA. ZULY DAVIDSON AWARE  BMI 22.33 kg/m²     Physical Exam  Constitutional:       General: He is not in acute distress.     Appearance: Normal appearance. He is normal weight. He is not ill-appearing.   HENT:      Right Ear: Tympanic membrane and ear canal normal.      Left Ear: Tympanic membrane and ear canal normal.      Nose: Nose normal.      Mouth/Throat:      Mouth: Mucous membranes are moist.      Pharynx: Oropharynx is clear.   Eyes:      Extraocular Movements: Extraocular movements intact.      Conjunctiva/sclera: Conjunctivae normal.      Pupils: Pupils are equal, round, and reactive to light.   Cardiovascular:      Rate and Rhythm: Normal rate and regular rhythm.      Pulses: Normal pulses.      Heart sounds: Normal heart sounds.   Pulmonary:      Effort: Pulmonary effort is normal. No respiratory distress.      Breath sounds: No stridor. No wheezing, rhonchi or rales.      Comments: Very diminished  Abdominal:      General: Bowel sounds are normal.      Palpations: Abdomen is soft.   Musculoskeletal:         General: No swelling. Normal range of motion.      Cervical back: Normal range of motion and neck supple.      Right lower leg: No edema.      Left lower leg: No edema.   Skin:     General: Skin is warm and dry.   Neurological:      General: No focal deficit present.      Mental Status: He is alert and oriented to person, place, and time.      Motor: No weakness.   Psychiatric:         Mood and Affect: Mood normal.         Behavior: Behavior normal.        Result Review :   I have personally reviewed patient's labs and images.  I also reviewed the ED provider " note 11/25/2023.            Diagnoses and all orders for this visit:    1. Chronic respiratory failure with hypoxia and hypercapnia (Primary)    2. Pulmonary hypertension    3. Asthma-COPD overlap syndrome    4. Pulmonary emphysema, unspecified emphysema type    5. Aspergillus    6. TAISHA (obstructive sleep apnea)    7. Dyspnea, unspecified type    8. Wheezing    9. Cough, unspecified type    10. Fatigue, unspecified type    11. Diastolic heart failure    12. Tobacco abuse    13. Encounter for smoking cessation counseling       Impression and Plan    -Patient sent to ED via private vehicle due to hypoxia, acute shortness of breath, and difficulty taking deep breaths.  He may need increased supplemental oxygen, IV steroids, IV fluids, scheduled round-the-clock breathing treatments, and CPT.  I notified Jacquelin at [facility] ED of his arrival.  -CXR 11/25/2023 showed no active disease  -BNP 11/25/2023 greater than 6,000  -CT chest 3/9/2023 showed mild bibasilar atelectasis, moderate emphysema  -Echocardiogram 9/13/2022 showed EF 56%, RVSP markedly elevated greater than 55 mmHg, moderate to severe pulmonary hypertension present  -Patient underwent right heart cath September 2022 after which she was diagnosed with moderate pulmonary artery hypertension, nonresponder to vasodilator challenge  -PFTs 10/13/2022 showed severe obstructive disease FEV1 14% of predicted, significant response to bronchodilator with 26% improvement in FEV1.  Air trapping present, DLCO severely reduced 18% of predicted.  -Continue wearing supplemental oxygen 4 L to keep O2 saturation at or greater than 89%  -Continue wearing Astral vent nightly with 6 L oxygen bled in  -Continue using Brovana and Pulmicort twice daily as prescribed, reminded patient to rinse mouth after each use  -Continue using Yupelri daily  -Continue using albuterol and DuoNeb treatments as needed  -Continue taking itraconazole for Aspergillus infection  -Continue  taking Lasix 20 mg daily  -Continue following up with cardiology for management of heart failure  -Patient to keep regularly scheduled appointment with Dr. Moya 2/22/2024    Smoking status: Reviewed  Vaccination status: Patient reports he is up-to-date with his flu, pneumonia, and Covid vaccines.  Patient is advised to continue to follow CDC recommendations such as social distancing wearing a mask and washing hands for at least 20 seconds.  Medications personally reviewed    Follow Up   No follow-ups on file.  Patient was given instructions and counseling regarding his condition or for health maintenance advice. Please see specific information pulled into the AVS if appropriate.

## 2023-12-04 NOTE — ED PROVIDER NOTES
Time: 12:42 PM EST  Date of encounter:  2023  Independent Historian/Clinical History and Information was obtained by:   Patient    History is limited by: N/A    Chief Complaint: Dyspnea      History of Present Illness:  Patient is a 62 y.o. year old male who presents to the emergency department for evaluation of dyspnea for the past month.  Patient states there is no sudden change today and he presents because he has had no improvement or at the most gradual worsening.  He denies any chest pain.  Afebrile.  Does have cough and wheeze.  No abdominal pain, vomiting or any other symptoms.    HPI    Patient Care Team  Primary Care Provider: Mirtha Alexander APRN    Past Medical History:     No Known Allergies  Past Medical History:   Diagnosis Date    COPD (chronic obstructive pulmonary disease)     COPD (chronic obstructive pulmonary disease)     Coronary artery disease     Diastolic heart failure     Hyperlipidemia     Hypertension     Pulmonary hypertension      Past Surgical History:   Procedure Laterality Date    BACK SURGERY      BRONCHOSCOPY N/A 2022    Procedure: BRONCHOSCOPY WITH BRONCHOALVEOLAR LAVAGE AND BRONCHIAL WASHINGS;  Surgeon: Ulices Moya MD;  Location: [facility] ENDOSCOPY;  Service: Pulmonary;  Laterality: N/A;  MUCUS PLUGGING, COPD EXACERBATION    BRONCHOSCOPY      CARDIAC CATHETERIZATION N/A 2022    Procedure: Right Heart Cath;  Surgeon: Drew Rodriguez MD;  Location: [facility] CATH INVASIVE LOCATION;  Service: Cardiovascular;  Laterality: N/A;    OTHER SURGICAL HISTORY      knee     OTHER SURGICAL HISTORY      shoulder, rotator cuff    SHOULDER SURGERY Right 10/03/2022     Family History   Problem Relation Age of Onset    Asthma Mother     Emphysema Mother             Stroke Mother        Home Medications:  Prior to Admission medications    Medication Sig Start Date End Date Taking? Authorizing Provider   albuterol sulfate  (90 Base) MCG/ACT inhaler Inhale 2  puffs Every 4 (Four) Hours As Needed for Wheezing. 11/25/23   Tyron Copeland MD   arformoterol (Brovana) 15 MCG/2ML nebulizer solution Take 2 mL by nebulization 2 (Two) Times a Day. 2/27/23   Ulices Moya MD   budesonide (Pulmicort) 0.5 MG/2ML nebulizer solution Take 2 mL by nebulization Daily. 2/27/23   Ulices Moya MD   furosemide (LASIX) 20 MG tablet Take 1 tablet by mouth Daily. 8/2/23   Mirtha Alexander APRN   ipratropium-albuterol (DUO-NEB) 0.5-2.5 mg/3 ml nebulizer Take 3 mL by nebulization 4 (Four) Times a Day. 5/26/23   Julia Chin APRN   itraconazole (Sporanox) 100 MG capsule Take 2 capsules by mouth 2 (Two) Times a Day. 5/26/23   Julia Chin APRN   metoprolol tartrate (LOPRESSOR) 25 MG tablet Take 0.5 tablets by mouth Every 12 (Twelve) Hours. 8/2/23   Mirtha Alexander APRN   NON FORMULARY V PAP    Provider, Historical, MD   revefenacin (Yupelri) 175 MCG/3ML nebulizer solution Take 3 mL by nebulization Daily. 6/5/23   Julia Chin APRN   albuterol sulfate  (90 Base) MCG/ACT inhaler Inhale 2 puffs Every 6 (Six) Hours As Needed for Wheezing or Shortness of Air. for wheezing 10/17/23 12/4/23  Ulices Moya MD        Social History:   Social History     Tobacco Use    Smoking status: Every Day     Packs/day: 0.25     Years: 47.00     Additional pack years: 0.00     Total pack years: 11.75     Types: Cigarettes     Start date: 1/1/1975    Smokeless tobacco: Never    Tobacco comments:     Pt stated he is smoking 3 cigarettes per day     12/4/23 hasn't smoked for a couple days   Vaping Use    Vaping Use: Never used   Substance Use Topics    Alcohol use: Not Currently    Drug use: Never         Review of Systems:  Review of Systems   Constitutional:  Negative for chills and fever.   HENT:  Negative for congestion, rhinorrhea and sore throat.    Eyes:  Negative for photophobia.   Respiratory:  Positive for cough, shortness of breath and wheezing. Negative for apnea and chest  "tightness.    Cardiovascular:  Negative for chest pain and palpitations.   Gastrointestinal:  Negative for abdominal pain, diarrhea, nausea and vomiting.   Endocrine: Negative.    Genitourinary:  Negative for difficulty urinating and dysuria.   Musculoskeletal:  Negative for back pain, joint swelling and myalgias.   Skin:  Negative for color change and wound.   Allergic/Immunologic: Negative.    Neurological:  Negative for seizures and headaches.   Psychiatric/Behavioral: Negative.     All other systems reviewed and are negative.       Physical Exam:  /72   Pulse 52   Temp 97.7 °F (36.5 °C) (Oral)   Resp 20   Ht 170.2 cm (67\")   Wt 61 kg (134 lb 7.7 oz)   SpO2 91%   BMI 21.06 kg/m²     Physical Exam  Vitals and nursing note reviewed.   Constitutional:       Appearance: Normal appearance. He is well-developed.   HENT:      Head: Normocephalic and atraumatic.      Nose: Nose normal.      Mouth/Throat:      Mouth: Mucous membranes are dry.   Eyes:      Extraocular Movements: Extraocular movements intact.      Pupils: Pupils are equal, round, and reactive to light.   Cardiovascular:      Rate and Rhythm: Normal rate and regular rhythm.      Heart sounds: Normal heart sounds.   Pulmonary:      Effort: Tachypnea present.      Breath sounds: Examination of the right-upper field reveals decreased breath sounds and wheezing. Examination of the left-upper field reveals decreased breath sounds and wheezing.   Abdominal:      General: Bowel sounds are normal.      Palpations: Abdomen is soft.      Tenderness: There is no abdominal tenderness.   Musculoskeletal:         General: No swelling. Normal range of motion.      Cervical back: Normal range of motion and neck supple.      Right lower leg: Edema present.      Left lower leg: Edema present.   Skin:     General: Skin is warm and dry.      Coloration: Skin is not jaundiced.   Neurological:      General: No focal deficit present.      Mental Status: He is alert " and oriented to person, place, and time. Mental status is at baseline.   Psychiatric:         Mood and Affect: Mood normal.         Behavior: Behavior normal.         Judgment: Judgment normal.                  Procedures:  Procedures      Medical Decision Making:      Comorbidities that affect care:    COPD, hypertension, diastolic CHF, hyperlipidemia, CAD    External Notes reviewed:    Previous Clinic Note: Outpatient visit with pulmonology today as follows:  Impression and Plan     -Patient sent to ED via private vehicle due to hypoxia, acute shortness of breath, and difficulty taking deep breaths.  He may need increased supplemental oxygen, IV steroids, IV fluids, scheduled round-the-clock breathing treatments, and CPT.  I notified Jacquelin at [facility] ED of his arrival.  -CXR 11/25/2023 showed no active disease  -BNP 11/25/2023 greater than 6,000  -CT chest 3/9/2023 showed mild bibasilar atelectasis, moderate emphysema  -Echocardiogram 9/13/2022 showed EF 56%, RVSP markedly elevated greater than 55 mmHg, moderate to severe pulmonary hypertension present  -Patient underwent right heart cath September 2022 after which she was diagnosed with moderate pulmonary artery hypertension, nonresponder to vasodilator challenge  -PFTs 10/13/2022 showed severe obstructive disease FEV1 14% of predicted, significant response to bronchodilator with 26% improvement in FEV1.  Air trapping present, DLCO severely reduced 18% of predicted.  -Continue wearing supplemental oxygen 4 L to keep O2 saturation at or greater than 89%  -Continue wearing Astral vent nightly with 6 L oxygen bled in  -Continue using Brovana and Pulmicort twice daily as prescribed, reminded patient to rinse mouth after each use  -Continue using Yupelri daily  -Continue using albuterol and DuoNeb treatments as needed  -Continue taking itraconazole for Aspergillus infection  -Continue taking Lasix 20 mg daily  -Continue following up with cardiology for  management of heart failure  -Patient to keep regularly scheduled appointment with Dr. Moya 2/22/2024     Smoking status: Reviewed  Vaccination status: Patient reports he is up-to-date with his flu, pneumonia, and Covid vaccines.  Patient is advised to continue to follow CDC recommendations such as social distancing wearing a mask and washing hands for at least 20 seconds.  Medications personally reviewed     Follow Up   No follow-ups on file.  Patient was given instructions and counseling regarding his condition or for health maintenance advice. Please see specific information pulled into the AVS if appropriate.           The following orders were placed and all results were independently analyzed by me:  Orders Placed This Encounter   Procedures    COVID-19, FLU A/B, RSV PCR 1 HR TAT - Swab, Nasopharynx    Respiratory Panel PCR w/COVID-19(SARS-CoV-2) KARREI/JUSTIN/ENZO/PAD/COR/J[facility]SE[facility] In-House, NP Swab in UTM/VTM, 2 HR TAT - Swab, Nasopharynx    XR Chest 1 View    [place] Draw    Comprehensive Metabolic Panel    BNP    Single High Sensitivity Troponin T    CBC Auto Differential    Blood Gas, Arterial -With Co-Ox Panel: Yes    Ethanol    NPO Diet NPO Type: Strict NPO    Undress & Gown    Continuous Pulse Oximetry    Vital Signs    Hospitalist (on-call MD unless specified)    Oxygen Therapy- Nasal Cannula; Titrate 1-6 LPM Per SpO2; 90 - 95%    ECG 12 Lead ED Triage Standing Order; SOA    Insert Peripheral IV    Inpatient Admission    CBC & Differential    Green Top (Gel)    Lavender Top    Gold Top - SST    Light Blue Top       Medications Given in the Emergency Department:  Medications   sodium chloride 0.9 % flush 10 mL (has no administration in time range)   ipratropium-albuterol (DUO-NEB) nebulizer solution 3 mL (3 mL Nebulization Given 12/4/23 1300)   methylPREDNISolone sodium succinate (SOLU-Medrol) injection 125 mg (125 mg Intravenous Given 12/4/23 1246)        ED Course:    ED Course as of 12/04/23 1438   Mon Dec 04,  2023 1237 I have personally interpreted the EKG today and it shows no evidence of any acute ischemia or heart arrhythmia. [RP]      ED Course User Index  [RP] Tyron Copeland MD       Labs:    Lab Results (last 24 hours)       Procedure Component Value Units Date/Time    CBC & Differential [[id number]]  (Abnormal) Collected: 12/04/23 1210    Specimen: Blood Updated: 12/04/23 1221    Narrative:      The following orders were created for panel order CBC & Differential.  Procedure                               Abnormality         Status                     ---------                               -----------         ------                     CBC Auto Differential[[id number]]        Abnormal            Final result               Scan Slide[[id number]]                                                                    Please view results for these tests on the individual orders.    Comprehensive Metabolic Panel [[id number]]  (Abnormal) Collected: 12/04/23 1210    Specimen: Blood Updated: 12/04/23 1244     Glucose 101 mg/dL      BUN 19 mg/dL      Creatinine 0.61 mg/dL      Sodium 144 mmol/L      Potassium 4.4 mmol/L      Chloride 97 mmol/L      CO2 36.9 mmol/L      Calcium 9.6 mg/dL      Total Protein 6.8 g/dL      Albumin 4.3 g/dL      ALT (SGPT) 37 U/L      AST (SGOT) 33 U/L      Alkaline Phosphatase 189 U/L      Total Bilirubin 1.4 mg/dL      Globulin 2.5 gm/dL      A/G Ratio 1.7 g/dL      BUN/Creatinine Ratio 31.1     Anion Gap 10.1 mmol/L      eGFR 109.3 mL/min/1.73     Narrative:      GFR Normal >60  Chronic Kidney Disease <60  Kidney Failure <15      BNP [[id number]]  (Abnormal) Collected: 12/04/23 1210    Specimen: Blood Updated: 12/04/23 1240     proBNP 4,590.0 pg/mL     Narrative:      This assay is used as an aid in the diagnosis of individuals suspected of having heart failure. It can be used as an aid in the diagnosis of acute decompensated heart failure (ADHF) in patients presenting with signs and symptoms  of ADHF to the emergency department (ED). In addition, NT-proBNP of <300 pg/mL indicates ADHF is not likely.    Age Range Result Interpretation  NT-proBNP Concentration (pg/mL:      <50             Positive            >450                   Gray                 300-450                    Negative             <300    50-75           Positive            >900                  Gray                300-900                  Negative            <300      >75             Positive            >1800                  Gray                300-1800                  Negative            <300    Single High Sensitivity Troponin T [[id number]]  (Abnormal) Collected: 12/04/23 1210    Specimen: Blood Updated: 12/04/23 1244     HS Troponin T 23 ng/L     Narrative:      High Sensitive Troponin T Reference Range:  <14.0 ng/L- Negative Female for AMI  <22.0 ng/L- Negative Male for AMI  >=14 - Abnormal Female indicating possible myocardial injury.  >=22 - Abnormal Male indicating possible myocardial injury.   Clinicians would have to utilize clinical acumen, EKG, Troponin, and serial changes to determine if it is an Acute Myocardial Infarction or myocardial injury due to an underlying chronic condition.         CBC Auto Differential [[id number]]  (Abnormal) Collected: 12/04/23 1210    Specimen: Blood Updated: 12/04/23 1221     WBC 10.15 10*3/mm3      RBC 5.73 10*6/mm3      Hemoglobin 16.0 g/dL      Hematocrit 53.1 %      MCV 92.7 fL      MCH 27.9 pg      MCHC 30.1 g/dL      RDW 14.6 %      RDW-SD 50.4 fl      MPV 10.7 fL      Platelets 134 10*3/mm3      Neutrophil % 89.5 %      Lymphocyte % 4.1 %      Monocyte % 5.2 %      Eosinophil % 0.1 %      Basophil % 0.1 %      Immature Grans % 1.0 %      Neutrophils, Absolute 9.08 10*3/mm3      Lymphocytes, Absolute 0.42 10*3/mm3      Monocytes, Absolute 0.53 10*3/mm3      Eosinophils, Absolute 0.01 10*3/mm3      Basophils, Absolute 0.01 10*3/mm3      Immature Grans, Absolute 0.10 10*3/mm3      nRBC 0.0  /100 WBC     Ethanol [[id number]] Collected: 12/04/23 1210    Specimen: Blood Updated: 12/04/23 1432    COVID-19, FLU A/B, RSV PCR 1 HR TAT - Swab, Nasopharynx [[id number]]  (Normal) Collected: 12/04/23 1242    Specimen: Swab from Nasopharynx Updated: 12/04/23 1341     COVID19 Not Detected     Influenza A PCR Not Detected     Influenza B PCR Not Detected     RSV, PCR Not Detected    Narrative:      Fact sheet for providers: https://www.fda.gov/media/534964/download    Fact sheet for patients: https://www.fda.gov/media/895034/download    Test performed by PCR.    Blood Gas, Arterial -With Co-Ox Panel: Yes [[id number]]  (Abnormal) Collected: 12/04/23 1300    Specimen: Arterial Blood from Arm, Right Updated: 12/04/23 1304     pH, Arterial 7.304 pH units      pCO2, Arterial 77.7 mm Hg      pO2, Arterial 91.0 mm Hg      HCO3, Arterial 37.7 mmol/L      Base Excess, Arterial 7.9 mmol/L      O2 Saturation, Arterial 96.4 %      Hemoglobin, Blood Gas 15.4 g/dL      Carboxyhemoglobin 3.0 %      Methemoglobin 0.10 %      Oxyhemoglobin 93.4 %      FHHB 3.5 %      Site Arterial: right radial     Modality Nasal Cannula     FIO2 28 %      Flow Rate 2 lpm      PO2/FIO2 325     Adebayo's Test Positive     Lactate, Arterial --             Imaging:    XR Chest 1 View    Result Date: 12/4/2023  PROCEDURE: XR CHEST 1 VW  COMPARISON: [facility], CT, CT CHEST WO CONTRAST DIAGNOSTIC, 3/09/2023, 12:28.  [facility], CR, XR CHEST 1 VW, 11/30/2022, 17:00.  [facility], CR, XR CHEST 2 VW, 2/01/2023, 15:06.  [facility], CR, XR CHEST 1 VW, 11/25/2023, 12:01.  INDICATIONS: SOA Triage Protocol  FINDINGS:  No focal or diffuse infiltrate is identified.  There is emphysema, as before.  No pleural effusion or pneumothorax. Heart size and mediastinal contour appear within normal limits.         Emphysema.   No radiographic findings of acute cardiopulmonary abnormality.       ZURI MARIE MD        Electronically Signed and Approved By: ZURI MARIE MD on 12/04/2023 at 12:29                Differential Diagnosis and Discussion:    Dyspnea: Differential diagnosis includes but is not limited to metabolic acidosis, neurological disorders, psychogenic, asthma, pneumothorax, upper airway obstruction, COPD, pneumonia, noncardiogenic pulmonary edema, interstitial lung disease, anemia, congestive heart failure, and pulmonary embolism    All labs were reviewed and interpreted by me.  All X-rays impressions were independently interpreted by me.  EKG was interpreted by me.    MDM     Amount and/or Complexity of Data Reviewed  Clinical lab tests: reviewed  Tests in the radiology section of CPT®: reviewed  Tests in the medicine section of CPT®: reviewed         Critical Care Note: Total Critical Care time of 40 minutes. Total critical care time documented does not include time spent on separately billed procedures for services of nurses or physician assistants. I personally saw and examined the patient. I have reviewed all diagnostic interpretations and treatment plans as written. I was present for the key portions of any procedures performed and the inclusive time noted in any critical care statement. Critical care time includes patient management by me, time spent at the patients bedside,  time to review lab and imaging results, discussing patient care, documentation in the medical record, and time spent with family or caregiver.    Patient required multiple levels of escalation of respiratory care, going from standard nasal cannula to high flow nasal cannula and later BiPAP.  He required multiple reassessments to ensure stability.        Patient Care Considerations:    PERC: I used the PERC score to risk stratify the patient for PE and a CT of the chest was considered but ultimately not indicated in today's visit.      Consultants/Shared Management Plan:    Hospitalist: I have discussed the case with Dr. Rouse who  agrees to accept the patient for admission.    Social Determinants of Health:    Patient is independent, reliable, and has access to care.       Disposition and Care Coordination:    Admit:   Through independent evaluation of the patient's history, physical, and imperical data, the patient meets criteria for observation/admission to the hospital.        Final diagnoses:   COPD exacerbation   Acute on chronic respiratory failure with hypercapnia   Acute on chronic respiratory failure with hypoxia        ED Disposition       ED Disposition   Decision to Admit    Condition   --    Comment   Level of Care: Telemetry [5]   Diagnosis: Acute respiratory failure with hypoxia [[id number]]   Admitting Physician: JESS LOPEZ [[id number]]   Attending Physician: JESS LOPEZ [[id number]]   Certification: I Certify That Inpatient Hospital Services Are Medically Necessary For Greater Than 2 Midnights                 This medical record created using voice recognition software.             Tyron Copeland MD  12/04/23 9630

## 2023-12-05 ENCOUNTER — APPOINTMENT (OUTPATIENT)
Dept: CARDIOLOGY | Facility: HOSPITAL | Age: 63
DRG: 190 | End: 2023-12-05
Payer: MEDICAID

## 2023-12-05 LAB
ALBUMIN SERPL-MCNC: 3.2 G/DL (ref 3.5–5.2)
ALBUMIN/GLOB SERPL: 1.8 G/DL
ALP SERPL-CCNC: 132 U/L (ref 39–117)
ALT SERPL W P-5'-P-CCNC: 22 U/L (ref 1–41)
ANION GAP SERPL CALCULATED.3IONS-SCNC: 6.8 MMOL/L (ref 5–15)
ARTERIAL PATENCY WRIST A: POSITIVE
AST SERPL-CCNC: 19 U/L (ref 1–40)
B PARAPERT DNA SPEC QL NAA+PROBE: NOT DETECTED
B PERT DNA SPEC QL NAA+PROBE: NOT DETECTED
BASE EXCESS BLDA CALC-SCNC: 8.1 MMOL/L (ref -2–2)
BASOPHILS # BLD AUTO: 0 10*3/MM3 (ref 0–0.2)
BASOPHILS NFR BLD AUTO: 0 % (ref 0–1.5)
BDY SITE: ABNORMAL
BH CV ECHO MEAS - ACS: 2 CM
BH CV ECHO MEAS - AO MAX PG: 5 MMHG
BH CV ECHO MEAS - AO MEAN PG: 3 MMHG
BH CV ECHO MEAS - AO ROOT DIAM: 3.5 CM
BH CV ECHO MEAS - AO V2 MAX: 109 CM/SEC
BH CV ECHO MEAS - AO V2 VTI: 22.9 CM
BH CV ECHO MEAS - AVA(I,D): 2.47 CM2
BH CV ECHO MEAS - EDV(CUBED): 103.8 ML
BH CV ECHO MEAS - EDV(MOD-SP4): 79.8 ML
BH CV ECHO MEAS - EF(MOD-SP4): 55.6 %
BH CV ECHO MEAS - ESV(CUBED): 22 ML
BH CV ECHO MEAS - ESV(MOD-SP4): 35.4 ML
BH CV ECHO MEAS - FS: 40.4 %
BH CV ECHO MEAS - IVS/LVPW: 0.78 CM
BH CV ECHO MEAS - IVSD: 0.7 CM
BH CV ECHO MEAS - LA DIMENSION: 3.1 CM
BH CV ECHO MEAS - LAT PEAK E' VEL: 12.2 CM/SEC
BH CV ECHO MEAS - LV DIASTOLIC VOL/BSA (35-75): 46.8 CM2
BH CV ECHO MEAS - LV MASS(C)D: 122.3 GRAMS
BH CV ECHO MEAS - LV MAX PG: 2.9 MMHG
BH CV ECHO MEAS - LV MEAN PG: 1 MMHG
BH CV ECHO MEAS - LV SYSTOLIC VOL/BSA (12-30): 20.8 CM2
BH CV ECHO MEAS - LV V1 MAX: 85.4 CM/SEC
BH CV ECHO MEAS - LV V1 VTI: 18 CM
BH CV ECHO MEAS - LVIDD: 4.7 CM
BH CV ECHO MEAS - LVIDS: 2.8 CM
BH CV ECHO MEAS - LVOT AREA: 3.1 CM2
BH CV ECHO MEAS - LVOT DIAM: 2 CM
BH CV ECHO MEAS - LVPWD: 0.9 CM
BH CV ECHO MEAS - MED PEAK E' VEL: 7.3 CM/SEC
BH CV ECHO MEAS - MV A MAX VEL: 73.9 CM/SEC
BH CV ECHO MEAS - MV DEC TIME: 0.22 SEC
BH CV ECHO MEAS - MV E MAX VEL: 73.4 CM/SEC
BH CV ECHO MEAS - MV E/A: 0.99
BH CV ECHO MEAS - RAP SYSTOLE: 3 MMHG
BH CV ECHO MEAS - RVSP: 52 MMHG
BH CV ECHO MEAS - SI(MOD-SP4): 26 ML/M2
BH CV ECHO MEAS - SV(LVOT): 56.5 ML
BH CV ECHO MEAS - SV(MOD-SP4): 44.4 ML
BH CV ECHO MEAS - TR MAX PG: 49 MMHG
BH CV ECHO MEAS - TR MAX VEL: 350 CM/SEC
BH CV ECHO MEASUREMENTS AVERAGE E/E' RATIO: 7.53
BILIRUB SERPL-MCNC: 0.9 MG/DL (ref 0–1.2)
BUN SERPL-MCNC: 17 MG/DL (ref 8–23)
BUN/CREAT SERPL: 34.7 (ref 7–25)
C PNEUM DNA NPH QL NAA+NON-PROBE: NOT DETECTED
CALCIUM SPEC-SCNC: 8.6 MG/DL (ref 8.6–10.5)
CHLORIDE SERPL-SCNC: 97 MMOL/L (ref 98–107)
CO2 SERPL-SCNC: 34.2 MMOL/L (ref 22–29)
COHGB MFR BLD: 1.6 % (ref 0–1.5)
CREAT SERPL-MCNC: 0.49 MG/DL (ref 0.76–1.27)
DEPRECATED RDW RBC AUTO: 46.8 FL (ref 37–54)
EGFRCR SERPLBLD CKD-EPI 2021: 116.8 ML/MIN/1.73
EOSINOPHIL # BLD AUTO: 0 10*3/MM3 (ref 0–0.4)
EOSINOPHIL NFR BLD AUTO: 0 % (ref 0.3–6.2)
ERYTHROCYTE [DISTWIDTH] IN BLOOD BY AUTOMATED COUNT: 14.4 % (ref 12.3–15.4)
FHHB: 6.1 % (ref 0–5)
FLUAV SUBTYP SPEC NAA+PROBE: NOT DETECTED
FLUBV RNA ISLT QL NAA+PROBE: NOT DETECTED
GLOBULIN UR ELPH-MCNC: 1.8 GM/DL
GLUCOSE SERPL-MCNC: 167 MG/DL (ref 65–99)
HADV DNA SPEC NAA+PROBE: NOT DETECTED
HCO3 BLDA-SCNC: 33 MMOL/L (ref 22–26)
HCOV 229E RNA SPEC QL NAA+PROBE: NOT DETECTED
HCOV HKU1 RNA SPEC QL NAA+PROBE: NOT DETECTED
HCOV NL63 RNA SPEC QL NAA+PROBE: NOT DETECTED
HCOV OC43 RNA SPEC QL NAA+PROBE: NOT DETECTED
HCT VFR BLD AUTO: 40.8 % (ref 37.5–51)
HGB BLD-MCNC: 12.6 G/DL (ref 13–17.7)
HGB BLDA-MCNC: 13.7 G/DL (ref 13.8–16.4)
HMPV RNA NPH QL NAA+NON-PROBE: NOT DETECTED
HPIV1 RNA ISLT QL NAA+PROBE: NOT DETECTED
HPIV2 RNA SPEC QL NAA+PROBE: NOT DETECTED
HPIV3 RNA NPH QL NAA+PROBE: NOT DETECTED
HPIV4 P GENE NPH QL NAA+PROBE: NOT DETECTED
IMM GRANULOCYTES # BLD AUTO: 0.12 10*3/MM3 (ref 0–0.05)
IMM GRANULOCYTES NFR BLD AUTO: 2.1 % (ref 0–0.5)
INHALED O2 CONCENTRATION: 26 %
LACTATE BLDA-SCNC: ABNORMAL MMOL/L
LEFT ATRIUM VOLUME INDEX: 29.7 ML/M2
LV EF 2D ECHO EST: 55 %
LYMPHOCYTES # BLD AUTO: 0.18 10*3/MM3 (ref 0.7–3.1)
LYMPHOCYTES NFR BLD AUTO: 3.1 % (ref 19.6–45.3)
M PNEUMO IGG SER IA-ACNC: NOT DETECTED
MAGNESIUM SERPL-MCNC: 2 MG/DL (ref 1.6–2.4)
MCH RBC QN AUTO: 27.4 PG (ref 26.6–33)
MCHC RBC AUTO-ENTMCNC: 30.9 G/DL (ref 31.5–35.7)
MCV RBC AUTO: 88.7 FL (ref 79–97)
METHGB BLD QL: 0.2 % (ref 0–1.5)
MODALITY: ABNORMAL
MONOCYTES # BLD AUTO: 0.08 10*3/MM3 (ref 0.1–0.9)
MONOCYTES NFR BLD AUTO: 1.4 % (ref 5–12)
NEUTROPHILS NFR BLD AUTO: 5.45 10*3/MM3 (ref 1.7–7)
NEUTROPHILS NFR BLD AUTO: 93.4 % (ref 42.7–76)
NOTE: ABNORMAL
NRBC BLD AUTO-RTO: 0 /100 WBC (ref 0–0.2)
OXYHGB MFR BLDV: 92.1 % (ref 94–99)
PCO2 BLDA: 47.2 MM HG (ref 35–45)
PH BLDA: 7.46 PH UNITS (ref 7.35–7.45)
PLATELET # BLD AUTO: 103 10*3/MM3 (ref 140–450)
PMV BLD AUTO: 11.3 FL (ref 6–12)
PO2 BLD: 258 MM[HG] (ref 0–500)
PO2 BLDA: 67.1 MM HG (ref 80–100)
POTASSIUM SERPL-SCNC: 3.9 MMOL/L (ref 3.5–5.2)
PROT SERPL-MCNC: 5 G/DL (ref 6–8.5)
QT INTERVAL: 413 MS
QTC INTERVAL: 429 MS
RBC # BLD AUTO: 4.6 10*6/MM3 (ref 4.14–5.8)
RHINOVIRUS RNA SPEC NAA+PROBE: NOT DETECTED
RSV RNA NPH QL NAA+NON-PROBE: NOT DETECTED
SAO2 % BLDCOA: 93.8 % (ref 95–99)
SARS-COV-2 RNA RESP QL NAA+PROBE: NOT DETECTED
SET MECH RESP RATE: 4
SODIUM SERPL-SCNC: 138 MMOL/L (ref 136–145)
WBC NRBC COR # BLD AUTO: 5.83 10*3/MM3 (ref 3.4–10.8)

## 2023-12-05 PROCEDURE — 94660 CPAP INITIATION&MGMT: CPT

## 2023-12-05 PROCEDURE — 82375 ASSAY CARBOXYHB QUANT: CPT | Performed by: STUDENT IN AN ORGANIZED HEALTH CARE EDUCATION/TRAINING PROGRAM

## 2023-12-05 PROCEDURE — 94799 UNLISTED PULMONARY SVC/PX: CPT

## 2023-12-05 PROCEDURE — 25010000002 METHYLPREDNISOLONE PER 40 MG: Performed by: INTERNAL MEDICINE

## 2023-12-05 PROCEDURE — 0202U NFCT DS 22 TRGT SARS-COV-2: CPT | Performed by: INTERNAL MEDICINE

## 2023-12-05 PROCEDURE — 97165 OT EVAL LOW COMPLEX 30 MIN: CPT

## 2023-12-05 PROCEDURE — 25010000002 ENOXAPARIN PER 10 MG: Performed by: INTERNAL MEDICINE

## 2023-12-05 PROCEDURE — 80053 COMPREHEN METABOLIC PANEL: CPT | Performed by: INTERNAL MEDICINE

## 2023-12-05 PROCEDURE — 93306 TTE W/DOPPLER COMPLETE: CPT | Performed by: INTERNAL MEDICINE

## 2023-12-05 PROCEDURE — 25010000002 FUROSEMIDE PER 20 MG: Performed by: STUDENT IN AN ORGANIZED HEALTH CARE EDUCATION/TRAINING PROGRAM

## 2023-12-05 PROCEDURE — 99232 SBSQ HOSP IP/OBS MODERATE 35: CPT | Performed by: STUDENT IN AN ORGANIZED HEALTH CARE EDUCATION/TRAINING PROGRAM

## 2023-12-05 PROCEDURE — 94761 N-INVAS EAR/PLS OXIMETRY MLT: CPT

## 2023-12-05 PROCEDURE — 97161 PT EVAL LOW COMPLEX 20 MIN: CPT

## 2023-12-05 PROCEDURE — 99233 SBSQ HOSP IP/OBS HIGH 50: CPT | Performed by: STUDENT IN AN ORGANIZED HEALTH CARE EDUCATION/TRAINING PROGRAM

## 2023-12-05 PROCEDURE — 83735 ASSAY OF MAGNESIUM: CPT | Performed by: INTERNAL MEDICINE

## 2023-12-05 PROCEDURE — 94664 DEMO&/EVAL PT USE INHALER: CPT

## 2023-12-05 PROCEDURE — 93306 TTE W/DOPPLER COMPLETE: CPT

## 2023-12-05 PROCEDURE — 85025 COMPLETE CBC W/AUTO DIFF WBC: CPT | Performed by: INTERNAL MEDICINE

## 2023-12-05 PROCEDURE — 82805 BLOOD GASES W/O2 SATURATION: CPT | Performed by: STUDENT IN AN ORGANIZED HEALTH CARE EDUCATION/TRAINING PROGRAM

## 2023-12-05 PROCEDURE — 83050 HGB METHEMOGLOBIN QUAN: CPT | Performed by: STUDENT IN AN ORGANIZED HEALTH CARE EDUCATION/TRAINING PROGRAM

## 2023-12-05 PROCEDURE — 36600 WITHDRAWAL OF ARTERIAL BLOOD: CPT | Performed by: STUDENT IN AN ORGANIZED HEALTH CARE EDUCATION/TRAINING PROGRAM

## 2023-12-05 RX ORDER — PREDNISONE 20 MG/1
40 TABLET ORAL
Status: DISCONTINUED | OUTPATIENT
Start: 2023-12-06 | End: 2023-12-09 | Stop reason: HOSPADM

## 2023-12-05 RX ORDER — METHYLPREDNISOLONE SODIUM SUCCINATE 40 MG/ML
40 INJECTION, POWDER, LYOPHILIZED, FOR SOLUTION INTRAMUSCULAR; INTRAVENOUS DAILY
Status: DISCONTINUED | OUTPATIENT
Start: 2023-12-06 | End: 2023-12-05

## 2023-12-05 RX ORDER — FUROSEMIDE 10 MG/ML
40 INJECTION INTRAMUSCULAR; INTRAVENOUS ONCE
Status: COMPLETED | OUTPATIENT
Start: 2023-12-05 | End: 2023-12-05

## 2023-12-05 RX ORDER — PANTOPRAZOLE SODIUM 40 MG/10ML
40 INJECTION, POWDER, LYOPHILIZED, FOR SOLUTION INTRAVENOUS
Status: DISCONTINUED | OUTPATIENT
Start: 2023-12-06 | End: 2023-12-08 | Stop reason: ALTCHOICE

## 2023-12-05 RX ADMIN — ARFORMOTEROL TARTRATE 15 MCG: 15 SOLUTION RESPIRATORY (INHALATION) at 20:00

## 2023-12-05 RX ADMIN — IPRATROPIUM BROMIDE AND ALBUTEROL SULFATE 3 ML: .5; 3 SOLUTION RESPIRATORY (INHALATION) at 00:25

## 2023-12-05 RX ADMIN — IPRATROPIUM BROMIDE AND ALBUTEROL SULFATE 3 ML: .5; 3 SOLUTION RESPIRATORY (INHALATION) at 13:33

## 2023-12-05 RX ADMIN — ARFORMOTEROL TARTRATE 15 MCG: 15 SOLUTION RESPIRATORY (INHALATION) at 07:09

## 2023-12-05 RX ADMIN — METHYLPREDNISOLONE SODIUM SUCCINATE 40 MG: 40 INJECTION INTRAMUSCULAR; INTRAVENOUS at 05:56

## 2023-12-05 RX ADMIN — ACETAMINOPHEN 650 MG: 325 TABLET ORAL at 00:20

## 2023-12-05 RX ADMIN — ITRACONAZOLE 200 MG: 100 CAPSULE, COATED PELLETS ORAL at 09:02

## 2023-12-05 RX ADMIN — ACETAMINOPHEN 650 MG: 325 TABLET ORAL at 20:27

## 2023-12-05 RX ADMIN — ENOXAPARIN SODIUM 40 MG: 100 INJECTION SUBCUTANEOUS at 09:02

## 2023-12-05 RX ADMIN — IPRATROPIUM BROMIDE AND ALBUTEROL SULFATE 3 ML: .5; 3 SOLUTION RESPIRATORY (INHALATION) at 23:44

## 2023-12-05 RX ADMIN — FUROSEMIDE 40 MG: 10 INJECTION, SOLUTION INTRAMUSCULAR; INTRAVENOUS at 17:43

## 2023-12-05 RX ADMIN — ITRACONAZOLE 200 MG: 100 CAPSULE, COATED PELLETS ORAL at 20:26

## 2023-12-05 RX ADMIN — IPRATROPIUM BROMIDE AND ALBUTEROL SULFATE 3 ML: .5; 3 SOLUTION RESPIRATORY (INHALATION) at 07:09

## 2023-12-05 RX ADMIN — IPRATROPIUM BROMIDE AND ALBUTEROL SULFATE 3 ML: .5; 3 SOLUTION RESPIRATORY (INHALATION) at 20:00

## 2023-12-05 RX ADMIN — Medication 10 ML: at 20:29

## 2023-12-05 RX ADMIN — FUROSEMIDE 20 MG: 20 TABLET ORAL at 09:01

## 2023-12-05 NOTE — CONSULTS
COPD Education    Referring Provider: LATRICE  Reason for Consultation:  YONATAN  Pulmonologist:   ADRIANA  Last outpatient pulmonary visit:  12/4/23    Age: 62 y.o.  Sex: male  BMI:Body mass index is 21.06 kg/m².     Past Medical Hx:  asthma/COPD overlap syndrome, emphysema, pulmonary hypertension, CAD,HLD,HF, history of aspergillus infection, and ongoing tobacco abuse   Bronchoscopy: 9/14/22    Smoking Hx: Social History    Tobacco Use      Smoking status: Every Day        Packs/day: 0.25        Years: 47.00        Additional pack years: 0.00        Total pack years: 11.75        Types: Cigarettes        Start date: 1/1/1975      Smokeless tobacco: Never      Tobacco comments: Pt stated he is smoking 3 cigarettes per day      12/4/23 hasn't smoked for a couple days    Social History     Substance and Sexual Activity   Drug Use Never     Home Equipment Used: O2 NEB ASTRAL  DME: [employer]     Therapy Report:  26 days used >4hr; 83% usage  Leak    95TH %          37.7         L/min   AHI           0.3         Events/hour   Ve 95th%       10.8 L/min   Vt 95th %     522 ml   RR 95th%       31 bpm   *target Ve noted to be set at 5.2L/min    ADL's: independent Lives: home with spouse    Daily symptoms: SOA w/exertion, nonproductive cough, wheeze    Airway Clearance methods utilized:     Have you ever attended Pulmonary Rehab?  Yes; completed 37 sessions graduated 4/11/23    Last PFT:  10/13/22  PARAMETER BEST % PREDICTED   FVC L 1.95 45   FEV1 L 0.58 18   FEV1/FVC % 30    RV 2.09 166   DLCO 26.49 18     Classification of Airflow Limitation Severity in COPD (Based on Post-Bronchodilator FEV1)  Gold 1: Mild FEV1 ? 80% predicted   Gold 2:  Moderate 50% ? FEV1 < 80% predicted   Gold 3: Severe 30% ? FEV1 < 50% predicted   Gold 4: Very Severe FEV1 < 30% predicted     Have you ever had a sleep study/PSG? no     Last Arterial Blood Gas:   Lab Results   Component Value Date    PHART 7.463 (H) 12/05/2023    KWD9RVD 47.2 (H)  12/05/2023    PO2ART 67.1 (L) 12/05/2023    JHD7CIV 33.0 (H) 12/05/2023    BASEEXCESS 8.1 (H) 12/05/2023    [id number] 93.8 (L) 12/05/2023     Chest CT findings:   1. Mild bibasilar atelectasis.  No evidence of active pneumonia.  2. Moderate emphysema.    Home Medications:  Medications Prior to Admission   Medication Sig Dispense Refill Last Dose    albuterol sulfate  (90 Base) MCG/ACT inhaler Inhale 2 puffs Every 4 (Four) Hours As Needed for Wheezing. 18 g 0 12/2/2023    arformoterol (Brovana) 15 MCG/2ML nebulizer solution Take 2 mL by nebulization 2 (Two) Times a Day. 120 mL 11 12/2/2023    budesonide (Pulmicort) 0.5 MG/2ML nebulizer solution Take 2 mL by nebulization Daily. 60 each 11 12/2/2023    furosemide (LASIX) 20 MG tablet Take 1 tablet by mouth Daily. 90 tablet 1 12/2/2023    ipratropium-albuterol (DUO-NEB) 0.5-2.5 mg/3 ml nebulizer Take 3 mL by nebulization 4 (Four) Times a Day. 360 mL 3 12/2/2023    itraconazole (Sporanox) 100 MG capsule Take 2 capsules by mouth 2 (Two) Times a Day. 120 capsule 5 12/2/2023    metoprolol tartrate (LOPRESSOR) 25 MG tablet Take 0.5 tablets by mouth Every 12 (Twelve) Hours. 90 tablet 1 12/2/2023    NON FORMULARY V PAP       revefenacin (Yupelri) 175 MCG/3ML nebulizer solution Take 3 mL by nebulization Daily. 60 mL 11 12/2/2023     Patient is bring in home Astral to assess function. RT CM will follow up.    Bailey Luis, RRT   RT   12/05/23  09:21 EST

## 2023-12-05 NOTE — PROGRESS NOTES
[facility]   Hospitalist Progress Note  Date: 2023  Patient Name: Sumeet Gomes  : 1960  MRN: [id number]  Date of admission: 2023  Room/Bed: 420/1      Subjective   Subjective     Chief Complaint: Shortness of breath    Summary:Sumeet Gomes is a 62 y.o. male with a past medical history of COPD, acute hypoxic/hypercapnic respiratory failure, pulmonary hypertension, hypertension, and continued tobacco use who initially presented to the pulmonology office today complaining of shortness of breath and was found to be hypoxic with sats in the 70s on his home oxygen. Patient's oxygen was increased to 6L NC and in the ED sats remained in the 70s. Patient is currently on bipap and majority of the history is taken from his daughter with some input from the patient.   Patient's daughter states patient has been more short of breath for the past month, becoming more severe over the past couple weeks. Over the past 2 weeks patient has mostly been lying in the bed with Astral vent in place. He is not eating or drinking much. He has been unable to take his medications or nebulizer treatments since Friday. Patient was seen in the ED about 9 days ago and was discharged home on prednisone for 5 days and has had no improvement in his symptoms. He denies any fever or chills. He has a nonproductive cough. Patient reports his lower extremity edema is improved from baseline. Patient states he gets dizzy upon standing. Patient continues to smoke about 1/2ppd, but has not had any cigarettes recently.     Interval Followup: No acute overnight events.  No acute distress.  Patient is resting comfortably in bed.  Discussed with patient and family at bedside.  They are concerned given his decreased appetite, weight loss of about 30 pounds over the past year, and abdominal bloating.  Reports that he has been having diarrhea about once a week for the last couple of months.  Discussed care plan with him.    Review of  Systems    All systems reviewed and negative except for what is outlined above.      Objective   Objective     Vitals:   Temp:  [97.6 °F (36.4 °C)-98.5 °F (36.9 °C)] 97.9 °F (36.6 °C)  Heart Rate:  [57-78] 70  Resp:  [17-23] 20  BP: (104-125)/(54-71) 123/68  Flow (L/min):  [2-3] 2    Physical Exam   Gen: NAD, Alert and Oriented  Cards: RRR, no murmur   Pulm: CTA b/l, no wheezing  Abd: soft, distended, nontender to palpation  Extremities: no pitting edema    Result Review    Result Review:  I have personally reviewed these results:  [x]  Laboratory      Lab 12/05/23  0500 12/04/23  1210   WBC 5.83 10.15   HEMOGLOBIN 12.6* 16.0   HEMATOCRIT 40.8 53.1*   PLATELETS 103* 134*   NEUTROS ABS 5.45 9.08*   IMMATURE GRANS (ABS) 0.12* 0.10*   LYMPHS ABS 0.18* 0.42*   MONOS ABS 0.08* 0.53   EOS ABS 0.00 0.01   MCV 88.7 92.7   PROCALCITONIN  --  0.05         Lab 12/05/23  0500 12/04/23  1210   SODIUM 138 144   POTASSIUM 3.9 4.4   CHLORIDE 97* 97*   CO2 34.2* 36.9*   ANION GAP 6.8 10.1   BUN 17 19   CREATININE 0.49* 0.61*   EGFR 116.8 109.3   GLUCOSE 167* 101*   CALCIUM 8.6 9.6   MAGNESIUM 2.0  --          Lab 12/05/23  0500 12/04/23  1210   TOTAL PROTEIN 5.0* 6.8   ALBUMIN 3.2* 4.3   GLOBULIN 1.8 2.5   ALT (SGPT) 22 37   AST (SGOT) 19 33   BILIRUBIN 0.9 1.4*   ALK PHOS 132* 189*         Lab 12/04/23  1210   PROBNP 4,590.0*   HSTROP T 23*                 Lab 12/05/23  0715 12/04/23  1300   PH, ARTERIAL 7.463* 7.304*   PCO2, ARTERIAL 47.2* 77.7*   PO2 ART 67.1* 91.0   O2 SATURATION ART 93.8* 96.4   FIO2 26 28   HCO3 ART 33.0* 37.7*   BASE EXCESS ART 8.1* 7.9*   CARBOXYHEMOGLOBIN 1.6* 3.0*     Brief Urine Lab Results       None          [x]  Microbiology   Microbiology Results (last 10 days)       Procedure Component Value - Date/Time    Respiratory Panel PCR w/COVID-19(SARS-CoV-2) KARRIE/JUSTIN/ENZO/PAD/COR/[facility] In-House, NP Swab in UTM/VTM, 2 HR TAT - Swab, Nasopharynx [[id number]]  (Normal) Collected: 12/05/23 5264    Lab Status: Final  result Specimen: Swab from Nasopharynx Updated: 12/05/23 1647     ADENOVIRUS, PCR Not Detected     Coronavirus 229E Not Detected     Coronavirus HKU1 Not Detected     Coronavirus NL63 Not Detected     Coronavirus OC43 Not Detected     COVID19 Not Detected     Human Metapneumovirus Not Detected     Human Rhinovirus/Enterovirus Not Detected     Influenza A PCR Not Detected     Influenza B PCR Not Detected     Parainfluenza Virus 1 Not Detected     Parainfluenza Virus 2 Not Detected     Parainfluenza Virus 3 Not Detected     Parainfluenza Virus 4 Not Detected     RSV, PCR Not Detected     Bordetella pertussis pcr Not Detected     Bordetella parapertussis PCR Not Detected     Chlamydophila pneumoniae PCR Not Detected     Mycoplasma pneumo by PCR Not Detected    Narrative:      In the setting of a positive respiratory panel with a viral infection PLUS a negative procalcitonin without other underlying concern for bacterial infection, consider observing off antibiotics or discontinuation of antibiotics and continue supportive care. If the respiratory panel is positive for atypical bacterial infection (Bordetella pertussis, Chlamydophila pneumoniae, or Mycoplasma pneumoniae), consider antibiotic de-escalation to target atypical bacterial infection.    COVID-19, FLU A/B, RSV PCR 1 HR TAT - Swab, Nasopharynx [[id number]]  (Normal) Collected: 12/04/23 1242    Lab Status: Final result Specimen: Swab from Nasopharynx Updated: 12/04/23 1341     COVID19 Not Detected     Influenza A PCR Not Detected     Influenza B PCR Not Detected     RSV, PCR Not Detected    Narrative:      Fact sheet for providers: https://www.fda.gov/media/976555/download    Fact sheet for patients: https://www.fda.gov/media/658331/download    Test performed by PCR.          [x]  Radiology  XR Chest 1 View    Result Date: 12/4/2023    Emphysema.   No radiographic findings of acute cardiopulmonary abnormality.       ZURI MARIE MD       Electronically Signed  and Approved By: ZURI MARIE MD on 12/04/2023 at 12:29            []  EKG/Telemetry   []  Cardiology/Vascular   []  Pathology  []  Old records  []  Other:    Assessment & Plan   Assessment / Plan     Assessment:  Acute on chronic hypoxic respiratory failure  Acute on chronic COPD  Abdominal distention, weight loss  Hypertension  History aspergillus  TAISHA  HFpEF      Plan:  COVID/flu/RSV negative  Discontinue IV Solu-Medrol.  Start p.o. prednisone for total 5-day course.    Scheduled nebulizer  DuoNebs as needed  Patient to bring his home astral vent  Pulmonology following  Start p.o. Protonix  Continue home hydrocodone as all  Patient complaining of abdominal distention, reflux and weight loss.  Wife reports that he sometimes does get choked on food.  Consider aspiration as possible induction of his COPD exacerbation.  Speech consulted.  Consider abdominal imaging.  IV Lasix 40 mg once  Echo with normal EF  A.m. labs       Discussed with RN.    DVT prophylaxis:  Medical DVT prophylaxis orders are present.    CODE STATUS:   Code Status (Patient has no pulse and is not breathing): CPR (Attempt to Resuscitate)  Medical Interventions (Patient has pulse or is breathing): Full Support      Electronically signed by Pearl Alexander DO, 12/05/23, 5:26 PM EST.

## 2023-12-05 NOTE — THERAPY EVALUATION
Patient Name: Sumeet Gomes  : 1960    MRN: [id number]                              Today's Date: 2023       Admit Date: 2023    Visit Dx:     ICD-10-CM ICD-9-CM   1. COPD exacerbation  J44.1 491.21   2. Acute on chronic respiratory failure with hypercapnia  J96.22 518.84   3. Acute on chronic respiratory failure with hypoxia  J96.21 518.84     799.02   4. Decreased activities of daily living (ADL)  Z78.9 V49.89     Patient Active Problem List   Diagnosis    Other hyperlipidemia    Essential hypertension    COPD (chronic obstructive pulmonary disease)    Fatigue    Chest wall contusion, right, initial encounter    Closed fracture of coccyx    Contusion of right knee    Acute respiratory failure with hypoxia and hypercapnia    Acute CHF    Tobacco abuse    Hypoxia    Severe malnutrition    Pulmonary hypertension    Other fatigue    Dyspnea    Cough    Diastolic heart failure    Routine general medical examination at a health care facility    Screening for prostate cancer    Swelling of joint of right knee    Acute respiratory failure with hypoxia     Past Medical History:   Diagnosis Date    COPD (chronic obstructive pulmonary disease)     COPD (chronic obstructive pulmonary disease)     Coronary artery disease     Diastolic heart failure     Hyperlipidemia     Hypertension     Pulmonary hypertension      Past Surgical History:   Procedure Laterality Date    BACK SURGERY      BRONCHOSCOPY N/A 2022    Procedure: BRONCHOSCOPY WITH BRONCHOALVEOLAR LAVAGE AND BRONCHIAL WASHINGS;  Surgeon: Ulices Moya MD;  Location: [facility] ENDOSCOPY;  Service: Pulmonary;  Laterality: N/A;  MUCUS PLUGGING, COPD EXACERBATION    BRONCHOSCOPY      CARDIAC CATHETERIZATION N/A 2022    Procedure: Right Heart Cath;  Surgeon: Drew Rodriguez MD;  Location: [facility] CATH INVASIVE LOCATION;  Service: Cardiovascular;  Laterality: N/A;    OTHER SURGICAL HISTORY      knee     OTHER SURGICAL HISTORY      shoulder,  rotator cuff    SHOULDER SURGERY Right 10/03/2022      General Information       Row Name 12/05/23 1435          OT Time and Intention    Document Type evaluation  -     Mode of Treatment individual therapy;occupational therapy  -       Row Name 12/05/23 143          General Information    Patient Profile Reviewed yes  -     Prior Level of Function --  (I) with ADLs, ambulated w/o a device, has a step over tub where he stands to shower with grab bars/handheld shower head available, e.commode, stands to groom, drives, wears 3L daytime O2 but will increase it to 6L PRN, & 6L at night via Astral vent.  -     Existing Precautions/Restrictions fall  -     Barriers to Rehab none identified  -       Row Name 12/05/23 1435          Occupational Profile    Reason for Services/Referral (Occupational Profile) Patient is a 62 year old male admitted to [facility] for shortness of breath and weakness on December 4th, 2023. Occupational therapy consulted due to recent decline in ADLs/functional transfers. No previous occupational therapy services for current condition.  -       Row Name 12/05/23 1435          Living Environment    People in Home spouse  -       Row Name 12/05/23 143          Home Main Entrance    Number of Stairs, Main Entrance two  -     Stair Railings, Main Entrance railings safe and in good condition  -       Row Name 12/05/23 1435          Cognition    Orientation Status (Cognition) oriented x 4  -       Row Name 12/05/23 1435          Safety Issues, Functional Mobility    Impairments Affecting Function (Mobility) balance;endurance/activity tolerance;shortness of breath  -               User Key  (r) = Recorded By, (t) = Taken By, (c) = Cosigned By      Initials Name Provider Type     Ashley Chavez OT Occupational Therapist                     Mobility/ADL's       Row Name 12/05/23 1437          Bed Mobility    Bed Mobility supine-sit  -     Supine-Sit [place]  (Bed Mobility) modified independence  -     Assistive Device (Bed Mobility) bed rails  -       Row Name 12/05/23 1437          Transfers    Transfers sit-stand transfer;stand-sit transfer  -       Row Name 12/05/23 1437          Sit-Stand Transfer    Sit-Stand [place] (Transfers) standby assist  -LF       Row Name 12/05/23 1437          Stand-Sit Transfer    Stand-Sit [place] (Transfers) standby assist  -       Row Name 12/05/23 1437          Functional Mobility    Functional Mobility- Ind. Level standby assist  -       Row Name 12/05/23 1437          Activities of Daily Living    BADL Assessment/Intervention bathing;upper body dressing;lower body dressing;grooming;feeding;toileting  -       Row Name 12/05/23 1437          Bathing Assessment/Intervention    [place] Level (Bathing) bathing skills;upper body;set up;lower body;standby assist  -       Row Name 12/05/23 1437          Upper Body Dressing Assessment/Training    [place] Level (Upper Body Dressing) upper body dressing skills;set up  -       Row Name 12/05/23 1437          Lower Body Dressing Assessment/Training    [place] Level (Lower Body Dressing) lower body dressing skills;standby assist  -       Row Name 12/05/23 1437          Grooming Assessment/Training    [place] Level (Grooming) grooming skills;set up  -       Row Name 12/05/23 1437          Self-Feeding Assessment/Training    [place] Level (Feeding) feeding skills;set up  -[facility] Name 12/05/23 1437          Toileting Assessment/Training    [place] Level (Toileting) toileting skills;standby assist  -               User Key  (r) = Recorded By, (t) = Taken By, (c) = Cosigned By      Initials Name Provider Type     Ashley Chavez OT Occupational Therapist                   Obj/Interventions       Row Name 12/05/23 1438          Sensory Assessment (Somatosensory)    Sensory Assessment (Somatosensory) UE sensation intact  -        Row Name 12/05/23 1438          Vision Assessment/Intervention    Visual Impairment/Limitations WFL;corrective lenses full-time  -LF       Row Name 12/05/23 1438          Range of Motion Comprehensive    General Range of Motion bilateral upper extremity ROM WFL  -LF       Row Name 12/05/23 1438          Strength Comprehensive (MMT)    Comment, General Manual Muscle Testing (MMT) Assessment 5/5 BUEs  -LF       Row Name 12/05/23 1438          Motor Skills    Motor Skills coordination;functional endurance  -LF     Coordination bilateral;upper extremity;WFL  -LF     Functional Endurance Fair/fair-  -LF       Row Name 12/05/23 1438          Balance    Balance Assessment sitting dynamic balance;standing dynamic balance  -LF     Dynamic Sitting Balance independent  -LF     Position, Sitting Balance unsupported;sitting edge of bed  -LF     Dynamic Standing Balance standby assist  -LF     Position/Device Used, Standing Balance unsupported  -LF               User Key  (r) = Recorded By, (t) = Taken By, (c) = Cosigned By      Initials Name Provider Type     Ashley Chavez OT Occupational Therapist                   Goals/Plan       Row Name 12/05/23 1439          Bed Mobility Goal 1 (OT)    Activity/Assistive Device (Bed Mobility Goal 1, OT) bed mobility activities, all  -LF     [place] Level/Cues Needed (Bed Mobility Goal 1, OT) independent  -LF     Time Frame (Bed Mobility Goal 1, OT) long term goal (LTG);10 days  -[facility] Name 12/05/23 1439          Transfer Goal 1 (OT)    Activity/Assistive Device (Transfer Goal 1, OT) transfers, all  -LF     [place] Level/Cues Needed (Transfer Goal 1, OT) independent  -LF     Time Frame (Transfer Goal 1, OT) long term goal (LTG);10 days  -[facility] Name 12/05/23 1439          Bathing Goal 1 (OT)    Activity/Device (Bathing Goal 1, OT) bathing skills, all  -LF     [place] Level/Cues Needed (Bathing Goal 1, OT) independent  -LF     Time Frame (Bathing Goal 1, OT)  long term goal (LTG);10 days  -LF       Row Name 12/05/23 1439          Dressing Goal 1 (OT)    Activity/Device (Dressing Goal 1, OT) dressing skills, all  -LF     [place]/Cues Needed (Dressing Goal 1, OT) independent  -LF     Time Frame (Dressing Goal 1, OT) long term goal (LTG);10 days  -LF       Row Name 12/05/23 1439          Toileting Goal 1 (OT)    Activity/Device (Toileting Goal 1, OT) toileting skills, all  -LF     [place] Level/Cues Needed (Toileting Goal 1, OT) independent  -LF     Time Frame (Toileting Goal 1, OT) long term goal (LTG);10 days  -LF       Row Name 12/05/23 1439          Problem Specific Goal 1 (OT)    Problem Specific Goal 1 (OT) Patient will demonstrate fair+/good- endurance to support ADLs/functional transfers.  -LF     Time Frame (Problem Specific Goal 1, OT) long term goal (LTG);10 days  -       Row Name 12/05/23 1439          Therapy Assessment/Plan (OT)    Planned Therapy Interventions (OT) activity tolerance training;BADL retraining;functional balance retraining;occupation/activity based interventions;patient/caregiver education/training;strengthening exercise;transfer/mobility retraining  -               User Key  (r) = Recorded By, (t) = Taken By, (c) = Cosigned By      Initials Name Provider Type     Ashley Chavez, OT Occupational Therapist                   Clinical Impression       Row Name 12/05/23 1438          Pain Assessment    Additional Documentation Pain Scale: FACES Pre/Post-Treatment (Group)  -       Row Name 12/05/23 1438          Pain Scale: FACES Pre/Post-Treatment    Pain: FACES Scale, Pretreatment 0-->no hurt  -LF     Posttreatment Pain Rating 0-->no hurt  -LF       Row Name 12/05/23 1438          Plan of Care Review    Plan of Care Reviewed With patient  -LF     Progress no change  -     Outcome Evaluation Patient presents with limitations in self-care, functional transfers, balance, and endurance. He would benefit from continued skilled  occupational therapy services to maximize independence with ADLs/functional transfers.  -       Row Name 12/05/23 1438          Therapy Assessment/Plan (OT)    Patient/Family Therapy Goal Statement (OT) To maximize independence.  -LF     Rehab Potential (OT) good, to achieve stated therapy goals  -     Criteria for Skilled Therapeutic Interventions Met (OT) yes;meets criteria;skilled treatment is necessary  -     Therapy Frequency (OT) 5 times/wk  -       Row Name 12/05/23 1438          Therapy Plan Review/Discharge Plan (OT)    Anticipated Discharge Disposition (OT) home with assist;home with outpatient therapy services  OP Pulmonary Rehab  -       Row Name 12/05/23 1438          Vital Signs    O2 Delivery Pre Treatment nasal cannula  -LF     O2 Delivery Intra Treatment nasal cannula  -LF     O2 Delivery Post Treatment nasal cannula  -       Row Name 12/05/23 1438          Positioning and Restraints    Pre-Treatment Position in bed  -LF     Post Treatment Position chair  -LF     In Chair reclined;call light within reach;encouraged to call for assist  -LF               User Key  (r) = Recorded By, (t) = Taken By, (c) = Cosigned By      Initials Name Provider Type    LF Ashley Chavez, OT Occupational Therapist                   Outcome Measures       Row Name 12/05/23 1439          How much help from another is currently needed...    Putting on and taking off regular lower body clothing? 3  -LF     Bathing (including washing, rinsing, and drying) 3  -LF     Toileting (which includes using toilet bed pan or urinal) 3  -LF     Putting on and taking off regular upper body clothing 4  -LF     Taking care of personal grooming (such as brushing teeth) 4  -LF     Eating meals 4  -LF     AM-PAC 6 Clicks Score (OT) 21  -LF       Row Name 12/05/23 0900          How much help from another person do you currently need...    Turning from your back to your side while in flat bed without using bedrails? 4  -BK      Moving from lying on back to sitting on the side of a flat bed without bedrails? 4  -BK     Moving to and from a bed to a chair (including a wheelchair)? 4  -BK     Standing up from a chair using your arms (e.g., wheelchair, bedside chair)? 4  -BK     Climbing 3-5 steps with a railing? 3  -BK     To walk in hospital room? 4  -BK     AM-PAC 6 Clicks Score (PT) 23  -BK     Highest Level of Mobility Goal 7 --> Walk 25 feet or more  -BK       Row Name 12/05/23 1439          Functional Assessment    Outcome Measure Options AM-PAC 6 Clicks Daily Activity (OT);Optimal Instrument  -LF       Row Name 12/05/23 1439          Optimal Instrument    Optimal Instrument Optimal - 3  -LF     Bending/Stooping 2  -LF     Standing 2  -LF     Reaching 1  -LF     From the list, choose the 3 activities you would most like to be able to do without any difficulty Bending/stooping;Standing;Reaching  -LF     Total Score Optimal - 3 5  -LF               User Key  (r) = Recorded By, (t) = Taken By, (c) = Cosigned By      Initials Name Provider Type    LF Ashley Chavez, MICHAEL Occupational Therapist    Arleen Keane, RN Registered Nurse                    Occupational Therapy Education       Title: PT OT SLP Therapies (Done)       Topic: Occupational Therapy (Done)       Point: ADL training (Done)       Description:   Instruct learner(s) on proper safety adaptation and remediation techniques during self care or transfers.   Instruct in proper use of assistive devices.                  Learning Progress Summary             Patient Acceptance, E,TB, VU by  at 12/5/2023 1440                         Point: Precautions (Done)       Description:   Instruct learner(s) on prescribed precautions during self-care and functional transfers.                  Learning Progress Summary             Patient Acceptance, E,TB, VU by  at 12/5/2023 1440                         Point: Body mechanics (Done)       Description:   Instruct learner(s) on proper  positioning and spine alignment during self-care, functional mobility activities and/or exercises.                  Learning Progress Summary             Patient Acceptance, E,TB, VU by  at 12/5/2023 1440                                         User Key       Initials Effective Dates Name Provider Type Discipline     06/16/21 -  Ashley Chavez OT Occupational Therapist OT                  OT Recommendation and Plan  Planned Therapy Interventions (OT): activity tolerance training, BADL retraining, functional balance retraining, occupation/activity based interventions, patient/caregiver education/training, strengthening exercise, transfer/mobility retraining  Therapy Frequency (OT): 5 times/wk  Plan of Care Review  Plan of Care Reviewed With: patient  Progress: no change  Outcome Evaluation: Patient presents with limitations in self-care, functional transfers, balance, and endurance. He would benefit from continued skilled occupational therapy services to maximize independence with ADLs/functional transfers.     Time Calculation:   Evaluation Complexity (OT)  Review Occupational Profile/Medical/Therapy History Complexity: brief/low complexity  Assessment, Occupational Performance/Identification of Deficit Complexity: 3-5 performance deficits  Clinical Decision Making Complexity (OT): problem focused assessment/low complexity  Overall Complexity of Evaluation (OT): low complexity     Time Calculation- OT       Row Name 12/05/23 1440             Time Calculation- OT    OT Received On 12/05/23  -      OT Goal Re-Cert Due Date 12/14/23  -         Untimed Charges    OT Eval/Re-eval Minutes 34  -LF         Total Minutes    Untimed Charges Total Minutes 34  -LF       Total Minutes 34  -LF                User Key  (r) = Recorded By, (t) = Taken By, (c) = Cosigned By      Initials Name Provider Type     Ashley Chavez OT Occupational Therapist                  Therapy Charges for Today       Code Description  Service Date Service Provider Modifiers Qty    [id number] HC OT EVAL LOW COMPLEXITY 3 12/5/2023 Ashley Chavez, MICHAEL GO 1                 Ashley Chavez OT  12/5/2023

## 2023-12-05 NOTE — PROGRESS NOTES
Pulmonary / Critical Care Progress Note      Patient Name: Sumeet Gomes  : 1960  MRN: [id number]  Primary Care Physician:  Mirtha Alexander APRN  Date of admission: 2023    Subjective   Subjective   Follow-up for acute on chronic hypoxic and hypercapnic respiratory failure    No acute events overnight.    This morning,  Sitting up in chair on 2 L nasal cannula  Tolerated NIPPV overnight  Overall feeling better  Remains dyspneic with activity  ABG improved  Family at bedside  No fever or chills  Remains weak and fatigued    Review of Systems  General: Fatigue, otherwise denied complaints  HEENT: Denied complaints  Respiratory: Dyspnea, otherwise denied complaints  Cardiovascular: Denied complaints  GI: Denied complaints  : Denied complaints  MSK: Weakness, otherwise denied complaints      Objective   Objective     Vitals:   Temp:  [97.6 °F (36.4 °C)-98.5 °F (36.9 °C)] 97.9 °F (36.6 °C)  Heart Rate:  [45-78] 78  Resp:  [17-23] 20  BP: (104-133)/(54-72) 115/61  Flow (L/min):  [2-3] 2    Physical Exam   Vital Signs Reviewed   General:  Alert, NAD.  Sitting up in chair  HEENT:  PERRL, EOMI.    Neck:  No JVD, no thyromegaly  Lymph: no axillary, cervical, supraclavicular lymphadenopathy noted bilaterally  Chest:  Clear to auscultation bilaterally, no work of breathing noted on 2 L nasal cannula  CV: RRR, no M/G/R, pulses 2+  Abd:  Soft, NT, ND, +BS  EXT:  no clubbing, no cyanosis, no edema  Neuro:  A&Ox3, CN grossly intact, no focal deficits.  Skin: No rashes or lesions noted    Result Review    Result Review:  I have personally reviewed the results from the time of this admission to 2023 13:43 EST and agree with these findings:  [x]  Laboratory  [x]  Microbiology  [x]  Radiology  [x]  EKG/Telemetry   []  Cardiology/Vascular   []  Pathology  []  Old records  []  Other:  Most notable findings include:     ABG 7.30/77/91 --> 7.46/47/67  proBNP 4590        Lab 23  0500 23  1210   WBC  5.83 10.15   HEMOGLOBIN 12.6* 16.0   HEMATOCRIT 40.8 53.1*   PLATELETS 103* 134*   SODIUM 138 144   POTASSIUM 3.9 4.4   CHLORIDE 97* 97*   CO2 34.2* 36.9*   BUN 17 19   CREATININE 0.49* 0.61*   GLUCOSE 167* 101*   CALCIUM 8.6 9.6   TOTAL PROTEIN 5.0* 6.8   ALBUMIN 3.2* 4.3   GLOBULIN 1.8 2.5     XR Chest 1 View    Result Date: 12/4/2023  PROCEDURE: XR CHEST 1 VW  COMPARISON: [facility], CT, CT CHEST WO CONTRAST DIAGNOSTIC, 3/09/2023, 12:28.  [facility], CR, XR CHEST 1 VW, 11/30/2022, 17:00.  [facility], CR, XR CHEST 2 VW, 2/01/2023, 15:06.  [facility], CR, XR CHEST 1 VW, 11/25/2023, 12:01.  INDICATIONS: SOA Triage Protocol  FINDINGS:  No focal or diffuse infiltrate is identified.  There is emphysema, as before.  No pleural effusion or pneumothorax. Heart size and mediastinal contour appear within normal limits.       Impression:   Emphysema.   No radiographic findings of acute cardiopulmonary abnormality.       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  Okay to use home NIPPV.  -Repeat ABG in a.m.  -RT  on board.  Appreciate assistance.  -CXR reviewed revealing emphysematous changes without any acute cardiopulmonary abnormality  -Respiratory viral panel pending  -Continue Brovana and DuoNebs  -Continue bronchopulmonary hygiene.  Encourage use of I-S.  -Solu-Medrol decreased  to 40 mg IV daily  -Continue home itraconazole for Aspergillus  -Echocardiogram pending  -proBNP 4590, Continue Lasix 20 mg p.o. daily  -Trend electrolytes and renal panel.  Replace electrolytes as needed.  -Encourage smoking cessation.  -Encourage mobility.  Out of bed to chair.  -PT/OT on board.  Appreciate assistance       DVT prophylaxis:  Medical DVT prophylaxis orders are present.    CODE STATUS:   Code Status (Patient has no pulse and is not breathing): CPR (Attempt to Resuscitate)  Medical Interventions (Patient has pulse or is breathing): Full Support      Electronically signed by ZULY Doyle, 12/05/23, 1:43 PM EST.    This patient was seen by both a physician and a NP. I, Yadira Lopez MD, spent >50% of time in accordance with split shared billing. This included personally reviewing all pertinent labs, imaging, microbiology and documentation. Also discussing the case with the patient and any available family, the admitting physician and any available ancillary staff.   Electronically signed by Yadira Lopez MD, 12/05/23, 2:45 PM EST.

## 2023-12-05 NOTE — THERAPY EVALUATION
Acute Care - Physical Therapy Initial Evaluation   Joe     Patient Name: Sumeet Gomes  : 1960  MRN: [id number]  Today's Date: 2023      Visit Dx:     ICD-10-CM ICD-9-CM   1. COPD exacerbation  J44.1 491.21   2. Acute on chronic respiratory failure with hypercapnia  J96.22 518.84   3. Acute on chronic respiratory failure with hypoxia  J96.21 518.84     799.02   4. Decreased activities of daily living (ADL)  Z78.9 V49.89   5. Difficulty walking  R26.2 719.7     Patient Active Problem List   Diagnosis    Other hyperlipidemia    Essential hypertension    COPD (chronic obstructive pulmonary disease)    Fatigue    Chest wall contusion, right, initial encounter    Closed fracture of coccyx    Contusion of right knee    Acute respiratory failure with hypoxia and hypercapnia    Acute CHF    Tobacco abuse    Hypoxia    Severe malnutrition    Pulmonary hypertension    Other fatigue    Dyspnea    Cough    Diastolic heart failure    Routine general medical examination at a health care facility    Screening for prostate cancer    Swelling of joint of right knee    Acute respiratory failure with hypoxia     Past Medical History:   Diagnosis Date    COPD (chronic obstructive pulmonary disease)     COPD (chronic obstructive pulmonary disease)     Coronary artery disease     Diastolic heart failure     Hyperlipidemia     Hypertension     Pulmonary hypertension      Past Surgical History:   Procedure Laterality Date    BACK SURGERY      BRONCHOSCOPY N/A 2022    Procedure: BRONCHOSCOPY WITH BRONCHOALVEOLAR LAVAGE AND BRONCHIAL WASHINGS;  Surgeon: Ulices Moya MD;  Location: [facility] ENDOSCOPY;  Service: Pulmonary;  Laterality: N/A;  MUCUS PLUGGING, COPD EXACERBATION    BRONCHOSCOPY      CARDIAC CATHETERIZATION N/A 2022    Procedure: Right Heart Cath;  Surgeon: Drew Rodriguez MD;  Location: [facility] CATH INVASIVE LOCATION;  Service: Cardiovascular;  Laterality: N/A;    OTHER SURGICAL HISTORY      knee      OTHER SURGICAL HISTORY      shoulder, rotator cuff    SHOULDER SURGERY Right 10/03/2022     PT Assessment (last 12 hours)       PT Evaluation and Treatment       Row Name 12/05/23 1500          Physical Therapy Time and Intention    Document Type evaluation  -AV     Mode of Treatment individual therapy;physical therapy  -AV     Symptoms Noted During/After Treatment shortness of breath;significant change in vital signs  -AV       Row Name 12/05/23 1500          General Information    Patient Profile Reviewed yes  -AV     Prior Level of Function independent:;all household mobility;gait;transfer;ADL's  Ambulated without an assistive device but reports having STC if needed. 3 L O2 throughout the day, 6 L O2 at night.  -AV     Existing Precautions/Restrictions fall;oxygen therapy device and L/min  -AV       Row Name 12/05/23 1500          Living Environment    Current Living Arrangements home  -AV     Home Accessibility stairs to enter home  -AV     People in Home spouse  -AV       Row Name 12/05/23 1500          Home Main Entrance    Number of Stairs, Main Entrance two  -AV     Stair Railings, Main Entrance none  -AV       Row Name 12/05/23 1500          Cognition    Orientation Status (Cognition) oriented x 3  -AV       Row Name 12/05/23 1500          Range of Motion (ROM)    Range of Motion bilateral lower extremities;ROM is WFL  -AV       Row Name 12/05/23 1500          Strength (Manual Muscle Testing)    Strength (Manual Muscle Testing) bilateral lower extremities;strength is WFL  -AV       Row Name 12/05/23 1500          Bed Mobility    Comment, (Bed Mobility) Patient seated upright in recliner upon therapist entry.  -AV       Row Name 12/05/23 1500          Transfers    Transfers sit-stand transfer;stand-sit transfer  -AV       Row Name 12/05/23 1500          Sit-Stand Transfer    Sit-Stand [place] (Transfers) standby assist  -AV       Row Name 12/05/23 1500          Stand-Sit Transfer    Stand-Sit  [place] (Transfers) standby assist  -AV       Row Name 12/05/23 1500          Gait/Stairs (Locomotion)    Gait/Stairs Locomotion gait/ambulation independence;gait/ambulation assistive device;distance ambulated  -AV     [place] Level (Gait) contact guard;standby assist  -AV     Assistive Device (Gait) --  IV pole  -AV     Distance in Feet (Gait) 35  -AV       Row Name 12/05/23 1500          Safety Issues, Functional Mobility    Impairments Affecting Function (Mobility) balance;endurance/activity tolerance;shortness of breath  -AV       Row Name 12/05/23 1500          Balance    Balance Assessment standing dynamic balance  -AV     Dynamic Standing Balance contact guard;standby assist  -AV     Position/Device Used, Standing Balance other (see comments)  IV pole  -AV       Row Name 12/05/23 1500          Plan of Care Review    Plan of Care Reviewed With patient  -AV     Progress no change  -AV     Outcome Evaluation Patient presents with deficits in balance, endurance, transfers, and ambulation. Patient will benefit from skilled PT services to address these mobility deficits and decrease risk of falls.  -AV       Row Name 12/05/23 1500          Vital Signs    Pre SpO2 (%) 90  -AV     O2 Delivery Pre Treatment nasal cannula  -AV     Intra SpO2 (%) 75  -AV     O2 Delivery Intra Treatment nasal cannula  -AV     Post SpO2 (%) 88  -AV     O2 Delivery Post Treatment nasal cannula  -AV     Pre Patient Position Sitting  -AV     Intra Patient Position --  ambulating  -AV     Post Patient Position Sitting  -AV     Recovery Time 20 seconds  Patient educated on pursed lip breathing technique and patient demonstrated ability to complete PLB accurately  -AV       Row Name 12/05/23 1500          Positioning and Restraints    Pre-Treatment Position sitting in chair/recliner  -AV     Post Treatment Position chair  -AV     In Chair reclined;call light within reach;encouraged to call for assist  -AV       Row Name 12/05/23 1500           Therapy Assessment/Plan (PT)    Rehab Potential (PT) good, to achieve stated therapy goals  -AV     Criteria for Skilled Interventions Met (PT) yes;meets criteria  -AV     Therapy Frequency (PT) daily  -AV     Predicted Duration of Therapy Intervention (PT) 10 days  -AV     Problem List (PT) problems related to;balance;mobility  -AV     Activity Limitations Related to Problem List (PT) unable to ambulate safely  -AV       Row Name 12/05/23 1500          PT Evaluation Complexity    History, PT Evaluation Complexity 1-2 personal factors and/or comorbidities  -AV     Examination of Body Systems (PT Eval Complexity) total of 4 or more elements  -AV     Clinical Presentation (PT Evaluation Complexity) stable  -AV     Clinical Decision Making (PT Evaluation Complexity) low complexity  -AV     Overall Complexity (PT Evaluation Complexity) low complexity  -AV       Row Name 12/05/23 1500          Therapy Plan Review/Discharge Plan (PT)    Therapy Plan Review (PT) evaluation/treatment results reviewed;patient  -AV       Row Name 12/05/23 1500          Physical Therapy Goals    Gait Training Goal Selection (PT) gait training, PT goal 1  -AV       Row Name 12/05/23 1500          Gait Training Goal 1 (PT)    Activity/Assistive Device (Gait Training Goal 1, PT) gait (walking locomotion)  -AV     [place] Level (Gait Training Goal 1, PT) independent  -AV     Distance (Gait Training Goal 1, PT) 200  with O2 staying at 90% or above  -AV     Time Frame (Gait Training Goal 1, PT) 10 days  -AV               User Key  (r) = Recorded By, (t) = Taken By, (c) = Cosigned By      Initials Name Provider Type    AV Richard Carlton, PT Physical Therapist                    Physical Therapy Education       Title: PT OT SLP Therapies (In Progress)       Topic: Physical Therapy (In Progress)       Point: Mobility training (Done)       Learning Progress Summary             Patient Acceptance, E,TB, VU by AV at 12/5/2023 0797                          Point: Home exercise program (Not Started)       Learner Progress:  Not documented in this visit.              Point: Body mechanics (Done)       Learning Progress Summary             Patient Acceptance, E,TB, VU by AV at 12/5/2023 1509                         Point: Precautions (Done)       Learning Progress Summary             Patient Acceptance, E,TB, VU by AV at 12/5/2023 1509                                         User Key       Initials Effective Dates Name Provider Type Discipline    AV 06/11/21 -  Richard Carlton, PT Physical Therapist PT                  PT Recommendation and Plan  Anticipated Discharge Disposition (PT): other (see comments) (pulmonary rehab)  Planned Therapy Interventions (PT): balance training, bed mobility training, gait training, home exercise program, neuromuscular re-education, strengthening, transfer training  Therapy Frequency (PT): daily  Plan of Care Reviewed With: patient  Progress: no change  Outcome Evaluation: Patient presents with deficits in balance, endurance, transfers, and ambulation. Patient will benefit from skilled PT services to address these mobility deficits and decrease risk of falls.   Outcome Measures       Row Name 12/05/23 1500             How much help from another person do you currently need...    Turning from your back to your side while in flat bed without using bedrails? 4  -AV      Moving from lying on back to sitting on the side of a flat bed without bedrails? 4  -AV      Moving to and from a bed to a chair (including a wheelchair)? 3  -AV      Standing up from a chair using your arms (e.g., wheelchair, bedside chair)? 4  -AV      Climbing 3-5 steps with a railing? 3  -AV      To walk in hospital room? 3  -AV      AM-PAC 6 Clicks Score (PT) 21  -AV      Highest Level of Mobility Goal 6 --> Walk 10 steps or more  -AV         Functional Assessment    Outcome Measure Options AM-PAC 6 Clicks Basic Mobility (PT)  -AV                User  Key  (r) = Recorded By, (t) = Taken By, (c) = Cosigned By      Initials Name Provider Type    AV Richard aCrlton, PT Physical Therapist                     Time Calculation:    PT Charges       Row Name 12/05/23 1508             Time Calculation    PT Received On 12/05/23  -AV      PT Goal Re-Cert Due Date 12/14/23  -AV         Untimed Charges    PT Eval/Re-eval Minutes 32  -AV         Total Minutes    Untimed Charges Total Minutes 32  -AV       Total Minutes 32  -AV                User Key  (r) = Recorded By, (t) = Taken By, (c) = Cosigned By      Initials Name Provider Type    AV Richard Carlton, PT Physical Therapist                  Therapy Charges for Today       Code Description Service Date Service Provider Modifiers Qty    24687211329 HC PT EVAL LOW COMPLEXITY 3 12/5/2023 Richard Carlton, PT GP 1            PT G-Codes  Outcome Measure Options: AM-PAC 6 Clicks Basic Mobility (PT)  AM-PAC 6 Clicks Score (PT): 21  AM-PAC 6 Clicks Score (OT): 21    Richard Carlton PT  12/5/2023

## 2023-12-06 ENCOUNTER — APPOINTMENT (OUTPATIENT)
Dept: CT IMAGING | Facility: HOSPITAL | Age: 63
DRG: 190 | End: 2023-12-06
Payer: MEDICAID

## 2023-12-06 LAB
ANION GAP SERPL CALCULATED.3IONS-SCNC: 6.6 MMOL/L (ref 5–15)
ARTERIAL PATENCY WRIST A: POSITIVE
BASE EXCESS BLDA CALC-SCNC: 5.9 MMOL/L (ref -2–2)
BASOPHILS # BLD AUTO: 0.01 10*3/MM3 (ref 0–0.2)
BASOPHILS NFR BLD AUTO: 0.1 % (ref 0–1.5)
BDY SITE: ABNORMAL
BUN SERPL-MCNC: 16 MG/DL (ref 8–23)
BUN/CREAT SERPL: 32 (ref 7–25)
CALCIUM SPEC-SCNC: 8.8 MG/DL (ref 8.6–10.5)
CHLORIDE SERPL-SCNC: 98 MMOL/L (ref 98–107)
CO2 SERPL-SCNC: 34.4 MMOL/L (ref 22–29)
COHGB MFR BLD: 0.8 % (ref 0–1.5)
CREAT SERPL-MCNC: 0.5 MG/DL (ref 0.76–1.27)
DEPRECATED RDW RBC AUTO: 48.3 FL (ref 37–54)
EGFRCR SERPLBLD CKD-EPI 2021: 116 ML/MIN/1.73
EOSINOPHIL # BLD AUTO: 0 10*3/MM3 (ref 0–0.4)
EOSINOPHIL NFR BLD AUTO: 0 % (ref 0.3–6.2)
ERYTHROCYTE [DISTWIDTH] IN BLOOD BY AUTOMATED COUNT: 14.7 % (ref 12.3–15.4)
FHHB: 4.7 % (ref 0–5)
GAS FLOW AIRWAY: 2 LPM
GLUCOSE SERPL-MCNC: 156 MG/DL (ref 65–99)
HAV IGM SERPL QL IA: NORMAL
HBV CORE IGM SERPL QL IA: NORMAL
HBV SURFACE AG SERPL QL IA: NORMAL
HCO3 BLDA-SCNC: 31.3 MMOL/L (ref 22–26)
HCT VFR BLD AUTO: 39.4 % (ref 37.5–51)
HCV AB SER DONR QL: NORMAL
HGB BLD-MCNC: 12.6 G/DL (ref 13–17.7)
HGB BLDA-MCNC: 13.3 G/DL (ref 13.8–16.4)
IMM GRANULOCYTES # BLD AUTO: 0.04 10*3/MM3 (ref 0–0.05)
IMM GRANULOCYTES NFR BLD AUTO: 0.3 % (ref 0–0.5)
INR PPP: 1.06 (ref 0.86–1.15)
LYMPHOCYTES # BLD AUTO: 0.24 10*3/MM3 (ref 0.7–3.1)
LYMPHOCYTES NFR BLD AUTO: 2 % (ref 19.6–45.3)
MCH RBC QN AUTO: 28.4 PG (ref 26.6–33)
MCHC RBC AUTO-ENTMCNC: 32 G/DL (ref 31.5–35.7)
MCV RBC AUTO: 88.7 FL (ref 79–97)
METHGB BLD QL: 0.2 % (ref 0–1.5)
MODALITY: ABNORMAL
MONOCYTES # BLD AUTO: 0.42 10*3/MM3 (ref 0.1–0.9)
MONOCYTES NFR BLD AUTO: 3.5 % (ref 5–12)
NEUTROPHILS NFR BLD AUTO: 11.16 10*3/MM3 (ref 1.7–7)
NEUTROPHILS NFR BLD AUTO: 94.1 % (ref 42.7–76)
NOTE: ABNORMAL
NRBC BLD AUTO-RTO: 0 /100 WBC (ref 0–0.2)
OXYHGB MFR BLDV: 94.3 % (ref 94–99)
PCO2 BLDA: 48.4 MM HG (ref 35–45)
PH BLDA: 7.43 PH UNITS (ref 7.35–7.45)
PLATELET # BLD AUTO: 111 10*3/MM3 (ref 140–450)
PMV BLD AUTO: 11.2 FL (ref 6–12)
PO2 BLDA: 79.1 MM HG (ref 80–100)
POTASSIUM SERPL-SCNC: 3.4 MMOL/L (ref 3.5–5.2)
PROTHROMBIN TIME: 14.1 SECONDS (ref 11.8–14.9)
QT INTERVAL: 446 MS
QTC INTERVAL: 460 MS
RBC # BLD AUTO: 4.44 10*6/MM3 (ref 4.14–5.8)
SAO2 % BLDCOA: 95.3 % (ref 95–99)
SODIUM SERPL-SCNC: 139 MMOL/L (ref 136–145)
WBC NRBC COR # BLD AUTO: 11.87 10*3/MM3 (ref 3.4–10.8)

## 2023-12-06 PROCEDURE — 82805 BLOOD GASES W/O2 SATURATION: CPT | Performed by: NURSE PRACTITIONER

## 2023-12-06 PROCEDURE — 94799 UNLISTED PULMONARY SVC/PX: CPT

## 2023-12-06 PROCEDURE — 99233 SBSQ HOSP IP/OBS HIGH 50: CPT | Performed by: STUDENT IN AN ORGANIZED HEALTH CARE EDUCATION/TRAINING PROGRAM

## 2023-12-06 PROCEDURE — 80074 ACUTE HEPATITIS PANEL: CPT | Performed by: STUDENT IN AN ORGANIZED HEALTH CARE EDUCATION/TRAINING PROGRAM

## 2023-12-06 PROCEDURE — 85610 PROTHROMBIN TIME: CPT | Performed by: STUDENT IN AN ORGANIZED HEALTH CARE EDUCATION/TRAINING PROGRAM

## 2023-12-06 PROCEDURE — 99232 SBSQ HOSP IP/OBS MODERATE 35: CPT | Performed by: STUDENT IN AN ORGANIZED HEALTH CARE EDUCATION/TRAINING PROGRAM

## 2023-12-06 PROCEDURE — 85025 COMPLETE CBC W/AUTO DIFF WBC: CPT | Performed by: STUDENT IN AN ORGANIZED HEALTH CARE EDUCATION/TRAINING PROGRAM

## 2023-12-06 PROCEDURE — 92610 EVALUATE SWALLOWING FUNCTION: CPT

## 2023-12-06 PROCEDURE — 36600 WITHDRAWAL OF ARTERIAL BLOOD: CPT | Performed by: NURSE PRACTITIONER

## 2023-12-06 PROCEDURE — 0 IOHEXOL 12 MG/ML SOLUTION: Performed by: STUDENT IN AN ORGANIZED HEALTH CARE EDUCATION/TRAINING PROGRAM

## 2023-12-06 PROCEDURE — 82375 ASSAY CARBOXYHB QUANT: CPT | Performed by: NURSE PRACTITIONER

## 2023-12-06 PROCEDURE — 74176 CT ABD & PELVIS W/O CONTRAST: CPT

## 2023-12-06 PROCEDURE — 94664 DEMO&/EVAL PT USE INHALER: CPT

## 2023-12-06 PROCEDURE — 63710000001 PREDNISONE PER 1 MG: Performed by: STUDENT IN AN ORGANIZED HEALTH CARE EDUCATION/TRAINING PROGRAM

## 2023-12-06 PROCEDURE — 83050 HGB METHEMOGLOBIN QUAN: CPT | Performed by: NURSE PRACTITIONER

## 2023-12-06 PROCEDURE — 80048 BASIC METABOLIC PNL TOTAL CA: CPT | Performed by: STUDENT IN AN ORGANIZED HEALTH CARE EDUCATION/TRAINING PROGRAM

## 2023-12-06 PROCEDURE — 25010000002 ENOXAPARIN PER 10 MG: Performed by: INTERNAL MEDICINE

## 2023-12-06 PROCEDURE — 94761 N-INVAS EAR/PLS OXIMETRY MLT: CPT

## 2023-12-06 RX ORDER — POTASSIUM CHLORIDE 750 MG/1
40 CAPSULE, EXTENDED RELEASE ORAL ONCE
Status: COMPLETED | OUTPATIENT
Start: 2023-12-06 | End: 2023-12-06

## 2023-12-06 RX ADMIN — IOHEXOL 500 ML: 12 SOLUTION ORAL at 17:43

## 2023-12-06 RX ADMIN — IPRATROPIUM BROMIDE AND ALBUTEROL SULFATE 3 ML: .5; 3 SOLUTION RESPIRATORY (INHALATION) at 13:34

## 2023-12-06 RX ADMIN — ENOXAPARIN SODIUM 40 MG: 100 INJECTION SUBCUTANEOUS at 08:42

## 2023-12-06 RX ADMIN — DOCUSATE SODIUM 50MG AND SENNOSIDES 8.6MG 2 TABLET: 8.6; 5 TABLET, FILM COATED ORAL at 20:56

## 2023-12-06 RX ADMIN — PANTOPRAZOLE SODIUM 40 MG: 40 INJECTION, POWDER, FOR SOLUTION INTRAVENOUS at 05:55

## 2023-12-06 RX ADMIN — IOHEXOL 250 ML: 12 SOLUTION ORAL at 19:37

## 2023-12-06 RX ADMIN — PREDNISONE 40 MG: 20 TABLET ORAL at 08:43

## 2023-12-06 RX ADMIN — ITRACONAZOLE 200 MG: 100 CAPSULE, COATED PELLETS ORAL at 08:43

## 2023-12-06 RX ADMIN — POTASSIUM CHLORIDE 40 MEQ: 10 CAPSULE, COATED, EXTENDED RELEASE ORAL at 08:43

## 2023-12-06 RX ADMIN — Medication 10 ML: at 08:43

## 2023-12-06 RX ADMIN — IPRATROPIUM BROMIDE AND ALBUTEROL SULFATE 3 ML: .5; 3 SOLUTION RESPIRATORY (INHALATION) at 19:49

## 2023-12-06 RX ADMIN — ARFORMOTEROL TARTRATE 15 MCG: 15 SOLUTION RESPIRATORY (INHALATION) at 07:53

## 2023-12-06 RX ADMIN — ACETAMINOPHEN 650 MG: 325 TABLET ORAL at 15:17

## 2023-12-06 RX ADMIN — ARFORMOTEROL TARTRATE 15 MCG: 15 SOLUTION RESPIRATORY (INHALATION) at 19:49

## 2023-12-06 RX ADMIN — FUROSEMIDE 20 MG: 20 TABLET ORAL at 08:43

## 2023-12-06 RX ADMIN — IPRATROPIUM BROMIDE AND ALBUTEROL SULFATE 3 ML: .5; 3 SOLUTION RESPIRATORY (INHALATION) at 07:53

## 2023-12-06 RX ADMIN — DOCUSATE SODIUM 50MG AND SENNOSIDES 8.6MG 2 TABLET: 8.6; 5 TABLET, FILM COATED ORAL at 08:43

## 2023-12-06 RX ADMIN — Medication 10 ML: at 20:56

## 2023-12-06 NOTE — PROGRESS NOTES
Pulmonary / Critical Care Progress Note      Patient Name: Sumeet Gomes  : 1960  MRN: [id number]  Primary Care Physician:  Mirtha Alexander APRN  Date of admission: 2023    Subjective   Subjective   Follow-up for acute on chronic hypoxic and hypercapnic respiratory failure    No acute events overnight.    This morning,  On 2 L nasal cannula  Lying in bed  Feels well today, but reports difficulty clearing airway  ABG improved  Reports some dizziness and blurred vision    Review of Systems  General: Fatigue, otherwise denied complaints  HEENT: Denied complaints  Respiratory: Dyspnea, otherwise denied complaints  Cardiovascular: Denied complaints  GI: Denied complaints  : Denied complaints  MSK: Weakness, otherwise denied complaints    Objective   Objective     Vitals:   Temp:  [97.3 °F (36.3 °C)-98.2 °F (36.8 °C)] 97.5 °F (36.4 °C)  Heart Rate:  [70-90] 82  Resp:  [18-21] 20  BP: (121-152)/(65-81) 127/81  Flow (L/min):  [2] 2    Physical Exam   Vital Signs Reviewed   General:  Alert, NAD. Lying in bed   HEENT:  PERRL, EOMI.    Neck:  No JVD, no thyromegaly  Lymph: no axillary, cervical, supraclavicular lymphadenopathy noted bilaterally  Chest:  Clear to auscultation bilaterally, no work of breathing noted on 2L NC  CV: RRR, no M/G/R, pulses 2+  Abd:  Soft, NT, ND, +BS  EXT:  no clubbing, no cyanosis, no edema  Neuro:  A&Ox3, CN grossly intact, no focal deficits.  Skin: No rashes or lesions noted    Result Review    Result Review:  I have personally reviewed the results from the time of this admission to 2023 11:08 EST and agree with these findings:  [x]  Laboratory  [x]  Microbiology  [x]  Radiology  [x]  EKG/Telemetry   []  Cardiology/Vascular   []  Pathology  []  Old records  []  Other:  Most notable findings include:     ABG 7.30/77/91 --> 7.46/47/67  proBNP 4590    10/13/2022 PFT: FEV1 14%, DLCO 18%     bronchoscopy        Lab 23  0515 23  0500 23  1210   WBC  11.87* 5.83 10.15   HEMOGLOBIN 12.6* 12.6* 16.0   HEMATOCRIT 39.4 40.8 53.1*   PLATELETS 111* 103* 134*   SODIUM 139 138 144   POTASSIUM 3.4* 3.9 4.4   CHLORIDE 98 97* 97*   CO2 34.4* 34.2* 36.9*   BUN 16 17 19   CREATININE 0.50* 0.49* 0.61*   GLUCOSE 156* 167* 101*   CALCIUM 8.8 8.6 9.6   TOTAL PROTEIN  --  5.0* 6.8   ALBUMIN  --  3.2* 4.3   GLOBULIN  --  1.8 2.5     XR Chest 1 View    Result Date: 12/4/2023  PROCEDURE: XR CHEST 1 VW  COMPARISON: [facility], CT, CT CHEST WO CONTRAST DIAGNOSTIC, 3/09/2023, 12:28.  [facility], CR, XR CHEST 1 VW, 11/30/2022, 17:00.  [facility], CR, XR CHEST 2 VW, 2/01/2023, 15:06.  [facility], CR, XR CHEST 1 VW, 11/25/2023, 12:01.  INDICATIONS: SOA Triage Protocol  FINDINGS:  No focal or diffuse infiltrate is identified.  There is emphysema, as before.  No pleural effusion or pneumothorax. Heart size and mediastinal contour appear within normal limits.       Impression:   Emphysema.   No radiographic findings of acute cardiopulmonary abnormality.       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  Okay to use home NIPPV (astrovent).  -ABG improving.  7.42/48/79 on 12/6  -RT  on board.  Appreciate assistance.  -Ordered respiratory sputum culture  -Patient reports blurred vision and dizziness. Will hold itraconazole for now see if symptoms improve.  Will repeat sputum culture to insure clearance of previous aspergillus infection  -Continue Brovana and DuoNebs  -Continue bronchopulmonary hygiene.  Encourage use of I-S.  -Transition to prednisone 40 daily  -12/5 echo EF 55% estimated RVSP 45 to 55 mmHg  -Continue Lasix 20 mg p.o. daily  -Trend electrolytes and renal panel.  Replace electrolytes as needed.  -Encourage smoking cessation.  -Encourage mobility.  Out of bed to chair.  -PT/OT/SLP on board.  Appreciate assistance  -If the above outlined plan does not result in patient improvement, the service will consider bronchoscopy    DVT prophylaxis:  Medical DVT prophylaxis orders are present.    CODE STATUS:   Code Status (Patient has no pulse and is not breathing): CPR (Attempt to Resuscitate)  Medical Interventions (Patient has pulse or is breathing): Full Support    Electronically signed by ZULY Ames, 12/06/23, 11:08 AM EST.  This patient was seen by both a physician and a NP. IYadira MD, spent >50% of time in accordance with split shared billing. This included personally reviewing all pertinent labs, imaging, microbiology and documentation. Also discussing the case with the patient and any available family, the admitting physician and any available ancillary staff.   Electronically signed by Yadira Lpoez MD, 12/06/23, 8:47 PM EST.

## 2023-12-06 NOTE — THERAPY EVALUATION
Acute Care - Speech Language Pathology   Swallow Initial Evaluation  [facility]     Patient Name: Sumeet Gomes  : 1960  MRN: [id number]  Today's Date: 2023               Admit Date: 2023    Visit Dx:     ICD-10-CM ICD-9-CM   1. COPD exacerbation  J44.1 491.21   2. Acute on chronic respiratory failure with hypercapnia  J96.22 518.84   3. Acute on chronic respiratory failure with hypoxia  J96.21 518.84     799.02   4. Decreased activities of daily living (ADL)  Z78.9 V49.89   5. Difficulty walking  R26.2 719.7   6. Oropharyngeal dysphagia  R13.12 787.22     Patient Active Problem List   Diagnosis    Other hyperlipidemia    Essential hypertension    COPD (chronic obstructive pulmonary disease)    Fatigue    Chest wall contusion, right, initial encounter    Closed fracture of coccyx    Contusion of right knee    Acute respiratory failure with hypoxia and hypercapnia    Acute CHF    Tobacco abuse    Hypoxia    Severe malnutrition    Pulmonary hypertension    Other fatigue    Dyspnea    Cough    Diastolic heart failure    Routine general medical examination at a health care facility    Screening for prostate cancer    Swelling of joint of right knee    Acute respiratory failure with hypoxia     Past Medical History:   Diagnosis Date    COPD (chronic obstructive pulmonary disease)     COPD (chronic obstructive pulmonary disease)     Coronary artery disease     Diastolic heart failure     Hyperlipidemia     Hypertension     Pulmonary hypertension      Past Surgical History:   Procedure Laterality Date    BACK SURGERY      BRONCHOSCOPY N/A 2022    Procedure: BRONCHOSCOPY WITH BRONCHOALVEOLAR LAVAGE AND BRONCHIAL WASHINGS;  Surgeon: Ulices Moya MD;  Location: [facility] ENDOSCOPY;  Service: Pulmonary;  Laterality: N/A;  MUCUS PLUGGING, COPD EXACERBATION    BRONCHOSCOPY      CARDIAC CATHETERIZATION N/A 2022    Procedure: Right Heart Cath;  Surgeon: Drew Rodriguez MD;  Location: [facility] CATH  INVASIVE LOCATION;  Service: Cardiovascular;  Laterality: N/A;    OTHER SURGICAL HISTORY      knee     OTHER SURGICAL HISTORY      shoulder, rotator cuff    SHOULDER SURGERY Right 10/03/2022         Inpatient Speech Pathology Dysphagia Evaluation        PAIN SCALE: None indicated.    PRECAUTIONS/CONTRAINDICATIONS: Standard    SUSPECTED ABUSE/NEGLECT/EXPLOITATION: None indicated.    SOCIAL/PSYCHOLOGICAL NEEDS/BARRIERS: None indicated.    PAST SOCIAL HISTORY: 62-year-old male lives at home with his wife    PRIOR FUNCTION: Dependent    PATIENT GOALS/EXPECTATIONS: Continue with regular diet    HISTORY: 62-year-old male with the above diagnosis referred for speech therapy evaluation to assess for swallowing.  Concern for possible aspiration.  No previous speech pathology services are reported.  Patient does complain of some difficulty with solids feeling may catch in the throat and sometimes go down the wrong way.  He states this is been happening about a year and a half.    CURRENT DIET LEVEL: Regular, thin    OBJECTIVE:    TEST ADMINISTERED: Clinical dysphagia evaluation    COGNITION/SAFETY AWARENESS: Patient followed commands and respond to questions without difficulty.    BEHAVIORAL OBSERVATIONS: Alert and cooperative    ORAL MOTOR EXAM: Grossly within functional limits    VOICE QUALITY: Soft but adequate    REFLEX EXAM: Patient demonstrated cough    POSTURE: Sitting upright in bed    FEEDING/SWALLOWING FUNCTION: Assessed with thin liquids, puréed solids, crunchy solid.    CLINICAL OBSERVATIONS:  Thin liquid by cup and by straw appeared timely with minimal vocal change noted to cervical auscultation.  Belching noted following swallow of liquid.  Purée solid with swallow completed with laryngeal elevation noted to palpation.  Crunchy solid with adequate chewing followed by swallow completed clearing the oral cavity.  No complaint of foods catching during this assessment.  Patient states no complaint of difficulty  swallowing pills.    DYSPHAGIA CRITERIA: Risk of aspiration, signs of possible aspiration.  Complaint of dysphagia to solids.    FUNCTIONAL ASSESSMENT INSTRUMENT: Patient currently scored a level 6 of 7 on Functional Communication Measures for swallowing indicating a 1-19% limitation in function.    ASSESSMENT/ PLAN OF CARE:  Pt presents with limitations, noted below, that impede his ability to swallow safely and maintain nutrition. The skills of a therapist will be required to safely and effectively implement the following treatment plan to restore maximal level of function.    PROBLEMS:  1.   risk of aspiration                       LTG 1: 30 days: Patient will tolerate least restrictive diet utilizing appropriate positioning and strategies with minimal assistance.                       STG 1a: 14 days: Patient will tolerate diet of regular solids with additional moisture and thin liquids with minimal assistance for strategies.                       STG 1b: 14 days: Patient will tolerate 8 of 10 trials of thin liquids with min to no signs or symptoms of aspiration.                       STG 1c: 14 days: Patient/family education.                       TREATMENT: Dysphagia therapy to address swallow function through exercises and education of strategies.     FREQUENCY/DURATION: Once daily 5 times per week    REHAB POTENTIAL:  Pt has good rehab potential.  The following limitations may influence improvement/ length of tx: Medical status.    RECOMMENDATIONS:   1.   DIET: Regular solids cut small additional moisture, thin liquid.    2.  POSITION: Positioning fully upright for all p.o. intake and 30 minutes following.    3.  COMPENSATORY STRATEGIES: Alternate small bites and small sips of solids and liquids at a slow rate.    4.  Modified barium swallow study to rule out aspiration.    Pt/responsible party agrees with plan of care and has been informed of all alternatives, risks and benefits.                               Anticipated Discharge Disposition (SLP): home with assist (12/06/23 1117)                                                                  EDUCATION  The patient has been educated in the following areas:   Modified Diet Instruction.              Time Calculation:    Time Calculation- SLP       Row Name 12/06/23 1117             Time Calculation- SLP    SLP Start Time 0800  -TB      SLP Stop Time 0900  -TB      SLP Time Calculation (min) 60 min  -TB      SLP Received On 12/06/23  -TB         Untimed Charges    SLP Eval/Re-eval  ST Eval Oral Pharyng Swallow - 89846  -TB      20801-DE Eval Oral Pharyng Swallow Minutes 60  -TB         Total Minutes    Untimed Charges Total Minutes 60  -TB       Total Minutes 60  -TB                User Key  (r) = Recorded By, (t) = Taken By, (c) = Cosigned By      Initials Name Provider Type    TB Sally Wray SLP Speech and Language Pathologist                    Therapy Charges for Today       Code Description Service Date Service Provider Modifiers Qty    [id number] HC ST EVAL ORAL PHARYNG SWALLOW 4 12/6/2023 Sally Wray SLP GN 1                 DK Goss  12/6/2023

## 2023-12-06 NOTE — CONSULTS
"The patient is on 4MTU, laying supine with bipap in use. The patients spouse had already left for the day and will return tomorrow after 2 pm. I placed a call to spouse however not able to leave a message and no answer. Patient confirmed this was a good number. Palliative care will return tomorrow to talk with both of them together to help answer questions. I left the booklet \"Conversations that matter\".     -Chio KERNS, RN, [facility]   Palliative Care    12/6/2023 16:16 EST    "

## 2023-12-06 NOTE — CONSULTS
Mr Gomes wore his home Astral NIV  last night. AM ABG WNL.      When asked what may have contributed to this exacerbation Mr. Gomes admits to smoking more, being less active and exposure to wood shavings while doing his wood working hobby.     Mr. Gomes graduated from pulmonary rehab in April and has not maintained an exercise program. Patient was encouraged to participate in pulmonary rehab again.     Mr. Gomes states he is adherent with his maintenance medications and does use his rescue nebulizer often.     Family at bedside had questions about living will completion, DNR stauts and what treatment is available if the NIV is now longer effective. Palliative care referral placed for goals of care discussion and advance care planning per patient's request.

## 2023-12-06 NOTE — PROGRESS NOTES
[facility]   Hospitalist Progress Note  Date: 2023  Patient Name: Sumeet Gomes  : 1960  MRN: [id number]  Date of admission: 2023  Room/Bed: 420/1      Subjective   Subjective     Chief Complaint: Shortness of breath    Summary:Sumeet Gomes is a 62 y.o. male with a past medical history of COPD, acute hypoxic/hypercapnic respiratory failure, pulmonary hypertension, hypertension, and continued tobacco use who initially presented to the pulmonology office today complaining of shortness of breath and was found to be hypoxic with sats in the 70s on his home oxygen. Patient's oxygen was increased to 6L NC and in the ED sats remained in the 70s. Patient is currently on bipap and majority of the history is taken from his daughter with some input from the patient.   Patient's daughter states patient has been more short of breath for the past month, becoming more severe over the past couple weeks. Over the past 2 weeks patient has mostly been lying in the bed with Astral vent in place. He is not eating or drinking much. He has been unable to take his medications or nebulizer treatments since Friday. Patient was seen in the ED about 9 days ago and was discharged home on prednisone for 5 days and has had no improvement in his symptoms. He denies any fever or chills. He has a nonproductive cough. Patient reports his lower extremity edema is improved from baseline. Patient states he gets dizzy upon standing. Patient continues to smoke about 1/2ppd, but has not had any cigarettes recently.     Interval Followup: No acute overnight events.  No acute distress.  Patient is resting comfortably in bed.  No family at bedside today.  He reports that he still has a lot of abdominal distention.  Speech evaluated him.  Respiratory status is stable and he feels close to his baseline.    Review of Systems    All systems reviewed and negative except for what is outlined above.      Objective   Objective     Vitals:    Temp:  [97.3 °F (36.3 °C)-98.2 °F (36.8 °C)] 98.1 °F (36.7 °C)  Heart Rate:  [71-90] 81  Resp:  [16-21] 16  BP: (121-152)/(65-81) 126/72  Flow (L/min):  [2] 2    Physical Exam   Gen: NAD, Alert and Oriented  Cards: RRR, no murmur   Pulm: CTA b/l, no wheezing  Abd: soft, distended, nontender to palpation  Extremities: no pitting edema    Result Review    Result Review:  I have personally reviewed these results:  [x]  Laboratory      Lab 12/06/23  0938 12/06/23  0515 12/05/23  0500 12/04/23  1210   WBC  --  11.87* 5.83 10.15   HEMOGLOBIN  --  12.6* 12.6* 16.0   HEMATOCRIT  --  39.4 40.8 53.1*   PLATELETS  --  111* 103* 134*   NEUTROS ABS  --  11.16* 5.45 9.08*   IMMATURE GRANS (ABS)  --  0.04 0.12* 0.10*   LYMPHS ABS  --  0.24* 0.18* 0.42*   MONOS ABS  --  0.42 0.08* 0.53   EOS ABS  --  0.00 0.00 0.01   MCV  --  88.7 88.7 92.7   PROCALCITONIN  --   --   --  0.05   PROTIME 14.1  --   --   --          Lab 12/06/23  0515 12/05/23  0500 12/04/23  1210   SODIUM 139 138 144   POTASSIUM 3.4* 3.9 4.4   CHLORIDE 98 97* 97*   CO2 34.4* 34.2* 36.9*   ANION GAP 6.6 6.8 10.1   BUN 16 17 19   CREATININE 0.50* 0.49* 0.61*   EGFR 116.0 116.8 109.3   GLUCOSE 156* 167* 101*   CALCIUM 8.8 8.6 9.6   MAGNESIUM  --  2.0  --          Lab 12/05/23  0500 12/04/23  1210   TOTAL PROTEIN 5.0* 6.8   ALBUMIN 3.2* 4.3   GLOBULIN 1.8 2.5   ALT (SGPT) 22 37   AST (SGOT) 19 33   BILIRUBIN 0.9 1.4*   ALK PHOS 132* 189*         Lab 12/06/23  0938 12/04/23  1210   PROBNP  --  4,590.0*   HSTROP T  --  23*   PROTIME 14.1  --    INR 1.06  --                  Lab 12/06/23  0625 12/05/23  0715 12/04/23  1300   PH, ARTERIAL 7.428 7.463* 7.304*   PCO2, ARTERIAL 48.4* 47.2* 77.7*   PO2 ART 79.1* 67.1* 91.0   O2 SATURATION ART 95.3 93.8* 96.4   FIO2  --  26 28   HCO3 ART 31.3* 33.0* 37.7*   BASE EXCESS ART 5.9* 8.1* 7.9*   CARBOXYHEMOGLOBIN 0.8 1.6* 3.0*     Brief Urine Lab Results       None          [x]  Microbiology   Microbiology Results (last 10 days)        Procedure Component Value - Date/Time    Respiratory Panel PCR w/COVID-19(SARS-CoV-2) [facility] In-House, NP Swab in UTM/VTM, 2 HR TAT - Swab, Nasopharynx [[id number]]  (Normal) Collected: 12/05/23 1516    Lab Status: Final result Specimen: Swab from Nasopharynx Updated: 12/05/23 1647     ADENOVIRUS, PCR Not Detected     Coronavirus 229E Not Detected     Coronavirus HKU1 Not Detected     Coronavirus NL63 Not Detected     Coronavirus OC43 Not Detected     COVID19 Not Detected     Human Metapneumovirus Not Detected     Human Rhinovirus/Enterovirus Not Detected     Influenza A PCR Not Detected     Influenza B PCR Not Detected     Parainfluenza Virus 1 Not Detected     Parainfluenza Virus 2 Not Detected     Parainfluenza Virus 3 Not Detected     Parainfluenza Virus 4 Not Detected     RSV, PCR Not Detected     Bordetella pertussis pcr Not Detected     Bordetella parapertussis PCR Not Detected     Chlamydophila pneumoniae PCR Not Detected     Mycoplasma pneumo by PCR Not Detected    Narrative:      In the setting of a positive respiratory panel with a viral infection PLUS a negative procalcitonin without other underlying concern for bacterial infection, consider observing off antibiotics or discontinuation of antibiotics and continue supportive care. If the respiratory panel is positive for atypical bacterial infection (Bordetella pertussis, Chlamydophila pneumoniae, or Mycoplasma pneumoniae), consider antibiotic de-escalation to target atypical bacterial infection.    COVID-19, FLU A/B, RSV PCR 1 HR TAT - Swab, Nasopharynx [[id number]]  (Normal) Collected: 12/04/23 1242    Lab Status: Final result Specimen: Swab from Nasopharynx Updated: 12/04/23 1341     COVID19 Not Detected     Influenza A PCR Not Detected     Influenza B PCR Not Detected     RSV, PCR Not Detected    Narrative:      Fact sheet for providers: https://www.fda.gov/media/216957/download    Fact sheet for patients:  https://www.fda.gov/media/825541/download    Test performed by PCR.          [x]  Radiology  XR Chest 1 View    Result Date: 12/4/2023    Emphysema.   No radiographic findings of acute cardiopulmonary abnormality.       ZURI MARIE MD       Electronically Signed and Approved By: ZURI MARIE MD on 12/04/2023 at 12:29            []  EKG/Telemetry   []  Cardiology/Vascular   []  Pathology  []  Old records  []  Other:    Assessment & Plan   Assessment / Plan     Assessment:  Acute on chronic hypoxic respiratory failure  Acute on chronic COPD  Abdominal distention, weight loss  Hypertension  History aspergillus  TAISHA  HFpEF  Thrombocytopenia      Plan:  COVID/flu/RSV negative  Continue p.o. prednisone for total 5-day course.    Scheduled nebulizer  DuoNebs as needed  Patient to bring his home astral vent  Pulmonology following  Continue p.o. Protonix  Continue home hydrocodone  Itraconazole on hold per pulm  Patient complaining of abdominal distention, reflux and weight loss.  Wife reports that he sometimes does get choked on food.  Consider aspiration as possible induction of his COPD exacerbation.    Speech consulted.    CT abdomen pending.  Echo with normal EF  Isolated thrombocytopenia, platelets stable at 111.  Looks like this been an intermittent problem for about the last year.  No other indications of liver disease, LFTs normal, bilirubin normal, INR normal.  A.m. labs       Discussed with RN.    DVT prophylaxis:  Medical DVT prophylaxis orders are present.    CODE STATUS:   Code Status (Patient has no pulse and is not breathing): CPR (Attempt to Resuscitate)  Medical Interventions (Patient has pulse or is breathing): Full Support      Electronically signed by Pearl Alexander DO, 12/05/23, 5:26 PM EST.

## 2023-12-07 ENCOUNTER — APPOINTMENT (OUTPATIENT)
Dept: GENERAL RADIOLOGY | Facility: HOSPITAL | Age: 63
DRG: 190 | End: 2023-12-07
Payer: MEDICAID

## 2023-12-07 LAB
ANION GAP SERPL CALCULATED.3IONS-SCNC: 5.8 MMOL/L (ref 5–15)
ARTERIAL PATENCY WRIST A: ABNORMAL
BASE EXCESS BLDA CALC-SCNC: 6.9 MMOL/L (ref -2–2)
BASOPHILS # BLD AUTO: 0.01 10*3/MM3 (ref 0–0.2)
BASOPHILS NFR BLD AUTO: 0.1 % (ref 0–1.5)
BDY SITE: ABNORMAL
BUN SERPL-MCNC: 14 MG/DL (ref 8–23)
BUN/CREAT SERPL: 31.1 (ref 7–25)
CALCIUM SPEC-SCNC: 9 MG/DL (ref 8.6–10.5)
CHLORIDE SERPL-SCNC: 96 MMOL/L (ref 98–107)
CO2 SERPL-SCNC: 36.2 MMOL/L (ref 22–29)
COHGB MFR BLD: 0.2 % (ref 0–1.5)
CREAT SERPL-MCNC: 0.45 MG/DL (ref 0.76–1.27)
DEPRECATED RDW RBC AUTO: 50.9 FL (ref 37–54)
EGFRCR SERPLBLD CKD-EPI 2021: 119.8 ML/MIN/1.73
EOSINOPHIL # BLD AUTO: 0 10*3/MM3 (ref 0–0.4)
EOSINOPHIL NFR BLD AUTO: 0 % (ref 0.3–6.2)
ERYTHROCYTE [DISTWIDTH] IN BLOOD BY AUTOMATED COUNT: 15 % (ref 12.3–15.4)
FHHB: 13.8 % (ref 0–5)
GAS FLOW AIRWAY: 3 LPM
GLUCOSE SERPL-MCNC: 137 MG/DL (ref 65–99)
HCO3 BLDA-SCNC: 35.9 MMOL/L (ref 22–26)
HCT VFR BLD AUTO: 41.6 % (ref 37.5–51)
HGB BLD-MCNC: 12.7 G/DL (ref 13–17.7)
HGB BLDA-MCNC: 14.1 G/DL (ref 13.8–16.4)
IMM GRANULOCYTES # BLD AUTO: 0.04 10*3/MM3 (ref 0–0.05)
IMM GRANULOCYTES NFR BLD AUTO: 0.4 % (ref 0–0.5)
INHALED O2 CONCENTRATION: 32 %
LACTATE BLDA-SCNC: ABNORMAL MMOL/L
LYMPHOCYTES # BLD AUTO: 0.42 10*3/MM3 (ref 0.7–3.1)
LYMPHOCYTES NFR BLD AUTO: 3.8 % (ref 19.6–45.3)
MCH RBC QN AUTO: 28.2 PG (ref 26.6–33)
MCHC RBC AUTO-ENTMCNC: 30.5 G/DL (ref 31.5–35.7)
MCV RBC AUTO: 92.4 FL (ref 79–97)
METHGB BLD QL: 0.2 % (ref 0–1.5)
MODALITY: ABNORMAL
MONOCYTES # BLD AUTO: 0.63 10*3/MM3 (ref 0.1–0.9)
MONOCYTES NFR BLD AUTO: 5.8 % (ref 5–12)
NEUTROPHILS NFR BLD AUTO: 89.9 % (ref 42.7–76)
NEUTROPHILS NFR BLD AUTO: 9.81 10*3/MM3 (ref 1.7–7)
NRBC BLD AUTO-RTO: 0 /100 WBC (ref 0–0.2)
OXYHGB MFR BLDV: 85.8 % (ref 94–99)
PCO2 BLDA: 72.7 MM HG (ref 35–45)
PH BLDA: 7.31 PH UNITS (ref 7.35–7.45)
PLATELET # BLD AUTO: 100 10*3/MM3 (ref 140–450)
PMV BLD AUTO: 11.2 FL (ref 6–12)
PO2 BLD: 149 MM[HG] (ref 0–500)
PO2 BLDA: 47.8 MM HG (ref 80–100)
POTASSIUM SERPL-SCNC: 4 MMOL/L (ref 3.5–5.2)
RBC # BLD AUTO: 4.5 10*6/MM3 (ref 4.14–5.8)
SAO2 % BLDCOA: 86.1 % (ref 95–99)
SODIUM SERPL-SCNC: 138 MMOL/L (ref 136–145)
WBC NRBC COR # BLD AUTO: 10.91 10*3/MM3 (ref 3.4–10.8)

## 2023-12-07 PROCEDURE — 80048 BASIC METABOLIC PNL TOTAL CA: CPT | Performed by: STUDENT IN AN ORGANIZED HEALTH CARE EDUCATION/TRAINING PROGRAM

## 2023-12-07 PROCEDURE — 36600 WITHDRAWAL OF ARTERIAL BLOOD: CPT | Performed by: STUDENT IN AN ORGANIZED HEALTH CARE EDUCATION/TRAINING PROGRAM

## 2023-12-07 PROCEDURE — 99232 SBSQ HOSP IP/OBS MODERATE 35: CPT | Performed by: STUDENT IN AN ORGANIZED HEALTH CARE EDUCATION/TRAINING PROGRAM

## 2023-12-07 PROCEDURE — 94799 UNLISTED PULMONARY SVC/PX: CPT

## 2023-12-07 PROCEDURE — 83050 HGB METHEMOGLOBIN QUAN: CPT | Performed by: STUDENT IN AN ORGANIZED HEALTH CARE EDUCATION/TRAINING PROGRAM

## 2023-12-07 PROCEDURE — 99233 SBSQ HOSP IP/OBS HIGH 50: CPT | Performed by: STUDENT IN AN ORGANIZED HEALTH CARE EDUCATION/TRAINING PROGRAM

## 2023-12-07 PROCEDURE — 94664 DEMO&/EVAL PT USE INHALER: CPT

## 2023-12-07 PROCEDURE — 92611 MOTION FLUOROSCOPY/SWALLOW: CPT

## 2023-12-07 PROCEDURE — 94761 N-INVAS EAR/PLS OXIMETRY MLT: CPT

## 2023-12-07 PROCEDURE — 63710000001 PREDNISONE PER 1 MG: Performed by: STUDENT IN AN ORGANIZED HEALTH CARE EDUCATION/TRAINING PROGRAM

## 2023-12-07 PROCEDURE — 82805 BLOOD GASES W/O2 SATURATION: CPT | Performed by: STUDENT IN AN ORGANIZED HEALTH CARE EDUCATION/TRAINING PROGRAM

## 2023-12-07 PROCEDURE — 25010000002 ENOXAPARIN PER 10 MG: Performed by: INTERNAL MEDICINE

## 2023-12-07 PROCEDURE — 74230 X-RAY XM SWLNG FUNCJ C+: CPT

## 2023-12-07 PROCEDURE — 25010000002 FUROSEMIDE PER 20 MG: Performed by: STUDENT IN AN ORGANIZED HEALTH CARE EDUCATION/TRAINING PROGRAM

## 2023-12-07 PROCEDURE — 82375 ASSAY CARBOXYHB QUANT: CPT | Performed by: STUDENT IN AN ORGANIZED HEALTH CARE EDUCATION/TRAINING PROGRAM

## 2023-12-07 PROCEDURE — 85025 COMPLETE CBC W/AUTO DIFF WBC: CPT | Performed by: STUDENT IN AN ORGANIZED HEALTH CARE EDUCATION/TRAINING PROGRAM

## 2023-12-07 RX ORDER — FUROSEMIDE 10 MG/ML
40 INJECTION INTRAMUSCULAR; INTRAVENOUS EVERY 12 HOURS
Status: DISCONTINUED | OUTPATIENT
Start: 2023-12-07 | End: 2023-12-08

## 2023-12-07 RX ADMIN — FUROSEMIDE 40 MG: 10 INJECTION, SOLUTION INTRAMUSCULAR; INTRAVENOUS at 09:33

## 2023-12-07 RX ADMIN — BARIUM SULFATE 183 ML: 960 POWDER, FOR SUSPENSION ORAL at 14:14

## 2023-12-07 RX ADMIN — BARIUM SULFATE 50 ML: 400 SUSPENSION ORAL at 14:14

## 2023-12-07 RX ADMIN — IPRATROPIUM BROMIDE AND ALBUTEROL SULFATE 3 ML: .5; 3 SOLUTION RESPIRATORY (INHALATION) at 19:46

## 2023-12-07 RX ADMIN — IPRATROPIUM BROMIDE AND ALBUTEROL SULFATE 3 ML: .5; 3 SOLUTION RESPIRATORY (INHALATION) at 07:42

## 2023-12-07 RX ADMIN — PREDNISONE 40 MG: 20 TABLET ORAL at 09:33

## 2023-12-07 RX ADMIN — IPRATROPIUM BROMIDE AND ALBUTEROL SULFATE 3 ML: .5; 3 SOLUTION RESPIRATORY (INHALATION) at 13:30

## 2023-12-07 RX ADMIN — FUROSEMIDE 40 MG: 10 INJECTION, SOLUTION INTRAMUSCULAR; INTRAVENOUS at 21:18

## 2023-12-07 RX ADMIN — ACETAMINOPHEN 650 MG: 325 TABLET ORAL at 21:24

## 2023-12-07 RX ADMIN — Medication 10 ML: at 21:18

## 2023-12-07 RX ADMIN — ARFORMOTEROL TARTRATE 15 MCG: 15 SOLUTION RESPIRATORY (INHALATION) at 07:42

## 2023-12-07 RX ADMIN — ACETAMINOPHEN 650 MG: 325 TABLET ORAL at 16:45

## 2023-12-07 RX ADMIN — BARIUM SULFATE 55 ML: 0.81 POWDER, FOR SUSPENSION ORAL at 14:14

## 2023-12-07 RX ADMIN — ACETAMINOPHEN 650 MG: 325 TABLET ORAL at 00:58

## 2023-12-07 RX ADMIN — ENOXAPARIN SODIUM 40 MG: 100 INJECTION SUBCUTANEOUS at 09:33

## 2023-12-07 RX ADMIN — ARFORMOTEROL TARTRATE 15 MCG: 15 SOLUTION RESPIRATORY (INHALATION) at 19:46

## 2023-12-07 RX ADMIN — IPRATROPIUM BROMIDE AND ALBUTEROL SULFATE 3 ML: .5; 3 SOLUTION RESPIRATORY (INHALATION) at 00:41

## 2023-12-07 RX ADMIN — PANTOPRAZOLE SODIUM 40 MG: 40 INJECTION, POWDER, FOR SOLUTION INTRAVENOUS at 05:52

## 2023-12-07 RX ADMIN — Medication 10 ML: at 09:33

## 2023-12-07 NOTE — CASE MANAGEMENT/SOCIAL WORK
RT CM followed up with patient and his spouse.  They would like to see if he qualifies for inpatient rehab at discharge.  RN CM alerted of patient's wishes.  PT/OT recommending home with assist and outpatient pulmonary rehab.

## 2023-12-07 NOTE — PROGRESS NOTES
Pulmonary / Critical Care Progress Note      Patient Name: Sumeet Gomes  : 1960  MRN: [id number]  Primary Care Physician:  Mirtha Alexander APRN  Date of admission: 2023    Subjective   Subjective   Follow-up for acute on chronic hypoxic and hypercapnic respiratory failure    No acute events overnight.    This morning,  On 2 L nasal cannula  Lying in bed eating breakfast  Feels about the same  ABG worse this morning    Review of Systems  General: Fatigue, otherwise denied complaints  HEENT: Denied complaints  Respiratory: Dyspnea, otherwise denied complaints  Cardiovascular: Denied complaints  GI: Denied complaints  : Denied complaints  MSK: Weakness, otherwise denied complaints    Objective   Objective     Vitals:   Temp:  [97.2 °F (36.2 °C)-98.7 °F (37.1 °C)] 97.2 °F (36.2 °C)  Heart Rate:  [71-90] 77  Resp:  [16-20] 20  BP: (126-138)/(72-83) 138/83  Flow (L/min):  [2-3] 3    Physical Exam   Vital Signs Reviewed   General:  Alert, NAD.  Chronically ill-appearing male, lying in bed  HEENT:  PERRL, EOMI.    Neck:  No JVD, no thyromegaly  Lymph: no axillary, cervical, supraclavicular lymphadenopathy noted bilaterally  Chest:  Clear to auscultation bilaterally, no work of breathing noted on 3 L nasal cannula  CV: RRR, no M/G/R, pulses 2+  Abd:  Soft, NT, ND, +BS  EXT:  no clubbing, no cyanosis, no edema  Neuro:  A&Ox3, CN grossly intact, no focal deficits.  Skin: No rashes or lesions noted    Result Review    Result Review:  I have personally reviewed the results from the time of this admission to 2023 10:56 EST and agree with these findings:  [x]  Laboratory  [x]  Microbiology  [x]  Radiology  [x]  EKG/Telemetry   []  Cardiology/Vascular   []  Pathology  []  Old records  []  Other:  Most notable findings include:     ABG 7.30/77/91 --> 7.46/47/67  proBNP 4590    10/13/2022 PFT: FEV1 14%, DLCO 18%     bronchoscopy    12 echo EF 55% estimated RVSP 45 to 55 mmHg        Lab  12/07/23  0435 12/06/23  0515 12/05/23  0500 12/04/23  1210   WBC 10.91* 11.87* 5.83 10.15   HEMOGLOBIN 12.7* 12.6* 12.6* 16.0   HEMATOCRIT 41.6 39.4 40.8 53.1*   PLATELETS 100* 111* 103* 134*   SODIUM 138 139 138 144   POTASSIUM 4.0 3.4* 3.9 4.4   CHLORIDE 96* 98 97* 97*   CO2 36.2* 34.4* 34.2* 36.9*   BUN 14 16 17 19   CREATININE 0.45* 0.50* 0.49* 0.61*   GLUCOSE 137* 156* 167* 101*   CALCIUM 9.0 8.8 8.6 9.6   TOTAL PROTEIN  --   --  5.0* 6.8   ALBUMIN  --   --  3.2* 4.3   GLOBULIN  --   --  1.8 2.5     XR Chest 1 View    Result Date: 12/4/2023  PROCEDURE: XR CHEST 1 VW  COMPARISON: [facility], CT, CT CHEST WO CONTRAST DIAGNOSTIC, 3/09/2023, 12:28.  [facility], CR, XR CHEST 1 VW, 11/30/2022, 17:00.  [facility], CR, XR CHEST 2 VW, 2/01/2023, 15:06.  [facility], CR, XR CHEST 1 VW, 11/25/2023, 12:01.  INDICATIONS: SOA Triage Protocol  FINDINGS:  No focal or diffuse infiltrate is identified.  There is emphysema, as before.  No pleural effusion or pneumothorax. Heart size and mediastinal contour appear within normal limits.       Impression:   Emphysema.   No radiographic findings of acute cardiopulmonary abnormality.       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  Okay to use home NIPPV (astrovent).  -Repeat ABG.  7.3 1/72/47.  Patient may benefit from setting adjustments on astral vent  by increasing positive pressure  -RT  on board.  Appreciate assistance.  -Sputum culture pending  -Continue to hold itraconazole  -Continue Brovana and DuoNebs  -Continue bronchopulmonary hygiene.  Encourage use of I-S.  -Continue prednisone 40 daily x5 days  -Continue Lasix 20 mg p.o. daily  -Trend electrolytes and renal panel.  Replace electrolytes as needed.  -Encourage smoking cessation.  -Encourage mobility.  Out of bed to chair.  -PT/OT/SLP on board.  Appreciate assistance  -If the above outlined plan does not result in patient improvement, the service will consider bronchoscopy    DVT prophylaxis:  Medical DVT prophylaxis orders are present.    CODE STATUS:   Code Status (Patient has no pulse and is not breathing): CPR (Attempt to Resuscitate)  Medical Interventions (Patient has pulse or is breathing): Full Support    Electronically signed by ZULY Ames, 12/07/23, 10:56 AM EST.  This patient was seen by both a physician and a NP. IYadira MD, spent >50% of time in accordance with split shared billing. This included personally reviewing all pertinent labs, imaging, microbiology and documentation. Also discussing the case with the patient and any available family, the admitting physician and any available ancillary staff.   Electronically signed by Yadira Lopez MD, 12/07/23, 1:32 PM EST.

## 2023-12-07 NOTE — MBS/VFSS/FEES
Acute Care - Speech Language Pathology   Swallow  Modified Barium Swallow Study   Heath     Patient Name: Sumeet Gomes  : 1960  MRN: [id number]  Today's Date: 2023               Admit Date: 2023    Visit Dx:     ICD-10-CM ICD-9-CM   1. COPD exacerbation  J44.1 491.21   2. Acute on chronic respiratory failure with hypercapnia  J96.22 518.84   3. Acute on chronic respiratory failure with hypoxia  J96.21 518.84     799.02   4. Decreased activities of daily living (ADL)  Z78.9 V49.89   5. Difficulty walking  R26.2 719.7   6. Oropharyngeal dysphagia  R13.12 787.22   7. Chronic obstructive pulmonary disease with acute exacerbation  J44.1 491.21     Patient Active Problem List   Diagnosis    Other hyperlipidemia    Essential hypertension    COPD (chronic obstructive pulmonary disease)    Fatigue    Chest wall contusion, right, initial encounter    Closed fracture of coccyx    Contusion of right knee    Acute respiratory failure with hypoxia and hypercapnia    Acute CHF    Tobacco abuse    Hypoxia    Severe malnutrition    Pulmonary hypertension    Other fatigue    Dyspnea    Cough    Diastolic heart failure    Routine general medical examination at a health care facility    Screening for prostate cancer    Swelling of joint of right knee    Acute respiratory failure with hypoxia     Past Medical History:   Diagnosis Date    COPD (chronic obstructive pulmonary disease)     COPD (chronic obstructive pulmonary disease)     Coronary artery disease     Diastolic heart failure     Hyperlipidemia     Hypertension     Pulmonary hypertension      Past Surgical History:   Procedure Laterality Date    BACK SURGERY      BRONCHOSCOPY N/A 2022    Procedure: BRONCHOSCOPY WITH BRONCHOALVEOLAR LAVAGE AND BRONCHIAL WASHINGS;  Surgeon: Ulices Moya MD;  Location: [facility] ENDOSCOPY;  Service: Pulmonary;  Laterality: N/A;  MUCUS PLUGGING, COPD EXACERBATION    BRONCHOSCOPY      CARDIAC CATHETERIZATION N/A  09/16/2022    Procedure: Right Heart Cath;  Surgeon: Drew Rodriguez MD;  Location: [facility] INVASIVE LOCATION;  Service: Cardiovascular;  Laterality: N/A;    OTHER SURGICAL HISTORY      knee     OTHER SURGICAL HISTORY      shoulder, rotator cuff    SHOULDER SURGERY Right 10/03/2022         MODIFIED BARIUM SWALLOW STUDY: SPEECH PATHOLOGY REPORT        DATE OF SERVICE: 12/7/2023    PERTINENT INFORMATION:  Mr. Gomes is a 62year old male with diagnosis of dysphagia.    He was referred for an MBSS by Dr. Alexander to rule out aspiration as well as to determine appropriate treatment plan for this patient.      PROCEDURE:    Mr. Gomes was alert and cooperative.  The patient was viewed in the lateral plane.  The following Ba consistencies were administered: Thin liquids, nectar thick liquids, barium mixed with applesauce, barium paste, barium mixed with cracker. The following compensatory swallowing strategies were performed: Bolus modification, cyclic ingestion.      RESULTS:    1. Nectar liquid by spoon with spillage to the pharynx, swallow completed.  2. Nectar liquid by cup with patient taking very large drink, spillage to the vallecula, swallow completed.  Pharyngeal residue with spontaneous swallow to clear.  3. Thin liquid with swallow completed.  4. Purée with spillage to the vallecula, swallow completed.  Residual coating at tongue base and vallecula with second swallow to clear.  5. Pudding with swallow completed.  Double swallow to clear.  6. Solid results with chewing, spillage to the vallecula followed by swallow completed.  7. Thin liquid via straw with swallow completed.  Sequential swallow completed.      IMPRESSIONS:    Mr. Gomes demonstrated mild oropharyngeal dysphagia characterized by decreased general strength, swallow delay.  No laryngeal penetration, no aspiration were observed during the study.     FUNCTIONAL DEFICIT: Patient scored level 6 of 7 on Functional Communication Measures for swallowing  indicating a 1-19% limitation in function for current status, goal status, and discharge status.      RECOMMENDATIONS:   1.  Regular solids cut small additional moisture, thin liquid.  2.  Positioning fully upright for all p.o. intake and 30 minutes following.  3.  Alternate small bites and small sips of solids and liquids at a slow rate.  Double swallow each bite/sip.      Yes, patient/responsible party agrees with the plan of care and has been informed of all alternatives, risks and benefits.    Thank you for this referral.                                                                                  Plan of Care Reviewed With: patient          EDUCATION  The patient has been educated in the following areas:   Modified Diet Instruction.              Time Calculation:    Time Calculation- SLP       Row Name 12/07/23 1438             Time Calculation- SLP    SLP Received On 12/07/23  -TB         Untimed Charges    SLP Eval/Re-eval  ST Motion Fluoro Eval Swallow - 31213  -TB      14472-ME Motion Fluoro Eval Swallow Minutes 90  -TB         Total Minutes    Untimed Charges Total Minutes 90  -TB       Total Minutes 90  -TB                User Key  (r) = Recorded By, (t) = Taken By, (c) = Cosigned By      Initials Name Provider Type    TB Sally Wray SLP Speech and Language Pathologist                    Therapy Charges for Today       Code Description Service Date Service Provider Modifiers Qty    04923666554 HC ST EVAL ORAL PHARYNG SWALLOW 4 12/6/2023 Sally Wray SLP GN 1    38420812398 HC ST MOTION FLUORO EVAL SWALLOW 6 12/7/2023 Sally Wray SLP GN 1                 DK Goss  12/7/2023

## 2023-12-07 NOTE — PROGRESS NOTES
[facility]   Hospitalist Progress Note  Date: 2023  Patient Name: Sumeet Gomes  : 1960  MRN: [id number]  Date of admission: 2023  Room/Bed: 420/1      Subjective   Subjective     Chief Complaint: Shortness of breath    Summary:Sumeet Gomes is a 62 y.o. male with a past medical history of COPD, acute hypoxic/hypercapnic respiratory failure, pulmonary hypertension, hypertension, and continued tobacco use who initially presented to the pulmonology office today complaining of shortness of breath and was found to be hypoxic with sats in the 70s on his home oxygen. Patient's oxygen was increased to 6L NC and in the ED sats remained in the 70s. Patient is currently on bipap and majority of the history is taken from his daughter with some input from the patient.   Patient's daughter states patient has been more short of breath for the past month, becoming more severe over the past couple weeks. Over the past 2 weeks patient has mostly been lying in the bed with Astral vent in place. He is not eating or drinking much. He has been unable to take his medications or nebulizer treatments since Friday. Patient was seen in the ED about 9 days ago and was discharged home on prednisone for 5 days and has had no improvement in his symptoms. He denies any fever or chills. He has a nonproductive cough. Patient reports his lower extremity edema is improved from baseline. Patient states he gets dizzy upon standing. Patient continues to smoke about 1/2ppd, but has not had any cigarettes recently.     Interval Followup: No acute overnight events.  No acute distress.  Patient was resting comfortably wearing his BiPAP.  He reports that he is doing well and has no new complaints.  Speech to do a modified barium on him today.  He is considering requesting pulmonary rehab.      Review of Systems    All systems reviewed and negative except for what is outlined above.      Objective   Objective     Vitals:   Temp:   [97.2 °F (36.2 °C)-98.7 °F (37.1 °C)] 97.7 °F (36.5 °C)  Heart Rate:  [74-90] 76  Resp:  [18-20] 20  BP: (126-138)/(75-83) 126/80  Flow (L/min):  [2-3] 3    Physical Exam   Gen: NAD, Alert and Oriented  Cards: RRR, no murmur   Pulm: CTA b/l, no wheezing  Abd: soft, distended, nontender to palpation  Extremities: no pitting edema    Result Review    Result Review:  I have personally reviewed these results:  [x]  Laboratory      Lab 12/07/23  0435 12/06/23  0938 12/06/23  0515 12/05/23  0500 12/04/23  1210   WBC 10.91*  --  11.87* 5.83 10.15   HEMOGLOBIN 12.7*  --  12.6* 12.6* 16.0   HEMATOCRIT 41.6  --  39.4 40.8 53.1*   PLATELETS 100*  --  111* 103* 134*   NEUTROS ABS 9.81*  --  11.16* 5.45 9.08*   IMMATURE GRANS (ABS) 0.04  --  0.04 0.12* 0.10*   LYMPHS ABS 0.42*  --  0.24* 0.18* 0.42*   MONOS ABS 0.63  --  0.42 0.08* 0.53   EOS ABS 0.00  --  0.00 0.00 0.01   MCV 92.4  --  88.7 88.7 92.7   PROCALCITONIN  --   --   --   --  0.05   PROTIME  --  14.1  --   --   --          Lab 12/07/23  0435 12/06/23  0515 12/05/23  0500   SODIUM 138 139 138   POTASSIUM 4.0 3.4* 3.9   CHLORIDE 96* 98 97*   CO2 36.2* 34.4* 34.2*   ANION GAP 5.8 6.6 6.8   BUN 14 16 17   CREATININE 0.45* 0.50* 0.49*   EGFR 119.8 116.0 116.8   GLUCOSE 137* 156* 167*   CALCIUM 9.0 8.8 8.6   MAGNESIUM  --   --  2.0         Lab 12/05/23  0500 12/04/23  1210   TOTAL PROTEIN 5.0* 6.8   ALBUMIN 3.2* 4.3   GLOBULIN 1.8 2.5   ALT (SGPT) 22 37   AST (SGOT) 19 33   BILIRUBIN 0.9 1.4*   ALK PHOS 132* 189*         Lab 12/06/23  0938 12/04/23  1210   PROBNP  --  4,590.0*   HSTROP T  --  23*   PROTIME 14.1  --    INR 1.06  --                  Lab 12/07/23  0857 12/06/23  0625 12/05/23  0715 12/04/23  1300   PH, ARTERIAL 7.311* 7.428 7.463* 7.304*   PCO2, ARTERIAL 72.7* 48.4* 47.2* 77.7*   PO2 ART 47.8* 79.1* 67.1* 91.0   O2 SATURATION ART 86.1* 95.3 93.8* 96.4   FIO2 32  --  26 28   HCO3 ART 35.9* 31.3* 33.0* 37.7*   BASE EXCESS ART 6.9* 5.9* 8.1* 7.9*    CARBOXYHEMOGLOBIN 0.2 0.8 1.6* 3.0*     Brief Urine Lab Results       None          [x]  Microbiology   Microbiology Results (last 10 days)       Procedure Component Value - Date/Time    Respiratory Panel PCR w/COVID-19(SARS-CoV-2) KARRIE/JUSTIN/ENZO/[facility] In-House, NP Swab in UTM/VTM, 2 HR TAT - Swab, Nasopharynx [[id number]]  (Normal) Collected: 12/05/23 1516    Lab Status: Final result Specimen: Swab from Nasopharynx Updated: 12/05/23 1647     ADENOVIRUS, PCR Not Detected     Coronavirus 229E Not Detected     Coronavirus HKU1 Not Detected     Coronavirus NL63 Not Detected     Coronavirus OC43 Not Detected     COVID19 Not Detected     Human Metapneumovirus Not Detected     Human Rhinovirus/Enterovirus Not Detected     Influenza A PCR Not Detected     Influenza B PCR Not Detected     Parainfluenza Virus 1 Not Detected     Parainfluenza Virus 2 Not Detected     Parainfluenza Virus 3 Not Detected     Parainfluenza Virus 4 Not Detected     RSV, PCR Not Detected     Bordetella pertussis pcr Not Detected     Bordetella parapertussis PCR Not Detected     Chlamydophila pneumoniae PCR Not Detected     Mycoplasma pneumo by PCR Not Detected    Narrative:      In the setting of a positive respiratory panel with a viral infection PLUS a negative procalcitonin without other underlying concern for bacterial infection, consider observing off antibiotics or discontinuation of antibiotics and continue supportive care. If the respiratory panel is positive for atypical bacterial infection (Bordetella pertussis, Chlamydophila pneumoniae, or Mycoplasma pneumoniae), consider antibiotic de-escalation to target atypical bacterial infection.    COVID-19, FLU A/B, RSV PCR 1 HR TAT - Swab, Nasopharynx [[id number]]  (Normal) Collected: 12/04/23 1242    Lab Status: Final result Specimen: Swab from Nasopharynx Updated: 12/04/23 1341     COVID19 Not Detected     Influenza A PCR Not Detected     Influenza B PCR Not Detected     RSV, PCR Not  Detected    Narrative:      Fact sheet for providers: https://www.fda.gov/media/372134/download    Fact sheet for patients: https://www.fda.gov/media/294215/download    Test performed by PCR.          [x]  Radiology  FL Video Swallow With Speech Single Contrast    Result Date: 12/7/2023     1. No tracheal aspiration or laryngeal penetration appreciated on exam  Please consult speech pathology report for further details regarding exam and dietary recommendations    ANGELICA BEARD       Electronically Signed and Approved By: GABRIEL MAGALLON MD on 12/07/2023 at 15:08             CT Abdomen Pelvis Without Contrast    Result Date: 12/6/2023    1. Bilateral pleural effusions are noted.  Atelectasis versus infiltrates are seen in the lung bases.  There is mild scattered free fluid throughout the abdomen and pelvis.  Diffuse anasarca is noted.  The findings suggest changes of CHF/volume overload. 2. No evidence for acute abnormality throughout the abdomen or pelvis on this noncontrast study. 3. No evidence for nephrolithiasis bilaterally. There is no evidence for obstructive uropathy.     JOSE JUAN HERNANDEZ MD       Electronically Signed and Approved By: JOSE JUAN HERNANDEZ MD on 12/06/2023 at 22:37             XR Chest 1 View    Result Date: 12/4/2023    Emphysema.   No radiographic findings of acute cardiopulmonary abnormality.       ZURI MARIE MD       Electronically Signed and Approved By: ZURI MARIE MD on 12/04/2023 at 12:29            []  EKG/Telemetry   []  Cardiology/Vascular   []  Pathology  []  Old records  []  Other:    Assessment & Plan   Assessment / Plan     Assessment:  Acute on chronic hypoxic respiratory failure  Acute on chronic COPD  Abdominal distention, weight loss  Hypertension  History aspergillus  TAISHA  HFpEF  Thrombocytopenia      Plan:  COVID/flu/RSV negative  Continue p.o. prednisone for total 5-day course.    Scheduled nebulizer  DuoNebs as needed  Patient to bring his home [facility]  Pulmonology  following  Continue p.o. Protonix  Continue home hydrocodone  Itraconazole on hold per pulm  CT abdomen with bilateral pleural effusions and diffuse anasarca.  Start IV Lasix 40 mg twice daily  Speech consulted.    Echo with normal EF  Isolated thrombocytopenia, platelets stable at 111.  Looks like this been an intermittent problem for about the last year.  No other indications of liver disease, LFTs normal, bilirubin normal, INR normal.  Hepatitis panel negative.  A.m. labs    Disposition: Patient considering pulmonary rehab.     Discussed with RN.    DVT prophylaxis:  Medical DVT prophylaxis orders are present.    CODE STATUS:   Code Status (Patient has no pulse and is not breathing): CPR (Attempt to Resuscitate)  Medical Interventions (Patient has pulse or is breathing): Full Support      Electronically signed by Pearl Alexander DO, 12/05/23, 5:26 PM EST.

## 2023-12-07 NOTE — ACP (ADVANCE CARE PLANNING)
The patient is on 4MTU, sitting up in bed wearing a nasal canula, no reports of pain, nausea or anxiety at this time. I had an educational session with the patient and his spouse about Living [place], Goals and Code status. I completed a Living Will naming the patients spouse Ida OR his son Jeff as his HCS. Original and copies given to family and copy sent to MR.The patient and his spouse talked about scenerios where he would not want to live and agreed to continue these conversations going forward. I discussed code status and the patient would like to be NO CPR/DNI always. I educated on and completed an EMS DNR, copy to MR and original and copies to patient. I provided a pamphlet on [facility] and [facility] and explained their services. At this time the spouse declined a referral but did take the information. I reviewed with them options of care and symptom management. All questions were answered. Primary nurse and provider updated.    -Chio KERNS, RN, [facility]   Palliative Care    12/7/2023 15:40 EST

## 2023-12-08 LAB
ANION GAP SERPL CALCULATED.3IONS-SCNC: 3.8 MMOL/L (ref 5–15)
BASOPHILS # BLD AUTO: 0 10*3/MM3 (ref 0–0.2)
BASOPHILS NFR BLD AUTO: 0 % (ref 0–1.5)
BUN SERPL-MCNC: 13 MG/DL (ref 8–23)
BUN/CREAT SERPL: 25 (ref 7–25)
CALCIUM SPEC-SCNC: 9.3 MG/DL (ref 8.6–10.5)
CHLORIDE SERPL-SCNC: 91 MMOL/L (ref 98–107)
CO2 SERPL-SCNC: 43.2 MMOL/L (ref 22–29)
CREAT SERPL-MCNC: 0.52 MG/DL (ref 0.76–1.27)
DEPRECATED RDW RBC AUTO: 50.6 FL (ref 37–54)
EGFRCR SERPLBLD CKD-EPI 2021: 114.7 ML/MIN/1.73
EOSINOPHIL # BLD AUTO: 0.01 10*3/MM3 (ref 0–0.4)
EOSINOPHIL NFR BLD AUTO: 0.1 % (ref 0.3–6.2)
ERYTHROCYTE [DISTWIDTH] IN BLOOD BY AUTOMATED COUNT: 14.7 % (ref 12.3–15.4)
GLUCOSE SERPL-MCNC: 89 MG/DL (ref 65–99)
HCT VFR BLD AUTO: 46.4 % (ref 37.5–51)
HGB BLD-MCNC: 14.1 G/DL (ref 13–17.7)
IMM GRANULOCYTES # BLD AUTO: 0.02 10*3/MM3 (ref 0–0.05)
IMM GRANULOCYTES NFR BLD AUTO: 0.2 % (ref 0–0.5)
LYMPHOCYTES # BLD AUTO: 0.8 10*3/MM3 (ref 0.7–3.1)
LYMPHOCYTES NFR BLD AUTO: 9.8 % (ref 19.6–45.3)
MCH RBC QN AUTO: 28.4 PG (ref 26.6–33)
MCHC RBC AUTO-ENTMCNC: 30.4 G/DL (ref 31.5–35.7)
MCV RBC AUTO: 93.4 FL (ref 79–97)
MONOCYTES # BLD AUTO: 0.54 10*3/MM3 (ref 0.1–0.9)
MONOCYTES NFR BLD AUTO: 6.6 % (ref 5–12)
NEUTROPHILS NFR BLD AUTO: 6.82 10*3/MM3 (ref 1.7–7)
NEUTROPHILS NFR BLD AUTO: 83.3 % (ref 42.7–76)
NRBC BLD AUTO-RTO: 0 /100 WBC (ref 0–0.2)
PLATELET # BLD AUTO: 103 10*3/MM3 (ref 140–450)
PMV BLD AUTO: 10.8 FL (ref 6–12)
POTASSIUM SERPL-SCNC: 3.9 MMOL/L (ref 3.5–5.2)
RBC # BLD AUTO: 4.97 10*6/MM3 (ref 4.14–5.8)
SODIUM SERPL-SCNC: 138 MMOL/L (ref 136–145)
WBC NRBC COR # BLD AUTO: 8.19 10*3/MM3 (ref 3.4–10.8)

## 2023-12-08 PROCEDURE — 99232 SBSQ HOSP IP/OBS MODERATE 35: CPT | Performed by: STUDENT IN AN ORGANIZED HEALTH CARE EDUCATION/TRAINING PROGRAM

## 2023-12-08 PROCEDURE — 94799 UNLISTED PULMONARY SVC/PX: CPT

## 2023-12-08 PROCEDURE — 25010000002 FUROSEMIDE PER 20 MG: Performed by: STUDENT IN AN ORGANIZED HEALTH CARE EDUCATION/TRAINING PROGRAM

## 2023-12-08 PROCEDURE — 99233 SBSQ HOSP IP/OBS HIGH 50: CPT | Performed by: STUDENT IN AN ORGANIZED HEALTH CARE EDUCATION/TRAINING PROGRAM

## 2023-12-08 PROCEDURE — 63710000001 PREDNISONE PER 1 MG: Performed by: STUDENT IN AN ORGANIZED HEALTH CARE EDUCATION/TRAINING PROGRAM

## 2023-12-08 PROCEDURE — 80048 BASIC METABOLIC PNL TOTAL CA: CPT | Performed by: STUDENT IN AN ORGANIZED HEALTH CARE EDUCATION/TRAINING PROGRAM

## 2023-12-08 PROCEDURE — 94761 N-INVAS EAR/PLS OXIMETRY MLT: CPT

## 2023-12-08 PROCEDURE — 87070 CULTURE OTHR SPECIMN AEROBIC: CPT

## 2023-12-08 PROCEDURE — 87205 SMEAR GRAM STAIN: CPT

## 2023-12-08 PROCEDURE — 25010000002 ENOXAPARIN PER 10 MG: Performed by: INTERNAL MEDICINE

## 2023-12-08 PROCEDURE — 94664 DEMO&/EVAL PT USE INHALER: CPT

## 2023-12-08 PROCEDURE — 85025 COMPLETE CBC W/AUTO DIFF WBC: CPT | Performed by: STUDENT IN AN ORGANIZED HEALTH CARE EDUCATION/TRAINING PROGRAM

## 2023-12-08 RX ORDER — FUROSEMIDE 40 MG/1
40 TABLET ORAL DAILY
Status: DISCONTINUED | OUTPATIENT
Start: 2023-12-08 | End: 2023-12-09 | Stop reason: HOSPADM

## 2023-12-08 RX ORDER — PANTOPRAZOLE SODIUM 40 MG/1
40 TABLET, DELAYED RELEASE ORAL
Status: DISCONTINUED | OUTPATIENT
Start: 2023-12-09 | End: 2023-12-09 | Stop reason: HOSPADM

## 2023-12-08 RX ADMIN — PANTOPRAZOLE SODIUM 40 MG: 40 INJECTION, POWDER, FOR SOLUTION INTRAVENOUS at 05:40

## 2023-12-08 RX ADMIN — IPRATROPIUM BROMIDE AND ALBUTEROL SULFATE 3 ML: .5; 3 SOLUTION RESPIRATORY (INHALATION) at 00:57

## 2023-12-08 RX ADMIN — DOCUSATE SODIUM 50MG AND SENNOSIDES 8.6MG 2 TABLET: 8.6; 5 TABLET, FILM COATED ORAL at 09:27

## 2023-12-08 RX ADMIN — ARFORMOTEROL TARTRATE 15 MCG: 15 SOLUTION RESPIRATORY (INHALATION) at 07:25

## 2023-12-08 RX ADMIN — ENOXAPARIN SODIUM 40 MG: 100 INJECTION SUBCUTANEOUS at 09:27

## 2023-12-08 RX ADMIN — IPRATROPIUM BROMIDE AND ALBUTEROL SULFATE 3 ML: .5; 3 SOLUTION RESPIRATORY (INHALATION) at 07:25

## 2023-12-08 RX ADMIN — FUROSEMIDE 40 MG: 40 TABLET ORAL at 15:33

## 2023-12-08 RX ADMIN — IPRATROPIUM BROMIDE AND ALBUTEROL SULFATE 3 ML: .5; 3 SOLUTION RESPIRATORY (INHALATION) at 12:28

## 2023-12-08 RX ADMIN — ARFORMOTEROL TARTRATE 15 MCG: 15 SOLUTION RESPIRATORY (INHALATION) at 19:23

## 2023-12-08 RX ADMIN — FUROSEMIDE 40 MG: 10 INJECTION, SOLUTION INTRAMUSCULAR; INTRAVENOUS at 09:32

## 2023-12-08 RX ADMIN — PREDNISONE 40 MG: 20 TABLET ORAL at 09:27

## 2023-12-08 RX ADMIN — Medication 10 ML: at 09:28

## 2023-12-08 RX ADMIN — IPRATROPIUM BROMIDE AND ALBUTEROL SULFATE 3 ML: .5; 3 SOLUTION RESPIRATORY (INHALATION) at 19:23

## 2023-12-08 NOTE — CASE MANAGEMENT/SOCIAL WORK
RT CM consulted to have PS increased on Astral.  Patient was on 10/6 on V60.  Home Astral min PS was set to 4; increased to 8.

## 2023-12-08 NOTE — PROGRESS NOTES
[facility]   Hospitalist Progress Note  Date: 2023  Patient Name: Sumeet Gomes  : 1960  MRN: [id number]  Date of admission: 2023  Room/Bed: 420/1      Subjective   Subjective     Chief Complaint: Shortness of breath    Summary:Sumeet Gomes is a 62 y.o. male with a past medical history of COPD, acute hypoxic/hypercapnic respiratory failure, pulmonary hypertension, hypertension, and continued tobacco use who initially presented to the pulmonology office today complaining of shortness of breath and was found to be hypoxic with sats in the 70s on his home oxygen. Patient's oxygen was increased to 6L NC and in the ED sats remained in the 70s. Patient is currently on bipap and majority of the history is taken from his daughter with some input from the patient.   Patient's daughter states patient has been more short of breath for the past month, becoming more severe over the past couple weeks. Over the past 2 weeks patient has mostly been lying in the bed with Astral vent in place. He is not eating or drinking much. He has been unable to take his medications or nebulizer treatments since Friday. Patient was seen in the ED about 9 days ago and was discharged home on prednisone for 5 days and has had no improvement in his symptoms. He denies any fever or chills. He has a nonproductive cough. Patient reports his lower extremity edema is improved from baseline. Patient states he gets dizzy upon standing. Patient continues to smoke about 1/2ppd, but has not had any cigarettes recently.     Interval Followup: No acute overnight events.  No acute distress.  Patient resting comfortably in bed wearing nasal cannula.  He reports that he is feeling better but still feels a little bloated in his abdomen.  Up and walking around.  No new complaints or concerns.  Denies chest pain, shortness of breath, or abdominal pain.  Hoping to do pulmonary rehab.    Review of Systems    All systems reviewed and negative  except for what is outlined above.      Objective   Objective     Vitals:   Temp:  [97.7 °F (36.5 °C)-98.6 °F (37 °C)] 97.9 °F (36.6 °C)  Heart Rate:  [57-78] 72  Resp:  [20-22] 20  BP: (117-139)/(67-78) 117/75  Flow (L/min):  [2-3] 3    Physical Exam   Gen: NAD, Alert and Oriented  Cards: RRR, no murmur   Pulm: CTA b/l, no wheezing  Abd: soft, distended, nontender to palpation  Extremities: 2+ lower extremity pitting edema    Result Review    Result Review:  I have personally reviewed these results:  [x]  Laboratory      Lab 12/08/23 0829 12/07/23 0435 12/06/23  0938 12/06/23  0515 12/05/23  0500 12/04/23  1210   WBC 8.19 10.91*  --  11.87*   < > 10.15   HEMOGLOBIN 14.1 12.7*  --  12.6*   < > 16.0   HEMATOCRIT 46.4 41.6  --  39.4   < > 53.1*   PLATELETS 103* 100*  --  111*   < > 134*   NEUTROS ABS 6.82 9.81*  --  11.16*   < > 9.08*   IMMATURE GRANS (ABS) 0.02 0.04  --  0.04   < > 0.10*   LYMPHS ABS 0.80 0.42*  --  0.24*   < > 0.42*   MONOS ABS 0.54 0.63  --  0.42   < > 0.53   EOS ABS 0.01 0.00  --  0.00   < > 0.01   MCV 93.4 92.4  --  88.7   < > 92.7   PROCALCITONIN  --   --   --   --   --  0.05   PROTIME  --   --  14.1  --   --   --     < > = values in this interval not displayed.         Lab 12/08/23 0829 12/07/23 0435 12/06/23  0515 12/05/23  0500   SODIUM 138 138 139 138   POTASSIUM 3.9 4.0 3.4* 3.9   CHLORIDE 91* 96* 98 97*   CO2 43.2* 36.2* 34.4* 34.2*   ANION GAP 3.8* 5.8 6.6 6.8   BUN 13 14 16 17   CREATININE 0.52* 0.45* 0.50* 0.49*   EGFR 114.7 119.8 116.0 116.8   GLUCOSE 89 137* 156* 167*   CALCIUM 9.3 9.0 8.8 8.6   MAGNESIUM  --   --   --  2.0         Lab 12/05/23  0500 12/04/23  1210   TOTAL PROTEIN 5.0* 6.8   ALBUMIN 3.2* 4.3   GLOBULIN 1.8 2.5   ALT (SGPT) 22 37   AST (SGOT) 19 33   BILIRUBIN 0.9 1.4*   ALK PHOS 132* 189*         Lab 12/06/23  0938 12/04/23  1210   PROBNP  --  4,590.0*   HSTROP T  --  23*   PROTIME 14.1  --    INR 1.06  --                  Lab 12/07/23  0857 12/06/23  0625  12/05/23  0715 12/04/23  1300   PH, ARTERIAL 7.311* 7.428 7.463* 7.304*   PCO2, ARTERIAL 72.7* 48.4* 47.2* 77.7*   PO2 ART 47.8* 79.1* 67.1* 91.0   O2 SATURATION ART 86.1* 95.3 93.8* 96.4   FIO2 32  --  26 28   HCO3 ART 35.9* 31.3* 33.0* 37.7*   BASE EXCESS ART 6.9* 5.9* 8.1* 7.9*   CARBOXYHEMOGLOBIN 0.2 0.8 1.6* 3.0*     Brief Urine Lab Results       None          [x]  Microbiology   Microbiology Results (last 10 days)       Procedure Component Value - Date/Time    Respiratory Panel PCR w/COVID-19(SARS-CoV-2) [facility]IN/EN[facility] In-House, NP Swab in UTM/VTM, 2 HR TAT - Swab, Nasopharynx [[id number]]  (Normal) Collected: 12/05/23 1516    Lab Status: Final result Specimen: Swab from Nasopharynx Updated: 12/05/23 1647     ADENOVIRUS, PCR Not Detected     Coronavirus 229E Not Detected     Coronavirus HKU1 Not Detected     Coronavirus NL63 Not Detected     Coronavirus OC43 Not Detected     COVID19 Not Detected     Human Metapneumovirus Not Detected     Human Rhinovirus/Enterovirus Not Detected     Influenza A PCR Not Detected     Influenza B PCR Not Detected     Parainfluenza Virus 1 Not Detected     Parainfluenza Virus 2 Not Detected     Parainfluenza Virus 3 Not Detected     Parainfluenza Virus 4 Not Detected     RSV, PCR Not Detected     Bordetella pertussis pcr Not Detected     Bordetella parapertussis PCR Not Detected     Chlamydophila pneumoniae PCR Not Detected     Mycoplasma pneumo by PCR Not Detected    Narrative:      In the setting of a positive respiratory panel with a viral infection PLUS a negative procalcitonin without other underlying concern for bacterial infection, consider observing off antibiotics or discontinuation of antibiotics and continue supportive care. If the respiratory panel is positive for atypical bacterial infection (Bordetella pertussis, Chlamydophila pneumoniae, or Mycoplasma pneumoniae), consider antibiotic de-escalation to target atypical bacterial infection.    COVID-19, FLU A/B,  RSV PCR 1 HR TAT - Swab, Nasopharynx [[id number]]  (Normal) Collected: 12/04/23 1242    Lab Status: Final result Specimen: Swab from Nasopharynx Updated: 12/04/23 1341     COVID19 Not Detected     Influenza A PCR Not Detected     Influenza B PCR Not Detected     RSV, PCR Not Detected    Narrative:      Fact sheet for providers: https://www.fda.gov/media/477445/download    Fact sheet for patients: https://www.fda.gov/media/704162/download    Test performed by PCR.          [x]  Radiology  FL Video Swallow With Speech Single Contrast    Result Date: 12/7/2023     1. No tracheal aspiration or laryngeal penetration appreciated on exam  Please consult speech pathology report for further details regarding exam and dietary recommendations    ANGELICA BEARD       Electronically Signed and Approved By: GABRIEL MAGALLON MD on 12/07/2023 at 15:08             CT Abdomen Pelvis Without Contrast    Result Date: 12/6/2023    1. Bilateral pleural effusions are noted.  Atelectasis versus infiltrates are seen in the lung bases.  There is mild scattered free fluid throughout the abdomen and pelvis.  Diffuse anasarca is noted.  The findings suggest changes of CHF/volume overload. 2. No evidence for acute abnormality throughout the abdomen or pelvis on this noncontrast study. 3. No evidence for nephrolithiasis bilaterally. There is no evidence for obstructive uropathy.     JOSE JUAN HERNANDEZ MD       Electronically Signed and Approved By: JOSE JUAN HERNANDEZ MD on 12/06/2023 at 22:37             XR Chest 1 View    Result Date: 12/4/2023    Emphysema.   No radiographic findings of acute cardiopulmonary abnormality.       ZURI MARIE MD       Electronically Signed and Approved By: ZURI MARIE MD on 12/04/2023 at 12:29            []  EKG/Telemetry   []  Cardiology/Vascular   []  Pathology  []  Old records  []  Other:    Assessment & Plan   Assessment / Plan     Assessment:  Acute on chronic hypoxic respiratory failure  Acute on chronic  COPD  Abdominal distention, weight loss  Hypertension  History aspergillus  TAISHA  HFpEF  Thrombocytopenia      Plan:  COVID/flu/RSV negative  Continue p.o. prednisone for total 5-day course.    Scheduled nebulizer  DuoNebs as needed  Patient using his home astral vent  Pulmonology following  Continue p.o. Protonix  Continue home hydrocodone  Itraconazole on hold per pulm  CT abdomen with bilateral pleural effusions and diffuse anasarca.  Past swallow study with no aspiration.  Transition from IV Lasix to p.o. Lasix 40 mg daily.  Speech consulted.    Echo with normal EF  Isolated thrombocytopenia, platelets stable.  Looks like this been an intermittent problem for about the last year.  No other indications of liver disease, LFTs normal, bilirubin normal, INR normal.  Hepatitis panel negative.  A.m. labs    Disposition: Patient considering pulmonary rehab.     Discussed with RN.    DVT prophylaxis:  Medical DVT prophylaxis orders are present.    CODE STATUS:   Medical Intervention Limits: NO intubation (DNI)  Level Of Support Discussed With: Patient  Code Status (Patient has no pulse and is not breathing): No CPR (Do Not Attempt to Resuscitate)  Medical Interventions (Patient has pulse or is breathing): Limited Support

## 2023-12-08 NOTE — PROGRESS NOTES
Pulmonary / Critical Care Progress Note      Patient Name: Sumeet Gomes  : 1960  MRN: [id number]  Primary Care Physician:  Mirtha Alexander APRN  Date of admission: 2023    Subjective   Subjective   Follow-up for acute on chronic hypoxic and hypercapnic respiratory failure    No acute events overnight.    This morning,  On 3 L nasal cannula  Lying in bed eating breakfast  Feels well this morning  Diuresing well  -5 L net output    Review of Systems  General: Fatigue, otherwise denied complaints  HEENT: Denied complaints  Respiratory: Dyspnea, otherwise denied complaints  Cardiovascular: Denied complaints  GI: Denied complaints  : Denied complaints  MSK: Weakness, otherwise denied complaints    Objective   Objective     Vitals:   Temp:  [97.7 °F (36.5 °C)-98.6 °F (37 °C)] 97.7 °F (36.5 °C)  Heart Rate:  [57-78] 64  Resp:  [20-22] 22  BP: (121-139)/(67-78) 139/78  Flow (L/min):  [2-3] 3    Physical Exam   Vital Signs Reviewed   General:  Alert, NAD.     HEENT:  PERRL, EOMI.    Neck:  No JVD, no thyromegaly  Lymph: no axillary, cervical, supraclavicular lymphadenopathy noted bilaterally  Chest:  Clear to auscultation bilaterally, no work of breathing noted on 3 L nasal cannula  CV: RRR, no M/G/R, pulses 2+  Abd:  Soft, NT, ND, +BS  EXT:  no clubbing, no cyanosis, no edema  Neuro:  A&Ox3, CN grossly intact, no focal deficits.  Skin: No rashes or lesions noted    Result Review    Result Review:  I have personally reviewed the results from the time of this admission to 2023 11:21 EST and agree with these findings:  [x]  Laboratory  [x]  Microbiology  [x]  Radiology  [x]  EKG/Telemetry   []  Cardiology/Vascular   []  Pathology  []  Old records  []  Other:  Most notable findings include:     ABG 7.30/77/91 --> 7.46/47/67  proBNP 4590    10/13/2022 PFT: FEV1 14%, DLCO 18%     bronchoscopy     echo EF 55% estimated RVSP 45 to 55 mmHg        Lab 23  0829 23  0435 23  0515  12/05/23  0500 12/04/23  1210   WBC 8.19 10.91* 11.87* 5.83 10.15   HEMOGLOBIN 14.1 12.7* 12.6* 12.6* 16.0   HEMATOCRIT 46.4 41.6 39.4 40.8 53.1*   PLATELETS 103* 100* 111* 103* 134*   SODIUM 138 138 139 138 144   POTASSIUM 3.9 4.0 3.4* 3.9 4.4   CHLORIDE 91* 96* 98 97* 97*   CO2 43.2* 36.2* 34.4* 34.2* 36.9*   BUN 13 14 16 17 19   CREATININE 0.52* 0.45* 0.50* 0.49* 0.61*   GLUCOSE 89 137* 156* 167* 101*   CALCIUM 9.3 9.0 8.8 8.6 9.6   TOTAL PROTEIN  --   --   --  5.0* 6.8   ALBUMIN  --   --   --  3.2* 4.3   GLOBULIN  --   --   --  1.8 2.5       Assessment & Plan   Assessment / Plan     Active Hospital Problems:  Active Hospital Problems    Diagnosis     **Acute respiratory failure with hypoxia      Impression:  Acute on chronic hypoxic/hypercapnic respiratory failure  Acute exacerbation of COPD  Obstructive sleep apnea  Pulmonary hypertension  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  Okay to use home NIPPV (astrovent).  -Will make adjustments to astrovent prior to discharge  -RT  on board.  Appreciate assistance.  -Continue to hold itraconazole as this may have been causing patients with revision.  Will repeat sputum to assure clearance of previous aspergillus infection  -Continue Brovana and DuoNebs  -Continue bronchopulmonary hygiene.  Encourage use of I-S.  -Continue prednisone 40 daily x5 days  -Continue Lasix 20 mg p.o. daily; will need this on discharge  -Trend electrolytes and renal panel.  Replace electrolytes as needed.  -Encourage smoking cessation.  -Encourage mobility.  Out of bed to chair.  -PT/OT/SLP on board.  Appreciate assistance  -Patient accepted by [facility] home health care.  Patient will follow-up [facility] re[facility] due to bed situation  -Follow-up with pulmonary clinic 1 to 2 weeks after discharge    DVT prophylaxis:  Medical  DVT prophylaxis orders are present.    CODE STATUS:   Medical Intervention Limits: NO intubation (DNI)  Level Of Support Discussed With: Patient  Code Status (Patient has no pulse and is not breathing): No CPR (Do Not Attempt to Resuscitate)  Medical Interventions (Patient has pulse or is breathing): Limited Support    Electronically signed by ZULY Ames, 12/08/23, 11:21 AM EST.  This patient was seen by both a physician and a NP. I, Yadira Lopez MD, spent >50% of time in accordance with split shared billing. This included personally reviewing all pertinent labs, imaging, microbiology and documentation. Also discussing the case with the patient and any available family, the admitting physician and any available ancillary staff.   Electronically signed by Yadira Lopez MD, 12/08/23, 1:10 PM EST.

## 2023-12-09 ENCOUNTER — READMISSION MANAGEMENT (OUTPATIENT)
Dept: CALL CENTER | Facility: HOSPITAL | Age: 63
End: 2023-12-09
Payer: MEDICAID

## 2023-12-09 VITALS
HEART RATE: 84 BPM | RESPIRATION RATE: 20 BRPM | WEIGHT: 134.48 LBS | DIASTOLIC BLOOD PRESSURE: 68 MMHG | TEMPERATURE: 97.8 F | SYSTOLIC BLOOD PRESSURE: 115 MMHG | OXYGEN SATURATION: 95 % | HEIGHT: 67 IN | BODY MASS INDEX: 21.11 KG/M2

## 2023-12-09 DIAGNOSIS — J43.9 PULMONARY EMPHYSEMA, UNSPECIFIED EMPHYSEMA TYPE: ICD-10-CM

## 2023-12-09 LAB
ANION GAP SERPL CALCULATED.3IONS-SCNC: 4.1 MMOL/L (ref 5–15)
ARTERIAL PATENCY WRIST A: ABNORMAL
BASE EXCESS BLDA CALC-SCNC: 13.5 MMOL/L (ref -2–2)
BASOPHILS # BLD AUTO: 0 10*3/MM3 (ref 0–0.2)
BASOPHILS NFR BLD AUTO: 0 % (ref 0–1.5)
BDY SITE: ABNORMAL
BUN SERPL-MCNC: 12 MG/DL (ref 8–23)
BUN/CREAT SERPL: 27.9 (ref 7–25)
CALCIUM SPEC-SCNC: 8.6 MG/DL (ref 8.6–10.5)
CHLORIDE SERPL-SCNC: 93 MMOL/L (ref 98–107)
CO2 SERPL-SCNC: 41.9 MMOL/L (ref 22–29)
COHGB MFR BLD: 0.8 % (ref 0–1.5)
CREAT SERPL-MCNC: 0.43 MG/DL (ref 0.76–1.27)
DEPRECATED RDW RBC AUTO: 48.6 FL (ref 37–54)
EGFRCR SERPLBLD CKD-EPI 2021: 121.5 ML/MIN/1.73
EOSINOPHIL # BLD AUTO: 0.01 10*3/MM3 (ref 0–0.4)
EOSINOPHIL NFR BLD AUTO: 0.1 % (ref 0.3–6.2)
ERYTHROCYTE [DISTWIDTH] IN BLOOD BY AUTOMATED COUNT: 14.8 % (ref 12.3–15.4)
FHHB: 7.6 % (ref 0–5)
GAS FLOW AIRWAY: 3 LPM
GLUCOSE SERPL-MCNC: 88 MG/DL (ref 65–99)
HCO3 BLDA-SCNC: 41.3 MMOL/L (ref 22–26)
HCT VFR BLD AUTO: 41.8 % (ref 37.5–51)
HGB BLD-MCNC: 13.6 G/DL (ref 13–17.7)
HGB BLDA-MCNC: 14.9 G/DL (ref 13.8–16.4)
IMM GRANULOCYTES # BLD AUTO: 0.01 10*3/MM3 (ref 0–0.05)
IMM GRANULOCYTES NFR BLD AUTO: 0.1 % (ref 0–0.5)
INHALED O2 CONCENTRATION: 32 %
LACTATE BLDA-SCNC: ABNORMAL MMOL/L
LYMPHOCYTES # BLD AUTO: 0.73 10*3/MM3 (ref 0.7–3.1)
LYMPHOCYTES NFR BLD AUTO: 10.7 % (ref 19.6–45.3)
MCH RBC QN AUTO: 29.4 PG (ref 26.6–33)
MCHC RBC AUTO-ENTMCNC: 32.5 G/DL (ref 31.5–35.7)
MCV RBC AUTO: 90.5 FL (ref 79–97)
METHGB BLD QL: 0.3 % (ref 0–1.5)
MODALITY: ABNORMAL
MONOCYTES # BLD AUTO: 0.65 10*3/MM3 (ref 0.1–0.9)
MONOCYTES NFR BLD AUTO: 9.5 % (ref 5–12)
NEUTROPHILS NFR BLD AUTO: 5.41 10*3/MM3 (ref 1.7–7)
NEUTROPHILS NFR BLD AUTO: 79.6 % (ref 42.7–76)
NRBC BLD AUTO-RTO: 0 /100 WBC (ref 0–0.2)
OVALOCYTES BLD QL SMEAR: NORMAL
OXYHGB MFR BLDV: 91.3 % (ref 94–99)
PCO2 BLDA: 65.4 MM HG (ref 35–45)
PH BLDA: 7.42 PH UNITS (ref 7.35–7.45)
PLATELET # BLD AUTO: 98 10*3/MM3 (ref 140–450)
PMV BLD AUTO: 12.2 FL (ref 6–12)
PO2 BLD: 195 MM[HG] (ref 0–500)
PO2 BLDA: 62.4 MM HG (ref 80–100)
POTASSIUM SERPL-SCNC: 3.4 MMOL/L (ref 3.5–5.2)
RBC # BLD AUTO: 4.62 10*6/MM3 (ref 4.14–5.8)
SAO2 % BLDCOA: 92.3 % (ref 95–99)
SMALL PLATELETS BLD QL SMEAR: NORMAL
SODIUM SERPL-SCNC: 139 MMOL/L (ref 136–145)
WBC MORPH BLD: NORMAL
WBC NRBC COR # BLD AUTO: 6.81 10*3/MM3 (ref 3.4–10.8)

## 2023-12-09 PROCEDURE — 63710000001 PREDNISONE PER 1 MG: Performed by: STUDENT IN AN ORGANIZED HEALTH CARE EDUCATION/TRAINING PROGRAM

## 2023-12-09 PROCEDURE — 36600 WITHDRAWAL OF ARTERIAL BLOOD: CPT | Performed by: STUDENT IN AN ORGANIZED HEALTH CARE EDUCATION/TRAINING PROGRAM

## 2023-12-09 PROCEDURE — 80048 BASIC METABOLIC PNL TOTAL CA: CPT | Performed by: STUDENT IN AN ORGANIZED HEALTH CARE EDUCATION/TRAINING PROGRAM

## 2023-12-09 PROCEDURE — 83050 HGB METHEMOGLOBIN QUAN: CPT | Performed by: STUDENT IN AN ORGANIZED HEALTH CARE EDUCATION/TRAINING PROGRAM

## 2023-12-09 PROCEDURE — 94799 UNLISTED PULMONARY SVC/PX: CPT

## 2023-12-09 PROCEDURE — 94664 DEMO&/EVAL PT USE INHALER: CPT

## 2023-12-09 PROCEDURE — 82375 ASSAY CARBOXYHB QUANT: CPT | Performed by: STUDENT IN AN ORGANIZED HEALTH CARE EDUCATION/TRAINING PROGRAM

## 2023-12-09 PROCEDURE — 99239 HOSP IP/OBS DSCHRG MGMT >30: CPT | Performed by: STUDENT IN AN ORGANIZED HEALTH CARE EDUCATION/TRAINING PROGRAM

## 2023-12-09 PROCEDURE — 85025 COMPLETE CBC W/AUTO DIFF WBC: CPT | Performed by: STUDENT IN AN ORGANIZED HEALTH CARE EDUCATION/TRAINING PROGRAM

## 2023-12-09 PROCEDURE — 82805 BLOOD GASES W/O2 SATURATION: CPT | Performed by: STUDENT IN AN ORGANIZED HEALTH CARE EDUCATION/TRAINING PROGRAM

## 2023-12-09 PROCEDURE — 99233 SBSQ HOSP IP/OBS HIGH 50: CPT | Performed by: INTERNAL MEDICINE

## 2023-12-09 PROCEDURE — 94761 N-INVAS EAR/PLS OXIMETRY MLT: CPT

## 2023-12-09 PROCEDURE — 85007 BL SMEAR W/DIFF WBC COUNT: CPT | Performed by: STUDENT IN AN ORGANIZED HEALTH CARE EDUCATION/TRAINING PROGRAM

## 2023-12-09 PROCEDURE — 25010000002 ENOXAPARIN PER 10 MG: Performed by: INTERNAL MEDICINE

## 2023-12-09 RX ORDER — REVEFENACIN 175 UG/3ML
175 SOLUTION RESPIRATORY (INHALATION)
Qty: 60 ML | Refills: 0 | Status: SHIPPED | OUTPATIENT
Start: 2023-12-09

## 2023-12-09 RX ORDER — PANTOPRAZOLE SODIUM 40 MG/1
40 TABLET, DELAYED RELEASE ORAL
Qty: 30 TABLET | Refills: 0 | Status: SHIPPED | OUTPATIENT
Start: 2023-12-10

## 2023-12-09 RX ORDER — FUROSEMIDE 20 MG/1
40 TABLET ORAL DAILY
Qty: 90 TABLET | Refills: 1 | Status: SHIPPED | OUTPATIENT
Start: 2023-12-09

## 2023-12-09 RX ADMIN — FUROSEMIDE 40 MG: 40 TABLET ORAL at 09:04

## 2023-12-09 RX ADMIN — ENOXAPARIN SODIUM 40 MG: 100 INJECTION SUBCUTANEOUS at 09:04

## 2023-12-09 RX ADMIN — IPRATROPIUM BROMIDE AND ALBUTEROL SULFATE 3 ML: .5; 3 SOLUTION RESPIRATORY (INHALATION) at 06:59

## 2023-12-09 RX ADMIN — IPRATROPIUM BROMIDE AND ALBUTEROL SULFATE 3 ML: .5; 3 SOLUTION RESPIRATORY (INHALATION) at 11:40

## 2023-12-09 RX ADMIN — IPRATROPIUM BROMIDE AND ALBUTEROL SULFATE 3 ML: .5; 3 SOLUTION RESPIRATORY (INHALATION) at 00:46

## 2023-12-09 RX ADMIN — PREDNISONE 40 MG: 20 TABLET ORAL at 09:04

## 2023-12-09 RX ADMIN — ARFORMOTEROL TARTRATE 15 MCG: 15 SOLUTION RESPIRATORY (INHALATION) at 06:59

## 2023-12-09 RX ADMIN — PANTOPRAZOLE SODIUM 40 MG: 40 TABLET, DELAYED RELEASE ORAL at 09:04

## 2023-12-09 RX ADMIN — Medication 10 ML: at 09:03

## 2023-12-09 NOTE — PROGRESS NOTES
Respiratory Therapist Broncho-Pulmonary Hygiene Progress Note      Patient Name:  Sumeet Gomes  YOB: 1960    Sumeet Gomes meets the qualification for Level 1 of the Bronco-Pulmonary Hygiene Protocol. This was based on my daily patient assessment and includes review of chest x-ray results, cough ability and quality, oxygenation, secretions or risk for secretion development and patient mobility.     Broncho-Pulmonary Hygiene Assessment:    Level of Movement: Actively changing positions without assistance  Alert/ oriented/ cooperative    Breath Sounds: Clear to slightly diminished    Cough: Strong, effective    Chest X-Ray: Possible signs of consolidation and/or atelectasis or clear.     Sputum Productions: None or small amount of thin or watery secretions with effective cough    History and Physical: Home use of oxygen     SpO2 to Oxygen Need: greater than 92% on room air or  less than 3L nasal canula    Current SpO2 is: 98% on 3L    Based on this information, I have completed the following interventions: Teach/Instruct patient on cough and deep breathe      Electronically signed by [place] George RRT, 12/09/23, 7:03 AM EST.

## 2023-12-09 NOTE — DISCHARGE SUMMARY
[facility]         HOSPITALIST  DISCHARGE SUMMARY    Patient Name: Sumeet Gomes  : 1960  MRN: [id number]    Date of Admission: 2023  Date of Discharge:  2023  Primary Care Physician: Mirtha Alexander APRN    Consults       Date and Time Order Name Status Description    2023  4:03 PM Inpatient Pulmonology Consult Completed     2023  2:28 PM Hospitalist (on-call MD unless specified)              Active and Resolved Hospital Problems:  Active Hospital Problems    Diagnosis POA    **Acute respiratory failure with hypoxia [J96.01] Yes      Resolved Hospital Problems   No resolved problems to display.       Hospital Course     Hospital Course:  Sumeet Gomes is a 62 y.o. male  with a past medical history of COPD, acute hypoxic/hypercapnic respiratory failure, pulmonary hypertension, hypertension, and continued tobacco use who initially presented to the pulmonology office complaining of shortness of breath and was found to be hypoxic with sats in the 70s on his home oxygen. Patient's oxygen was increased to 6L NC and in the ED sats remained in the 70s. He initially was on bipap and majority of the history is taken from his daughter with some input from the patient.     Patient was admitted for further evaluation.  He was initiated on steroids and completed a total 5-day course.  COVID/flu/RSV was negative.  He was initiated on scheduled nebulizer therapy as well as PRNs.  He brought his home astral vent in and settings were adjusted to decrease his hypercapnia.  Pulmonology was consulted and followed the patient.  Itraconazole was discontinued as he was experiencing some vision changes which could be adverse reaction, pulmonology felt that he was adequately treated for his Aspergillus.    Patient reported some abdominal swelling, getting choked on food, reflux-like symptoms, and weight loss.  He was initiated on p.o. Protonix.  CT abdomen revealed anasarca and he was  given IV Lasix.  Abdominal distention resolved.  Echo was reviewed and had a normal EF.    Patient did have isolated thrombocytopenia which on chart review reveals has been present for about the last year.  His liver enzymes are normal, bilirubin normal, INR normal, hepatitis panel negative.  Discussed this with the patient and his wife at bedside, recommended he follow-up with PCP for further evaluation and possible referral for hematology.    Patient was requesting pulmonary rehab, unfortunately, could not be accepted until middle to late next week.  Discussed that we will go ahead and send him home with home health and hopefully can transition to pulmonary rehab outpatient.    Patient discharged in stable condition.    DISCHARGE Follow Up Recommendations for labs and diagnostics: Follow-up with PCP in 1 week.  Follow-up with pulmonology in 1 to 2 weeks.      Day of Discharge     Vital Signs:  Temp:  [97.7 °F (36.5 °C)-99.7 °F (37.6 °C)] 97.8 °F (36.6 °C)  Heart Rate:  [73-91] 84  Resp:  [16-20] 20  BP: (115-132)/(68-78) 115/68  Flow (L/min):  [3] 3    Physical Exam:   Gen: NAD, Alert and Oriented  Cards: RRR, no murmur   Pulm: CTA b/l, no wheezing  Abd: soft, nondistended  Extremities: no pitting edema      Discharge Details        Discharge Medications        New Medications        Instructions Start Date   pantoprazole 40 MG EC tablet  Commonly known as: PROTONIX   40 mg, Oral, Every Early Morning   Start Date: December 10, 2023            Changes to Medications        Instructions Start Date   furosemide 20 MG tablet  Commonly known as: LASIX  What changed: how much to take   40 mg, Oral, Daily             Continue These Medications        Instructions Start Date   arformoterol 15 MCG/2ML nebulizer solution  Commonly known as: Brovana   15 mcg, Nebulization, 2 Times Daily - RT      budesonide 0.5 MG/2ML nebulizer solution  Commonly known as: Pulmicort   0.5 mg, Nebulization, Daily - RT       ipratropium-albuterol 0.5-2.5 mg/3 ml nebulizer  Commonly known as: DUO-NEB   3 mL, Nebulization, 4 Times Daily - RT      NON FORMULARY   V PAP      Yupelri 175 MCG/3ML nebulizer solution  Generic drug: revefenacin   175 mcg, Nebulization, Daily - RT             Stop These Medications      albuterol sulfate  (90 Base) MCG/ACT inhaler  Commonly known as: PROVENTIL HFA;VENTOLIN HFA;PROAIR HFA     itraconazole 100 MG capsule  Commonly known as: Sporanox     metoprolol tartrate 25 MG tablet  Commonly known as: LOPRESSOR              No Known Allergies    Discharge Disposition:  Home or Self Care    Diet:  Hospital:  Diet Order   Procedures    Diet: Regular/House Diet; Texture: Regular Texture (IDDSI 7); Fluid Consistency: Thin (IDDSI 0)       Discharge Activity:       CODE STATUS:  Code Status and Medical Interventions:   Ordered at: 12/07/23 1540     Medical Intervention Limits:    NO intubation (DNI)     Level Of Support Discussed With:    Patient     Code Status (Patient has no pulse and is not breathing):    No CPR (Do Not Attempt to Resuscitate)     Medical Interventions (Patient has pulse or is breathing):    Limited Support         Future Appointments   Date Time Provider Department Center   12/14/2023 11:30 AM Arleen Gillespie APRN [facility] PCC COPD [facility]   12/18/2023  1:30 PM NURSE/MA ONC [place] [facility] ONC E521 [facility]   12/18/2023  2:00 PM Elvis Pinzon MD [facility] ONC E521 [facility]   2/22/2024  3:45 PM Ulices Moya MD [facility] PCC ETW [facility]   3/15/2024  1:00 PM MELIZA PAGE CT 1 [facility] BARCT [facility]   6/24/2024  2:00 PM [facility]       Additional Instructions for the Follow-ups that You Need to Schedule       Ambulatory Referral to Pulmonary Rehab   As directed      Follow-up needed: Yes        Discharge Follow-up with PCP   As directed       Currently Documented PCP:    Mirtha Alexander APRN    PCP Phone Number:    [phone]     Follow Up Details: one week        Discharge Follow-up  with Specialty: pulmonology; 2 Weeks   As directed      Specialty: pulmonology   Follow Up: 2 Weeks                Pertinent  and/or Most Recent Results         LAB RESULTS:      Lab 12/09/23  0520 12/08/23  0829 12/07/23  0435 12/06/23  0938 12/06/23  0515 12/05/23  0500 12/04/23  1210   WBC 6.81 8.19 10.91*  --  11.87* 5.83 10.15   HEMOGLOBIN 13.6 14.1 12.7*  --  12.6* 12.6* 16.0   HEMATOCRIT 41.8 46.4 41.6  --  39.4 40.8 53.1*   PLATELETS 98* 103* 100*  --  111* 103* 134*   NEUTROS ABS 5.41 6.82 9.81*  --  11.16* 5.45 9.08*   IMMATURE GRANS (ABS) 0.01 0.02 0.04  --  0.04 0.12* 0.10*   LYMPHS ABS 0.73 0.80 0.42*  --  0.24* 0.18* 0.42*   MONOS ABS 0.65 0.54 0.63  --  0.42 0.08* 0.53   EOS ABS 0.01 0.01 0.00  --  0.00 0.00 0.01   MCV 90.5 93.4 92.4  --  88.7 88.7 92.7   PROCALCITONIN  --   --   --   --   --   --  0.05   PROTIME  --   --   --  14.1  --   --   --          Lab 12/09/23  0520 12/08/23  0829 12/07/23  0435 12/06/23  0515 12/05/23  0500   SODIUM 139 138 138 139 138   POTASSIUM 3.4* 3.9 4.0 3.4* 3.9   CHLORIDE 93* 91* 96* 98 97*   CO2 41.9* 43.2* 36.2* 34.4* 34.2*   ANION GAP 4.1* 3.8* 5.8 6.6 6.8   BUN 12 13 14 16 17   CREATININE 0.43* 0.52* 0.45* 0.50* 0.49*   EGFR 121.5 114.7 119.8 116.0 116.8   GLUCOSE 88 89 137* 156* 167*   CALCIUM 8.6 9.3 9.0 8.8 8.6   MAGNESIUM  --   --   --   --  2.0         Lab 12/05/23  0500 12/04/23  1210   TOTAL PROTEIN 5.0* 6.8   ALBUMIN 3.2* 4.3   GLOBULIN 1.8 2.5   ALT (SGPT) 22 37   AST (SGOT) 19 33   BILIRUBIN 0.9 1.4*   ALK PHOS 132* 189*         Lab 12/06/23  0938 12/04/23  1210   PROBNP  --  4,590.0*   HSTROP T  --  23*   PROTIME 14.1  --    INR 1.06  --                  Lab 12/09/23  0834 12/07/23  0857 12/06/23  0625 12/05/23  0715   PH, ARTERIAL 7.418 7.311* 7.428 7.463*   PCO2, ARTERIAL 65.4* 72.7* 48.4* 47.2*   PO2 ART 62.4* 47.8* 79.1* 67.1*   O2 SATURATION ART 92.3* 86.1* 95.3 93.8*   FIO2 32 32  --  26   HCO3 ART 41.3* 35.9* 31.3* 33.0*   BASE EXCESS ART 13.5*  6.9* 5.9* 8.1*   CARBOXYHEMOGLOBIN 0.8 0.2 0.8 1.6*     Brief Urine Lab Results       None          Microbiology Results (last 10 days)       Procedure Component Value - Date/Time    Respiratory Culture - Sputum, Cough [[id number]] Collected: 12/08/23 1930    Lab Status: Preliminary result Specimen: Sputum from Cough Updated: 12/08/23 2024     Gram Stain Many (4+) Gram positive cocci      Moderate (3+) WBCs seen    Respiratory Panel PCR w/COVID-19(SARS-CoV-2) KARRIE/JUSTIN/ENZO/PAD/[facility] In-House, NP Swab in UTM/VTM, 2 HR TAT - Swab, Nasopharynx [[id number]]  (Normal) Collected: 12/05/23 1516    Lab Status: Final result Specimen: Swab from Nasopharynx Updated: 12/05/23 1647     ADENOVIRUS, PCR Not Detected     Coronavirus 229E Not Detected     Coronavirus HKU1 Not Detected     Coronavirus NL63 Not Detected     Coronavirus OC43 Not Detected     COVID19 Not Detected     Human Metapneumovirus Not Detected     Human Rhinovirus/Enterovirus Not Detected     Influenza A PCR Not Detected     Influenza B PCR Not Detected     Parainfluenza Virus 1 Not Detected     Parainfluenza Virus 2 Not Detected     Parainfluenza Virus 3 Not Detected     Parainfluenza Virus 4 Not Detected     RSV, PCR Not Detected     Bordetella pertussis pcr Not Detected     Bordetella parapertussis PCR Not Detected     Chlamydophila pneumoniae PCR Not Detected     Mycoplasma pneumo by PCR Not Detected    Narrative:      In the setting of a positive respiratory panel with a viral infection PLUS a negative procalcitonin without other underlying concern for bacterial infection, consider observing off antibiotics or discontinuation of antibiotics and continue supportive care. If the respiratory panel is positive for atypical bacterial infection (Bordetella pertussis, Chlamydophila pneumoniae, or Mycoplasma pneumoniae), consider antibiotic de-escalation to target atypical bacterial infection.    COVID-19, FLU A/B, RSV PCR 1 HR TAT - Swab, Nasopharynx [[id number]]   (Normal) Collected: 12/04/23 1242    Lab Status: Final result Specimen: Swab from Nasopharynx Updated: 12/04/23 1341     COVID19 Not Detected     Influenza A PCR Not Detected     Influenza B PCR Not Detected     RSV, PCR Not Detected    Narrative:      Fact sheet for providers: https://www.fda.gov/media/152504/download    Fact sheet for patients: https://www.fda.gov/media/609308/download    Test performed by PCR.            FL Video Swallow With Speech Single Contrast    Result Date: 12/7/2023     1. No tracheal aspiration or laryngeal penetration appreciated on exam  Please consult speech pathology report for further details regarding exam and dietary recommendations    ANGELICA BEARD       Electronically Signed and Approved By: GABRIEL MAGALLON MD on 12/07/2023 at 15:08             CT Abdomen Pelvis Without Contrast    Result Date: 12/6/2023    1. Bilateral pleural effusions are noted.  Atelectasis versus infiltrates are seen in the lung bases.  There is mild scattered free fluid throughout the abdomen and pelvis.  Diffuse anasarca is noted.  The findings suggest changes of CHF/volume overload. 2. No evidence for acute abnormality throughout the abdomen or pelvis on this noncontrast study. 3. No evidence for nephrolithiasis bilaterally. There is no evidence for obstructive uropathy.     JOSE JUAN HERNANDEZ MD       Electronically Signed and Approved By: JOSE JUAN HERNANDEZ MD on 12/06/2023 at 22:37             XR Chest 1 View    Result Date: 12/4/2023    Emphysema.   No radiographic findings of acute cardiopulmonary abnormality.       ZURI MARIE MD       Electronically Signed and Approved By: ZURI MARIE MD on 12/04/2023 at 12:29                       Results for orders placed during the hospital encounter of 12/04/23    Adult Transthoracic Echo Complete W/ Cont if Necessary Per Protocol    Interpretation Summary    Left ventricular systolic function is normal. Estimated left ventricular EF = 55%    Left ventricular  diastolic function was normal.    The right ventricular cavity is moderately dilated.    The right atrial cavity is moderately  dilated.    Estimated right ventricular systolic pressure from tricuspid regurgitation is moderately elevated (45-55 mmHg).      Labs Pending at Discharge:  Pending Labs       Order Current Status    Respiratory Culture - Sputum, Cough Preliminary result              Time spent on Discharge including face to face service:  >30 minutes    Electronically signed by Pearl Alexander DO, 12/09/23, 1:44 PM EST.

## 2023-12-09 NOTE — PROGRESS NOTES
Pulmonary / Critical Care Progress Note      Patient Name: Sumeet Gomes  : 1960  MRN: [id number]  Primary Care Physician:  Mirtha Alexander APRN  Date of admission: 2023    Subjective   Subjective   Follow-up for acute on chronic hypoxic and hypercapnic respiratory failure    No acute events overnight.    This morning,  On 3 L nasal cannula  Sitting up in bed  Feeling better this morning after adjusting astrovent settings  Diuresing well  Dyspnea improved  Cough improved.  Dry hacking  Weak and fatigued  No chest pain or hemoptysis  -3 L      Objective   Objective     Vitals:   Temp:  [97.7 °F (36.5 °C)-99.7 °F (37.6 °C)] 99.7 °F (37.6 °C)  Heart Rate:  [57-91] 77  Resp:  [16-22] 16  BP: (116-139)/(74-78) 116/74  Flow (L/min):  [3] 3    Physical Exam   Vital Signs Reviewed   General:  Alert, NAD.     HEENT:  PERRL, EOMI.    Chest:  Clear to auscultation bilaterally, trace rhonchi bilaterally, tympanic to percussion bilaterally, no work of breathing noted   CV: RRR, no M/G/R, pulses 2+  Abd:  Soft, NT, ND, +BS  EXT:  no clubbing, no cyanosis, no edema  Neuro:  A&Ox3, CN grossly intact, no focal deficits.  Skin: No rashes or lesions noted    Result Review    Result Review:  I have personally reviewed the results from the time of this admission to 2023 07:08 EST and agree with these findings:  [x]  Laboratory  [x]  Microbiology  [x]  Radiology  [x]  EKG/Telemetry   []  Cardiology/Vascular   []  Pathology  []  Old records  []  Other:  Most notable findings include:     ABG 7.30/77/ --> 7.46/47/67  proBNP 4590    10/13/2022 PFT: FEV1 14%, DLCO 18%     bronchoscopy     echo EF 55% estimated RVSP 45 to 55 mmHg     barium swallow        Lab 23  0520 23  0829 23  0435 23  0515 23  0500 23  1210   WBC 6.81 8.19 10.91* 11.87* 5.83 10.15   HEMOGLOBIN 13.6 14.1 12.7* 12.6* 12.6* 16.0   HEMATOCRIT 41.8 46.4 41.6 39.4 40.8 53.1*   PLATELETS 98* 103* 100*  111* 103* 134*   SODIUM  --  138 138 139 138 144   POTASSIUM  --  3.9 4.0 3.4* 3.9 4.4   CHLORIDE  --  91* 96* 98 97* 97*   CO2  --  43.2* 36.2* 34.4* 34.2* 36.9*   BUN  --  13 14 16 17 19   CREATININE  --  0.52* 0.45* 0.50* 0.49* 0.61*   GLUCOSE  --  89 137* 156* 167* 101*   CALCIUM  --  9.3 9.0 8.8 8.6 9.6   TOTAL PROTEIN  --   --   --   --  5.0* 6.8   ALBUMIN  --   --   --   --  3.2* 4.3   GLOBULIN  --   --   --   --  1.8 2.5       Assessment & Plan   Assessment / Plan     Active Hospital Problems:  Active Hospital Problems    Diagnosis     **Acute respiratory failure with hypoxia      Impression:  Acute on chronic hypoxic/hypercapnic respiratory failure  Acute exacerbation of COPD  Obstructive sleep apnea  Pulmonary hypertension  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  Okay to use home NIPPV (astrovent).  -12/9 ABG 7.4 1/65/62.  Patient tolerating astral vent setting adjustments.  Patient states that he is tolerating the adjustment well  -RT  on board.  Appreciate assistance.  -No need for itraconazole at this time.  Patient has completed his course  -Continue Brovana and DuoNebs  -Continue bronchopulmonary hygiene.  Encourage use of I-S.  -Continue prednisone 40 daily x5 days  -Continue Lasix 20 mg p.o. daily; will need this on discharge  -Trend electrolytes and renal panel.  Replace electrolytes as needed.  -Encourage smoking cessation.  -Encourage mobility.  Out of bed to chair.  -PT/OT/SLP on board.  Appreciate assistance  -Patient accepted by [facility] home health care.  Patient will follow-up [facility] due to bed situation  -Follow-up with us in 1 to 2 weeks    DVT prophylaxis:  Medical DVT prophylaxis orders are present.    CODE STATUS:   Medical Intervention Limits: NO intubation (DNI)  Level Of Support Discussed With:  Patient  Code Status (Patient has no pulse and is not breathing): No CPR (Do Not Attempt to Resuscitate)  Medical Interventions (Patient has pulse or is breathing): Limited Support      Labs, imaging, microbiology, notes and medications personally reviewed  Discussed with primary    I, Dr. Collins Chapa, have spent more than 50% of the total time managing the patient in this encounter today.  This included personally reviewing all pertinent labs, imaging, microbiology and documentation. Also discussing the case with the patient and any available family, the admitting physician and any available ancillary staff.    Electronically signed by ZULY Amse, 12/09/23, 7:08 AM EST.  Electronically signed by Collins Chapa MD, 12/09/23, 1:08 PM EST.

## 2023-12-10 LAB
BACTERIA SPEC RESP CULT: NORMAL
GRAM STN SPEC: NORMAL
GRAM STN SPEC: NORMAL

## 2023-12-10 NOTE — OUTREACH NOTE
Prep Survey      Flowsheet Row Responses   [facility]   Is LACE score < 7 ? No   Eligibility [facility]   [facility]   Date of Admission 12/04/23   Date of Discharge 12/09/23   Discharge Disposition Home or Self Care   Discharge diagnosis acute resp failure with hypoxia,   Does the patient have one of the following disease processes/diagnoses(primary or secondary)? Other   Does the patient have Home health ordered? Yes   What is the Home health agency?  [facility], waiting on precert for rehab.  Could go to [facility] from home   Is there a DME ordered? No   Prep survey completed? Yes            Wendy ELLIS - Registered Nurse

## 2023-12-11 ENCOUNTER — TRANSITIONAL CARE MANAGEMENT TELEPHONE ENCOUNTER (OUTPATIENT)
Dept: CALL CENTER | Facility: HOSPITAL | Age: 63
End: 2023-12-11
Payer: MEDICAID

## 2023-12-11 RX ORDER — FLUTICASONE PROPIONATE 220 UG/1
2 AEROSOL, METERED RESPIRATORY (INHALATION) 2 TIMES DAILY
Qty: 144 EACH | Refills: 5 | Status: SHIPPED | OUTPATIENT
Start: 2023-12-11

## 2023-12-11 RX ORDER — IPRATROPIUM BROMIDE AND ALBUTEROL SULFATE 2.5; .5 MG/3ML; MG/3ML
SOLUTION RESPIRATORY (INHALATION)
Qty: 360 ML | Refills: 3 | Status: SHIPPED | OUTPATIENT
Start: 2023-12-11

## 2023-12-11 NOTE — PROGRESS NOTES
Can we ensure that this was forwarded to the PCP/Hub transferred to our office as they should be doing? This is the second patient has been scheduled with me for hospital discharge follow-up.  I am not able to see if the hub made PCP aware of these patients

## 2023-12-11 NOTE — OUTREACH NOTE
Call Center TCM Note      Flowsheet Row Responses   [facility] discharged from? [facility]   Does the patient have one of the following disease processes/diagnoses(primary or secondary)? Other   TCM attempt successful? Yes  [verbal release  but notes indicate wife active with POC]   Call start time 1332   Call end time 1352   Discharge diagnosis acute resp failure with hypoxia,   Person spoke with today (if not patient) and relationship pt and wife   Meds reviewed with patient/caregiver? Yes   Is the patient having any side effects they believe may be caused by any medication additions or changes? No   Does the patient have all medications ordered at discharge? Yes   Prescription comments reviewed breathing treatments with wife,  wife reports that yupelri on list for about a year but pt has never taken as never received a rx, discharged with new rx.   Is the patient taking all medications as directed (includes completed medication regime)? Yes   Comments Hospital f/u on 23@0815am, Pulmonary on 23   Does the patient have an appointment with their PCP within 7-14 days of discharge? Yes   What is the Home health agency?  [facility]   Has home health visited the patient within 72 hours of discharge? Unsure   What DME was ordered? Pt uses [facility] at HS, O2 during the day   DME comments Reports sats in 90s with O2 at rest but easily desats with exertion--aware to use O2 as ordered and has rescue nebs also   Psychosocial issues? No   Did the patient receive a copy of their discharge instructions? Yes   Nursing interventions Reviewed instructions with patient   What is the patient's perception of their health status since discharge? Improving  [Pt reports he is doing better--is able to take deep breaths at present time, was on his way to pharmacy to  his medications.  Spent a great deal of time reviewed breathing txs with pt and wife.]   Is the patient/caregiver able to teach back signs and  symptoms related to disease process for when to call PCP? Yes   Is the patient/caregiver able to teach back signs and symptoms related to disease process for when to call 911? Yes   TCM call completed? Yes   Call end time 1352            Joan Sargent RN    12/11/2023, 13:54 EST

## 2023-12-14 ENCOUNTER — OFFICE VISIT (OUTPATIENT)
Dept: PULMONOLOGY | Facility: CLINIC | Age: 63
End: 2023-12-14
Payer: MEDICAID

## 2023-12-14 VITALS
SYSTOLIC BLOOD PRESSURE: 106 MMHG | DIASTOLIC BLOOD PRESSURE: 69 MMHG | HEIGHT: 67 IN | RESPIRATION RATE: 18 BRPM | TEMPERATURE: 97.8 F | WEIGHT: 120.8 LBS | BODY MASS INDEX: 18.96 KG/M2 | HEART RATE: 88 BPM | OXYGEN SATURATION: 93 %

## 2023-12-14 DIAGNOSIS — F17.210 NICOTINE DEPENDENCE, CIGARETTES, UNCOMPLICATED: ICD-10-CM

## 2023-12-14 DIAGNOSIS — I27.20 PULMONARY HYPERTENSION: ICD-10-CM

## 2023-12-14 DIAGNOSIS — Z99.81 ON HOME OXYGEN THERAPY: ICD-10-CM

## 2023-12-14 DIAGNOSIS — J96.01 ACUTE RESPIRATORY FAILURE WITH HYPOXIA AND HYPERCAPNIA: ICD-10-CM

## 2023-12-14 DIAGNOSIS — J96.02 ACUTE RESPIRATORY FAILURE WITH HYPOXIA AND HYPERCAPNIA: ICD-10-CM

## 2023-12-14 DIAGNOSIS — Z23 ENCOUNTER FOR IMMUNIZATION: ICD-10-CM

## 2023-12-14 DIAGNOSIS — K21.9 GASTROESOPHAGEAL REFLUX DISEASE, UNSPECIFIED WHETHER ESOPHAGITIS PRESENT: ICD-10-CM

## 2023-12-14 DIAGNOSIS — R06.09 DYSPNEA ON EXERTION: ICD-10-CM

## 2023-12-14 DIAGNOSIS — Z71.6 ENCOUNTER FOR SMOKING CESSATION COUNSELING: ICD-10-CM

## 2023-12-14 DIAGNOSIS — J44.9 CHRONIC OBSTRUCTIVE PULMONARY DISEASE, UNSPECIFIED COPD TYPE: Primary | ICD-10-CM

## 2023-12-14 RX ORDER — ALBUTEROL SULFATE 90 UG/1
2 AEROSOL, METERED RESPIRATORY (INHALATION) EVERY 4 HOURS PRN
Qty: 36 G | Refills: 6 | Status: SHIPPED | OUTPATIENT
Start: 2023-12-14

## 2023-12-14 RX ORDER — REVEFENACIN 175 UG/3ML
175 SOLUTION RESPIRATORY (INHALATION)
Qty: 175 ML | Refills: 0 | COMMUNITY
Start: 2023-12-14

## 2023-12-14 RX ORDER — PANTOPRAZOLE SODIUM 40 MG/1
40 TABLET, DELAYED RELEASE ORAL
Qty: 30 TABLET | Refills: 11 | Status: SHIPPED | OUTPATIENT
Start: 2023-12-14

## 2023-12-14 RX ORDER — FUROSEMIDE 20 MG/1
40 TABLET ORAL DAILY
Qty: 60 TABLET | Refills: 11 | Status: SHIPPED | OUTPATIENT
Start: 2023-12-14

## 2023-12-14 RX ORDER — REVEFENACIN 175 UG/3ML
175 SOLUTION RESPIRATORY (INHALATION)
Qty: 90 ML | Refills: 11 | Status: SHIPPED | OUTPATIENT
Start: 2023-12-14

## 2023-12-14 NOTE — PROGRESS NOTES
"[facility] Care Provider  Mirtha Alexander APRN     Referring Provider  Pearl Alexander*     Chief Complaint  Emphysema, Shortness of Breath, Cough (Intermittent, green/yellow \"glu-like\" mucus ), Hospital Follow Up Visit ([facility] 12/4-12/9 COPD exacerbation ), and COPD    Subjective          Sumeet Gomes presents to [facility] PULMONARY & CRITICAL CARE MEDICINE  History of Present Illness  Sumeet Gomes is a 62 y.o. male patient of Dr. Moya with hypoxic and hypercapnic respiratory failure, asthma/COPD overlap syndrome, emphysema, pulmonary hypertension and tobacco abuse ongoing.  Recently admitted to [facility] from 12/4/2023 until 12/9/2023.    Per patient's discharge however, he has a history of COPD, acute hypoxic and hypercapnic respiratory failure, pulmonary hypertension, hypertension and tobacco use of cigarettes ongoing.  He had presented to the pulmonary office with complaints of shortness of breath and was found to be hypoxic with saturations in the 70s.  His oxygen was increased to 6 L in the emergency room and his saturations remained in the 70s.  Patient was initially on BiPAP and the majority of the history was taken from his daughter.  Patient was admitted for further evaluation.  He was started on steroids and did complete a 5-day course.  COVID, flu and RSV were all negative.  Patient was started on scheduled nebulizer treatments.  He brought his home astral vent and settings were adjusted to decrease his hypercapnia.  Pulmonology was consulted.  His itraconazole was discontinued as patient was experiencing vision changes.  Pulmonology did feel that he had adequate treatment for Aspergillus.  He also noticed abdominal swelling and getting choked on food, with reflux-like symptoms and weight loss.  Patient was started on Protonix.  CT scan of abdomen revealed anasarca and he was given IV Lasix.  Patient's abdominal distention did resolve.  An echocardiogram was " performed and he had a normal EF.  He did request pulmonary rehab, and could be excepted the following week.  He was sent home with home health and hopes to transition to pulmonary rehab outpatient.    Patient is he is doing okay since discharge from the hospital.  He continues to use Brovana and Pulmicort as prescribed.  He states he has never received Yupelri.  He has been using his Duoneb four times a day because that was how the prescription was sent over.  He continues to wear 2-3 L of oxygen.  He does wear his astral vent nightly with oxygen bled in.  He continues to follow-up with cardiology for management of heart failure. He has not smoked since 12/1/2023 and is strongly encouraged to continue with her cessation.  Patient's pulmonary function test from 12/3/2022 does show very severe COPD with asthma.  Patient's FEV1 was 14% at that time with a diffusion capacity of 18%.  He is strongly encouraged to continue with his smoking cessation.  He is also wanting to look into getting a POC, as it would allow him to be more mobile and better perform his ADLs.  He is up to date with his COVID and pneumonia vaccines.  He is wanting his flu vaccines today in office.     His history of smoking is   Tobacco Use: High Risk (12/14/2023)    Patient History     Smoking Tobacco Use: Every Day     Smokeless Tobacco Use: Never     Passive Exposure: Current     Sumeet oGmes  reports that he has been smoking cigarettes. He started smoking about 48 years ago. He has a 11.75 pack-year smoking history. He has been exposed to tobacco smoke. He has never used smokeless tobacco.. I have educated him on the risk of diseases from using tobacco products such as cancer, COPD, and heart disease.     I advised him to quit and he is willing to quit. We have discussed the following method/s for tobacco cessation:  Education Material Counseling [place].  Encouraged a quit date for  2 months .  He will follow up with Dr. Moya in 2 months  or sooner to check on his progress.  Patient will be considered a former smoker after 3 months of smoking cessation.    I spent 5 minutes counseling the patient.      Review of Systems   Constitutional:  Negative for chills, fatigue, fever, unexpected weight gain and unexpected weight loss.   HENT:  Congestion: Nasal.    Respiratory:  Positive for cough and shortness of breath. Negative for apnea and wheezing.         Negative for Hemoptysis     Cardiovascular:  Negative for chest pain, palpitations and leg swelling.   Skin:         Negative for cyanosis      Sleep: Negative for Excessive daytime sleepiness  Negative for morning headaches  Negative for Snoring    Family History   Problem Relation Age of Onset    Asthma Mother     Emphysema Mother             Stroke Mother         Social History     Socioeconomic History    Marital status:    Tobacco Use    Smoking status: Every Day     Packs/day: 0.25     Years: 47.00     Additional pack years: 0.00     Total pack years: 11.75     Types: Cigarettes     Start date: 1975     Last attempt to quit: 2023     Years since quittin.0     Passive exposure: Current    Smokeless tobacco: Never    Tobacco comments:     Pt stated he is smoking 3 cigarettes per day     23 hasn't smoked for a couple days   Vaping Use    Vaping Use: Never used   Substance and Sexual Activity    Alcohol use: Not Currently    Drug use: Never    Sexual activity: Defer        Past Medical History:   Diagnosis Date    COPD (chronic obstructive pulmonary disease)     COPD (chronic obstructive pulmonary disease)     Coronary artery disease     Diastolic heart failure     Hyperlipidemia     Hypertension     Pulmonary hypertension         Immunization History   Administered Date(s) Administered    COVID-19 (MODERNA) 1st,2nd,3rd Dose Monovalent 2021, 2021, 2021, 2021    Flu Vaccine Quad PF >36MO 2019    Fluzone (or Fluarix & Flulaval for VFC)  >6mos 11/08/2019, 10/25/2021, 10/06/2022, 12/14/2023    Hepatitis A 11/13/2018    Pneumococcal Conjugate 13-Valent (PCV13) 11/07/2018    Pneumococcal Conjugate 20-Valent (PCV20) 11/08/2022    Pneumococcal Polysaccharide (PPSV23) 02/13/2017    Tdap 06/13/2012    Zostavax 06/13/2012         No Known Allergies       Current Outpatient Medications:     arformoterol (Brovana) 15 MCG/2ML nebulizer solution, Take 2 mL by nebulization 2 (Two) Times a Day., Disp: 120 mL, Rfl: 11    budesonide (Pulmicort) 0.5 MG/2ML nebulizer solution, Take 2 mL by nebulization Daily., Disp: 60 each, Rfl: 11    furosemide (LASIX) 20 MG tablet, Take 2 tablets by mouth Daily., Disp: 60 tablet, Rfl: 11    ipratropium-albuterol (DUO-NEB) 0.5-2.5 mg/3 ml nebulizer, INHALE 3 ML BY NEBULIZATION 4 TIMES A DAY, Disp: 360 mL, Rfl: 3    NON FORMULARY, V PAP, Disp: , Rfl:     pantoprazole (PROTONIX) 40 MG EC tablet, Take 1 tablet by mouth Every Morning., Disp: 30 tablet, Rfl: 11    revefenacin (Yupelri) 175 MCG/3ML nebulizer solution, Take 3 mL by nebulization Daily., Disp: 90 mL, Rfl: 11    albuterol sulfate  (90 Base) MCG/ACT inhaler, Inhale 2 puffs Every 4 (Four) Hours As Needed for Wheezing., Disp: 36 g, Rfl: 6    fluticasone (Flovent HFA) 220 MCG/ACT inhaler, INHALE 2 PUFFS BY MOUTH 2 TIMES A DAY, Disp: 144 each, Rfl: 5    revefenacin (Yupelri) 175 MCG/3ML nebulizer solution, Take 3 mL by nebulization Daily., Disp: 175 mL, Rfl: 0     Objective   Physical Exam  Constitutional:       General: He is not in acute distress.     Appearance: Normal appearance. He is normal weight.   HENT:      Right Ear: Hearing normal.      Left Ear: Hearing normal.      Nose: No nasal tenderness or congestion.      Mouth/Throat:      Mouth: Mucous membranes are moist. No oral lesions.   Eyes:      Extraocular Movements: Extraocular movements intact.      Pupils: Pupils are equal, round, and reactive to light.   Neck:      Thyroid: No thyroid mass or thyromegaly.  "  Cardiovascular:      Rate and Rhythm: Normal rate and regular rhythm.      Pulses: Normal pulses.      Heart sounds: Normal heart sounds. No murmur heard.  Pulmonary:      Effort: Pulmonary effort is normal.      Breath sounds: Normal breath sounds. Decreased breath sounds present. No wheezing, rhonchi or rales.      Comments: Patient is on 3 L of oxygen.  He is able to speak full sentences without difficulty.  Musculoskeletal:      Cervical back: Neck supple.      Right lower le+ Pitting Edema present.      Left lower le+ Pitting Edema present.   Lymphadenopathy:      Cervical: No cervical adenopathy.      Upper Body:      Right upper body: No axillary adenopathy.   Skin:     General: Skin is warm and dry.      Findings: No lesion or rash.   Neurological:      General: No focal deficit present.      Mental Status: He is alert and oriented to person, place, and time.   Psychiatric:         Mood and Affect: Affect normal. Mood is not anxious or depressed.         Vital Signs:   /69 (BP Location: Left arm, Patient Position: Sitting, Cuff Size: Adult)   Pulse 88   Temp 97.8 °F (36.6 °C) (Temporal)   Resp 18   Ht 170.2 cm (67\")   Wt 54.8 kg (120 lb 12.8 oz)   SpO2 93% Comment: 3 L's PD  BMI 18.92 kg/m²        Result Review :   The following data was reviewed by: ZULY Contreras on 2023:  CMP          2023    04:35 2023    08:29 2023    05:20   CMP   Glucose 137  89  88    BUN 14  13  12    Creatinine 0.45  0.52  0.43    EGFR 119.8  114.7  121.5    Sodium 138  138  139    Potassium 4.0  3.9  3.4    Chloride 96  91  93    Calcium 9.0  9.3  8.6    BUN/Creatinine Ratio 31.1  25.0  27.9    Anion Gap 5.8  3.8  4.1      CBC w/diff          2023    04:35 2023    08:29 2023    05:20   CBC w/Diff   WBC 10.91  8.19  6.81    RBC 4.50  4.97  4.62    Hemoglobin 12.7  14.1  13.6    Hematocrit 41.6  46.4  41.8    MCV 92.4  93.4  90.5    MCH 28.2  28.4  29.4    MCHC 30.5  " 30.4  32.5    RDW 15.0  14.7  14.8    Platelets 100  103  98    Neutrophil Rel % 89.9  83.3  79.6    Immature Granulocyte Rel % 0.4  0.2  0.1    Lymphocyte Rel % 3.8  9.8  10.7    Monocyte Rel % 5.8  6.6  9.5    Eosinophil Rel % 0.0  0.1  0.1    Basophil Rel % 0.1  0.0  0.0      Data reviewed : Radiologic studies chest CT 3/9/2023, chest x-ray 12/4/2023, CT abdomen pelvis 12/6/2023, pulmonary function test 10/13/2022, Cardiology studies echocardiogram 12/5/2023 showing a right ventricular systolic pressure moderately elevated at 45 to 55 mmHg, Consultant notes Dr. Lopez consult note 12/4/2023, Recent hospitalization notes Hospital discharge summary 12/9/2023 and Dr. Chapa's last hospital note 12/9/2023, and Adamaris CARUSO last office note    Procedures        Assessment and Plan    Diagnoses and all orders for this visit:    1. Chronic obstructive pulmonary disease, unspecified COPD type (Primary)  -     Alpha - 1 - Antitrypsin; Future  -     albuterol sulfate  (90 Base) MCG/ACT inhaler; Inhale 2 puffs Every 4 (Four) Hours As Needed for Wheezing.  Dispense: 36 g; Refill: 6  -     revefenacin (Yupelri) 175 MCG/3ML nebulizer solution; Take 3 mL by nebulization Daily.  Dispense: 175 mL; Refill: 0    2. Acute respiratory failure with hypoxia and hypercapnia  -     6 Minute Walk Test; Future    3. Pulmonary hypertension    4. Nicotine dependence, cigarettes, uncomplicated    5. Encounter for smoking cessation counseling    6. Dyspnea on exertion  -     6 Minute Walk Test; Future    7. Diastolic heart failure  -     revefenacin (Yupelri) 175 MCG/3ML nebulizer solution; Take 3 mL by nebulization Daily.  Dispense: 90 mL; Refill: 11  -     furosemide (LASIX) 20 MG tablet; Take 2 tablets by mouth Daily.  Dispense: 60 tablet; Refill: 11    8. Gastroesophageal reflux disease, unspecified whether esophagitis present  -     pantoprazole (PROTONIX) 40 MG EC tablet; Take 1 tablet by mouth Every Morning.  Dispense: 30  tablet; Refill: 11    9. Encounter for immunization  -     Fluzone >6 Months ([id number])    10.  Continue Brovana, Pulmicort and Yupelri as prescribed.  Rinse mouth after each use.  11.  Continue albuterol and DuoNebs as needed.  12.  Continue NIPPV at night and with naps.  Clean mask and tubing daily.  13.  Continue supplemental oxygen to maintain saturations at or above 89%.  14.  6-minute walk performed in office today.  Patient did desaturate less than 88% during his 6-minute walk.  Unfortunately, patient does not qualify for a POC, as he did require at least 4 L pulsed dose to maintain saturations above 90%.  15.  Smoking cessation counseling provided.  I spent 5 minutes today counseling patient on the risks of smoking, including throat cancer, lung cancer, COPD, heart disease and death.  Also discussed the benefits of quitting.  Strongly encourage patient to continue with smoking cessation.  He will be considered a former smoker after 3 months of cessation.  16.  Additional education provided by our nurse navigator on COPD action plan, astral vent, pulmonary rehab, portable oxygen concentrator, nebulizer treatments, etc.  17.  Follow-up as scheduled.    I spent 90 minutes caring for Sumeet on this date of service. This time includes time spent by me in the following activities:preparing for the visit, reviewing tests, obtaining and/or reviewing a separately obtained history, performing a medically appropriate examination and/or evaluation , counseling and educating the patient/family/caregiver, ordering medications, tests, or procedures, referring and communicating with other health care professionals , and documenting information in the medical record    Follow Up   Return for Next scheduled follow up.  Patient was given instructions and counseling regarding his condition or for health maintenance advice. Please see specific information pulled into the AVS if appropriate.

## 2023-12-14 NOTE — PATIENT INSTRUCTIONS
COPD and Physical Activity  Chronic obstructive pulmonary disease (COPD) is a long-term, or chronic, condition that affects the lungs. COPD is a general term that can be used to describe many problems that cause inflammation of the lungs and limit airflow. These conditions include chronic bronchitis and emphysema.  The main symptom of COPD is shortness of breath, which makes it harder to do even simple tasks. This can also make it harder to exercise and stay active. Talk with your health care provider about treatments to help you breathe better and actions you can take to prevent breathing problems during physical activity.  What are the benefits of exercising when you have COPD?  Exercising regularly is an important part of a healthy lifestyle. You can still exercise and do physical activities even though you have COPD. Exercise and physical activity improve your shortness of breath by increasing blood flow (circulation). This causes your heart to pump more oxygen through your body. Moderate exercise can:  Improve oxygen use.  Increase your energy level.  Help with shortness of breath.  Strengthen your breathing muscles.  Improve heart health.  Help with sleep.  Improve your self-esteem and feelings of self-worth.  Lower depression, stress, and anxiety.  Exercise can benefit everyone with COPD. The severity of your disease may affect how hard you can exercise, especially at first, but everyone can benefit. Talk with your health care provider about how much exercise is safe for you, and which activities and exercises are safe for you.  What actions can I take to prevent breathing problems during physical activity?  Sign up for a pulmonary rehabilitation program. This type of program may include:  Education about lung diseases.  Exercise classes that teach you how to exercise and be more active while improving your breathing. This usually involves:  Exercise using your lower extremities, such as a stationary  bicycle.  About 30 minutes of exercise, 2 to 5 times per week, for 6 to 12 weeks.  Strength training, such as push-ups or leg lifts.  Nutrition education.  Group classes in which you can talk with others who also have COPD and learn ways to manage stress.  If you use an oxygen tank, you should use it while you exercise. Work with your health care provider to adjust your oxygen for your physical activity. Your resting flow rate is different from your flow rate during physical activity.  How to manage your breathing while exercising  While you are exercising:  Take slow breaths.  Pace yourself, and do nottry to go too fast.  Purse your lips while breathing out. Pursing your lips is similar to a kissing or whistling position.  If doing exercise that uses a quick burst of effort, such as weight lifting:  Breathe in before starting the exercise.  Breathe out during the hardest part of the exercise, such as raising the weights.  Where to find support  You can find support for exercising with COPD from:  Your health care provider.  A pulmonary rehabilitation program.  Your local health department or community health programs.  Support groups, either online or in-person. Your health care provider may be able to recommend support groups.  Where to find more information  You can find more information about exercising with COPD from:  American Lung Association: lung.org  COPD Foundation: copdfoundation.org  Contact a health care provider if:  Your symptoms get worse.  You have nausea.  You have a fever.  You want to start a new exercise program or a new activity.  Get help right away if:  You have chest pain.  You cannot breathe.  These symptoms may represent a serious problem that is an emergency. Do not wait to see if the symptoms will go away. Get medical help right away. Call your local emergency services (911 in the [place] Do not drive yourself to the hospital.  Summary  COPD is a general term that can be used to describe  "many different lung problems that cause lung inflammation and limit airflow. This includes chronic bronchitis and emphysema.  Exercise and physical activity improve your shortness of breath by increasing blood flow (circulation). This causes your heart to provide more oxygen to your body.  Contact your health care provider before starting any exercise program or new activity. Ask your health care provider what exercises and activities are safe for you.  This information is not intended to replace advice given to you by your health care provider. Make sure you discuss any questions you have with your health care provider.  Document Revised: 10/26/2021 Document Reviewed: 10/26/2021  [employer] Patient Education © 2023 [employer]  Smoking Tobacco Information, Adult  Smoking tobacco can be harmful to your health. Tobacco contains a toxic colorless chemical called nicotine. Nicotine causes changes in your brain that make you want more and more. This is called addiction. This can make it hard to stop smoking once you start. Tobacco also has other toxic chemicals that can hurt your body and raise your risk of many cancers.  Menthol or \"lite\" tobacco or cigarette brands are not safer than regular brands.  How can smoking tobacco affect me?  Smoking tobacco puts you at risk for:  Cancer. Smoking is most commonly associated with lung cancer, but can also lead to cancer in other parts of the body.  Chronic obstructive pulmonary disease (COPD). This is a long-term lung condition that makes it hard to breathe. It also gets worse over time.  High blood pressure (hypertension), heart disease, stroke, heart attack, and lung infections, such as pneumonia.  Cataracts. This is when the lenses in the eyes become clouded.  Digestive problems. This may include peptic ulcers, heartburn, and gastroesophageal reflux disease (GERD).  Oral health problems, such as gum disease, mouth sores, and tooth loss.  Loss of taste and smell.  Smoking " also affects how you look and smell. Smoking may cause:  Wrinkles.  Yellow or stained teeth, fingers, and fingernails.  Bad breath.  Bad-smelling clothes and hair.  Smoking tobacco can also affect your social life, because:  It may be challenging to find places to smoke when away from home. Many workplaces, restaurants, hotels, and public places are tobacco-free.  Smoking is expensive. This is due to the cost of tobacco and the long-term costs of treating health problems from smoking.  Secondhand smoke may affect those around you. Secondhand smoke can cause lung cancer, breathing problems, and heart disease. Children of smokers have a higher risk for:  Sudden infant death syndrome (SIDS).  Ear infections.  Lung infections.  What actions can I take to prevent health problems?  Quit smoking    Do not start smoking. Quit if you already smoke.  Do not replace cigarette smoking with vaping devices, such as e-cigarettes.  Make a plan to quit smoking and commit to it. Look for programs to help you, and ask your health care provider for recommendations and ideas. Set a date and write down all the reasons you want to quit.  Let your friends and family know you are quitting so they can help and support you. Consider finding friends who also want to quit. It can be easier to quit with someone else, so that you can support each other.  Talk with your health care provider about using nicotine replacement medicines to help you quit. These include gum, lozenges, patches, sprays, or pills.  If you try to quit but return to smoking, stay positive. It is common to slip up when you first quit, so take it one day at a time.  Be prepared for cravings. When you feel the urge to smoke, chew gum or suck on hard candy.  Lifestyle  Stay busy.  Take care of your body. Get plenty of exercise, eat a healthy diet, and drink plenty of water.  Find ways to manage your stress, such as meditation, yoga, exercise, or time spent with friends and  family.  Ask your health care provider about having regular tests (screenings) to check for cancer. This may include blood tests, imaging tests, and other tests.  Where to find support  To get support to quit smoking, consider:  Asking your health care provider for more information and resources.  Joining a support group for people who want to quit smoking in your local community. There are many effective programs that may help you to quit.  Calling the smokefree.gov counselor helpline at 8-739-QUIT-NOW (1-838.233.8438).  Where to find more information  You may find more information about quitting smoking from:  Centers for Disease Control and Prevention: cdc.gov/tobacco  Smokefree.gov: smokefree.gov  American Lung Association: freedomfromsmoking.org  Contact a health care provider if:  You have problems breathing.  Your lips, nose, or fingers turn blue.  You have chest pain.  You are coughing up blood.  You feel like you will faint.  You have other health changes that cause you to worry.  Summary  Smoking tobacco can negatively affect your health, the health of those around you, your finances, and your social life.  Do not start smoking. Quit if you already smoke. If you need help quitting, ask your health care provider.  Consider joining a support group for people in your local community who want to quit smoking. There are many effective programs that may help you to quit.  This information is not intended to replace advice given to you by your health care provider. Make sure you discuss any questions you have with your health care provider.  Document Revised: 12/13/2022 Document Reviewed: 12/13/2022  [employer] Patient Education © 2023 [employer]

## 2023-12-18 ENCOUNTER — OFFICE VISIT (OUTPATIENT)
Dept: ONCOLOGY | Facility: HOSPITAL | Age: 63
End: 2023-12-18
Payer: MEDICAID

## 2023-12-18 ENCOUNTER — LAB (OUTPATIENT)
Dept: ONCOLOGY | Facility: HOSPITAL | Age: 63
End: 2023-12-18
Payer: MEDICAID

## 2023-12-18 VITALS
TEMPERATURE: 98.2 F | RESPIRATION RATE: 16 BRPM | BODY MASS INDEX: 19.27 KG/M2 | HEIGHT: 67 IN | WEIGHT: 122.8 LBS | HEART RATE: 92 BPM | SYSTOLIC BLOOD PRESSURE: 109 MMHG | OXYGEN SATURATION: 91 % | DIASTOLIC BLOOD PRESSURE: 75 MMHG

## 2023-12-18 DIAGNOSIS — D69.6 THROMBOCYTOPENIA: Primary | ICD-10-CM

## 2023-12-18 PROCEDURE — G0463 HOSPITAL OUTPT CLINIC VISIT: HCPCS | Performed by: INTERNAL MEDICINE

## 2023-12-18 NOTE — PROGRESS NOTES
Chief Complaint/Reason for Referral:  Thrombocytopenia    Benjamin, Mirtha Agarwal, *  Mirtha Alexander, APRN    Records Obtained:  Records of the patients history including those obtained from  were reviewed and summarized in detail.    Subjective    History of Present Illness   Mr. Sumeet Gomes is a very pleasant 62 year old  male presenting for follow up for mild thrombocytopenia. He was recently discharged from hospital after admission for hypoxia and volume overload. Weight is down. He is on 3L NC down from 4L. He is feeling better. He has quit smoking for last 3 weeks. No bleeding. Does have easy bruising. No history of liver disease. Eating better.     Hematology History    He has co-morbid history of CAD, HLD, hypertension, CHF, COPD, hypoxia, pulmonary hypertension, tobacco abuse,  asperillosis pulmonary infection and has been taking Itraconazole for this. He has been following with Dr. Moya for his pulmonary issues. He has chronic hypoxia and uses continuous oxygen at 6 liters but his oxygen is sitting on 2 liters / NC current. Reports he is trying to quit smoking and is down to around 3 cigarettes a day using Chantix.     2/1/23: Plt count of 126,000 from 147,000 in November.     2/28/23: Plt 129,000. He has normal WBC and hemoglobin. Denies any persistent bleeding or bruising issues.     6/2023: B12, folate normal    12/9/23: Plt 98K, hgb 13.6, WBC 6.81        Oncology/Hematology History    No history exists.       Review of Systems   Constitutional:  Positive for fatigue. Negative for appetite change, diaphoresis, fever, unexpected weight gain and unexpected weight loss.   HENT:  Negative for hearing loss, mouth sores, sore throat, swollen glands, trouble swallowing and voice change.    Eyes:  Negative for blurred vision, double vision, pain, redness and visual disturbance.   Respiratory:  Positive for shortness of breath. Negative for cough and wheezing.    Cardiovascular:  Negative for  chest pain, palpitations and leg swelling.   Gastrointestinal:  Negative for abdominal pain, blood in stool, constipation, diarrhea, nausea and vomiting.   Endocrine: Negative for cold intolerance, heat intolerance, polydipsia and polyuria.   Genitourinary:  Negative for decreased urine volume, difficulty urinating, dysuria, frequency, hematuria and urinary incontinence.   Musculoskeletal:  Negative for arthralgias, back pain, joint swelling and myalgias.   Skin:  Negative for color change, rash, skin lesions and wound.   Neurological:  Negative for dizziness, seizures, weakness, numbness and headache.   Hematological:  Negative for adenopathy. Does not bruise/bleed easily.   Psychiatric/Behavioral:  Negative for depressed mood. The patient is not nervous/anxious.    All other systems reviewed and are negative.      Current Outpatient Medications on File Prior to Visit   Medication Sig Dispense Refill    albuterol sulfate  (90 Base) MCG/ACT inhaler Inhale 2 puffs Every 4 (Four) Hours As Needed for Wheezing. 36 g 6    arformoterol (Brovana) 15 MCG/2ML nebulizer solution Take 2 mL by nebulization 2 (Two) Times a Day. 120 mL 11    budesonide (Pulmicort) 0.5 MG/2ML nebulizer solution Take 2 mL by nebulization Daily. 60 each 11    fluticasone (Flovent HFA) 220 MCG/ACT inhaler INHALE 2 PUFFS BY MOUTH 2 TIMES A  each 5    furosemide (LASIX) 20 MG tablet Take 2 tablets by mouth Daily. 60 tablet 11    ipratropium-albuterol (DUO-NEB) 0.5-2.5 mg/3 ml nebulizer INHALE 3 ML BY NEBULIZATION 4 TIMES A  mL 3    NON FORMULARY V PAP      pantoprazole (PROTONIX) 40 MG EC tablet Take 1 tablet by mouth Every Morning. 30 tablet 11    revefenacin (Yupelri) 175 MCG/3ML nebulizer solution Take 3 mL by nebulization Daily. 90 mL 11    revefenacin (Yupelri) 175 MCG/3ML nebulizer solution Take 3 mL by nebulization Daily. 175 mL 0     No current facility-administered medications on file prior to visit.       No Known  Allergies  Past Medical History:   Diagnosis Date    COPD (chronic obstructive pulmonary disease)     COPD (chronic obstructive pulmonary disease)     Coronary artery disease     Diastolic heart failure     Hyperlipidemia     Hypertension     Pulmonary hypertension      Past Surgical History:   Procedure Laterality Date    BACK SURGERY      BRONCHOSCOPY N/A 2022    Procedure: BRONCHOSCOPY WITH BRONCHOALVEOLAR LAVAGE AND BRONCHIAL WASHINGS;  Surgeon: Ulices Moya MD;  Location: [facility] ENDOSCOPY;  Service: Pulmonary;  Laterality: N/A;  MUCUS PLUGGING, COPD EXACERBATION    BRONCHOSCOPY      CARDIAC CATHETERIZATION N/A 2022    Procedure: Right Heart Cath;  Surgeon: Drew Rodriguez MD;  Location: [facility] CATH INVASIVE LOCATION;  Service: Cardiovascular;  Laterality: N/A;    OTHER SURGICAL HISTORY      knee     OTHER SURGICAL HISTORY      shoulder, rotator cuff    SHOULDER SURGERY Right 10/03/2022     Social History     Socioeconomic History    Marital status:    Tobacco Use    Smoking status: Every Day     Packs/day: 0.25     Years: 47.00     Additional pack years: 0.00     Total pack years: 11.75     Types: Cigarettes     Start date: 1975     Last attempt to quit: 2023     Years since quittin.0     Passive exposure: Current    Smokeless tobacco: Never    Tobacco comments:     Pt stated he is smoking 3 cigarettes per day     23 hasn't smoked for a couple days   Vaping Use    Vaping Use: Never used   Substance and Sexual Activity    Alcohol use: Not Currently    Drug use: Never    Sexual activity: Defer     Family History   Problem Relation Age of Onset    Asthma Mother     Emphysema Mother             Stroke Mother      Immunization History   Administered Date(s) Administered    COVID-19 (MODERNA) 1st,2nd,3rd Dose Monovalent 2021, 2021, 2021, 2021    Flu Vaccine Quad PF >36MO 2019    Fluzone (or Fluarix & Flulaval for VFC) >6mos 2019,  "10/25/2021, 10/06/2022, 12/14/2023    Hepatitis A 11/13/2018    Pneumococcal Conjugate 13-Valent (PCV13) 11/07/2018    Pneumococcal Conjugate 20-Valent (PCV20) 11/08/2022    Pneumococcal Polysaccharide (PPSV23) 02/13/2017    Tdap 06/13/2012    Zostavax 06/13/2012       Tobacco Use: High Risk (12/18/2023)    Patient History     Smoking Tobacco Use: Every Day     Smokeless Tobacco Use: Never     Passive Exposure: Current       Objective     Physical Exam  Constitutional:       Appearance: Normal appearance.   HENT:      Head: Normocephalic and atraumatic.      Nose: Nose normal.   Eyes:      Conjunctiva/sclera: Conjunctivae normal.   Pulmonary:      Effort: Pulmonary effort is normal.   Skin:     Findings: Bruising present.   Neurological:      General: No focal deficit present.      Mental Status: He is alert. Mental status is at baseline.   Psychiatric:         Mood and Affect: Mood normal.         Behavior: Behavior normal.         Thought Content: Thought content normal.       Vitals:    12/18/23 1350   BP: 109/75   Pulse: 92   Resp: 16   Temp: 98.2 °F (36.8 °C)   TempSrc: Temporal   SpO2: 91%   Weight: 55.7 kg (122 lb 12.7 oz)   Height: 170.2 cm (67.01\")   PainSc: 0-No pain         Wt Readings from Last 3 Encounters:   12/14/23 54.8 kg (120 lb 12.8 oz)   12/04/23 61 kg (134 lb 7.7 oz)   12/04/23 64.7 kg (142 lb 9.6 oz)        BMI is within normal parameters. No other follow-up for BMI required.                 ECOG: (1) Restricted in Physically Strenuous Activity, Ambulatory & Able to Do Work of Light Nature  Fall Risk Assessment was completed, and patient is at moderate risk for falls.  PHQ-9 Total Score:         The patient is  experiencing fatigue. Fatigue score: 5    PT/OT Functional Screening: PT fx screen: Difficulty Walking and No needs identified  Speech Functional Screening: Speech fx screen: No needs identified  Rehab to be ordered: Rehab to be ordered: No needs identified        Result Review :  The " "following data was reviewed by: Elvis Pinzon MD on 12/18/23:  Lab Results   Component Value Date    HGB 13.6 12/09/2023    HCT 41.8 12/09/2023    MCV 90.5 12/09/2023    PLT 98 (L) 12/09/2023    WBC 6.81 12/09/2023    NEUTROABS 5.41 12/09/2023    LYMPHSABS 0.73 12/09/2023    MONOSABS 0.65 12/09/2023    EOSABS 0.01 12/09/2023    BASOSABS 0.00 12/09/2023     Lab Results   Component Value Date    GLUCOSE 88 12/09/2023    BUN 12 12/09/2023    CREATININE 0.43 (L) 12/09/2023     12/09/2023    K 3.4 (L) 12/09/2023    CL 93 (L) 12/09/2023    CO2 41.9 (H) 12/09/2023    CALCIUM 8.6 12/09/2023    PROTEINTOT 5.0 (L) 12/05/2023    ALBUMIN 3.2 (L) 12/05/2023    BILITOT 0.9 12/05/2023    ALKPHOS 132 (H) 12/05/2023    AST 19 12/05/2023    ALT 22 12/05/2023     Lab Results   Component Value Date    MZZGOGUU13 879 06/19/2023    FOLATE 14.00 06/19/2023     No results found for: \"IRON\", \"LABIRON\", \"TRANSFERRIN\", \"TIBC\"  Lab Results   Component Value Date    YUVYGHRW21 879 06/19/2023    FOLATE 14.00 06/19/2023     Lab Results   Component Value Date    PSA 0.361 02/01/2023     Labs personally reviewed and notable for mild thrombocytopenia.     CT 12/6/23 personally reviewed and per my independent read with diffuse edema. No splenomegaly    Discharge summary personally reviewed              Assessment and Plan:  Diagnoses and all orders for this visit:    1. Thrombocytopenia (Primary)  -     CBC & Differential; Future        Mild thrombocytopenia now in range around 100K. Worse since 2/2023. Denies any acute bleeding or persistent bleeding issues, but does have easy bruising. Thyroid function was normal on 2/1/23. B12, folate normal as of 6/2023. Smear with normal morphology of all cell lines 6/2023. 12/23 ct without splenomegaly. Could be worse recently due to recent hospitalization. Likely benign in etiology. Platelet count is acceptable for hemostasis.       I spent 20 minutes caring for Sumeet on this date of service. " This time includes time spent by me in the following activities:preparing for the visit, reviewing tests, obtaining and/or reviewing a separately obtained history, performing a medically appropriate examination and/or evaluation , counseling and educating the patient/family/caregiver, ordering medications, tests, or procedures, referring and communicating with other health care professionals , documenting information in the medical record and independently interpreting results and communicating that information with the patient/family/caregiver    Patient Follow Up: 6 months with repeat CBC    Patient was given instructions and counseling regarding his condition or for health maintenance advice. Please see specific information pulled into the AVS if appropriate.

## 2023-12-26 ENCOUNTER — HOSPITAL ENCOUNTER (INPATIENT)
Facility: HOSPITAL | Age: 63
LOS: 3 days | Discharge: HOME | DRG: 177 | End: 2023-12-29
Attending: EMERGENCY MEDICINE | Admitting: INTERNAL MEDICINE
Payer: MEDICAID

## 2023-12-26 ENCOUNTER — APPOINTMENT (OUTPATIENT)
Dept: GENERAL RADIOLOGY | Facility: HOSPITAL | Age: 63
DRG: 177 | End: 2023-12-26
Payer: MEDICAID

## 2023-12-26 DIAGNOSIS — B44.9 ASPERGILLUS: ICD-10-CM

## 2023-12-26 DIAGNOSIS — R53.83 FATIGUE, UNSPECIFIED TYPE: ICD-10-CM

## 2023-12-26 DIAGNOSIS — R05.9 COUGH, UNSPECIFIED TYPE: ICD-10-CM

## 2023-12-26 DIAGNOSIS — R05.3 CHRONIC COUGH: ICD-10-CM

## 2023-12-26 DIAGNOSIS — Z99.81 ON HOME OXYGEN THERAPY: ICD-10-CM

## 2023-12-26 DIAGNOSIS — R06.2 WHEEZING: ICD-10-CM

## 2023-12-26 DIAGNOSIS — E43 SEVERE MALNUTRITION: ICD-10-CM

## 2023-12-26 DIAGNOSIS — J44.1 CHRONIC OBSTRUCTIVE PULMONARY DISEASE WITH ACUTE EXACERBATION: ICD-10-CM

## 2023-12-26 DIAGNOSIS — U07.1 COVID-19: Primary | ICD-10-CM

## 2023-12-26 DIAGNOSIS — J44.9 CHRONIC OBSTRUCTIVE PULMONARY DISEASE, UNSPECIFIED COPD TYPE: ICD-10-CM

## 2023-12-26 LAB
ALBUMIN SERPL-MCNC: 3.6 G/DL (ref 3.5–5.2)
ALBUMIN/GLOB SERPL: 1.5 G/DL
ALP SERPL-CCNC: 132 U/L (ref 39–117)
ALT SERPL W P-5'-P-CCNC: 18 U/L (ref 1–41)
ANION GAP SERPL CALCULATED.3IONS-SCNC: 8.2 MMOL/L (ref 5–15)
ARTERIAL PATENCY WRIST A: ABNORMAL
AST SERPL-CCNC: 35 U/L (ref 1–40)
BASE EXCESS BLDA CALC-SCNC: 9.8 MMOL/L (ref -2–2)
BASOPHILS # BLD AUTO: 0.02 10*3/MM3 (ref 0–0.2)
BASOPHILS NFR BLD AUTO: 0.5 % (ref 0–1.5)
BDY SITE: ABNORMAL
BILIRUB SERPL-MCNC: 0.5 MG/DL (ref 0–1.2)
BUN SERPL-MCNC: 12 MG/DL (ref 8–23)
BUN/CREAT SERPL: 21.1 (ref 7–25)
CALCIUM SPEC-SCNC: 8.8 MG/DL (ref 8.6–10.5)
CHLORIDE SERPL-SCNC: 94 MMOL/L (ref 98–107)
CO2 SERPL-SCNC: 33.8 MMOL/L (ref 22–29)
COHGB MFR BLD: 1.1 % (ref 0–1.5)
CREAT SERPL-MCNC: 0.57 MG/DL (ref 0.76–1.27)
D DIMER PPP FEU-MCNC: 0.42 MCGFEU/ML (ref 0–0.62)
DEPRECATED RDW RBC AUTO: 46.9 FL (ref 37–54)
EGFRCR SERPLBLD CKD-EPI 2021: 110.8 ML/MIN/1.73
EOSINOPHIL # BLD AUTO: 0.02 10*3/MM3 (ref 0–0.4)
EOSINOPHIL NFR BLD AUTO: 0.5 % (ref 0.3–6.2)
ERYTHROCYTE [DISTWIDTH] IN BLOOD BY AUTOMATED COUNT: 14.4 % (ref 12.3–15.4)
FERRITIN SERPL-MCNC: 803.1 NG/ML (ref 30–400)
FHHB: 7.1 % (ref 0–5)
FLUAV SUBTYP SPEC NAA+PROBE: NOT DETECTED
FLUBV RNA ISLT QL NAA+PROBE: NOT DETECTED
GAS FLOW AIRWAY: 2.5 LPM
GLOBULIN UR ELPH-MCNC: 2.4 GM/DL
GLUCOSE SERPL-MCNC: 86 MG/DL (ref 65–99)
HCO3 BLDA-SCNC: 37.1 MMOL/L (ref 22–26)
HCT VFR BLD AUTO: 42.6 % (ref 37.5–51)
HGB BLD-MCNC: 13.5 G/DL (ref 13–17.7)
HGB BLDA-MCNC: 13.8 G/DL (ref 13.8–16.4)
HOLD SPECIMEN: NORMAL
HOLD SPECIMEN: NORMAL
IMM GRANULOCYTES # BLD AUTO: 0.01 10*3/MM3 (ref 0–0.05)
IMM GRANULOCYTES NFR BLD AUTO: 0.2 % (ref 0–0.5)
INHALED O2 CONCENTRATION: 30 %
INR PPP: 0.94 (ref 0.86–1.15)
L PNEUMO1 AG UR QL IA: NEGATIVE
LACTATE BLDA-SCNC: ABNORMAL MMOL/L
LDH SERPL-CCNC: 174 U/L (ref 135–225)
LYMPHOCYTES # BLD AUTO: 0.76 10*3/MM3 (ref 0.7–3.1)
LYMPHOCYTES NFR BLD AUTO: 17.7 % (ref 19.6–45.3)
MCH RBC QN AUTO: 28.1 PG (ref 26.6–33)
MCHC RBC AUTO-ENTMCNC: 31.7 G/DL (ref 31.5–35.7)
MCV RBC AUTO: 88.6 FL (ref 79–97)
METHGB BLD QL: 0.2 % (ref 0–1.5)
MODALITY: ABNORMAL
MONOCYTES # BLD AUTO: 0.82 10*3/MM3 (ref 0.1–0.9)
MONOCYTES NFR BLD AUTO: 19.1 % (ref 5–12)
NEUTROPHILS NFR BLD AUTO: 2.67 10*3/MM3 (ref 1.7–7)
NEUTROPHILS NFR BLD AUTO: 62 % (ref 42.7–76)
NRBC BLD AUTO-RTO: 0 /100 WBC (ref 0–0.2)
NT-PROBNP SERPL-MCNC: 436.3 PG/ML (ref 0–900)
OXYHGB MFR BLDV: 91.6 % (ref 94–99)
PCO2 BLDA: 61.9 MM HG (ref 35–45)
PH BLDA: 7.39 PH UNITS (ref 7.35–7.45)
PLATELET # BLD AUTO: 151 10*3/MM3 (ref 140–450)
PMV BLD AUTO: 10.4 FL (ref 6–12)
PO2 BLD: 223 MM[HG] (ref 0–500)
PO2 BLDA: 67 MM HG (ref 80–100)
POTASSIUM SERPL-SCNC: 4 MMOL/L (ref 3.5–5.2)
PROCALCITONIN SERPL-MCNC: 0.07 NG/ML (ref 0–0.25)
PROT SERPL-MCNC: 6 G/DL (ref 6–8.5)
PROTHROMBIN TIME: 12.8 SECONDS (ref 11.8–14.9)
RBC # BLD AUTO: 4.81 10*6/MM3 (ref 4.14–5.8)
RSV RNA NPH QL NAA+NON-PROBE: NOT DETECTED
S PNEUM AG SPEC QL LA: NEGATIVE
SAO2 % BLDCOA: 92.8 % (ref 95–99)
SARS-COV-2 RNA RESP QL NAA+PROBE: DETECTED
SODIUM SERPL-SCNC: 136 MMOL/L (ref 136–145)
TROPONIN T SERPL HS-MCNC: 16 NG/L
WBC NRBC COR # BLD AUTO: 4.3 10*3/MM3 (ref 3.4–10.8)
WHOLE BLOOD HOLD COAG: NORMAL
WHOLE BLOOD HOLD SPECIMEN: NORMAL

## 2023-12-26 PROCEDURE — 83880 ASSAY OF NATRIURETIC PEPTIDE: CPT | Performed by: EMERGENCY MEDICINE

## 2023-12-26 PROCEDURE — 93010 ELECTROCARDIOGRAM REPORT: CPT | Performed by: INTERNAL MEDICINE

## 2023-12-26 PROCEDURE — 85025 COMPLETE CBC W/AUTO DIFF WBC: CPT | Performed by: EMERGENCY MEDICINE

## 2023-12-26 PROCEDURE — 82805 BLOOD GASES W/O2 SATURATION: CPT | Performed by: INTERNAL MEDICINE

## 2023-12-26 PROCEDURE — 80053 COMPREHEN METABOLIC PANEL: CPT | Performed by: EMERGENCY MEDICINE

## 2023-12-26 PROCEDURE — 25010000002 DEXAMETHASONE SODIUM PHOSPHATE 10 MG/ML SOLUTION: Performed by: EMERGENCY MEDICINE

## 2023-12-26 PROCEDURE — 87899 AGENT NOS ASSAY W/OPTIC: CPT | Performed by: INTERNAL MEDICINE

## 2023-12-26 PROCEDURE — 82728 ASSAY OF FERRITIN: CPT | Performed by: INTERNAL MEDICINE

## 2023-12-26 PROCEDURE — 84484 ASSAY OF TROPONIN QUANT: CPT | Performed by: EMERGENCY MEDICINE

## 2023-12-26 PROCEDURE — 84145 PROCALCITONIN (PCT): CPT | Performed by: INTERNAL MEDICINE

## 2023-12-26 PROCEDURE — 93005 ELECTROCARDIOGRAM TRACING: CPT | Performed by: EMERGENCY MEDICINE

## 2023-12-26 PROCEDURE — 94761 N-INVAS EAR/PLS OXIMETRY MLT: CPT

## 2023-12-26 PROCEDURE — 87637 SARSCOV2&INF A&B&RSV AMP PRB: CPT | Performed by: EMERGENCY MEDICINE

## 2023-12-26 PROCEDURE — 99223 1ST HOSP IP/OBS HIGH 75: CPT | Performed by: INTERNAL MEDICINE

## 2023-12-26 PROCEDURE — 94799 UNLISTED PULMONARY SVC/PX: CPT

## 2023-12-26 PROCEDURE — 36600 WITHDRAWAL OF ARTERIAL BLOOD: CPT | Performed by: INTERNAL MEDICINE

## 2023-12-26 PROCEDURE — 93005 ELECTROCARDIOGRAM TRACING: CPT

## 2023-12-26 PROCEDURE — 25010000002 CEFEPIME PER 500 MG: Performed by: INTERNAL MEDICINE

## 2023-12-26 PROCEDURE — 83050 HGB METHEMOGLOBIN QUAN: CPT | Performed by: INTERNAL MEDICINE

## 2023-12-26 PROCEDURE — 83615 LACTATE (LD) (LDH) ENZYME: CPT | Performed by: INTERNAL MEDICINE

## 2023-12-26 PROCEDURE — 94640 AIRWAY INHALATION TREATMENT: CPT

## 2023-12-26 PROCEDURE — 82375 ASSAY CARBOXYHB QUANT: CPT | Performed by: INTERNAL MEDICINE

## 2023-12-26 PROCEDURE — 85610 PROTHROMBIN TIME: CPT | Performed by: INTERNAL MEDICINE

## 2023-12-26 PROCEDURE — 87449 NOS EACH ORGANISM AG IA: CPT | Performed by: INTERNAL MEDICINE

## 2023-12-26 PROCEDURE — 99285 EMERGENCY DEPT VISIT HI MDM: CPT

## 2023-12-26 PROCEDURE — 25010000002 FUROSEMIDE PER 20 MG: Performed by: INTERNAL MEDICINE

## 2023-12-26 PROCEDURE — 85379 FIBRIN DEGRADATION QUANT: CPT | Performed by: INTERNAL MEDICINE

## 2023-12-26 PROCEDURE — 71045 X-RAY EXAM CHEST 1 VIEW: CPT

## 2023-12-26 PROCEDURE — 25010000002 ENOXAPARIN PER 10 MG: Performed by: INTERNAL MEDICINE

## 2023-12-26 RX ORDER — IPRATROPIUM BROMIDE AND ALBUTEROL SULFATE 2.5; .5 MG/3ML; MG/3ML
3 SOLUTION RESPIRATORY (INHALATION) ONCE
Status: COMPLETED | OUTPATIENT
Start: 2023-12-26 | End: 2023-12-26

## 2023-12-26 RX ORDER — ARFORMOTEROL TARTRATE 15 UG/2ML
15 SOLUTION RESPIRATORY (INHALATION)
Status: DISCONTINUED | OUTPATIENT
Start: 2023-12-26 | End: 2023-12-29 | Stop reason: HOSPADM

## 2023-12-26 RX ORDER — SODIUM CHLORIDE 0.9 % (FLUSH) 0.9 %
10 SYRINGE (ML) INJECTION AS NEEDED
Status: DISCONTINUED | OUTPATIENT
Start: 2023-12-26 | End: 2023-12-29 | Stop reason: HOSPADM

## 2023-12-26 RX ORDER — SODIUM CHLORIDE 9 MG/ML
40 INJECTION, SOLUTION INTRAVENOUS AS NEEDED
Status: DISCONTINUED | OUTPATIENT
Start: 2023-12-26 | End: 2023-12-29 | Stop reason: HOSPADM

## 2023-12-26 RX ORDER — IPRATROPIUM BROMIDE AND ALBUTEROL SULFATE 2.5; .5 MG/3ML; MG/3ML
3 SOLUTION RESPIRATORY (INHALATION)
Status: DISCONTINUED | OUTPATIENT
Start: 2023-12-26 | End: 2023-12-28

## 2023-12-26 RX ORDER — ENOXAPARIN SODIUM 100 MG/ML
40 INJECTION SUBCUTANEOUS NIGHTLY
Status: DISCONTINUED | OUTPATIENT
Start: 2023-12-26 | End: 2023-12-29 | Stop reason: HOSPADM

## 2023-12-26 RX ORDER — DEXAMETHASONE SODIUM PHOSPHATE 10 MG/ML
10 INJECTION, SOLUTION INTRAMUSCULAR; INTRAVENOUS ONCE
Status: COMPLETED | OUTPATIENT
Start: 2023-12-26 | End: 2023-12-26

## 2023-12-26 RX ORDER — BUDESONIDE 0.5 MG/2ML
0.5 INHALANT ORAL
Status: DISCONTINUED | OUTPATIENT
Start: 2023-12-26 | End: 2023-12-29 | Stop reason: HOSPADM

## 2023-12-26 RX ORDER — ACETAMINOPHEN 325 MG/1
650 TABLET ORAL EVERY 4 HOURS PRN
Status: DISCONTINUED | OUTPATIENT
Start: 2023-12-26 | End: 2023-12-29 | Stop reason: HOSPADM

## 2023-12-26 RX ORDER — SODIUM CHLORIDE 0.9 % (FLUSH) 0.9 %
10 SYRINGE (ML) INJECTION EVERY 12 HOURS SCHEDULED
Status: DISCONTINUED | OUTPATIENT
Start: 2023-12-26 | End: 2023-12-29 | Stop reason: HOSPADM

## 2023-12-26 RX ORDER — FUROSEMIDE 10 MG/ML
40 INJECTION INTRAMUSCULAR; INTRAVENOUS ONCE
Status: COMPLETED | OUTPATIENT
Start: 2023-12-26 | End: 2023-12-26

## 2023-12-26 RX ADMIN — ARFORMOTEROL TARTRATE 15 MCG: 15 SOLUTION RESPIRATORY (INHALATION) at 20:21

## 2023-12-26 RX ADMIN — Medication 10 ML: at 21:59

## 2023-12-26 RX ADMIN — IPRATROPIUM BROMIDE AND ALBUTEROL SULFATE 3 ML: .5; 3 SOLUTION RESPIRATORY (INHALATION) at 23:51

## 2023-12-26 RX ADMIN — Medication 2000 MG: at 15:53

## 2023-12-26 RX ADMIN — IPRATROPIUM BROMIDE AND ALBUTEROL SULFATE 3 ML: .5; 3 SOLUTION RESPIRATORY (INHALATION) at 14:20

## 2023-12-26 RX ADMIN — BUDESONIDE 0.5 MG: 0.5 SUSPENSION RESPIRATORY (INHALATION) at 20:21

## 2023-12-26 RX ADMIN — ENOXAPARIN SODIUM 40 MG: 100 INJECTION SUBCUTANEOUS at 21:27

## 2023-12-26 RX ADMIN — DEXAMETHASONE SODIUM PHOSPHATE 10 MG: 10 INJECTION INTRAMUSCULAR; INTRAVENOUS at 14:00

## 2023-12-26 RX ADMIN — FUROSEMIDE 40 MG: 10 INJECTION, SOLUTION INTRAMUSCULAR; INTRAVENOUS at 14:43

## 2023-12-26 RX ADMIN — IPRATROPIUM BROMIDE AND ALBUTEROL SULFATE 3 ML: .5; 3 SOLUTION RESPIRATORY (INHALATION) at 20:21

## 2023-12-26 NOTE — PAYOR COMM NOTE
"Sumeet Owens \"Olman\" (63 y.o. Male)       Date of Birth   1960    Social Security Number       Address   [address] [place] [address]    Home Phone   [phone]    MRN   [id number]       Episcopal   [employer]    Marital Status                               Admission Date   23    Admission Type   Emergency    Admitting Provider       Attending Provider   Tyron Copeland MD    Department, Room/Bed   [facility] EMERGENCY ROOM,        Discharge Date       Discharge Disposition       Discharge Destination                                 Attending Provider: Tyron Copeland MD    Allergies: No Known Allergies    Isolation: None   Infection: COVID (confirmed) (23)   Code Status: CPR    Ht: 170.2 cm (67\")   Wt: 55.7 kg (122 lb 12.7 oz)    Admission Cmt: None   Principal Problem: Hypoxia [R09.02]                   Active Insurance as of 2023       Primary Coverage       Payor Plan Insurance Group Employer/Plan Group    ANTHEM MEDICAID ANTHEM MEDICAID [id number]       Payor Plan Address Payor Plan Phone Number Payor Plan Fax Number Effective Dates    [address] [address] [phone]  2022 - None Entered    [facility] [id number]         Subscriber Name Subscriber Birth Date Member ID       SUMEET OWENS 1960 [id number]                     Emergency Contacts        (Rel.) Home Phone Work Phone Mobile Phone    JOHN OWENS (Spouse) -- -- [phone]                 History & Physical        Juan David Hawkins MD at 23 [address] [place] [address] HISTORY AND PHYSICAL  Date: 2023   Patient Name: Sumeet Owens  : 1960  MRN: [id number]  Primary Care Physician:  Mirtha Alexander APRN  Date of admission: 2023    Subjective  Subjective     Chief Complaint: COVID-19 on home test and shortness of breath    HPI:    Sumeet Owens is a 63 y.o. male past medical history of COPD on home oxygen, " diastolic heart failure, pulmonary hypertension, continued tobacco abuse, and coronary disease who presents with shortness of breath since Friday    Patient was hospitalized 1 December for heart failure and COPD.  He was seen in clinic 12/14 with no issues at that time.  He normally wears 3 L of oxygen at home.  He states he had shortness of breath and started desaturating down into the mid 80s so he increased his oxygen up to 5 L.  He did a home test was positive for COVID.  As result he came the ER for further evaluation.    The emergency department the patient's vital signs are as follows: Temperature is 90.5, pulse 93, respirate is 20, blood pressure 108/71, satting 95% on 6 L.  He has been desaturating into the mid 80s at home with his oxygen has been increased over the last couple days.  CBC shows no abnormalities.  CMP shows no significant abnormalities.  BNP is only 436 and troponin is 16.  Chest x-ray shows no acute abnormalities.  Patient was given 1 DuoNeb and 10 of dexamethasone.  Patient was in the hospital for acute COPD exacerbation due to COVID-19.    All systems reviewed abnormal as noted above    Personal History     Past Medical History:  COPD on home oxygen  Heart failure preserved ejection fraction EF of 55%  Pulmonary hypertension with RVSP of 45 to 55 mm per mercury and dilated right ventricle and atrium  GERD  Coronary artery disease  Dyslipidemia  Hypertension      Past Surgical History:  Back surgery on bronchoscopy  Cardiac catheterization  Shoulder surgery    Family History:   COPD and stroke    Social History:   Every day smoker.  No alcohol.    Home Medications:  NON FORMULARY, albuterol sulfate HFA, arformoterol, budesonide, fluticasone, furosemide, ipratropium-albuterol, pantoprazole, and revefenacin    Allergies:  No Known Allergies        Objective  Objective     Vitals:   Temp:  [98.5 °F (36.9 °C)] 98.5 °F (36.9 °C)  Heart Rate:  [] 93  Resp:  [20] 20  BP: (108)/(71)  108/71  Flow (L/min):  [6] 6    Physical Exam    Constitutional:    Eyes: Pupils equal, sclerae anicteric, no conjunctival injection   HENT: NCAT, mucous membranes moist   Neck: Supple, no thyromegaly, no lymphadenopathy, trachea midline   Respiratory: Wheezing throughout.  Mild respiratory distress   Cardiovascular: RRR, no murmurs, rubs, or gallops, palpable pedal pulses bilaterally   Gastrointestinal: Positive bowel sounds, soft, nontender, nondistended   Musculoskeletal: No bilateral ankle edema, no clubbing or cyanosis to extremities   Psychiatric: Appropriate affect, cooperative   Neurologic: Oriented x 3, strength symmetric in all extremities, Cranial Nerves grossly intact to confrontation, speech clear   Skin: No rashes     Result Review   Result Review:  I have personally reviewed the results from the time of this admission to 12/26/2023 13:27 EST and agree with these findings:  Labs and imaging reviewed    Assessment & Plan  Assessment / Plan     Assessment/Plan:   COVID-positive on home test  Acute on chronic hypoxic respiratory failure  Acute COPD exacerbation from COVID-19  Diastolic heart failure with normal EF  Pulmonary hypertension    Plan:  --Admit to hospital service  -- Continue oxygen for saturations less than 90%  -- BiPAP at night per home use  --Cefepime due to risk of Pseudomonas due to poor lung function with low FEV1  -- Continue dexamethasone for COPD exacerbation/COVID-19  -- Brovana/Pulmicort/DuoNebs  -- RT consult placed  -- Will get ABG  -- D-dimer/INR/ferritin  -- Strep and Legionella urine antigen  -- Will dose with 40 of IV Lasix tines one      DVT prophylaxis:  Lovenox    CODE STATUS:     Full code    Admission Status:  I believe this patient meets admission status.    Electronically signed by Juan David Hawkins MD, 12/26/23, 1:27 PM EST.             Electronically signed by Juan David Hawkins MD at 12/26/23 8045          Emergency Department Notes        Tyron Copeland MD at 12/26/23 0802           Time: 12:59 PM EST  Date of encounter:  2023  Independent Historian/Clinical History and Information was obtained by:   Patient and Family    History is limited by: N/A    Chief Complaint: COVID-19, dyspnea      History of Present Illness:  Patient is a 63 y.o. year old male who presents to the emergency department for evaluation of COVID-19 and dyspnea.  Patient states he was positive on a home test today.  Patient has been symptomatic for the past 4 to 5 days.  Afebrile but complains of chills, generalized weakness and bodyaches.  Increasing congestion.  Patient and family states that his primary concern is that he was quite hypoxic at home with O2 saturations in the 70s despite his normal oxygen levels.    HPI    Patient Care Team  Primary Care Provider: Mirtha Alexander APRN    Past Medical History:     No Known Allergies  Past Medical History:   Diagnosis Date    COPD (chronic obstructive pulmonary disease)     COPD (chronic obstructive pulmonary disease)     Coronary artery disease     Diastolic heart failure     Hyperlipidemia     Hypertension     Pulmonary hypertension      Past Surgical History:   Procedure Laterality Date    BACK SURGERY      BRONCHOSCOPY N/A 2022    Procedure: BRONCHOSCOPY WITH BRONCHOALVEOLAR LAVAGE AND BRONCHIAL WASHINGS;  Surgeon: Ulices Moya MD;  Location: [facility] ENDOSCOPY;  Service: Pulmonary;  Laterality: N/A;  MUCUS PLUGGING, COPD EXACERBATION    BRONCHOSCOPY      CARDIAC CATHETERIZATION N/A 2022    Procedure: Right Heart Cath;  Surgeon: Drew Rodriguez MD;  Location: [facility] CATH INVASIVE LOCATION;  Service: Cardiovascular;  Laterality: N/A;    OTHER SURGICAL HISTORY      knee     OTHER SURGICAL HISTORY      shoulder, rotator cuff    SHOULDER SURGERY Right 10/03/2022     Family History   Problem Relation Age of Onset    Asthma Mother     Emphysema Mother             Stroke Mother        Home Medications:  Prior to Admission medications     Medication Sig Start Date End Date Taking? Authorizing Provider   albuterol sulfate  (90 Base) MCG/ACT inhaler Inhale 2 puffs Every 4 (Four) Hours As Needed for Wheezing. 23   Arleen Gillespie APRN   arformoterol (Brovana) 15 MCG/2ML nebulizer solution Take 2 mL by nebulization 2 (Two) Times a Day. 23   Ulices Moya MD   budesonide (Pulmicort) 0.5 MG/2ML nebulizer solution Take 2 mL by nebulization Daily. 23   Ulices Moya MD   fluticasone (Flovent HFA) 220 MCG/ACT inhaler INHALE 2 PUFFS BY MOUTH 2 TIMES A DAY 23   Adamaris Ray APRN   furosemide (LASIX) 20 MG tablet Take 2 tablets by mouth Daily. 23   Arleen Gillespie APRN   ipratropium-albuterol (DUO-NEB) 0.5-2.5 mg/3 ml nebulizer INHALE 3 ML BY NEBULIZATION 4 TIMES A DAY 23   Adamaris Ray APRN   NON FORMULARY V PAP    ProviderTawnya MD   pantoprazole (PROTONIX) 40 MG EC tablet Take 1 tablet by mouth Every Morning. 23   Arleen Gillespie APRN   revefenacin (Yupelri) 175 MCG/3ML nebulizer solution Take 3 mL by nebulization Daily. 23   Arleen Gillespie APRN   revefenacin (Yupelri) 175 MCG/3ML nebulizer solution Take 3 mL by nebulization Daily. 23   Arleen Gillespie APRN        Social History:   Social History     Tobacco Use    Smoking status: Every Day     Packs/day: 0.25     Years: 47.00     Additional pack years: 0.00     Total pack years: 11.75     Types: Cigarettes     Start date: 1975     Last attempt to quit: 2023     Years since quittin.0     Passive exposure: Current    Smokeless tobacco: Never    Tobacco comments:     Pt stated he is smoking 3 cigarettes per day     23 hasn't smoked for a couple days   Vaping Use    Vaping Use: Never used   Substance Use Topics    Alcohol use: Not Currently    Drug use: Never         Review of Systems:  Review of Systems   Constitutional:  Positive for chills. Negative for fever.   HENT:  Positive for congestion. Negative for  "rhinorrhea and sore throat.    Eyes:  Negative for photophobia.   Respiratory:  Positive for cough and shortness of breath. Negative for apnea and chest tightness.    Cardiovascular:  Negative for chest pain and palpitations.   Gastrointestinal:  Negative for abdominal pain, diarrhea, nausea and vomiting.   Endocrine: Negative.    Genitourinary:  Negative for difficulty urinating and dysuria.   Musculoskeletal:  Negative for back pain, joint swelling and myalgias.   Skin:  Negative for color change and wound.   Allergic/Immunologic: Negative.    Neurological:  Negative for seizures and headaches.   Psychiatric/Behavioral: Negative.     All other systems reviewed and are negative.       Physical Exam:  /71 (BP Location: Left arm, Patient Position: Sitting)   Pulse 93   Temp 98.5 °F (36.9 °C) (Oral)   Resp 20   Ht 170.2 cm (67\")   SpO2 95%   BMI 19.23 kg/m²     Physical Exam  Vitals and nursing note reviewed.   Constitutional:       General: He is awake.      Appearance: Normal appearance. He is well-developed.   HENT:      Head: Normocephalic and atraumatic.      Nose: Nose normal.      Mouth/Throat:      Mouth: Mucous membranes are moist.   Eyes:      Extraocular Movements: Extraocular movements intact.      Pupils: Pupils are equal, round, and reactive to light.   Cardiovascular:      Rate and Rhythm: Normal rate and regular rhythm.      Heart sounds: Normal heart sounds.   Pulmonary:      Effort: Tachypnea, accessory muscle usage and respiratory distress (Mild) present.      Breath sounds: Examination of the right-upper field reveals wheezing. Examination of the left-upper field reveals wheezing. Wheezing present. No rhonchi or rales.   Abdominal:      General: Bowel sounds are normal.      Palpations: Abdomen is soft.      Tenderness: There is no abdominal tenderness. There is no guarding or rebound.      Comments: No rigidity   Musculoskeletal:         General: No tenderness. Normal range of motion. "      Cervical back: Normal range of motion and neck supple.   Skin:     General: Skin is warm and dry.      Coloration: Skin is not jaundiced.   Neurological:      General: No focal deficit present.      Mental Status: He is alert. Mental status is at baseline.   Psychiatric:         Mood and Affect: Mood normal.                  Procedures:  Procedures      Medical Decision Making:      Comorbidities that affect care:    COPD, hypertension, pulmonary hypertension, diastolic heart failure, CAD    External Notes reviewed:    Previous Clinic Note: 12/18/2023 office visit with oncology, Dr. Pinzon.  Reason for visit: Thrombocytopenia      The following orders were placed and all results were independently analyzed by me:  Orders Placed This Encounter   Procedures    COVID-19, FLU A/B, RSV PCR 1 HR TAT - Swab, Nasopharynx    XR Chest 1 View    [place] Draw    Comprehensive Metabolic Panel    BNP    Single High Sensitivity Troponin T    CBC Auto Differential    NPO Diet NPO Type: Strict NPO    Undress & Gown    Continuous Pulse Oximetry    Vital Signs    Hospitalist (on-call MD unless specified)    Oxygen Therapy- Nasal Cannula; Titrate 1-6 LPM Per SpO2; 90 - 95%    ECG 12 Lead ED Triage Standing Order; SOA    Insert Peripheral IV    CBC & Differential    Green Top (Gel)    Lavender Top    Gold Top - SST    Light Blue Top       Medications Given in the Emergency Department:  Medications   sodium chloride 0.9 % flush 10 mL (has no administration in time range)   ipratropium-albuterol (DUO-NEB) nebulizer solution 3 mL (has no administration in time range)   dexAMETHasone sodium phosphate injection 10 mg (has no administration in time range)        ED Course:    ED Course as of 12/26/23 1329   Tue Dec 26, 2023   1259 I have personally interpreted the EKG today and it shows no evidence of any acute ischemia or heart arrhythmia. [RP]      ED Course User Index  [RP] Tyron Copeland MD       Labs:    Lab Results (last 24  hours)       Procedure Component Value Units Date/Time    CBC & Differential [[id number]]  (Abnormal) Collected: 12/26/23 1227    Specimen: Blood from Arm, Left Updated: 12/26/23 1235    Narrative:      The following orders were created for panel order CBC & Differential.  Procedure                               Abnormality         Status                     ---------                               -----------         ------                     CBC Auto Differential[[id number]]        Abnormal            Final result                 Please view results for these tests on the individual orders.    Comprehensive Metabolic Panel [[id number]]  (Abnormal) Collected: 12/26/23 1227    Specimen: Blood from Arm, Left Updated: 12/26/23 1254     Glucose 86 mg/dL      BUN 12 mg/dL      Creatinine 0.57 mg/dL      Sodium 136 mmol/L      Potassium 4.0 mmol/L      Comment: Slight hemolysis detected by analyzer. Result may be falsely elevated.        Chloride 94 mmol/L      CO2 33.8 mmol/L      Calcium 8.8 mg/dL      Total Protein 6.0 g/dL      Albumin 3.6 g/dL      ALT (SGPT) 18 U/L      AST (SGOT) 35 U/L      Comment: Slight hemolysis detected by analyzer. Result may be falsely elevated.        Alkaline Phosphatase 132 U/L      Total Bilirubin 0.5 mg/dL      Globulin 2.4 gm/dL      A/G Ratio 1.5 g/dL      BUN/Creatinine Ratio 21.1     Anion Gap 8.2 mmol/L      eGFR 110.8 mL/min/1.73     Narrative:      GFR Normal >60  Chronic Kidney Disease <60  Kidney Failure <15      BNP [[id number]]  (Normal) Collected: 12/26/23 1227    Specimen: Blood from Arm, Left Updated: 12/26/23 1252     proBNP 436.3 pg/mL     Narrative:      This assay is used as an aid in the diagnosis of individuals suspected of having heart failure. It can be used as an aid in the diagnosis of acute decompensated heart failure (ADHF) in patients presenting with signs and symptoms of ADHF to the emergency department (ED). In addition, NT-proBNP of <300 pg/mL indicates ADHF  is not likely.    Age Range Result Interpretation  NT-proBNP Concentration (pg/mL:      <50             Positive            >450                   Gray                 300-450                    Negative             <300    50-75           Positive            >900                  Gray                300-900                  Negative            <300      >75             Positive            >1800                  Gray                300-1800                  Negative            <300    Single High Sensitivity Troponin T [[id number]]  (Normal) Collected: 12/26/23 1227    Specimen: Blood from Arm, Left Updated: 12/26/23 1253     HS Troponin T 16 ng/L     Narrative:      High Sensitive Troponin T Reference Range:  <14.0 ng/L- Negative Female for AMI  <22.0 ng/L- Negative Male for AMI  >=14 - Abnormal Female indicating possible myocardial injury.  >=22 - Abnormal Male indicating possible myocardial injury.   Clinicians would have to utilize clinical acumen, EKG, Troponin, and serial changes to determine if it is an Acute Myocardial Infarction or myocardial injury due to an underlying chronic condition.         CBC Auto Differential [[id number]]  (Abnormal) Collected: 12/26/23 1227    Specimen: Blood from Arm, Left Updated: 12/26/23 1235     WBC 4.30 10*3/mm3      RBC 4.81 10*6/mm3      Hemoglobin 13.5 g/dL      Hematocrit 42.6 %      MCV 88.6 fL      MCH 28.1 pg      MCHC 31.7 g/dL      RDW 14.4 %      RDW-SD 46.9 fl      MPV 10.4 fL      Platelets 151 10*3/mm3      Neutrophil % 62.0 %      Lymphocyte % 17.7 %      Monocyte % 19.1 %      Eosinophil % 0.5 %      Basophil % 0.5 %      Immature Grans % 0.2 %      Neutrophils, Absolute 2.67 10*3/mm3      Lymphocytes, Absolute 0.76 10*3/mm3      Monocytes, Absolute 0.82 10*3/mm3      Eosinophils, Absolute 0.02 10*3/mm3      Basophils, Absolute 0.02 10*3/mm3      Immature Grans, Absolute 0.01 10*3/mm3      nRBC 0.0 /100 WBC              Imaging:    XR Chest 1 View    Result  Date: 12/26/2023  PROCEDURE: XR CHEST 1 VW  COMPARISON: [facility], CR, XR CHEST 1 VW, 12/04/2023, 12:19.  INDICATIONS: SOA Triage Protocol  FINDINGS:  The cardiomediastinal silhouette is within normal limits.  Pulmonary vascularity appears normal.  There are chronic findings of COPD/emphysema.  Stable appearance of the lungs with chronic blunting of the costophrenic angles.  There is no new consolidation or pleural effusion.  There is no evidence of pneumothorax.  There are degenerative changes of the thoracic spine.        1. No acute cardiopulmonary abnormality.  Chronic findings of COPD/emphysema.  No change from prior exam.        HELGA LOPEZ MD       Electronically Signed and Approved By: HELGA LOPEZ MD on 12/26/2023 at 12:45                Differential Diagnosis and Discussion:    Dyspnea: Differential diagnosis includes but is not limited to metabolic acidosis, neurological disorders, psychogenic, asthma, pneumothorax, upper airway obstruction, COPD, pneumonia, noncardiogenic pulmonary edema, interstitial lung disease, anemia, congestive heart failure, and pulmonary embolism    All labs were reviewed and interpreted by me.  All X-rays impressions were independently interpreted by me.  EKG was interpreted by me.    [facility]               Patient Care Considerations:    CT CHEST: I considered ordering a CT scan of the chest, however patient denies chest pain.  COPD/CHF and acute COVID-19 explain his symptoms and physical exam without emergent need for further evaluation.      Consultants/Shared Management Plan:    Hospitalist: I have discussed the case with Dr. Hawkins who agrees to accept the patient for admission.    Social Determinants of Health:    Patient is independent, reliable, and has access to care.       Disposition and Care Coordination:    Admit:   Through independent evaluation of the patient's history, physical, and imperical data, the patient meets criteria for  observation/admission to the hospital.        Final diagnoses:   COVID-19   Chronic obstructive pulmonary disease, unspecified COPD type        ED Disposition       ED Disposition   Decision to Admit    Condition   --    Comment   --               This medical record created using voice recognition software.             Tyron Copeland MD  12/26/23 1329      Electronically signed by Tyron Copeland MD at 12/26/23 1329       Vital Signs (last day)       Date/Time Temp Temp src Pulse Resp BP Patient Position SpO2    12/26/23 1257 -- -- 93 -- -- -- 95    12/26/23 1147 98.5 (36.9) Oral 101 20 108/71 Sitting 95          Oxygen Therapy (last day)       Date/Time SpO2 Device (Oxygen Therapy) Flow (L/min) Oxygen Concentration (%) ETCO2 (mmHg)    12/26/23 1257 95 -- -- -- --    12/26/23 1147 95 nasal cannula 6 -- --          Orders (active)        Start     Ordered    12/27/23 0800  dexAMETHasone (DECADRON) tablet 6 mg  Daily With Breakfast         12/26/23 1517    12/27/23 0600  CBC Auto Differential  Morning Draw         12/26/23 1517    12/27/23 0600  Comprehensive Metabolic Panel  Morning Draw         12/26/23 1517    12/27/23 0600  Magnesium  Morning Draw         12/26/23 1517    12/27/23 0600  Procalcitonin  Morning Draw         12/26/23 1517    12/27/23 0000  cefepime (MAXIPIME) 2000 mg/100 mL 0.9% NS (mbp)  Every 8 Hours         12/26/23 1522    12/26/23 2130  arformoterol (BROVANA) nebulizer solution 15 mcg  2 Times Daily - RT         12/26/23 1517    12/26/23 2130  budesonide (PULMICORT) nebulizer solution 0.5 mg  2 Times Daily - RT         12/26/23 1517    12/26/23 2100  sodium chloride 0.9 % flush 10 mL  Every 12 Hours Scheduled         12/26/23 1517    12/26/23 2100  Enoxaparin Sodium (LOVENOX) syringe 40 mg  Nightly         12/26/23 1517    12/26/23 1800  Oral Care  2 Times Daily       12/26/23 1517    12/26/23 1600  Vital Signs  Every 4 Hours       12/26/23 1517    12/26/23 1600  Strict Intake & Output   Every Hour       12/26/23 1517    12/26/23 1545  ipratropium-albuterol (DUO-NEB) nebulizer solution 3 mL  Every 4 Hours - RT         12/26/23 1517    12/26/23 1545  cefepime (MAXIPIME) 2000 mg/100 mL 0.9% NS (mbp)  Once         12/26/23 1522    12/26/23 1518  Intake & Output  Every Shift       12/26/23 1517    12/26/23 1518  Weigh Patient  Once         12/26/23 1517    12/26/23 1518  Insert Peripheral IV  Once         12/26/23 1517    12/26/23 1518  Saline Lock & Maintain IV Access  Continuous         12/26/23 1517    12/26/23 1518  Daily Weights  Daily       12/26/23 1517    12/26/23 1518  Diet: Cardiac Diets; Healthy Heart (2-3 Na+); Texture: Regular Texture (IDDSI 7); Fluid Consistency: Thin (IDDSI 0)  Diet Effective Now         12/26/23 1517    12/26/23 1518  S. Pneumo Ag Urine or CSF - Urine, Urine, Clean Catch  Once         12/26/23 1517    12/26/23 1518  Legionella Antigen, Urine - Urine, Urine, Clean Catch  Once         12/26/23 1517    12/26/23 1518  RT to Initiate Bronchopulmonary Hygiene Protocol  Once         12/26/23 1517    12/26/23 1518  Discontinue Cardiac Monitoring  Once         12/26/23 1517    12/26/23 1517  acetaminophen (TYLENOL) tablet 650 mg  Every 4 Hours PRN         12/26/23 1517    12/26/23 1517  Pharmacy to Dose Cefepime  Continuous PRN         12/26/23 1517    12/26/23 1517  sodium chloride 0.9 % flush 10 mL  As Needed         12/26/23 1517    12/26/23 1517  sodium chloride 0.9 % infusion 40 mL  As Needed         12/26/23 1517    12/26/23 1349  Inpatient Admission  Once         12/26/23 1350    12/26/23 1349  Code Status and Medical Interventions:  Continuous         12/26/23 1350    12/26/23 1311  Hospitalist (on-call MD unless specified)  Once        Specialty:  Hospitalist  Provider:  Juan David Hawkins MD    12/26/23 1310    12/26/23 1149  Insert Peripheral IV  Once         12/26/23 1149    12/26/23 1148  sodium chloride 0.9 % flush 10 mL  As Needed         12/26/23 1149    Unscheduled   Oxygen Therapy- Nasal Cannula; Titrate 1-6 LPM Per SpO2; 90 - 95%  Continuous PRN       12/26/23 3339    Unscheduled  Patient May Use Home CPAP / BIPAP For Sleep or As Needed  As Needed       12/26/23 1111

## 2023-12-26 NOTE — ED PROVIDER NOTES
Time: 12:59 PM EST  Date of encounter:  2023  Independent Historian/Clinical History and Information was obtained by:   Patient and Family    History is limited by: N/A    Chief Complaint: COVID-19, dyspnea      History of Present Illness:  Patient is a 63 y.o. year old male who presents to the emergency department for evaluation of COVID-19 and dyspnea.  Patient states he was positive on a home test today.  Patient has been symptomatic for the past 4 to 5 days.  Afebrile but complains of chills, generalized weakness and bodyaches.  Increasing congestion.  Patient and family states that his primary concern is that he was quite hypoxic at home with O2 saturations in the 70s despite his normal oxygen levels.    HPI    Patient Care Team  Primary Care Provider: Mirtha Alexander APRN    Past Medical History:     No Known Allergies  Past Medical History:   Diagnosis Date    COPD (chronic obstructive pulmonary disease)     COPD (chronic obstructive pulmonary disease)     Coronary artery disease     Diastolic heart failure     Hyperlipidemia     Hypertension     Pulmonary hypertension      Past Surgical History:   Procedure Laterality Date    BACK SURGERY      BRONCHOSCOPY N/A 2022    Procedure: BRONCHOSCOPY WITH BRONCHOALVEOLAR LAVAGE AND BRONCHIAL WASHINGS;  Surgeon: Ulices Moya MD;  Location: [facility] ENDOSCOPY;  Service: Pulmonary;  Laterality: N/A;  MUCUS PLUGGING, COPD EXACERBATION    BRONCHOSCOPY      CARDIAC CATHETERIZATION N/A 2022    Procedure: Right Heart Cath;  Surgeon: Drew Rodriguez MD;  Location: [facility] CATH INVASIVE LOCATION;  Service: Cardiovascular;  Laterality: N/A;    OTHER SURGICAL HISTORY      knee     OTHER SURGICAL HISTORY      shoulder, rotator cuff    SHOULDER SURGERY Right 10/03/2022     Family History   Problem Relation Age of Onset    Asthma Mother     Emphysema Mother             Stroke Mother        Home Medications:  Prior to Admission medications     Medication Sig Start Date End Date Taking? Authorizing Provider   albuterol sulfate  (90 Base) MCG/ACT inhaler Inhale 2 puffs Every 4 (Four) Hours As Needed for Wheezing. 23   Arleen Gillespie APRN   arformoterol (Brovana) 15 MCG/2ML nebulizer solution Take 2 mL by nebulization 2 (Two) Times a Day. 23   Ulices Moya MD   budesonide (Pulmicort) 0.5 MG/2ML nebulizer solution Take 2 mL by nebulization Daily. 23   Ulices Moya MD   fluticasone (Flovent HFA) 220 MCG/ACT inhaler INHALE 2 PUFFS BY MOUTH 2 TIMES A DAY 23   Adamaris Ray APRN   furosemide (LASIX) 20 MG tablet Take 2 tablets by mouth Daily. 23   Arleen Gillespie APRN   ipratropium-albuterol (DUO-NEB) 0.5-2.5 mg/3 ml nebulizer INHALE 3 ML BY NEBULIZATION 4 TIMES A DAY 23   Adamaris Ray APRN   NON FORMULARY V PAP    ProviderTawnya MD   pantoprazole (PROTONIX) 40 MG EC tablet Take 1 tablet by mouth Every Morning. 23   Arleen Gillespie APRN   revefenacin (Yupelri) 175 MCG/3ML nebulizer solution Take 3 mL by nebulization Daily. 23   Arleen Gillespie APRN   revefenacin (Yupelri) 175 MCG/3ML nebulizer solution Take 3 mL by nebulization Daily. 23   Arleen Gillespie APRN        Social History:   Social History     Tobacco Use    Smoking status: Every Day     Packs/day: 0.25     Years: 47.00     Additional pack years: 0.00     Total pack years: 11.75     Types: Cigarettes     Start date: 1975     Last attempt to quit: 2023     Years since quittin.0     Passive exposure: Current    Smokeless tobacco: Never    Tobacco comments:     Pt stated he is smoking 3 cigarettes per day     23 hasn't smoked for a couple days   Vaping Use    Vaping Use: Never used   Substance Use Topics    Alcohol use: Not Currently    Drug use: Never         Review of Systems:  Review of Systems   Constitutional:  Positive for chills. Negative for fever.   HENT:  Positive for congestion. Negative for  "rhinorrhea and sore throat.    Eyes:  Negative for photophobia.   Respiratory:  Positive for cough and shortness of breath. Negative for apnea and chest tightness.    Cardiovascular:  Negative for chest pain and palpitations.   Gastrointestinal:  Negative for abdominal pain, diarrhea, nausea and vomiting.   Endocrine: Negative.    Genitourinary:  Negative for difficulty urinating and dysuria.   Musculoskeletal:  Negative for back pain, joint swelling and myalgias.   Skin:  Negative for color change and wound.   Allergic/Immunologic: Negative.    Neurological:  Negative for seizures and headaches.   Psychiatric/Behavioral: Negative.     All other systems reviewed and are negative.       Physical Exam:  /71 (BP Location: Left arm, Patient Position: Sitting)   Pulse 93   Temp 98.5 °F (36.9 °C) (Oral)   Resp 20   Ht 170.2 cm (67\")   SpO2 95%   BMI 19.23 kg/m²     Physical Exam  Vitals and nursing note reviewed.   Constitutional:       General: He is awake.      Appearance: Normal appearance. He is well-developed.   HENT:      Head: Normocephalic and atraumatic.      Nose: Nose normal.      Mouth/Throat:      Mouth: Mucous membranes are moist.   Eyes:      Extraocular Movements: Extraocular movements intact.      Pupils: Pupils are equal, round, and reactive to light.   Cardiovascular:      Rate and Rhythm: Normal rate and regular rhythm.      Heart sounds: Normal heart sounds.   Pulmonary:      Effort: Tachypnea, accessory muscle usage and respiratory distress (Mild) present.      Breath sounds: Examination of the right-upper field reveals wheezing. Examination of the left-upper field reveals wheezing. Wheezing present. No rhonchi or rales.   Abdominal:      General: Bowel sounds are normal.      Palpations: Abdomen is soft.      Tenderness: There is no abdominal tenderness. There is no guarding or rebound.      Comments: No rigidity   Musculoskeletal:         General: No tenderness. Normal range of motion. "      Cervical back: Normal range of motion and neck supple.   Skin:     General: Skin is warm and dry.      Coloration: Skin is not jaundiced.   Neurological:      General: No focal deficit present.      Mental Status: He is alert. Mental status is at baseline.   Psychiatric:         Mood and Affect: Mood normal.                  Procedures:  Procedures      Medical Decision Making:      Comorbidities that affect care:    COPD, hypertension, pulmonary hypertension, diastolic heart failure, CAD    External Notes reviewed:    Previous Clinic Note: 12/18/2023 office visit with oncology, Dr. Pinzon.  Reason for visit: Thrombocytopenia      The following orders were placed and all results were independently analyzed by me:  Orders Placed This Encounter   Procedures    COVID-19, FLU A/B, RSV PCR 1 HR TAT - Swab, Nasopharynx    XR Chest 1 View    [place] Draw    Comprehensive Metabolic Panel    BNP    Single High Sensitivity Troponin T    CBC Auto Differential    NPO Diet NPO Type: Strict NPO    Undress & Gown    Continuous Pulse Oximetry    Vital Signs    Hospitalist (on-call MD unless specified)    Oxygen Therapy- Nasal Cannula; Titrate 1-6 LPM Per SpO2; 90 - 95%    ECG 12 Lead ED Triage Standing Order; SOA    Insert Peripheral IV    CBC & Differential    Green Top (Gel)    Lavender Top    Gold Top - SST    Light Blue Top       Medications Given in the Emergency Department:  Medications   sodium chloride 0.9 % flush 10 mL (has no administration in time range)   ipratropium-albuterol (DUO-NEB) nebulizer solution 3 mL (has no administration in time range)   dexAMETHasone sodium phosphate injection 10 mg (has no administration in time range)        ED Course:    ED Course as of 12/26/23 1329   Tue Dec 26, 2023   1259 I have personally interpreted the EKG today and it shows no evidence of any acute ischemia or heart arrhythmia. [RP]      ED Course User Index  [RP] Tyron Copeland MD       Labs:    Lab Results (last 24  hours)       Procedure Component Value Units Date/Time    CBC & Differential [[id number]]  (Abnormal) Collected: 12/26/23 1227    Specimen: Blood from Arm, Left Updated: 12/26/23 1235    Narrative:      The following orders were created for panel order CBC & Differential.  Procedure                               Abnormality         Status                     ---------                               -----------         ------                     CBC Auto Differential[[id number]]        Abnormal            Final result                 Please view results for these tests on the individual orders.    Comprehensive Metabolic Panel [[id number]]  (Abnormal) Collected: 12/26/23 1227    Specimen: Blood from Arm, Left Updated: 12/26/23 1254     Glucose 86 mg/dL      BUN 12 mg/dL      Creatinine 0.57 mg/dL      Sodium 136 mmol/L      Potassium 4.0 mmol/L      Comment: Slight hemolysis detected by analyzer. Result may be falsely elevated.        Chloride 94 mmol/L      CO2 33.8 mmol/L      Calcium 8.8 mg/dL      Total Protein 6.0 g/dL      Albumin 3.6 g/dL      ALT (SGPT) 18 U/L      AST (SGOT) 35 U/L      Comment: Slight hemolysis detected by analyzer. Result may be falsely elevated.        Alkaline Phosphatase 132 U/L      Total Bilirubin 0.5 mg/dL      Globulin 2.4 gm/dL      A/G Ratio 1.5 g/dL      BUN/Creatinine Ratio 21.1     Anion Gap 8.2 mmol/L      eGFR 110.8 mL/min/1.73     Narrative:      GFR Normal >60  Chronic Kidney Disease <60  Kidney Failure <15      BNP [[id number]]  (Normal) Collected: 12/26/23 1227    Specimen: Blood from Arm, Left Updated: 12/26/23 1252     proBNP 436.3 pg/mL     Narrative:      This assay is used as an aid in the diagnosis of individuals suspected of having heart failure. It can be used as an aid in the diagnosis of acute decompensated heart failure (ADHF) in patients presenting with signs and symptoms of ADHF to the emergency department (ED). In addition, NT-proBNP of <300 pg/mL indicates ADHF  is not likely.    Age Range Result Interpretation  NT-proBNP Concentration (pg/mL:      <50             Positive            >450                   Gray                 300-450                    Negative             <300    50-75           Positive            >900                  Gray                300-900                  Negative            <300      >75             Positive            >1800                  Gray                300-1800                  Negative            <300    Single High Sensitivity Troponin T [[id number]]  (Normal) Collected: 12/26/23 1227    Specimen: Blood from Arm, Left Updated: 12/26/23 1253     HS Troponin T 16 ng/L     Narrative:      High Sensitive Troponin T Reference Range:  <14.0 ng/L- Negative Female for AMI  <22.0 ng/L- Negative Male for AMI  >=14 - Abnormal Female indicating possible myocardial injury.  >=22 - Abnormal Male indicating possible myocardial injury.   Clinicians would have to utilize clinical acumen, EKG, Troponin, and serial changes to determine if it is an Acute Myocardial Infarction or myocardial injury due to an underlying chronic condition.         CBC Auto Differential [[id number]]  (Abnormal) Collected: 12/26/23 1227    Specimen: Blood from Arm, Left Updated: 12/26/23 1235     WBC 4.30 10*3/mm3      RBC 4.81 10*6/mm3      Hemoglobin 13.5 g/dL      Hematocrit 42.6 %      MCV 88.6 fL      MCH 28.1 pg      MCHC 31.7 g/dL      RDW 14.4 %      RDW-SD 46.9 fl      MPV 10.4 fL      Platelets 151 10*3/mm3      Neutrophil % 62.0 %      Lymphocyte % 17.7 %      Monocyte % 19.1 %      Eosinophil % 0.5 %      Basophil % 0.5 %      Immature Grans % 0.2 %      Neutrophils, Absolute 2.67 10*3/mm3      Lymphocytes, Absolute 0.76 10*3/mm3      Monocytes, Absolute 0.82 10*3/mm3      Eosinophils, Absolute 0.02 10*3/mm3      Basophils, Absolute 0.02 10*3/mm3      Immature Grans, Absolute 0.01 10*3/mm3      nRBC 0.0 /100 WBC              Imaging:    XR Chest 1 View    Result  Date: 12/26/2023  PROCEDURE: XR CHEST 1 VW  COMPARISON: [facility], CR, XR CHEST 1 VW, 12/04/2023, 12:19.  INDICATIONS: SOA Triage Protocol  FINDINGS:  The cardiomediastinal silhouette is within normal limits.  Pulmonary vascularity appears normal.  There are chronic findings of COPD/emphysema.  Stable appearance of the lungs with chronic blunting of the costophrenic angles.  There is no new consolidation or pleural effusion.  There is no evidence of pneumothorax.  There are degenerative changes of the thoracic spine.        1. No acute cardiopulmonary abnormality.  Chronic findings of COPD/emphysema.  No change from prior exam.        HELGA LOPEZ MD       Electronically Signed and Approved By: HELGA LOPEZ MD on 12/26/2023 at 12:45                Differential Diagnosis and Discussion:    Dyspnea: Differential diagnosis includes but is not limited to metabolic acidosis, neurological disorders, psychogenic, asthma, pneumothorax, upper airway obstruction, COPD, pneumonia, noncardiogenic pulmonary edema, interstitial lung disease, anemia, congestive heart failure, and pulmonary embolism    All labs were reviewed and interpreted by me.  All X-rays impressions were independently interpreted by me.  EKG was interpreted by me.    [facility]               Patient Care Considerations:    CT CHEST: I considered ordering a CT scan of the chest, however patient denies chest pain.  COPD/CHF and acute COVID-19 explain his symptoms and physical exam without emergent need for further evaluation.      Consultants/Shared Management Plan:    Hospitalist: I have discussed the case with Dr. Hawkins who agrees to accept the patient for admission.    Social Determinants of Health:    Patient is independent, reliable, and has access to care.       Disposition and Care Coordination:    Admit:   Through independent evaluation of the patient's history, physical, and imperical data, the patient meets criteria for  observation/admission to the hospital.        Final diagnoses:   COVID-19   Chronic obstructive pulmonary disease, unspecified COPD type        ED Disposition       ED Disposition   Decision to Admit    Condition   --    Comment   --               This medical record created using voice recognition software.             Tyron Copeland MD  12/26/23 7005

## 2023-12-26 NOTE — H&P
[facility]  [facility]   Patient Name: Sumeet Gomes  : 1960  MRN: [id number]  Primary Care Physician:  Mirtha Alexander APRN  Date of admission: 2023    Subjective   Subjective     Chief Complaint: COVID-19 on home test and shortness of breath    HPI:    Sumeet Gomes is a 63 y.o. male past medical history of COPD on home oxygen, diastolic heart failure, pulmonary hypertension, continued tobacco abuse, and coronary disease who presents with shortness of breath since Friday    Patient was hospitalized  for heart failure and COPD.  He was seen in clinic  with no issues at that time.  He normally wears 3 L of oxygen at home.  He states he had shortness of breath and started desaturating down into the mid 80s so he increased his oxygen up to 5 L.  He did a home test was positive for COVID.  As result he came the ER for further evaluation.    The emergency department the patient's vital signs are as follows: Temperature is 90.5, pulse 93, respirate is 20, blood pressure 108/71, satting 95% on 6 L.  He has been desaturating into the mid 80s at home with his oxygen has been increased over the last couple days.  CBC shows no abnormalities.  CMP shows no significant abnormalities.  BNP is only 436 and troponin is 16.  Chest x-ray shows no acute abnormalities.  Patient was given 1 DuoNeb and 10 of dexamethasone.  Patient was in the hospital for acute COPD exacerbation due to COVID-19.    All systems reviewed abnormal as noted above    Personal History     Past Medical History:  COPD on home oxygen  Heart failure preserved ejection fraction EF of 55%  Pulmonary hypertension with RVSP of 45 to 55 mm per mercury and dilated right ventricle and atrium  GERD  Coronary artery disease  Dyslipidemia  Hypertension      Past Surgical History:  Back surgery on bronchoscopy  Cardiac catheterization  Shoulder surgery    Family History:   COPD and  stroke    Social History:   Every day smoker.  No alcohol.    Home Medications:  NON FORMULARY, albuterol sulfate HFA, arformoterol, budesonide, fluticasone, furosemide, ipratropium-albuterol, pantoprazole, and revefenacin    Allergies:  No Known Allergies        Objective   Objective     Vitals:   Temp:  [98.5 °F (36.9 °C)] 98.5 °F (36.9 °C)  Heart Rate:  [] 93  Resp:  [20] 20  BP: (108)/(71) 108/71  Flow (L/min):  [6] 6    Physical Exam    Constitutional:    Eyes: Pupils equal, sclerae anicteric, no conjunctival injection   HENT: NCAT, mucous membranes moist   Neck: Supple, no thyromegaly, no lymphadenopathy, trachea midline   Respiratory: Wheezing throughout.  Mild respiratory distress   Cardiovascular: RRR, no murmurs, rubs, or gallops, palpable pedal pulses bilaterally   Gastrointestinal: Positive bowel sounds, soft, nontender, nondistended   Musculoskeletal: No bilateral ankle edema, no clubbing or cyanosis to extremities   Psychiatric: Appropriate affect, cooperative   Neurologic: Oriented x 3, strength symmetric in all extremities, Cranial Nerves grossly intact to confrontation, speech clear   Skin: No rashes     Result Review    Result Review:  I have personally reviewed the results from the time of this admission to 12/26/2023 13:27 EST and agree with these findings:  Labs and imaging reviewed    Assessment & Plan   Assessment / Plan     Assessment/Plan:   COVID-positive on home test  Acute on chronic hypoxic respiratory failure  Acute COPD exacerbation from COVID-19  Diastolic heart failure with normal EF  Pulmonary hypertension    Plan:  --Admit to hospital service  -- Continue oxygen for saturations less than 90%  -- BiPAP at night per home use  --Cefepime due to risk of Pseudomonas due to poor lung function with low FEV1  -- Continue dexamethasone for COPD exacerbation/COVID-19  -- Brovana/Pulmicort/DuoNebs  -- RT consult placed  -- Will get ABG  -- D-dimer/INR/ferritin  -- Strep and Legionella  urine antigen  -- Will dose with 40 of IV Lasix tines one      DVT prophylaxis:  Lovenox    CODE STATUS:     Full code    Admission Status:  I believe this patient meets admission status.    Electronically signed by Juan David Hawkins MD, 12/26/23, 1:27 PM EST.

## 2023-12-27 LAB
ALBUMIN SERPL-MCNC: 3.3 G/DL (ref 3.5–5.2)
ALBUMIN/GLOB SERPL: 1.2 G/DL
ALP SERPL-CCNC: 139 U/L (ref 39–117)
ALT SERPL W P-5'-P-CCNC: 15 U/L (ref 1–41)
ANION GAP SERPL CALCULATED.3IONS-SCNC: 12.4 MMOL/L (ref 5–15)
AST SERPL-CCNC: 27 U/L (ref 1–40)
BASOPHILS # BLD AUTO: 0.01 10*3/MM3 (ref 0–0.2)
BASOPHILS NFR BLD AUTO: 0.3 % (ref 0–1.5)
BILIRUB SERPL-MCNC: 0.3 MG/DL (ref 0–1.2)
BUN SERPL-MCNC: 17 MG/DL (ref 8–23)
BUN/CREAT SERPL: 32.7 (ref 7–25)
CALCIUM SPEC-SCNC: 9.5 MG/DL (ref 8.6–10.5)
CHLORIDE SERPL-SCNC: 96 MMOL/L (ref 98–107)
CO2 SERPL-SCNC: 28.6 MMOL/L (ref 22–29)
CREAT SERPL-MCNC: 0.52 MG/DL (ref 0.76–1.27)
DEPRECATED RDW RBC AUTO: 45.1 FL (ref 37–54)
EGFRCR SERPLBLD CKD-EPI 2021: 114 ML/MIN/1.73
EOSINOPHIL # BLD AUTO: 0 10*3/MM3 (ref 0–0.4)
EOSINOPHIL NFR BLD AUTO: 0 % (ref 0.3–6.2)
ERYTHROCYTE [DISTWIDTH] IN BLOOD BY AUTOMATED COUNT: 14 % (ref 12.3–15.4)
GLOBULIN UR ELPH-MCNC: 2.8 GM/DL
GLUCOSE SERPL-MCNC: 112 MG/DL (ref 65–99)
HCT VFR BLD AUTO: 40.5 % (ref 37.5–51)
HGB BLD-MCNC: 12.8 G/DL (ref 13–17.7)
IMM GRANULOCYTES # BLD AUTO: 0.01 10*3/MM3 (ref 0–0.05)
IMM GRANULOCYTES NFR BLD AUTO: 0.3 % (ref 0–0.5)
LYMPHOCYTES # BLD AUTO: 0.44 10*3/MM3 (ref 0.7–3.1)
LYMPHOCYTES NFR BLD AUTO: 13.5 % (ref 19.6–45.3)
MAGNESIUM SERPL-MCNC: 1.7 MG/DL (ref 1.6–2.4)
MCH RBC QN AUTO: 27.7 PG (ref 26.6–33)
MCHC RBC AUTO-ENTMCNC: 31.6 G/DL (ref 31.5–35.7)
MCV RBC AUTO: 87.7 FL (ref 79–97)
MONOCYTES # BLD AUTO: 0.6 10*3/MM3 (ref 0.1–0.9)
MONOCYTES NFR BLD AUTO: 18.3 % (ref 5–12)
NEUTROPHILS NFR BLD AUTO: 2.21 10*3/MM3 (ref 1.7–7)
NEUTROPHILS NFR BLD AUTO: 67.6 % (ref 42.7–76)
NRBC BLD AUTO-RTO: 0 /100 WBC (ref 0–0.2)
PLATELET # BLD AUTO: 139 10*3/MM3 (ref 140–450)
PMV BLD AUTO: 11 FL (ref 6–12)
POTASSIUM SERPL-SCNC: 4.4 MMOL/L (ref 3.5–5.2)
PROCALCITONIN SERPL-MCNC: 0.06 NG/ML (ref 0–0.25)
PROT SERPL-MCNC: 6.1 G/DL (ref 6–8.5)
RBC # BLD AUTO: 4.62 10*6/MM3 (ref 4.14–5.8)
SODIUM SERPL-SCNC: 137 MMOL/L (ref 136–145)
WBC NRBC COR # BLD AUTO: 3.27 10*3/MM3 (ref 3.4–10.8)

## 2023-12-27 PROCEDURE — 94761 N-INVAS EAR/PLS OXIMETRY MLT: CPT

## 2023-12-27 PROCEDURE — 85025 COMPLETE CBC W/AUTO DIFF WBC: CPT | Performed by: INTERNAL MEDICINE

## 2023-12-27 PROCEDURE — 84145 PROCALCITONIN (PCT): CPT | Performed by: INTERNAL MEDICINE

## 2023-12-27 PROCEDURE — 25010000002 CEFEPIME PER 500 MG: Performed by: INTERNAL MEDICINE

## 2023-12-27 PROCEDURE — 63710000001 DEXAMETHASONE PER 0.25 MG: Performed by: INTERNAL MEDICINE

## 2023-12-27 PROCEDURE — 94664 DEMO&/EVAL PT USE INHALER: CPT

## 2023-12-27 PROCEDURE — 80053 COMPREHEN METABOLIC PANEL: CPT | Performed by: INTERNAL MEDICINE

## 2023-12-27 PROCEDURE — 94799 UNLISTED PULMONARY SVC/PX: CPT

## 2023-12-27 PROCEDURE — 99232 SBSQ HOSP IP/OBS MODERATE 35: CPT | Performed by: INTERNAL MEDICINE

## 2023-12-27 PROCEDURE — 25010000002 ENOXAPARIN PER 10 MG: Performed by: INTERNAL MEDICINE

## 2023-12-27 PROCEDURE — 36415 COLL VENOUS BLD VENIPUNCTURE: CPT | Performed by: INTERNAL MEDICINE

## 2023-12-27 PROCEDURE — 94660 CPAP INITIATION&MGMT: CPT

## 2023-12-27 PROCEDURE — 83735 ASSAY OF MAGNESIUM: CPT | Performed by: INTERNAL MEDICINE

## 2023-12-27 RX ORDER — PANTOPRAZOLE SODIUM 40 MG/1
40 TABLET, DELAYED RELEASE ORAL
Status: DISCONTINUED | OUTPATIENT
Start: 2023-12-27 | End: 2023-12-29 | Stop reason: HOSPADM

## 2023-12-27 RX ADMIN — BUDESONIDE 0.5 MG: 0.5 SUSPENSION RESPIRATORY (INHALATION) at 19:44

## 2023-12-27 RX ADMIN — IPRATROPIUM BROMIDE AND ALBUTEROL SULFATE 3 ML: .5; 3 SOLUTION RESPIRATORY (INHALATION) at 14:49

## 2023-12-27 RX ADMIN — ENOXAPARIN SODIUM 40 MG: 100 INJECTION SUBCUTANEOUS at 20:36

## 2023-12-27 RX ADMIN — BUDESONIDE 0.5 MG: 0.5 SUSPENSION RESPIRATORY (INHALATION) at 06:24

## 2023-12-27 RX ADMIN — CEFEPIME 2000 MG: 2 INJECTION, POWDER, FOR SOLUTION INTRAVENOUS at 09:04

## 2023-12-27 RX ADMIN — CEFEPIME 2000 MG: 2 INJECTION, POWDER, FOR SOLUTION INTRAVENOUS at 17:25

## 2023-12-27 RX ADMIN — DEXAMETHASONE 6 MG: 0.5 TABLET ORAL at 09:07

## 2023-12-27 RX ADMIN — Medication 10 ML: at 09:08

## 2023-12-27 RX ADMIN — ARFORMOTEROL TARTRATE 15 MCG: 15 SOLUTION RESPIRATORY (INHALATION) at 06:24

## 2023-12-27 RX ADMIN — IPRATROPIUM BROMIDE AND ALBUTEROL SULFATE 3 ML: .5; 3 SOLUTION RESPIRATORY (INHALATION) at 19:44

## 2023-12-27 RX ADMIN — IPRATROPIUM BROMIDE AND ALBUTEROL SULFATE 3 ML: .5; 3 SOLUTION RESPIRATORY (INHALATION) at 03:54

## 2023-12-27 RX ADMIN — ACETAMINOPHEN 650 MG: 325 TABLET ORAL at 19:31

## 2023-12-27 RX ADMIN — ARFORMOTEROL TARTRATE 15 MCG: 15 SOLUTION RESPIRATORY (INHALATION) at 19:44

## 2023-12-27 RX ADMIN — PANTOPRAZOLE SODIUM 40 MG: 40 TABLET, DELAYED RELEASE ORAL at 09:07

## 2023-12-27 RX ADMIN — IPRATROPIUM BROMIDE AND ALBUTEROL SULFATE 3 ML: .5; 3 SOLUTION RESPIRATORY (INHALATION) at 06:24

## 2023-12-27 RX ADMIN — CEFEPIME 2000 MG: 2 INJECTION, POWDER, FOR SOLUTION INTRAVENOUS at 00:47

## 2023-12-27 RX ADMIN — ACETAMINOPHEN 650 MG: 325 TABLET ORAL at 07:06

## 2023-12-27 RX ADMIN — IPRATROPIUM BROMIDE AND ALBUTEROL SULFATE 3 ML: .5; 3 SOLUTION RESPIRATORY (INHALATION) at 10:45

## 2023-12-27 NOTE — PLAN OF CARE
Goal Outcome Evaluation:  Plan of Care Reviewed With: patient        Progress: improving  Outcome Evaluation: pt axox4. On 3L nc, wearing bipap at night, pt educated on importance due to co2 level. no c/o of pain. pt tolerating abx well. no other concerns noted.

## 2023-12-27 NOTE — PROGRESS NOTES
Respiratory Therapist Broncho-Pulmonary Hygiene Progress Note      Patient Name:  Sumeet Gomes  YOB: 1960    Sumeet Gomes meets the qualification for Level 2 of the Bronco-Pulmonary Hygiene Protocol. This was based on my daily patient assessment and includes review of chest x-ray results, cough ability and quality, oxygenation, secretions or risk for secretion development and patient mobility.     Broncho-Pulmonary Hygiene Assessment:    Level of Movement: Actively changing positions without assistance  Alert/ oriented/ cooperative    Breath Sounds: Diminished and/or coarse rhonchi    Cough: Strong, effective    Chest X-Ray: Possible signs of consolidation and/or atelectasis or clear.     Sputum Productions: None or small amount of thin or watery secretions with effective cough    History and Physical: Home use of oxygen  and Chronic condition    SpO2 to Oxygen Need: greater than 92% on room air or  less than 3L nasal canula    Current SpO2 is: 94% on 3L    Based on this information, I have completed the following interventions: Aerobika with bronchodialtor medication or TID      Electronically signed by Krystina Meredith, LALI, 12/27/23, 6:32 AM EST.

## 2023-12-27 NOTE — PROGRESS NOTES
[facility]   Hospitalist Progress Note  Date: 2023  Patient Name: Sumeet Gomes  : 1960  MRN: [id number]  Date of admission: 2023  Room/Bed: 4009/1      Subjective   Subjective     Chief Complaint:   Shortness of breath, testing positive for COVID-19 at home    Summary:  Sumeet Gomes is a 63 y.o. male with medical history of COPD on home oxygen, diastolic heart failure, pulmonary hypertension, continued tobacco abuse, and coronary disease who presents with shortness of breath since Friday     Patient was hospitalized  for heart failure and COPD.  He was seen in clinic  with no issues at that time.  He normally wears 3 L of oxygen at home.  He states he had shortness of breath and started desaturating down into the mid 80s so he increased his oxygen up to 5 L.  He did a home test was positive for COVID.  As result he came the ER for further evaluation. The emergency department the patient's vital signs are as follows: Temperature is 90.5, pulse 93, respirate is 20, blood pressure 108/71, satting 95% on 6 L.  He has been desaturating into the mid 80s at home with his oxygen has been increased over the last couple days.  CBC shows no abnormalities.  CMP shows no significant abnormalities.  BNP is only 436 and troponin is 16.  Chest x-ray shows no acute abnormalities.  Patient was given 1 DuoNeb and 10 of dexamethasone.  Patient was in the hospital for acute COPD exacerbation due to COVID-19.    Interval Followup:   Still feeling short of breath.  White count now down to 3.27 with a platelet count of 139.  Will closely monitor.  Suspect this is secondary to COVID-19.      Objective   Objective     Vitals:   Temp:  [97.1 °F (36.2 °C)-97.9 °F (36.6 °C)] 97.5 °F (36.4 °C)  Heart Rate:  [] 100  Resp:  [16-20] 18  BP: ()/(58-78) 124/60  Flow (L/min):  [2-3] 3    Physical Exam               Constitutional: Sitting up in bed, increased work of breathing               Eyes: Pupils equal, sclerae anicteric, no conjunctival injection              HENT: NCAT, mucous membranes moist              Neck: Supple, no thyromegaly, no lymphadenopathy, trachea midline              Respiratory: Wheezing throughout.  Mild respiratory distress              Cardiovascular: Tachycardic, regular rhythm, no murmurs, rubs, or gallops, palpable pedal pulses bilaterally              Gastrointestinal: Positive bowel sounds, soft, nontender, nondistended              Musculoskeletal: No bilateral ankle edema, no clubbing or cyanosis to extremities              Psychiatric: Appropriate affect, cooperative              Neurologic: Oriented x 3, strength symmetric in all extremities, Cranial Nerves grossly intact to confrontation, speech clear              Skin: No rashes     Result Review    Result Review:  I have personally reviewed these results:  [x]  Laboratory      Lab 12/27/23  0550 12/26/23  1409 12/26/23  1227   WBC 3.27*  --  4.30   HEMOGLOBIN 12.8*  --  13.5   HEMATOCRIT 40.5  --  42.6   PLATELETS 139*  --  151   NEUTROS ABS 2.21  --  2.67   IMMATURE GRANS (ABS) 0.01  --  0.01   LYMPHS ABS 0.44*  --  0.76   MONOS ABS 0.60  --  0.82   EOS ABS 0.00  --  0.02   MCV 87.7  --  88.6   PROCALCITONIN 0.06  --  0.07   LDH  --  174  --    PROTIME  --   --  12.8   D DIMER QUANT  --   --  0.42         Lab 12/27/23  0550 12/26/23  1227   SODIUM 137 136   POTASSIUM 4.4 4.0   CHLORIDE 96* 94*   CO2 28.6 33.8*   ANION GAP 12.4 8.2   BUN 17 12   CREATININE 0.52* 0.57*   EGFR 114.0 110.8   GLUCOSE 112* 86   CALCIUM 9.5 8.8   MAGNESIUM 1.7  --          Lab 12/27/23  0550 12/26/23  1227   TOTAL PROTEIN 6.1 6.0   ALBUMIN 3.3* 3.6   GLOBULIN 2.8 2.4   ALT (SGPT) 15 18   AST (SGOT) 27 35   BILIRUBIN 0.3 0.5   ALK PHOS 139* 132*         Lab 12/26/23  1227   PROBNP 436.3   HSTROP T 16   PROTIME 12.8   INR 0.94             Lab 12/26/23  1227   FERRITIN 803.10*         Lab 12/26/23  1422   PH, ARTERIAL 7.395   PCO2,  ARTERIAL 61.9*   PO2 ART 67.0*   O2 SATURATION ART 92.8*   FIO2 30   HCO3 ART 37.1*   BASE EXCESS ART 9.8*   CARBOXYHEMOGLOBIN 1.1     Brief Urine Lab Results       None          [x]  Microbiology   Microbiology Results (last 10 days)       Procedure Component Value - Date/Time    S. Pneumo Ag Urine or CSF - Urine, Urine, Clean Catch [[id number]]  (Normal) Collected: 12/26/23 1724    Lab Status: Final result Specimen: Urine, Clean Catch Updated: 12/26/23 1752     Strep Pneumo Ag Negative    Legionella Antigen, Urine - Urine, Urine, Clean Catch [[id number]]  (Normal) Collected: 12/26/23 1724    Lab Status: Final result Specimen: Urine, Clean Catch Updated: 12/26/23 1752     LEGIONELLA ANTIGEN, URINE Negative    COVID-19, FLU A/B, RSV PCR 1 HR TAT - Swab, Nasopharynx [[id number]]  (Abnormal) Collected: 12/26/23 1406    Lab Status: Final result Specimen: Swab from Nasopharynx Updated: 12/26/23 1512     COVID19 Detected     Influenza A PCR Not Detected     Influenza B PCR Not Detected     RSV, PCR Not Detected    Narrative:      Fact sheet for providers: https://www.fda.gov/media/614284/download    Fact sheet for patients: https://www.fda.gov/media/793425/download    Test performed by PCR.          [x]  Radiology  XR Chest 1 View    Result Date: 12/26/2023    1. No acute cardiopulmonary abnormality.  Chronic findings of COPD/emphysema.  No change from prior exam.        HELGA LOPEZ MD       Electronically Signed and Approved By: HELGA LOPEZ MD on 12/26/2023 at 12:45            []  EKG/Telemetry   []  Cardiology/Vascular   []  Pathology  []  Old records  []  Other:    Assessment & Plan   Assessment / Plan     Assessment:  Acute COPD exacerbation  COVID-19  Chronic hypoxic respiratory failure  Heart failure not in exacerbation  Pulmonary hypertension    Plan:  Patient remains admitted to the hospital for further care and management  Remains on supplemental oxygen, wean as able  Orders for BiPAP have been  placed  Patient started on cefepime due to baseline poor lung function  Started on steroids, will continue her course  Scheduled and as needed breathing treatments  Will hold IV Lasix today, no signs of fluid overload on exam  Continue home Protonix  Other home medications reviewed and held      Discussed with RN.    DVT prophylaxis:  Medical DVT prophylaxis orders are present.    CODE STATUS:   Level Of Support Discussed With: Patient  Code Status (Patient has no pulse and is not breathing): CPR (Attempt to Resuscitate)  Medical Interventions (Patient has pulse or is breathing): Full Support

## 2023-12-27 NOTE — PLAN OF CARE
Goal Outcome Evaluation:  Plan of Care Reviewed With: patient        Progress: improving  Outcome Evaluation: Patient tolerated hospital v60 Bipap 12/5 last night. Removed this am. Patient now resting on 3L nasal cannula home oxygen.

## 2023-12-28 LAB
ANION GAP SERPL CALCULATED.3IONS-SCNC: 9.4 MMOL/L (ref 5–15)
BUN SERPL-MCNC: 12 MG/DL (ref 8–23)
BUN/CREAT SERPL: 26.1 (ref 7–25)
CALCIUM SPEC-SCNC: 9.1 MG/DL (ref 8.6–10.5)
CHLORIDE SERPL-SCNC: 99 MMOL/L (ref 98–107)
CO2 SERPL-SCNC: 31.6 MMOL/L (ref 22–29)
CREAT SERPL-MCNC: 0.46 MG/DL (ref 0.76–1.27)
DEPRECATED RDW RBC AUTO: 45.3 FL (ref 37–54)
EGFRCR SERPLBLD CKD-EPI 2021: 118.3 ML/MIN/1.73
ERYTHROCYTE [DISTWIDTH] IN BLOOD BY AUTOMATED COUNT: 14.1 % (ref 12.3–15.4)
GLUCOSE SERPL-MCNC: 115 MG/DL (ref 65–99)
HCT VFR BLD AUTO: 38.3 % (ref 37.5–51)
HGB BLD-MCNC: 12.1 G/DL (ref 13–17.7)
MAGNESIUM SERPL-MCNC: 1.6 MG/DL (ref 1.6–2.4)
MCH RBC QN AUTO: 27.5 PG (ref 26.6–33)
MCHC RBC AUTO-ENTMCNC: 31.6 G/DL (ref 31.5–35.7)
MCV RBC AUTO: 87 FL (ref 79–97)
PLATELET # BLD AUTO: 181 10*3/MM3 (ref 140–450)
PMV BLD AUTO: 10.4 FL (ref 6–12)
POTASSIUM SERPL-SCNC: 4 MMOL/L (ref 3.5–5.2)
RBC # BLD AUTO: 4.4 10*6/MM3 (ref 4.14–5.8)
SODIUM SERPL-SCNC: 140 MMOL/L (ref 136–145)
WBC NRBC COR # BLD AUTO: 5.33 10*3/MM3 (ref 3.4–10.8)

## 2023-12-28 PROCEDURE — 94799 UNLISTED PULMONARY SVC/PX: CPT

## 2023-12-28 PROCEDURE — 25010000002 ENOXAPARIN PER 10 MG: Performed by: INTERNAL MEDICINE

## 2023-12-28 PROCEDURE — 80048 BASIC METABOLIC PNL TOTAL CA: CPT | Performed by: INTERNAL MEDICINE

## 2023-12-28 PROCEDURE — 63710000001 DEXAMETHASONE PER 0.25 MG: Performed by: INTERNAL MEDICINE

## 2023-12-28 PROCEDURE — 94664 DEMO&/EVAL PT USE INHALER: CPT

## 2023-12-28 PROCEDURE — 83735 ASSAY OF MAGNESIUM: CPT | Performed by: INTERNAL MEDICINE

## 2023-12-28 PROCEDURE — 85027 COMPLETE CBC AUTOMATED: CPT | Performed by: INTERNAL MEDICINE

## 2023-12-28 PROCEDURE — 99232 SBSQ HOSP IP/OBS MODERATE 35: CPT | Performed by: INTERNAL MEDICINE

## 2023-12-28 PROCEDURE — 25010000002 CEFEPIME PER 500 MG: Performed by: INTERNAL MEDICINE

## 2023-12-28 PROCEDURE — 94761 N-INVAS EAR/PLS OXIMETRY MLT: CPT

## 2023-12-28 PROCEDURE — 25810000003 SODIUM CHLORIDE 0.9 % SOLUTION 500 ML FLEX CONT: Performed by: INTERNAL MEDICINE

## 2023-12-28 RX ORDER — REVEFENACIN 175 UG/3ML
175 SOLUTION RESPIRATORY (INHALATION)
Qty: 90 ML | Refills: 0 | COMMUNITY
Start: 2023-12-28 | End: 2023-12-29

## 2023-12-28 RX ORDER — IPRATROPIUM BROMIDE AND ALBUTEROL SULFATE 2.5; .5 MG/3ML; MG/3ML
3 SOLUTION RESPIRATORY (INHALATION)
Status: DISCONTINUED | OUTPATIENT
Start: 2023-12-28 | End: 2023-12-29 | Stop reason: HOSPADM

## 2023-12-28 RX ADMIN — CEFEPIME 2000 MG: 2 INJECTION, POWDER, FOR SOLUTION INTRAVENOUS at 17:20

## 2023-12-28 RX ADMIN — SODIUM CHLORIDE 40 ML: 9 INJECTION, SOLUTION INTRAVENOUS at 09:05

## 2023-12-28 RX ADMIN — ARFORMOTEROL TARTRATE 15 MCG: 15 SOLUTION RESPIRATORY (INHALATION) at 20:16

## 2023-12-28 RX ADMIN — ENOXAPARIN SODIUM 40 MG: 100 INJECTION SUBCUTANEOUS at 19:40

## 2023-12-28 RX ADMIN — IPRATROPIUM BROMIDE AND ALBUTEROL SULFATE 3 ML: .5; 3 SOLUTION RESPIRATORY (INHALATION) at 07:26

## 2023-12-28 RX ADMIN — IPRATROPIUM BROMIDE AND ALBUTEROL SULFATE 3 ML: .5; 3 SOLUTION RESPIRATORY (INHALATION) at 03:21

## 2023-12-28 RX ADMIN — PANTOPRAZOLE SODIUM 40 MG: 40 TABLET, DELAYED RELEASE ORAL at 06:05

## 2023-12-28 RX ADMIN — IPRATROPIUM BROMIDE AND ALBUTEROL SULFATE 3 ML: .5; 3 SOLUTION RESPIRATORY (INHALATION) at 12:20

## 2023-12-28 RX ADMIN — ARFORMOTEROL TARTRATE 15 MCG: 15 SOLUTION RESPIRATORY (INHALATION) at 07:26

## 2023-12-28 RX ADMIN — BUDESONIDE 0.5 MG: 0.5 SUSPENSION RESPIRATORY (INHALATION) at 07:26

## 2023-12-28 RX ADMIN — CEFEPIME 2000 MG: 2 INJECTION, POWDER, FOR SOLUTION INTRAVENOUS at 09:00

## 2023-12-28 RX ADMIN — Medication 10 ML: at 01:23

## 2023-12-28 RX ADMIN — IPRATROPIUM BROMIDE AND ALBUTEROL SULFATE 3 ML: .5; 3 SOLUTION RESPIRATORY (INHALATION) at 20:16

## 2023-12-28 RX ADMIN — BUDESONIDE 0.5 MG: 0.5 SUSPENSION RESPIRATORY (INHALATION) at 20:16

## 2023-12-28 RX ADMIN — ACETAMINOPHEN 650 MG: 325 TABLET ORAL at 19:40

## 2023-12-28 RX ADMIN — DEXAMETHASONE 6 MG: 0.5 TABLET ORAL at 08:59

## 2023-12-28 RX ADMIN — CEFEPIME 2000 MG: 2 INJECTION, POWDER, FOR SOLUTION INTRAVENOUS at 01:23

## 2023-12-28 RX ADMIN — IPRATROPIUM BROMIDE AND ALBUTEROL SULFATE 3 ML: .5; 3 SOLUTION RESPIRATORY (INHALATION) at 00:19

## 2023-12-28 RX ADMIN — Medication 10 ML: at 09:00

## 2023-12-28 RX ADMIN — CEFEPIME 2000 MG: 2 INJECTION, POWDER, FOR SOLUTION INTRAVENOUS at 23:04

## 2023-12-28 NOTE — PROGRESS NOTES
[facility]   Hospitalist Progress Note  Date: 2023  Patient Name: Sumeet Gomes  : 1960  MRN: [id number]  Date of admission: 2023  Room/Bed: 4009/1      Subjective   Subjective     Chief Complaint:   Shortness of breath, testing positive for COVID-19 at home    Summary:  Sumeet Gomes is a 63 y.o. male with medical history of COPD on home oxygen, diastolic heart failure, pulmonary hypertension, continued tobacco abuse, and coronary disease who presents with shortness of breath since Friday     Patient was hospitalized  for heart failure and COPD.  He was seen in clinic  with no issues at that time.  He normally wears 3 L of oxygen at home.  He states he had shortness of breath and started desaturating down into the mid 80s so he increased his oxygen up to 5 L.  He did a home test was positive for COVID.  As result he came the ER for further evaluation. The emergency department the patient's vital signs are as follows: Temperature is 90.5, pulse 93, respirate is 20, blood pressure 108/71, satting 95% on 6 L.  He has been desaturating into the mid 80s at home with his oxygen has been increased over the last couple days.  CBC shows no abnormalities.  CMP shows no significant abnormalities.  BNP is only 436 and troponin is 16.  Chest x-ray shows no acute abnormalities.  Patient was given 1 DuoNeb and 10 of dexamethasone.  Patient was in the hospital for acute COPD exacerbation due to COVID-19.    Interval Followup:   Still feeling short of breath worse with exertion. Patient wore home CPAP overnight with no issue. Will try and get patient up and walking today. Anticipate discharge in the morning       Objective   Objective     Vitals:   Temp:  [97.5 °F (36.4 °C)-98.3 °F (36.8 °C)] 98.3 °F (36.8 °C)  Heart Rate:  [] 93  Resp:  [18-20] 18  BP: (110-124)/(54-76) 118/54  Flow (L/min):  [3] 3    Physical Exam               Constitutional: Sitting up in bed, improved  increased work of breathing              Eyes: Pupils equal, sclerae anicteric, no conjunctival injection              HENT: NCAT, mucous membranes moist              Neck: Supple, no thyromegaly, no lymphadenopathy, trachea midline              Respiratory: Wheezing throughout.  Mild respiratory distress, poor aeration               Cardiovascular: regular rate, regular rhythm, no murmurs, rubs, or gallops, palpable pedal pulses bilaterally              Gastrointestinal: Positive bowel sounds, soft, nontender, nondistended              Musculoskeletal: No bilateral ankle edema, no clubbing or cyanosis to extremities              Psychiatric: Appropriate affect, cooperative              Neurologic: Oriented x 3, strength symmetric in all extremities, Cranial Nerves grossly intact to confrontation, speech clear              Skin: No rashes     Result Review    Result Review:  I have personally reviewed these results:  [x]  Laboratory      Lab 12/28/23  0402 12/27/23  0550 12/26/23  1409 12/26/23  1227   WBC 5.33 3.27*  --  4.30   HEMOGLOBIN 12.1* 12.8*  --  13.5   HEMATOCRIT 38.3 40.5  --  42.6   PLATELETS 181 139*  --  151   NEUTROS ABS  --  2.21  --  2.67   IMMATURE GRANS (ABS)  --  0.01  --  0.01   LYMPHS ABS  --  0.44*  --  0.76   MONOS ABS  --  0.60  --  0.82   EOS ABS  --  0.00  --  0.02   MCV 87.0 87.7  --  88.6   PROCALCITONIN  --  0.06  --  0.07   LDH  --   --  174  --    PROTIME  --   --   --  12.8   D DIMER QUANT  --   --   --  0.42         Lab 12/28/23  0402 12/27/23  0550 12/26/23  1227   SODIUM 140 137 136   POTASSIUM 4.0 4.4 4.0   CHLORIDE 99 96* 94*   CO2 31.6* 28.6 33.8*   ANION GAP 9.4 12.4 8.2   BUN 12 17 12   CREATININE 0.46* 0.52* 0.57*   EGFR 118.3 114.0 110.8   GLUCOSE 115* 112* 86   CALCIUM 9.1 9.5 8.8   MAGNESIUM 1.6 1.7  --          Lab 12/27/23  0550 12/26/23  1227   TOTAL PROTEIN 6.1 6.0   ALBUMIN 3.3* 3.6   GLOBULIN 2.8 2.4   ALT (SGPT) 15 18   AST (SGOT) 27 35   BILIRUBIN 0.3 0.5   ALK  PHOS 139* 132*         Lab 12/26/23  1227   PROBNP 436.3   HSTROP T 16   PROTIME 12.8   INR 0.94             Lab 12/26/23  1227   FERRITIN 803.10*         Lab 12/26/23  1422   PH, ARTERIAL 7.395   PCO2, ARTERIAL 61.9*   PO2 ART 67.0*   O2 SATURATION ART 92.8*   FIO2 30   HCO3 ART 37.1*   BASE EXCESS ART 9.8*   CARBOXYHEMOGLOBIN 1.1     Brief Urine Lab Results       None          [x]  Microbiology   Microbiology Results (last 10 days)       Procedure Component Value - Date/Time    S. Pneumo Ag Urine or CSF - Urine, Urine, Clean Catch [[id number]]  (Normal) Collected: 12/26/23 1724    Lab Status: Final result Specimen: Urine, Clean Catch Updated: 12/26/23 1752     Strep Pneumo Ag Negative    Legionella Antigen, Urine - Urine, Urine, Clean Catch [[id number]]  (Normal) Collected: 12/26/23 1724    Lab Status: Final result Specimen: Urine, Clean Catch Updated: 12/26/23 1752     LEGIONELLA ANTIGEN, URINE Negative    COVID-19, FLU A/B, RSV PCR 1 HR TAT - Swab, Nasopharynx [[id number]]  (Abnormal) Collected: 12/26/23 1406    Lab Status: Final result Specimen: Swab from Nasopharynx Updated: 12/26/23 1512     COVID19 Detected     Influenza A PCR Not Detected     Influenza B PCR Not Detected     RSV, PCR Not Detected    Narrative:      Fact sheet for providers: https://www.fda.gov/media/356998/download    Fact sheet for patients: https://www.fda.gov/media/146467/download    Test performed by PCR.          [x]  Radiology  XR Chest 1 View    Result Date: 12/26/2023    1. No acute cardiopulmonary abnormality.  Chronic findings of COPD/emphysema.  No change from prior exam.        HELGA LOPEZ MD       Electronically Signed and Approved By: HELGA LOPEZ MD on 12/26/2023 at 12:45            []  EKG/Telemetry   []  Cardiology/Vascular   []  Pathology  []  Old records  []  Other:    Assessment & Plan   Assessment / Plan     Assessment:  Acute COPD exacerbation  COVID-19  Chronic hypoxic respiratory failure  Heart failure not in  exacerbation  Pulmonary hypertension    Plan:  Patient remains admitted to the hospital for further care and management  Remains on supplemental oxygen, wean as able, currently on home level of oxygen   Orders for NIPPV have been placed, use home device   Patient started on cefepime due to baseline poor lung function, procal normal, will only provide short course   Started on steroids, will continue full course in treatment of COVID  Scheduled and as needed breathing treatments  Will hold IV Lasix again today, no signs of fluid overload on exam  Continue home Protonix  Other home medications reviewed and held  Will have patient up and ambulating today  Will remain inpatient overnight due to continued increased work of breathing     Discussed with RN.    DVT prophylaxis:  Medical DVT prophylaxis orders are present.    CODE STATUS:   Level Of Support Discussed With: Patient  Code Status (Patient has no pulse and is not breathing): CPR (Attempt to Resuscitate)  Medical Interventions (Patient has pulse or is breathing): Full Support

## 2023-12-28 NOTE — PLAN OF CARE
Goal Outcome Evaluation:Patient has his home Astral vent tonight. He wore his machine comfortably throughout the night.

## 2023-12-29 ENCOUNTER — READMISSION MANAGEMENT (OUTPATIENT)
Dept: CALL CENTER | Facility: HOSPITAL | Age: 63
End: 2023-12-29
Payer: MEDICAID

## 2023-12-29 VITALS
BODY MASS INDEX: 18.55 KG/M2 | TEMPERATURE: 97.7 F | SYSTOLIC BLOOD PRESSURE: 112 MMHG | RESPIRATION RATE: 20 BRPM | WEIGHT: 118.17 LBS | HEART RATE: 86 BPM | OXYGEN SATURATION: 94 % | DIASTOLIC BLOOD PRESSURE: 72 MMHG | HEIGHT: 67 IN

## 2023-12-29 LAB
ANION GAP SERPL CALCULATED.3IONS-SCNC: 4.9 MMOL/L (ref 5–15)
BUN SERPL-MCNC: 15 MG/DL (ref 8–23)
BUN/CREAT SERPL: 32.6 (ref 7–25)
CALCIUM SPEC-SCNC: 9 MG/DL (ref 8.6–10.5)
CHLORIDE SERPL-SCNC: 103 MMOL/L (ref 98–107)
CO2 SERPL-SCNC: 33.1 MMOL/L (ref 22–29)
CREAT SERPL-MCNC: 0.46 MG/DL (ref 0.76–1.27)
DEPRECATED RDW RBC AUTO: 46.4 FL (ref 37–54)
EGFRCR SERPLBLD CKD-EPI 2021: 118.3 ML/MIN/1.73
ERYTHROCYTE [DISTWIDTH] IN BLOOD BY AUTOMATED COUNT: 14.3 % (ref 12.3–15.4)
GLUCOSE SERPL-MCNC: 92 MG/DL (ref 65–99)
HCT VFR BLD AUTO: 38 % (ref 37.5–51)
HGB BLD-MCNC: 11.7 G/DL (ref 13–17.7)
MAGNESIUM SERPL-MCNC: 1.7 MG/DL (ref 1.6–2.4)
MCH RBC QN AUTO: 27.1 PG (ref 26.6–33)
MCHC RBC AUTO-ENTMCNC: 30.8 G/DL (ref 31.5–35.7)
MCV RBC AUTO: 88.2 FL (ref 79–97)
PLATELET # BLD AUTO: 207 10*3/MM3 (ref 140–450)
PMV BLD AUTO: 10.2 FL (ref 6–12)
POTASSIUM SERPL-SCNC: 4.1 MMOL/L (ref 3.5–5.2)
QT INTERVAL: 387 MS
QTC INTERVAL: 488 MS
RBC # BLD AUTO: 4.31 10*6/MM3 (ref 4.14–5.8)
SODIUM SERPL-SCNC: 141 MMOL/L (ref 136–145)
WBC NRBC COR # BLD AUTO: 5.69 10*3/MM3 (ref 3.4–10.8)

## 2023-12-29 PROCEDURE — 25010000002 CEFEPIME PER 500 MG: Performed by: INTERNAL MEDICINE

## 2023-12-29 PROCEDURE — 80048 BASIC METABOLIC PNL TOTAL CA: CPT | Performed by: INTERNAL MEDICINE

## 2023-12-29 PROCEDURE — 94799 UNLISTED PULMONARY SVC/PX: CPT

## 2023-12-29 PROCEDURE — 94761 N-INVAS EAR/PLS OXIMETRY MLT: CPT

## 2023-12-29 PROCEDURE — 85027 COMPLETE CBC AUTOMATED: CPT | Performed by: INTERNAL MEDICINE

## 2023-12-29 PROCEDURE — 94664 DEMO&/EVAL PT USE INHALER: CPT

## 2023-12-29 PROCEDURE — 83735 ASSAY OF MAGNESIUM: CPT | Performed by: INTERNAL MEDICINE

## 2023-12-29 PROCEDURE — 99239 HOSP IP/OBS DSCHRG MGMT >30: CPT | Performed by: INTERNAL MEDICINE

## 2023-12-29 PROCEDURE — 63710000001 DEXAMETHASONE PER 0.25 MG: Performed by: INTERNAL MEDICINE

## 2023-12-29 RX ORDER — AMOXICILLIN AND CLAVULANATE POTASSIUM 875; 125 MG/1; MG/1
1 TABLET, FILM COATED ORAL 2 TIMES DAILY
Qty: 6 TABLET | Refills: 0 | Status: SHIPPED | OUTPATIENT
Start: 2023-12-29 | End: 2024-01-01

## 2023-12-29 RX ORDER — PREDNISONE 20 MG/1
TABLET ORAL
Qty: 25 TABLET | Refills: 0 | Status: SHIPPED | OUTPATIENT
Start: 2023-12-29 | End: 2024-01-18

## 2023-12-29 RX ORDER — BUDESONIDE 0.5 MG/2ML
0.5 INHALANT ORAL 2 TIMES DAILY
Qty: 60 EACH | Refills: 11 | Status: SHIPPED | OUTPATIENT
Start: 2023-12-29

## 2023-12-29 RX ADMIN — BUDESONIDE 0.5 MG: 0.5 SUSPENSION RESPIRATORY (INHALATION) at 07:54

## 2023-12-29 RX ADMIN — CEFEPIME 2000 MG: 2 INJECTION, POWDER, FOR SOLUTION INTRAVENOUS at 08:53

## 2023-12-29 RX ADMIN — IPRATROPIUM BROMIDE AND ALBUTEROL SULFATE 3 ML: .5; 3 SOLUTION RESPIRATORY (INHALATION) at 07:54

## 2023-12-29 RX ADMIN — ARFORMOTEROL TARTRATE 15 MCG: 15 SOLUTION RESPIRATORY (INHALATION) at 07:54

## 2023-12-29 RX ADMIN — IPRATROPIUM BROMIDE AND ALBUTEROL SULFATE 3 ML: .5; 3 SOLUTION RESPIRATORY (INHALATION) at 11:25

## 2023-12-29 RX ADMIN — PANTOPRAZOLE SODIUM 40 MG: 40 TABLET, DELAYED RELEASE ORAL at 05:35

## 2023-12-29 RX ADMIN — Medication 10 ML: at 08:53

## 2023-12-29 RX ADMIN — IPRATROPIUM BROMIDE AND ALBUTEROL SULFATE 3 ML: .5; 3 SOLUTION RESPIRATORY (INHALATION) at 02:40

## 2023-12-29 RX ADMIN — DEXAMETHASONE 6 MG: 0.5 TABLET ORAL at 08:53

## 2023-12-29 NOTE — DISCHARGE SUMMARY
[facility]         HOSPITALIST  DISCHARGE SUMMARY    Patient Name: Sumeet Gomes  : 1960  MRN: [id number]    Date of Admission: 2023  Date of Discharge:  2023  Primary Care Physician: Mirtha Alexander APRN    Active and Resolved Hospital Problems:  Acute COPD exacerbation  COVID-19  Chronic hypoxic respiratory failure  Heart failure not in exacerbation  Pulmonary hypertension    Hospital Course     Hospital Course:  Sumeet Gomes is a 63 y.o. male with medical history of COPD on home oxygen, diastolic heart failure, pulmonary hypertension, continued tobacco abuse, and coronary disease who presents with shortness of breath since Friday     Patient was hospitalized  for heart failure and COPD.  He was seen in clinic  with no issues at that time.  He normally wears 3 L of oxygen at home.  He states he had shortness of breath and started desaturating down into the mid 80s so he increased his oxygen up to 5 L.  He did a home test was positive for COVID.  As result he came the ER for further evaluation. The emergency department the patient's vital signs are as follows: Temperature is 90.5, pulse 93, respirate is 20, blood pressure 108/71, satting 95% on 6 L.  He has been desaturating into the mid 80s at home with his oxygen has been increased over the last couple days.  CBC shows no abnormalities.  CMP shows no significant abnormalities.  BNP is only 436 and troponin is 16.  Chest x-ray shows no acute abnormalities.  Patient was given 1 DuoNeb and 10 of dexamethasone.  Patient was in the hospital for acute COPD exacerbation due to COVID-19.  Patient was monitored in the hospital until felt safe to discharge home.  Will discharge on a short course of Augmentin.  Also discharging on prolonged steroid taper.  Patient will also follow-up with pulmonary rehab.  Patient seen on date of discharge, clinically and hemodynamically stable.  Patient provided concerning  signs and symptoms prompting immediate medical attention, patient understanding and agreeable     DISCHARGE Follow Up Recommendations for labs and diagnostics:   Follow-up with primary care physician  Follow-up with pulmonology, pulmonary rehab    Day of Discharge     Vital Signs:  Temp:  [97.6 °F (36.4 °C)-97.9 °F (36.6 °C)] 97.7 °F (36.5 °C)  Heart Rate:  [77-99] 86  Resp:  [20] 20  BP: (106-126)/(68-72) 112/72  Flow (L/min):  [3] 3  Physical Exam:               Constitutional: Sitting up in bed, no increased work of breathing              Eyes: Pupils equal, sclerae anicteric, no conjunctival injection              HENT: NCAT, mucous membranes moist              Neck: Supple, no thyromegaly, no lymphadenopathy, trachea midline              Respiratory: Wheezing throughout.  No respiratory distress              Cardiovascular: regular rate, regular rhythm, no murmurs, rubs, or gallops, palpable pedal pulses bilaterally              Gastrointestinal: Positive bowel sounds, soft, nontender, nondistended              Musculoskeletal: No bilateral ankle edema, no clubbing or cyanosis to extremities              Psychiatric: Appropriate affect, cooperative              Neurologic: Oriented x 3, strength symmetric in all extremities, Cranial Nerves grossly intact to confrontation, speech clear              Skin: No rashes     Discharge Details        Discharge Medications        New Medications        Instructions Start Date   amoxicillin-clavulanate 875-125 MG per tablet  Commonly known as: AUGMENTIN   1 tablet, Oral, 2 Times Daily      predniSONE 20 MG tablet  Commonly known as: DELTASONE   Take 2 tablets by mouth Daily for 5 days, THEN 1.5 tablets Daily for 5 days, THEN 1 tablet Daily for 5 days, THEN 0.5 tablets Daily for 5 days.   Start Date: December 29, 2023            Changes to Medications        Instructions Start Date   budesonide 0.5 MG/2ML nebulizer solution  Commonly known as: Pulmicort  What changed: when  to take this   0.5 mg, Nebulization, 2 Times Daily      furosemide 20 MG tablet  Commonly known as: LASIX  What changed: how much to take   40 mg, Oral, Daily      ipratropium-albuterol 0.5-2.5 mg/3 ml nebulizer  Commonly known as: DUO-NEB  What changed: See the new instructions.   INHALE 3 ML BY NEBULIZATION 4 TIMES A DAY      Yupelri 175 MCG/3ML nebulizer solution  Generic drug: revefenacin  What changed: Another medication with the same name was added. Make sure you understand how and when to take each.   175 mcg, Nebulization, Daily - RT      Yupelri 175 MCG/3ML nebulizer solution  Generic drug: revefenacin  What changed: You were already taking a medication with the same name, and this prescription was added. Make sure you understand how and when to take each.   175 mcg, Nebulization, Daily - RT             Continue These Medications        Instructions Start Date   albuterol sulfate  (90 Base) MCG/ACT inhaler  Commonly known as: PROVENTIL HFA;VENTOLIN HFA;PROAIR HFA   2 puffs, Inhalation, Every 4 Hours PRN      arformoterol 15 MCG/2ML nebulizer solution  Commonly known as: Brovana   15 mcg, Nebulization, 2 Times Daily - RT      pantoprazole 40 MG EC tablet  Commonly known as: PROTONIX   40 mg, Oral, Every Early Morning               No Known Allergies    Discharge Disposition:  Home or Self Care    Diet:  Hospital:  Diet Order   Procedures   • Diet: Cardiac Diets; Healthy Heart (2-3 Na+); Texture: Regular Texture (IDDSI 7); Fluid Consistency: Thin (IDDSI 0)       Discharge Activity:   Activity Instructions       Activity as Tolerated              CODE STATUS:  Code Status and Medical Interventions:   Ordered at: 12/26/23 1350     Level Of Support Discussed With:    Patient     Code Status (Patient has no pulse and is not breathing):    CPR (Attempt to Resuscitate)     Medical Interventions (Patient has pulse or is breathing):    Full Support         Future Appointments   Date Time Provider Department  [place]   1/3/2024  1:00 PM Mirtha Alexander APRN [facility] [place] [facility] BARDS [place]   2/22/2024  3:45 PM Ulices Moya MD [facility] [place] PCC ETW [place]   3/15/2024  1:00 PM [place] [address]  [place] [facility]   6/18/2024  1:30 PM NURSE/MA ONC [place] [facility] [place] ONC [address] [place]   6/18/2024  2:00 PM Elvis Pinzon MD [facility] [place] ONC [address] [place]   6/24/2024  2:00 PM Drwe Rodriguez MD [facility] [place] CD [place]       Additional Instructions for the Follow-ups that You Need to Schedule       Ambulatory Referral to Pulmonary Rehab   As directed      Follow-up needed: Yes        Discharge Follow-up with PCP   As directed       Currently Documented PCP:    Mirtha Alexander APRN    PCP Phone Number:    [phone]     Follow Up Details: In less than one week        Discharge Follow-up with Specified Provider: pulm; 1 Month   As directed      To: pulm   Follow Up: 1 Month                Pertinent  and/or Most Recent Results     PROCEDURES:   NA    LAB RESULTS:      Lab 12/29/23  0535 12/28/23  0402 12/27/23  0550 12/26/23  1409 12/26/23  1227   WBC 5.69 5.33 3.27*  --  4.30   HEMOGLOBIN 11.7* 12.1* 12.8*  --  13.5   HEMATOCRIT 38.0 38.3 40.5  --  42.6   PLATELETS 207 181 139*  --  151   NEUTROS ABS  --   --  2.21  --  2.67   IMMATURE GRANS (ABS)  --   --  0.01  --  0.01   LYMPHS ABS  --   --  0.44*  --  0.76   MONOS ABS  --   --  0.60  --  0.82   EOS ABS  --   --  0.00  --  0.02   MCV 88.2 87.0 87.7  --  88.6   PROCALCITONIN  --   --  0.06  --  0.07   LDH  --   --   --  174  --    PROTIME  --   --   --   --  12.8   D DIMER QUANT  --   --   --   --  0.42         Lab 12/29/23  0535 12/28/23  0402 12/27/23  0550 12/26/23  1227   SODIUM 141 140 137 136   POTASSIUM 4.1 4.0 4.4 4.0   CHLORIDE 103 99 96* 94*   CO2 33.1* 31.6* 28.6 33.8*   ANION GAP 4.9* 9.4 12.4 8.2   BUN 15 12 17 12   CREATININE 0.46* 0.46* 0.52* 0.57*   EGFR 118.3 118.3 114.0 110.8   GLUCOSE 92 115* 112* 86   CALCIUM 9.0 9.1 9.5 8.8   MAGNESIUM 1.7 1.6 1.7  --          Lab 12/27/23  0550  12/26/23  1227   TOTAL PROTEIN 6.1 6.0   ALBUMIN 3.3* 3.6   GLOBULIN 2.8 2.4   ALT (SGPT) 15 18   AST (SGOT) 27 35   BILIRUBIN 0.3 0.5   ALK PHOS 139* 132*         Lab 12/26/23  1227   PROBNP 436.3   HSTROP T 16   PROTIME 12.8   INR 0.94             Lab 12/26/23  1227   FERRITIN 803.10*         Lab 12/26/23  1422   PH, ARTERIAL 7.395   PCO2, ARTERIAL 61.9*   PO2 ART 67.0*   O2 SATURATION ART 92.8*   FIO2 30   HCO3 ART 37.1*   BASE EXCESS ART 9.8*   CARBOXYHEMOGLOBIN 1.1     Brief Urine Lab Results       None          Microbiology Results (last 10 days)       Procedure Component Value - Date/Time    S. Pneumo Ag Urine or CSF - Urine, Urine, Clean Catch [[id number]]  (Normal) Collected: 12/26/23 1724    Lab Status: Final result Specimen: Urine, Clean Catch Updated: 12/26/23 1752     Strep Pneumo Ag Negative    Legionella Antigen, Urine - Urine, Urine, Clean Catch [[id number]]  (Normal) Collected: 12/26/23 1724    Lab Status: Final result Specimen: Urine, Clean Catch Updated: 12/26/23 1752     LEGIONELLA ANTIGEN, URINE Negative    COVID-19, FLU A/B, RSV PCR 1 HR TAT - Swab, Nasopharynx [[id number]]  (Abnormal) Collected: 12/26/23 1406    Lab Status: Final result Specimen: Swab from Nasopharynx Updated: 12/26/23 1512     COVID19 Detected     Influenza A PCR Not Detected     Influenza B PCR Not Detected     RSV, PCR Not Detected    Narrative:      Fact sheet for providers: https://www.fda.gov/media/747217/download    Fact sheet for patients: https://www.fda.gov/media/772450/download    Test performed by PCR.            XR Chest 1 View    Result Date: 12/26/2023  Impression:   1. No acute cardiopulmonary abnormality.  Chronic findings of COPD/emphysema.  No change from prior exam.        HELGA LOPEZ MD       Electronically Signed and Approved By: HELGA LOPEZ MD on 12/26/2023 at 12:45             FL Video Swallow With Speech Single Contrast    Result Date: 12/7/2023  Impression:    1. No tracheal aspiration or  laryngeal penetration appreciated on exam  Please consult speech pathology report for further details regarding exam and dietary recommendations    ANGELICA BEARD       Electronically Signed and Approved By: GABRIEL MAGALLON MD on 12/07/2023 at 15:08             CT Abdomen Pelvis Without Contrast    Result Date: 12/6/2023  Impression:   1. Bilateral pleural effusions are noted.  Atelectasis versus infiltrates are seen in the lung bases.  There is mild scattered free fluid throughout the abdomen and pelvis.  Diffuse anasarca is noted.  The findings suggest changes of CHF/volume overload. 2. No evidence for acute abnormality throughout the abdomen or pelvis on this noncontrast study. 3. No evidence for nephrolithiasis bilaterally. There is no evidence for obstructive uropathy.     JOSE JUAN HERNANDEZ MD       Electronically Signed and Approved By: JOSE JUAN HERNANDEZ MD on 12/06/2023 at 22:37             XR Chest 1 View    Result Date: 12/4/2023  Impression:   Emphysema.   No radiographic findings of acute cardiopulmonary abnormality.       ZURI MARIE MD       Electronically Signed and Approved By: ZURI MARIE MD on 12/04/2023 at 12:29                      Results for orders placed during the hospital encounter of 12/04/23    Adult Transthoracic Echo Complete W/ Cont if Necessary Per Protocol    Interpretation Summary  •  Left ventricular systolic function is normal. Estimated left ventricular EF = 55%  •  Left ventricular diastolic function was normal.  •  The right ventricular cavity is moderately dilated.  •  The right atrial cavity is moderately  dilated.  •  Estimated right ventricular systolic pressure from tricuspid regurgitation is moderately elevated (45-55 mmHg).      Labs Pending at Discharge:        Time spent on Discharge including face to face service:  36 minutes    Electronically signed by Marcus Albrecht MD, 12/29/23, 12:49 PM EST.

## 2023-12-29 NOTE — OUTREACH NOTE
Prep Survey      Flowsheet Row Responses   [facility] patient discharged from? [facility]   Is LACE score < 7 ? No   Eligibility [facility]   Date of Admission 12/26/23   Date of Discharge 12/29/23   Discharge Disposition Home or Self Care   Discharge diagnosis Acute COPD exacerbation   Does the patient have one of the following disease processes/diagnoses(primary or secondary)? COPD   Does the patient have Home health ordered? No   Is there a DME ordered? No   Prep survey completed? Yes            Cassie PRESTON - Registered Nurse

## 2023-12-29 NOTE — PLAN OF CARE
Goal Outcome Evaluation:      Patient being discharged home, no complaints of pain or discomfort, vitals stable.

## 2023-12-31 ENCOUNTER — TRANSITIONAL CARE MANAGEMENT TELEPHONE ENCOUNTER (OUTPATIENT)
Dept: CALL CENTER | Facility: HOSPITAL | Age: 63
End: 2023-12-31
Payer: MEDICAID

## 2023-12-31 NOTE — OUTREACH NOTE
Call Center TCM Note      Flowsheet Row Responses   [facility] patient discharged from? [facility]   Does the patient have one of the following disease processes/diagnoses(primary or secondary)? COPD   TCM attempt successful? Yes   Call start time 1043   Call end time 1048   Discharge diagnosis Acute COPD exacerbation   Meds reviewed with patient/caregiver? Yes   Is the patient having any side effects they believe may be caused by any medication additions or changes? No   Does the patient have all medications ordered at discharge? Yes   Is the patient taking all medications as directed (includes completed medication regime)? Yes   Comments Hospital d/c f/u appt on 1/3/24 @1pm   Does the patient have an appointment with their PCP within 7-14 days of discharge? Yes   Has home health visited the patient within 72 hours of discharge? N/A   DME comments home oxygen, wears 3LO2   Pulse Ox monitoring Intermittent   Pulse Ox device source Patient   O2 Sat comments sats 70's with activity on O2, 92-93% on O2 at rest   O2 Sat: education provided Sat levels   Psychosocial issues? No   Did the patient receive a copy of their discharge instructions? Yes   Nursing interventions Reviewed instructions with patient   What is the patient's perception of their health status since discharge? Same   Nursing Interventions Nurse provided patient education   Is the patient/caregiver able to teach back the hierarchy of who to call/visit for symptoms/problems? PCP, Specialist, Home health nurse, Urgent Care, ED, 911 Yes   Patient reports what zone on this call? Green Zone   Green Zone Reports doing well   TCM call completed? Yes   Call end time 1048   Would this patient benefit from a Referral to Amb Social Work? No   Is the patient interested in additional calls from an ambulatory ? No            Nanda Parks RN    12/31/2023, 10:48 EST

## 2024-01-03 ENCOUNTER — OFFICE VISIT (OUTPATIENT)
Dept: FAMILY MEDICINE CLINIC | Age: 64
End: 2024-01-03
Payer: MEDICAID

## 2024-01-03 VITALS
WEIGHT: 121.2 LBS | DIASTOLIC BLOOD PRESSURE: 64 MMHG | HEIGHT: 67 IN | HEART RATE: 90 BPM | TEMPERATURE: 97.7 F | SYSTOLIC BLOOD PRESSURE: 103 MMHG | BODY MASS INDEX: 19.02 KG/M2 | OXYGEN SATURATION: 92 %

## 2024-01-03 DIAGNOSIS — I27.20 PULMONARY HYPERTENSION: ICD-10-CM

## 2024-01-03 DIAGNOSIS — J44.1 CHRONIC OBSTRUCTIVE PULMONARY DISEASE WITH ACUTE EXACERBATION: Primary | ICD-10-CM

## 2024-01-03 DIAGNOSIS — U07.1 COVID-19: ICD-10-CM

## 2024-01-03 DIAGNOSIS — J96.11 CHRONIC HYPOXIC RESPIRATORY FAILURE: ICD-10-CM

## 2024-01-03 NOTE — ASSESSMENT & PLAN NOTE
continue current pulm rx's, congratulated him on smoking cessation, reviewed discharge summary, complete steroid taper, get him in with pulm NP later this month, then keep upcoming appt with pulm next month

## 2024-01-03 NOTE — Clinical Note
He is seeing pulm next month, but I want him to see NP pulm this month, before he runs out of prednisone

## 2024-01-03 NOTE — PROGRESS NOTES
Noted. I have updated referral to 'Urgent' and added communication to referral notes reflecting this information.     Thanks   dinesh

## 2024-01-03 NOTE — PROGRESS NOTES
Transitional Care Follow Up Visit  Subjective     Sumeet Gomes is a 63 y.o. male who presents for a transitional care management visit.    Within 48 business hours after discharge our office contacted him via telephone to coordinate his care and needs.      I reviewed and discussed the details of that call along with the discharge summary, hospital problems, inpatient lab results, inpatient diagnostic studies, and consultation reports with Sumeet.     Current outpatient and discharge medications have been reconciled for the patient.  Reviewed by: ZULY Segundo          9/18/2022     4:03 PM 12/9/2023     9:02 PM 12/29/2023     4:52 PM   Date of TCM Phone Call   [facility]   Date of Admission 9/12/2022 12/4/2023 12/26/2023   Date of Discharge 9/18/2022 12/9/2023 12/29/2023   Discharge Disposition Home or Self Care Home or Self Care Home or Self Care       Risk for Readmission (LACE) Score: 12 (12/29/2023  6:00 AM)      History of Present Illness  Date of Admission: 12/26/2023  Date of Discharge:  12/29/2023    Condition: improved  Now using 3L oxygen. Completed his Augmetnin and still on a steroid taper, using pulm rx's as directed.  He is still not smoking.  He is here today with his wife.     diagnosis:  Acute COPD exacerbation  COVID-19  Chronic hypoxic respiratory failure  Heart failure not in exacerbation  Pulmonary hypertension     Hospital Course     Hospital Course:  Sumeet Gomes is a 63 y.o. male with medical history of COPD on home oxygen, diastolic heart failure, pulmonary hypertension, continued tobacco abuse, and coronary disease who presents with shortness of breath since Friday     Patient was hospitalized 1st of December for heart failure and COPD.  He was seen in clinic 12/14 with no issues at that time.  He normally wears 3 L of oxygen at home.  He states he had shortness of breath and started desaturating down into the mid 80s so he  increased his oxygen up to 5 L.  He did a home test was positive for COVID.  As result he came the ER for further evaluation. The emergency department the patient's vital signs are as follows: Temperature is 90.5, pulse 93, respirate is 20, blood pressure 108/71, satting 95% on 6 L.  He has been desaturating into the mid 80s at home with his oxygen has been increased over the last couple days.  CBC shows no abnormalities.  CMP shows no significant abnormalities.  BNP is only 436 and troponin is 16.  Chest x-ray shows no acute abnormalities.  Patient was given 1 DuoNeb and 10 of dexamethasone.  Patient was in the hospital for acute COPD exacerbation due to COVID-19.  Patient was monitored in the hospital until felt safe to discharge home.  Will discharge on a short course of Augmentin.  Also discharging on prolonged steroid taper.  Patient will also follow-up with pulmonary rehab.  Patient seen on date of discharge, clinically and hemodynamically stable.  Patient provided concerning signs and symptoms prompting immediate medical attention, patient understanding and agreeable     PMH changes since :        Covid +  and       COPD /sees pulm        Hospitalizations:      covid and COPD 23  COPD   back surgery         PREVENTIVE HEALTH MAINTENANCE             Hepatitis C Medicare Screening: was last done ; negative         Surgical History:         Heart cath   Arthroscopy: left knee  and right shoulder ;     Vasectomy     Other Surgeries:    Laminectomy: lumbar region; ;     Procedures:    Colonoscopy ( 12 )     COLONOSCOPY: was last done 12 with normal results Haddad         Family History:     Father: Hypertension;  Dementia     Mother:  at age 54; Cause of death was stroke;  Hypertension;  COPD     Brother(s): Healthy; 3 brother(s) total     Sister(s): 2 sister(s) total         Social History:     Occupation: [employer] /retired     Marital  "Status:      Children: 3 children          Course During Hospital Stay(Copied from D/C Summary and reviewed during encounter on 01/03/2024 by ZULY Segundo):      The following portions of the patient's history were reviewed and updated as appropriate: allergies, current medications, past family history, past medical history, past social history, past surgical history, and problem list.      Current Outpatient Medications:     albuterol sulfate  (90 Base) MCG/ACT inhaler, Inhale 2 puffs Every 4 (Four) Hours As Needed for Wheezing., Disp: 36 g, Rfl: 6    arformoterol (Brovana) 15 MCG/2ML nebulizer solution, Take 2 mL by nebulization 2 (Two) Times a Day., Disp: 120 mL, Rfl: 11    budesonide (Pulmicort) 0.5 MG/2ML nebulizer solution, Take 2 mL by nebulization 2 (Two) Times a Day., Disp: 60 each, Rfl: 11    furosemide (LASIX) 20 MG tablet, Take 2 tablets by mouth Daily. (Patient taking differently: Take 1 tablet by mouth Daily.), Disp: 60 tablet, Rfl: 11    ipratropium-albuterol (DUO-NEB) 0.5-2.5 mg/3 ml nebulizer, INHALE 3 ML BY NEBULIZATION 4 TIMES A DAY (Patient taking differently: Take 3 mL by nebulization 4 (Four) Times a Day.), Disp: 360 mL, Rfl: 3    pantoprazole (PROTONIX) 40 MG EC tablet, Take 1 tablet by mouth Every Morning., Disp: 30 tablet, Rfl: 11    predniSONE (DELTASONE) 20 MG tablet, Take 2 tablets by mouth Daily for 5 days, THEN 1.5 tablets Daily for 5 days, THEN 1 tablet Daily for 5 days, THEN 0.5 tablets Daily for 5 days., Disp: 25 tablet, Rfl: 0    revefenacin (Yupelri) 175 MCG/3ML nebulizer solution, Take 3 mL by nebulization Daily., Disp: 90 mL, Rfl: 11     Vitals:    01/03/24 1303   BP: 103/64   BP Location: Right arm   Patient Position: Sitting   Cuff Size: Large Adult   Pulse: 90   Temp: 97.7 °F (36.5 °C)   TempSrc: Oral   SpO2: 92%   Weight: 55 kg (121 lb 3.2 oz)   Height: 170.2 cm (67\")       Advance Care Planning     Review of Systems   Constitutional:  Negative for " fatigue and fever.   Respiratory:  Positive for cough (slight) and shortness of breath (stable /improved).    Cardiovascular:  Negative for chest pain and leg swelling.   Gastrointestinal:  Negative for abdominal pain and nausea.        Objective   Physical Exam  Vitals reviewed.   Constitutional:       General: He is not in acute distress.     Appearance: Normal appearance.      Comments: Thin    Neck:      Vascular: No carotid bruit.   Cardiovascular:      Rate and Rhythm: Normal rate and regular rhythm.      Heart sounds: Normal heart sounds. No murmur heard.  Pulmonary:      Effort: Pulmonary effort is normal. No respiratory distress.      Comments: Decreased breath sounds but no adventitious sounds   Musculoskeletal:      Right lower leg: No edema.      Left lower leg: No edema.   Neurological:      Mental Status: He is alert.   Psychiatric:         Mood and Affect: Mood normal.         Behavior: Behavior normal.         DATA REVIEWED:        XR Chest 1 View    Result Date: 12/26/2023  Impression:   1. No acute cardiopulmonary abnormality.  Chronic findings of COPD/emphysema.  No change from prior exam.        HELGA LOPEZ MD       Electronically Signed and Approved By: HELGA LOPEZ MD on 12/26/2023 at 12:45             FL Video Swallow With Speech Single Contrast    Result Date: 12/7/2023  Impression:    1. No tracheal aspiration or laryngeal penetration appreciated on exam  Please consult speech pathology report for further details regarding exam and dietary recommendations    ANGELICA BEARD       Electronically Signed and Approved By: GABRIEL MAGALLON MD on 12/07/2023 at 15:08             CT Abdomen Pelvis Without Contrast    Result Date: 12/6/2023  Impression:   1. Bilateral pleural effusions are noted.  Atelectasis versus infiltrates are seen in the lung bases.  There is mild scattered free fluid throughout the abdomen and pelvis.  Diffuse anasarca is noted.  The findings suggest changes of  CHF/volume overload. 2. No evidence for acute abnormality throughout the abdomen or pelvis on this noncontrast study. 3. No evidence for nephrolithiasis bilaterally. There is no evidence for obstructive uropathy.     JOSE JUAN HERNANDEZ MD       Electronically Signed and Approved By: JOSE JUAN HERNANDEZ MD on 12/06/2023 at 22:37              Assessment & Plan     Diagnoses and all orders for this visit:    1. Chronic obstructive pulmonary disease with acute exacerbation (Primary)  Assessment & Plan:  continue current pulm rx's, congratulated him on smoking cessation, reviewed discharge summary, complete steroid taper, get him in with pulm NP later this month, then keep upcoming appt with pulm next month     Orders:  -     Ambulatory Referral to Pulmonology    2. COVID-19  Assessment & Plan:  Reviewed hospital labs/records/testing and discharge summary     Orders:  -     Ambulatory Referral to Pulmonology    3. Chronic hypoxic respiratory failure  Assessment & Plan:  He continues his use of portable oxygen     Orders:  -     Ambulatory Referral to Pulmonology    4. Pulmonary hypertension  Assessment & Plan:  He is on lasix, off toprol, monitoring bp at home, reviewed his weights, bp and rx's   Discussed healthy eating           Follow Up      Return if symptoms worsen or fail to improve.

## 2024-01-03 NOTE — ASSESSMENT & PLAN NOTE
He is on lasix, off toprol, monitoring bp at home, reviewed his weights, bp and rx's   Discussed healthy eating

## 2024-01-08 ENCOUNTER — READMISSION MANAGEMENT (OUTPATIENT)
Dept: CALL CENTER | Facility: HOSPITAL | Age: 64
End: 2024-01-08
Payer: MEDICAID

## 2024-01-08 NOTE — OUTREACH NOTE
COPD/PN Week 2 Survey      Flowsheet Row Responses   [facility] patient discharged from? [facility]   Does the patient have one of the following disease processes/diagnoses(primary or secondary)? COPD   Week 2 attempt successful? Yes   Call start time 1119   Call end time 1120   Discharge diagnosis Acute COPD exacerbation   Meds reviewed with patient/caregiver? Yes   Is the patient taking all medications as directed (includes completed medication regime)? Yes   Does the patient have a primary care provider?  Yes   Has the patient kept scheduled appointments due by today? Yes  [PCP apt 1/3/24]   Pulse Ox monitoring Intermittent   Pulse Ox device source Patient   O2 Sat comments stays in the 90's with oxygen   O2 Sat: education provided Sat levels, Monitoring frequency, When to seek care   What is the patient's perception of their health status since discharge? Improving   If the patient is a current smoker, are they able to teach back resources for cessation? Not a smoker   Is the patient/caregiver able to teach back the hierarchy of who to call/visit for symptoms/problems? PCP, Specialist, Home health nurse, Urgent Care, ED, 911 Yes   Is the patient able to teach back COPD zones? Yes   Nursing interventions Education provided on various zones   Patient reports what zone on this call? Green Zone   Green Zone Reports doing well   Green Zone interventions: Take daily medications, Use oxygen as prescribed   Week 2 call completed? Yes   Graduated Yes   Graduated/Revoked comments Pt reports he is doing well-no issues during call   Call end time 1120            Lizette H - Registered Nurse

## 2024-01-12 ENCOUNTER — TELEPHONE (OUTPATIENT)
Dept: PULMONOLOGY | Facility: CLINIC | Age: 64
End: 2024-01-12
Payer: MEDICAID

## 2024-01-12 NOTE — TELEPHONE ENCOUNTER
Pt called and left a message on [employer] phone about oxygen.  I have tried to call but no answer.  Will call again.

## 2024-01-12 NOTE — TELEPHONE ENCOUNTER
I called and spoke with patient, he stated that he is in need of re certification information sent over to his DME company for his oxygen and cpap due to his insurance needing it. I have sent a email to juan pablo, awaiting reply.

## 2024-01-17 ENCOUNTER — OFFICE VISIT (OUTPATIENT)
Dept: PULMONOLOGY | Facility: CLINIC | Age: 64
End: 2024-01-17
Payer: MEDICAID

## 2024-01-17 VITALS
WEIGHT: 131 LBS | OXYGEN SATURATION: 91 % | SYSTOLIC BLOOD PRESSURE: 116 MMHG | BODY MASS INDEX: 20.56 KG/M2 | HEART RATE: 98 BPM | TEMPERATURE: 97.6 F | DIASTOLIC BLOOD PRESSURE: 78 MMHG | RESPIRATION RATE: 16 BRPM | HEIGHT: 67 IN

## 2024-01-17 DIAGNOSIS — Z99.81 ON HOME OXYGEN THERAPY: ICD-10-CM

## 2024-01-17 DIAGNOSIS — F17.211 NICOTINE DEPENDENCE, CIGARETTES, IN REMISSION: ICD-10-CM

## 2024-01-17 DIAGNOSIS — J96.11 CHRONIC RESPIRATORY FAILURE WITH HYPOXIA AND HYPERCAPNIA: ICD-10-CM

## 2024-01-17 DIAGNOSIS — R06.09 DYSPNEA ON EXERTION: ICD-10-CM

## 2024-01-17 DIAGNOSIS — J96.12 CHRONIC RESPIRATORY FAILURE WITH HYPOXIA AND HYPERCAPNIA: ICD-10-CM

## 2024-01-17 DIAGNOSIS — J44.9 CHRONIC OBSTRUCTIVE PULMONARY DISEASE, UNSPECIFIED COPD TYPE: Primary | ICD-10-CM

## 2024-01-17 RX ORDER — ROFLUMILAST 500 UG/1
500 TABLET ORAL DAILY
Qty: 30 TABLET | Refills: 5 | Status: SHIPPED | OUTPATIENT
Start: 2024-02-14

## 2024-01-17 RX ORDER — ROFLUMILAST 250 UG/1
250 TABLET ORAL DAILY
Qty: 28 TABLET | Refills: 0 | Status: SHIPPED | OUTPATIENT
Start: 2024-01-17 | End: 2024-02-14

## 2024-01-17 NOTE — PATIENT INSTRUCTIONS
COPD and Physical Activity  Chronic obstructive pulmonary disease (COPD) is a long-term, or chronic, condition that affects the lungs. COPD is a general term that can be used to describe many problems that cause inflammation of the lungs and limit airflow. These conditions include chronic bronchitis and emphysema.  The main symptom of COPD is shortness of breath, which makes it harder to do even simple tasks. This can also make it harder to exercise and stay active. Talk with your health care provider about treatments to help you breathe better and actions you can take to prevent breathing problems during physical activity.  What are the benefits of exercising when you have COPD?  Exercising regularly is an important part of a healthy lifestyle. You can still exercise and do physical activities even though you have COPD. Exercise and physical activity improve your shortness of breath by increasing blood flow (circulation). This causes your heart to pump more oxygen through your body. Moderate exercise can:  Improve oxygen use.  Increase your energy level.  Help with shortness of breath.  Strengthen your breathing muscles.  Improve heart health.  Help with sleep.  Improve your self-esteem and feelings of self-worth.  Lower depression, stress, and anxiety.  Exercise can benefit everyone with COPD. The severity of your disease may affect how hard you can exercise, especially at first, but everyone can benefit. Talk with your health care provider about how much exercise is safe for you, and which activities and exercises are safe for you.  What actions can I take to prevent breathing problems during physical activity?  Sign up for a pulmonary rehabilitation program. This type of program may include:  Education about lung diseases.  Exercise classes that teach you how to exercise and be more active while improving your breathing. This usually involves:  Exercise using your lower extremities, such as a stationary  bicycle.  About 30 minutes of exercise, 2 to 5 times per week, for 6 to 12 weeks.  Strength training, such as push-ups or leg lifts.  Nutrition education.  Group classes in which you can talk with others who also have COPD and learn ways to manage stress.  If you use an oxygen tank, you should use it while you exercise. Work with your health care provider to adjust your oxygen for your physical activity. Your resting flow rate is different from your flow rate during physical activity.  How to manage your breathing while exercising  While you are exercising:  Take slow breaths.  Pace yourself, and do nottry to go too fast.  Purse your lips while breathing out. Pursing your lips is similar to a kissing or whistling position.  If doing exercise that uses a quick burst of effort, such as weight lifting:  Breathe in before starting the exercise.  Breathe out during the hardest part of the exercise, such as raising the weights.  Where to find support  You can find support for exercising with COPD from:  Your health care provider.  A pulmonary rehabilitation program.  Your local health department or community health programs.  Support groups, either online or in-person. Your health care provider may be able to recommend support groups.  Where to find more information  You can find more information about exercising with COPD from:  American Lung Association: lung.org  COPD Foundation: copdfoundation.org  Contact a health care provider if:  Your symptoms get worse.  You have nausea.  You have a fever.  You want to start a new exercise program or a new activity.  Get help right away if:  You have chest pain.  You cannot breathe.  These symptoms may represent a serious problem that is an emergency. Do not wait to see if the symptoms will go away. Get medical help right away. Call your local emergency services (911 in the [place] Do not drive yourself to the hospital.  Summary  COPD is a general term that can be used to describe  many different lung problems that cause lung inflammation and limit airflow. This includes chronic bronchitis and emphysema.  Exercise and physical activity improve your shortness of breath by increasing blood flow (circulation). This causes your heart to provide more oxygen to your body.  Contact your health care provider before starting any exercise program or new activity. Ask your health care provider what exercises and activities are safe for you.  This information is not intended to replace advice given to you by your health care provider. Make sure you discuss any questions you have with your health care provider.  Document Revised: 10/26/2021 Document Reviewed: 10/26/2021  [employer] Patient Education © 2023 [employer]

## 2024-01-17 NOTE — PROGRESS NOTES
"Primary Care Provider  Mirtha Alexander APRN     Referring Provider  No ref. provider found     Chief Complaint  COPD, Emphysema, Shortness of Breath, Cough (Intermittent, gray/yellowish thick mucus \"glu-like\"), Wheezing, and Follow-up    Subjective          Sumeet Gomes presents to [facility] PULMONARY & CRITICAL CARE MEDICINE  History of Present Illness  Sumeet Gomes is a 63 y.o. male patient of Dr. Moya with hypoxic and hypercapnic respiratory failure, asthma/COPD overlap syndrome, emphysema, pulmonary hypertension and tobacco abuse ongoing.     Patient had a rehospitalization since his last office visit.  Patient was admitted from 12/26/2023 until 12/29/2023.  Per his discharge summary, patient had complained of worsening shortness of breath with desaturations to the mid 80s.  He typically wears 3 L of oxygen but did increase to 5 L.  Patient tested positive for COVID on a home test and presented to the emergency room for further evaluation.  Was given 1 breathing treatment and 10 mg of dexamethasone.  He was hospitalized for acute COPD exacerbation due to COVID-19.  Patient was discharged with a short course of Augmentin and a prolonged steroid taper.    Patient states he is doing okay since discharge from the hospital.  He does still complain of shortness of breath with exertion.  He continues to use Brovana and Pulmicort as prescribed.  He will intermittently use his DuoNebs as needed.  He continues to wear 4L pulsed dose of oxygen.  He does wear his astral vent nightly with oxygen bled in.  Patient is using and benefiting from his oxygen and vent.  Patient is to continue using both.  His most recent NIPPV compliance is from 1/19/2023 until 1/17/2024.  This shows usage of 28/30 days.  Average usage on days used is 10 hours 4 minutes with an AHI of 0.3.  He continues to follow-up with cardiology for management of heart failure.  He is also being followed closely by nephrology.  He " continues with a smoking cessation and has not smoked since 2023.  Overall, he is doing well and has no additional concerns at this time.  He is able to perform his ADLs without difficulty.  He is up-to-date with his COVID, flu and pneumonia vaccines.     His history of smoking is   Tobacco Use: Medium Risk (2024)    Patient History     Smoking Tobacco Use: Former     Smokeless Tobacco Use: Never     Passive Exposure: Past   .    Review of Systems   Constitutional:  Negative for chills, fatigue, fever, unexpected weight gain and unexpected weight loss.   HENT:  Congestion: Nasal.    Respiratory:  Positive for cough, shortness of breath and wheezing. Negative for apnea.         Negative for Hemoptysis     Cardiovascular:  Negative for chest pain, palpitations and leg swelling.   Skin:         Negative for cyanosis      Sleep: Negative for Excessive daytime sleepiness  Negative for morning headaches  Negative for Snoring    Family History   Problem Relation Age of Onset    Asthma Mother     Emphysema Mother             Stroke Mother         Social History     Socioeconomic History    Marital status:    Tobacco Use    Smoking status: Former     Packs/day: 0.25     Years: 47.00     Additional pack years: 0.00     Total pack years: 11.75     Types: Cigarettes     Start date: 1975     Quit date: 2023     Years since quittin.1     Passive exposure: Past    Smokeless tobacco: Never   Vaping Use    Vaping Use: Never used   Substance and Sexual Activity    Alcohol use: Not Currently    Drug use: Never    Sexual activity: Defer        Past Medical History:   Diagnosis Date    CHF (congestive heart failure)     COPD (chronic obstructive pulmonary disease)     COPD (chronic obstructive pulmonary disease)     Coronary artery disease     Diastolic heart failure     Hyperlipidemia     Hypertension     Pulmonary hypertension         Immunization History   Administered Date(s) Administered     COVID-19 (MODERNA) 1st,2nd,3rd Dose Monovalent 06/18/2021, 06/18/2021, 07/23/2021, 07/23/2021    Flu Vaccine Quad PF >36MO 11/08/2019    Fluzone (or Fluarix & Flulaval for VFC) >6mos 11/08/2019, 10/25/2021, 10/06/2022, 12/14/2023    Hepatitis A 11/13/2018    Pneumococcal Conjugate 13-Valent (PCV13) 11/07/2018    Pneumococcal Conjugate 20-Valent (PCV20) 11/08/2022    Pneumococcal Polysaccharide (PPSV23) 02/13/2017    Tdap 06/13/2012    Zostavax 06/13/2012         No Known Allergies       Current Outpatient Medications:     albuterol sulfate  (90 Base) MCG/ACT inhaler, Inhale 2 puffs Every 4 (Four) Hours As Needed for Wheezing., Disp: 36 g, Rfl: 6    arformoterol (Brovana) 15 MCG/2ML nebulizer solution, Take 2 mL by nebulization 2 (Two) Times a Day., Disp: 120 mL, Rfl: 11    budesonide (Pulmicort) 0.5 MG/2ML nebulizer solution, Take 2 mL by nebulization 2 (Two) Times a Day., Disp: 60 each, Rfl: 11    furosemide (LASIX) 20 MG tablet, Take 2 tablets by mouth Daily. (Patient taking differently: Take 1 tablet by mouth Daily.), Disp: 60 tablet, Rfl: 11    ipratropium-albuterol (DUO-NEB) 0.5-2.5 mg/3 ml nebulizer, INHALE 3 ML BY NEBULIZATION 4 TIMES A DAY (Patient taking differently: Take 3 mL by nebulization 4 (Four) Times a Day.), Disp: 360 mL, Rfl: 3    pantoprazole (PROTONIX) 40 MG EC tablet, Take 1 tablet by mouth Every Morning., Disp: 30 tablet, Rfl: 11    predniSONE (DELTASONE) 20 MG tablet, Take 2 tablets by mouth Daily for 5 days, THEN 1.5 tablets Daily for 5 days, THEN 1 tablet Daily for 5 days, THEN 0.5 tablets Daily for 5 days., Disp: 25 tablet, Rfl: 0    revefenacin (Yupelri) 175 MCG/3ML nebulizer solution, Take 3 mL by nebulization Daily., Disp: 90 mL, Rfl: 11    roflumilast (Daliresp) 250 MCG tablet tablet, Take 1 tablet by mouth Daily for 28 days., Disp: 28 tablet, Rfl: 0    [START ON 2/14/2024] roflumilast (DALIRESP) 500 MCG tablet tablet, Take 1 tablet by mouth Daily., Disp: 30 tablet, Rfl: 5  "    Objective   Physical Exam  Constitutional:       General: He is not in acute distress.     Appearance: Normal appearance. He is normal weight.   HENT:      Right Ear: Hearing normal.      Left Ear: Hearing normal.      Nose: No nasal tenderness or congestion.      Mouth/Throat:      Mouth: Mucous membranes are moist. No oral lesions.   Eyes:      Extraocular Movements: Extraocular movements intact.      Pupils: Pupils are equal, round, and reactive to light.   Neck:      Thyroid: No thyroid mass or thyromegaly.   Cardiovascular:      Rate and Rhythm: Normal rate and regular rhythm.      Pulses: Normal pulses.      Heart sounds: Normal heart sounds. No murmur heard.  Pulmonary:      Effort: Pulmonary effort is normal.      Breath sounds: Decreased breath sounds present. No wheezing, rhonchi or rales.      Comments: Patient is on 4 L pulsed dose oxygen.  He is able to speak full sentences without difficulty.  Musculoskeletal:      Cervical back: Neck supple.      Right lower leg: No edema.      Left lower leg: No edema.   Lymphadenopathy:      Cervical: No cervical adenopathy.      Upper Body:      Right upper body: No axillary adenopathy.   Skin:     General: Skin is warm and dry.      Findings: No lesion or rash.   Neurological:      General: No focal deficit present.      Mental Status: He is alert and oriented to person, place, and time.   Psychiatric:         Mood and Affect: Affect normal. Mood is not anxious or depressed.         Vital Signs:   /78 (BP Location: Right arm, Patient Position: Sitting, Cuff Size: Adult)   Pulse 98   Temp 97.6 °F (36.4 °C) (Temporal)   Resp 16   Ht 170.2 cm (67\")   Wt 59.4 kg (131 lb)   SpO2 91% Comment: 4 L's PD  BMI 20.52 kg/m²        Result Review :   The following data was reviewed by: ZULY Contreras on 01/17/2024:  CMP          12/27/2023    05:50 12/28/2023    04:02 12/29/2023    05:35   CMP   Glucose 112  115  92    BUN 17  12  15    Creatinine 0.52  " 0.46  0.46    EGFR 114.0  118.3  118.3    Sodium 137  140  141    Potassium 4.4  4.0  4.1    Chloride 96  99  103    Calcium 9.5  9.1  9.0    Total Protein 6.1      Albumin 3.3      Globulin 2.8      Total Bilirubin 0.3      Alkaline Phosphatase 139      AST (SGOT) 27      ALT (SGPT) 15      Albumin/Globulin Ratio 1.2      BUN/Creatinine Ratio 32.7  26.1  32.6    Anion Gap 12.4  9.4  4.9      CBC w/diff          12/27/2023    05:50 12/28/2023    04:02 12/29/2023    05:35   CBC w/Diff   WBC 3.27  5.33  5.69    RBC 4.62  4.40  4.31    Hemoglobin 12.8  12.1  11.7    Hematocrit 40.5  38.3  38.0    MCV 87.7  87.0  88.2    MCH 27.7  27.5  27.1    MCHC 31.6  31.6  30.8    RDW 14.0  14.1  14.3    Platelets 139  181  207    Neutrophil Rel % 67.6      Immature Granulocyte Rel % 0.3      Lymphocyte Rel % 13.5      Monocyte Rel % 18.3      Eosinophil Rel % 0.0      Basophil Rel % 0.3          Covid Tests          12/26/2023    14:06   Common Labsle   COVID19 Detected      Data reviewed : Radiologic studies chest CT 3/9/2023, chest x-ray 12/26/2023, pulmonary function test 10/13/2022, Recent hospitalization notes Hospital discharge summary 12/29/2023, and my last office note    Procedures        Assessment and Plan    Diagnoses and all orders for this visit:    1. Chronic obstructive pulmonary disease, unspecified COPD type (Primary)  -     roflumilast (Daliresp) 250 MCG tablet tablet; Take 1 tablet by mouth Daily for 28 days.  Dispense: 28 tablet; Refill: 0  -     roflumilast (DALIRESP) 500 MCG tablet tablet; Take 1 tablet by mouth Daily.  Dispense: 30 tablet; Refill: 5    2. Chronic respiratory failure with hypoxia and hypercapnia    3. Diastolic heart failure    4. Dyspnea on exertion    5. Nicotine dependence, cigarettes, in remission    6.  Continue Brovana, Pulmicort and Yupelri as prescribed.  Rinse mouth after each use.  7.  Continue albuterol and DuoNebs as needed.  8.  Start Daliresp.  9.  Continue NIPPV at night and  with naps.  Clean mask and tubing daily.  Patient is using and benefitting.  10.  Continue supplemental oxygen to maintain saturations at or above 89%.  Patient is using and benefiting.  11.  Encourage patient to attend pulmonary rehab once scheduled for COPD and recent COVID infection.  12.  Follow-up with Dr. Moya as scheduled, sooner if needed.        Follow Up   Return for Next scheduled follow up.  Patient was given instructions and counseling regarding his condition or for health maintenance advice. Please see specific information pulled into the AVS if appropriate.

## 2024-01-27 DIAGNOSIS — Z99.81 ON HOME OXYGEN THERAPY: ICD-10-CM

## 2024-01-29 RX ORDER — FUROSEMIDE 20 MG/1
40 TABLET ORAL DAILY
Qty: 60 TABLET | Refills: 11 | OUTPATIENT
Start: 2024-01-29

## 2024-02-12 ENCOUNTER — OFFICE VISIT (OUTPATIENT)
Dept: CARDIAC REHAB | Facility: HOSPITAL | Age: 64
End: 2024-02-12
Payer: MEDICAID

## 2024-02-12 VITALS
HEART RATE: 120 BPM | SYSTOLIC BLOOD PRESSURE: 128 MMHG | OXYGEN SATURATION: 93 % | HEIGHT: 67 IN | BODY MASS INDEX: 20.55 KG/M2 | DIASTOLIC BLOOD PRESSURE: 80 MMHG | WEIGHT: 130.95 LBS

## 2024-02-12 DIAGNOSIS — U07.1 COVID-19: Primary | ICD-10-CM

## 2024-02-12 PROCEDURE — 94625 PHY/QHP OP PULM RHB W/O MNTR: CPT

## 2024-02-14 ENCOUNTER — TREATMENT (OUTPATIENT)
Dept: CARDIAC REHAB | Facility: HOSPITAL | Age: 64
End: 2024-02-14
Payer: MEDICAID

## 2024-02-14 DIAGNOSIS — U07.1 COVID-19: Primary | ICD-10-CM

## 2024-02-14 PROCEDURE — 94625 PHY/QHP OP PULM RHB W/O MNTR: CPT

## 2024-02-15 DIAGNOSIS — J44.9 CHRONIC OBSTRUCTIVE PULMONARY DISEASE, UNSPECIFIED COPD TYPE: ICD-10-CM

## 2024-02-15 RX ORDER — ROFLUMILAST 250 UG/1
250 TABLET ORAL DAILY
Qty: 28 TABLET | Refills: 0 | OUTPATIENT
Start: 2024-02-15

## 2024-02-16 ENCOUNTER — TREATMENT (OUTPATIENT)
Dept: CARDIAC REHAB | Facility: HOSPITAL | Age: 64
End: 2024-02-16
Payer: MEDICAID

## 2024-02-16 DIAGNOSIS — U07.1 COVID-19: Primary | ICD-10-CM

## 2024-02-16 PROCEDURE — 94625 PHY/QHP OP PULM RHB W/O MNTR: CPT

## 2024-02-19 ENCOUNTER — TREATMENT (OUTPATIENT)
Dept: CARDIAC REHAB | Facility: HOSPITAL | Age: 64
End: 2024-02-19
Payer: MEDICAID

## 2024-02-19 DIAGNOSIS — U07.1 COVID-19: Primary | ICD-10-CM

## 2024-02-19 PROCEDURE — 94625 PHY/QHP OP PULM RHB W/O MNTR: CPT

## 2024-02-21 ENCOUNTER — TREATMENT (OUTPATIENT)
Dept: CARDIAC REHAB | Facility: HOSPITAL | Age: 64
End: 2024-02-21
Payer: MEDICAID

## 2024-02-21 DIAGNOSIS — U07.1 COVID-19: Primary | ICD-10-CM

## 2024-02-21 PROCEDURE — 94625 PHY/QHP OP PULM RHB W/O MNTR: CPT

## 2024-02-22 ENCOUNTER — OFFICE VISIT (OUTPATIENT)
Dept: PULMONOLOGY | Facility: CLINIC | Age: 64
End: 2024-02-22
Payer: MEDICAID

## 2024-02-22 VITALS
BODY MASS INDEX: 20.69 KG/M2 | OXYGEN SATURATION: 93 % | SYSTOLIC BLOOD PRESSURE: 112 MMHG | HEIGHT: 67 IN | WEIGHT: 131.8 LBS | DIASTOLIC BLOOD PRESSURE: 63 MMHG | RESPIRATION RATE: 18 BRPM | HEART RATE: 97 BPM

## 2024-02-22 DIAGNOSIS — J96.01 ACUTE RESPIRATORY FAILURE WITH HYPOXIA AND HYPERCAPNIA: ICD-10-CM

## 2024-02-22 DIAGNOSIS — U07.1 COVID-19: ICD-10-CM

## 2024-02-22 DIAGNOSIS — J96.11 CHRONIC HYPOXIC RESPIRATORY FAILURE: ICD-10-CM

## 2024-02-22 DIAGNOSIS — R05.9 COUGH, UNSPECIFIED TYPE: ICD-10-CM

## 2024-02-22 DIAGNOSIS — S20.211A CHEST WALL CONTUSION, RIGHT, INITIAL ENCOUNTER: ICD-10-CM

## 2024-02-22 DIAGNOSIS — J44.9 CHRONIC OBSTRUCTIVE PULMONARY DISEASE, UNSPECIFIED COPD TYPE: Primary | ICD-10-CM

## 2024-02-22 DIAGNOSIS — J96.01 ACUTE RESPIRATORY FAILURE WITH HYPOXIA: ICD-10-CM

## 2024-02-22 DIAGNOSIS — Z99.81 ON HOME OXYGEN THERAPY: ICD-10-CM

## 2024-02-22 DIAGNOSIS — J96.02 ACUTE RESPIRATORY FAILURE WITH HYPOXIA AND HYPERCAPNIA: ICD-10-CM

## 2024-02-22 DIAGNOSIS — I27.20 PULMONARY HYPERTENSION: ICD-10-CM

## 2024-02-22 RX ORDER — AZITHROMYCIN 250 MG/1
250 TABLET, FILM COATED ORAL DAILY
Qty: 30 TABLET | Refills: 11 | Status: SHIPPED | OUTPATIENT
Start: 2024-02-22

## 2024-02-22 RX ORDER — PREDNISONE 10 MG/1
TABLET ORAL
Qty: 45 TABLET | Refills: 2 | Status: SHIPPED | OUTPATIENT
Start: 2024-02-22

## 2024-02-22 NOTE — PROGRESS NOTES
Primary Care Provider  Mirtha Alexander APRN     Referring Provider  No ref. provider found     Chief Complaint  COPD, Shortness of Breath, Wheezing, Cough, and Follow-up (Pt here for 6 month follow up)    Subjective          Sumeet Gomes presents to [facility] PULMONARY & CRITICAL CARE MEDICINE  History of Present Illness  Sumeet Gomes is a 63 y.o. male patient with hypoxic and hypercapnic respiratory failure, asthma/COPD overlap syndrome, emphysema, pulmonary hypertension and tobacco abuse ongoing.   Since his last office visit, he has been on Brovana and Pulmicort nebulizers twice daily and Yupelri nebulizer once daily.  He continues to use albuterol for rescue 2-3 times a day.  He was started on Daliresp a month ago and feels like it is helping some.  He continues to use oxygen at 3 L/min with rest, increases up to 4 L at times.  He has no significant changes in weight or appetite.  He had needed several rounds of antibiotics and steroids over the last year prior to his COVID-19 hospitalization.  He is agreeable to start daily azithromycin to help with recurrent exacerbations.  I reviewed his BiPAP usage data today.  His average daily use has been 9 hours and 47 minutes with average apnea-hypopnea index of 0.2.  He is on AVAPS mode with minimum pressure support of 8, maximum pressure support of 20, minimum EPAP of 5 maximum EPAP of 15. He continues to follow-up with cardiology for management of heart failure.  He is also being followed closely by nephrology.  He continues with a smoking cessation and has not smoked since 12/1/2023.  Overall, he is doing well and has no additional concerns at this time.  He is able to perform his ADLs without difficulty.  He is up-to-date with his COVID, flu and pneumonia vaccines.  He has not taken RSV vaccine and is willing to take it.     His history of smoking is   Tobacco Use: Medium Risk (2/22/2024)    Patient History     Smoking Tobacco Use:  Former     Smokeless Tobacco Use: Never     Passive Exposure: Past   .    Review of Systems   Constitutional:  Negative for chills, fatigue, fever, unexpected weight gain and unexpected weight loss.   HENT:  Congestion: Nasal.    Respiratory:  Positive for cough, shortness of breath and wheezing. Negative for apnea.         Negative for Hemoptysis     Cardiovascular:  Negative for chest pain, palpitations and leg swelling.   Skin:         Negative for cyanosis      Sleep: Negative for Excessive daytime sleepiness  Negative for morning headaches  Negative for Snoring    Family History   Problem Relation Age of Onset    Asthma Mother     Emphysema Mother             Stroke Mother         Social History     Socioeconomic History    Marital status:    Tobacco Use    Smoking status: Former     Packs/day: 0.25     Years: 47.00     Additional pack years: 0.00     Total pack years: 11.75     Types: Cigarettes     Start date: 1975     Quit date: 2023     Years since quittin.2     Passive exposure: Past    Smokeless tobacco: Never   Vaping Use    Vaping Use: Never used   Substance and Sexual Activity    Alcohol use: Not Currently    Drug use: Never    Sexual activity: Defer        Past Medical History:   Diagnosis Date    CHF (congestive heart failure)     COPD (chronic obstructive pulmonary disease)     COPD (chronic obstructive pulmonary disease)     Coronary artery disease     Diastolic heart failure     Hyperlipidemia     Hypertension     Pulmonary hypertension         Immunization History   Administered Date(s) Administered    COVID-19 (MODERNA) 1st,2nd,3rd Dose Monovalent 2021, 2021, 2021, 2021    Flu Vaccine Quad PF >36MO 2019    Fluzone (or Fluarix & Flulaval for VFC) >6mos 2019, 10/25/2021, 10/06/2022, 2023    Hepatitis A 2018    Pneumococcal Conjugate 13-Valent (PCV13) 2018    Pneumococcal Conjugate 20-Valent (PCV20) 2022     Pneumococcal Polysaccharide (PPSV23) 02/13/2017    Tdap 06/13/2012    Zostavax 06/13/2012         No Known Allergies       Current Outpatient Medications:     albuterol sulfate  (90 Base) MCG/ACT inhaler, Inhale 2 puffs Every 4 (Four) Hours As Needed for Wheezing., Disp: 36 g, Rfl: 6    arformoterol (Brovana) 15 MCG/2ML nebulizer solution, Take 2 mL by nebulization 2 (Two) Times a Day., Disp: 120 mL, Rfl: 11    budesonide (Pulmicort) 0.5 MG/2ML nebulizer solution, Take 2 mL by nebulization 2 (Two) Times a Day., Disp: 60 each, Rfl: 11    furosemide (LASIX) 20 MG tablet, Take 2 tablets by mouth Daily. (Patient taking differently: Take 1 tablet by mouth Daily.), Disp: 60 tablet, Rfl: 11    ipratropium-albuterol (DUO-NEB) 0.5-2.5 mg/3 ml nebulizer, INHALE 3 ML BY NEBULIZATION 4 TIMES A DAY (Patient taking differently: Take 3 mL by nebulization 4 (Four) Times a Day.), Disp: 360 mL, Rfl: 3    pantoprazole (PROTONIX) 40 MG EC tablet, Take 1 tablet by mouth Every Morning., Disp: 30 tablet, Rfl: 11    revefenacin (Yupelri) 175 MCG/3ML nebulizer solution, Take 3 mL by nebulization Daily., Disp: 90 mL, Rfl: 11    roflumilast (DALIRESP) 500 MCG tablet tablet, Take 1 tablet by mouth Daily., Disp: 30 tablet, Rfl: 5    azithromycin (ZITHROMAX) 250 MG tablet, Take 1 tablet by mouth Daily., Disp: 30 tablet, Rfl: 11    predniSONE (DELTASONE) 10 MG tablet, 50mg qday x 3 days, 40mg qday x 3 days, 30mg qday x 3 days, 20mg qday x 3 days, 10mg qday x 3 days, then stop, Disp: 45 tablet, Rfl: 2    roflumilast (Daliresp) 250 MCG tablet tablet, Take 1 tablet by mouth Daily for 28 days., Disp: 28 tablet, Rfl: 0     Objective   Physical Exam  Constitutional:       General: He is not in acute distress.     Appearance: Normal appearance. He is normal weight.   HENT:      Right Ear: Hearing normal.      Left Ear: Hearing normal.      Nose: No nasal tenderness or congestion.      Mouth/Throat:      Mouth: Mucous membranes are moist. No oral  "lesions.   Eyes:      Extraocular Movements: Extraocular movements intact.      Pupils: Pupils are equal, round, and reactive to light.   Neck:      Thyroid: No thyroid mass or thyromegaly.   Cardiovascular:      Rate and Rhythm: Normal rate and regular rhythm.      Pulses: Normal pulses.      Heart sounds: Normal heart sounds. No murmur heard.  Pulmonary:      Effort: Pulmonary effort is normal.      Breath sounds: Decreased breath sounds present. No wheezing, rhonchi or rales.      Comments: Patient is on 4 L pulsed dose oxygen.  He is able to speak full sentences without difficulty.  Musculoskeletal:      Cervical back: Neck supple.      Right lower leg: No edema.      Left lower leg: No edema.   Lymphadenopathy:      Cervical: No cervical adenopathy.      Upper Body:      Right upper body: No axillary adenopathy.   Skin:     General: Skin is warm and dry.      Findings: No lesion or rash.   Neurological:      General: No focal deficit present.      Mental Status: He is alert and oriented to person, place, and time.   Psychiatric:         Mood and Affect: Affect normal. Mood is not anxious or depressed.         Vital Signs:   /63 (BP Location: Left arm, Patient Position: Sitting, Cuff Size: Adult)   Pulse 97   Resp 18   Ht 170.2 cm (67\")   Wt 59.8 kg (131 lb 12.8 oz)   SpO2 93% Comment: nasal cannula/4 liters/pulse dose  BMI 20.64 kg/m²        Result Review :   The following data was reviewed by: Ulices Moya MD on 01/17/2024:  CMP          12/27/2023    05:50 12/28/2023    04:02 12/29/2023    05:35   CMP   Glucose 112  115  92    BUN 17  12  15    Creatinine 0.52  0.46  0.46    EGFR 114.0  118.3  118.3    Sodium 137  140  141    Potassium 4.4  4.0  4.1    Chloride 96  99  103    Calcium 9.5  9.1  9.0    Total Protein 6.1      Albumin 3.3      Globulin 2.8      Total Bilirubin 0.3      Alkaline Phosphatase 139      AST (SGOT) 27      ALT (SGPT) 15      Albumin/Globulin Ratio 1.2      BUN/Creatinine " Ratio 32.7  26.1  32.6    Anion Gap 12.4  9.4  4.9      CBC w/diff          12/27/2023    05:50 12/28/2023    04:02 12/29/2023    05:35   CBC w/Diff   WBC 3.27  5.33  5.69    RBC 4.62  4.40  4.31    Hemoglobin 12.8  12.1  11.7    Hematocrit 40.5  38.3  38.0    MCV 87.7  87.0  88.2    MCH 27.7  27.5  27.1    MCHC 31.6  31.6  30.8    RDW 14.0  14.1  14.3    Platelets 139  181  207    Neutrophil Rel % 67.6      Immature Granulocyte Rel % 0.3      Lymphocyte Rel % 13.5      Monocyte Rel % 18.3      Eosinophil Rel % 0.0      Basophil Rel % 0.3          Covid Tests          12/26/2023    14:06   Common Labsle   COVID19 Detected      Data reviewed : Radiologic studies chest CT 3/9/2023, chest x-ray 12/26/2023, pulmonary function test 10/13/2022, Recent hospitalization notes Hospital discharge summary 12/29/2023, and my last office note    Procedures        Assessment and Plan    Diagnoses and all orders for this visit:    1. Chronic obstructive pulmonary disease, unspecified COPD type (Primary)  -      CT Chest Low Dose Cancer Screening WO; Future  -     predniSONE (DELTASONE) 10 MG tablet; 50mg qday x 3 days, 40mg qday x 3 days, 30mg qday x 3 days, 20mg qday x 3 days, 10mg qday x 3 days, then stop  Dispense: 45 tablet; Refill: 2  -     azithromycin (ZITHROMAX) 250 MG tablet; Take 1 tablet by mouth Daily.  Dispense: 30 tablet; Refill: 11    2. Acute respiratory failure with hypoxia and hypercapnia  -      CT Chest Low Dose Cancer Screening WO; Future  -     predniSONE (DELTASONE) 10 MG tablet; 50mg qday x 3 days, 40mg qday x 3 days, 30mg qday x 3 days, 20mg qday x 3 days, 10mg qday x 3 days, then stop  Dispense: 45 tablet; Refill: 2  -     azithromycin (ZITHROMAX) 250 MG tablet; Take 1 tablet by mouth Daily.  Dispense: 30 tablet; Refill: 11    3. Pulmonary hypertension  -      CT Chest Low Dose Cancer Screening WO; Future  -     predniSONE (DELTASONE) 10 MG tablet; 50mg qday x 3 days, 40mg qday x 3 days, 30mg qday x 3  days, 20mg qday x 3 days, 10mg qday x 3 days, then stop  Dispense: 45 tablet; Refill: 2  -     azithromycin (ZITHROMAX) 250 MG tablet; Take 1 tablet by mouth Daily.  Dispense: 30 tablet; Refill: 11    4. Cough, unspecified type  -      CT Chest Low Dose Cancer Screening WO; Future  -     predniSONE (DELTASONE) 10 MG tablet; 50mg qday x 3 days, 40mg qday x 3 days, 30mg qday x 3 days, 20mg qday x 3 days, 10mg qday x 3 days, then stop  Dispense: 45 tablet; Refill: 2  -     azithromycin (ZITHROMAX) 250 MG tablet; Take 1 tablet by mouth Daily.  Dispense: 30 tablet; Refill: 11    5. Diastolic heart failure  -      CT Chest Low Dose Cancer Screening WO; Future  -     predniSONE (DELTASONE) 10 MG tablet; 50mg qday x 3 days, 40mg qday x 3 days, 30mg qday x 3 days, 20mg qday x 3 days, 10mg qday x 3 days, then stop  Dispense: 45 tablet; Refill: 2  -     azithromycin (ZITHROMAX) 250 MG tablet; Take 1 tablet by mouth Daily.  Dispense: 30 tablet; Refill: 11    6. Acute respiratory failure with hypoxia  -      CT Chest Low Dose Cancer Screening WO; Future  -     predniSONE (DELTASONE) 10 MG tablet; 50mg qday x 3 days, 40mg qday x 3 days, 30mg qday x 3 days, 20mg qday x 3 days, 10mg qday x 3 days, then stop  Dispense: 45 tablet; Refill: 2  -     azithromycin (ZITHROMAX) 250 MG tablet; Take 1 tablet by mouth Daily.  Dispense: 30 tablet; Refill: 11    7. Chronic hypoxic respiratory failure  -      CT Chest Low Dose Cancer Screening WO; Future  -     predniSONE (DELTASONE) 10 MG tablet; 50mg qday x 3 days, 40mg qday x 3 days, 30mg qday x 3 days, 20mg qday x 3 days, 10mg qday x 3 days, then stop  Dispense: 45 tablet; Refill: 2  -     azithromycin (ZITHROMAX) 250 MG tablet; Take 1 tablet by mouth Daily.  Dispense: 30 tablet; Refill: 11    8. COVID-19  -      CT Chest Low Dose Cancer Screening WO; Future  -     predniSONE (DELTASONE) 10 MG tablet; 50mg qday x 3 days, 40mg qday x 3 days, 30mg qday x 3 days, 20mg qday x 3 days, 10mg qday x  3 days, then stop  Dispense: 45 tablet; Refill: 2  -     azithromycin (ZITHROMAX) 250 MG tablet; Take 1 tablet by mouth Daily.  Dispense: 30 tablet; Refill: 11    9. Chest wall contusion, right, initial encounter  -      CT Chest Low Dose Cancer Screening WO; Future  -     predniSONE (DELTASONE) 10 MG tablet; 50mg qday x 3 days, 40mg qday x 3 days, 30mg qday x 3 days, 20mg qday x 3 days, 10mg qday x 3 days, then stop  Dispense: 45 tablet; Refill: 2  -     azithromycin (ZITHROMAX) 250 MG tablet; Take 1 tablet by mouth Daily.  Dispense: 30 tablet; Refill: 11      Continue Brovana, Pulmicort and Yupelri as prescribed.  Rinse mouth after each use.  Continue albuterol and DuoNebs as needed.  Start Daliresp.    COPD exacerbation action plan was discussed in detail.  I have sent a prescription for prednisone taper for 15 days and refills to use if needed.  Recurrent COPD exacerbation: I have started him on daily azithromycin.  He is already on Daliresp 500 mg once daily.    Continue NIPPV at night and with naps.  Clean mask and tubing daily.  Patient is using and benefitting.  His average daily use has been 9 hours and 47 minutes with apnea-hypopnea index of 0.2 on it.     Continue supplemental oxygen to maintain saturations at or above 89%.  Patient is using and benefiting.  Encourage patient to attend pulmonary rehab once scheduled for COPD and recent COVID infection.    May need to consider lung transplant evaluation in future if he continues to be abstinent from cigarettes.    Follow-up in 3 months with Arleen.  I will follow-up in 6 months.    Follow Up   Return in about 3 months (around 5/22/2024).  Patient was given instructions and counseling regarding his condition or for health maintenance advice. Please see specific information pulled into the AVS if appropriate.

## 2024-02-23 ENCOUNTER — TREATMENT (OUTPATIENT)
Dept: CARDIAC REHAB | Facility: HOSPITAL | Age: 64
End: 2024-02-23
Payer: MEDICAID

## 2024-02-23 DIAGNOSIS — U07.1 COVID-19: Primary | ICD-10-CM

## 2024-02-23 PROCEDURE — 94625 PHY/QHP OP PULM RHB W/O MNTR: CPT

## 2024-02-26 ENCOUNTER — TREATMENT (OUTPATIENT)
Dept: CARDIAC REHAB | Facility: HOSPITAL | Age: 64
End: 2024-02-26
Payer: MEDICAID

## 2024-02-26 ENCOUNTER — TELEPHONE (OUTPATIENT)
Dept: PULMONOLOGY | Facility: CLINIC | Age: 64
End: 2024-02-26
Payer: MEDICAID

## 2024-02-26 DIAGNOSIS — U07.1 COVID-19: Primary | ICD-10-CM

## 2024-02-26 DIAGNOSIS — Z99.81 ON HOME OXYGEN THERAPY: ICD-10-CM

## 2024-02-26 DIAGNOSIS — I10 ESSENTIAL HYPERTENSION: ICD-10-CM

## 2024-02-26 PROCEDURE — 94625 PHY/QHP OP PULM RHB W/O MNTR: CPT

## 2024-02-26 NOTE — PROGRESS NOTES
Patient heart rate was elevated and at checkout remained elevated throughout.  He sat for greater than 10 minutes with heart rate in  atrial tachycardia.  Reviewed medications with patient and he had no beta blockers listed.  Note in November stated he was put on a beta blocker, but he says he was taken off.  I called and spoke with nurse navigator and she relayed message to MD.  He instructed patient to refrain from exerting and to hydrate.  The nurse navigator also stated they would look into his beta blocker medication.

## 2024-02-26 NOTE — TELEPHONE ENCOUNTER
Joan with [facility] called stating patient has a mildly higher than normal heart rate at rest.  Normal = low 100's, today was 120's to150's.  Pt is currently taking steroids.  Discussed with Dr. Moya - encourage patient to hydrate, minimal exertion and follow up in a 2-3 days if symptoms persist or worsen.  Patient relayed to CP staff that he thinks he is or should be on a beta blocker.  He is going to check at home and call Joan back at [facility] verify.

## 2024-02-27 DIAGNOSIS — I10 ESSENTIAL HYPERTENSION: Primary | ICD-10-CM

## 2024-02-28 ENCOUNTER — TREATMENT (OUTPATIENT)
Dept: CARDIAC REHAB | Facility: HOSPITAL | Age: 64
End: 2024-02-28
Payer: MEDICAID

## 2024-02-28 DIAGNOSIS — U07.1 COVID-19: Primary | ICD-10-CM

## 2024-02-28 PROCEDURE — 94625 PHY/QHP OP PULM RHB W/O MNTR: CPT

## 2024-03-01 ENCOUNTER — TREATMENT (OUTPATIENT)
Dept: CARDIAC REHAB | Facility: HOSPITAL | Age: 64
End: 2024-03-01
Payer: MEDICAID

## 2024-03-01 DIAGNOSIS — U07.1 COVID-19: Primary | ICD-10-CM

## 2024-03-01 PROCEDURE — 94625 PHY/QHP OP PULM RHB W/O MNTR: CPT

## 2024-03-04 ENCOUNTER — TREATMENT (OUTPATIENT)
Dept: CARDIAC REHAB | Facility: HOSPITAL | Age: 64
End: 2024-03-04
Payer: MEDICAID

## 2024-03-04 DIAGNOSIS — U07.1 COVID-19: Primary | ICD-10-CM

## 2024-03-04 PROCEDURE — 94625 PHY/QHP OP PULM RHB W/O MNTR: CPT

## 2024-03-06 ENCOUNTER — TREATMENT (OUTPATIENT)
Dept: CARDIAC REHAB | Facility: HOSPITAL | Age: 64
End: 2024-03-06
Payer: MEDICAID

## 2024-03-06 DIAGNOSIS — U07.1 COVID-19: Primary | ICD-10-CM

## 2024-03-06 PROCEDURE — 94625 PHY/QHP OP PULM RHB W/O MNTR: CPT

## 2024-03-08 ENCOUNTER — TREATMENT (OUTPATIENT)
Dept: CARDIAC REHAB | Facility: HOSPITAL | Age: 64
End: 2024-03-08
Payer: MEDICAID

## 2024-03-08 DIAGNOSIS — U07.1 COVID-19: Primary | ICD-10-CM

## 2024-03-08 PROCEDURE — 94625 PHY/QHP OP PULM RHB W/O MNTR: CPT

## 2024-03-11 ENCOUNTER — APPOINTMENT (OUTPATIENT)
Dept: CARDIAC REHAB | Facility: HOSPITAL | Age: 64
End: 2024-03-11
Payer: MEDICAID

## 2024-03-13 ENCOUNTER — TREATMENT (OUTPATIENT)
Dept: CARDIAC REHAB | Facility: HOSPITAL | Age: 64
End: 2024-03-13
Payer: MEDICAID

## 2024-03-13 DIAGNOSIS — U07.1 COVID-19: Primary | ICD-10-CM

## 2024-03-13 PROCEDURE — 94625 PHY/QHP OP PULM RHB W/O MNTR: CPT

## 2024-03-15 ENCOUNTER — TREATMENT (OUTPATIENT)
Dept: CARDIAC REHAB | Facility: HOSPITAL | Age: 64
End: 2024-03-15
Payer: MEDICAID

## 2024-03-15 ENCOUNTER — HOSPITAL ENCOUNTER (OUTPATIENT)
Dept: CT IMAGING | Facility: HOSPITAL | Age: 64
Discharge: HOME | End: 2024-03-15
Payer: MEDICAID

## 2024-03-15 DIAGNOSIS — U07.1 COVID-19: Primary | ICD-10-CM

## 2024-03-15 PROCEDURE — 94625 PHY/QHP OP PULM RHB W/O MNTR: CPT

## 2024-03-18 ENCOUNTER — TREATMENT (OUTPATIENT)
Dept: CARDIAC REHAB | Facility: HOSPITAL | Age: 64
End: 2024-03-18
Payer: MEDICAID

## 2024-03-18 DIAGNOSIS — U07.1 COVID-19: Primary | ICD-10-CM

## 2024-03-18 PROCEDURE — 94625 PHY/QHP OP PULM RHB W/O MNTR: CPT

## 2024-03-20 ENCOUNTER — TREATMENT (OUTPATIENT)
Dept: CARDIAC REHAB | Facility: HOSPITAL | Age: 64
End: 2024-03-20
Payer: MEDICAID

## 2024-03-20 DIAGNOSIS — U07.1 COVID-19: Primary | ICD-10-CM

## 2024-03-20 PROCEDURE — 94625 PHY/QHP OP PULM RHB W/O MNTR: CPT

## 2024-03-22 ENCOUNTER — TREATMENT (OUTPATIENT)
Dept: CARDIAC REHAB | Facility: HOSPITAL | Age: 64
End: 2024-03-22
Payer: MEDICAID

## 2024-03-22 DIAGNOSIS — U07.1 COVID-19: Primary | ICD-10-CM

## 2024-03-22 PROCEDURE — 94625 PHY/QHP OP PULM RHB W/O MNTR: CPT

## 2024-03-25 ENCOUNTER — TREATMENT (OUTPATIENT)
Dept: CARDIAC REHAB | Facility: HOSPITAL | Age: 64
End: 2024-03-25
Payer: MEDICAID

## 2024-03-25 DIAGNOSIS — U07.1 COVID-19: Primary | ICD-10-CM

## 2024-03-25 PROCEDURE — 94625 PHY/QHP OP PULM RHB W/O MNTR: CPT

## 2024-03-27 ENCOUNTER — APPOINTMENT (OUTPATIENT)
Dept: CARDIAC REHAB | Facility: HOSPITAL | Age: 64
End: 2024-03-27
Payer: MEDICAID

## 2024-03-29 ENCOUNTER — TREATMENT (OUTPATIENT)
Dept: CARDIAC REHAB | Facility: HOSPITAL | Age: 64
End: 2024-03-29
Payer: MEDICAID

## 2024-03-29 DIAGNOSIS — U07.1 COVID-19: Primary | ICD-10-CM

## 2024-03-29 PROCEDURE — 94625 PHY/QHP OP PULM RHB W/O MNTR: CPT

## 2024-04-01 ENCOUNTER — TREATMENT (OUTPATIENT)
Dept: CARDIAC REHAB | Facility: HOSPITAL | Age: 64
End: 2024-04-01
Payer: MEDICAID

## 2024-04-01 DIAGNOSIS — U07.1 COVID-19: Primary | ICD-10-CM

## 2024-04-01 PROCEDURE — 94625 PHY/QHP OP PULM RHB W/O MNTR: CPT

## 2024-04-03 ENCOUNTER — TREATMENT (OUTPATIENT)
Dept: CARDIAC REHAB | Facility: HOSPITAL | Age: 64
End: 2024-04-03
Payer: MEDICAID

## 2024-04-03 DIAGNOSIS — U07.1 COVID-19: Primary | ICD-10-CM

## 2024-04-03 PROCEDURE — 94625 PHY/QHP OP PULM RHB W/O MNTR: CPT

## 2024-04-05 ENCOUNTER — TREATMENT (OUTPATIENT)
Dept: CARDIAC REHAB | Facility: HOSPITAL | Age: 64
End: 2024-04-05
Payer: MEDICAID

## 2024-04-05 DIAGNOSIS — U07.1 COVID-19: Primary | ICD-10-CM

## 2024-04-05 PROCEDURE — 94625 PHY/QHP OP PULM RHB W/O MNTR: CPT

## 2024-04-08 ENCOUNTER — TREATMENT (OUTPATIENT)
Dept: CARDIAC REHAB | Facility: HOSPITAL | Age: 64
End: 2024-04-08
Payer: MEDICAID

## 2024-04-08 DIAGNOSIS — U07.1 COVID-19: Primary | ICD-10-CM

## 2024-04-08 PROCEDURE — 94625 PHY/QHP OP PULM RHB W/O MNTR: CPT

## 2024-04-10 ENCOUNTER — TREATMENT (OUTPATIENT)
Dept: CARDIAC REHAB | Facility: HOSPITAL | Age: 64
End: 2024-04-10
Payer: MEDICAID

## 2024-04-10 DIAGNOSIS — U07.1 COVID-19: Primary | ICD-10-CM

## 2024-04-10 PROCEDURE — 94625 PHY/QHP OP PULM RHB W/O MNTR: CPT

## 2024-04-12 ENCOUNTER — TREATMENT (OUTPATIENT)
Dept: CARDIAC REHAB | Facility: HOSPITAL | Age: 64
End: 2024-04-12
Payer: MEDICAID

## 2024-04-12 DIAGNOSIS — U07.1 COVID-19: Primary | ICD-10-CM

## 2024-04-12 PROCEDURE — 94625 PHY/QHP OP PULM RHB W/O MNTR: CPT

## 2024-04-15 ENCOUNTER — TREATMENT (OUTPATIENT)
Dept: CARDIAC REHAB | Facility: HOSPITAL | Age: 64
End: 2024-04-15
Payer: MEDICAID

## 2024-04-15 DIAGNOSIS — U07.1 COVID-19: Primary | ICD-10-CM

## 2024-04-15 PROCEDURE — 94625 PHY/QHP OP PULM RHB W/O MNTR: CPT

## 2024-04-17 ENCOUNTER — TREATMENT (OUTPATIENT)
Dept: CARDIAC REHAB | Facility: HOSPITAL | Age: 64
End: 2024-04-17
Payer: MEDICAID

## 2024-04-17 DIAGNOSIS — U07.1 COVID-19: Primary | ICD-10-CM

## 2024-04-17 PROCEDURE — 94625 PHY/QHP OP PULM RHB W/O MNTR: CPT

## 2024-04-19 ENCOUNTER — TREATMENT (OUTPATIENT)
Dept: CARDIAC REHAB | Facility: HOSPITAL | Age: 64
End: 2024-04-19
Payer: MEDICAID

## 2024-04-19 DIAGNOSIS — U07.1 COVID-19: Primary | ICD-10-CM

## 2024-04-19 PROCEDURE — 94625 PHY/QHP OP PULM RHB W/O MNTR: CPT

## 2024-04-22 ENCOUNTER — TREATMENT (OUTPATIENT)
Dept: CARDIAC REHAB | Facility: HOSPITAL | Age: 64
End: 2024-04-22
Payer: MEDICAID

## 2024-04-22 DIAGNOSIS — U07.1 COVID-19: Primary | ICD-10-CM

## 2024-04-22 PROCEDURE — 94625 PHY/QHP OP PULM RHB W/O MNTR: CPT

## 2024-04-24 ENCOUNTER — TREATMENT (OUTPATIENT)
Dept: CARDIAC REHAB | Facility: HOSPITAL | Age: 64
End: 2024-04-24
Payer: MEDICAID

## 2024-04-24 DIAGNOSIS — U07.1 COVID-19: Primary | ICD-10-CM

## 2024-04-24 PROCEDURE — 94625 PHY/QHP OP PULM RHB W/O MNTR: CPT

## 2024-04-26 ENCOUNTER — TREATMENT (OUTPATIENT)
Dept: CARDIAC REHAB | Facility: HOSPITAL | Age: 64
End: 2024-04-26
Payer: MEDICAID

## 2024-04-26 DIAGNOSIS — U07.1 COVID-19: Primary | ICD-10-CM

## 2024-04-26 PROCEDURE — 94625 PHY/QHP OP PULM RHB W/O MNTR: CPT

## 2024-04-29 ENCOUNTER — TREATMENT (OUTPATIENT)
Dept: CARDIAC REHAB | Facility: HOSPITAL | Age: 64
End: 2024-04-29
Payer: MEDICAID

## 2024-04-29 DIAGNOSIS — U07.1 COVID-19: Primary | ICD-10-CM

## 2024-04-29 PROCEDURE — 94625 PHY/QHP OP PULM RHB W/O MNTR: CPT

## 2024-05-01 ENCOUNTER — TREATMENT (OUTPATIENT)
Dept: CARDIAC REHAB | Facility: HOSPITAL | Age: 64
End: 2024-05-01
Payer: MEDICAID

## 2024-05-01 DIAGNOSIS — U07.1 COVID-19: Primary | ICD-10-CM

## 2024-05-01 PROCEDURE — 94625 PHY/QHP OP PULM RHB W/O MNTR: CPT

## 2024-05-03 ENCOUNTER — TREATMENT (OUTPATIENT)
Dept: CARDIAC REHAB | Facility: HOSPITAL | Age: 64
End: 2024-05-03
Payer: MEDICAID

## 2024-05-03 DIAGNOSIS — U07.1 COVID-19: Primary | ICD-10-CM

## 2024-05-03 PROCEDURE — 94625 PHY/QHP OP PULM RHB W/O MNTR: CPT

## 2024-05-06 ENCOUNTER — TREATMENT (OUTPATIENT)
Dept: CARDIAC REHAB | Facility: HOSPITAL | Age: 64
End: 2024-05-06
Payer: MEDICAID

## 2024-05-06 DIAGNOSIS — U07.1 COVID-19: Primary | ICD-10-CM

## 2024-05-06 PROCEDURE — 94625 PHY/QHP OP PULM RHB W/O MNTR: CPT

## 2024-05-08 ENCOUNTER — TREATMENT (OUTPATIENT)
Dept: CARDIAC REHAB | Facility: HOSPITAL | Age: 64
End: 2024-05-08
Payer: MEDICAID

## 2024-05-08 DIAGNOSIS — U07.1 COVID-19: Primary | ICD-10-CM

## 2024-05-08 PROCEDURE — 94625 PHY/QHP OP PULM RHB W/O MNTR: CPT

## 2024-05-10 ENCOUNTER — APPOINTMENT (OUTPATIENT)
Dept: CARDIAC REHAB | Facility: HOSPITAL | Age: 64
End: 2024-05-10
Payer: MEDICAID

## 2024-05-12 ENCOUNTER — HOSPITAL ENCOUNTER (INPATIENT)
Facility: HOSPITAL | Age: 64
LOS: 12 days | Discharge: HOME | End: 2024-05-24
Attending: EMERGENCY MEDICINE | Admitting: INTERNAL MEDICINE
Payer: MEDICAID

## 2024-05-12 ENCOUNTER — APPOINTMENT (OUTPATIENT)
Dept: GENERAL RADIOLOGY | Facility: HOSPITAL | Age: 64
End: 2024-05-12
Payer: MEDICAID

## 2024-05-12 DIAGNOSIS — Z99.81 ON HOME OXYGEN THERAPY: ICD-10-CM

## 2024-05-12 DIAGNOSIS — U07.1 COVID-19: ICD-10-CM

## 2024-05-12 DIAGNOSIS — S20.211A CHEST WALL CONTUSION, RIGHT, INITIAL ENCOUNTER: ICD-10-CM

## 2024-05-12 DIAGNOSIS — J44.9 CHRONIC OBSTRUCTIVE PULMONARY DISEASE, UNSPECIFIED COPD TYPE: ICD-10-CM

## 2024-05-12 DIAGNOSIS — J96.01 ACUTE RESPIRATORY FAILURE WITH HYPOXIA: ICD-10-CM

## 2024-05-12 DIAGNOSIS — T17.500A MUCUS PLUGGING OF BRONCHI: ICD-10-CM

## 2024-05-12 DIAGNOSIS — J96.02 ACUTE RESPIRATORY FAILURE WITH HYPOXIA AND HYPERCAPNIA: ICD-10-CM

## 2024-05-12 DIAGNOSIS — R26.2 DIFFICULTY WALKING: ICD-10-CM

## 2024-05-12 DIAGNOSIS — J44.1 COPD EXACERBATION: Primary | ICD-10-CM

## 2024-05-12 DIAGNOSIS — R05.9 COUGH, UNSPECIFIED TYPE: ICD-10-CM

## 2024-05-12 DIAGNOSIS — I27.20 PULMONARY HYPERTENSION: ICD-10-CM

## 2024-05-12 DIAGNOSIS — J96.01 ACUTE RESPIRATORY FAILURE WITH HYPOXIA AND HYPERCAPNIA: ICD-10-CM

## 2024-05-12 DIAGNOSIS — J96.11 CHRONIC HYPOXIC RESPIRATORY FAILURE: ICD-10-CM

## 2024-05-12 PROBLEM — J96.20 ACUTE-ON-CHRONIC RESPIRATORY FAILURE: Status: ACTIVE | Noted: 2024-05-12

## 2024-05-12 LAB
ALBUMIN SERPL-MCNC: 4.5 G/DL (ref 3.5–5.2)
ALBUMIN/GLOB SERPL: 1.5 G/DL
ALP SERPL-CCNC: 159 U/L (ref 39–117)
ALT SERPL W P-5'-P-CCNC: 19 U/L (ref 1–41)
ANION GAP SERPL CALCULATED.3IONS-SCNC: 10.8 MMOL/L (ref 5–15)
AST SERPL-CCNC: 32 U/L (ref 1–40)
BASOPHILS # BLD AUTO: 0.03 10*3/MM3 (ref 0–0.2)
BASOPHILS NFR BLD AUTO: 0.5 % (ref 0–1.5)
BILIRUB SERPL-MCNC: 0.5 MG/DL (ref 0–1.2)
BUN SERPL-MCNC: 12 MG/DL (ref 8–23)
BUN/CREAT SERPL: 17.1 (ref 7–25)
CALCIUM SPEC-SCNC: 10.2 MG/DL (ref 8.6–10.5)
CHLORIDE SERPL-SCNC: 97 MMOL/L (ref 98–107)
CO2 SERPL-SCNC: 34.2 MMOL/L (ref 22–29)
CREAT SERPL-MCNC: 0.7 MG/DL (ref 0.76–1.27)
D-LACTATE SERPL-SCNC: 1.3 MMOL/L (ref 0.5–2)
DEPRECATED RDW RBC AUTO: 46.5 FL (ref 37–54)
EGFRCR SERPLBLD CKD-EPI 2021: 104.2 ML/MIN/1.73
EOSINOPHIL # BLD AUTO: 0.01 10*3/MM3 (ref 0–0.4)
EOSINOPHIL NFR BLD AUTO: 0.2 % (ref 0.3–6.2)
ERYTHROCYTE [DISTWIDTH] IN BLOOD BY AUTOMATED COUNT: 14 % (ref 12.3–15.4)
FLUAV SUBTYP SPEC NAA+PROBE: NOT DETECTED
FLUBV RNA ISLT QL NAA+PROBE: NOT DETECTED
GLOBULIN UR ELPH-MCNC: 3 GM/DL
GLUCOSE SERPL-MCNC: 114 MG/DL (ref 65–99)
HCT VFR BLD AUTO: 49.2 % (ref 37.5–51)
HGB BLD-MCNC: 15.6 G/DL (ref 13–17.7)
HOLD SPECIMEN: NORMAL
HOLD SPECIMEN: NORMAL
IMM GRANULOCYTES # BLD AUTO: 0.01 10*3/MM3 (ref 0–0.05)
IMM GRANULOCYTES NFR BLD AUTO: 0.2 % (ref 0–0.5)
LYMPHOCYTES # BLD AUTO: 0.38 10*3/MM3 (ref 0.7–3.1)
LYMPHOCYTES NFR BLD AUTO: 5.8 % (ref 19.6–45.3)
MCH RBC QN AUTO: 28.6 PG (ref 26.6–33)
MCHC RBC AUTO-ENTMCNC: 31.7 G/DL (ref 31.5–35.7)
MCV RBC AUTO: 90.3 FL (ref 79–97)
MONOCYTES # BLD AUTO: 0.72 10*3/MM3 (ref 0.1–0.9)
MONOCYTES NFR BLD AUTO: 11 % (ref 5–12)
NEUTROPHILS NFR BLD AUTO: 5.39 10*3/MM3 (ref 1.7–7)
NEUTROPHILS NFR BLD AUTO: 82.3 % (ref 42.7–76)
NRBC BLD AUTO-RTO: 0 /100 WBC (ref 0–0.2)
NT-PROBNP SERPL-MCNC: 449.4 PG/ML (ref 0–900)
PLATELET # BLD AUTO: 167 10*3/MM3 (ref 140–450)
PMV BLD AUTO: 10.3 FL (ref 6–12)
POTASSIUM SERPL-SCNC: 4.4 MMOL/L (ref 3.5–5.2)
PROT SERPL-MCNC: 7.5 G/DL (ref 6–8.5)
RBC # BLD AUTO: 5.45 10*6/MM3 (ref 4.14–5.8)
RSV RNA NPH QL NAA+NON-PROBE: NOT DETECTED
SARS-COV-2 RNA RESP QL NAA+PROBE: NOT DETECTED
SODIUM SERPL-SCNC: 142 MMOL/L (ref 136–145)
TROPONIN T SERPL HS-MCNC: 17 NG/L
WBC NRBC COR # BLD AUTO: 6.54 10*3/MM3 (ref 3.4–10.8)
WHOLE BLOOD HOLD COAG: NORMAL
WHOLE BLOOD HOLD SPECIMEN: NORMAL

## 2024-05-12 PROCEDURE — 94799 UNLISTED PULMONARY SVC/PX: CPT

## 2024-05-12 PROCEDURE — 99223 1ST HOSP IP/OBS HIGH 75: CPT | Performed by: FAMILY MEDICINE

## 2024-05-12 PROCEDURE — 87637 SARSCOV2&INF A&B&RSV AMP PRB: CPT | Performed by: EMERGENCY MEDICINE

## 2024-05-12 PROCEDURE — 84484 ASSAY OF TROPONIN QUANT: CPT | Performed by: EMERGENCY MEDICINE

## 2024-05-12 PROCEDURE — 25010000002 METHYLPREDNISOLONE PER 125 MG: Performed by: EMERGENCY MEDICINE

## 2024-05-12 PROCEDURE — 99291 CRITICAL CARE FIRST HOUR: CPT

## 2024-05-12 PROCEDURE — 25810000003 SODIUM CHLORIDE 0.9 % SOLUTION: Performed by: FAMILY MEDICINE

## 2024-05-12 PROCEDURE — 83605 ASSAY OF LACTIC ACID: CPT | Performed by: EMERGENCY MEDICINE

## 2024-05-12 PROCEDURE — 94761 N-INVAS EAR/PLS OXIMETRY MLT: CPT

## 2024-05-12 PROCEDURE — 25010000002 ENOXAPARIN PER 10 MG: Performed by: FAMILY MEDICINE

## 2024-05-12 PROCEDURE — 71045 X-RAY EXAM CHEST 1 VIEW: CPT

## 2024-05-12 PROCEDURE — 83880 ASSAY OF NATRIURETIC PEPTIDE: CPT | Performed by: EMERGENCY MEDICINE

## 2024-05-12 PROCEDURE — 93005 ELECTROCARDIOGRAM TRACING: CPT | Performed by: EMERGENCY MEDICINE

## 2024-05-12 PROCEDURE — G0378 HOSPITAL OBSERVATION PER HR: HCPCS

## 2024-05-12 PROCEDURE — 80053 COMPREHEN METABOLIC PANEL: CPT | Performed by: EMERGENCY MEDICINE

## 2024-05-12 PROCEDURE — 25010000002 ENOXAPARIN PER 10 MG: Performed by: INTERNAL MEDICINE

## 2024-05-12 PROCEDURE — 94640 AIRWAY INHALATION TREATMENT: CPT

## 2024-05-12 PROCEDURE — 93005 ELECTROCARDIOGRAM TRACING: CPT

## 2024-05-12 PROCEDURE — 85025 COMPLETE CBC W/AUTO DIFF WBC: CPT

## 2024-05-12 PROCEDURE — 93010 ELECTROCARDIOGRAM REPORT: CPT | Performed by: INTERNAL MEDICINE

## 2024-05-12 RX ORDER — POLYETHYLENE GLYCOL 3350 17 G/17G
17 POWDER, FOR SOLUTION ORAL DAILY PRN
Status: DISCONTINUED | OUTPATIENT
Start: 2024-05-12 | End: 2024-05-24 | Stop reason: HOSPADM

## 2024-05-12 RX ORDER — SODIUM CHLORIDE 0.9 % (FLUSH) 0.9 %
10 SYRINGE (ML) INJECTION EVERY 12 HOURS SCHEDULED
Status: DISCONTINUED | OUTPATIENT
Start: 2024-05-12 | End: 2024-05-24 | Stop reason: HOSPADM

## 2024-05-12 RX ORDER — METHYLPREDNISOLONE SODIUM SUCCINATE 40 MG/ML
40 INJECTION, POWDER, LYOPHILIZED, FOR SOLUTION INTRAMUSCULAR; INTRAVENOUS EVERY 12 HOURS
Status: DISCONTINUED | OUTPATIENT
Start: 2024-05-13 | End: 2024-05-20

## 2024-05-12 RX ORDER — PANTOPRAZOLE SODIUM 40 MG/1
40 TABLET, DELAYED RELEASE ORAL
Status: DISCONTINUED | OUTPATIENT
Start: 2024-05-13 | End: 2024-05-24 | Stop reason: HOSPADM

## 2024-05-12 RX ORDER — METHYLPREDNISOLONE SODIUM SUCCINATE 125 MG/2ML
125 INJECTION, POWDER, LYOPHILIZED, FOR SOLUTION INTRAMUSCULAR; INTRAVENOUS ONCE
Status: COMPLETED | OUTPATIENT
Start: 2024-05-12 | End: 2024-05-12

## 2024-05-12 RX ORDER — SODIUM CHLORIDE 9 MG/ML
40 INJECTION, SOLUTION INTRAVENOUS AS NEEDED
Status: DISCONTINUED | OUTPATIENT
Start: 2024-05-12 | End: 2024-05-24 | Stop reason: HOSPADM

## 2024-05-12 RX ORDER — MONTELUKAST SODIUM 10 MG/1
10 TABLET ORAL NIGHTLY
Status: DISCONTINUED | OUTPATIENT
Start: 2024-05-12 | End: 2024-05-24 | Stop reason: HOSPADM

## 2024-05-12 RX ORDER — IPRATROPIUM BROMIDE AND ALBUTEROL SULFATE 2.5; .5 MG/3ML; MG/3ML
3 SOLUTION RESPIRATORY (INHALATION)
Status: DISCONTINUED | OUTPATIENT
Start: 2024-05-12 | End: 2024-05-13

## 2024-05-12 RX ORDER — SODIUM CHLORIDE 9 MG/ML
100 INJECTION, SOLUTION INTRAVENOUS CONTINUOUS
Status: ACTIVE | OUTPATIENT
Start: 2024-05-12 | End: 2024-05-13

## 2024-05-12 RX ORDER — BISACODYL 10 MG
10 SUPPOSITORY, RECTAL RECTAL DAILY PRN
Status: DISCONTINUED | OUTPATIENT
Start: 2024-05-12 | End: 2024-05-24 | Stop reason: HOSPADM

## 2024-05-12 RX ORDER — ENOXAPARIN SODIUM 100 MG/ML
30 INJECTION SUBCUTANEOUS DAILY
Status: DISCONTINUED | OUTPATIENT
Start: 2024-05-12 | End: 2024-05-24 | Stop reason: HOSPADM

## 2024-05-12 RX ORDER — GUAIFENESIN/DEXTROMETHORPHAN 100-10MG/5
10 SYRUP ORAL EVERY 6 HOURS PRN
Status: DISCONTINUED | OUTPATIENT
Start: 2024-05-12 | End: 2024-05-24 | Stop reason: HOSPADM

## 2024-05-12 RX ORDER — IPRATROPIUM BROMIDE AND ALBUTEROL SULFATE 2.5; .5 MG/3ML; MG/3ML
3 SOLUTION RESPIRATORY (INHALATION) EVERY 4 HOURS PRN
Status: DISCONTINUED | OUTPATIENT
Start: 2024-05-12 | End: 2024-05-24 | Stop reason: HOSPADM

## 2024-05-12 RX ORDER — ALBUTEROL SULFATE 2.5 MG/3ML
7.5 SOLUTION RESPIRATORY (INHALATION) CONTINUOUS
Status: DISCONTINUED | OUTPATIENT
Start: 2024-05-12 | End: 2024-05-13

## 2024-05-12 RX ORDER — GUAIFENESIN 600 MG/1
600 TABLET, EXTENDED RELEASE ORAL EVERY 12 HOURS SCHEDULED
Status: DISCONTINUED | OUTPATIENT
Start: 2024-05-12 | End: 2024-05-24 | Stop reason: HOSPADM

## 2024-05-12 RX ORDER — AMOXICILLIN 250 MG
2 CAPSULE ORAL 2 TIMES DAILY PRN
Status: DISCONTINUED | OUTPATIENT
Start: 2024-05-12 | End: 2024-05-24 | Stop reason: HOSPADM

## 2024-05-12 RX ORDER — BISACODYL 5 MG/1
5 TABLET, DELAYED RELEASE ORAL DAILY PRN
Status: DISCONTINUED | OUTPATIENT
Start: 2024-05-12 | End: 2024-05-24 | Stop reason: HOSPADM

## 2024-05-12 RX ORDER — ENOXAPARIN SODIUM 100 MG/ML
40 INJECTION SUBCUTANEOUS DAILY
Status: DISCONTINUED | OUTPATIENT
Start: 2024-05-12 | End: 2024-05-12 | Stop reason: DRUGHIGH

## 2024-05-12 RX ORDER — SODIUM CHLORIDE 0.9 % (FLUSH) 0.9 %
10 SYRINGE (ML) INJECTION AS NEEDED
Status: DISCONTINUED | OUTPATIENT
Start: 2024-05-12 | End: 2024-05-24 | Stop reason: HOSPADM

## 2024-05-12 RX ORDER — ONDANSETRON 2 MG/ML
4 INJECTION INTRAMUSCULAR; INTRAVENOUS EVERY 6 HOURS PRN
Status: DISCONTINUED | OUTPATIENT
Start: 2024-05-12 | End: 2024-05-24 | Stop reason: HOSPADM

## 2024-05-12 RX ORDER — CETIRIZINE HYDROCHLORIDE 10 MG/1
10 TABLET ORAL NIGHTLY
Status: DISCONTINUED | OUTPATIENT
Start: 2024-05-12 | End: 2024-05-24 | Stop reason: HOSPADM

## 2024-05-12 RX ADMIN — ENOXAPARIN SODIUM 30 MG: 100 INJECTION SUBCUTANEOUS at 20:23

## 2024-05-12 RX ADMIN — CETIRIZINE HYDROCHLORIDE 10 MG: 10 TABLET, FILM COATED ORAL at 20:22

## 2024-05-12 RX ADMIN — GUAIFENESIN 600 MG: 600 TABLET ORAL at 20:22

## 2024-05-12 RX ADMIN — SODIUM CHLORIDE 100 ML/HR: 9 INJECTION, SOLUTION INTRAVENOUS at 20:23

## 2024-05-12 RX ADMIN — ALBUTEROL SULFATE 7.5 MG: 2.5 SOLUTION RESPIRATORY (INHALATION) at 15:03

## 2024-05-12 RX ADMIN — METHYLPREDNISOLONE SODIUM SUCCINATE 125 MG: 125 INJECTION INTRAMUSCULAR; INTRAVENOUS at 15:32

## 2024-05-12 RX ADMIN — Medication 10 ML: at 20:23

## 2024-05-12 RX ADMIN — IPRATROPIUM BROMIDE AND ALBUTEROL SULFATE 3 ML: .5; 3 SOLUTION RESPIRATORY (INHALATION) at 19:13

## 2024-05-12 RX ADMIN — METOPROLOL TARTRATE 25 MG: 25 TABLET, FILM COATED ORAL at 22:08

## 2024-05-12 RX ADMIN — MONTELUKAST 10 MG: 10 TABLET, FILM COATED ORAL at 20:22

## 2024-05-12 NOTE — H&P
[facility]   HISTORY AND PHYSICAL    Patient Name: Sumeet Gomes  : 1960  MRN: [id number]  Primary Care Physician:  Mirtha Alexander APRN  Date of admission: 2024    Subjective   Subjective     Chief Complaint: Shortness of breath    HPI:    Sumeet Gomes is a 63 y.o. male with past medical history of hypertension, hyperlipidemia, COPD on home oxygen, pulmonary hypertension, CAD, and GERD presented to ED with complaint of shortness of breath.  Patient states that over the last few weeks he has been having shortness of breath but became much worse this morning the point where he could probably walk across the room.  He also states that he has been significantly weak over the last few months and has a hard time getting out of bed due to weakness as well as significant weight loss over the last year or so.  Due to his worsening symptoms he came to the ED for further evaluation.  In the ED patient vitals were within normal limits on 3 L nasal cannula.  Labs were all relatively unremarkable given his chronic conditions including a normal lactic acid, proBNP, and negative COVID flu RSV.  Chest x-ray was negative for any acute findings.  When asked he denied any recent fevers, chills, headaches, focal weakness, chest pain, palpitation, abdominal pain, nausea, vomiting, diarrhea, constipation, dysuria, hematuria, hematochezia, melena, or anxiety.  She was admitted for further evaluation and treatment.    Review of Systems   All systems were reviewed and negative except for: As per HPI    Personal History     Past Medical History:   Diagnosis Date    CHF (congestive heart failure)     COPD (chronic obstructive pulmonary disease)     COPD (chronic obstructive pulmonary disease)     Coronary artery disease     Diastolic heart failure     Hyperlipidemia     Hypertension     Pulmonary hypertension        Past Surgical History:   Procedure Laterality Date    BACK SURGERY      BRONCHOSCOPY N/A 2022     Procedure: BRONCHOSCOPY WITH BRONCHOALVEOLAR LAVAGE AND BRONCHIAL WASHINGS;  Surgeon: Ulices Moya MD;  Location: [facility] ENDOSCOPY;  Service: Pulmonary;  Laterality: N/A;  MUCUS PLUGGING, COPD EXACERBATION    BRONCHOSCOPY      CARDIAC CATHETERIZATION N/A 09/16/2022    Procedure: Right Heart Cath;  Surgeon: Drew Rodriguez MD;  Location: [facility] CATH INVASIVE LOCATION;  Service: Cardiovascular;  Laterality: N/A;    OTHER SURGICAL HISTORY      knee     OTHER SURGICAL HISTORY      shoulder, rotator cuff    SHOULDER SURGERY Right 10/03/2022       Family History: family history includes Asthma in his mother; Emphysema in his mother; Stroke in his mother. Otherwise pertinent FHx was reviewed and not pertinent to current issue.    Social History:  reports that he has been smoking cigarettes. He started smoking about 49 years ago. He has a 12.2 pack-year smoking history. He has been exposed to tobacco smoke. He has never used smokeless tobacco. He reports that he does not currently use alcohol. He reports that he does not use drugs.    Home Medications:  albuterol sulfate HFA, arformoterol, azithromycin, budesonide, furosemide, ipratropium-albuterol, metoprolol tartrate, pantoprazole, predniSONE, revefenacin, and roflumilast      Allergies:  No Known Allergies    Objective   Objective     Vitals:   Temp:  [97.6 °F (36.4 °C)-98.1 °F (36.7 °C)] 98.1 °F (36.7 °C)  Heart Rate:  [73-89] 89  Resp:  [16-21] 18  BP: ()/(67-84) 113/73  Flow (L/min):  [3] 3  Physical Exam    Constitutional: Awake, alert   Eyes: PERRLA, sclerae anicteric, no conjunctival injection   HENT: NCAT, mucous membranes moist   Neck: Supple, no thyromegaly, no lymphadenopathy, trachea midline   Respiratory: Decreased breath sounds bilaterally, nonlabored respirations    Cardiovascular: RRR, no murmurs, rubs, or gallops, palpable pedal pulses bilaterally   Gastrointestinal: Positive bowel sounds, soft, nontender, nondistended   Musculoskeletal: No  bilateral ankle edema, no clubbing or cyanosis to extremities   Psychiatric: Appropriate affect, cooperative   Neurologic: Oriented x 3, strength symmetric in all extremities, Cranial Nerves grossly intact to confrontation, speech clear   Skin: No rashes     Result Review    Result Review:  I have personally reviewed the results from the time of this admission to 5/12/2024 19:24 EDT and agree with these findings:  [x]  Laboratory list / accordion  []  Microbiology  [x]  Radiology  [x]  EKG/Telemetry   []  Cardiology/Vascular   []  Pathology  []  Old records  []  Other:  Most notable findings include: Labs unremarkable, chest x-ray negative      Assessment & Plan   Assessment / Plan     Brief Patient Summary:  Sumeet Gomes is a 63 y.o. male with past medical history of hypertension, hyperlipidemia, COPD on home oxygen, pulmonary hypertension, CAD, and GERD presented to ED with complaint of shortness of breath    Active Hospital Problems:  Active Hospital Problems    Diagnosis     **COPD exacerbation     Tobacco abuse     Pulmonary hypertension     Fatigue     Essential hypertension      Plan:     COPD Exacerbation  -Admit to telemetry  -History of pulmonary hypertension as well  -Imaging reviewed  -Supplemental oxygen as needed, wean down as tolerated  -IV Solu-Medrol  -DuoNeb 3 times daily and as needed  -Mucinex, Tessalon Perles  -Incentive spirometry  -Adjust home meds if warranted  -Consult pulmonology if warranted  -Supportive care    HTN  -Currently well controlled  -PRN BP meds  -Resume home meds when available  -Titrate if needed    Tobacco use  -Patient weaning off  -Significant weight loss over the last year or so  -Outpatient low-dose CT recommended    Hx CAD      GI ppx  DVT ppx      CODE STATUS:    Level Of Support Discussed With: Patient  Code Status (Patient has no pulse and is not breathing): No CPR (Do Not Attempt to Resuscitate)  Medical Interventions (Patient has pulse or is breathing): Full  Support    Admission Status:  I believe this patient meets the ovation status.      Electronically signed by Talon Taylor MD, 05/12/24, 7:24 PM EDT.

## 2024-05-12 NOTE — CASE MANAGEMENT/SOCIAL WORK
Discharge Planning Assessment   [facility]     Patient Name: Sumeet Gomes  MRN: [id number]  Today's Date: 5/12/2024    Admit Date: 5/12/2024        Discharge Needs Assessment       Row Name 05/12/24 1805       Living Environment    People in Home spouse (P)     Current Living Arrangements home (P)     Primary Care Provided by self (P)     Provides Primary Care For no one (P)     Able to Return to Prior Arrangements yes (P)        Resource/Environmental Concerns    Transportation Concerns none (P)        Transportation Needs    In the past 12 months, has lack of transportation kept you from medical appointments or from getting medications? no (P)     In the past 12 months, has lack of transportation kept you from meetings, work, or from getting things needed for daily living? No (P)        Food Insecurity    Within the past 12 months, you worried that your food would run out before you got the money to buy more. Never true (P)     Within the past 12 months, the food you bought just didn't last and you didn't have money to get more. Never true (P)        Transition Planning    Patient/Family Anticipates Transition to home (P)        Discharge Needs Assessment    Readmission Within the Last 30 Days no previous admission in last 30 days (P)     Equipment Currently Used at Home none (P)     Concerns to be Addressed denies needs/concerns at this time (P)     Equipment Needed After Discharge none (P)                    Discharge Plan    No documentation.                 Continued Care and Services - Admitted Since 5/12/2024    No active coordination exists for this encounter.          Demographic Summary       Row Name 05/12/24 1802       General Information    Admission Type observation (P)     Arrived From home (P)     Referral Source emergency department (P)     Reason for Consult discharge planning (P)     Preferred Language English (P)                    Functional Status       Row Name 05/12/24 1803       Functional  Status    Usual Activity Tolerance moderate (P)     Current Activity Tolerance moderate (P)        Physical Activity    On average, how many days per week do you engage in moderate to strenuous exercise (like a brisk walk)? 1 day (P)   Pt reported finishing PT this past Wednesday.    On average, how many minutes do you engage in exercise at this level? 30 min (P)     Number of minutes of exercise per week 30 (P)        Assessment of Health Literacy    How often do you have someone help you read hospital materials? Sometimes (P)     How often do you have problems learning about your medical condition because of difficulty understanding written information? Sometimes (P)     How often do you have a problem understanding what is told to you about your medical condition? Sometimes (P)     How confident are you filling out medical forms by yourself? Somewhat (P)        Functional Status, IADL    Medications independent (P)     Meal Preparation independent (P)     Housekeeping independent (P)     Laundry independent (P)     Shopping independent (P)        Mental Status    General Appearance WDL WDL (P)        Mental Status Summary    Recent Changes in Mental Status/Cognitive Functioning no changes (P)                    Psychosocial    No documentation.                  Abuse/Neglect       Row Name 05/12/24 1805       Personal Safety    Feels Unsafe at Home or Work/School no (P)     Feels Threatened by Someone no (P)     Does Anyone Try to Keep You From Having Contact with Others or Doing Things Outside Your Home? no (P)                    Legal       Row Name 05/12/24 1805       Financial Resource Strain    How hard is it for you to pay for the very basics like food, housing, medical care, and heating? Not very (P)                    Substance Abuse    No documentation.                  Patient Forms    No documentation.                 SW student completed discharge needs assessment with pt bedside in the ED. PCP is  Mirtha Alexander, APRN. Preferred pharmacy is [facility] in [place]. Pt can complete ADLs independently. Pt denies concerns with access to food or transportation. Pt reports finishing PT on Wednesday of last week. Pt denies alcohol and substance use. Pt reports smoking cigarettes. Pt denies needing any assistive equipment upon discharge.    Lauren Randolph, Social Work Student

## 2024-05-12 NOTE — ED PROVIDER NOTES
Time: 2:56 PM EDT  Date of encounter:  2024  Independent Historian/Clinical History and Information was obtained by:   Patient    History is limited by: N/A    Chief Complaint: Dyspnea      History of Present Illness:  Patient is a 63 y.o. year old male who presents to the emergency department for evaluation of dyspnea that is gotten worse.  The patient does report a productive cough.  Patient denies fever and chills.  Patient has no nausea or vomiting.  Patient denies chest pain.  Patient denies leg pain and swelling.    HPI    Patient Care Team  Primary Care Provider: Mirtha Alexander APRN    Past Medical History:     No Known Allergies  Past Medical History:   Diagnosis Date    CHF (congestive heart failure)     COPD (chronic obstructive pulmonary disease)     COPD (chronic obstructive pulmonary disease)     Coronary artery disease     Diastolic heart failure     Hyperlipidemia     Hypertension     Pulmonary hypertension      Past Surgical History:   Procedure Laterality Date    BACK SURGERY      BRONCHOSCOPY N/A 2022    Procedure: BRONCHOSCOPY WITH BRONCHOALVEOLAR LAVAGE AND BRONCHIAL WASHINGS;  Surgeon: Ulices Moya MD;  Location: [facility] ENDOSCOPY;  Service: Pulmonary;  Laterality: N/A;  MUCUS PLUGGING, COPD EXACERBATION    BRONCHOSCOPY      CARDIAC CATHETERIZATION N/A 2022    Procedure: Right Heart Cath;  Surgeon: Drew Rodriguez MD;  Location: [facility] CATH INVASIVE LOCATION;  Service: Cardiovascular;  Laterality: N/A;    OTHER SURGICAL HISTORY      knee     OTHER SURGICAL HISTORY      shoulder, rotator cuff    SHOULDER SURGERY Right 10/03/2022     Family History   Problem Relation Age of Onset    Asthma Mother     Emphysema Mother             Stroke Mother        Home Medications:  Prior to Admission medications    Medication Sig Start Date End Date Taking? Authorizing Provider   albuterol sulfate  (90 Base) MCG/ACT inhaler Inhale 2 puffs Every 4 (Four) Hours As  Needed for Wheezing. 23   Arleen Gillespie APRN   arformoterol (Brovana) 15 MCG/2ML nebulizer solution Take 2 mL by nebulization 2 (Two) Times a Day. 23   Ulices Moya MD   azithromycin (ZITHROMAX) 250 MG tablet Take 1 tablet by mouth Daily. 24   Ulices Moya MD   budesonide (Pulmicort) 0.5 MG/2ML nebulizer solution Take 2 mL by nebulization 2 (Two) Times a Day. 23   Marcus Albrecht MD   furosemide (LASIX) 20 MG tablet Take 2 tablets by mouth Daily.  Patient taking differently: Take 1 tablet by mouth Daily. 23   Arleen Gillespie APRN   ipratropium-albuterol (DUO-NEB) 0.5-2.5 mg/3 ml nebulizer INHALE 3 ML BY NEBULIZATION 4 TIMES A DAY  Patient taking differently: Take 3 mL by nebulization 4 (Four) Times a Day. 23   Adamaris Ray APRN   metoprolol tartrate (LOPRESSOR) 25 MG tablet Half tab BID 24   Mirtha Alexander APRN   pantoprazole (PROTONIX) 40 MG EC tablet Take 1 tablet by mouth Every Morning. 23   Arleen Gillespie APRN   predniSONE (DELTASONE) 10 MG tablet 50mg qday x 3 days, 40mg qday x 3 days, 30mg qday x 3 days, 20mg qday x 3 days, 10mg qday x 3 days, then stop 24   Ulices Moya MD   revefenacin (Yupelri) 175 MCG/3ML nebulizer solution Take 3 mL by nebulization Daily. 23   Arleen Gillespie APRN   roflumilast (Daliresp) 250 MCG tablet tablet Take 1 tablet by mouth Daily for 28 days. 24  Arleen Gillespie APRN   roflumilast (DALIRESP) 500 MCG tablet tablet Take 1 tablet by mouth Daily. 24   Arleen Gillespie APRN        Social History:   Social History     Tobacco Use    Smoking status: Former     Current packs/day: 0.00     Average packs/day: 0.2 packs/day for 48.9 years (12.2 ttl pk-yrs)     Types: Cigarettes     Start date: 1975     Quit date: 2023     Years since quittin.4     Passive exposure: Past    Smokeless tobacco: Never   Vaping Use    Vaping status: Never Used   Substance Use Topics    Alcohol use: Not  "Currently    Drug use: Never         Review of Systems:  Review of Systems   Constitutional:  Negative for chills and fever.   HENT:  Negative for congestion, rhinorrhea and sore throat.    Eyes:  Negative for pain and visual disturbance.   Respiratory:  Positive for shortness of breath. Negative for apnea, cough and chest tightness.    Cardiovascular:  Negative for chest pain and palpitations.   Gastrointestinal:  Negative for abdominal pain, diarrhea, nausea and vomiting.   Genitourinary:  Negative for difficulty urinating and dysuria.   Musculoskeletal:  Negative for joint swelling and myalgias.   Skin:  Negative for color change.   Neurological:  Negative for seizures and headaches.   Psychiatric/Behavioral: Negative.     All other systems reviewed and are negative.       Physical Exam:  /69   Pulse 84   Temp 98 °F (36.7 °C) (Oral)   Resp 16   Ht 170.2 cm (67\")   Wt 53.9 kg (118 lb 13.3 oz)   SpO2 96%   BMI 18.61 kg/m²     Physical Exam  Vitals and nursing note reviewed.   Constitutional:       General: He is not in acute distress.     Appearance: Normal appearance. He is not toxic-appearing.   HENT:      Head: Normocephalic and atraumatic.      Jaw: There is normal jaw occlusion.   Eyes:      General: Lids are normal.      Extraocular Movements: Extraocular movements intact.      Conjunctiva/sclera: Conjunctivae normal.      Pupils: Pupils are equal, round, and reactive to light.   Cardiovascular:      Rate and Rhythm: Normal rate and regular rhythm.      Pulses: Normal pulses.      Heart sounds: Normal heart sounds.   Pulmonary:      Effort: Pulmonary effort is normal. No respiratory distress.      Breath sounds: Wheezing present. No rhonchi.   Abdominal:      General: Abdomen is flat.      Palpations: Abdomen is soft.      Tenderness: There is no abdominal tenderness. There is no guarding or rebound.   Musculoskeletal:         General: Normal range of motion.      Cervical back: Normal range of " motion and neck supple.      Right lower leg: No edema.      Left lower leg: No edema.   Skin:     General: Skin is warm and dry.   Neurological:      Mental Status: He is alert and oriented to person, place, and time. Mental status is at baseline.   Psychiatric:         Mood and Affect: Mood normal.                  Procedures:  Procedures      Medical Decision Making:      Comorbidities that affect care:    COPD    External Notes reviewed:    Previous Clinic Note: Patient was last seen in clinic for COPD exacerbation.      The following orders were placed and all results were independently analyzed by me:  Orders Placed This Encounter   Procedures    COVID PRE-OP / PRE-PROCEDURE SCREENING ORDER (NO ISOLATION) - Swab, Nasopharynx    COVID-19, FLU A/B, RSV PCR 1 HR TAT - Swab, Nasopharynx    XR Chest 1 View    [place] Draw    Comprehensive Metabolic Panel    BNP    Single High Sensitivity Troponin T    CBC Auto Differential    Lactic Acid, Plasma    NPO Diet NPO Type: Strict NPO    Undress & Gown    Continuous Pulse Oximetry    Vital Signs    Inpatient Hospitalist Consult    Oxygen Therapy- Nasal Cannula; Titrate 1-6 LPM Per SpO2; 90 - 95%    ECG 12 Lead ED Triage Standing Order; SOA    Insert Peripheral IV    Initiate Observation Status    CBC & Differential    Green Top (Gel)    Lavender Top    Gold Top - SST    Light Blue Top       Medications Given in the Emergency Department:  Medications   sodium chloride 0.9 % flush 10 mL (has no administration in time range)   albuterol (PROVENTIL) nebulizer solution 0.083% 2.5 mg/3mL (7.5 mg Nebulization Not Given 5/12/24 1504)   methylPREDNISolone sodium succinate (SOLU-Medrol) injection 125 mg (125 mg Intravenous Given 5/12/24 1532)        ED Course:         Labs:    Lab Results (last 24 hours)       Procedure Component Value Units Date/Time    CBC & Differential [[id number]]  (Abnormal) Collected: 05/12/24 1439    Specimen: Blood Updated: 05/12/24 1445    Narrative:       The following orders were created for panel order CBC & Differential.  Procedure                               Abnormality         Status                     ---------                               -----------         ------                     CBC Auto Differential[[id number]]        Abnormal            Final result                 Please view results for these tests on the individual orders.    Comprehensive Metabolic Panel [[id number]]  (Abnormal) Collected: 05/12/24 1439    Specimen: Blood Updated: 05/12/24 1514     Glucose 114 mg/dL      BUN 12 mg/dL      Creatinine 0.70 mg/dL      Sodium 142 mmol/L      Potassium 4.4 mmol/L      Comment: Slight hemolysis detected by analyzer. Result may be falsely elevated.        Chloride 97 mmol/L      CO2 34.2 mmol/L      Calcium 10.2 mg/dL      Total Protein 7.5 g/dL      Albumin 4.5 g/dL      ALT (SGPT) 19 U/L      AST (SGOT) 32 U/L      Alkaline Phosphatase 159 U/L      Total Bilirubin 0.5 mg/dL      Globulin 3.0 gm/dL      A/G Ratio 1.5 g/dL      BUN/Creatinine Ratio 17.1     Anion Gap 10.8 mmol/L      eGFR 104.2 mL/min/1.73     Narrative:      GFR Normal >60  Chronic Kidney Disease <60  Kidney Failure <15      BNP [[id number]]  (Normal) Collected: 05/12/24 1439    Specimen: Blood Updated: 05/12/24 1509     proBNP 449.4 pg/mL     Narrative:      This assay is used as an aid in the diagnosis of individuals suspected of having heart failure. It can be used as an aid in the diagnosis of acute decompensated heart failure (ADHF) in patients presenting with signs and symptoms of ADHF to the emergency department (ED). In addition, NT-proBNP of <300 pg/mL indicates ADHF is not likely.    Age Range Result Interpretation  NT-proBNP Concentration (pg/mL:      <50             Positive            >450                   Gray                 300-450                    Negative             <300    50-75           Positive            >900                  Gray                300-900                   Negative            <300      >75             Positive            >1800                  Gray                300-1800                  Negative            <300    Single High Sensitivity Troponin T [[id number]]  (Normal) Collected: 05/12/24 1439    Specimen: Blood Updated: 05/12/24 1514     HS Troponin T 17 ng/L     Narrative:      High Sensitive Troponin T Reference Range:  <14.0 ng/L- Negative Female for AMI  <22.0 ng/L- Negative Male for AMI  >=14 - Abnormal Female indicating possible myocardial injury.  >=22 - Abnormal Male indicating possible myocardial injury.   Clinicians would have to utilize clinical acumen, EKG, Troponin, and serial changes to determine if it is an Acute Myocardial Infarction or myocardial injury due to an underlying chronic condition.         CBC Auto Differential [[id number]]  (Abnormal) Collected: 05/12/24 1439    Specimen: Blood Updated: 05/12/24 1445     WBC 6.54 10*3/mm3      RBC 5.45 10*6/mm3      Hemoglobin 15.6 g/dL      Hematocrit 49.2 %      MCV 90.3 fL      MCH 28.6 pg      MCHC 31.7 g/dL      RDW 14.0 %      RDW-SD 46.5 fl      MPV 10.3 fL      Platelets 167 10*3/mm3      Neutrophil % 82.3 %      Lymphocyte % 5.8 %      Monocyte % 11.0 %      Eosinophil % 0.2 %      Basophil % 0.5 %      Immature Grans % 0.2 %      Neutrophils, Absolute 5.39 10*3/mm3      Lymphocytes, Absolute 0.38 10*3/mm3      Monocytes, Absolute 0.72 10*3/mm3      Eosinophils, Absolute 0.01 10*3/mm3      Basophils, Absolute 0.03 10*3/mm3      Immature Grans, Absolute 0.01 10*3/mm3      nRBC 0.0 /100 WBC     Lactic Acid, Plasma [[id number]]  (Normal) Collected: 05/12/24 1439    Specimen: Blood Updated: 05/12/24 1513     Lactate 1.3 mmol/L     COVID PRE-OP / PRE-PROCEDURE SCREENING ORDER (NO ISOLATION) - Swab, Nasopharynx [[id number]]  (Normal) Collected: 05/12/24 1443    Specimen: Swab from Nasopharynx Updated: 05/12/24 1527    Narrative:      The following orders were created for panel order COVID  PRE-OP / PRE-PROCEDURE SCREENING ORDER (NO ISOLATION) - Swab, Nasopharynx.  Procedure                               Abnormality         Status                     ---------                               -----------         ------                     COVID-19, FLU A/B, RSV P...[[id number]]  Normal              Final result                 Please view results for these tests on the individual orders.    COVID-19, FLU A/B, RSV PCR 1 HR TAT - Swab, Nasopharynx [[id number]]  (Normal) Collected: 05/12/24 1443    Specimen: Swab from Nasopharynx Updated: 05/12/24 1527     COVID19 Not Detected     Influenza A PCR Not Detected     Influenza B PCR Not Detected     RSV, PCR Not Detected    Narrative:      Fact sheet for providers: https://www.fda.gov/media/636936/download    Fact sheet for patients: https://www.fda.gov/media/758324/download    Test performed by PCR.             Imaging:    XR Chest 1 View    Result Date: 5/12/2024  XR CHEST 1 VW-  Date of Exam: 5/12/2024 2:55 PM  Indication: SOA Triage Protocol  Comparison: Chest AP dated 12/26/2023  Findings: No infiltrate or effusion is seen. The cardiac mediastinal silhouettes appear normal.      Impression: 1.  No acute cardiopulmonary disease   Electronically Signed By-Ciro Martin MD On:5/12/2024 4:43 PM         Differential Diagnosis and Discussion:    Dyspnea: Differential diagnosis includes but is not limited to metabolic acidosis, neurological disorders, psychogenic, asthma, pneumothorax, upper airway obstruction, COPD, pneumonia, noncardiogenic pulmonary edema, interstitial lung disease, anemia, congestive heart failure, and pulmonary embolism    All labs were reviewed and interpreted by me.  All X-rays impressions were independently interpreted by me.  EKG was interpreted by me.    MDM     The patient´s CBC that was reviewed and interpreted by me shows no abnormalities of critical concern. Of note, there is no anemia requiring a blood transfusion and the platelet  count is acceptable.  The patient´s CMP that was reviewed and interpretted by me shows no abnormalities of critical concern. Of note, the patient´s sodium and potassium are acceptable. The patient´s liver enzymes are unremarkable. The patient´s renal function (creatinine) is preserved. The patient has a normal anion gap.  Chest x-ray is negative for acute infiltrate.  Patient was given an hour-long breathing treatment and [facility] emergency department.  Patient does report that he still continues to feel dyspneic and weak.    Patient was placed on the cardiac monitor after given albuterol.  They were monitored for ventricular ectopy, arrhythmia, tachycardia, hypoxia, and changes in blood pressure.  Patient was rechecked several times throughout their stay.  Critical Care Note: Total Critical Care time of 50 minutes. Total critical care time documented does not include time spent on separately billed procedures for services of nurses or physician assistants. I personally saw and examined the patient. I have reviewed all diagnostic interpretations and treatment plans as written. I was present for the key portions of any procedures performed and the inclusive time noted in any critical care statement. Critical care time includes patient management by me, time spent at the patients bedside,  time to review lab and imaging results, discussing patient care, documentation in the medical record, and time spent with family or caregiver.        Patient Care Considerations:    PERC: I used the PERC score to risk stratify the patient for PE and a CT of the chest was considered but ultimately not indicated in today's visit.      Consultants/Shared Management Plan:    Case was discussed with Dr. Taylor who agrees with admission.    Social Determinants of Health:    Patient is independent, reliable, and has access to care.       Disposition and Care Coordination:    Admit:   Through independent evaluation of the patient's  history, physical, and imperical data, the patient meets criteria for inpatient admission to the hospital.        Final diagnoses:   COPD exacerbation        ED Disposition       ED Disposition   Decision to Admit    Condition   --    Comment   Level of Care: Remote Telemetry [26]   Diagnosis: COPD exacerbation [772540]   Admitting Physician: BRODY PEREZ [[id number]]                 This medical record created using voice recognition software.             Cyndy Torrez MD  05/12/24 [id number]

## 2024-05-13 ENCOUNTER — APPOINTMENT (OUTPATIENT)
Dept: CARDIAC REHAB | Facility: HOSPITAL | Age: 64
End: 2024-05-13
Payer: MEDICAID

## 2024-05-13 ENCOUNTER — APPOINTMENT (OUTPATIENT)
Dept: CT IMAGING | Facility: HOSPITAL | Age: 64
End: 2024-05-13
Payer: MEDICAID

## 2024-05-13 LAB
ANION GAP SERPL CALCULATED.3IONS-SCNC: 5.7 MMOL/L (ref 5–15)
BUN SERPL-MCNC: 14 MG/DL (ref 8–23)
BUN/CREAT SERPL: 20.9 (ref 7–25)
CALCIUM SPEC-SCNC: 9.3 MG/DL (ref 8.6–10.5)
CHLORIDE SERPL-SCNC: 100 MMOL/L (ref 98–107)
CO2 SERPL-SCNC: 34.3 MMOL/L (ref 22–29)
CREAT SERPL-MCNC: 0.67 MG/DL (ref 0.76–1.27)
EGFRCR SERPLBLD CKD-EPI 2021: 105.6 ML/MIN/1.73
GLUCOSE SERPL-MCNC: 138 MG/DL (ref 65–99)
L PNEUMO1 AG UR QL IA: NEGATIVE
POTASSIUM SERPL-SCNC: 4.6 MMOL/L (ref 3.5–5.2)
PROCALCITONIN SERPL-MCNC: 0.05 NG/ML (ref 0–0.25)
QT INTERVAL: 394 MS
QTC INTERVAL: 461 MS
S PNEUM AG SPEC QL LA: NEGATIVE
SODIUM SERPL-SCNC: 140 MMOL/L (ref 136–145)

## 2024-05-13 PROCEDURE — 87899 AGENT NOS ASSAY W/OPTIC: CPT | Performed by: INTERNAL MEDICINE

## 2024-05-13 PROCEDURE — 74176 CT ABD & PELVIS W/O CONTRAST: CPT

## 2024-05-13 PROCEDURE — 25010000002 METHYLPREDNISOLONE PER 40 MG: Performed by: FAMILY MEDICINE

## 2024-05-13 PROCEDURE — G0378 HOSPITAL OBSERVATION PER HR: HCPCS

## 2024-05-13 PROCEDURE — 80048 BASIC METABOLIC PNL TOTAL CA: CPT | Performed by: FAMILY MEDICINE

## 2024-05-13 PROCEDURE — 84145 PROCALCITONIN (PCT): CPT | Performed by: INTERNAL MEDICINE

## 2024-05-13 PROCEDURE — 94799 UNLISTED PULMONARY SVC/PX: CPT

## 2024-05-13 PROCEDURE — 25010000002 ENOXAPARIN PER 10 MG: Performed by: FAMILY MEDICINE

## 2024-05-13 PROCEDURE — 99232 SBSQ HOSP IP/OBS MODERATE 35: CPT | Performed by: INTERNAL MEDICINE

## 2024-05-13 PROCEDURE — 71250 CT THORAX DX C-: CPT

## 2024-05-13 PROCEDURE — 97161 PT EVAL LOW COMPLEX 20 MIN: CPT

## 2024-05-13 PROCEDURE — 25010000002 METHYLPREDNISOLONE PER 40 MG: Performed by: INTERNAL MEDICINE

## 2024-05-13 PROCEDURE — 87449 NOS EACH ORGANISM AG IA: CPT | Performed by: INTERNAL MEDICINE

## 2024-05-13 PROCEDURE — 94664 DEMO&/EVAL PT USE INHALER: CPT

## 2024-05-13 PROCEDURE — 25010000002 ENOXAPARIN PER 10 MG: Performed by: INTERNAL MEDICINE

## 2024-05-13 RX ORDER — AZITHROMYCIN 250 MG/1
250 TABLET, FILM COATED ORAL DAILY
Status: DISCONTINUED | OUTPATIENT
Start: 2024-05-13 | End: 2024-05-24 | Stop reason: HOSPADM

## 2024-05-13 RX ORDER — ALBUTEROL SULFATE 2.5 MG/3ML
2.5 SOLUTION RESPIRATORY (INHALATION) EVERY 4 HOURS PRN
Status: DISCONTINUED | OUTPATIENT
Start: 2024-05-13 | End: 2024-05-24 | Stop reason: HOSPADM

## 2024-05-13 RX ORDER — ROFLUMILAST 500 UG/1
500 TABLET ORAL DAILY
Status: DISCONTINUED | OUTPATIENT
Start: 2024-05-13 | End: 2024-05-24 | Stop reason: HOSPADM

## 2024-05-13 RX ORDER — BUDESONIDE 0.5 MG/2ML
0.5 INHALANT ORAL
Status: DISCONTINUED | OUTPATIENT
Start: 2024-05-13 | End: 2024-05-24 | Stop reason: HOSPADM

## 2024-05-13 RX ORDER — ARFORMOTEROL TARTRATE 15 UG/2ML
15 SOLUTION RESPIRATORY (INHALATION)
Status: DISCONTINUED | OUTPATIENT
Start: 2024-05-13 | End: 2024-05-24 | Stop reason: HOSPADM

## 2024-05-13 RX ORDER — ACETAMINOPHEN 325 MG/1
650 TABLET ORAL EVERY 6 HOURS PRN
Status: DISCONTINUED | OUTPATIENT
Start: 2024-05-13 | End: 2024-05-24 | Stop reason: HOSPADM

## 2024-05-13 RX ORDER — FUROSEMIDE 40 MG/1
40 TABLET ORAL DAILY
Status: DISCONTINUED | OUTPATIENT
Start: 2024-05-13 | End: 2024-05-24 | Stop reason: HOSPADM

## 2024-05-13 RX ORDER — IPRATROPIUM BROMIDE AND ALBUTEROL SULFATE 2.5; .5 MG/3ML; MG/3ML
3 SOLUTION RESPIRATORY (INHALATION)
Status: DISCONTINUED | OUTPATIENT
Start: 2024-05-13 | End: 2024-05-24 | Stop reason: HOSPADM

## 2024-05-13 RX ADMIN — FUROSEMIDE 40 MG: 40 TABLET ORAL at 08:55

## 2024-05-13 RX ADMIN — PANTOPRAZOLE SODIUM 40 MG: 40 TABLET, DELAYED RELEASE ORAL at 06:29

## 2024-05-13 RX ADMIN — IPRATROPIUM BROMIDE AND ALBUTEROL SULFATE 3 ML: .5; 3 SOLUTION RESPIRATORY (INHALATION) at 07:29

## 2024-05-13 RX ADMIN — ACETAMINOPHEN 650 MG: 325 TABLET ORAL at 08:55

## 2024-05-13 RX ADMIN — ACETAMINOPHEN 650 MG: 325 TABLET ORAL at 01:23

## 2024-05-13 RX ADMIN — BUDESONIDE 0.5 MG: 0.5 SUSPENSION RESPIRATORY (INHALATION) at 09:45

## 2024-05-13 RX ADMIN — BUDESONIDE 0.5 MG: 0.5 SUSPENSION RESPIRATORY (INHALATION) at 18:27

## 2024-05-13 RX ADMIN — METOPROLOL TARTRATE 25 MG: 25 TABLET, FILM COATED ORAL at 08:55

## 2024-05-13 RX ADMIN — ACETAMINOPHEN 650 MG: 325 TABLET ORAL at 20:40

## 2024-05-13 RX ADMIN — IPRATROPIUM BROMIDE AND ALBUTEROL SULFATE 3 ML: .5; 3 SOLUTION RESPIRATORY (INHALATION) at 12:02

## 2024-05-13 RX ADMIN — ARFORMOTEROL TARTRATE 15 MCG: 15 SOLUTION RESPIRATORY (INHALATION) at 09:45

## 2024-05-13 RX ADMIN — IPRATROPIUM BROMIDE AND ALBUTEROL SULFATE 3 ML: .5; 3 SOLUTION RESPIRATORY (INHALATION) at 00:27

## 2024-05-13 RX ADMIN — AZITHROMYCIN DIHYDRATE 250 MG: 250 TABLET ORAL at 08:55

## 2024-05-13 RX ADMIN — Medication 10 ML: at 20:41

## 2024-05-13 RX ADMIN — ENOXAPARIN SODIUM 30 MG: 100 INJECTION SUBCUTANEOUS at 08:55

## 2024-05-13 RX ADMIN — METOPROLOL TARTRATE 25 MG: 25 TABLET, FILM COATED ORAL at 20:40

## 2024-05-13 RX ADMIN — Medication 10 ML: at 08:56

## 2024-05-13 RX ADMIN — IPRATROPIUM BROMIDE AND ALBUTEROL SULFATE 3 ML: .5; 3 SOLUTION RESPIRATORY (INHALATION) at 18:27

## 2024-05-13 RX ADMIN — METHYLPREDNISOLONE SODIUM SUCCINATE 40 MG: 40 INJECTION INTRAMUSCULAR; INTRAVENOUS at 16:29

## 2024-05-13 RX ADMIN — MONTELUKAST 10 MG: 10 TABLET, FILM COATED ORAL at 20:40

## 2024-05-13 RX ADMIN — CETIRIZINE HYDROCHLORIDE 10 MG: 10 TABLET, FILM COATED ORAL at 20:40

## 2024-05-13 RX ADMIN — ROFLUMILAST 500 MCG: 500 TABLET ORAL at 08:55

## 2024-05-13 RX ADMIN — GUAIFENESIN 600 MG: 600 TABLET ORAL at 20:40

## 2024-05-13 RX ADMIN — ARFORMOTEROL TARTRATE 15 MCG: 15 SOLUTION RESPIRATORY (INHALATION) at 18:27

## 2024-05-13 RX ADMIN — GUAIFENESIN 600 MG: 600 TABLET ORAL at 08:55

## 2024-05-13 NOTE — THERAPY EVALUATION
Acute Care - Physical Therapy Initial Evaluation   [facility]     Patient Name: Sumeet Gomes  : 1960  MRN: [id number]  Today's Date: 2024      Visit Dx:     ICD-10-CM ICD-9-CM   1. COPD exacerbation  J44.1 491.21   2. Difficulty walking  R26.2 719.7     Patient Active Problem List   Diagnosis    Other hyperlipidemia    Essential hypertension    COPD (chronic obstructive pulmonary disease)    Fatigue    Chest wall contusion, right, initial encounter    Closed fracture of coccyx    Contusion of right knee    Acute respiratory failure with hypoxia and hypercapnia    Acute CHF    Tobacco abuse    Hypoxia    Severe malnutrition    Pulmonary hypertension    Other fatigue    Dyspnea    Cough    Diastolic heart failure    Routine general medical examination at a health care facility    Screening for prostate cancer    Swelling of joint of right knee    Acute respiratory failure with hypoxia    COVID-19    Chronic hypoxic respiratory failure    COPD exacerbation    Acute-on-chronic respiratory failure     Past Medical History:   Diagnosis Date    CHF (congestive heart failure)     COPD (chronic obstructive pulmonary disease)     COPD (chronic obstructive pulmonary disease)     Coronary artery disease     Diastolic heart failure     Hyperlipidemia     Hypertension     Pulmonary hypertension      Past Surgical History:   Procedure Laterality Date    BACK SURGERY      BRONCHOSCOPY N/A 2022    Procedure: BRONCHOSCOPY WITH BRONCHOALVEOLAR LAVAGE AND BRONCHIAL WASHINGS;  Surgeon: Ulices Moya MD;  Location: [facility] ENDOSCOPY;  Service: Pulmonary;  Laterality: N/A;  MUCUS PLUGGING, COPD EXACERBATION    BRONCHOSCOPY      CARDIAC CATHETERIZATION N/A 2022    Procedure: Right Heart Cath;  Surgeon: Drew Rodriguez MD;  Location: [facility] CATH INVASIVE LOCATION;  Service: Cardiovascular;  Laterality: N/A;    OTHER SURGICAL HISTORY      knee     OTHER SURGICAL HISTORY      shoulder, rotator cuff    SHOULDER  SURGERY Right 10/03/2022     PT Assessment (Last 12 Hours)       PT Evaluation and Treatment       Row Name 05/13/24 1500          Physical Therapy Time and Intention    Document Type evaluation  -AV     Mode of Treatment individual therapy;physical therapy  -AV     Symptoms Noted During/After Treatment shortness of breath  -AV       Row Name 05/13/24 1500          General Information    Patient Profile Reviewed yes  -AV     Patient Observations alert;cooperative;agree to therapy  -AV     Prior Level of Function independent:;all household mobility;gait;transfer;ADL's  Ambulated without an assistive device. 3 L continuous O2. Recently discharged from pulmonary rehab.  -AV     Equipment Currently Used at Home oxygen  -AV     Existing Precautions/Restrictions oxygen therapy device and L/min  -AV       Row Name 05/13/24 1500          Living Environment    Current Living Arrangements home  -AV     People in Home spouse  -AV       Row Name 05/13/24 1500          Cognition    Orientation Status (Cognition) oriented x 3  -AV       [facility] Name 05/13/24 1500          Range of Motion (ROM)    Range of Motion bilateral lower extremities;ROM is WFL  -AV       [facility] Name 05/13/24 1500          Strength (Manual Muscle Testing)    Strength (Manual Muscle Testing) bilateral lower extremities;strength is WFL  -AV       [facility] Name 05/13/24 1500          Bed Mobility    Bed Mobility bed mobility (all) activities  -AV     All Activities, [place] (Bed Mobility) independent  -AV       Row Name 05/13/24 1500          Transfers    Transfers sit-stand transfer;stand-sit transfer  -AV       [facility] Name 05/13/24 1500          Sit-Stand Transfer    Sit-Stand [place] (Transfers) independent  -AV     Assistive Device (Sit-Stand Transfers) --  No AD  -AV       Row Name 05/13/24 1500          Stand-Sit Transfer    Stand-Sit [place] (Transfers) independent  -AV     Assistive Device (Stand-Sit Transfers) --  No AD  -AV       [facility] Name 05/13/24  1500          Gait/Stairs (Locomotion)    Gait/Stairs Locomotion gait/ambulation independence;gait/ambulation assistive device;distance ambulated  -AV     [place] Level (Gait) independent  -AV     Assistive Device (Gait) --  No AD  -AV     Distance in Feet (Gait) 50  -AV     Pattern (Gait) step-through  -AV       Row Name 05/13/24 1500          Balance    Balance Assessment standing dynamic balance  -AV     Dynamic Standing Balance independent  -AV     Position/Device Used, Standing Balance unsupported  -AV       Row Name             Wound 05/12/24 1851 Right anterior wrist Skin Tear    Wound - Properties Group Placement Date: 05/12/24  -DS Placement Time: 1851  -DS Present on Original Admission: Y  -DS Side: Right  -DS Orientation: anterior  -DS Location: wrist  -DS Primary Wound Type: Skin tear  -DS    Retired Wound - Properties Group Placement Date: 05/12/24  -DS Placement Time: 1851 -DS Present on Original Admission: Y  -DS Side: Right  -DS Orientation: anterior  -DS Location: wrist  -DS Primary Wound Type: Skin tear  -DS    Retired Wound - Properties Group Date first assessed: 05/12/24  -DS Time first assessed: 1851  -DS Present on Original Admission: Y  -DS Side: Right  -DS Location: wrist  -DS Primary Wound Type: Skin tear  -DS      Row Name 05/13/24 1500          Plan of Care Review    Plan of Care Reviewed With patient  -AV     Progress no change  -AV     Outcome Evaluation Patient does not present with any significant decline in functional mobility requiring inpatient PT services at this time. Patient is completing transfers and ambulating independently and is safe to continue doing so until his discharge from the hospital. Patient may benefit from follow up with pulmonary rehab to address decreased endurance. D/C PT order with patient in agreement.  -AV       Row Name 05/13/24 1500          Therapy Assessment/Plan (PT)    Criteria for Skilled Interventions Met (PT) no problems identified which  require skilled intervention  -AV     Therapy Frequency (PT) evaluation only  -AV       Row Name 05/13/24 1500          PT Evaluation Complexity    History, PT Evaluation Complexity 1-2 personal factors and/or comorbidities  -AV     Examination of Body Systems (PT Eval Complexity) total of 4 or more elements  -AV     Clinical Presentation (PT Evaluation Complexity) stable  -AV     Clinical Decision Making (PT Evaluation Complexity) low complexity  -AV     Overall Complexity (PT Evaluation Complexity) low complexity  -AV       Row Name 05/13/24 1500          Therapy Plan Review/Discharge Plan (PT)    Therapy Plan Review (PT) evaluation/treatment results reviewed;patient  -AV               User Key  (r) = Recorded By, (t) = Taken By, (c) = Cosigned By      Initials Name Provider Type    AV Richard Carlton, GABRIEL Physical Therapist    Antoine Styles RN Registered Nurse                    Physical Therapy Education       Title: PT OT SLP Therapies (In Progress)       Topic: Physical Therapy (In Progress)       Point: Mobility training (Done)       Learning Progress Summary             Patient Acceptance, E,TB, VU by AV at 5/13/2024 1537                         Point: Home exercise program (Not Started)       Learner Progress:  Not documented in this visit.              Point: Body mechanics (Done)       Learning Progress Summary             Patient Acceptance, E,TB, VU by AV at 5/13/2024 1537                         Point: Precautions (Done)       Learning Progress Summary             Patient Acceptance, E,TB, VU by AV at 5/13/2024 1537                                         User Key       Initials Effective Dates Name Provider Type Discipline    AV 06/11/21 -  Richard Carlton, PT Physical Therapist PT                  PT Recommendation and Plan  Anticipated Discharge Disposition (PT): home, other (see comments) (pulmonary rehab)  Therapy Frequency (PT): evaluation only  Plan of Care Reviewed With:  patient  Progress: no change  Outcome Evaluation: Patient does not present with any significant decline in functional mobility requiring inpatient PT services at this time. Patient is completing transfers and ambulating independently and is safe to continue doing so until his discharge from the hospital. Patient may benefit from follow up with pulmonary rehab to address decreased endurance. D/C PT order with patient in agreement.   Outcome Measures       Row Name 05/13/24 1500             How much help from another person do you currently need...    Turning from your back to your side while in flat bed without using bedrails? 4  -AV      Moving from lying on back to sitting on the side of a flat bed without bedrails? 4  -AV      Moving to and from a bed to a chair (including a wheelchair)? 4  -AV      Standing up from a chair using your arms (e.g., wheelchair, bedside chair)? 4  -AV      Climbing 3-5 steps with a railing? 3  -AV      To walk in hospital room? 4  -AV      AM-PAC 6 Clicks Score (PT) 23  -AV      Highest Level of Mobility Goal 7 --> Walk 25 feet or more  -AV         Functional Assessment    Outcome Measure Options AM-PAC 6 Clicks Basic Mobility (PT)  -AV                User Key  (r) = Recorded By, (t) = Taken By, (c) = Cosigned By      Initials Name Provider Type    Richard Ding, GABRIEL Physical Therapist                     Time Calculation:    PT Charges       Row Name 05/13/24 1536             Time Calculation    PT Received On 05/13/24  -AV         Untimed Charges    PT Eval/Re-eval Minutes 20  -AV         Total Minutes    Untimed Charges Total Minutes 20  -AV       Total Minutes 20  -AV                User Key  (r) = Recorded By, (t) = Taken By, (c) = Cosigned By      Initials Name Provider Type    Richard Ding, GABRIEL Physical Therapist                  Therapy Charges for Today       Code Description Service Date Service Provider Modifiers Qty    52041739816 HC PT EVAL LOW COMPLEXITY 2  5/13/2024 Richard Carlton, PT GP 1            PT G-Codes  Outcome Measure Options: AM-PAC 6 Clicks Basic Mobility (PT)  AM-PAC 6 Clicks Score (PT): 23    Richard Carlton, PT  5/13/2024

## 2024-05-13 NOTE — PROGRESS NOTES
[facility]   Hospitalist Progress Note  Date: 2024  Patient Name: Sumeet Gomes  : 1960  MRN: [id number]  Date of admission: 2024      Subjective   Subjective     Chief Complaint: Shortness of breath    Summary: 63 y.o. male with past medical history of hypertension, hyperlipidemia, COPD on home oxygen, pulmonary hypertension, CAD, and GERD presented to ED with complaint of shortness of breath over the past few weeks.  In the ED patient vitals were within normal limits on 3 L nasal cannula.  Labs were all relatively unremarkable given his chronic conditions including a normal lactic acid, proBNP, and negative COVID flu RSV.  Chest x-ray was negative for any acute findings.  He was admitted for COPD exacerbation, started on steroids, bronchodilators.  No evidence of pneumonia.  His [place] significant weight loss over the last 2 years, likely in setting of advanced COPD.  However, obtaining CT chest, abdomen pelvis to rule out malignancy.    Interval Followup: No acute events overnight.  States he feels like his breathing is starting to ease up a little bit this morning.  However, he is still very short winded with exertion.  Appetite is a little bit improved and he is able to eat breakfast.  He states his appetite is been quite poor over the last few weeks.    Objective   Objective     Vitals:   Temp:  [97.6 °F (36.4 °C)-98.2 °F (36.8 °C)] 98.2 °F (36.8 °C)  Heart Rate:  [70-96] 96  Resp:  [16-21] 18  BP: ()/(64-84) 103/66  Flow (L/min):  [3] 3  Physical Exam    Constitutional: Thin male, awake, alert, no acute distress   Respiratory: Scattered expiratory wheezing noted throughout all lung fields, nasal cannula in place, nonlabored respirations    Cardiovascular: RRR, no murmurs, rubs, or gallops   Gastrointestinal: Positive bowel sounds, soft, nontender, nondistended   Neurologic: Oriented x 3, strength symmetric in all extremities, Cranial Nerves grossly intact to confrontation,  speech clear    Result Review    I have personally reviewed the results below:  [x]  Laboratory personally viewed BMP, procalcitonin, CBC, personally reviewed chest x-ray with no cardiopulmonary abnormality  []  Microbiology  []  Radiology  []  EKG/Telemetry   []  Cardiology/Vascular   []  Pathology  []  Old records  []  Other:  CBC          12/28/2023    04:02 12/29/2023    05:35 5/12/2024    14:39   CBC   WBC 5.33  5.69  6.54    RBC 4.40  4.31  5.45    Hemoglobin 12.1  11.7  15.6    Hematocrit 38.3  38.0  49.2    MCV 87.0  88.2  90.3    MCH 27.5  27.1  28.6    MCHC 31.6  30.8  31.7    RDW 14.1  14.3  14.0    Platelets 181  207  167      CMP          12/29/2023    05:35 5/12/2024    14:39 5/13/2024    05:39   CMP   Glucose 92  114  138    BUN 15  12  14    Creatinine 0.46  0.70  0.67    EGFR 118.3  104.2  105.6    Sodium 141  142  140    Potassium 4.1  4.4  4.6    Chloride 103  97  100    Calcium 9.0  10.2  9.3    Total Protein  7.5     Albumin  4.5     Globulin  3.0     Total Bilirubin  0.5     Alkaline Phosphatase  159     AST (SGOT)  32     ALT (SGPT)  19     Albumin/Globulin Ratio  1.5     BUN/Creatinine Ratio 32.6  17.1  20.9    Anion Gap 4.9  10.8  5.7        Assessment & Plan   Assessment / Plan   COPD with acute exacerbation  Weight loss  Hypertension  Tobacco dependence  Pulmonary hypertension  CAD    Continue to monitor in the hospital for workup and management of the above  Patient follows with Dr. Moya, should he worsen, will consider pulmonology consult but currently not warranted  Continue with IV Solu-Medrol today, will likely transition to prednisone in the next 24-48 hours  Continue with scheduled DuoNebs, Pulmicort and Brovana twice daily, albuterol as needed  Continue scheduled Mucinex and Tessalon Perles  Incentive spirometry at bedside  Continue daily azithromycin for COPD suppression  Continue Daliresp and Singulair  In setting of weight loss, may be secondary to advancing COPD, however, will  obtain CT chest abdomen pelvis to rule out malignancy  Recommend colonoscopy on outpatient basis for routine screening  Continue home antihypertensives  Nicotine patch as needed  Trend renal function and electrolytes with a.m. BMP, magnesium   Trend Hgb and WBC with a.m. CBC     Discussed plan with RN.    DVT prophylaxis:  Medical DVT prophylaxis orders are present.        CODE STATUS:   Level Of Support Discussed With: Patient  Code Status (Patient has no pulse and is not breathing): No CPR (Do Not Attempt to Resuscitate)  Medical Interventions (Patient has pulse or is breathing): Full Support        Electronically signed by Jomar Flores MD, 05/13/24, 11:36 AM EDT.

## 2024-05-13 NOTE — PLAN OF CARE
Goal Outcome Evaluation:  Plan of Care Reviewed With: patient        Progress: no change  Outcome Evaluation: PT vss. Pt c/o headache gave PRN tylenol. Continue plan of care

## 2024-05-13 NOTE — PLAN OF CARE
Goal Outcome Evaluation:  Plan of Care Reviewed With: patient        Progress: no change  Outcome Evaluation: Patient does not present with any significant decline in functional mobility requiring inpatient PT services at this time. Patient is completing transfers and ambulating independently and is safe to continue doing so until his discharge from the hospital. Patient may benefit from follow up with pulmonary rehab to address decreased endurance. D/C PT order with patient in agreement.      Anticipated Discharge Disposition (PT): home, other (see comments) (pulmonary rehab)

## 2024-05-13 NOTE — PROGRESS NOTES
Respiratory Therapist Broncho-Pulmonary Hygiene Progress Note      Patient Name:  Sumeet Gomes  YOB: 1960    Sumeet Gomes meets the qualification for Level 1 of the Bronco-Pulmonary Hygiene Protocol. This was based on my daily patient assessment and includes review of chest x-ray results, cough ability and quality, oxygenation, secretions or risk for secretion development and patient mobility.     Broncho-Pulmonary Hygiene Assessment:    Level of Movement: Actively changing positions without assistance  Alert/ oriented/ cooperative    Breath Sounds: Clear to slightly diminished    Cough: Strong, effective    Chest X-Ray: Possible signs of consolidation and/or atelectasis or clear.     Sputum Productions: None or small amount of thin or watery secretions with effective cough    History and Physical: New onset of bronchitis or existing chronic pulmonary conditions.  **(not in an exacerbation)    SpO2 to Oxygen Need: greater than 92% on room air or  less than 3L nasal canula    Current SpO2 is: 95% on 3L    Based on this information, I have completed the following interventions: Teach/Instruct patient on cough and deep breathe      Electronically signed by Lryic Menezes RRT, 05/13/24, 9:46 AM EDT.

## 2024-05-14 LAB
ANION GAP SERPL CALCULATED.3IONS-SCNC: 3.4 MMOL/L (ref 5–15)
B PARAPERT DNA SPEC QL NAA+PROBE: NOT DETECTED
B PERT DNA SPEC QL NAA+PROBE: NOT DETECTED
BASOPHILS # BLD AUTO: 0.01 10*3/MM3 (ref 0–0.2)
BASOPHILS NFR BLD AUTO: 0.1 % (ref 0–1.5)
BUN SERPL-MCNC: 18 MG/DL (ref 8–23)
BUN/CREAT SERPL: 29 (ref 7–25)
C PNEUM DNA NPH QL NAA+NON-PROBE: NOT DETECTED
CALCIUM SPEC-SCNC: 9.6 MG/DL (ref 8.6–10.5)
CHLORIDE SERPL-SCNC: 99 MMOL/L (ref 98–107)
CO2 SERPL-SCNC: 37.6 MMOL/L (ref 22–29)
CREAT SERPL-MCNC: 0.62 MG/DL (ref 0.76–1.27)
DEPRECATED RDW RBC AUTO: 47.6 FL (ref 37–54)
EGFRCR SERPLBLD CKD-EPI 2021: 108.1 ML/MIN/1.73
EOSINOPHIL # BLD AUTO: 0 10*3/MM3 (ref 0–0.4)
EOSINOPHIL NFR BLD AUTO: 0 % (ref 0.3–6.2)
ERYTHROCYTE [DISTWIDTH] IN BLOOD BY AUTOMATED COUNT: 14.1 % (ref 12.3–15.4)
FLUAV SUBTYP SPEC NAA+PROBE: NOT DETECTED
FLUBV RNA ISLT QL NAA+PROBE: NOT DETECTED
GLUCOSE SERPL-MCNC: 151 MG/DL (ref 65–99)
HADV DNA SPEC NAA+PROBE: NOT DETECTED
HCOV 229E RNA SPEC QL NAA+PROBE: NOT DETECTED
HCOV HKU1 RNA SPEC QL NAA+PROBE: NOT DETECTED
HCOV NL63 RNA SPEC QL NAA+PROBE: NOT DETECTED
HCOV OC43 RNA SPEC QL NAA+PROBE: NOT DETECTED
HCT VFR BLD AUTO: 44.6 % (ref 37.5–51)
HGB BLD-MCNC: 13.8 G/DL (ref 13–17.7)
HMPV RNA NPH QL NAA+NON-PROBE: NOT DETECTED
HPIV1 RNA ISLT QL NAA+PROBE: NOT DETECTED
HPIV2 RNA SPEC QL NAA+PROBE: NOT DETECTED
HPIV3 RNA NPH QL NAA+PROBE: NOT DETECTED
HPIV4 P GENE NPH QL NAA+PROBE: NOT DETECTED
IMM GRANULOCYTES # BLD AUTO: 0.02 10*3/MM3 (ref 0–0.05)
IMM GRANULOCYTES NFR BLD AUTO: 0.2 % (ref 0–0.5)
LYMPHOCYTES # BLD AUTO: 0.4 10*3/MM3 (ref 0.7–3.1)
LYMPHOCYTES NFR BLD AUTO: 5 % (ref 19.6–45.3)
M PNEUMO IGG SER IA-ACNC: NOT DETECTED
MAGNESIUM SERPL-MCNC: 2 MG/DL (ref 1.6–2.4)
MCH RBC QN AUTO: 28.4 PG (ref 26.6–33)
MCHC RBC AUTO-ENTMCNC: 30.9 G/DL (ref 31.5–35.7)
MCV RBC AUTO: 91.8 FL (ref 79–97)
MONOCYTES # BLD AUTO: 0.51 10*3/MM3 (ref 0.1–0.9)
MONOCYTES NFR BLD AUTO: 6.3 % (ref 5–12)
NEUTROPHILS NFR BLD AUTO: 7.13 10*3/MM3 (ref 1.7–7)
NEUTROPHILS NFR BLD AUTO: 88.4 % (ref 42.7–76)
NRBC BLD AUTO-RTO: 0 /100 WBC (ref 0–0.2)
PHOSPHATE SERPL-MCNC: 3.3 MG/DL (ref 2.5–4.5)
PLATELET # BLD AUTO: 156 10*3/MM3 (ref 140–450)
PMV BLD AUTO: 10.5 FL (ref 6–12)
POTASSIUM SERPL-SCNC: 4.8 MMOL/L (ref 3.5–5.2)
RBC # BLD AUTO: 4.86 10*6/MM3 (ref 4.14–5.8)
RHINOVIRUS RNA SPEC NAA+PROBE: NOT DETECTED
RSV RNA NPH QL NAA+NON-PROBE: NOT DETECTED
SARS-COV-2 RNA RESP QL NAA+PROBE: NOT DETECTED
SODIUM SERPL-SCNC: 140 MMOL/L (ref 136–145)
WBC NRBC COR # BLD AUTO: 8.07 10*3/MM3 (ref 3.4–10.8)

## 2024-05-14 PROCEDURE — 25010000002 METHYLPREDNISOLONE PER 40 MG: Performed by: FAMILY MEDICINE

## 2024-05-14 PROCEDURE — 94799 UNLISTED PULMONARY SVC/PX: CPT

## 2024-05-14 PROCEDURE — 94664 DEMO&/EVAL PT USE INHALER: CPT

## 2024-05-14 PROCEDURE — 25010000002 METHYLPREDNISOLONE PER 40 MG: Performed by: INTERNAL MEDICINE

## 2024-05-14 PROCEDURE — 84100 ASSAY OF PHOSPHORUS: CPT | Performed by: INTERNAL MEDICINE

## 2024-05-14 PROCEDURE — 85025 COMPLETE CBC W/AUTO DIFF WBC: CPT | Performed by: INTERNAL MEDICINE

## 2024-05-14 PROCEDURE — 0202U NFCT DS 22 TRGT SARS-COV-2: CPT | Performed by: INTERNAL MEDICINE

## 2024-05-14 PROCEDURE — 80048 BASIC METABOLIC PNL TOTAL CA: CPT | Performed by: INTERNAL MEDICINE

## 2024-05-14 PROCEDURE — 25010000002 ENOXAPARIN PER 10 MG: Performed by: INTERNAL MEDICINE

## 2024-05-14 PROCEDURE — 25010000002 ENOXAPARIN PER 10 MG: Performed by: FAMILY MEDICINE

## 2024-05-14 PROCEDURE — 99232 SBSQ HOSP IP/OBS MODERATE 35: CPT | Performed by: INTERNAL MEDICINE

## 2024-05-14 PROCEDURE — 83735 ASSAY OF MAGNESIUM: CPT | Performed by: INTERNAL MEDICINE

## 2024-05-14 RX ORDER — AMOXICILLIN AND CLAVULANATE POTASSIUM 875; 125 MG/1; MG/1
1 TABLET, FILM COATED ORAL EVERY 12 HOURS SCHEDULED
Status: DISCONTINUED | OUTPATIENT
Start: 2024-05-14 | End: 2024-05-16

## 2024-05-14 RX ADMIN — Medication 10 ML: at 08:55

## 2024-05-14 RX ADMIN — CETIRIZINE HYDROCHLORIDE 10 MG: 10 TABLET, FILM COATED ORAL at 20:57

## 2024-05-14 RX ADMIN — GUAIFENESIN 600 MG: 600 TABLET ORAL at 08:55

## 2024-05-14 RX ADMIN — ACETAMINOPHEN 650 MG: 325 TABLET ORAL at 20:59

## 2024-05-14 RX ADMIN — GUAIFENESIN 600 MG: 600 TABLET ORAL at 20:57

## 2024-05-14 RX ADMIN — IPRATROPIUM BROMIDE AND ALBUTEROL SULFATE 3 ML: .5; 3 SOLUTION RESPIRATORY (INHALATION) at 06:53

## 2024-05-14 RX ADMIN — AMOXICILLIN AND CLAVULANATE POTASSIUM 1 TABLET: 875; 125 TABLET, FILM COATED ORAL at 20:57

## 2024-05-14 RX ADMIN — AMOXICILLIN AND CLAVULANATE POTASSIUM 1 TABLET: 875; 125 TABLET, FILM COATED ORAL at 10:30

## 2024-05-14 RX ADMIN — BUDESONIDE 0.5 MG: 0.5 SUSPENSION RESPIRATORY (INHALATION) at 06:53

## 2024-05-14 RX ADMIN — BUDESONIDE 0.5 MG: 0.5 SUSPENSION RESPIRATORY (INHALATION) at 18:23

## 2024-05-14 RX ADMIN — IPRATROPIUM BROMIDE AND ALBUTEROL SULFATE 3 ML: .5; 3 SOLUTION RESPIRATORY (INHALATION) at 00:47

## 2024-05-14 RX ADMIN — ENOXAPARIN SODIUM 30 MG: 100 INJECTION SUBCUTANEOUS at 08:55

## 2024-05-14 RX ADMIN — PANTOPRAZOLE SODIUM 40 MG: 40 TABLET, DELAYED RELEASE ORAL at 06:56

## 2024-05-14 RX ADMIN — Medication 10 ML: at 20:57

## 2024-05-14 RX ADMIN — MONTELUKAST 10 MG: 10 TABLET, FILM COATED ORAL at 20:57

## 2024-05-14 RX ADMIN — ARFORMOTEROL TARTRATE 15 MCG: 15 SOLUTION RESPIRATORY (INHALATION) at 06:53

## 2024-05-14 RX ADMIN — METHYLPREDNISOLONE SODIUM SUCCINATE 40 MG: 40 INJECTION INTRAMUSCULAR; INTRAVENOUS at 16:49

## 2024-05-14 RX ADMIN — AZITHROMYCIN DIHYDRATE 250 MG: 250 TABLET ORAL at 08:55

## 2024-05-14 RX ADMIN — ARFORMOTEROL TARTRATE 15 MCG: 15 SOLUTION RESPIRATORY (INHALATION) at 18:23

## 2024-05-14 RX ADMIN — ROFLUMILAST 500 MCG: 500 TABLET ORAL at 08:55

## 2024-05-14 RX ADMIN — METHYLPREDNISOLONE SODIUM SUCCINATE 40 MG: 40 INJECTION INTRAMUSCULAR; INTRAVENOUS at 04:30

## 2024-05-14 RX ADMIN — IPRATROPIUM BROMIDE AND ALBUTEROL SULFATE 3 ML: .5; 3 SOLUTION RESPIRATORY (INHALATION) at 18:23

## 2024-05-14 RX ADMIN — FUROSEMIDE 40 MG: 40 TABLET ORAL at 08:55

## 2024-05-14 RX ADMIN — METOPROLOL TARTRATE 25 MG: 25 TABLET, FILM COATED ORAL at 08:55

## 2024-05-14 RX ADMIN — METOPROLOL TARTRATE 25 MG: 25 TABLET, FILM COATED ORAL at 20:57

## 2024-05-14 RX ADMIN — IPRATROPIUM BROMIDE AND ALBUTEROL SULFATE 3 ML: .5; 3 SOLUTION RESPIRATORY (INHALATION) at 11:46

## 2024-05-14 NOTE — PLAN OF CARE
Problem: Adult Inpatient Plan of Care  Goal: Plan of Care Review  5/14/2024 1803 by Bhavana Parks RN  Outcome: Ongoing, Progressing  Flowsheets  Taken 5/14/2024 1803 by Bhavana Parks RN  Progress: improving  Outcome Evaluation: VSS. wound care completed per orders. pt rested well during shift. no new concerns at this time.  Taken 5/14/2024 0612 by Mirtha Freed RN  Plan of Care Reviewed With: patient  5/14/2024 1655 by Bhavana Parks RN  Outcome: Ongoing, Progressing  Goal: Patient-Specific Goal (Individualized)  5/14/2024 1803 by Bhavana Parks RN  Outcome: Ongoing, Progressing  5/14/2024 1655 by Bhavana Parks RN  Outcome: Ongoing, Progressing  Goal: Absence of Hospital-Acquired Illness or Injury  5/14/2024 1803 by Bhavana Parks RN  Outcome: Ongoing, Progressing  5/14/2024 1655 by Bhavana Parks RN  Outcome: Ongoing, Progressing  Intervention: Identify and Manage Fall Risk  Recent Flowsheet Documentation  Taken 5/14/2024 1733 by Bhavana Parks RN  Safety Promotion/Fall Prevention:   safety round/check completed   clutter free environment maintained   assistive device/personal items within reach   fall prevention program maintained  Taken 5/14/2024 1550 by Bhavana Parks RN  Safety Promotion/Fall Prevention:   safety round/check completed   clutter free environment maintained   assistive device/personal items within reach   fall prevention program maintained  Taken 5/14/2024 1330 by Bhavana Parks RN  Safety Promotion/Fall Prevention:   safety round/check completed   clutter free environment maintained   assistive device/personal items within reach   fall prevention program maintained  Taken 5/14/2024 1138 by Bhavana Parks RN  Safety Promotion/Fall Prevention:   safety round/check completed   clutter free environment maintained   assistive device/personal items within reach   fall prevention program maintained  Taken 5/14/2024 0928 by Charly  DILMA Bateman  Safety Promotion/Fall Prevention:   safety round/check completed   assistive device/personal items within reach   clutter free environment maintained   fall prevention program maintained  Taken 5/14/2024 0736 by Bhavana Parks RN  Safety Promotion/Fall Prevention:   safety round/check completed   clutter free environment maintained   assistive device/personal items within reach   fall prevention program maintained  Intervention: Prevent and Manage VTE (Venous Thromboembolism) Risk  Recent Flowsheet Documentation  Taken 5/14/2024 0736 by Bhavana Parks RN  Range of Motion: active ROM (range of motion) encouraged  Intervention: Prevent Infection  Recent Flowsheet Documentation  Taken 5/14/2024 1733 by Bhavana Parks RN  Infection Prevention:   rest/sleep promoted   personal protective equipment utilized   hand hygiene promoted   equipment surfaces disinfected  Taken 5/14/2024 1550 by Bhavana Parks RN  Infection Prevention:   rest/sleep promoted   personal protective equipment utilized   hand hygiene promoted   equipment surfaces disinfected  Taken 5/14/2024 1330 by Bhavana Parks RN  Infection Prevention:   rest/sleep promoted   personal protective equipment utilized   hand hygiene promoted   equipment surfaces disinfected  Taken 5/14/2024 1138 by Bhavana Parks RN  Infection Prevention:   rest/sleep promoted   personal protective equipment utilized   hand hygiene promoted   equipment surfaces disinfected  Taken 5/14/2024 0928 by Bhavana Parks RN  Infection Prevention:   rest/sleep promoted   personal protective equipment utilized   hand hygiene promoted   equipment surfaces disinfected  Taken 5/14/2024 0736 by Bhavana Parks RN  Infection Prevention:   rest/sleep promoted   personal protective equipment utilized   hand hygiene promoted   equipment surfaces disinfected  Goal: Optimal Comfort and Wellbeing  5/14/2024 1803 by Bhavana Parks RN  Outcome:  Ongoing, Progressing  5/14/2024 1655 by Bhavana Parks RN  Outcome: Ongoing, Progressing  Goal: Readiness for Transition of Care  5/14/2024 1803 by Bhavana Parks RN  Outcome: Ongoing, Progressing  5/14/2024 1655 by Bhavana Parks RN  Outcome: Ongoing, Progressing     Problem: Gas Exchange Impaired  Goal: Optimal Gas Exchange  5/14/2024 1803 by Bhavana Parks RN  Outcome: Ongoing, Progressing  5/14/2024 1655 by Bhavana Parks RN  Outcome: Ongoing, Progressing     Problem: COPD (Chronic Obstructive Pulmonary Disease) Comorbidity  Goal: Maintenance of COPD Symptom Control  5/14/2024 1803 by Bhavana Parks RN  Outcome: Ongoing, Progressing  5/14/2024 1655 by Bhavana Parks RN  Outcome: Ongoing, Progressing     Problem: Hypertension Comorbidity  Goal: Blood Pressure in Desired Range  5/14/2024 1803 by Bhavana Parks RN  Outcome: Ongoing, Progressing  5/14/2024 1655 by Bhavana Parks RN  Outcome: Ongoing, Progressing   Goal Outcome Evaluation:           Progress: improving  Outcome Evaluation: VSS. wound care completed per orders. pt rested well during shift. no new concerns at this time.

## 2024-05-14 NOTE — PLAN OF CARE
Goal Outcome Evaluation:  Plan of Care Reviewed With: patient        Progress: no change  Outcome Evaluation: Medicated for pain as ordered. No further c/o pain. VSS

## 2024-05-14 NOTE — PROGRESS NOTES
[facility]   Hospitalist Progress Note  Date: 2024  Patient Name: Sumeet Gomes  : 1960  MRN: [id number]  Date of admission: 2024      Subjective   Subjective     Chief Complaint: Shortness of breath    Summary: 63 y.o. male with past medical history of hypertension, hyperlipidemia, COPD on home oxygen, pulmonary hypertension, CAD, and GERD presented to ED with complaint of shortness of breath over the past few weeks.  In the ED patient vitals were within normal limits on 3 L nasal cannula.  Labs were all relatively unremarkable given his chronic conditions including a normal lactic acid, proBNP, and negative COVID flu RSV.  Chest x-ray was negative for any acute findings.  He was admitted for COPD exacerbation, started on steroids, bronchodilators.  No evidence of pneumonia.  His [place] significant weight loss over the last 2 years, likely in setting of advanced COPD.  However, obtaining CT chest, abdomen pelvis to rule out malignancy.    Interval Followup: No acute events overnight, patient resting comfortably in bed, still short of breath, breathing unchanged from admission as he reports    Objective   Objective     Vitals:   Temp:  [97.9 °F (36.6 °C)-98.3 °F (36.8 °C)] 98.3 °F (36.8 °C)  Heart Rate:  [68-85] 74  Resp:  [16-20] 20  BP: ()/(57-66) 107/66  Flow (L/min):  [3] 3  Physical Exam   GEN: No acute distress  HEENT: Moist mucous membranes  LUNGS: Equal chest rise bilaterally  CARDIAC: Regular rate and rhythm  NEURO: Moving all 4 extremities spontaneously  SKIN: No obvious breakdown    Result Review    I have personally reviewed the results below:  [x]  Laboratory personally viewed BMP, procalcitonin, CBC, personally reviewed chest x-ray with no cardiopulmonary abnormality  []  Microbiology  []  Radiology  []  EKG/Telemetry   []  Cardiology/Vascular   []  Pathology  []  Old records  []  Other:  CBC          2023    05:35 2024    14:39 2024    05:34   CBC   WBC  5.69  6.54  8.07    RBC 4.31  5.45  4.86    Hemoglobin 11.7  15.6  13.8    Hematocrit 38.0  49.2  44.6    MCV 88.2  90.3  91.8    MCH 27.1  28.6  28.4    MCHC 30.8  31.7  30.9    RDW 14.3  14.0  14.1    Platelets 207  167  156      CMP          5/12/2024    14:39 5/13/2024    05:39 5/14/2024    05:34   CMP   Glucose 114  138  151    BUN 12  14  18    Creatinine 0.70  0.67  0.62    EGFR 104.2  105.6  108.1    Sodium 142  140  140    Potassium 4.4  4.6  4.8    Chloride 97  100  99    Calcium 10.2  9.3  9.6    Total Protein 7.5      Albumin 4.5      Globulin 3.0      Total Bilirubin 0.5      Alkaline Phosphatase 159      AST (SGOT) 32      ALT (SGPT) 19      Albumin/Globulin Ratio 1.5      BUN/Creatinine Ratio 17.1  20.9  29.0    Anion Gap 10.8  5.7  3.4        Assessment & Plan   Assessment / Plan   COPD with acute exacerbation  Weight loss  Hypertension  Tobacco dependence  Pulmonary hypertension  CAD  Questionable subtle renal mass -will need renal protocol CT with contrast once acute issues resolve    Continue to monitor in the hospital for workup and management of the above  Patient follows with Dr. Moya, should he worsen, will consider pulmonology consult but currently not warranted  Continue with IV Solu-Medrol today, will likely transition to prednisone in the next 24-48 hours  Continue with scheduled DuoNebs, Pulmicort and Brovana twice daily, albuterol as needed  Continue scheduled Mucinex and Tessalon Perles  Incentive spirometry at bedside  Continue daily azithromycin for COPD suppression add Augmentin as patient has had a change in his sputum color   check viral respiratory panel  Continue Daliresp and Singulair  CT scan of the chest personally reviewed, negative for any dense infiltrate or malignancy, CT scan of the abdomen and pelvis with subtle hyperdense lesion in the right kidney, could represent mass recommend follow-up with renal protocol CT with contrast once acute issues are resolved  Recommend  colonoscopy on outpatient basis for routine screening  Continue home antihypertensives  Nicotine patch as needed  CBC, CMP reviewed  Repeat CBC, CMP, mag and Phos in a.m.     Discussed plan with RN.    DVT prophylaxis:  Medical DVT prophylaxis orders are present.        CODE STATUS:   Level Of Support Discussed With: Patient  Code Status (Patient has no pulse and is not breathing): No CPR (Do Not Attempt to Resuscitate)  Medical Interventions (Patient has pulse or is breathing): Full Support      Electronically signed by Trevin Wright MD, 5/14/2024, 10:08 EDT.

## 2024-05-15 ENCOUNTER — APPOINTMENT (OUTPATIENT)
Dept: CARDIAC REHAB | Facility: HOSPITAL | Age: 64
End: 2024-05-15
Payer: MEDICAID

## 2024-05-15 LAB
ALBUMIN SERPL-MCNC: 3.7 G/DL (ref 3.5–5.2)
ALBUMIN/GLOB SERPL: 1.9 G/DL
ALP SERPL-CCNC: 125 U/L (ref 39–117)
ALT SERPL W P-5'-P-CCNC: 25 U/L (ref 1–41)
ANION GAP SERPL CALCULATED.3IONS-SCNC: 3 MMOL/L (ref 5–15)
AST SERPL-CCNC: 27 U/L (ref 1–40)
BASOPHILS # BLD AUTO: 0 10*3/MM3 (ref 0–0.2)
BASOPHILS NFR BLD AUTO: 0 % (ref 0–1.5)
BILIRUB SERPL-MCNC: 0.2 MG/DL (ref 0–1.2)
BUN SERPL-MCNC: 19 MG/DL (ref 8–23)
BUN/CREAT SERPL: 32.8 (ref 7–25)
CALCIUM SPEC-SCNC: 9.4 MG/DL (ref 8.6–10.5)
CHLORIDE SERPL-SCNC: 98 MMOL/L (ref 98–107)
CO2 SERPL-SCNC: 40 MMOL/L (ref 22–29)
CREAT SERPL-MCNC: 0.58 MG/DL (ref 0.76–1.27)
DEPRECATED RDW RBC AUTO: 47.4 FL (ref 37–54)
EGFRCR SERPLBLD CKD-EPI 2021: 110.3 ML/MIN/1.73
EOSINOPHIL # BLD AUTO: 0 10*3/MM3 (ref 0–0.4)
EOSINOPHIL NFR BLD AUTO: 0 % (ref 0.3–6.2)
ERYTHROCYTE [DISTWIDTH] IN BLOOD BY AUTOMATED COUNT: 13.8 % (ref 12.3–15.4)
GLOBULIN UR ELPH-MCNC: 2 GM/DL
GLUCOSE SERPL-MCNC: 144 MG/DL (ref 65–99)
HCT VFR BLD AUTO: 44.8 % (ref 37.5–51)
HGB BLD-MCNC: 13.5 G/DL (ref 13–17.7)
IMM GRANULOCYTES # BLD AUTO: 0.04 10*3/MM3 (ref 0–0.05)
IMM GRANULOCYTES NFR BLD AUTO: 0.5 % (ref 0–0.5)
LYMPHOCYTES # BLD AUTO: 0.33 10*3/MM3 (ref 0.7–3.1)
LYMPHOCYTES NFR BLD AUTO: 4.2 % (ref 19.6–45.3)
MAGNESIUM SERPL-MCNC: 2.1 MG/DL (ref 1.6–2.4)
MCH RBC QN AUTO: 28.1 PG (ref 26.6–33)
MCHC RBC AUTO-ENTMCNC: 30.1 G/DL (ref 31.5–35.7)
MCV RBC AUTO: 93.3 FL (ref 79–97)
MONOCYTES # BLD AUTO: 0.39 10*3/MM3 (ref 0.1–0.9)
MONOCYTES NFR BLD AUTO: 5 % (ref 5–12)
NEUTROPHILS NFR BLD AUTO: 7.06 10*3/MM3 (ref 1.7–7)
NEUTROPHILS NFR BLD AUTO: 90.3 % (ref 42.7–76)
NRBC BLD AUTO-RTO: 0 /100 WBC (ref 0–0.2)
PHOSPHATE SERPL-MCNC: 2.9 MG/DL (ref 2.5–4.5)
PLATELET # BLD AUTO: 154 10*3/MM3 (ref 140–450)
PMV BLD AUTO: 10.3 FL (ref 6–12)
POTASSIUM SERPL-SCNC: 4.8 MMOL/L (ref 3.5–5.2)
PROT SERPL-MCNC: 5.7 G/DL (ref 6–8.5)
RBC # BLD AUTO: 4.8 10*6/MM3 (ref 4.14–5.8)
SODIUM SERPL-SCNC: 141 MMOL/L (ref 136–145)
WBC NRBC COR # BLD AUTO: 7.82 10*3/MM3 (ref 3.4–10.8)

## 2024-05-15 PROCEDURE — 80053 COMPREHEN METABOLIC PANEL: CPT | Performed by: INTERNAL MEDICINE

## 2024-05-15 PROCEDURE — 94799 UNLISTED PULMONARY SVC/PX: CPT

## 2024-05-15 PROCEDURE — 94664 DEMO&/EVAL PT USE INHALER: CPT

## 2024-05-15 PROCEDURE — 84100 ASSAY OF PHOSPHORUS: CPT | Performed by: INTERNAL MEDICINE

## 2024-05-15 PROCEDURE — 94760 N-INVAS EAR/PLS OXIMETRY 1: CPT

## 2024-05-15 PROCEDURE — 83735 ASSAY OF MAGNESIUM: CPT | Performed by: INTERNAL MEDICINE

## 2024-05-15 PROCEDURE — 99232 SBSQ HOSP IP/OBS MODERATE 35: CPT | Performed by: INTERNAL MEDICINE

## 2024-05-15 PROCEDURE — 25010000002 ENOXAPARIN PER 10 MG: Performed by: FAMILY MEDICINE

## 2024-05-15 PROCEDURE — 94761 N-INVAS EAR/PLS OXIMETRY MLT: CPT

## 2024-05-15 PROCEDURE — 85025 COMPLETE CBC W/AUTO DIFF WBC: CPT | Performed by: INTERNAL MEDICINE

## 2024-05-15 PROCEDURE — 25010000002 METHYLPREDNISOLONE PER 40 MG: Performed by: FAMILY MEDICINE

## 2024-05-15 RX ORDER — UREA 10 %
10 LOTION (ML) TOPICAL NIGHTLY
Status: DISCONTINUED | OUTPATIENT
Start: 2024-05-15 | End: 2024-05-24 | Stop reason: HOSPADM

## 2024-05-15 RX ADMIN — IPRATROPIUM BROMIDE AND ALBUTEROL SULFATE 3 ML: .5; 3 SOLUTION RESPIRATORY (INHALATION) at 00:27

## 2024-05-15 RX ADMIN — METOPROLOL TARTRATE 25 MG: 25 TABLET, FILM COATED ORAL at 08:03

## 2024-05-15 RX ADMIN — ARFORMOTEROL TARTRATE 15 MCG: 15 SOLUTION RESPIRATORY (INHALATION) at 07:06

## 2024-05-15 RX ADMIN — METOPROLOL TARTRATE 25 MG: 25 TABLET, FILM COATED ORAL at 20:55

## 2024-05-15 RX ADMIN — Medication 10 MG: at 20:55

## 2024-05-15 RX ADMIN — AMOXICILLIN AND CLAVULANATE POTASSIUM 1 TABLET: 875; 125 TABLET, FILM COATED ORAL at 08:03

## 2024-05-15 RX ADMIN — METHYLPREDNISOLONE SODIUM SUCCINATE 40 MG: 40 INJECTION INTRAMUSCULAR; INTRAVENOUS at 04:09

## 2024-05-15 RX ADMIN — IPRATROPIUM BROMIDE AND ALBUTEROL SULFATE 3 ML: .5; 3 SOLUTION RESPIRATORY (INHALATION) at 18:22

## 2024-05-15 RX ADMIN — IPRATROPIUM BROMIDE AND ALBUTEROL SULFATE 3 ML: .5; 3 SOLUTION RESPIRATORY (INHALATION) at 07:07

## 2024-05-15 RX ADMIN — IPRATROPIUM BROMIDE AND ALBUTEROL SULFATE 3 ML: .5; 3 SOLUTION RESPIRATORY (INHALATION) at 14:00

## 2024-05-15 RX ADMIN — ARFORMOTEROL TARTRATE 15 MCG: 15 SOLUTION RESPIRATORY (INHALATION) at 18:22

## 2024-05-15 RX ADMIN — Medication 10 ML: at 08:06

## 2024-05-15 RX ADMIN — BUDESONIDE 0.5 MG: 0.5 SUSPENSION RESPIRATORY (INHALATION) at 18:22

## 2024-05-15 RX ADMIN — AZITHROMYCIN DIHYDRATE 250 MG: 250 TABLET ORAL at 08:03

## 2024-05-15 RX ADMIN — METHYLPREDNISOLONE SODIUM SUCCINATE 40 MG: 40 INJECTION INTRAMUSCULAR; INTRAVENOUS at 17:37

## 2024-05-15 RX ADMIN — BUDESONIDE 0.5 MG: 0.5 SUSPENSION RESPIRATORY (INHALATION) at 07:06

## 2024-05-15 RX ADMIN — GUAIFENESIN 600 MG: 600 TABLET ORAL at 20:56

## 2024-05-15 RX ADMIN — ROFLUMILAST 500 MCG: 500 TABLET ORAL at 08:03

## 2024-05-15 RX ADMIN — Medication 10 ML: at 20:56

## 2024-05-15 RX ADMIN — ACETAMINOPHEN 650 MG: 325 TABLET ORAL at 21:02

## 2024-05-15 RX ADMIN — FUROSEMIDE 40 MG: 40 TABLET ORAL at 08:03

## 2024-05-15 RX ADMIN — AMOXICILLIN AND CLAVULANATE POTASSIUM 1 TABLET: 875; 125 TABLET, FILM COATED ORAL at 20:55

## 2024-05-15 RX ADMIN — ACETAMINOPHEN 650 MG: 325 TABLET ORAL at 04:13

## 2024-05-15 RX ADMIN — GUAIFENESIN 600 MG: 600 TABLET ORAL at 08:03

## 2024-05-15 RX ADMIN — ENOXAPARIN SODIUM 30 MG: 100 INJECTION SUBCUTANEOUS at 08:03

## 2024-05-15 RX ADMIN — MONTELUKAST 10 MG: 10 TABLET, FILM COATED ORAL at 20:55

## 2024-05-15 RX ADMIN — CETIRIZINE HYDROCHLORIDE 10 MG: 10 TABLET, FILM COATED ORAL at 20:55

## 2024-05-15 RX ADMIN — PANTOPRAZOLE SODIUM 40 MG: 40 TABLET, DELAYED RELEASE ORAL at 05:41

## 2024-05-15 NOTE — PLAN OF CARE
Problem: Adult Inpatient Plan of Care  Goal: Plan of Care Review  Outcome: Ongoing, Progressing  Flowsheets  Taken 5/15/2024 1728 by Bhavana Parks RN  Progress: no change  Outcome Evaluation: VSS. pt rested comfortably during shift. wound care completed per orders. no new concerns at this time.  Taken 5/15/2024 0424 by Aleshia Oakes RN  Plan of Care Reviewed With: patient  Goal: Patient-Specific Goal (Individualized)  Outcome: Ongoing, Progressing  Goal: Absence of Hospital-Acquired Illness or Injury  Outcome: Ongoing, Progressing  Intervention: Identify and Manage Fall Risk  Recent Flowsheet Documentation  Taken 5/15/2024 1725 by Bhavana Parks RN  Safety Promotion/Fall Prevention:   safety round/check completed   assistive device/personal items within reach   clutter free environment maintained   fall prevention program maintained  Taken 5/15/2024 1544 by Bhavana Parks RN  Safety Promotion/Fall Prevention:   safety round/check completed   clutter free environment maintained   assistive device/personal items within reach   fall prevention program maintained  Taken 5/15/2024 1313 by Bhavana Parks RN  Safety Promotion/Fall Prevention:   safety round/check completed   clutter free environment maintained   assistive device/personal items within reach   fall prevention program maintained  Taken 5/15/2024 1302 by Bhavana Parks RN  Safety Promotion/Fall Prevention:   safety round/check completed   clutter free environment maintained   assistive device/personal items within reach   fall prevention program maintained  Taken 5/15/2024 0909 by Bhavana Parks RN  Safety Promotion/Fall Prevention:   safety round/check completed   clutter free environment maintained   assistive device/personal items within reach   fall prevention program maintained  Taken 5/15/2024 0743 by Bhavana Parks RN  Safety Promotion/Fall Prevention:   safety round/check completed   clutter free environment  maintained   assistive device/personal items within reach   fall prevention program maintained  Intervention: Prevent and Manage VTE (Venous Thromboembolism) Risk  Recent Flowsheet Documentation  Taken 5/15/2024 0743 by Bhavana Parks RN  Range of Motion: active ROM (range of motion) encouraged  Intervention: Prevent Infection  Recent Flowsheet Documentation  Taken 5/15/2024 1725 by Bhavana Parks RN  Infection Prevention:   rest/sleep promoted   hand hygiene promoted   equipment surfaces disinfected   personal protective equipment utilized  Taken 5/15/2024 1544 by Bhavana Parks RN  Infection Prevention:   rest/sleep promoted   personal protective equipment utilized   hand hygiene promoted   equipment surfaces disinfected  Taken 5/15/2024 1313 by Bhavana Parks RN  Infection Prevention:   rest/sleep promoted   personal protective equipment utilized   hand hygiene promoted   equipment surfaces disinfected  Taken 5/15/2024 1302 by Bhavana Parks RN  Infection Prevention:   rest/sleep promoted   personal protective equipment utilized   hand hygiene promoted   equipment surfaces disinfected  Taken 5/15/2024 0909 by Bhavana Parks RN  Infection Prevention:   rest/sleep promoted   personal protective equipment utilized   hand hygiene promoted   equipment surfaces disinfected  Taken 5/15/2024 0743 by Bhavana Parks RN  Infection Prevention:   rest/sleep promoted   equipment surfaces disinfected   hand hygiene promoted   personal protective equipment utilized  Goal: Optimal Comfort and Wellbeing  Outcome: Ongoing, Progressing  Goal: Readiness for Transition of Care  Outcome: Ongoing, Progressing     Problem: Gas Exchange Impaired  Goal: Optimal Gas Exchange  Outcome: Ongoing, Progressing     Problem: COPD (Chronic Obstructive Pulmonary Disease) Comorbidity  Goal: Maintenance of COPD Symptom Control  Outcome: Ongoing, Progressing     Problem: Hypertension Comorbidity  Goal: Blood Pressure  in Desired Range  Outcome: Ongoing, Progressing   Goal Outcome Evaluation:           Progress: no change  Outcome Evaluation: VSS. pt rested comfortably during shift. wound care completed per orders. no new concerns at this time.

## 2024-05-15 NOTE — PLAN OF CARE
Goal Outcome Evaluation:  Plan of Care Reviewed With: patient        Progress: no change  Outcome Evaluation: Patient alert and oriented, able to make needs known.  Patient with c/o headache x2 this shift, see emar.  Patient up and down throught out shift, not resting well.  Patient with no acute events thus far this shift. Patient with no other concerns voiced at this time.  Continue plan of care.

## 2024-05-15 NOTE — PROGRESS NOTES
Respiratory Therapist Broncho-Pulmonary Hygiene Progress Note      Patient Name:  Sumeet Gomes  YOB: 1960    Sumeet Gomes meets the qualification for Level 1 of the Bronco-Pulmonary Hygiene Protocol. This was based on my daily patient assessment and includes review of chest x-ray results, cough ability and quality, oxygenation, secretions or risk for secretion development and patient mobility.     Broncho-Pulmonary Hygiene Assessment:    Level of Movement: Actively changing positions without assistance  Alert/ oriented/ cooperative    Breath Sounds: Clear to slightly diminished    Cough: Strong, effective    Chest X-Ray: Possible signs of consolidation and/or atelectasis or clear.     Sputum Productions: None or small amount of thin or watery secretions with effective cough    History and Physical: New onset of bronchitis or existing chronic pulmonary conditions.  **(not in an exacerbation)    SpO2 to Oxygen Need: greater than 92% on room air or  less than 3L nasal canula    Current SpO2 is: 92 on 3L home O2    Based on this information, I have completed the following interventions: Teach/Instruct patient on cough and deep breathe      Electronically signed by Marissa Maldonado RRT, 05/15/24, 8:56 AM EDT.

## 2024-05-15 NOTE — PROGRESS NOTES
[facility]   Hospitalist Progress Note  Date: 5/15/2024  Patient Name: Sumeet Gomes  : 1960  MRN: [id number]  Date of admission: 2024      Subjective   Subjective     Chief Complaint: Shortness of breath    Summary: 63 y.o. male with past medical history of hypertension, hyperlipidemia, COPD on home oxygen, pulmonary hypertension, CAD, and GERD presented to ED with complaint of shortness of breath over the past few weeks.  In the ED patient vitals were within normal limits on 3 L nasal cannula.  Labs were all relatively unremarkable given his chronic conditions including a normal lactic acid, proBNP, and negative COVID flu RSV.  Chest x-ray was negative for any acute findings.  He was admitted for COPD exacerbation, started on steroids, bronchodilators.  No evidence of pneumonia.  His [place] significant weight loss over the last 2 years, likely in setting of advanced COPD.  However, obtaining CT chest, abdomen pelvis to rule out malignancy.    Interval Followup: No acute events overnight, patient resting comfortably in bed, still short of breath with significant wheezing    Objective   Objective     Vitals:   Temp:  [97.3 °F (36.3 °C)-98.1 °F (36.7 °C)] 98.1 °F (36.7 °C)  Heart Rate:  [57-70] 57  Resp:  [18] 18  BP: (102-106)/(65-70) 102/65  Flow (L/min):  [3] 3  Physical Exam   GEN: No acute distress  HEENT: Moist mucous membranes  LUNGS: Equal chest rise bilaterally  CARDIAC: Regular rate and rhythm  NEURO: Moving all 4 extremities spontaneously  SKIN: No obvious breakdown    Result Review    I have personally reviewed the results below:  [x]  Laboratory personally viewed BMP, procalcitonin, CBC, personally reviewed chest x-ray with no cardiopulmonary abnormality  []  Microbiology  []  Radiology  []  EKG/Telemetry   []  Cardiology/Vascular   []  Pathology  []  Old records  []  Other:  CBC          2024    14:39 2024    05:34 5/15/2024    05:52   CBC   WBC 6.54  8.07  7.82    RBC 5.45   4.86  4.80    Hemoglobin 15.6  13.8  13.5    Hematocrit 49.2  44.6  44.8    MCV 90.3  91.8  93.3    MCH 28.6  28.4  28.1    MCHC 31.7  30.9  30.1    RDW 14.0  14.1  13.8    Platelets 167  156  154      CMP          5/13/2024    05:39 5/14/2024    05:34 5/15/2024    05:52   CMP   Glucose 138  151  144    BUN 14  18  19    Creatinine 0.67  0.62  0.58    EGFR 105.6  108.1  110.3    Sodium 140  140  141    Potassium 4.6  4.8  4.8    Chloride 100  99  98    Calcium 9.3  9.6  9.4    Total Protein   5.7    Albumin   3.7    Globulin   2.0    Total Bilirubin   0.2    Alkaline Phosphatase   125    AST (SGOT)   27    ALT (SGPT)   25    Albumin/Globulin Ratio   1.9    BUN/Creatinine Ratio 20.9  29.0  32.8    Anion Gap 5.7  3.4  3.0        Assessment & Plan   Assessment / Plan   COPD with acute exacerbation  Weight loss  Hypertension  Tobacco dependence  Pulmonary hypertension  CAD  Questionable subtle renal mass -will need renal protocol CT with contrast once acute issues resolve    Continue to monitor in the hospital for workup and management of the above  Patient follows with Dr. Moya, should he worsen, will consider pulmonology consult but currently not warranted  Continue with IV Solu-Medrol today, will likely transition to prednisone in the next 24-48 hours if patient improves  Continue with scheduled DuoNebs, Pulmicort and Brovana twice daily, albuterol as needed  Continue scheduled Mucinex and Tessalon Perles  Incentive spirometry at bedside  Continue daily azithromycin for COPD suppression added Augmentin as patient has had a change in his sputum color   Viral respiratory panel negative  Continue Daliresp and Singulair  CT scan of the chest personally reviewed, negative for any dense infiltrate or malignancy, CT scan of the abdomen and pelvis with subtle hyperdense lesion in the right kidney, could represent mass recommend follow-up with renal protocol CT with contrast once acute issues are resolved  Recommend  colonoscopy on outpatient basis for routine screening  Continue home antihypertensives  Nicotine patch as needed  CBC, CMP reviewed 5/15/2024   Repeat CBC, CMP, mag and Phos in a.m. 5/15/2024      Discussed plan with RN.    DVT prophylaxis:  Medical DVT prophylaxis orders are present.        CODE STATUS:   Level Of Support Discussed With: Patient  Code Status (Patient has no pulse and is not breathing): No CPR (Do Not Attempt to Resuscitate)  Medical Interventions (Patient has pulse or is breathing): Full Support      Electronically signed by Trevin Wright MD, 5/15/2024, 11:43 EDT.

## 2024-05-16 LAB
ALBUMIN SERPL-MCNC: 3.5 G/DL (ref 3.5–5.2)
ALBUMIN/GLOB SERPL: 1.8 G/DL
ALP SERPL-CCNC: 110 U/L (ref 39–117)
ALT SERPL W P-5'-P-CCNC: 33 U/L (ref 1–41)
ANION GAP SERPL CALCULATED.3IONS-SCNC: 0.4 MMOL/L (ref 5–15)
AST SERPL-CCNC: 29 U/L (ref 1–40)
BASOPHILS # BLD AUTO: 0 10*3/MM3 (ref 0–0.2)
BASOPHILS NFR BLD AUTO: 0 % (ref 0–1.5)
BILIRUB SERPL-MCNC: 0.3 MG/DL (ref 0–1.2)
BUN SERPL-MCNC: 23 MG/DL (ref 8–23)
BUN/CREAT SERPL: 41.1 (ref 7–25)
CALCIUM SPEC-SCNC: 9.2 MG/DL (ref 8.6–10.5)
CHLORIDE SERPL-SCNC: 97 MMOL/L (ref 98–107)
CO2 SERPL-SCNC: 43.6 MMOL/L (ref 22–29)
CREAT SERPL-MCNC: 0.56 MG/DL (ref 0.76–1.27)
DEPRECATED RDW RBC AUTO: 46.7 FL (ref 37–54)
EGFRCR SERPLBLD CKD-EPI 2021: 111.4 ML/MIN/1.73
EOSINOPHIL # BLD AUTO: 0 10*3/MM3 (ref 0–0.4)
EOSINOPHIL NFR BLD AUTO: 0 % (ref 0.3–6.2)
ERYTHROCYTE [DISTWIDTH] IN BLOOD BY AUTOMATED COUNT: 13.8 % (ref 12.3–15.4)
GLOBULIN UR ELPH-MCNC: 2 GM/DL
GLUCOSE SERPL-MCNC: 131 MG/DL (ref 65–99)
HCT VFR BLD AUTO: 45.1 % (ref 37.5–51)
HGB BLD-MCNC: 13.9 G/DL (ref 13–17.7)
IMM GRANULOCYTES # BLD AUTO: 0.02 10*3/MM3 (ref 0–0.05)
IMM GRANULOCYTES NFR BLD AUTO: 0.3 % (ref 0–0.5)
LYMPHOCYTES # BLD AUTO: 0.5 10*3/MM3 (ref 0.7–3.1)
LYMPHOCYTES NFR BLD AUTO: 7.8 % (ref 19.6–45.3)
MAGNESIUM SERPL-MCNC: 2.1 MG/DL (ref 1.6–2.4)
MCH RBC QN AUTO: 28.4 PG (ref 26.6–33)
MCHC RBC AUTO-ENTMCNC: 30.8 G/DL (ref 31.5–35.7)
MCV RBC AUTO: 92.2 FL (ref 79–97)
MONOCYTES # BLD AUTO: 0.42 10*3/MM3 (ref 0.1–0.9)
MONOCYTES NFR BLD AUTO: 6.5 % (ref 5–12)
NEUTROPHILS NFR BLD AUTO: 5.51 10*3/MM3 (ref 1.7–7)
NEUTROPHILS NFR BLD AUTO: 85.4 % (ref 42.7–76)
NRBC BLD AUTO-RTO: 0 /100 WBC (ref 0–0.2)
PHOSPHATE SERPL-MCNC: 3.3 MG/DL (ref 2.5–4.5)
PLATELET # BLD AUTO: 145 10*3/MM3 (ref 140–450)
PMV BLD AUTO: 10.2 FL (ref 6–12)
POTASSIUM SERPL-SCNC: 4.5 MMOL/L (ref 3.5–5.2)
PROT SERPL-MCNC: 5.5 G/DL (ref 6–8.5)
RBC # BLD AUTO: 4.89 10*6/MM3 (ref 4.14–5.8)
SODIUM SERPL-SCNC: 141 MMOL/L (ref 136–145)
WBC NRBC COR # BLD AUTO: 6.45 10*3/MM3 (ref 3.4–10.8)

## 2024-05-16 PROCEDURE — 84100 ASSAY OF PHOSPHORUS: CPT | Performed by: INTERNAL MEDICINE

## 2024-05-16 PROCEDURE — 25010000002 ENOXAPARIN PER 10 MG: Performed by: FAMILY MEDICINE

## 2024-05-16 PROCEDURE — 83735 ASSAY OF MAGNESIUM: CPT | Performed by: INTERNAL MEDICINE

## 2024-05-16 PROCEDURE — 94664 DEMO&/EVAL PT USE INHALER: CPT

## 2024-05-16 PROCEDURE — 94799 UNLISTED PULMONARY SVC/PX: CPT

## 2024-05-16 PROCEDURE — 80053 COMPREHEN METABOLIC PANEL: CPT | Performed by: INTERNAL MEDICINE

## 2024-05-16 PROCEDURE — 25010000002 METHYLPREDNISOLONE PER 40 MG: Performed by: FAMILY MEDICINE

## 2024-05-16 PROCEDURE — 25010000002 METHYLPREDNISOLONE PER 40 MG: Performed by: INTERNAL MEDICINE

## 2024-05-16 PROCEDURE — 94760 N-INVAS EAR/PLS OXIMETRY 1: CPT

## 2024-05-16 PROCEDURE — 99223 1ST HOSP IP/OBS HIGH 75: CPT | Performed by: INTERNAL MEDICINE

## 2024-05-16 PROCEDURE — 99232 SBSQ HOSP IP/OBS MODERATE 35: CPT | Performed by: INTERNAL MEDICINE

## 2024-05-16 PROCEDURE — 85025 COMPLETE CBC W/AUTO DIFF WBC: CPT | Performed by: INTERNAL MEDICINE

## 2024-05-16 RX ORDER — SODIUM CHLORIDE FOR INHALATION 3 %
4 VIAL, NEBULIZER (ML) INHALATION
Status: DISPENSED | OUTPATIENT
Start: 2024-05-16 | End: 2024-05-21

## 2024-05-16 RX ADMIN — IPRATROPIUM BROMIDE AND ALBUTEROL SULFATE 3 ML: .5; 3 SOLUTION RESPIRATORY (INHALATION) at 06:08

## 2024-05-16 RX ADMIN — METHYLPREDNISOLONE SODIUM SUCCINATE 40 MG: 40 INJECTION INTRAMUSCULAR; INTRAVENOUS at 04:06

## 2024-05-16 RX ADMIN — IPRATROPIUM BROMIDE AND ALBUTEROL SULFATE 3 ML: .5; 3 SOLUTION RESPIRATORY (INHALATION) at 19:21

## 2024-05-16 RX ADMIN — BUDESONIDE 0.5 MG: 0.5 SUSPENSION RESPIRATORY (INHALATION) at 19:21

## 2024-05-16 RX ADMIN — AMOXICILLIN AND CLAVULANATE POTASSIUM 1 TABLET: 875; 125 TABLET, FILM COATED ORAL at 09:34

## 2024-05-16 RX ADMIN — Medication 10 ML: at 20:34

## 2024-05-16 RX ADMIN — IPRATROPIUM BROMIDE AND ALBUTEROL SULFATE 3 ML: .5; 3 SOLUTION RESPIRATORY (INHALATION) at 00:08

## 2024-05-16 RX ADMIN — GUAIFENESIN 600 MG: 600 TABLET ORAL at 20:33

## 2024-05-16 RX ADMIN — MONTELUKAST 10 MG: 10 TABLET, FILM COATED ORAL at 20:34

## 2024-05-16 RX ADMIN — ROFLUMILAST 500 MCG: 500 TABLET ORAL at 09:34

## 2024-05-16 RX ADMIN — METOPROLOL TARTRATE 25 MG: 25 TABLET, FILM COATED ORAL at 20:33

## 2024-05-16 RX ADMIN — Medication 4 ML: at 19:21

## 2024-05-16 RX ADMIN — METHYLPREDNISOLONE SODIUM SUCCINATE 40 MG: 40 INJECTION INTRAMUSCULAR; INTRAVENOUS at 17:30

## 2024-05-16 RX ADMIN — ARFORMOTEROL TARTRATE 15 MCG: 15 SOLUTION RESPIRATORY (INHALATION) at 06:08

## 2024-05-16 RX ADMIN — ARFORMOTEROL TARTRATE 15 MCG: 15 SOLUTION RESPIRATORY (INHALATION) at 19:21

## 2024-05-16 RX ADMIN — ACETAMINOPHEN 650 MG: 325 TABLET ORAL at 20:39

## 2024-05-16 RX ADMIN — Medication 10 ML: at 09:34

## 2024-05-16 RX ADMIN — BUDESONIDE 0.5 MG: 0.5 SUSPENSION RESPIRATORY (INHALATION) at 06:08

## 2024-05-16 RX ADMIN — AZITHROMYCIN DIHYDRATE 250 MG: 250 TABLET ORAL at 09:34

## 2024-05-16 RX ADMIN — GUAIFENESIN 600 MG: 600 TABLET ORAL at 09:34

## 2024-05-16 RX ADMIN — Medication 10 MG: at 20:33

## 2024-05-16 RX ADMIN — CETIRIZINE HYDROCHLORIDE 10 MG: 10 TABLET, FILM COATED ORAL at 20:33

## 2024-05-16 RX ADMIN — IPRATROPIUM BROMIDE AND ALBUTEROL SULFATE 3 ML: .5; 3 SOLUTION RESPIRATORY (INHALATION) at 13:15

## 2024-05-16 RX ADMIN — ENOXAPARIN SODIUM 30 MG: 100 INJECTION SUBCUTANEOUS at 09:34

## 2024-05-16 RX ADMIN — PANTOPRAZOLE SODIUM 40 MG: 40 TABLET, DELAYED RELEASE ORAL at 05:55

## 2024-05-16 NOTE — PROGRESS NOTES
[facility]   Hospitalist Progress Note  Date: 2024  Patient Name: Sumeet Gomes  : 1960  MRN: [id number]  Date of admission: 2024      Subjective   Subjective     Chief Complaint: Shortness of breath    Summary: 63 y.o. male with past medical history of hypertension, hyperlipidemia, COPD on home oxygen, pulmonary hypertension, CAD, and GERD presented to ED with complaint of shortness of breath over the past few weeks.  In the ED patient vitals were within normal limits on 3 L nasal cannula.  Labs were all relatively unremarkable given his chronic conditions including a normal lactic acid, proBNP, and negative COVID flu RSV.  Chest x-ray was negative for any acute findings.  He was admitted for COPD exacerbation, started on steroids, bronchodilators.  No evidence of pneumonia.  His [place] significant weight loss over the last 2 years, likely in setting of advanced COPD.  However, obtaining CT chest, abdomen pelvis to rule out malignancy.    Interval Followup: No acute events overnight, patient resting comfortably in bed, minimal improvement in his breathing    Objective   Objective     Vitals:   Temp:  [97.7 °F (36.5 °C)-98.2 °F (36.8 °C)] 98.1 °F (36.7 °C)  Heart Rate:  [53-66] 62  Resp:  [16-18] 16  BP: ()/(57-72) 102/65  Flow (L/min):  [3] 3  Physical Exam   GEN: No acute distress  HEENT: Moist mucous membranes  LUNGS: Equal chest rise bilaterally  CARDIAC: Regular rate and rhythm  NEURO: Moving all 4 extremities spontaneously  SKIN: No obvious breakdown    Result Review    I have personally reviewed the results below:  [x]  Laboratory personally viewed BMP, procalcitonin, CBC, personally reviewed chest x-ray with no cardiopulmonary abnormality  []  Microbiology  []  Radiology  []  EKG/Telemetry   []  Cardiology/Vascular   []  Pathology  []  Old records  []  Other:  CBC          2024    05:34 5/15/2024    05:52 2024    05:33   CBC   WBC 8.07  7.82  6.45    RBC 4.86  4.80   4.89    Hemoglobin 13.8  13.5  13.9    Hematocrit 44.6  44.8  45.1    MCV 91.8  93.3  92.2    MCH 28.4  28.1  28.4    MCHC 30.9  30.1  30.8    RDW 14.1  13.8  13.8    Platelets 156  154  145      CMP          5/14/2024    05:34 5/15/2024    05:52 5/16/2024    05:33   CMP   Glucose 151  144  131    BUN 18  19  23    Creatinine 0.62  0.58  0.56    EGFR 108.1  110.3  111.4    Sodium 140  141  141    Potassium 4.8  4.8  4.5    Chloride 99  98  97    Calcium 9.6  9.4  9.2    Total Protein  5.7  5.5    Albumin  3.7  3.5    Globulin  2.0  2.0    Total Bilirubin  0.2  0.3    Alkaline Phosphatase  125  110    AST (SGOT)  27  29    ALT (SGPT)  25  33    Albumin/Globulin Ratio  1.9  1.8    BUN/Creatinine Ratio 29.0  32.8  41.1    Anion Gap 3.4  3.0  0.4        Assessment & Plan   Assessment / Plan   COPD with acute exacerbation  Weight loss  Hypertension  Tobacco dependence  Pulmonary hypertension  CAD  Questionable subtle renal mass -will need renal protocol CT with contrast once acute issues resolve    Continue to monitor in the hospital for workup and management of the above  Patient follows with Dr. Moya, should he worsen, consult pulmonology as patient has had minimal improvement  Continue with IV Solu-Medrol today, will likely transition to prednisone in the next 24-48 hours if patient improves  Continue with scheduled DuoNebs, Pulmicort and Brovana twice daily, albuterol as needed  Continue scheduled Mucinex and Tessalon Perles  Incentive spirometry at bedside  Continue daily azithromycin for COPD suppression added Augmentin as patient has had a change in his sputum color   Viral respiratory panel negative  Continue Daliresp and Singulair  CT scan of the chest personally reviewed, negative for any dense infiltrate or malignancy, CT scan of the abdomen and pelvis with subtle hyperdense lesion in the right kidney, could represent mass recommend follow-up with renal protocol CT with contrast once acute issues are  resolved  Recommend colonoscopy on outpatient basis for routine screening  Continue home antihypertensives  Nicotine patch as needed  Neurology planning on bronchoscopy  CBC, CMP reviewed 5/16/2024   Repeat CBC, CMP, mag and Phos in a.m. 5/16/2024      Discussed plan with RN.    DVT prophylaxis:  Medical DVT prophylaxis orders are present.        CODE STATUS:   Level Of Support Discussed With: Patient  Code Status (Patient has no pulse and is not breathing): No CPR (Do Not Attempt to Resuscitate)  Medical Interventions (Patient has pulse or is breathing): Full Support      Electronically signed by Trevin Wright MD, 5/16/2024, 12:57 EDT.

## 2024-05-16 NOTE — CASE MANAGEMENT/SOCIAL WORK
Mr. Gomes well known to RT CM from previous admissions for acute on chronic respiratory failure secondary to COPD.     Mr. Gomes has a home Astral with [employer] that is not currently in the room and BIPAP has not been ordered.  I asked patient to see if his wife can bring his device from home. Mr. Gomes states he just graduated from pulmonary rehab on 5/8.  Patient states he is still using his Astral regularly and continues with nebulized Arformoterol, Budesonide and Yupelri for maintenance.     Patient states he does not feel he gained much from Pulmonary Rehab given he is now admitted. Patient will have a bronchoscopy tomorrow.  I will follow.  Patient does not have advanced airway clearance at home.

## 2024-05-16 NOTE — PROGRESS NOTES
Respiratory Therapist Broncho-Pulmonary Hygiene Progress Note      Patient Name:  Sumeet Gomes  YOB: 1960    Sumeet Gomes meets the qualification for Level 1 of the Bronco-Pulmonary Hygiene Protocol. This was based on my daily patient assessment and includes review of chest x-ray results, cough ability and quality, oxygenation, secretions or risk for secretion development and patient mobility.     Broncho-Pulmonary Hygiene Assessment:    Level of Movement: Actively changing positions without assistance  Alert/ oriented/ cooperative    Breath Sounds: Clear to slightly diminished    Cough: Strong, effective and/or frequent    Chest X-Ray: Possible signs of consolidation and/or atelectasis or clear.     Sputum Productions: None or small amount of thin or watery secretions with effective cough    History and Physical: Home use of oxygen     SpO2 to Oxygen Need: greater than 92% on room air or  less than 3L nasal canula    Current SpO2 is: 97 on 3    Based on this information, I have completed the following interventions: Aerobika with bronchodialtor medication or TID      Electronically signed by Beth Chao RRT, 05/16/24, 6:17 AM EDT.

## 2024-05-16 NOTE — CONSULTS
Pulmonary / Critical Care Consult Note      Patient Name: Sumeet Gomes  : 1960  MRN: [id number]  Primary Care Physician:  Mirtha Alexander APRN  Referring Physician: Trevin Wright MD  Date of admission: 2024    Subjective   Subjective     Reason for Consult/ Chief Complaint:   COPD exacerbation, mucous plugging    HPI:  Sumeet Gomes is a 63 y.o. male with a history of chronic hypoxemic and hypercapnic respiratory failure, asthma/COPD overlap syndrome, pulmonary hypertension secondary to WHO class III chronic lung disease, history of aspergillus infection, ongoing tobacco use cigarettes came in feeling poorly.  Reports worsening respiratory symptoms over the last week and was hospitalized on .  Since  he has had persistent shortness of breath, coughing and wheezing.  He is on nebulizers and airway clearance here as well as steroids which are not helping.  He has a dry hacking cough and feels like secretions are stuck in his airways.  He is on his home oxygen of 3 L/min.  He is using NIPPV at night which he also uses at home.  No fevers or chills.  He gets breathless with little activity walking around the room.        Personal History     Past Medical History:   Diagnosis Date    CHF (congestive heart failure)     COPD (chronic obstructive pulmonary disease)     COPD (chronic obstructive pulmonary disease)     Coronary artery disease     Diastolic heart failure     Hyperlipidemia     Hypertension     Pulmonary hypertension        Past Surgical History:   Procedure Laterality Date    BACK SURGERY      BRONCHOSCOPY N/A 2022    Procedure: BRONCHOSCOPY WITH BRONCHOALVEOLAR LAVAGE AND BRONCHIAL WASHINGS;  Surgeon: Uliecs Moya MD;  Location: [facility] ENDOSCOPY;  Service: Pulmonary;  Laterality: N/A;  MUCUS PLUGGING, COPD EXACERBATION    BRONCHOSCOPY      CARDIAC CATHETERIZATION N/A 2022    Procedure: Right Heart Cath;  Surgeon: Drew Rodriguez MD;  Location:   [facility] INVASIVE LOCATION;  Service: Cardiovascular;  Laterality: N/A;    OTHER SURGICAL HISTORY      knee     OTHER SURGICAL HISTORY      shoulder, rotator cuff    SHOULDER SURGERY Right 10/03/2022       Family History: family history includes Asthma in his mother; Emphysema in his mother; Stroke in his mother. Otherwise pertinent FHx was reviewed and not pertinent to current issue.    Social History:  reports that he has been smoking cigarettes. He started smoking about 49 years ago. He has a 12.2 pack-year smoking history. He has been exposed to tobacco smoke. He has never used smokeless tobacco. He reports that he does not currently use alcohol. He reports that he does not use drugs.    Home Medications:  albuterol sulfate HFA, arformoterol, budesonide, furosemide, ipratropium-albuterol, metoprolol tartrate, pantoprazole, revefenacin, and roflumilast    Allergies:  No Known Allergies    Objective    Objective     Vitals:   Temp:  [97.7 °F (36.5 °C)-98.2 °F (36.8 °C)] 97.7 °F (36.5 °C)  Heart Rate:  [53-66] 62  Resp:  [16-18] 16  BP: ()/(57-72) 98/68  Flow (L/min):  [3] 3    Physical Exam:  Vital Signs Reviewed   General:  WDWN, Alert, NAD.    HEENT:  PERRL, EOMI.  OP, nares clear  Chest: Barrel chested, poor aeration, wheezing and rhonchi bilaterally, tympanic to percussion bilaterally, pursed lip breathing noted  CV: RRR, no MGR, pulses 2+, equal.  Abd:  Soft, NT, ND, + BS, no HSM  EXT:  no clubbing, no cyanosis, no edema  Neuro:  A&Ox3, CN grossly intact, no focal deficits.  Skin: No rashes or lesions noted      Result Review    Result Review:  I have personally reviewed the results from the time of this admission to 5/16/2024 09:33 EDT and agree with these findings:  [x]  Laboratory  [x]  Microbiology  [x]  Radiology  [x]  EKG/Telemetry   []  Cardiology/Vascular   []  Pathology  [x]  Old records  []  Other:  Most notable findings include:   Previous pulmonary notes personally reviewed        Lab  05/16/24  0533 05/15/24  0552 05/14/24  0534 05/13/24  0539 05/12/24  1439   WBC 6.45 7.82 8.07  --  6.54   HEMOGLOBIN 13.9 13.5 13.8  --  15.6   HEMATOCRIT 45.1 44.8 44.6  --  49.2   PLATELETS 145 154 156  --  167   SODIUM 141 141 140 140 142   POTASSIUM 4.5 4.8 4.8 4.6 4.4   CHLORIDE 97* 98 99 100 97*   CO2 43.6* 40.0* 37.6* 34.3* 34.2*   BUN 23 19 18 14 12   CREATININE 0.56* 0.58* 0.62* 0.67* 0.70*   GLUCOSE 131* 144* 151* 138* 114*   CALCIUM 9.2 9.4 9.6 9.3 10.2   PHOSPHORUS 3.3 2.9 3.3  --   --    TOTAL PROTEIN 5.5* 5.7*  --   --  7.5   ALBUMIN 3.5 3.7  --   --  4.5   GLOBULIN 2.0 2.0  --   --  3.0     CT Chest Without Contrast Diagnostic    Result Date: 5/13/2024  CT Chest without Contrast  INDICATION: Hypoxia. Weight loss. History of COPD.  TECHNIQUE: CT of the thorax without contrast. Coronal and sagittal reconstructions were obtained.  Radiation dose reduction techniques included automated exposure control or exposure modulation based on body size.  COMPARISON: 3/9/2023  FINDINGS: Please see abdomen pelvis CT report dictated separately. There is bilateral gynecomastia. No pleural or pericardial effusion is seen. There is atherosclerotic disease and coronary artery disease. No definite adenopathy. The luis are poorly characterized in the absence of IV contrast.  Lung windows demonstrate emphysema. There is some chronic scarring in both lung bases that is similar to prior. No evidence of acute pneumonia or pulmonary edema. No suspicious pulmonary masses. No pneumothorax.      Impression:  COPD with no acute or suspicious findings in the chest  Electronically Signed By-Dr. Dawson Hensley MD On:5/13/2024 10:35 PM       All cultures so far negative/pending  Procalcitonin negative    Assessment & Plan   Assessment / Plan     Active Hospital Problems:  Active Hospital Problems    Diagnosis     **COPD exacerbation     Tobacco abuse     Pulmonary hypertension     Fatigue     Essential hypertension         Impression:  Acute exacerbation of COPD/asthma overlap syndrome  Chronic hypoxemic and hypercapnic respiratory failure on 3 L of oxygen and NIPPV at home  Mucous plugging/issues with airway clearance  Pulmonary hypertension secondary to WHO class III left heart disease  Ongoing tobacco abuse of cigarettes    Plan:  Patient is on nebulizers and bronchopulmonary hygiene and has ongoing issues with mucous plugging.  He has not had any improvement despite multiple days of treatment.  Bronchoscopy is warranted  Will take patient tomorrow for bronchoscopy  I have discussed the risks of the procedure with the patient including pneumothorax, hemothorax, bleeding, hypoxia, required mechanical ventilation and death. The patient recognizes these findings, acknowledges these findings and is agreeable to the procedure.  Continue IV steroids  DC Augmentin.  No evidence of bacterial flexion  Continue chronic daily azithromycin 250 mg and Daliresp 500 mcg daily  Okay to bring in home NIPPV to use.  Otherwise use our machine nightly with naps  Wean O2 to keep SPO2 greater than 90%  Continue mucolytic's  Continue nebulizers and bronchopulmonary hygiene.  Will add 3% saline nebs    DVT prophylaxis:  Medical DVT prophylaxis orders are present.    Code Status and Medical Interventions:   Ordered at: 05/12/24 1924     Level Of Support Discussed With:    Patient     Code Status (Patient has no pulse and is not breathing):    No CPR (Do Not Attempt to Resuscitate)     Medical Interventions (Patient has pulse or is breathing):    Full Support        Labs, imaging, microbiology, notes and medications personally reviewed  Discussed with primary    Thank you for involving me in the care of this patient.    Electronically signed by Collins Chapa MD, 05/16/24, 1:28 PM EDT.

## 2024-05-16 NOTE — PROGRESS NOTES
"Enter Query Response Below      Query Response: Chronic respiratory failure with hypoxia     Electronically signed by Trevin Wright MD, 05/16/24, 1:27 PM EDT.           If applicable, please update the problem list.   If you have any questions about this query contact me at: [email or web address]     Dr. Wright,    Patient admitted with COPD exacerbation also has documentation of home oxygen use. Per H&P, \"COPD on home oxygen\". 5/15 Respiratory Therapy note states \"Current SpO2 is: 92 on 3L home O2\". Patient has maintained O2 sats 92%-100% on 3L/NC since admission.     Please specify the oxygen (dependence/continuous use) as:    Chronic respiratory failure with hypoxia  Chronic respiratory failure with hypercapnia  Chronic respiratory failure   Other- please specify ___________  Unable to clinically determine    By submitting this query, we are merely seeking further clarification of documentation to accurately reflect all conditions that you are monitoring, evaluating, treating or that extend the hospitalization or utilize additional resources of care. Please utilize your independent clinical judgment when addressing the question(s) above.     This query and your response, once completed, will be entered into the legal medical record.    Sincerely,  Jasmina Armijo RN  Clinical Documentation Integrity Program     "

## 2024-05-16 NOTE — PLAN OF CARE
Goal Outcome Evaluation:  Plan of Care Reviewed With: patient        Progress: improving  Outcome Evaluation: Patient resting, patient on 3L NC, no c/o pain or discomfort, patient ambulates to bathroom, patient is alert and oriented, call light within reach.

## 2024-05-16 NOTE — CONSULTS
"Nutrition Services    Patient Name: Sumeet Gomes  YOB: 1960  MRN: [id number]  Admission date: 5/12/2024      CLINICAL NUTRITION ASSESSMENT      Reason for Assessment  BMI     H&P:  Past Medical History:   Diagnosis Date    CHF (congestive heart failure)     COPD (chronic obstructive pulmonary disease)     COPD (chronic obstructive pulmonary disease)     Coronary artery disease     Diastolic heart failure     Hyperlipidemia     Hypertension     Pulmonary hypertension         Current Problems:   Active Hospital Problems    Diagnosis     **COPD exacerbation     Tobacco abuse     Pulmonary hypertension     Fatigue     Essential hypertension         Nutrition/Diet History         Narrative   63 y.o. male admitted d/t COPD exacerbation. PMH see above.     Pt reports poor intake r/t SOB x 1 week. Pt reports eating normally before exacerbation. Pt has tried Boost in the past, is agreeable to receiving while admitted. Pt's intake is documented as % of meals.     Pt has had significant wt loss x 3 months. Pt endorses wt loss. NFPE finds severe muscle and fat loss, meeting criteria for malnutrition.     RD will continue to monitor per protocol.      Anthropometrics        Current Height, Weight Height: 170.2 cm (67\")  Weight: 51.5 kg (113 lb 8.6 oz)   Current BMI Body mass index is 17.78 kg/m².   BMI Classification Underweight   % IBW 78%   Adjusted Body Weight (ABW)    Weight Hx  Wt Readings from Last 30 Encounters:   05/12/24 1844 51.5 kg (113 lb 8.6 oz)   05/12/24 1428 53.9 kg (118 lb 13.3 oz)   02/22/24 1543 59.8 kg (131 lb 12.8 oz)   02/12/24 1317 59.4 kg (130 lb 15.3 oz)   01/17/24 1344 59.4 kg (131 lb)   01/03/24 1303 55 kg (121 lb 3.2 oz)   12/28/23 0600 53.6 kg (118 lb 2.7 oz)   12/27/23 0451 52.9 kg (116 lb 10 oz)   12/26/23 2000 52.1 kg (114 lb 13.8 oz)   12/18/23 1350 55.7 kg (122 lb 12.7 oz)   12/14/23 1123 54.8 kg (120 lb 12.8 oz)   12/04/23 1207 61 kg (134 lb 7.7 oz)   12/04/23 1124 64.7 " kg (142 lb 9.6 oz)   11/25/23 1144 63.1 kg (139 lb 1.8 oz)   08/14/23 1345 62.1 kg (137 lb)   08/02/23 0907 62.4 kg (137 lb 9.6 oz)   06/27/23 1128 61.2 kg (135 lb)   06/19/23 0820 64.2 kg (141 lb 8.6 oz)   05/26/23 0808 63 kg (138 lb 14.4 oz)   03/16/23 1059 63.5 kg (139 lb 15.9 oz)   02/27/23 0856 63.6 kg (140 lb 4.8 oz)   02/01/23 1403 63.1 kg (139 lb 3.2 oz)   12/22/22 0807 63.1 kg (139 lb 3.2 oz)   11/30/22 1544 62.8 kg (138 lb 7.2 oz)   11/30/22 1457 63 kg (138 lb 12.8 oz)   11/14/22 1030 62.4 kg (137 lb 9.6 oz)   11/08/22 0817 62.5 kg (137 lb 12.8 oz)   10/21/22 1307 62.1 kg (136 lb 14.5 oz)   10/06/22 0855 63 kg (139 lb)   09/20/22 1445 57.7 kg (127 lb 3.2 oz)   09/18/22 0337 61.1 kg (134 lb 11.2 oz)   09/12/22 1628 60.6 kg (133 lb 9.6 oz)   06/14/22 1319 63.6 kg (140 lb 3.2 oz)   05/20/22 1431 66.2 kg (146 lb)          Wt Change Observation -10% x 3 months--clinically significant      Estimated/Assessed Needs  Estimated Needs based on: Ideal Body Weight       Energy Requirements 30-35 kcal/kg    EST Needs (kcal/day) 6440-5807       Protein Requirements 1.0-1.2 g/kg   EST Daily Needs (g/day) 66-79       Fluid Requirements 30 ml/kg    Estimated Needs (mL/day) 1980     Labs/Medications         Pertinent Labs Reviewed.   Results from last 7 days   Lab Units 05/16/24  0533 05/15/24  0552 05/14/24  0534 05/13/24  0539 05/12/24  1439   SODIUM mmol/L 141 141 140   < > 142   POTASSIUM mmol/L 4.5 4.8 4.8   < > 4.4   CHLORIDE mmol/L 97* 98 99   < > 97*   CO2 mmol/L 43.6* 40.0* 37.6*   < > 34.2*   BUN mg/dL 23 19 18   < > 12   CREATININE mg/dL 0.56* 0.58* 0.62*   < > 0.70*   CALCIUM mg/dL 9.2 9.4 9.6   < > 10.2   BILIRUBIN mg/dL 0.3 0.2  --   --  0.5   ALK PHOS U/L 110 125*  --   --  159*   ALT (SGPT) U/L 33 25  --   --  19   AST (SGOT) U/L 29 27  --   --  32   GLUCOSE mg/dL 131* 144* 151*   < > 114*    < > = values in this interval not displayed.     Results from last 7 days   Lab Units 05/16/24  0533 05/15/24  0552  05/14/24  0534   MAGNESIUM mg/dL 2.1 2.1 2.0   PHOSPHORUS mg/dL 3.3 2.9 3.3   HEMOGLOBIN g/dL 13.9 13.5 13.8   HEMATOCRIT % 45.1 44.8 44.6     COVID19   Date Value Ref Range Status   05/14/2024 Not Detected Not Detected - Ref. Range Final     Lab Results   Component Value Date    HGBA1C 5.50 09/12/2023         Pertinent Medications Reviewed.     Malnutrition Severity Assessment      Patient meets criteria for : Severe Malnutrition  Malnutrition Type (Last 8 Hours)       Malnutrition Severity Assessment       Row Name 05/16/24 1410       Malnutrition Severity Assessment    Malnutrition Type Chronic Disease - Related Malnutrition      Row Name 05/16/24 1410       Unintentional Weight Loss     Unintentional Weight Loss Findings Severe    Unintentional Weight Loss  Weight loss greater than 7.5% in three months      Row Name 05/16/24 1410       Muscle Loss    Loss of Muscle Mass Findings Severe    [place] Region Severe - deep hollowing/scooping, lack of muscle to touch, facial bones well defined    Clavicle Bone Region Severe - protruding prominent bone    Acromion Bone Region Severe - squared shoulders, bones, and acromion process protrusion prominent    Dorsal Hand Region Severe - prominent depression    Posterior Calf Region Severe - thin with very little definition/firmness      Row Name 05/16/24 1410       Fat Loss    Subcutaneous Fat Loss Findings Severe    Orbital Region  Severe - pronounced hollowness/depression, dark circles, loose saggy skin    Upper Arm Region Severe - mostly skin, very little space between folds, fingers touch      Row Name 05/16/24 1410       Criteria Met (Must meet criteria for severity in at least 2 of these categories: M Wasting, Fat Loss, Fluid, Secondary Signs, Wt. Status, Intake)    Patient meets criteria for  Severe Malnutrition                     Nutrition Diagnosis         Nutrition Dx Problem 1 Severe malnutrition related to decreased ability to consume sufficient energy as  evidenced by unintended weight change., body composition changes., and COPD     Nutrition Intervention           Current Nutrition Orders & Evaluation of Intake       Current PO Diet Diet: Regular/House; Fluid Consistency: Thin (IDDSI 0)  NPO Diet NPO Type: Sips with Meds   Supplement Orders Placed This Encounter      Dietary Nutrition Supplements Boost Plus (Ensure Plus); chocolate           Nutrition Intervention/Prescription        Boost Plus (jeff) TID    +1080 kcal, 42 g pro        Medical Nutrition Therapy/Nutrition Education          Learner     Readiness Patient  Acceptance     Method     Response Explanation  Verbalizes understanding     Monitor/Evaluation        Monitor PO intake, Supplement intake, Weight, POC/GOC     Nutrition Discharge Plan         Recommend to continue oral nutrition supplements on discharge.      Electronically signed by:  Radha Maldonado RD  05/16/24 14:11 EDT

## 2024-05-17 ENCOUNTER — ANESTHESIA (OUTPATIENT)
Dept: GASTROENTEROLOGY | Facility: HOSPITAL | Age: 64
End: 2024-05-17
Payer: MEDICAID

## 2024-05-17 ENCOUNTER — ANESTHESIA EVENT (OUTPATIENT)
Dept: GASTROENTEROLOGY | Facility: HOSPITAL | Age: 64
End: 2024-05-17
Payer: MEDICAID

## 2024-05-17 ENCOUNTER — APPOINTMENT (OUTPATIENT)
Dept: CT IMAGING | Facility: HOSPITAL | Age: 64
End: 2024-05-17
Payer: MEDICAID

## 2024-05-17 PROBLEM — T17.500A MUCUS PLUGGING OF BRONCHI: Status: ACTIVE | Noted: 2024-05-12

## 2024-05-17 LAB
ACB CMPLX DNA BAL NAA+NON-PRB-NCNCRNG: NOT DETECTED
ALBUMIN SERPL-MCNC: 3.4 G/DL (ref 3.5–5.2)
ALBUMIN/GLOB SERPL: 1.9 G/DL
ALP SERPL-CCNC: 108 U/L (ref 39–117)
ALT SERPL W P-5'-P-CCNC: 43 U/L (ref 1–41)
ANION GAP SERPL CALCULATED.3IONS-SCNC: 3.9 MMOL/L (ref 5–15)
AST SERPL-CCNC: 32 U/L (ref 1–40)
BASOPHILS # BLD AUTO: 0.01 10*3/MM3 (ref 0–0.2)
BASOPHILS NFR BLD AUTO: 0.2 % (ref 0–1.5)
BILIRUB SERPL-MCNC: 0.2 MG/DL (ref 0–1.2)
BLACTX-M ISLT/SPM QL: ABNORMAL
BLAIMP ISLT/SPM QL: ABNORMAL
BLAKPC ISLT/SPM QL: ABNORMAL
BLAOXA-48-LIKE ISLT/SPM QL: ABNORMAL
BLAVIM ISLT/SPM QL: ABNORMAL
BUN SERPL-MCNC: 25 MG/DL (ref 8–23)
BUN/CREAT SERPL: 42.4 (ref 7–25)
C PNEUM DNA NPH QL NAA+NON-PROBE: NOT DETECTED
CALCIUM SPEC-SCNC: 9.1 MG/DL (ref 8.6–10.5)
CHLORIDE SERPL-SCNC: 98 MMOL/L (ref 98–107)
CILIATED BAL QL: 5 %
CO2 SERPL-SCNC: 40.1 MMOL/L (ref 22–29)
CREAT SERPL-MCNC: 0.59 MG/DL (ref 0.76–1.27)
DEPRECATED RDW RBC AUTO: 46.7 FL (ref 37–54)
E CLOAC COMP DNA BAL NAA+NON-PRB-NCNCRNG: NOT DETECTED
E COLI DNA BAL NAA+NON-PRB-NCNCRNG: NOT DETECTED
EGFRCR SERPLBLD CKD-EPI 2021: 109.7 ML/MIN/1.73
EOSINOPHIL # BLD AUTO: 0 10*3/MM3 (ref 0–0.4)
EOSINOPHIL NFR BLD AUTO: 0 % (ref 0.3–6.2)
ERYTHROCYTE [DISTWIDTH] IN BLOOD BY AUTOMATED COUNT: 13.7 % (ref 12.3–15.4)
FLUAV SUBTYP SPEC NAA+PROBE: NOT DETECTED
FLUBV RNA ISLT QL NAA+PROBE: NOT DETECTED
GLOBULIN UR ELPH-MCNC: 1.8 GM/DL
GLUCOSE SERPL-MCNC: 109 MG/DL (ref 65–99)
GP B STREP DNA BAL NAA+NON-PRB-NCNCRNG: NOT DETECTED
HADV DNA SPEC NAA+PROBE: NOT DETECTED
HAEM INFLU DNA BAL NAA+NON-PRB-NCNCRNG: NOT DETECTED
HCOV RNA LOWER RESP QL NAA+NON-PROBE: NOT DETECTED
HCT VFR BLD AUTO: 44.9 % (ref 37.5–51)
HGB BLD-MCNC: 13.6 G/DL (ref 13–17.7)
HMPV RNA NPH QL NAA+NON-PROBE: NOT DETECTED
HPIV RNA LOWER RESP QL NAA+NON-PROBE: DETECTED
IMM GRANULOCYTES # BLD AUTO: 0.03 10*3/MM3 (ref 0–0.05)
IMM GRANULOCYTES NFR BLD AUTO: 0.5 % (ref 0–0.5)
K AEROGENES DNA BAL NAA+NON-PRB-NCNCRNG: NOT DETECTED
K OXYTOCA DNA BAL NAA+NON-PRB-NCNCRNG: NOT DETECTED
K PNEU GRP DNA BAL NAA+NON-PRB-NCNCRNG: NOT DETECTED
L PNEUMO DNA LOWER RESP QL NAA+NON-PROBE: NOT DETECTED
LYMPHOCYTES # BLD AUTO: 0.73 10*3/MM3 (ref 0.7–3.1)
LYMPHOCYTES NFR BLD AUTO: 12.6 % (ref 19.6–45.3)
LYMPHOCYTES NFR FLD MANUAL: 12 %
M CATARRHALIS DNA BAL NAA+NON-PRB-NCNCRNG: NOT DETECTED
M PNEUMO IGG SER IA-ACNC: NOT DETECTED
MACROPHAGE FLUID: 3 %
MAGNESIUM SERPL-MCNC: 2.1 MG/DL (ref 1.6–2.4)
MCH RBC QN AUTO: 28.1 PG (ref 26.6–33)
MCHC RBC AUTO-ENTMCNC: 30.3 G/DL (ref 31.5–35.7)
MCV RBC AUTO: 92.8 FL (ref 79–97)
MECA+MECC ISLT/SPM QL: ABNORMAL
MONOCYTES # BLD AUTO: 0.41 10*3/MM3 (ref 0.1–0.9)
MONOCYTES NFR BLD AUTO: 7.1 % (ref 5–12)
NDM GENE: ABNORMAL
NEUTROPHILS NFR BLD AUTO: 4.6 10*3/MM3 (ref 1.7–7)
NEUTROPHILS NFR BLD AUTO: 79.6 % (ref 42.7–76)
NEUTROPHILS NFR FLD MANUAL: 80 %
NRBC BLD AUTO-RTO: 0 /100 WBC (ref 0–0.2)
P AERUGINOSA DNA BAL NAA+NON-PRB-NCNCRNG: NOT DETECTED
PHOSPHATE SERPL-MCNC: 2.3 MG/DL (ref 2.5–4.5)
PLATELET # BLD AUTO: 142 10*3/MM3 (ref 140–450)
PMV BLD AUTO: 10.4 FL (ref 6–12)
POTASSIUM SERPL-SCNC: 4.4 MMOL/L (ref 3.5–5.2)
PROT SERPL-MCNC: 5.2 G/DL (ref 6–8.5)
PROTEUS SP DNA BAL NAA+NON-PRB-NCNCRNG: NOT DETECTED
RBC # BLD AUTO: 4.84 10*6/MM3 (ref 4.14–5.8)
RHINOVIRUS RNA SPEC NAA+PROBE: NOT DETECTED
RSV RNA NPH QL NAA+NON-PROBE: NOT DETECTED
S AUREUS DNA BAL NAA+NON-PRB-NCNCRNG: NOT DETECTED
S MARCESCENS DNA BAL NAA+NON-PRB-NCNCRNG: NOT DETECTED
S PNEUM DNA BAL NAA+NON-PRB-NCNCRNG: NOT DETECTED
S PYO DNA BAL NAA+NON-PRB-NCNCRNG: NOT DETECTED
SODIUM SERPL-SCNC: 142 MMOL/L (ref 136–145)
VISUAL PRESENCE OF BLOOD: NORMAL
WBC NRBC COR # BLD AUTO: 5.78 10*3/MM3 (ref 3.4–10.8)

## 2024-05-17 PROCEDURE — 87206 SMEAR FLUORESCENT/ACID STAI: CPT | Performed by: INTERNAL MEDICINE

## 2024-05-17 PROCEDURE — 87633 RESP VIRUS 12-25 TARGETS: CPT | Performed by: INTERNAL MEDICINE

## 2024-05-17 PROCEDURE — 0B9F8ZX DRAINAGE OF RIGHT LOWER LUNG LOBE, VIA NATURAL OR ARTIFICIAL OPENING ENDOSCOPIC, DIAGNOSTIC: ICD-10-PCS | Performed by: INTERNAL MEDICINE

## 2024-05-17 PROCEDURE — 83735 ASSAY OF MAGNESIUM: CPT | Performed by: INTERNAL MEDICINE

## 2024-05-17 PROCEDURE — 94799 UNLISTED PULMONARY SVC/PX: CPT

## 2024-05-17 PROCEDURE — 89051 BODY FLUID CELL COUNT: CPT | Performed by: INTERNAL MEDICINE

## 2024-05-17 PROCEDURE — 87102 FUNGUS ISOLATION CULTURE: CPT | Performed by: INTERNAL MEDICINE

## 2024-05-17 PROCEDURE — 87070 CULTURE OTHR SPECIMN AEROBIC: CPT | Performed by: INTERNAL MEDICINE

## 2024-05-17 PROCEDURE — 25810000003 LACTATED RINGERS PER 1000 ML: Performed by: NURSE ANESTHETIST, CERTIFIED REGISTERED

## 2024-05-17 PROCEDURE — 25510000001 IOPAMIDOL PER 1 ML: Performed by: INTERNAL MEDICINE

## 2024-05-17 PROCEDURE — 84100 ASSAY OF PHOSPHORUS: CPT | Performed by: INTERNAL MEDICINE

## 2024-05-17 PROCEDURE — 0 DEXTROSE 5 % SOLUTION: Performed by: INTERNAL MEDICINE

## 2024-05-17 PROCEDURE — 87116 MYCOBACTERIA CULTURE: CPT | Performed by: INTERNAL MEDICINE

## 2024-05-17 PROCEDURE — 31645 BRNCHSC W/THER ASPIR 1ST: CPT | Performed by: INTERNAL MEDICINE

## 2024-05-17 PROCEDURE — 80053 COMPREHEN METABOLIC PANEL: CPT | Performed by: INTERNAL MEDICINE

## 2024-05-17 PROCEDURE — 99233 SBSQ HOSP IP/OBS HIGH 50: CPT | Performed by: INTERNAL MEDICINE

## 2024-05-17 PROCEDURE — 31624 DX BRONCHOSCOPE/LAVAGE: CPT | Performed by: INTERNAL MEDICINE

## 2024-05-17 PROCEDURE — 99232 SBSQ HOSP IP/OBS MODERATE 35: CPT | Performed by: INTERNAL MEDICINE

## 2024-05-17 PROCEDURE — 25010000002 METHYLPREDNISOLONE PER 40 MG: Performed by: INTERNAL MEDICINE

## 2024-05-17 PROCEDURE — 85025 COMPLETE CBC W/AUTO DIFF WBC: CPT | Performed by: INTERNAL MEDICINE

## 2024-05-17 PROCEDURE — 25010000002 PROPOFOL 10 MG/ML EMULSION: Performed by: NURSE ANESTHETIST, CERTIFIED REGISTERED

## 2024-05-17 PROCEDURE — 94760 N-INVAS EAR/PLS OXIMETRY 1: CPT

## 2024-05-17 PROCEDURE — 74170 CT ABD WO CNTRST FLWD CNTRST: CPT

## 2024-05-17 PROCEDURE — 87205 SMEAR GRAM STAIN: CPT | Performed by: INTERNAL MEDICINE

## 2024-05-17 PROCEDURE — 94664 DEMO&/EVAL PT USE INHALER: CPT

## 2024-05-17 PROCEDURE — 87071 CULTURE AEROBIC QUANT OTHER: CPT | Performed by: INTERNAL MEDICINE

## 2024-05-17 PROCEDURE — 88108 CYTOPATH CONCENTRATE TECH: CPT | Performed by: INTERNAL MEDICINE

## 2024-05-17 RX ORDER — LIDOCAINE HYDROCHLORIDE 20 MG/ML
INJECTION, SOLUTION EPIDURAL; INFILTRATION; INTRACAUDAL; PERINEURAL AS NEEDED
Status: DISCONTINUED | OUTPATIENT
Start: 2024-05-17 | End: 2024-05-17 | Stop reason: SURG

## 2024-05-17 RX ORDER — IPRATROPIUM BROMIDE AND ALBUTEROL SULFATE 2.5; .5 MG/3ML; MG/3ML
3 SOLUTION RESPIRATORY (INHALATION) ONCE
Status: COMPLETED | OUTPATIENT
Start: 2024-05-17 | End: 2024-05-17

## 2024-05-17 RX ORDER — PROPOFOL 10 MG/ML
VIAL (ML) INTRAVENOUS AS NEEDED
Status: DISCONTINUED | OUTPATIENT
Start: 2024-05-17 | End: 2024-05-17 | Stop reason: SURG

## 2024-05-17 RX ORDER — SODIUM CHLORIDE, SODIUM LACTATE, POTASSIUM CHLORIDE, CALCIUM CHLORIDE 600; 310; 30; 20 MG/100ML; MG/100ML; MG/100ML; MG/100ML
INJECTION, SOLUTION INTRAVENOUS CONTINUOUS PRN
Status: DISCONTINUED | OUTPATIENT
Start: 2024-05-17 | End: 2024-05-17 | Stop reason: SURG

## 2024-05-17 RX ORDER — SODIUM CHLORIDE, SODIUM LACTATE, POTASSIUM CHLORIDE, CALCIUM CHLORIDE 600; 310; 30; 20 MG/100ML; MG/100ML; MG/100ML; MG/100ML
30 INJECTION, SOLUTION INTRAVENOUS CONTINUOUS
Status: DISCONTINUED | OUTPATIENT
Start: 2024-05-17 | End: 2024-05-24 | Stop reason: HOSPADM

## 2024-05-17 RX ORDER — LIDOCAINE HYDROCHLORIDE 40 MG/ML
INJECTION, SOLUTION RETROBULBAR AS NEEDED
Status: DISCONTINUED | OUTPATIENT
Start: 2024-05-17 | End: 2024-05-17 | Stop reason: HOSPADM

## 2024-05-17 RX ADMIN — CETIRIZINE HYDROCHLORIDE 10 MG: 10 TABLET, FILM COATED ORAL at 20:28

## 2024-05-17 RX ADMIN — ROFLUMILAST 500 MCG: 500 TABLET ORAL at 08:07

## 2024-05-17 RX ADMIN — GUAIFENESIN 600 MG: 600 TABLET ORAL at 08:08

## 2024-05-17 RX ADMIN — IPRATROPIUM BROMIDE AND ALBUTEROL SULFATE 3 ML: .5; 3 SOLUTION RESPIRATORY (INHALATION) at 07:20

## 2024-05-17 RX ADMIN — IPRATROPIUM BROMIDE AND ALBUTEROL SULFATE 3 ML: .5; 3 SOLUTION RESPIRATORY (INHALATION) at 00:38

## 2024-05-17 RX ADMIN — AZITHROMYCIN DIHYDRATE 250 MG: 250 TABLET ORAL at 08:08

## 2024-05-17 RX ADMIN — ARFORMOTEROL TARTRATE 15 MCG: 15 SOLUTION RESPIRATORY (INHALATION) at 18:28

## 2024-05-17 RX ADMIN — FUROSEMIDE 40 MG: 40 TABLET ORAL at 08:08

## 2024-05-17 RX ADMIN — IPRATROPIUM BROMIDE AND ALBUTEROL SULFATE 3 ML: .5; 3 SOLUTION RESPIRATORY (INHALATION) at 18:28

## 2024-05-17 RX ADMIN — SODIUM PHOSPHATE, MONOBASIC, MONOHYDRATE AND SODIUM PHOSPHATE, DIBASIC, ANHYDROUS 15 MMOL: 142; 276 INJECTION, SOLUTION INTRAVENOUS at 08:07

## 2024-05-17 RX ADMIN — BUDESONIDE 0.5 MG: 0.5 SUSPENSION RESPIRATORY (INHALATION) at 07:20

## 2024-05-17 RX ADMIN — Medication 10 ML: at 20:28

## 2024-05-17 RX ADMIN — SODIUM CHLORIDE, POTASSIUM CHLORIDE, SODIUM LACTATE AND CALCIUM CHLORIDE: 600; 310; 30; 20 INJECTION, SOLUTION INTRAVENOUS at 14:18

## 2024-05-17 RX ADMIN — PANTOPRAZOLE SODIUM 40 MG: 40 TABLET, DELAYED RELEASE ORAL at 05:39

## 2024-05-17 RX ADMIN — PROPOFOL 200 MCG/KG/MIN: 10 INJECTION, EMULSION INTRAVENOUS at 14:26

## 2024-05-17 RX ADMIN — LIDOCAINE HYDROCHLORIDE 100 MG: 20 INJECTION, SOLUTION INTRAVENOUS at 14:26

## 2024-05-17 RX ADMIN — METOPROLOL TARTRATE 25 MG: 25 TABLET, FILM COATED ORAL at 20:28

## 2024-05-17 RX ADMIN — GUAIFENESIN 600 MG: 600 TABLET ORAL at 20:28

## 2024-05-17 RX ADMIN — METOPROLOL TARTRATE 25 MG: 25 TABLET, FILM COATED ORAL at 08:08

## 2024-05-17 RX ADMIN — Medication 4 ML: at 07:22

## 2024-05-17 RX ADMIN — Medication 10 ML: at 08:08

## 2024-05-17 RX ADMIN — METHYLPREDNISOLONE SODIUM SUCCINATE 40 MG: 40 INJECTION INTRAMUSCULAR; INTRAVENOUS at 15:34

## 2024-05-17 RX ADMIN — IOPAMIDOL 100 ML: 755 INJECTION, SOLUTION INTRAVENOUS at 10:15

## 2024-05-17 RX ADMIN — BUDESONIDE 0.5 MG: 0.5 SUSPENSION RESPIRATORY (INHALATION) at 18:28

## 2024-05-17 RX ADMIN — MONTELUKAST 10 MG: 10 TABLET, FILM COATED ORAL at 20:28

## 2024-05-17 RX ADMIN — SODIUM CHLORIDE, POTASSIUM CHLORIDE, SODIUM LACTATE AND CALCIUM CHLORIDE 30 ML/HR: 600; 310; 30; 20 INJECTION, SOLUTION INTRAVENOUS at 14:07

## 2024-05-17 RX ADMIN — Medication 4 ML: at 18:39

## 2024-05-17 RX ADMIN — ACETAMINOPHEN 650 MG: 325 TABLET ORAL at 08:24

## 2024-05-17 RX ADMIN — Medication 10 MG: at 20:28

## 2024-05-17 RX ADMIN — IPRATROPIUM BROMIDE AND ALBUTEROL SULFATE 3 ML: .5; 3 SOLUTION RESPIRATORY (INHALATION) at 12:22

## 2024-05-17 RX ADMIN — METHYLPREDNISOLONE SODIUM SUCCINATE 40 MG: 40 INJECTION INTRAMUSCULAR; INTRAVENOUS at 04:47

## 2024-05-17 RX ADMIN — PROPOFOL 100 MG: 10 INJECTION, EMULSION INTRAVENOUS at 14:26

## 2024-05-17 RX ADMIN — IPRATROPIUM BROMIDE AND ALBUTEROL SULFATE 3 ML: .5; 3 SOLUTION RESPIRATORY (INHALATION) at 14:04

## 2024-05-17 RX ADMIN — ARFORMOTEROL TARTRATE 15 MCG: 15 SOLUTION RESPIRATORY (INHALATION) at 07:20

## 2024-05-17 NOTE — PLAN OF CARE
Goal Outcome Evaluation:  Plan of Care Reviewed With: patient        Progress: improving  Outcome Evaluation: Patient resting in bed, home cpap machine on, no c/o pain or discomfort, patient NPO since midnight for procedure. Patient alert and oriented, call light within reach.

## 2024-05-17 NOTE — OP NOTE
Procedure:  Bronchoscopy with clearance of airways, bronchoalveolar lavage, bronchial washings     Pre-Operative Diagnosis: Mucous plugging     Post-Operative Diagnosis: Same     Timeout performed     Anesthesia: MAC anesthesia     Procedure Details: The patient was consented for the procedure with all risk and benefit of the procedure explained in detail.  The was given the opportunity to ask questions and all concerns were answered. The bronchoscope was inserted into the main airway via the oral cavity. An anatomical survey was done of the main airways and the subsegmental bronchus.      Findings: The vocal cords were normal in appearance and movement with abduction and adduction without difficulty. The trachea was normal in caliber and had no mucosal abnormalities. The left tracheobronchial tree appeared anatomically normal with no mucosal abnormalities. The right tracheobronchial tree appeared anatomically normal with no mucosal abnormalities.  There were copious amounts of thick viscous purulent secretions and concretions obstructing the distal trachea, left and right mainstem bronchus, left upper, left lower, right upper, right middle and right lower lobe bronchus.  Saline had to be instilled and multiple concretions and removed en bloc.  After this I was able to suction out the rest of the mucous plugs.  After I did that I reevaluated the airways and did not see any suspicious endobronchial lesions or masses.  A bronchoalveolar lavage was performed using 2 x 60 mL aliquots of normal saline  instilled into the right lower lobe bronchus then aspirated back. There was 40 mL turbid cloudy fluid with plugs in return.  Bronchial washings were collected.     Findings:  Dense mucous plugging with thick viscous purulent secretions and concretions obstructing the entire left and right tracheobronchial trees     Estimated Blood Loss: 0mL     Specimens:  Bronchoalveolar lavage right lower lobe bronchus  Bronchial  washings     Complications: None; patient tolerated the procedure well.     Disposition: Stable to return to floor.  Follow-up test results.     Patient tolerated the procedure well.    Electronically signed by Collins Chapa MD, 05/17/24, 2:38 PM EDT.

## 2024-05-17 NOTE — PLAN OF CARE
Goal Outcome Evaluation:   VSS. Patient treated for headache in AM. Patient had no other complaints during shift. Pt returned from [facility] without complaint or acute event.

## 2024-05-17 NOTE — PROGRESS NOTES
Pulmonary / Critical Care Progress Note      Patient Name: Sumeet Gomes  : 1960  MRN: [id number]  Attending:  Trevin Wright MD  Date of admission: 2024    Subjective   Subjective   Follow-up for respiratory failure mucous plugging    Over past 24 hours:  Using NIPPV overnight on 3 L of oxygen  Does feel little bit better this morning  Still wheezing  Shortness of breath slightly better  Still having trouble coughing up secretions however did cough up some thick gray secretions overnight  Weak and fatigue  No nausea, fevers or chills  Agreeable for bronchoscopy        Objective   Objective     Vitals:   Temp:  [97.7 °F (36.5 °C)-98.2 °F (36.8 °C)] 98.1 °F (36.7 °C)  Heart Rate:  [59-83] 64  Resp:  [16-20] 18  BP: ()/(63-73) 122/65  Flow (L/min):  [3] 3    Physical Exam:  Vital Signs Reviewed   General:  WDWN, Alert, NAD.    HEENT:  PERRL, EOMI.  OP, nares clear  Chest: Barrel chested, poor aeration, slightly improved wheezing and rhonchi bilaterally, tympanic to percussion bilaterally, pursed lip breathing noted  CV: RRR, no MGR, pulses 2+, equal.  Abd:  Soft, NT, ND, + BS, no HSM  EXT:  no clubbing, no cyanosis, no edema  Neuro:  A&Ox3, CN grossly intact, no focal deficits.  Skin: No rashes or lesions noted         Result Review    Result Review:  I have personally reviewed the results from the time of this admission to 2024 11:04 EDT and agree with these findings:  [x]  Laboratory  [x]  Microbiology  [x]  Radiology  [x]  EKG/Telemetry   []  Cardiology/Vascular   []  Pathology  []  Old records  []  Other:  Most notable findings include:       Lab 24  0504 24  0533 05/15/24  0552 24  0534 24  0539 24  1439   WBC 5.78 6.45 7.82 8.07  --  6.54   HEMOGLOBIN 13.6 13.9 13.5 13.8  --  15.6   HEMATOCRIT 44.9 45.1 44.8 44.6  --  49.2   PLATELETS 142 145 154 156  --  167   SODIUM 142 141 141 140 140 142   POTASSIUM 4.4 4.5 4.8 4.8 4.6 4.4   CHLORIDE 98 97* 98  99 100 97*   CO2 40.1* 43.6* 40.0* 37.6* 34.3* 34.2*   BUN 25* 23 19 18 14 12   CREATININE 0.59* 0.56* 0.58* 0.62* 0.67* 0.70*   GLUCOSE 109* 131* 144* 151* 138* 114*   CALCIUM 9.1 9.2 9.4 9.6 9.3 10.2   PHOSPHORUS 2.3* 3.3 2.9 3.3  --   --    TOTAL PROTEIN 5.2* 5.5* 5.7*  --   --  7.5   ALBUMIN 3.4* 3.5 3.7  --   --  4.5   GLOBULIN 1.8 2.0 2.0  --   --  3.0     CT Chest Without Contrast Diagnostic    Result Date: 5/13/2024  CT Chest without Contrast  INDICATION: Hypoxia. Weight loss. History of COPD.  TECHNIQUE: CT of the thorax without contrast. Coronal and sagittal reconstructions were obtained.  Radiation dose reduction techniques included automated exposure control or exposure modulation based on body size.  COMPARISON: 3/9/2023  FINDINGS: Please see abdomen pelvis CT report dictated separately. There is bilateral gynecomastia. No pleural or pericardial effusion is seen. There is atherosclerotic disease and coronary artery disease. No definite adenopathy. The luis are poorly characterized in the absence of IV contrast.  Lung windows demonstrate emphysema. There is some chronic scarring in both lung bases that is similar to prior. No evidence of acute pneumonia or pulmonary edema. No suspicious pulmonary masses. No pneumothorax.      Impression:  COPD with no acute or suspicious findings in the chest  Electronically Signed By-Dr. Dawson Hensley MD On:5/13/2024 10:35 PM       All cultures so far negative.  Viral panel negative      Assessment & Plan   Assessment / Plan     Active Hospital Problems:  Active Hospital Problems    Diagnosis     **COPD exacerbation     Mucus plugging of bronchi     Tobacco abuse     Pulmonary hypertension     Fatigue     Essential hypertension        Impression:  Acute exacerbation of COPD/asthma overlap syndrome  Chronic hypoxemic and hypercapnic respiratory failure on 3 L of oxygen and NIPPV at home  Mucous plugging/issues with airway clearance  Hypophosphatemia  Pulmonary  hypertension secondary to WHO class III left heart disease  Ongoing tobacco abuse of cigarettes     Plan:  Slightly improving however still having significant issues with airway clearance.  Will need bronchoscopy  Agreeable today for bronchoscopy  I have discussed the risks of the procedure with the patient including pneumothorax, hemothorax, bleeding, hypoxia, required mechanical ventilation and death. The patient recognizes these findings, acknowledges these findings and is agreeable to the procedure.  Continue IV steroids.  Ultimately changed to prednisone to complete 2-week taper  Continue chronic daily azithromycin 250 mg and Daliresp 500 mcg daily  Okay to bring in home NIPPV to use.  Otherwise, use our machine nightly with naps  Wean O2 to keep SPO2 greater than 90%  Continue mucolytic's  Continue nebulizers and bronchopulmonary hygiene  Trend renal panel and electrolytes.  Replace phosphorus IV    DVT prophylaxis:  Medical DVT prophylaxis orders are present.    CODE STATUS:   Level Of Support Discussed With: Patient  Code Status (Patient has no pulse and is not breathing): No CPR (Do Not Attempt to Resuscitate)  Medical Interventions (Patient has pulse or is breathing): Full Support      Labs, imaging, microbiology, notes and medications personally reviewed  Discussed with primary    Electronically signed by Collins Chapa MD, 05/17/24, 11:05 AM EDT.

## 2024-05-17 NOTE — PROGRESS NOTES
"Enter Query Response Below      Query Response: Chronic illness related severe protein calorie malnutrition     Electronically signed by Trevin Wright MD, 05/17/24, 2:04 PM EDT.           If applicable, please update the problem list.   If you have any questions about this query contact me at: [email or web address]     Dr. Wright,    Patient admitted with COPD exacerbation and mucous plugging had a malnutrition severity assessment completed by the dietician on 5/16/24. Per this assessment, \"Patient meets criteria for: Severe Malnutrition\". Documentation includes chronic disease-related malnutrition, severe weight loss greater than 7.5% in three months, severe muscle loss and severe fat loss. Treatment has included Ensure Plus nutritional supplements TID.    Please clarify diagnosis treated/monitored:    Chronic illness related severe protein calorie malnutrition  Other Malnutrition  Other- specify __________  Unable to determine       By submitting this query, we are merely seeking further clarification of documentation to accurately reflect all conditions that you are monitoring, evaluating, treating or that extend the hospitalization or utilize additional resources of care. Please utilize your independent clinical judgment when addressing the question(s) above.     This query and your response, once completed, will be entered into the legal medical record.    Sincerely,  Jasmina Armijo RN  Clinical Documentation Integrity Program     "

## 2024-05-17 NOTE — ANESTHESIA POSTPROCEDURE EVALUATION
Patient: Sumeet Gomes    Procedure Summary       Date: 05/17/24 Room / Location: [facility] ENDOSCOPY 3 / [facility] ENDOSCOPY    Anesthesia Start: 1422 Anesthesia Stop: 1441    Procedure: BRONCHOSCOPY WITH BAL AND WASHINGS (Bronchus) Diagnosis:       Mucus plugging of bronchi      (Mucus plugging of bronchi [T17.500A])    Surgeons: Collins Chapa MD Provider: Alyssa Chew CRNA    Anesthesia Type: general ASA Status: 3            Anesthesia Type: general    Vitals  Vitals Value Taken Time   /95 05/17/24 1455   Temp 36.6 °C (97.8 °F) 05/17/24 1455   Pulse 68 05/17/24 1457   Resp 17 05/17/24 1455   SpO2 91 % 05/17/24 1457   Vitals shown include unfiled device data.        Post Anesthesia Care and Evaluation    Patient location during evaluation: bedside  Patient participation: complete - patient participated  Level of consciousness: awake  Pain management: adequate    Airway patency: patent  Anesthetic complications: No anesthetic complications  PONV Status: controlled  Cardiovascular status: acceptable and stable  Respiratory status: acceptable, spontaneous ventilation and nasal cannula (stable on 3L per NC)

## 2024-05-17 NOTE — ANESTHESIA PREPROCEDURE EVALUATION
Anesthesia Evaluation     Patient summary reviewed and Nursing notes reviewed   NPO Solid Status: > 8 hours  NPO Liquid Status: > 8 hours           Airway   Mallampati: I  TM distance: >3 FB  Neck ROM: full  No difficulty expected  Dental    (+) partials    Comment: Upper partial. Denies any existing teeth are loose    Pulmonary    (+) COPD moderate,shortness of breath, wheezes  Cardiovascular - normal exam  Exercise tolerance: poor (<4 METS)    ECG reviewed  Rhythm: regular  Rate: normal    (+) hypertension well controlled, CAD, CHF , hyperlipidemia      Neuro/Psych  GI/Hepatic/Renal/Endo      Musculoskeletal     (+) joint swelling  Abdominal  - normal exam   Substance History      OB/GYN          Other        ROS/Med Hx Other: EKG, 5/12/2024:  HEART RATE= 82  bpm  RR Interval= 732  ms  FL Interval= 133  ms  P Horizontal Axis= 35  deg  P Front Axis= 79  deg  QRSD Interval= 93  ms  QT Interval= 394  ms  QTcB= 461  ms  QRS Axis= -74  deg  T Wave Axis= 63  deg  - ABNORMAL ECG -  Sinus rhythm  Atrial premature complex  Left axis deviation  Incomplete right bundle branch block  Borderline low voltage, extremity leads    ECHO, 12/05/2023:  ·  Left ventricular systolic function is normal. Estimated left ventricular EF = 55%  ·  Left ventricular diastolic function was normal.  ·  The right ventricular cavity is moderately dilated.  ·  The right atrial cavity is moderately  dilated.  ·  Estimated right ventricular systolic pressure from tricuspid regurgitation is moderately elevated (45-55 mmHg).                      Anesthesia Plan    ASA 3     general   total IV anesthesia  (Pt is a DNR. D/W pt and SO plan for DNR, R/B/A. Pt desires to rescind DNR order for the procedure and immediate postoperative period. DNR will resume after discharge from the PACU. )  intravenous induction     Anesthetic plan, risks, benefits, and alternatives have been provided, discussed and informed consent has been obtained with: patient,  spouse/significant other and child.    Plan discussed with CRNA.        CODE STATUS:    While under anesthesia and immediate perioperative period:  Code Status: CPR - Full Support

## 2024-05-17 NOTE — PROGRESS NOTES
[facility]   Hospitalist Progress Note  Date: 2024  Patient Name: Sumeet Gomes  : 1960  MRN: [id number]  Date of admission: 2024      Subjective   Subjective     Chief Complaint: Shortness of breath    Summary: 63 y.o. male with past medical history of hypertension, hyperlipidemia, COPD on home oxygen, pulmonary hypertension, CAD, and GERD presented to ED with complaint of shortness of breath over the past few weeks.  In the ED patient vitals were within normal limits on 3 L nasal cannula.  Labs were all relatively unremarkable given his chronic conditions including a normal lactic acid, proBNP, and negative COVID flu RSV.  Chest x-ray was negative for any acute findings.  He was admitted for COPD exacerbation, started on steroids, bronchodilators.  No evidence of pneumonia.  His [place] significant weight loss over the last 2 years, likely in setting of advanced COPD.  However, obtaining CT chest, abdomen pelvis to rule out malignancy.    Interval Followup: No acute events overnight, patient resting comfortably in bed, breathing mildly improved    Objective   Objective     Vitals:   Temp:  [97.7 °F (36.5 °C)-98.2 °F (36.8 °C)] 98.2 °F (36.8 °C)  Heart Rate:  [59-83] 62  Resp:  [16-20] 18  BP: ()/(63-73) 118/70  Flow (L/min):  [3] 3  Physical Exam   GEN: No acute distress  HEENT: Moist mucous membranes  LUNGS: Equal chest rise bilaterally  CARDIAC: Regular rate and rhythm  NEURO: Moving all 4 extremities spontaneously  SKIN: No obvious breakdown    Result Review    I have personally reviewed the results below:  [x]  Laboratory personally viewed BMP, procalcitonin, CBC, personally reviewed chest x-ray with no cardiopulmonary abnormality  []  Microbiology  []  Radiology  []  EKG/Telemetry   []  Cardiology/Vascular   []  Pathology  []  Old records  []  Other:  CBC          5/15/2024    05:52 2024    05:33 2024    05:04   CBC   WBC 7.82  6.45  5.78    RBC 4.80  4.89  4.84     Hemoglobin 13.5  13.9  13.6    Hematocrit 44.8  45.1  44.9    MCV 93.3  92.2  92.8    MCH 28.1  28.4  28.1    MCHC 30.1  30.8  30.3    RDW 13.8  13.8  13.7    Platelets 154  145  142      CMP          5/15/2024    05:52 5/16/2024    05:33 5/17/2024    05:04   CMP   Glucose 144  131  109    BUN 19  23  25    Creatinine 0.58  0.56  0.59    EGFR 110.3  111.4  109.7    Sodium 141  141  142    Potassium 4.8  4.5  4.4    Chloride 98  97  98    Calcium 9.4  9.2  9.1    Total Protein 5.7  5.5  5.2    Albumin 3.7  3.5  3.4    Globulin 2.0  2.0  1.8    Total Bilirubin 0.2  0.3  0.2    Alkaline Phosphatase 125  110  108    AST (SGOT) 27  29  32    ALT (SGPT) 25  33  43    Albumin/Globulin Ratio 1.9  1.8  1.9    BUN/Creatinine Ratio 32.8  41.1  42.4    Anion Gap 3.0  0.4  3.9        Assessment & Plan   Assessment / Plan   COPD with acute exacerbation  Weight loss  Hypertension  Tobacco dependence  Pulmonary hypertension  CAD  Questionable subtle renal mass -will need renal protocol CT with contrast once acute issues resolve    Continue to monitor in the hospital for workup and management of the above  Patient follows with Dr. Moya, should he worsen, consult pulmonology as patient has had minimal improvement  Continue with IV Solu-Medrol today, will likely transition to prednisone in the next 24-48 hours if patient improves  Continue with scheduled DuoNebs, Pulmicort and Brovana twice daily, albuterol as needed  Continue scheduled Mucinex and Tessalon Perles  Incentive spirometry at bedside  Continue daily azithromycin for COPD suppression added Augmentin as patient has had a change in his sputum color, continue for now  Viral respiratory panel negative  Continue Daliresp and Singulair  CT scan of the chest personally reviewed, negative for any dense infiltrate or malignancy, CT scan of the abdomen and pelvis with subtle hyperdense lesion in the right kidney, could represent mass recommend follow-up with renal protocol CT  with contrast once acute issues are resolved  Recommend colonoscopy on outpatient basis for routine screening  Continue home antihypertensives  Nicotine patch as needed  Pulmonology consulted and following, discussed management plan with Dr. Chapa regarding hypoxemia, patient to have bronchoscopy today  CBC, CMP reviewed 5/17/2024   Repeat CBC, CMP, mag and Phos in a.m. 5/17/2024      Discussed plan with RN.    DVT prophylaxis:  Medical DVT prophylaxis orders are present.        CODE STATUS:   Level Of Support Discussed With: Patient  Code Status (Patient has no pulse and is not breathing): No CPR (Do Not Attempt to Resuscitate)  Medical Interventions (Patient has pulse or is breathing): Full Support      Electronically signed by Trevin Wright MD, 5/17/2024, 11:39 EDT.

## 2024-05-18 LAB
ALBUMIN SERPL-MCNC: 3.6 G/DL (ref 3.5–5.2)
ALBUMIN/GLOB SERPL: 1.8 G/DL
ALP SERPL-CCNC: 102 U/L (ref 39–117)
ALT SERPL W P-5'-P-CCNC: 44 U/L (ref 1–41)
ANION GAP SERPL CALCULATED.3IONS-SCNC: 2.5 MMOL/L (ref 5–15)
AST SERPL-CCNC: 32 U/L (ref 1–40)
BASOPHILS # BLD AUTO: 0 10*3/MM3 (ref 0–0.2)
BASOPHILS NFR BLD AUTO: 0 % (ref 0–1.5)
BILIRUB SERPL-MCNC: 0.4 MG/DL (ref 0–1.2)
BUN SERPL-MCNC: 21 MG/DL (ref 8–23)
BUN/CREAT SERPL: 36.2 (ref 7–25)
CALCIUM SPEC-SCNC: 9.1 MG/DL (ref 8.6–10.5)
CHLORIDE SERPL-SCNC: 96 MMOL/L (ref 98–107)
CO2 SERPL-SCNC: 43.5 MMOL/L (ref 22–29)
CREAT SERPL-MCNC: 0.58 MG/DL (ref 0.76–1.27)
DEPRECATED RDW RBC AUTO: 46.1 FL (ref 37–54)
EGFRCR SERPLBLD CKD-EPI 2021: 110.3 ML/MIN/1.73
EOSINOPHIL # BLD AUTO: 0 10*3/MM3 (ref 0–0.4)
EOSINOPHIL NFR BLD AUTO: 0 % (ref 0.3–6.2)
ERYTHROCYTE [DISTWIDTH] IN BLOOD BY AUTOMATED COUNT: 13.7 % (ref 12.3–15.4)
GLOBULIN UR ELPH-MCNC: 2 GM/DL
GLUCOSE SERPL-MCNC: 119 MG/DL (ref 65–99)
HCT VFR BLD AUTO: 46.2 % (ref 37.5–51)
HGB BLD-MCNC: 14.4 G/DL (ref 13–17.7)
IMM GRANULOCYTES # BLD AUTO: 0.02 10*3/MM3 (ref 0–0.05)
IMM GRANULOCYTES NFR BLD AUTO: 0.3 % (ref 0–0.5)
LYMPHOCYTES # BLD AUTO: 0.62 10*3/MM3 (ref 0.7–3.1)
LYMPHOCYTES NFR BLD AUTO: 9.9 % (ref 19.6–45.3)
MAGNESIUM SERPL-MCNC: 2.3 MG/DL (ref 1.6–2.4)
MCH RBC QN AUTO: 28.2 PG (ref 26.6–33)
MCHC RBC AUTO-ENTMCNC: 31.2 G/DL (ref 31.5–35.7)
MCV RBC AUTO: 90.6 FL (ref 79–97)
MONOCYTES # BLD AUTO: 0.52 10*3/MM3 (ref 0.1–0.9)
MONOCYTES NFR BLD AUTO: 8.3 % (ref 5–12)
NEUTROPHILS NFR BLD AUTO: 5.08 10*3/MM3 (ref 1.7–7)
NEUTROPHILS NFR BLD AUTO: 81.5 % (ref 42.7–76)
NRBC BLD AUTO-RTO: 0 /100 WBC (ref 0–0.2)
PHOSPHATE SERPL-MCNC: 3 MG/DL (ref 2.5–4.5)
PLATELET # BLD AUTO: 163 10*3/MM3 (ref 140–450)
PMV BLD AUTO: 9.8 FL (ref 6–12)
POTASSIUM SERPL-SCNC: 4.4 MMOL/L (ref 3.5–5.2)
PROT SERPL-MCNC: 5.6 G/DL (ref 6–8.5)
RBC # BLD AUTO: 5.1 10*6/MM3 (ref 4.14–5.8)
SODIUM SERPL-SCNC: 142 MMOL/L (ref 136–145)
WBC NRBC COR # BLD AUTO: 6.24 10*3/MM3 (ref 3.4–10.8)

## 2024-05-18 PROCEDURE — 99232 SBSQ HOSP IP/OBS MODERATE 35: CPT | Performed by: INTERNAL MEDICINE

## 2024-05-18 PROCEDURE — 94760 N-INVAS EAR/PLS OXIMETRY 1: CPT

## 2024-05-18 PROCEDURE — 94799 UNLISTED PULMONARY SVC/PX: CPT

## 2024-05-18 PROCEDURE — 94664 DEMO&/EVAL PT USE INHALER: CPT

## 2024-05-18 PROCEDURE — 80053 COMPREHEN METABOLIC PANEL: CPT | Performed by: INTERNAL MEDICINE

## 2024-05-18 PROCEDURE — 83735 ASSAY OF MAGNESIUM: CPT | Performed by: INTERNAL MEDICINE

## 2024-05-18 PROCEDURE — 25010000002 METHYLPREDNISOLONE PER 40 MG: Performed by: INTERNAL MEDICINE

## 2024-05-18 PROCEDURE — 85025 COMPLETE CBC W/AUTO DIFF WBC: CPT | Performed by: INTERNAL MEDICINE

## 2024-05-18 PROCEDURE — 84100 ASSAY OF PHOSPHORUS: CPT | Performed by: INTERNAL MEDICINE

## 2024-05-18 PROCEDURE — 99233 SBSQ HOSP IP/OBS HIGH 50: CPT | Performed by: INTERNAL MEDICINE

## 2024-05-18 RX ADMIN — Medication 10 MG: at 20:15

## 2024-05-18 RX ADMIN — ARFORMOTEROL TARTRATE 15 MCG: 15 SOLUTION RESPIRATORY (INHALATION) at 18:45

## 2024-05-18 RX ADMIN — IPRATROPIUM BROMIDE AND ALBUTEROL SULFATE 3 ML: .5; 3 SOLUTION RESPIRATORY (INHALATION) at 00:26

## 2024-05-18 RX ADMIN — FUROSEMIDE 40 MG: 40 TABLET ORAL at 08:06

## 2024-05-18 RX ADMIN — GUAIFENESIN 600 MG: 600 TABLET ORAL at 20:15

## 2024-05-18 RX ADMIN — MONTELUKAST 10 MG: 10 TABLET, FILM COATED ORAL at 20:15

## 2024-05-18 RX ADMIN — IPRATROPIUM BROMIDE AND ALBUTEROL SULFATE 3 ML: .5; 3 SOLUTION RESPIRATORY (INHALATION) at 12:24

## 2024-05-18 RX ADMIN — CETIRIZINE HYDROCHLORIDE 10 MG: 10 TABLET, FILM COATED ORAL at 20:15

## 2024-05-18 RX ADMIN — BUDESONIDE 0.5 MG: 0.5 SUSPENSION RESPIRATORY (INHALATION) at 18:45

## 2024-05-18 RX ADMIN — METHYLPREDNISOLONE SODIUM SUCCINATE 40 MG: 40 INJECTION INTRAMUSCULAR; INTRAVENOUS at 03:56

## 2024-05-18 RX ADMIN — PANTOPRAZOLE SODIUM 40 MG: 40 TABLET, DELAYED RELEASE ORAL at 05:37

## 2024-05-18 RX ADMIN — GUAIFENESIN 600 MG: 600 TABLET ORAL at 08:06

## 2024-05-18 RX ADMIN — Medication 10 ML: at 08:07

## 2024-05-18 RX ADMIN — METOPROLOL TARTRATE 25 MG: 25 TABLET, FILM COATED ORAL at 08:06

## 2024-05-18 RX ADMIN — AZITHROMYCIN DIHYDRATE 250 MG: 250 TABLET ORAL at 08:06

## 2024-05-18 RX ADMIN — ROFLUMILAST 500 MCG: 500 TABLET ORAL at 08:06

## 2024-05-18 RX ADMIN — ACETAMINOPHEN 650 MG: 325 TABLET ORAL at 20:15

## 2024-05-18 RX ADMIN — METHYLPREDNISOLONE SODIUM SUCCINATE 40 MG: 40 INJECTION INTRAMUSCULAR; INTRAVENOUS at 15:50

## 2024-05-18 RX ADMIN — Medication 4 ML: at 18:45

## 2024-05-18 RX ADMIN — ARFORMOTEROL TARTRATE 15 MCG: 15 SOLUTION RESPIRATORY (INHALATION) at 06:28

## 2024-05-18 RX ADMIN — Medication 4 ML: at 06:28

## 2024-05-18 RX ADMIN — IPRATROPIUM BROMIDE AND ALBUTEROL SULFATE 3 ML: .5; 3 SOLUTION RESPIRATORY (INHALATION) at 06:28

## 2024-05-18 RX ADMIN — BUDESONIDE 0.5 MG: 0.5 SUSPENSION RESPIRATORY (INHALATION) at 06:28

## 2024-05-18 RX ADMIN — IPRATROPIUM BROMIDE AND ALBUTEROL SULFATE 3 ML: .5; 3 SOLUTION RESPIRATORY (INHALATION) at 18:45

## 2024-05-18 RX ADMIN — Medication 10 ML: at 20:16

## 2024-05-18 NOTE — PROGRESS NOTES
[facility]   Hospitalist Progress Note  Date: 2024  Patient Name: Sumeet Gomes  : 1960  MRN: [id number]  Date of admission: 2024      Subjective   Subjective     Chief Complaint: Shortness of breath    Summary: 63 y.o. male with past medical history of hypertension, hyperlipidemia, COPD on home oxygen, pulmonary hypertension, CAD, and GERD presented to ED with complaint of shortness of breath over the past few weeks.  In the ED patient vitals were within normal limits on 3 L nasal cannula.  Labs were all relatively unremarkable given his chronic conditions including a normal lactic acid, proBNP, and negative COVID flu RSV.  Chest x-ray was negative for any acute findings.  He was admitted for COPD exacerbation, started on steroids, bronchodilators.  No evidence of pneumonia.  His [place] significant weight loss over the last 2 years, likely in setting of advanced COPD.  However, obtaining CT chest, abdomen pelvis to rule out malignancy.    Interval Followup: Patient status post bronchoscopy, breathing improved    Objective   Objective     Vitals:   Temp:  [97.7 °F (36.5 °C)-98.7 °F (37.1 °C)] 97.7 °F (36.5 °C)  Heart Rate:  [51-80] 59  Resp:  [14-22] 18  BP: ()/(64-95) 104/66  Flow (L/min):  [2-3] 3  Physical Exam   GEN: No acute distress  HEENT: Moist mucous membranes  LUNGS: Equal chest rise bilaterally  CARDIAC: Regular rate and rhythm  NEURO: Moving all 4 extremities spontaneously  SKIN: No obvious breakdown    Result Review    I have personally reviewed the results below:  [x]  Laboratory personally viewed BMP, procalcitonin, CBC, personally reviewed chest x-ray with no cardiopulmonary abnormality  []  Microbiology  []  Radiology  []  EKG/Telemetry   []  Cardiology/Vascular   []  Pathology  []  Old records  []  Other:  CBC          2024    05:33 2024    05:04 2024    05:50   CBC   WBC 6.45  5.78  6.24    RBC 4.89  4.84  5.10    Hemoglobin 13.9  13.6  14.4     Hematocrit 45.1  44.9  46.2    MCV 92.2  92.8  90.6    MCH 28.4  28.1  28.2    MCHC 30.8  30.3  31.2    RDW 13.8  13.7  13.7    Platelets 145  142  163      CMP          5/16/2024    05:33 5/17/2024    05:04 5/18/2024    05:50   CMP   Glucose 131  109  119    BUN 23  25  21    Creatinine 0.56  0.59  0.58    EGFR 111.4  109.7  110.3    Sodium 141  142  142    Potassium 4.5  4.4  4.4    Chloride 97  98  96    Calcium 9.2  9.1  9.1    Total Protein 5.5  5.2  5.6    Albumin 3.5  3.4  3.6    Globulin 2.0  1.8  2.0    Total Bilirubin 0.3  0.2  0.4    Alkaline Phosphatase 110  108  102    AST (SGOT) 29  32  32    ALT (SGPT) 33  43  44    Albumin/Globulin Ratio 1.8  1.9  1.8    BUN/Creatinine Ratio 41.1  42.4  36.2    Anion Gap 0.4  3.9  2.5        Assessment & Plan   Assessment / Plan   COPD with acute exacerbation  Weight loss  Hypertension  Tobacco dependence  Pulmonary hypertension  CAD  Questionable subtle renal mass -will need renal protocol CT with contrast once acute issues resolve    Continue to monitor in the hospital for workup and management of the above  Continue with IV Solu-Medrol today, will likely transition to prednisone in the next 24-48 hours if patient improves  Continue with scheduled DuoNebs, Pulmicort and Brovana twice daily, albuterol as needed  Continue scheduled Mucinex and Tessalon Perles  Incentive spirometry at bedside  Continue daily azithromycin for COPD suppression added Augmentin as patient has had a change in his sputum color, continue for now  Viral respiratory panel negative  Continue Daliresp and Singulair  CT scan of the chest personally reviewed, negative for any dense infiltrate or malignancy, CT scan of the abdomen and pelvis with subtle hyperdense lesion in the right kidney, dedicated CT scan of the kidney with concerning 1.7 cm lesion, will consult urology Monday when available for further guidance  Recommend colonoscopy on outpatient basis for routine screening  Continue home  antihypertensives  Nicotine patch as needed  Pulmonology consulted and following, discussed management plan with Dr. Willis regarding hypoxemia, patient status post bronchoscopy with thick secretions and mucous plugging  CBC, CMP reviewed 5/18/2024   Repeat CBC, CMP, mag and Phos in a.m. 5/18/2024      Discussed plan with RN.    DVT prophylaxis:  Medical DVT prophylaxis orders are present.        CODE STATUS:   Level Of Support Discussed With: Patient  Code Status (Patient has no pulse and is not breathing): No CPR (Do Not Attempt to Resuscitate)  Medical Interventions (Patient has pulse or is breathing): Full Support    Time spent: Time spent involving patient care including face-to-face encounter 51 minutes    Electronically signed by Trevin Wright MD, 5/18/2024, 10:44 EDT.

## 2024-05-18 NOTE — PLAN OF CARE
Goal Outcome Evaluation:   VSS. Patient had no complaints or acute events during shift. On 3L O2, up ad derek

## 2024-05-18 NOTE — PROGRESS NOTES
Pulmonary / Critical Care Progress Note      Patient Name: Sumeet Gomes  : 1960  MRN: [id number]  Attending:  Trevin Wright MD  Date of admission: 2024    Subjective   Subjective   Follow-up for respiratory failure mucous plugging    Over past 24 hours:  Feeling better  Still with some difficulty expectorating mucus        Objective   Objective     Vitals:   Temp:  [97.7 °F (36.5 °C)-98.1 °F (36.7 °C)] 98.1 °F (36.7 °C)  Heart Rate:  [51-80] 68  Resp:  [16-20] 20  BP: ()/(59-71) 104/65  Flow (L/min):  [3] 3    Physical Exam:  Vital Signs Reviewed   Pleasant male  Very thin in nature  Does have kyphosis  Has very poor air exchange  He is alert and oriented      Result Review    Result Review:  I have personally reviewed the results from the time of this admission to 2024 18:51 EDT and agree with these findings:  [x]  Laboratory  [x]  Microbiology  [x]  Radiology  [x]  EKG/Telemetry   []  Cardiology/Vascular   []  Pathology  []  Old records  []  Other:  Most notable findings include:       Lab 24  0550 24  0504 24  0533 05/15/24  0552 24  0534 24  0539 24  1439   WBC 6.24 5.78 6.45 7.82 8.07  --  6.54   HEMOGLOBIN 14.4 13.6 13.9 13.5 13.8  --  15.6   HEMATOCRIT 46.2 44.9 45.1 44.8 44.6  --  49.2   PLATELETS 163 142 145 154 156  --  167   SODIUM 142 142 141 141 140 140 142   POTASSIUM 4.4 4.4 4.5 4.8 4.8 4.6 4.4   CHLORIDE 96* 98 97* 98 99 100 97*   CO2 43.5* 40.1* 43.6* 40.0* 37.6* 34.3* 34.2*   BUN 21 25* 23 19 18 14 12   CREATININE 0.58* 0.59* 0.56* 0.58* 0.62* 0.67* 0.70*   GLUCOSE 119* 109* 131* 144* 151* 138* 114*   CALCIUM 9.1 9.1 9.2 9.4 9.6 9.3 10.2   PHOSPHORUS 3.0 2.3* 3.3 2.9 3.3  --   --    TOTAL PROTEIN 5.6* 5.2* 5.5* 5.7*  --   --  7.5   ALBUMIN 3.6 3.4* 3.5 3.7  --   --  4.5   GLOBULIN 2.0 1.8 2.0 2.0  --   --  3.0     CT Chest Without Contrast Diagnostic    Result Date: 2024  CT Chest without Contrast  INDICATION: Hypoxia.  Weight loss. History of COPD.  TECHNIQUE: CT of the thorax without contrast. Coronal and sagittal reconstructions were obtained.  Radiation dose reduction techniques included automated exposure control or exposure modulation based on body size.  COMPARISON: 3/9/2023  FINDINGS: Please see abdomen pelvis CT report dictated separately. There is bilateral gynecomastia. No pleural or pericardial effusion is seen. There is atherosclerotic disease and coronary artery disease. No definite adenopathy. The luis are poorly characterized in the absence of IV contrast.  Lung windows demonstrate emphysema. There is some chronic scarring in both lung bases that is similar to prior. No evidence of acute pneumonia or pulmonary edema. No suspicious pulmonary masses. No pneumothorax.      Impression:  COPD with no acute or suspicious findings in the chest  Electronically Signed By-Dr. Dawson Hensley MD On:5/13/2024 10:35 PM       All cultures so far negative.  Viral panel negative      Assessment & Plan   Assessment / Plan     Active Hospital Problems:  Active Hospital Problems    Diagnosis     **COPD exacerbation     Mucus plugging of bronchi     Tobacco abuse     Pulmonary hypertension     Fatigue     Essential hypertension        Impression:  Acute exacerbation of COPD/asthma overlap syndrome  Chronic hypoxemic and hypercapnic respiratory failure on 3 L of oxygen and NIPPV at home  Mucous plugging/issues with airway clearance  Hypophosphatemia  Pulmonary hypertension secondary to WHO class III left heart disease  Ongoing tobacco abuse of cigarettes     Plan:  Still feeling a little bit winded  Will start vest therapy today  Patient may benefit from being on vest therapy as an outpatient she does suffer from mucous plugging  Continue LABA LAMA inhaled corticosteroids  Continue daily Zithromax and Daliresp  Continue home NIPPV      DVT prophylaxis:  Medical DVT prophylaxis orders are present.    CODE STATUS:   Level Of Support  Discussed With: Patient  Code Status (Patient has no pulse and is not breathing): No CPR (Do Not Attempt to Resuscitate)  Medical Interventions (Patient has pulse or is breathing): Full Support      Labs, imaging, microbiology, notes and medications personally reviewed  Discussed with primary    Electronically signed by Lang Willis DO, 05/18/24, 6:51 PM EDT.

## 2024-05-19 LAB
ALBUMIN SERPL-MCNC: 3.4 G/DL (ref 3.5–5.2)
ALBUMIN/GLOB SERPL: 1.7 G/DL
ALP SERPL-CCNC: 117 U/L (ref 39–117)
ALT SERPL W P-5'-P-CCNC: 48 U/L (ref 1–41)
ANION GAP SERPL CALCULATED.3IONS-SCNC: 0.2 MMOL/L (ref 5–15)
AST SERPL-CCNC: 29 U/L (ref 1–40)
BACTERIA SPEC AEROBE CULT: NORMAL
BACTERIA SPEC RESP CULT: NORMAL
BASOPHILS # BLD AUTO: 0 10*3/MM3 (ref 0–0.2)
BASOPHILS NFR BLD AUTO: 0 % (ref 0–1.5)
BILIRUB SERPL-MCNC: 0.3 MG/DL (ref 0–1.2)
BUN SERPL-MCNC: 27 MG/DL (ref 8–23)
BUN/CREAT SERPL: 43.5 (ref 7–25)
CALCIUM SPEC-SCNC: 8.7 MG/DL (ref 8.6–10.5)
CHLORIDE SERPL-SCNC: 95 MMOL/L (ref 98–107)
CO2 SERPL-SCNC: 43.8 MMOL/L (ref 22–29)
CREAT SERPL-MCNC: 0.62 MG/DL (ref 0.76–1.27)
DEPRECATED RDW RBC AUTO: 45 FL (ref 37–54)
EGFRCR SERPLBLD CKD-EPI 2021: 108.1 ML/MIN/1.73
EOSINOPHIL # BLD AUTO: 0 10*3/MM3 (ref 0–0.4)
EOSINOPHIL NFR BLD AUTO: 0 % (ref 0.3–6.2)
ERYTHROCYTE [DISTWIDTH] IN BLOOD BY AUTOMATED COUNT: 13.5 % (ref 12.3–15.4)
GLOBULIN UR ELPH-MCNC: 2 GM/DL
GLUCOSE SERPL-MCNC: 115 MG/DL (ref 65–99)
GRAM STN SPEC: NORMAL
HCT VFR BLD AUTO: 44.9 % (ref 37.5–51)
HGB BLD-MCNC: 14 G/DL (ref 13–17.7)
IMM GRANULOCYTES # BLD AUTO: 0.02 10*3/MM3 (ref 0–0.05)
IMM GRANULOCYTES NFR BLD AUTO: 0.3 % (ref 0–0.5)
LYMPHOCYTES # BLD AUTO: 0.56 10*3/MM3 (ref 0.7–3.1)
LYMPHOCYTES NFR BLD AUTO: 9.2 % (ref 19.6–45.3)
MAGNESIUM SERPL-MCNC: 2.2 MG/DL (ref 1.6–2.4)
MCH RBC QN AUTO: 28.3 PG (ref 26.6–33)
MCHC RBC AUTO-ENTMCNC: 31.2 G/DL (ref 31.5–35.7)
MCV RBC AUTO: 90.7 FL (ref 79–97)
MONOCYTES # BLD AUTO: 0.4 10*3/MM3 (ref 0.1–0.9)
MONOCYTES NFR BLD AUTO: 6.6 % (ref 5–12)
NEUTROPHILS NFR BLD AUTO: 5.1 10*3/MM3 (ref 1.7–7)
NEUTROPHILS NFR BLD AUTO: 83.9 % (ref 42.7–76)
NRBC BLD AUTO-RTO: 0 /100 WBC (ref 0–0.2)
PHOSPHATE SERPL-MCNC: 2.9 MG/DL (ref 2.5–4.5)
PLATELET # BLD AUTO: 143 10*3/MM3 (ref 140–450)
PMV BLD AUTO: 9.8 FL (ref 6–12)
POTASSIUM SERPL-SCNC: 4.6 MMOL/L (ref 3.5–5.2)
PROT SERPL-MCNC: 5.4 G/DL (ref 6–8.5)
RBC # BLD AUTO: 4.95 10*6/MM3 (ref 4.14–5.8)
SODIUM SERPL-SCNC: 139 MMOL/L (ref 136–145)
WBC NRBC COR # BLD AUTO: 6.08 10*3/MM3 (ref 3.4–10.8)

## 2024-05-19 PROCEDURE — 94799 UNLISTED PULMONARY SVC/PX: CPT

## 2024-05-19 PROCEDURE — 80053 COMPREHEN METABOLIC PANEL: CPT | Performed by: INTERNAL MEDICINE

## 2024-05-19 PROCEDURE — 83735 ASSAY OF MAGNESIUM: CPT | Performed by: INTERNAL MEDICINE

## 2024-05-19 PROCEDURE — 99232 SBSQ HOSP IP/OBS MODERATE 35: CPT | Performed by: INTERNAL MEDICINE

## 2024-05-19 PROCEDURE — 94669 MECHANICAL CHEST WALL OSCILL: CPT

## 2024-05-19 PROCEDURE — 25010000002 METHYLPREDNISOLONE PER 40 MG: Performed by: INTERNAL MEDICINE

## 2024-05-19 PROCEDURE — 94664 DEMO&/EVAL PT USE INHALER: CPT

## 2024-05-19 PROCEDURE — 85025 COMPLETE CBC W/AUTO DIFF WBC: CPT | Performed by: INTERNAL MEDICINE

## 2024-05-19 PROCEDURE — 99233 SBSQ HOSP IP/OBS HIGH 50: CPT | Performed by: INTERNAL MEDICINE

## 2024-05-19 PROCEDURE — 84100 ASSAY OF PHOSPHORUS: CPT | Performed by: INTERNAL MEDICINE

## 2024-05-19 RX ADMIN — BUDESONIDE 0.5 MG: 0.5 SUSPENSION RESPIRATORY (INHALATION) at 18:42

## 2024-05-19 RX ADMIN — FUROSEMIDE 40 MG: 40 TABLET ORAL at 08:32

## 2024-05-19 RX ADMIN — Medication 10 ML: at 08:32

## 2024-05-19 RX ADMIN — GUAIFENESIN 600 MG: 600 TABLET ORAL at 08:32

## 2024-05-19 RX ADMIN — Medication 4 ML: at 06:12

## 2024-05-19 RX ADMIN — Medication 10 MG: at 20:23

## 2024-05-19 RX ADMIN — MONTELUKAST 10 MG: 10 TABLET, FILM COATED ORAL at 20:23

## 2024-05-19 RX ADMIN — METOPROLOL TARTRATE 25 MG: 25 TABLET, FILM COATED ORAL at 08:33

## 2024-05-19 RX ADMIN — IPRATROPIUM BROMIDE AND ALBUTEROL SULFATE 3 ML: .5; 3 SOLUTION RESPIRATORY (INHALATION) at 18:42

## 2024-05-19 RX ADMIN — METHYLPREDNISOLONE SODIUM SUCCINATE 40 MG: 40 INJECTION INTRAMUSCULAR; INTRAVENOUS at 03:03

## 2024-05-19 RX ADMIN — ARFORMOTEROL TARTRATE 15 MCG: 15 SOLUTION RESPIRATORY (INHALATION) at 06:12

## 2024-05-19 RX ADMIN — IPRATROPIUM BROMIDE AND ALBUTEROL SULFATE 3 ML: .5; 3 SOLUTION RESPIRATORY (INHALATION) at 01:42

## 2024-05-19 RX ADMIN — IPRATROPIUM BROMIDE AND ALBUTEROL SULFATE 3 ML: .5; 3 SOLUTION RESPIRATORY (INHALATION) at 06:12

## 2024-05-19 RX ADMIN — BUDESONIDE 0.5 MG: 0.5 SUSPENSION RESPIRATORY (INHALATION) at 06:12

## 2024-05-19 RX ADMIN — ARFORMOTEROL TARTRATE 15 MCG: 15 SOLUTION RESPIRATORY (INHALATION) at 18:43

## 2024-05-19 RX ADMIN — AZITHROMYCIN DIHYDRATE 250 MG: 250 TABLET ORAL at 08:33

## 2024-05-19 RX ADMIN — ACETAMINOPHEN 650 MG: 325 TABLET ORAL at 20:25

## 2024-05-19 RX ADMIN — IPRATROPIUM BROMIDE AND ALBUTEROL SULFATE 3 ML: .5; 3 SOLUTION RESPIRATORY (INHALATION) at 13:31

## 2024-05-19 RX ADMIN — METHYLPREDNISOLONE SODIUM SUCCINATE 40 MG: 40 INJECTION INTRAMUSCULAR; INTRAVENOUS at 17:15

## 2024-05-19 RX ADMIN — PANTOPRAZOLE SODIUM 40 MG: 40 TABLET, DELAYED RELEASE ORAL at 05:35

## 2024-05-19 RX ADMIN — METOPROLOL TARTRATE 25 MG: 25 TABLET, FILM COATED ORAL at 20:23

## 2024-05-19 RX ADMIN — ROFLUMILAST 500 MCG: 500 TABLET ORAL at 08:32

## 2024-05-19 RX ADMIN — Medication 4 ML: at 18:43

## 2024-05-19 RX ADMIN — CETIRIZINE HYDROCHLORIDE 10 MG: 10 TABLET, FILM COATED ORAL at 20:23

## 2024-05-19 RX ADMIN — GUAIFENESIN 600 MG: 600 TABLET ORAL at 20:23

## 2024-05-19 RX ADMIN — Medication 10 ML: at 20:23

## 2024-05-19 NOTE — PROGRESS NOTES
Pulmonary / Critical Care Progress Note      Patient Name: Sumeet Gomes  : 1960  MRN: [id number]  Attending:  Trevin Wright MD  Date of admission: 2024    Subjective   Subjective   Follow-up for respiratory failure mucous plugging    Over past 24 hours:  Feeling better  Still with some difficulty expectorating mucus  Using NIPPV      Objective   Objective     Vitals:   Temp:  [97.3 °F (36.3 °C)-98.2 °F (36.8 °C)] 98.2 °F (36.8 °C)  Heart Rate:  [59-81] 69  Resp:  [16-22] 16  BP: ()/(58-68) 100/66  Flow (L/min):  [3] 3    Physical Exam:  Vital Signs Reviewed   Pleasant male  Very thin in nature  Does have kyphosis  Has very poor air exchange  He is alert and oriented      Result Review    Result Review:  I have personally reviewed the results from the time of this admission to 2024 15:59 EDT and agree with these findings:  [x]  Laboratory  [x]  Microbiology  [x]  Radiology  [x]  EKG/Telemetry   []  Cardiology/Vascular   []  Pathology  []  Old records  []  Other:  Most notable findings include:       Lab 24  0456 24  0550 24  0504 24  0533 05/15/24  0552 24  0534 24  0539   WBC 6.08 6.24 5.78 6.45 7.82 8.07  --    HEMOGLOBIN 14.0 14.4 13.6 13.9 13.5 13.8  --    HEMATOCRIT 44.9 46.2 44.9 45.1 44.8 44.6  --    PLATELETS 143 163 142 145 154 156  --    SODIUM 139 142 142 141 141 140 140   POTASSIUM 4.6 4.4 4.4 4.5 4.8 4.8 4.6   CHLORIDE 95* 96* 98 97* 98 99 100   CO2 43.8* 43.5* 40.1* 43.6* 40.0* 37.6* 34.3*   BUN 27* 21 25* 23 19 18 14   CREATININE 0.62* 0.58* 0.59* 0.56* 0.58* 0.62* 0.67*   GLUCOSE 115* 119* 109* 131* 144* 151* 138*   CALCIUM 8.7 9.1 9.1 9.2 9.4 9.6 9.3   PHOSPHORUS 2.9 3.0 2.3* 3.3 2.9 3.3  --    TOTAL PROTEIN 5.4* 5.6* 5.2* 5.5* 5.7*  --   --    ALBUMIN 3.4* 3.6 3.4* 3.5 3.7  --   --    GLOBULIN 2.0 2.0 1.8 2.0 2.0  --   --      CT Chest Without Contrast Diagnostic    Result Date: 2024  CT Chest without Contrast  INDICATION:  Hypoxia. Weight loss. History of COPD.  TECHNIQUE: CT of the thorax without contrast. Coronal and sagittal reconstructions were obtained.  Radiation dose reduction techniques included automated exposure control or exposure modulation based on body size.  COMPARISON: 3/9/2023  FINDINGS: Please see abdomen pelvis CT report dictated separately. There is bilateral gynecomastia. No pleural or pericardial effusion is seen. There is atherosclerotic disease and coronary artery disease. No definite adenopathy. The luis are poorly characterized in the absence of IV contrast.  Lung windows demonstrate emphysema. There is some chronic scarring in both lung bases that is similar to prior. No evidence of acute pneumonia or pulmonary edema. No suspicious pulmonary masses. No pneumothorax.      Impression:  COPD with no acute or suspicious findings in the chest  Electronically Signed By-Dr. Dawson Hensley MD On:5/13/2024 10:35 PM       All cultures so far negative.  Viral panel negative      Assessment & Plan   Assessment / Plan     Active Hospital Problems:  Active Hospital Problems    Diagnosis     **COPD exacerbation     Mucus plugging of bronchi     Tobacco abuse     Pulmonary hypertension     Fatigue     Essential hypertension        Impression:  Acute exacerbation of COPD/asthma overlap syndrome  Chronic hypoxemic and hypercapnic respiratory failure on 3 L of oxygen and NIPPV at home  Mucous plugging/issues with airway clearance  Hypophosphatemia  Pulmonary hypertension secondary to WHO class III left heart disease  Ongoing tobacco abuse of cigarettes     Plan:  Starting to feel little bit better  Will continue with LABA LAMA inhaled corticosteroid  Continue Zithromax  Continue with Daliresp  Continue NIPPV  Continue chest vest        DVT prophylaxis:  Medical DVT prophylaxis orders are present.    CODE STATUS:   Level Of Support Discussed With: Patient  Code Status (Patient has no pulse and is not breathing): No CPR (Do Not  Attempt to Resuscitate)  Medical Interventions (Patient has pulse or is breathing): Full Support      Labs, imaging, microbiology, notes and medications personally reviewed  Discussed with primary    Electronically signed by Lang Willis DO, 05/19/24, 3:59 PM EDT.

## 2024-05-19 NOTE — PROGRESS NOTES
[facility]   Hospitalist Progress Note  Date: 2024  Patient Name: Sumeet Gomes  : 1960  MRN: [id number]  Date of admission: 2024      Subjective   Subjective     Chief Complaint: Shortness of breath    Summary: 63 y.o. male with past medical history of hypertension, hyperlipidemia, COPD on home oxygen, pulmonary hypertension, CAD, and GERD presented to ED with complaint of shortness of breath over the past few weeks.  In the ED patient vitals were within normal limits on 3 L nasal cannula.  Labs were all relatively unremarkable given his chronic conditions including a normal lactic acid, proBNP, and negative COVID flu RSV.  Chest x-ray was negative for any acute findings.  He was admitted for COPD exacerbation, started on steroids, bronchodilators.  No evidence of pneumonia.  His [place] significant weight loss over the last 2 years, likely in setting of advanced COPD.  However, obtaining CT chest, abdomen pelvis to rule out malignancy.    Interval Followup: No acute events overnight, her breathing improving    Objective   Objective     Vitals:   Temp:  [97.3 °F (36.3 °C)-98.1 °F (36.7 °C)] 97.3 °F (36.3 °C)  Heart Rate:  [59-70] 65  Resp:  [18-22] 18  BP: ()/(58-68) 102/60  Flow (L/min):  [3] 3  Physical Exam   GEN: No acute distress  HEENT: Moist mucous membranes  LUNGS: Equal chest rise bilaterally  CARDIAC: Regular rate and rhythm  NEURO: Moving all 4 extremities spontaneously  SKIN: No obvious breakdown    Result Review    I have personally reviewed the results below:  [x]  Laboratory personally viewed BMP, procalcitonin, CBC, personally reviewed chest x-ray with no cardiopulmonary abnormality  []  Microbiology  []  Radiology  []  EKG/Telemetry   []  Cardiology/Vascular   []  Pathology  []  Old records  []  Other:  CBC          2024    05:04 2024    05:50 2024    04:56   CBC   WBC 5.78  6.24  6.08    RBC 4.84  5.10  4.95    Hemoglobin 13.6  14.4  14.0    Hematocrit  44.9  46.2  44.9    MCV 92.8  90.6  90.7    MCH 28.1  28.2  28.3    MCHC 30.3  31.2  31.2    RDW 13.7  13.7  13.5    Platelets 142  163  143      CMP          5/17/2024    05:04 5/18/2024    05:50 5/19/2024    04:56   CMP   Glucose 109  119  115    BUN 25  21  27    Creatinine 0.59  0.58  0.62    EGFR 109.7  110.3  108.1    Sodium 142  142  139    Potassium 4.4  4.4  4.6    Chloride 98  96  95    Calcium 9.1  9.1  8.7    Total Protein 5.2  5.6  5.4    Albumin 3.4  3.6  3.4    Globulin 1.8  2.0  2.0    Total Bilirubin 0.2  0.4  0.3    Alkaline Phosphatase 108  102  117    AST (SGOT) 32  32  29    ALT (SGPT) 43  44  48    Albumin/Globulin Ratio 1.9  1.8  1.7    BUN/Creatinine Ratio 42.4  36.2  43.5    Anion Gap 3.9  2.5  0.2        Assessment & Plan   Assessment / Plan   COPD with acute exacerbation  Weight loss  Hypertension  Tobacco dependence  Pulmonary hypertension  CAD  Concern for renal mass, will need urologic follow-up    Continue to monitor in the hospital for workup and management of the above  Continue with IV Solu-Medrol today, will likely transition to prednisone in the next 24-48 hours if patient improves  Continue with scheduled DuoNebs, Pulmicort and Brovana twice daily, albuterol as needed  Continue scheduled Mucinex and Tessalon Perles  Incentive spirometry at bedside  Continue daily azithromycin for COPD suppression added Augmentin as patient has had a change in his sputum color, continue for now  Viral respiratory panel negative  Continue Daliresp and Singulair  CT scan of the chest personally reviewed, negative for any dense infiltrate or malignancy, CT scan of the abdomen and pelvis with subtle hyperdense lesion in the right kidney, dedicated CT scan of the kidney with concerning 1.7 cm lesion, will consult urology Monday when available for further guidance  Recommend colonoscopy on outpatient basis for routine screening  Continue home antihypertensives  Nicotine patch as needed  Pulmonology  consulted and following, discussed management plan with Dr. Willis regarding hypoxemia, patient status post bronchoscopy with thick secretions and mucous plugging 5/19/2024   CBC, CMP reviewed 5/19/2024   Repeat CBC, CMP, mag and Phos in a.m. 5/19/2024      Discussed plan with RN.    DVT prophylaxis:  Medical DVT prophylaxis orders are present.        CODE STATUS:   Level Of Support Discussed With: Patient  Code Status (Patient has no pulse and is not breathing): No CPR (Do Not Attempt to Resuscitate)  Medical Interventions (Patient has pulse or is breathing): Full Support    Electronically signed by Trevin Wright MD, 5/19/2024, 10:50 EDT.

## 2024-05-19 NOTE — PLAN OF CARE
Goal Outcome Evaluation:  Plan of Care Reviewed With: patient        Progress: no change  Outcome Evaluation: Pt vss. Pt alert and oriented, moving around the room himself. Sat up in the chair for a few hours today. No c/o pain. Continue plan of care,

## 2024-05-19 NOTE — PLAN OF CARE
Goal Outcome Evaluation:  Plan of Care Reviewed With: patient           Outcome Evaluation: Patient resting in bed, home cpap machine on.  Patient No complaints of pain or discomfort throughout. No acute events to report overnight. Continue plan of care.

## 2024-05-19 NOTE — PROGRESS NOTES
Respiratory Therapist Broncho-Pulmonary Hygiene Progress Note      Patient Name:  Sumeet Gomes  YOB: 1960    Sumeet Gomes meets the qualification for Level 4 of the Bronco-Pulmonary Hygiene Protocol. This was based on my daily patient assessment and includes review of chest x-ray results, cough ability and quality, oxygenation, secretions or risk for secretion development and patient mobility.     Broncho-Pulmonary Hygiene Assessment:    Level of Movement: Low level of mobility  Lethargic uncoorperative    Breath Sounds: Bilateral localized decreased air exchange and/or coarse rhonchi    Cough: Ineffective, weak, frequent    Chest X-Ray: Mild consolidation and/or atelectasis.    Sputum Productions: Able to produce thick, tenacious secreations with difficulty    History and Physical: Home use of oxygen     SpO2 to Oxygen Need: greater than 92% on room air or  less than 3L nasal canula    Current SpO2 is: 95% on 3L    Based on this information, I have completed the following interventions: CPT (vest)      Electronically signed by Krystina Meredith RRT, 05/19/24, 1:46 AM EDT.

## 2024-05-20 DIAGNOSIS — N28.89 RIGHT RENAL MASS: Primary | ICD-10-CM

## 2024-05-20 DIAGNOSIS — N28.89 RENAL MASS: ICD-10-CM

## 2024-05-20 LAB
ALBUMIN SERPL-MCNC: 3.3 G/DL (ref 3.5–5.2)
ALBUMIN/GLOB SERPL: 1.7 G/DL
ALP SERPL-CCNC: 121 U/L (ref 39–117)
ALT SERPL W P-5'-P-CCNC: 41 U/L (ref 1–41)
ANION GAP SERPL CALCULATED.3IONS-SCNC: 4.1 MMOL/L (ref 5–15)
AST SERPL-CCNC: 22 U/L (ref 1–40)
BASOPHILS # BLD AUTO: 0 10*3/MM3 (ref 0–0.2)
BASOPHILS NFR BLD AUTO: 0 % (ref 0–1.5)
BILIRUB SERPL-MCNC: 0.3 MG/DL (ref 0–1.2)
BUN SERPL-MCNC: 26 MG/DL (ref 8–23)
BUN/CREAT SERPL: 47.3 (ref 7–25)
CALCIUM SPEC-SCNC: 8.8 MG/DL (ref 8.6–10.5)
CHLORIDE SERPL-SCNC: 94 MMOL/L (ref 98–107)
CO2 SERPL-SCNC: 41.9 MMOL/L (ref 22–29)
CREAT SERPL-MCNC: 0.55 MG/DL (ref 0.76–1.27)
DEPRECATED RDW RBC AUTO: 45.1 FL (ref 37–54)
EGFRCR SERPLBLD CKD-EPI 2021: 112.1 ML/MIN/1.73
EOSINOPHIL # BLD AUTO: 0 10*3/MM3 (ref 0–0.4)
EOSINOPHIL NFR BLD AUTO: 0 % (ref 0.3–6.2)
ERYTHROCYTE [DISTWIDTH] IN BLOOD BY AUTOMATED COUNT: 13.2 % (ref 12.3–15.4)
GLOBULIN UR ELPH-MCNC: 1.9 GM/DL
GLUCOSE SERPL-MCNC: 100 MG/DL (ref 65–99)
HCT VFR BLD AUTO: 43.7 % (ref 37.5–51)
HGB BLD-MCNC: 13.4 G/DL (ref 13–17.7)
IMM GRANULOCYTES # BLD AUTO: 0.02 10*3/MM3 (ref 0–0.05)
IMM GRANULOCYTES NFR BLD AUTO: 0.3 % (ref 0–0.5)
LYMPHOCYTES # BLD AUTO: 0.94 10*3/MM3 (ref 0.7–3.1)
LYMPHOCYTES NFR BLD AUTO: 12.1 % (ref 19.6–45.3)
MAGNESIUM SERPL-MCNC: 2.1 MG/DL (ref 1.6–2.4)
MCH RBC QN AUTO: 27.9 PG (ref 26.6–33)
MCHC RBC AUTO-ENTMCNC: 30.7 G/DL (ref 31.5–35.7)
MCV RBC AUTO: 90.9 FL (ref 79–97)
MONOCYTES # BLD AUTO: 0.63 10*3/MM3 (ref 0.1–0.9)
MONOCYTES NFR BLD AUTO: 8.1 % (ref 5–12)
NEUTROPHILS NFR BLD AUTO: 6.18 10*3/MM3 (ref 1.7–7)
NEUTROPHILS NFR BLD AUTO: 79.5 % (ref 42.7–76)
NRBC BLD AUTO-RTO: 0 /100 WBC (ref 0–0.2)
PHOSPHATE SERPL-MCNC: 3.2 MG/DL (ref 2.5–4.5)
PLATELET # BLD AUTO: 154 10*3/MM3 (ref 140–450)
PMV BLD AUTO: 9.7 FL (ref 6–12)
POTASSIUM SERPL-SCNC: 4.5 MMOL/L (ref 3.5–5.2)
PROT SERPL-MCNC: 5.2 G/DL (ref 6–8.5)
RBC # BLD AUTO: 4.81 10*6/MM3 (ref 4.14–5.8)
SODIUM SERPL-SCNC: 140 MMOL/L (ref 136–145)
WBC NRBC COR # BLD AUTO: 7.77 10*3/MM3 (ref 3.4–10.8)

## 2024-05-20 PROCEDURE — 99222 1ST HOSP IP/OBS MODERATE 55: CPT | Performed by: UROLOGY

## 2024-05-20 PROCEDURE — 84100 ASSAY OF PHOSPHORUS: CPT | Performed by: INTERNAL MEDICINE

## 2024-05-20 PROCEDURE — 99233 SBSQ HOSP IP/OBS HIGH 50: CPT | Performed by: INTERNAL MEDICINE

## 2024-05-20 PROCEDURE — 85025 COMPLETE CBC W/AUTO DIFF WBC: CPT | Performed by: INTERNAL MEDICINE

## 2024-05-20 PROCEDURE — 94664 DEMO&/EVAL PT USE INHALER: CPT

## 2024-05-20 PROCEDURE — 94669 MECHANICAL CHEST WALL OSCILL: CPT

## 2024-05-20 PROCEDURE — 94799 UNLISTED PULMONARY SVC/PX: CPT

## 2024-05-20 PROCEDURE — 83735 ASSAY OF MAGNESIUM: CPT | Performed by: INTERNAL MEDICINE

## 2024-05-20 PROCEDURE — 25010000002 METHYLPREDNISOLONE PER 40 MG: Performed by: INTERNAL MEDICINE

## 2024-05-20 PROCEDURE — 94760 N-INVAS EAR/PLS OXIMETRY 1: CPT

## 2024-05-20 PROCEDURE — 80053 COMPREHEN METABOLIC PANEL: CPT | Performed by: INTERNAL MEDICINE

## 2024-05-20 RX ADMIN — ROFLUMILAST 500 MCG: 500 TABLET ORAL at 09:27

## 2024-05-20 RX ADMIN — METOPROLOL TARTRATE 25 MG: 25 TABLET, FILM COATED ORAL at 09:27

## 2024-05-20 RX ADMIN — METHYLPREDNISOLONE SODIUM SUCCINATE 40 MG: 40 INJECTION INTRAMUSCULAR; INTRAVENOUS at 04:59

## 2024-05-20 RX ADMIN — Medication 10 ML: at 09:28

## 2024-05-20 RX ADMIN — BUDESONIDE 0.5 MG: 0.5 SUSPENSION RESPIRATORY (INHALATION) at 19:06

## 2024-05-20 RX ADMIN — AZITHROMYCIN DIHYDRATE 250 MG: 250 TABLET ORAL at 09:27

## 2024-05-20 RX ADMIN — IPRATROPIUM BROMIDE AND ALBUTEROL SULFATE 3 ML: .5; 3 SOLUTION RESPIRATORY (INHALATION) at 00:39

## 2024-05-20 RX ADMIN — IPRATROPIUM BROMIDE AND ALBUTEROL SULFATE 3 ML: .5; 3 SOLUTION RESPIRATORY (INHALATION) at 13:41

## 2024-05-20 RX ADMIN — Medication 4 ML: at 06:29

## 2024-05-20 RX ADMIN — ARFORMOTEROL TARTRATE 15 MCG: 15 SOLUTION RESPIRATORY (INHALATION) at 19:06

## 2024-05-20 RX ADMIN — Medication 10 ML: at 20:14

## 2024-05-20 RX ADMIN — CETIRIZINE HYDROCHLORIDE 10 MG: 10 TABLET, FILM COATED ORAL at 20:14

## 2024-05-20 RX ADMIN — PANTOPRAZOLE SODIUM 40 MG: 40 TABLET, DELAYED RELEASE ORAL at 05:01

## 2024-05-20 RX ADMIN — MONTELUKAST 10 MG: 10 TABLET, FILM COATED ORAL at 20:14

## 2024-05-20 RX ADMIN — IPRATROPIUM BROMIDE AND ALBUTEROL SULFATE 3 ML: .5; 3 SOLUTION RESPIRATORY (INHALATION) at 06:29

## 2024-05-20 RX ADMIN — BUDESONIDE 0.5 MG: 0.5 SUSPENSION RESPIRATORY (INHALATION) at 06:29

## 2024-05-20 RX ADMIN — GUAIFENESIN 600 MG: 600 TABLET ORAL at 20:14

## 2024-05-20 RX ADMIN — METHYLPREDNISOLONE SODIUM SUCCINATE 40 MG: 40 INJECTION INTRAMUSCULAR; INTRAVENOUS at 17:24

## 2024-05-20 RX ADMIN — IPRATROPIUM BROMIDE AND ALBUTEROL SULFATE 3 ML: .5; 3 SOLUTION RESPIRATORY (INHALATION) at 19:06

## 2024-05-20 RX ADMIN — FUROSEMIDE 40 MG: 40 TABLET ORAL at 09:28

## 2024-05-20 RX ADMIN — Medication 10 MG: at 20:13

## 2024-05-20 RX ADMIN — GUAIFENESIN 600 MG: 600 TABLET ORAL at 09:28

## 2024-05-20 RX ADMIN — Medication 4 ML: at 19:06

## 2024-05-20 RX ADMIN — ARFORMOTEROL TARTRATE 15 MCG: 15 SOLUTION RESPIRATORY (INHALATION) at 06:29

## 2024-05-20 RX ADMIN — METOPROLOL TARTRATE 25 MG: 25 TABLET, FILM COATED ORAL at 20:14

## 2024-05-20 NOTE — PLAN OF CARE
Problem: Adult Inpatient Plan of Care  Goal: Plan of Care Review  Outcome: Ongoing, Progressing  Flowsheets (Taken 5/20/2024 1656)  Progress: no change  Outcome Evaluation: VSS. pt sat up in chair during shift. wound care completed per orders. pt rested well during shift. no new concerns at this time.  Goal: Patient-Specific Goal (Individualized)  Outcome: Ongoing, Progressing  Goal: Absence of Hospital-Acquired Illness or Injury  Outcome: Ongoing, Progressing  Intervention: Identify and Manage Fall Risk  Recent Flowsheet Documentation  Taken 5/20/2024 1539 by Bhavana Parks RN  Safety Promotion/Fall Prevention:   safety round/check completed   clutter free environment maintained   assistive device/personal items within reach   fall prevention program maintained  Taken 5/20/2024 1334 by Bhavana Parks RN  Safety Promotion/Fall Prevention:   safety round/check completed   clutter free environment maintained   assistive device/personal items within reach   fall prevention program maintained  Taken 5/20/2024 1124 by Bhavana Parks RN  Safety Promotion/Fall Prevention:   safety round/check completed   assistive device/personal items within reach   clutter free environment maintained   fall prevention program maintained  Taken 5/20/2024 0939 by Bhavana Parks RN  Safety Promotion/Fall Prevention:   safety round/check completed   clutter free environment maintained   assistive device/personal items within reach   fall prevention program maintained  Taken 5/20/2024 0747 by Bhavana aPrks RN  Safety Promotion/Fall Prevention:   safety round/check completed   assistive device/personal items within reach   clutter free environment maintained   fall prevention program maintained  Intervention: Prevent and Manage VTE (Venous Thromboembolism) Risk  Recent Flowsheet Documentation  Taken 5/20/2024 0747 by Bhavana Parks RN  Range of Motion: active ROM (range of motion) encouraged  Intervention:  Prevent Infection  Recent Flowsheet Documentation  Taken 5/20/2024 1539 by Bhavana Parks RN  Infection Prevention:   rest/sleep promoted   personal protective equipment utilized   hand hygiene promoted   equipment surfaces disinfected  Taken 5/20/2024 1334 by Bhavana Parks RN  Infection Prevention:   rest/sleep promoted   personal protective equipment utilized   hand hygiene promoted   equipment surfaces disinfected  Taken 5/20/2024 1124 by Bhavana Parks RN  Infection Prevention:   rest/sleep promoted   personal protective equipment utilized   hand hygiene promoted   equipment surfaces disinfected  Taken 5/20/2024 0939 by Bhavana Parks RN  Infection Prevention:   rest/sleep promoted   personal protective equipment utilized   hand hygiene promoted   equipment surfaces disinfected  Taken 5/20/2024 0747 by Bhavana Parks RN  Infection Prevention:   rest/sleep promoted   personal protective equipment utilized   hand hygiene promoted   equipment surfaces disinfected  Goal: Optimal Comfort and Wellbeing  Outcome: Ongoing, Progressing  Goal: Readiness for Transition of Care  Outcome: Ongoing, Progressing     Problem: Gas Exchange Impaired  Goal: Optimal Gas Exchange  Outcome: Ongoing, Progressing     Problem: COPD (Chronic Obstructive Pulmonary Disease) Comorbidity  Goal: Maintenance of COPD Symptom Control  Outcome: Ongoing, Progressing     Problem: Hypertension Comorbidity  Goal: Blood Pressure in Desired Range  Outcome: Ongoing, Progressing     Problem: Fall Injury Risk  Goal: Absence of Fall and Fall-Related Injury  Outcome: Ongoing, Progressing  Intervention: Promote Injury-Free Environment  Recent Flowsheet Documentation  Taken 5/20/2024 1539 by Bhavana Parks RN  Safety Promotion/Fall Prevention:   safety round/check completed   clutter free environment maintained   assistive device/personal items within reach   fall prevention program maintained  Taken 5/20/2024 1334 by Charly  DILMA Bateman  Safety Promotion/Fall Prevention:   safety round/check completed   clutter free environment maintained   assistive device/personal items within reach   fall prevention program maintained  Taken 5/20/2024 1124 by Bhavana Parks RN  Safety Promotion/Fall Prevention:   safety round/check completed   assistive device/personal items within reach   clutter free environment maintained   fall prevention program maintained  Taken 5/20/2024 0939 by Bhavana Parks RN  Safety Promotion/Fall Prevention:   safety round/check completed   clutter free environment maintained   assistive device/personal items within reach   fall prevention program maintained  Taken 5/20/2024 0747 by Bhavana Parks RN  Safety Promotion/Fall Prevention:   safety round/check completed   assistive device/personal items within reach   clutter free environment maintained   fall prevention program maintained     Problem: Pain Acute  Goal: Acceptable Pain Control and Functional Ability  Outcome: Ongoing, Progressing   Goal Outcome Evaluation:           Progress: no change  Outcome Evaluation: VSS. pt sat up in chair during shift. wound care completed per orders. pt rested well during shift. no new concerns at this time.

## 2024-05-20 NOTE — PROGRESS NOTES
Pulmonary / Critical Care Progress Note      Patient Name: Sumeet Gomes  : 1960  MRN: [id number]  Attending:  Trevin Wright MD  Date of admission: 2024    Subjective   Subjective   Follow-up for respiratory failure, mucous plugging, parainfluenza pneumonia.    Over past 24 hours:  Remained on supplemental oxygen at 3 L/min.  Continued with azithromycin.  Remained on Brovana, Pulmicort and DuoNeb.  Continued with Solu-Medrol 40 mg IV twice daily.    No acute events overnight.  Did use astral vent    This morning,  Feeling better  Still with some difficulty expectorating mucus  Using NIPPV  Has some chest tightness.  No fever or chills.  Continues to be on diuretics.      Objective   Objective     Vitals:   Temp:  [97.7 °F (36.5 °C)-98.2 °F (36.8 °C)] 97.9 °F (36.6 °C)  Heart Rate:  [57-81] 69  Resp:  [16-20] 20  BP: ()/(62-74) 105/70  Flow (L/min):  [3] 3    Physical Exam:  Vital Signs Reviewed   Pleasant male  Very thin in nature  Does have kyphosis  Has very poor air exchange, no wheezing, crackles or rhonchi, resonant percussion bilaterally  He is alert and oriented      Result Review    Result Review:  I have personally reviewed the results from the time of this admission to 2024 07:41 EDT and agree with these findings:  [x]  Laboratory  [x]  Microbiology  [x]  Radiology  [x]  EKG/Telemetry   []  Cardiology/Vascular   []  Pathology  []  Old records  []  Other:  Most notable findings include:       Lab 24  0520 24  0456 24  0550 24  0504 24  0533 05/15/24  0552 24  0534   WBC 7.77 6.08 6.24 5.78 6.45 7.82 8.07   HEMOGLOBIN 13.4 14.0 14.4 13.6 13.9 13.5 13.8   HEMATOCRIT 43.7 44.9 46.2 44.9 45.1 44.8 44.6   PLATELETS 154 143 163 142 145 154 156   SODIUM 140 139 142 142 141 141 140   POTASSIUM 4.5 4.6 4.4 4.4 4.5 4.8 4.8   CHLORIDE 94* 95* 96* 98 97* 98 99   CO2 41.9* 43.8* 43.5* 40.1* 43.6* 40.0* 37.6*   BUN 26* 27* 21 25* 23 19 18   CREATININE  0.55* 0.62* 0.58* 0.59* 0.56* 0.58* 0.62*   GLUCOSE 100* 115* 119* 109* 131* 144* 151*   CALCIUM 8.8 8.7 9.1 9.1 9.2 9.4 9.6   PHOSPHORUS 3.2 2.9 3.0 2.3* 3.3 2.9 3.3   TOTAL PROTEIN 5.2* 5.4* 5.6* 5.2* 5.5* 5.7*  --    ALBUMIN 3.3* 3.4* 3.6 3.4* 3.5 3.7  --    GLOBULIN 1.9 2.0 2.0 1.8 2.0 2.0  --      CT Chest Without Contrast Diagnostic    Result Date: 5/13/2024  CT Chest without Contrast  INDICATION: Hypoxia. Weight loss. History of COPD.  TECHNIQUE: CT of the thorax without contrast. Coronal and sagittal reconstructions were obtained.  Radiation dose reduction techniques included automated exposure control or exposure modulation based on body size.  COMPARISON: 3/9/2023  FINDINGS: Please see abdomen pelvis CT report dictated separately. There is bilateral gynecomastia. No pleural or pericardial effusion is seen. There is atherosclerotic disease and coronary artery disease. No definite adenopathy. The luis are poorly characterized in the absence of IV contrast.  Lung windows demonstrate emphysema. There is some chronic scarring in both lung bases that is similar to prior. No evidence of acute pneumonia or pulmonary edema. No suspicious pulmonary masses. No pneumothorax.      Impression:  COPD with no acute or suspicious findings in the chest  Electronically Signed By-Dr. Dawson Hensley MD On:5/13/2024 10:35 PM       All cultures so far negative.  Viral panel negative      Assessment & Plan   Assessment / Plan     Active Hospital Problems:  Active Hospital Problems    Diagnosis     **COPD exacerbation     Mucus plugging of bronchi     Tobacco abuse     Pulmonary hypertension     Fatigue     Essential hypertension        Impression:  Acute exacerbation of COPD/asthma overlap syndrome  Chronic hypoxemic and hypercapnic respiratory failure on 3 L of oxygen and NIPPV at home  Parainfluenza pneumonia  Mucous plugging/issues with airway clearance  Hypophosphatemia  Pulmonary hypertension secondary to WHO class III left  heart disease  Ongoing tobacco abuse of cigarettes     Plan:  Will continue with LABA LAMA inhaled corticosteroid  Continue Zithromax  Continue with Daliresp  Continue NIPPV  Continue chest vest  Slowly improving respiratory status.  Continue with Lasix 40 mg orally daily.      DVT prophylaxis:  Medical DVT prophylaxis orders are present.    CODE STATUS:   Level Of Support Discussed With: Patient  Code Status (Patient has no pulse and is not breathing): No CPR (Do Not Attempt to Resuscitate)  Medical Interventions (Patient has pulse or is breathing): Full Support      I have reviewed labs, imaging, pertinent clinical data and provider notes.   I have discussed with bedside nurse and primary service.     Electronically signed by Ulices Moya MD, 05/20/24, 7:42 AM EDT.

## 2024-05-20 NOTE — PLAN OF CARE
Goal Outcome Evaluation:  Plan of Care Reviewed With: patient            Pt is alert and orient x4.  VS WNL for pt.  Pt pain managed with prn tyl with good effect.

## 2024-05-20 NOTE — PROGRESS NOTES
[facility]   Hospitalist Progress Note  Date: 2024  Patient Name: Sumeet Gomes  : 1960  MRN: [id number]  Date of admission: 2024      Subjective   Subjective     Chief Complaint: Shortness of breath    Summary: 63 y.o. male with past medical history of hypertension, hyperlipidemia, COPD on home oxygen, pulmonary hypertension, CAD, and GERD presented to ED with complaint of shortness of breath over the past few weeks.  In the ED patient vitals were within normal limits on 3 L nasal cannula.  Labs were all relatively unremarkable given his chronic conditions including a normal lactic acid, proBNP, and negative COVID flu RSV.  Chest x-ray was negative for any acute findings.  He was admitted for COPD exacerbation, started on steroids, bronchodilators.  No evidence of pneumonia.  His [place] significant weight loss over the last 2 years, likely in setting of advanced COPD.  However, obtaining CT chest, abdomen pelvis to rule out malignancy.    Interval Followup: No acute events overnight, patient resting comfortably in bed, breathing improved    Objective   Objective     Vitals:   Temp:  [97.5 °F (36.4 °C)-98.2 °F (36.8 °C)] 97.5 °F (36.4 °C)  Heart Rate:  [57-81] 69  Resp:  [16-20] 20  BP: ()/(62-74) 101/68  Flow (L/min):  [3] 3  Physical Exam   GEN: No acute distress  HEENT: Moist mucous membranes  LUNGS: Equal chest rise bilaterally  CARDIAC: Regular rate and rhythm  NEURO: Moving all 4 extremities spontaneously  SKIN: No obvious breakdown    Result Review    I have personally reviewed the results below:  [x]  Laboratory personally viewed BMP, procalcitonin, CBC, personally reviewed chest x-ray with no cardiopulmonary abnormality  []  Microbiology  []  Radiology  []  EKG/Telemetry   []  Cardiology/Vascular   []  Pathology  []  Old records  []  Other:  CBC          2024    05:50 2024    04:56 2024    05:20   CBC   WBC 6.24  6.08  7.77    RBC 5.10  4.95  4.81    Hemoglobin  14.4  14.0  13.4    Hematocrit 46.2  44.9  43.7    MCV 90.6  90.7  90.9    MCH 28.2  28.3  27.9    MCHC 31.2  31.2  30.7    RDW 13.7  13.5  13.2    Platelets 163  143  154      CMP          5/18/2024    05:50 5/19/2024    04:56 5/20/2024    05:20   CMP   Glucose 119  115  100    BUN 21  27  26    Creatinine 0.58  0.62  0.55    EGFR 110.3  108.1  112.1    Sodium 142  139  140    Potassium 4.4  4.6  4.5    Chloride 96  95  94    Calcium 9.1  8.7  8.8    Total Protein 5.6  5.4  5.2    Albumin 3.6  3.4  3.3    Globulin 2.0  2.0  1.9    Total Bilirubin 0.4  0.3  0.3    Alkaline Phosphatase 102  117  121    AST (SGOT) 32  29  22    ALT (SGPT) 44  48  41    Albumin/Globulin Ratio 1.8  1.7  1.7    BUN/Creatinine Ratio 36.2  43.5  47.3    Anion Gap 2.5  0.2  4.1        Assessment & Plan   Assessment / Plan   COPD with acute exacerbation  Weight loss  Hypertension  Tobacco dependence  Pulmonary hypertension  CAD  Concern for renal mass, will need urologic follow-up    Continue to monitor in the hospital for workup and management of the above  Continue with IV Solu-Medrol today  Continue with scheduled DuoNebs, Pulmicort and Brovana twice daily, albuterol as needed  Continue scheduled Mucinex and Tessalon Perles  Incentive spirometry at bedside  Continue daily azithromycin for COPD suppression completed course of Augmentin  Viral respiratory panel negative  Continue Daliresp and Singulair  CT scan of the chest personally reviewed, negative for any dense infiltrate or malignancy, CT scan of the abdomen and pelvis with subtle hyperdense lesion in the right kidney, dedicated CT scan of the kidney with concerning 1.7 cm lesion, urology consulted, appreciate their recommendations  Recommend colonoscopy on outpatient basis for routine screening  Continue home antihypertensives  Nicotine patch as needed  Pulmonology consulted and following, discussed management plan with Dr. Moya regarding hypoxemia, patient status post bronchoscopy  with thick secretions and mucous plugging 5/20/2024   CBC, CMP reviewed 5/20/2024   Repeat CBC, CMP, mag and Phos in a.m. 5/20/2024      Discussed plan with RN.    DVT prophylaxis:  Medical DVT prophylaxis orders are present.        CODE STATUS:   Level Of Support Discussed With: Patient  Code Status (Patient has no pulse and is not breathing): No CPR (Do Not Attempt to Resuscitate)  Medical Interventions (Patient has pulse or is breathing): Full Support    Electronically signed by Trevin Wright MD, 5/20/2024, 10:16 EDT.

## 2024-05-20 NOTE — CONSULTS
Spoke with patient at bedside about chest percussion vest at home.  Patient states he feels the treatment has been beneficial and would like to proceed with treatment at home.     Mr. Gomes has a history of bronchiectasis visualized by CT 5/13/2024 and daily productive cough for more than six months. He has been using the Aerobika OPEP and nebulized hypersaline, but treatment failed to mobilize secretions.  Mr. Gomes underwent bronchoscopy for mucous plugging with thick viscous purulent secretions which were obstructing the entire left and right tracheobronchial trees.  The patient will need to have a chest percussion vest at home to loosen and help mobilize his thick secretions.

## 2024-05-20 NOTE — CONSULTS
[facility]   UROLOGY Consult Note    Patient Name: Sumeet Gomes  : 1960  MRN: [id number]  Primary Care Physician:  Mirtha Alexander APRN  Referring Physician: No ref. provider found  Date of admission: 2024    Subjective   Subjective     Chief Complaint:     Renal mass    HPI:  Sumeet Gomes is a 63 y.o. male       Enhancing right renal mass  COPD on O2         past medical history of hypertension, hyperlipidemia, COPD on home oxygen, pulmonary hypertension, CAD, and GERD    Admitted for COPD exacerbation    2024 CT abdomen with and without - 1.7 cm lesion interpolar cortex of the right kidney, heterogenous demonstrates enhancement.     0.5,     Voiding okay    No history of  surgery    No GH    No urologic family history.    No CAD  Non-smoker  No anticoagulation            Review of Systems     10 systems reviewed and are negative other than what is listed in the HPI    Personal History     Past Medical History:   Diagnosis Date    CHF (congestive heart failure)     COPD (chronic obstructive pulmonary disease)     COPD (chronic obstructive pulmonary disease)     Coronary artery disease     Diastolic heart failure     Hyperlipidemia     Hypertension     Pulmonary hypertension        Past Surgical History:   Procedure Laterality Date    BACK SURGERY      BRONCHOSCOPY N/A 2022    Procedure: BRONCHOSCOPY WITH BRONCHOALVEOLAR LAVAGE AND BRONCHIAL WASHINGS;  Surgeon: Ulices Moya MD;  Location: [facility] ENDOSCOPY;  Service: Pulmonary;  Laterality: N/A;  MUCUS PLUGGING, COPD EXACERBATION    BRONCHOSCOPY      BRONCHOSCOPY N/A 2024    Procedure: BRONCHOSCOPY WITH BAL AND WASHINGS;  Surgeon: Collins Chapa MD;  Location: [facility] ENDOSCOPY;  Service: Pulmonary;  Laterality: N/A;  MUCUS PLUGGING    CARDIAC CATHETERIZATION N/A 2022    Procedure: Right Heart Cath;  Surgeon: Drew Rodriguez MD;  Location: [facility] CATH INVASIVE LOCATION;  Service:  Cardiovascular;  Laterality: N/A;    OTHER SURGICAL HISTORY      knee     OTHER SURGICAL HISTORY      shoulder, rotator cuff    SHOULDER SURGERY Right 10/03/2022       Family History: family history includes Asthma in his mother; Emphysema in his mother; Stroke in his mother. Otherwise pertinent FHx was reviewed and not pertinent to current issue.    Social History:  reports that he has been smoking cigarettes. He started smoking about 49 years ago. He has a 12.2 pack-year smoking history. He has been exposed to tobacco smoke. He has never used smokeless tobacco. He reports that he does not currently use alcohol. He reports that he does not use drugs.    Home Medications:  albuterol sulfate HFA, arformoterol, budesonide, furosemide, ipratropium-albuterol, metoprolol tartrate, pantoprazole, revefenacin, and roflumilast    Allergies:  No Known Allergies    Objective    Objective     Vitals:   Temp:  [97.7 °F (36.5 °C)-98.2 °F (36.8 °C)] 97.9 °F (36.6 °C)  Heart Rate:  [57-81] 69  Resp:  [16-20] 20  BP: ()/(62-74) 105/70  Flow (L/min):  [3] 3    Physical Exam:   Constitutional: Awake, alert    Respiratory: Clear to auscultation bilaterally, nonlabored respirations    Cardiovascular: RRR, no murmurs, rubs, or gallops, palpable pedal pulses bilaterally   Gastrointestinal: Positive bowel sounds, soft, nontender, nondistended   Musculoskeletal: No bilateral ankle edema, no clubbing or cyanosis to extremities     Result Review    Result Review:  I have personally reviewed the results from the time of this admission to 5/20/2024 07:54 EDT and agree with these findings:  []  Laboratory  []  Microbiology  []  Radiology  []  EKG/Telemetry   []  Cardiology/Vascular   []  Pathology  []  Old records  []  Other:      Assessment & Plan   Assessment / Plan     Brief Patient Summary:  Sumeet Gomes is a 63 y.o. male     Active Hospital Problems:  Active Hospital Problems    Diagnosis     **COPD exacerbation     Mucus plugging  of bronchi     Tobacco abuse     Pulmonary hypertension     Fatigue     Essential hypertension        Enhancing renal mass      Discussed with the patient today  the natural history of small renal masses.  We discussed the normal growth rate of 3 millimeters per year.  We also discussed the small renal masses less than 3 cm have an extremely low rate of metastasis. We then discussed options for small renal masses.  These included active surveillance, laparoscopic or open partial nephrectomy, ablative procedures or radical nephrectomy.  Risks and benefits of these were discussed.         The patient understands that there is a small risk of disease progression with the choice of active surveillance.  They also understand that this is weighed against the risk of active treatment.  All patient's questions were answered and the patient voiced understanding and has chosen active surveillance.    We discussed this lesion has about 80% chance of being a renal cell carcinoma.  I will have him follow-up in 3 months with MRI abdomen with and without to further delineate growth rate and possibility of need for biopsy or removal.  Patient voiced understanding    I will make his follow-up with me    Call with questions        Electronically signed by Mart Miller MD, 05/20/24, 7:54 AM EDT.

## 2024-05-21 LAB
ALBUMIN SERPL-MCNC: 3.4 G/DL (ref 3.5–5.2)
ALBUMIN/GLOB SERPL: 1.9 G/DL
ALP SERPL-CCNC: 122 U/L (ref 39–117)
ALT SERPL W P-5'-P-CCNC: 39 U/L (ref 1–41)
ANION GAP SERPL CALCULATED.3IONS-SCNC: 2.3 MMOL/L (ref 5–15)
AST SERPL-CCNC: 21 U/L (ref 1–40)
BASOPHILS # BLD AUTO: 0 10*3/MM3 (ref 0–0.2)
BASOPHILS NFR BLD AUTO: 0 % (ref 0–1.5)
BILIRUB SERPL-MCNC: 0.2 MG/DL (ref 0–1.2)
BUN SERPL-MCNC: 25 MG/DL (ref 8–23)
BUN/CREAT SERPL: 43.9 (ref 7–25)
CALCIUM SPEC-SCNC: 8.8 MG/DL (ref 8.6–10.5)
CHLORIDE SERPL-SCNC: 96 MMOL/L (ref 98–107)
CO2 SERPL-SCNC: 41.7 MMOL/L (ref 22–29)
CREAT SERPL-MCNC: 0.57 MG/DL (ref 0.76–1.27)
CYTO UR: NORMAL
DEPRECATED RDW RBC AUTO: 45.6 FL (ref 37–54)
EGFRCR SERPLBLD CKD-EPI 2021: 110.8 ML/MIN/1.73
EOSINOPHIL # BLD AUTO: 0 10*3/MM3 (ref 0–0.4)
EOSINOPHIL NFR BLD AUTO: 0 % (ref 0.3–6.2)
ERYTHROCYTE [DISTWIDTH] IN BLOOD BY AUTOMATED COUNT: 13.5 % (ref 12.3–15.4)
GLOBULIN UR ELPH-MCNC: 1.8 GM/DL
GLUCOSE SERPL-MCNC: 114 MG/DL (ref 65–99)
HCT VFR BLD AUTO: 43.4 % (ref 37.5–51)
HGB BLD-MCNC: 13.4 G/DL (ref 13–17.7)
IMM GRANULOCYTES # BLD AUTO: 0.02 10*3/MM3 (ref 0–0.05)
IMM GRANULOCYTES NFR BLD AUTO: 0.3 % (ref 0–0.5)
LAB AP CASE REPORT: NORMAL
LAB AP CLINICAL INFORMATION: NORMAL
LYMPHOCYTES # BLD AUTO: 0.9 10*3/MM3 (ref 0.7–3.1)
LYMPHOCYTES NFR BLD AUTO: 11.4 % (ref 19.6–45.3)
MAGNESIUM SERPL-MCNC: 2.1 MG/DL (ref 1.6–2.4)
MCH RBC QN AUTO: 28.3 PG (ref 26.6–33)
MCHC RBC AUTO-ENTMCNC: 30.9 G/DL (ref 31.5–35.7)
MCV RBC AUTO: 91.6 FL (ref 79–97)
MONOCYTES # BLD AUTO: 0.61 10*3/MM3 (ref 0.1–0.9)
MONOCYTES NFR BLD AUTO: 7.8 % (ref 5–12)
NEUTROPHILS NFR BLD AUTO: 6.34 10*3/MM3 (ref 1.7–7)
NEUTROPHILS NFR BLD AUTO: 80.5 % (ref 42.7–76)
NRBC BLD AUTO-RTO: 0 /100 WBC (ref 0–0.2)
PATH REPORT.FINAL DX SPEC: NORMAL
PATH REPORT.GROSS SPEC: NORMAL
PHOSPHATE SERPL-MCNC: 3.7 MG/DL (ref 2.5–4.5)
PLATELET # BLD AUTO: 156 10*3/MM3 (ref 140–450)
PMV BLD AUTO: 9.7 FL (ref 6–12)
POTASSIUM SERPL-SCNC: 4.6 MMOL/L (ref 3.5–5.2)
PROT SERPL-MCNC: 5.2 G/DL (ref 6–8.5)
RBC # BLD AUTO: 4.74 10*6/MM3 (ref 4.14–5.8)
SODIUM SERPL-SCNC: 140 MMOL/L (ref 136–145)
WBC NRBC COR # BLD AUTO: 7.87 10*3/MM3 (ref 3.4–10.8)

## 2024-05-21 PROCEDURE — 63710000001 PREDNISONE PER 5 MG: Performed by: INTERNAL MEDICINE

## 2024-05-21 PROCEDURE — 94760 N-INVAS EAR/PLS OXIMETRY 1: CPT

## 2024-05-21 PROCEDURE — 63710000001 PREDNISONE PER 1 MG: Performed by: INTERNAL MEDICINE

## 2024-05-21 PROCEDURE — 99232 SBSQ HOSP IP/OBS MODERATE 35: CPT | Performed by: INTERNAL MEDICINE

## 2024-05-21 PROCEDURE — 99233 SBSQ HOSP IP/OBS HIGH 50: CPT | Performed by: INTERNAL MEDICINE

## 2024-05-21 PROCEDURE — 94669 MECHANICAL CHEST WALL OSCILL: CPT

## 2024-05-21 PROCEDURE — 83735 ASSAY OF MAGNESIUM: CPT | Performed by: INTERNAL MEDICINE

## 2024-05-21 PROCEDURE — 94799 UNLISTED PULMONARY SVC/PX: CPT

## 2024-05-21 PROCEDURE — 80053 COMPREHEN METABOLIC PANEL: CPT | Performed by: INTERNAL MEDICINE

## 2024-05-21 PROCEDURE — 84100 ASSAY OF PHOSPHORUS: CPT | Performed by: INTERNAL MEDICINE

## 2024-05-21 PROCEDURE — 94660 CPAP INITIATION&MGMT: CPT

## 2024-05-21 PROCEDURE — 85025 COMPLETE CBC W/AUTO DIFF WBC: CPT | Performed by: INTERNAL MEDICINE

## 2024-05-21 PROCEDURE — 94664 DEMO&/EVAL PT USE INHALER: CPT

## 2024-05-21 PROCEDURE — 25010000002 METHYLPREDNISOLONE PER 40 MG: Performed by: INTERNAL MEDICINE

## 2024-05-21 RX ADMIN — IPRATROPIUM BROMIDE AND ALBUTEROL SULFATE 3 ML: .5; 3 SOLUTION RESPIRATORY (INHALATION) at 14:11

## 2024-05-21 RX ADMIN — Medication 10 MG: at 20:19

## 2024-05-21 RX ADMIN — IPRATROPIUM BROMIDE AND ALBUTEROL SULFATE 3 ML: .5; 3 SOLUTION RESPIRATORY (INHALATION) at 20:06

## 2024-05-21 RX ADMIN — IPRATROPIUM BROMIDE AND ALBUTEROL SULFATE 3 ML: .5; 3 SOLUTION RESPIRATORY (INHALATION) at 07:33

## 2024-05-21 RX ADMIN — PREDNISONE 30 MG: 20 TABLET ORAL at 11:19

## 2024-05-21 RX ADMIN — GUAIFENESIN 600 MG: 600 TABLET ORAL at 20:20

## 2024-05-21 RX ADMIN — PANTOPRAZOLE SODIUM 40 MG: 40 TABLET, DELAYED RELEASE ORAL at 05:11

## 2024-05-21 RX ADMIN — ARFORMOTEROL TARTRATE 15 MCG: 15 SOLUTION RESPIRATORY (INHALATION) at 20:06

## 2024-05-21 RX ADMIN — GUAIFENESIN 600 MG: 600 TABLET ORAL at 08:14

## 2024-05-21 RX ADMIN — BUDESONIDE 0.5 MG: 0.5 SUSPENSION RESPIRATORY (INHALATION) at 20:06

## 2024-05-21 RX ADMIN — METOPROLOL TARTRATE 25 MG: 25 TABLET, FILM COATED ORAL at 08:15

## 2024-05-21 RX ADMIN — FUROSEMIDE 40 MG: 40 TABLET ORAL at 08:15

## 2024-05-21 RX ADMIN — METOPROLOL TARTRATE 25 MG: 25 TABLET, FILM COATED ORAL at 20:19

## 2024-05-21 RX ADMIN — AZITHROMYCIN DIHYDRATE 250 MG: 250 TABLET ORAL at 08:14

## 2024-05-21 RX ADMIN — BUDESONIDE 0.5 MG: 0.5 SUSPENSION RESPIRATORY (INHALATION) at 07:33

## 2024-05-21 RX ADMIN — IPRATROPIUM BROMIDE AND ALBUTEROL SULFATE 3 ML: .5; 3 SOLUTION RESPIRATORY (INHALATION) at 00:29

## 2024-05-21 RX ADMIN — ROFLUMILAST 500 MCG: 500 TABLET ORAL at 10:30

## 2024-05-21 RX ADMIN — MONTELUKAST 10 MG: 10 TABLET, FILM COATED ORAL at 20:19

## 2024-05-21 RX ADMIN — METHYLPREDNISOLONE SODIUM SUCCINATE 40 MG: 40 INJECTION INTRAMUSCULAR; INTRAVENOUS at 05:11

## 2024-05-21 RX ADMIN — Medication 10 ML: at 08:15

## 2024-05-21 RX ADMIN — CETIRIZINE HYDROCHLORIDE 10 MG: 10 TABLET, FILM COATED ORAL at 20:20

## 2024-05-21 RX ADMIN — Medication 10 ML: at 20:20

## 2024-05-21 RX ADMIN — ARFORMOTEROL TARTRATE 15 MCG: 15 SOLUTION RESPIRATORY (INHALATION) at 07:33

## 2024-05-21 NOTE — PLAN OF CARE
Problem: Adult Inpatient Plan of Care  Goal: Plan of Care Review  Outcome: Ongoing, Progressing  Flowsheets  Taken 5/21/2024 1652 by Bhavana Parks RN  Progress: no change  Outcome Evaluation: VSS. pt rested well during shift. wound care completed per orders. no new concerns at this time.  Taken 5/21/2024 0425 by Abigail Good RN  Plan of Care Reviewed With: patient  Goal: Patient-Specific Goal (Individualized)  Outcome: Ongoing, Progressing  Goal: Absence of Hospital-Acquired Illness or Injury  Outcome: Ongoing, Progressing  Intervention: Identify and Manage Fall Risk  Recent Flowsheet Documentation  Taken 5/21/2024 1530 by Bhavana Parks RN  Safety Promotion/Fall Prevention:   safety round/check completed   clutter free environment maintained   assistive device/personal items within reach   fall prevention program maintained  Taken 5/21/2024 1305 by Bhavana Parks RN  Safety Promotion/Fall Prevention:   safety round/check completed   clutter free environment maintained   assistive device/personal items within reach   fall prevention program maintained  Taken 5/21/2024 1112 by Bhavana Parks RN  Safety Promotion/Fall Prevention:   safety round/check completed   clutter free environment maintained   assistive device/personal items within reach   fall prevention program maintained  Taken 5/21/2024 0918 by Bhavana Parks RN  Safety Promotion/Fall Prevention:   safety round/check completed   clutter free environment maintained   assistive device/personal items within reach   fall prevention program maintained  Taken 5/21/2024 0747 by Bhavana Parks RN  Safety Promotion/Fall Prevention:   safety round/check completed   assistive device/personal items within reach   clutter free environment maintained   fall prevention program maintained  Intervention: Prevent and Manage VTE (Venous Thromboembolism) Risk  Recent Flowsheet Documentation  Taken 5/21/2024 0747 by Bhavana Parks  RN  Range of Motion: active ROM (range of motion) encouraged  Intervention: Prevent Infection  Recent Flowsheet Documentation  Taken 5/21/2024 1530 by Bhavana Parks RN  Infection Prevention:   rest/sleep promoted   personal protective equipment utilized   hand hygiene promoted   equipment surfaces disinfected  Taken 5/21/2024 1305 by Bhavana Parks RN  Infection Prevention:   rest/sleep promoted   personal protective equipment utilized   hand hygiene promoted   equipment surfaces disinfected  Taken 5/21/2024 1112 by Bhavana Parks RN  Infection Prevention:   rest/sleep promoted   personal protective equipment utilized   hand hygiene promoted   equipment surfaces disinfected  Taken 5/21/2024 0918 by Bhavana Parks RN  Infection Prevention:   rest/sleep promoted   hand hygiene promoted   equipment surfaces disinfected   personal protective equipment utilized  Taken 5/21/2024 0747 by Bhavana Parks RN  Infection Prevention:   rest/sleep promoted   personal protective equipment utilized   hand hygiene promoted   equipment surfaces disinfected  Goal: Optimal Comfort and Wellbeing  Outcome: Ongoing, Progressing  Goal: Readiness for Transition of Care  Outcome: Ongoing, Progressing     Problem: Gas Exchange Impaired  Goal: Optimal Gas Exchange  Outcome: Ongoing, Progressing     Problem: COPD (Chronic Obstructive Pulmonary Disease) Comorbidity  Goal: Maintenance of COPD Symptom Control  Outcome: Ongoing, Progressing     Problem: Hypertension Comorbidity  Goal: Blood Pressure in Desired Range  Outcome: Ongoing, Progressing     Problem: Fall Injury Risk  Goal: Absence of Fall and Fall-Related Injury  Outcome: Ongoing, Progressing  Intervention: Promote Injury-Free Environment  Recent Flowsheet Documentation  Taken 5/21/2024 1530 by Bhavana Parks RN  Safety Promotion/Fall Prevention:   safety round/check completed   clutter free environment maintained   assistive device/personal items within  reach   fall prevention program maintained  Taken 5/21/2024 1305 by Bhavana Parks RN  Safety Promotion/Fall Prevention:   safety round/check completed   clutter free environment maintained   assistive device/personal items within reach   fall prevention program maintained  Taken 5/21/2024 1112 by Bhavana Parks RN  Safety Promotion/Fall Prevention:   safety round/check completed   clutter free environment maintained   assistive device/personal items within [facility]   fall prevention program maintained  Taken 5/21/2024 0918 by Bhavana Parks RN  Safety Promotion/Fall Prevention:   safety round/check completed   clutter free environment maintained   assistive device/personal items within reach   fall prevention program maintained  Taken 5/21/2024 0747 by Bhavana Parks RN  Safety Promotion/Fall Prevention:   safety round/check completed   assistive device/personal items within reach   clutter free environment maintained   fall prevention program maintained     Problem: Pain Acute  Goal: Acceptable Pain Control and Functional Ability  Outcome: Ongoing, Progressing   Goal Outcome Evaluation:           Progress: no change  Outcome Evaluation: VSS. pt rested well during shift. wound care completed per orders. no new concerns at this time.

## 2024-05-21 NOTE — CONSULTS
"Nutrition Services    Patient Name: Sumeet Gomes  YOB: 1960  MRN: [id number]  Admission date: 5/12/2024      CLINICAL NUTRITION ASSESSMENT      Reason for Assessment  Follow Up     H&P:  Past Medical History:   Diagnosis Date    CHF (congestive heart failure)     COPD (chronic obstructive pulmonary disease)     COPD (chronic obstructive pulmonary disease)     Coronary artery disease     Diastolic heart failure     Hyperlipidemia     Hypertension     Pulmonary hypertension         Current Problems:   Active Hospital Problems    Diagnosis     **COPD exacerbation     Mucus plugging of bronchi     Tobacco abuse     Pulmonary hypertension     Fatigue     Essential hypertension         Nutrition/Diet History         Narrative   Upon my visit pt reports a good appetite/intake. Per nursing documentation, pt is consuming 100% of meals. Pt reports drinking the boost. No nausea. +BM.      Anthropometrics        Current Height, Weight Height: 170.2 cm (67\")  Weight: 51.5 kg (113 lb 8.6 oz)   Current BMI Body mass index is 17.78 kg/m².   BMI Classification Underweight   % IBW 78%   Adjusted Body Weight (ABW)    Weight Hx  Wt Readings from Last 30 Encounters:   05/12/24 1844 51.5 kg (113 lb 8.6 oz)   05/12/24 1428 53.9 kg (118 lb 13.3 oz)   02/22/24 1543 59.8 kg (131 lb 12.8 oz)   02/12/24 1317 59.4 kg (130 lb 15.3 oz)   01/17/24 1344 59.4 kg (131 lb)   01/03/24 1303 55 kg (121 lb 3.2 oz)   12/28/23 0600 53.6 kg (118 lb 2.7 oz)   12/27/23 0451 52.9 kg (116 lb 10 oz)   12/26/23 2000 52.1 kg (114 lb 13.8 oz)   12/18/23 1350 55.7 kg (122 lb 12.7 oz)   12/14/23 1123 54.8 kg (120 lb 12.8 oz)   12/04/23 1207 61 kg (134 lb 7.7 oz)   12/04/23 1124 64.7 kg (142 lb 9.6 oz)   11/25/23 1144 63.1 kg (139 lb 1.8 oz)   08/14/23 1345 62.1 kg (137 lb)   08/02/23 0907 62.4 kg (137 lb 9.6 oz)   06/27/23 1128 61.2 kg (135 lb)   06/19/23 0820 64.2 kg (141 lb 8.6 oz)   05/26/23 0808 63 kg (138 lb 14.4 oz)   03/16/23 1059 63.5 kg (139 " lb 15.9 oz)   02/27/23 0856 63.6 kg (140 lb 4.8 oz)   02/01/23 1403 63.1 kg (139 lb 3.2 oz)   12/22/22 0807 63.1 kg (139 lb 3.2 oz)   11/30/22 1544 62.8 kg (138 lb 7.2 oz)   11/30/22 1457 63 kg (138 lb 12.8 oz)   11/14/22 1030 62.4 kg (137 lb 9.6 oz)   11/08/22 0817 62.5 kg (137 lb 12.8 oz)   10/21/22 1307 62.1 kg (136 lb 14.5 oz)   10/06/22 0855 63 kg (139 lb)   09/20/22 1445 57.7 kg (127 lb 3.2 oz)   09/18/22 0337 61.1 kg (134 lb 11.2 oz)   09/12/22 1628 60.6 kg (133 lb 9.6 oz)   06/14/22 1319 63.6 kg (140 lb 3.2 oz)   05/20/22 1431 66.2 kg (146 lb)          Wt Change Observation -10% x 3 months--clinically significant      Estimated/Assessed Needs  Estimated Needs based on: Ideal Body Weight       Energy Requirements 30-35 kcal/kg    EST Needs (kcal/day) 6424-6287       Protein Requirements 1.0-1.2 g/kg   EST Daily Needs (g/day) 66-79       Fluid Requirements 30 ml/kg    Estimated Needs (mL/day) 1980     Labs/Medications         Pertinent Labs Reviewed.   Results from last 7 days   Lab Units 05/21/24  0516 05/20/24  0520 05/19/24  0456   SODIUM mmol/L 140 140 139   POTASSIUM mmol/L 4.6 4.5 4.6   CHLORIDE mmol/L 96* 94* 95*   CO2 mmol/L 41.7* 41.9* 43.8*   BUN mg/dL 25* 26* 27*   CREATININE mg/dL 0.57* 0.55* 0.62*   CALCIUM mg/dL 8.8 8.8 8.7   BILIRUBIN mg/dL 0.2 0.3 0.3   ALK PHOS U/L 122* 121* 117   ALT (SGPT) U/L 39 41 48*   AST (SGOT) U/L 21 22 29   GLUCOSE mg/dL 114* 100* 115*     Results from last 7 days   Lab Units 05/21/24  0516 05/20/24  0520 05/19/24  0456   MAGNESIUM mg/dL 2.1 2.1 2.2   PHOSPHORUS mg/dL 3.7 3.2 2.9   HEMOGLOBIN g/dL 13.4 13.4 14.0   HEMATOCRIT % 43.4 43.7 44.9     COVID19   Date Value Ref Range Status   05/14/2024 Not Detected Not Detected - Ref. Range Final     Lab Results   Component Value Date    HGBA1C 5.50 09/12/2023         Pertinent Medications Reviewed.     Malnutrition Severity Assessment      Patient meets criteria for : Severe Malnutrition         Nutrition Diagnosis          Nutrition Dx Problem 1 Severe malnutrition related to decreased ability to consume sufficient energy as evidenced by unintended weight change., body composition changes., and COPD     Nutrition Intervention           Current Nutrition Orders & Evaluation of Intake       Current PO Diet Diet: Regular/House; Fluid Consistency: Thin (IDDSI 0)   Supplement Orders Placed This Encounter      Dietary Nutrition Supplements Boost Plus (Ensure Plus); chocolate           Nutrition Intervention/Prescription        Boost Plus (jeff) TID    +1080 kcal, 42 g pro        Medical Nutrition Therapy/Nutrition Education          Learner     Readiness Patient  Acceptance     Method     Response Explanation  Verbalizes understanding     Monitor/Evaluation        Monitor PO intake, Supplement intake     Nutrition Discharge Plan         Recommend to continue oral nutrition supplements on discharge.      Electronically signed by:  Concha Henning RD  05/21/24 09:26 EDT

## 2024-05-21 NOTE — PROGRESS NOTES
Pulmonary / Critical Care Progress Note      Patient Name: Sumeet Gomes  : 1960  MRN: [id number]  Attending:  Trevin Wright MD  Date of admission: 2024    Subjective   Subjective   Follow-up for respiratory failure, mucous plugging, parainfluenza pneumonia.    Over past 24 hours:  Remained on supplemental oxygen at 3 L/min.  Continued with azithromycin.  Remained on Brovana, Pulmicort and DuoNeb.  Switched to oral prednisone.  RT  on board.    No acute events overnight.  Did use astral vent overnight.    This morning,  Feeling better  Chest vest is helping.  Using NIPPV with sleep.  Has some chest tightness.  No fever or chills.  Continues to be on diuretics.  No nausea or vomiting.      Objective   Objective     Vitals:   Temp:  [97.4 °F (36.3 °C)-97.7 °F (36.5 °C)] 97.4 °F (36.3 °C)  Heart Rate:  [54-76] 55  Resp:  [16-20] 18  BP: (101-108)/(65-74) 107/68  Flow (L/min):  [3] 3    Physical Exam:  Vital Signs Reviewed   Pleasant male  Very thin in nature  Does have kyphosis  Has very poor air exchange, no wheezing, crackles or rhonchi, resonant percussion bilaterally  He is alert and oriented      Result Review    Result Review:  I have personally reviewed the results from the time of this admission to 2024 08:22 EDT and agree with these findings:  [x]  Laboratory  [x]  Microbiology  [x]  Radiology  [x]  EKG/Telemetry   []  Cardiology/Vascular   []  Pathology  []  Old records  []  Other:  Most notable findings include:       Lab 24  0516 24  0520 24  0456 24  0550 24  0504 24  0533 05/15/24  0552   WBC 7.87 7.77 6.08 6.24 5.78 6.45 7.82   HEMOGLOBIN 13.4 13.4 14.0 14.4 13.6 13.9 13.5   HEMATOCRIT 43.4 43.7 44.9 46.2 44.9 45.1 44.8   PLATELETS 156 154 143 163 142 145 154   SODIUM 140 140 139 142 142 141 141   POTASSIUM 4.6 4.5 4.6 4.4 4.4 4.5 4.8   CHLORIDE 96* 94* 95* 96* 98 97* 98   CO2 41.7* 41.9* 43.8* 43.5* 40.1* 43.6* 40.0*   BUN 25*  26* 27* 21 25* 23 19   CREATININE 0.57* 0.55* 0.62* 0.58* 0.59* 0.56* 0.58*   GLUCOSE 114* 100* 115* 119* 109* 131* 144*   CALCIUM 8.8 8.8 8.7 9.1 9.1 9.2 9.4   PHOSPHORUS 3.7 3.2 2.9 3.0 2.3* 3.3 2.9   TOTAL PROTEIN 5.2* 5.2* 5.4* 5.6* 5.2* 5.5* 5.7*   ALBUMIN 3.4* 3.3* 3.4* 3.6 3.4* 3.5 3.7   GLOBULIN 1.8 1.9 2.0 2.0 1.8 2.0 2.0     CT Chest Without Contrast Diagnostic    Result Date: 5/13/2024  CT Chest without Contrast  INDICATION: Hypoxia. Weight loss. History of COPD.  TECHNIQUE: CT of the thorax without contrast. Coronal and sagittal reconstructions were obtained.  Radiation dose reduction techniques included automated exposure control or exposure modulation based on body size.  COMPARISON: 3/9/2023  FINDINGS: Please see abdomen pelvis CT report dictated separately. There is bilateral gynecomastia. No pleural or pericardial effusion is seen. There is atherosclerotic disease and coronary artery disease. No definite adenopathy. The luis are poorly characterized in the absence of IV contrast.  Lung windows demonstrate emphysema. There is some chronic scarring in both lung bases that is similar to prior. No evidence of acute pneumonia or pulmonary edema. No suspicious pulmonary masses. No pneumothorax.      Impression:  COPD with no acute or suspicious findings in the chest  Electronically Signed By-Dr. Dawson Hensley MD On:5/13/2024 10:35 PM       All cultures so far negative.  Viral panel negative      Assessment & Plan   Assessment / Plan     Active Hospital Problems:  Active Hospital Problems    Diagnosis     **COPD exacerbation     Mucus plugging of bronchi     Tobacco abuse     Pulmonary hypertension     Fatigue     Essential hypertension        Impression:  Acute exacerbation of COPD/asthma overlap syndrome  Chronic hypoxemic and hypercapnic respiratory failure on 3 L of oxygen and NIPPV at home  Parainfluenza pneumonia  Mucous plugging/issues with airway clearance  Hypophosphatemia  Pulmonary  hypertension secondary to WHO class III left heart disease  Ongoing tobacco abuse of cigarettes     Plan:  Will continue with LABA LAMA inhaled corticosteroid  Continue Zithromax  Continue with Daliresp  Continue NIPPV  Continue chest vest  Continue with prednisone 30 mg once daily.  Will need slow taper over next 2 weeks.  Slowly improving respiratory status.  Continue with Lasix 40 mg orally daily.    Mr. Gomes has a history of bronchiectasis visualized by CT 5/13/2024 and daily productive cough for more than six months. He has been using the Aerobika OPEP and nebulized hypersaline, but treatment failed to mobilize secretions.  Mr. Gomes underwent bronchoscopy for mucous plugging with thick viscous purulent secretions which were obstructing the entire left and right tracheobronchial trees.  The patient will need to have a chest percussion vest at home to loosen and help mobilize his thick secretions.   We are trying to arrange chest vest for home.    DVT prophylaxis:  Medical DVT prophylaxis orders are present.    CODE STATUS:   Level Of Support Discussed With: Patient  Code Status (Patient has no pulse and is not breathing): No CPR (Do Not Attempt to Resuscitate)  Medical Interventions (Patient has pulse or is breathing): Full Support      I have reviewed labs, imaging, pertinent clinical data and provider notes.   I have discussed with bedside nurse and primary service.     Electronically signed by Ulices Moya MD, 05/21/24, 8:22 AM EDT.

## 2024-05-21 NOTE — PLAN OF CARE
Goal Outcome Evaluation:  Plan of Care Reviewed With: patient        Progress: no change  Outcome Evaluation: Patient awake watching t.v. VSS. Patient no complaints of pain or discomfort throughout.  Pt expresses no other needs at this time. Continue plan of care.

## 2024-05-21 NOTE — PROGRESS NOTES
[facility]   Hospitalist Progress Note  Date: 2024  Patient Name: Sumeet Gomes  : 1960  MRN: [id number]  Date of admission: 2024      Subjective   Subjective     Chief Complaint: Shortness of breath    Summary: 63 y.o. male with past medical history of hypertension, hyperlipidemia, COPD on home oxygen, pulmonary hypertension, CAD, and GERD presented to ED with complaint of shortness of breath over the past few weeks.  In the ED patient vitals were within normal limits on 3 L nasal cannula.  Labs were all relatively unremarkable given his chronic conditions including a normal lactic acid, proBNP, and negative COVID flu RSV.  Chest x-ray was negative for any acute findings.  He was admitted for COPD exacerbation, started on steroids, bronchodilators.  No evidence of pneumonia.  His [place] significant weight loss over the last 2 years, likely in setting of advanced COPD.  Patient went CT scan of the chest abdomen pelvis to rule out malignancy, there was an abnormal lesion found in the kidney, patient underwent dedicated scan with contrast, and this was significant for a 1.7 cm lesion for which urology was consulted, they are planning on repeat MRI of the kidney in 3 months to assess growth.  Patient's respiratory status is slow to improve but he is nearing his baseline.    Interval Followup: No acute events overnight, patient resting comfortably in bed, breathing continues to improve, still not at baseline, still dyspneic with exertion    Objective   Objective     Vitals:   Temp:  [97.4 °F (36.3 °C)-97.7 °F (36.5 °C)] 97.4 °F (36.3 °C)  Heart Rate:  [54-76] 55  Resp:  [16-20] 18  BP: (101-108)/(65-74) 107/68  Flow (L/min):  [3] 3    Physical Exam   GEN: No acute distress  HEENT: Moist mucous membranes  LUNGS: Equal chest rise bilaterally  CARDIAC: Regular rate and rhythm  NEURO: Moving all 4 extremities spontaneously  SKIN: No obvious breakdown    Result Review    I have personally reviewed  the results below:  [x]  Laboratory personally viewed BMP, procalcitonin, CBC, personally reviewed chest x-ray with no cardiopulmonary abnormality  []  Microbiology  []  Radiology  []  EKG/Telemetry   []  Cardiology/Vascular   []  Pathology  []  Old records  []  Other:  CBC          5/19/2024    04:56 5/20/2024    05:20 5/21/2024    05:16   CBC   WBC 6.08  7.77  7.87    RBC 4.95  4.81  4.74    Hemoglobin 14.0  13.4  13.4    Hematocrit 44.9  43.7  43.4    MCV 90.7  90.9  91.6    MCH 28.3  27.9  28.3    MCHC 31.2  30.7  30.9    RDW 13.5  13.2  13.5    Platelets 143  154  156      CMP          5/19/2024    04:56 5/20/2024    05:20 5/21/2024    05:16   CMP   Glucose 115  100  114    BUN 27  26  25    Creatinine 0.62  0.55  0.57    EGFR 108.1  112.1  110.8    Sodium 139  140  140    Potassium 4.6  4.5  4.6    Chloride 95  94  96    Calcium 8.7  8.8  8.8    Total Protein 5.4  5.2  5.2    Albumin 3.4  3.3  3.4    Globulin 2.0  1.9  1.8    Total Bilirubin 0.3  0.3  0.2    Alkaline Phosphatase 117  121  122    AST (SGOT) 29  22  21    ALT (SGPT) 48  41  39    Albumin/Globulin Ratio 1.7  1.7  1.9    BUN/Creatinine Ratio 43.5  47.3  43.9    Anion Gap 0.2  4.1  2.3        Assessment & Plan   Assessment / Plan   COPD with acute exacerbation  Weight loss  Hypertension  Tobacco dependence  Pulmonary hypertension  CAD  Concern for renal mass, will need urologic follow-up    Continue to monitor in the hospital for workup and management of the above  Transition to prednisone today 30 mg  Continue with scheduled DuoNebs, Pulmicort and Brovana twice daily, albuterol as needed  Continue scheduled Mucinex and Tessalon Perles  Incentive spirometry at bedside  Continue daily azithromycin for COPD suppression completed course of Augmentin  Viral respiratory panel negative  Continue Daliresp and Singulair  CT scan of the chest personally reviewed, negative for any dense infiltrate or malignancy, CT scan of the abdomen and pelvis with subtle  hyperdense lesion in the right kidney, dedicated CT scan of the kidney with concerning 1.7 cm lesion, urology consulted, planning on close follow-up with repeat imaging in 3 months  Recommend colonoscopy on outpatient basis for routine screening  Continue home antihypertensives  Nicotine patch as needed  Pulmonology consulted and following, discussed management plan with Dr. Moya regarding hypoxemia, patient status post bronchoscopy with thick secretions and mucous plugging 5/21/2024   CBC, CMP reviewed 5/21/2024   Repeat CBC, CMP, mag and Phos in a.m. 5/21/2024      Discussed plan with RN.    DVT prophylaxis:  Medical DVT prophylaxis orders are present.        CODE STATUS:   Level Of Support Discussed With: Patient  Code Status (Patient has no pulse and is not breathing): No CPR (Do Not Attempt to Resuscitate)  Medical Interventions (Patient has pulse or is breathing): Full Support    Electronically signed by Trevin Wright MD, 5/21/2024, 10:13 EDT.

## 2024-05-22 LAB
ALBUMIN SERPL-MCNC: 3.4 G/DL (ref 3.5–5.2)
ALBUMIN/GLOB SERPL: 2 G/DL
ALP SERPL-CCNC: 132 U/L (ref 39–117)
ALT SERPL W P-5'-P-CCNC: 35 U/L (ref 1–41)
ANION GAP SERPL CALCULATED.3IONS-SCNC: 1.3 MMOL/L (ref 5–15)
AST SERPL-CCNC: 19 U/L (ref 1–40)
BASOPHILS # BLD AUTO: 0 10*3/MM3 (ref 0–0.2)
BASOPHILS NFR BLD AUTO: 0 % (ref 0–1.5)
BILIRUB SERPL-MCNC: 0.2 MG/DL (ref 0–1.2)
BUN SERPL-MCNC: 27 MG/DL (ref 8–23)
BUN/CREAT SERPL: 46.6 (ref 7–25)
CALCIUM SPEC-SCNC: 8.8 MG/DL (ref 8.6–10.5)
CHLORIDE SERPL-SCNC: 95 MMOL/L (ref 98–107)
CO2 SERPL-SCNC: 43.7 MMOL/L (ref 22–29)
CREAT SERPL-MCNC: 0.58 MG/DL (ref 0.76–1.27)
DEPRECATED RDW RBC AUTO: 45.2 FL (ref 37–54)
EGFRCR SERPLBLD CKD-EPI 2021: 110.3 ML/MIN/1.73
EOSINOPHIL # BLD AUTO: 0.01 10*3/MM3 (ref 0–0.4)
EOSINOPHIL NFR BLD AUTO: 0.1 % (ref 0.3–6.2)
ERYTHROCYTE [DISTWIDTH] IN BLOOD BY AUTOMATED COUNT: 13.6 % (ref 12.3–15.4)
FUNGUS WND CULT: ABNORMAL
FUNGUS WND CULT: ABNORMAL
GLOBULIN UR ELPH-MCNC: 1.7 GM/DL
GLUCOSE SERPL-MCNC: 88 MG/DL (ref 65–99)
HCT VFR BLD AUTO: 44.1 % (ref 37.5–51)
HGB BLD-MCNC: 13.7 G/DL (ref 13–17.7)
IMM GRANULOCYTES # BLD AUTO: 0.05 10*3/MM3 (ref 0–0.05)
IMM GRANULOCYTES NFR BLD AUTO: 0.5 % (ref 0–0.5)
LYMPHOCYTES # BLD AUTO: 1.32 10*3/MM3 (ref 0.7–3.1)
LYMPHOCYTES NFR BLD AUTO: 14.2 % (ref 19.6–45.3)
MAGNESIUM SERPL-MCNC: 2.2 MG/DL (ref 1.6–2.4)
MCH RBC QN AUTO: 28.1 PG (ref 26.6–33)
MCHC RBC AUTO-ENTMCNC: 31.1 G/DL (ref 31.5–35.7)
MCV RBC AUTO: 90.6 FL (ref 79–97)
MONOCYTES # BLD AUTO: 0.96 10*3/MM3 (ref 0.1–0.9)
MONOCYTES NFR BLD AUTO: 10.3 % (ref 5–12)
NEUTROPHILS NFR BLD AUTO: 6.94 10*3/MM3 (ref 1.7–7)
NEUTROPHILS NFR BLD AUTO: 74.9 % (ref 42.7–76)
NRBC BLD AUTO-RTO: 0 /100 WBC (ref 0–0.2)
PHOSPHATE SERPL-MCNC: 3.1 MG/DL (ref 2.5–4.5)
PLATELET # BLD AUTO: 165 10*3/MM3 (ref 140–450)
PMV BLD AUTO: 9.9 FL (ref 6–12)
POTASSIUM SERPL-SCNC: 4.1 MMOL/L (ref 3.5–5.2)
PROT SERPL-MCNC: 5.1 G/DL (ref 6–8.5)
RBC # BLD AUTO: 4.87 10*6/MM3 (ref 4.14–5.8)
SODIUM SERPL-SCNC: 140 MMOL/L (ref 136–145)
WBC NRBC COR # BLD AUTO: 9.28 10*3/MM3 (ref 3.4–10.8)

## 2024-05-22 PROCEDURE — 63710000001 PREDNISONE PER 1 MG: Performed by: INTERNAL MEDICINE

## 2024-05-22 PROCEDURE — 83735 ASSAY OF MAGNESIUM: CPT | Performed by: INTERNAL MEDICINE

## 2024-05-22 PROCEDURE — 80053 COMPREHEN METABOLIC PANEL: CPT | Performed by: INTERNAL MEDICINE

## 2024-05-22 PROCEDURE — 99232 SBSQ HOSP IP/OBS MODERATE 35: CPT | Performed by: INTERNAL MEDICINE

## 2024-05-22 PROCEDURE — 94799 UNLISTED PULMONARY SVC/PX: CPT

## 2024-05-22 PROCEDURE — 94669 MECHANICAL CHEST WALL OSCILL: CPT

## 2024-05-22 PROCEDURE — 94760 N-INVAS EAR/PLS OXIMETRY 1: CPT

## 2024-05-22 PROCEDURE — 85025 COMPLETE CBC W/AUTO DIFF WBC: CPT | Performed by: INTERNAL MEDICINE

## 2024-05-22 PROCEDURE — 63710000001 PREDNISONE PER 5 MG: Performed by: INTERNAL MEDICINE

## 2024-05-22 PROCEDURE — 94664 DEMO&/EVAL PT USE INHALER: CPT

## 2024-05-22 PROCEDURE — 84100 ASSAY OF PHOSPHORUS: CPT | Performed by: INTERNAL MEDICINE

## 2024-05-22 RX ADMIN — ROFLUMILAST 500 MCG: 500 TABLET ORAL at 08:53

## 2024-05-22 RX ADMIN — AZITHROMYCIN DIHYDRATE 250 MG: 250 TABLET ORAL at 08:53

## 2024-05-22 RX ADMIN — BUDESONIDE 0.5 MG: 0.5 SUSPENSION RESPIRATORY (INHALATION) at 18:22

## 2024-05-22 RX ADMIN — Medication 10 ML: at 20:31

## 2024-05-22 RX ADMIN — CETIRIZINE HYDROCHLORIDE 10 MG: 10 TABLET, FILM COATED ORAL at 20:32

## 2024-05-22 RX ADMIN — IPRATROPIUM BROMIDE AND ALBUTEROL SULFATE 3 ML: .5; 3 SOLUTION RESPIRATORY (INHALATION) at 12:05

## 2024-05-22 RX ADMIN — IPRATROPIUM BROMIDE AND ALBUTEROL SULFATE 3 ML: .5; 3 SOLUTION RESPIRATORY (INHALATION) at 01:21

## 2024-05-22 RX ADMIN — ARFORMOTEROL TARTRATE 15 MCG: 15 SOLUTION RESPIRATORY (INHALATION) at 07:02

## 2024-05-22 RX ADMIN — ARFORMOTEROL TARTRATE 15 MCG: 15 SOLUTION RESPIRATORY (INHALATION) at 18:22

## 2024-05-22 RX ADMIN — IPRATROPIUM BROMIDE AND ALBUTEROL SULFATE 3 ML: .5; 3 SOLUTION RESPIRATORY (INHALATION) at 18:22

## 2024-05-22 RX ADMIN — MONTELUKAST 10 MG: 10 TABLET, FILM COATED ORAL at 20:32

## 2024-05-22 RX ADMIN — IPRATROPIUM BROMIDE AND ALBUTEROL SULFATE 3 ML: .5; 3 SOLUTION RESPIRATORY (INHALATION) at 07:03

## 2024-05-22 RX ADMIN — Medication 10 MG: at 20:31

## 2024-05-22 RX ADMIN — BUDESONIDE 0.5 MG: 0.5 SUSPENSION RESPIRATORY (INHALATION) at 07:03

## 2024-05-22 RX ADMIN — PREDNISONE 30 MG: 20 TABLET ORAL at 08:53

## 2024-05-22 RX ADMIN — Medication 10 ML: at 08:53

## 2024-05-22 RX ADMIN — GUAIFENESIN 600 MG: 600 TABLET ORAL at 20:32

## 2024-05-22 RX ADMIN — METOPROLOL TARTRATE 25 MG: 25 TABLET, FILM COATED ORAL at 20:32

## 2024-05-22 RX ADMIN — PANTOPRAZOLE SODIUM 40 MG: 40 TABLET, DELAYED RELEASE ORAL at 05:05

## 2024-05-22 RX ADMIN — GUAIFENESIN 600 MG: 600 TABLET ORAL at 08:53

## 2024-05-22 NOTE — PROGRESS NOTES
[facility]   Hospitalist Progress Note  Date: 2024  Patient Name: Sumeet Gomes  : 1960  MRN: [id number]  Date of admission: 2024      Subjective   Subjective     Chief Complaint: Shortness of breath    Summary: 63 y.o. male with past medical history of hypertension, hyperlipidemia, COPD on home oxygen, pulmonary hypertension, CAD, and GERD presented to ED with complaint of shortness of breath over the past few weeks.  In the ED patient vitals were within normal limits on 3 L nasal cannula.  Labs were all relatively unremarkable given his chronic conditions including a normal lactic acid, proBNP, and negative COVID flu RSV.  Chest x-ray was negative for any acute findings.  He was admitted for COPD exacerbation, started on steroids, bronchodilators.  No evidence of pneumonia.  His [place] significant weight loss over the last 2 years, likely in setting of advanced COPD.  Patient went CT scan of the chest abdomen pelvis to rule out malignancy, there was an abnormal lesion found in the kidney, patient underwent dedicated scan with contrast, and this was significant for a 1.7 cm lesion for which urology was consulted, they are planning on repeat MRI of the kidney in 3 months to assess growth.  Patient's respiratory status is slow to improve but he is nearing his baseline.    Interval Followup: No acute events overnight, patient resting comfortably in bed, breathing improving, case management arranging percussion vest for outpatient    Objective   Objective     Vitals:   Temp:  [97.7 °F (36.5 °C)-98.2 °F (36.8 °C)] 97.7 °F (36.5 °C)  Heart Rate:  [] 61  Resp:  [16-20] 18  BP: ()/(52-76) 88/52  Flow (L/min):  [3] 3    Physical Exam   GEN: No acute distress  HEENT: Moist mucous membranes  LUNGS: Equal chest rise bilaterally  CARDIAC: Regular rate and rhythm  NEURO: Moving all 4 extremities spontaneously  SKIN: No obvious breakdown    Result Review    I have personally reviewed the  results below:  [x]  Laboratory personally viewed BMP, procalcitonin, CBC, personally reviewed chest x-ray with no cardiopulmonary abnormality  []  Microbiology  []  Radiology  []  EKG/Telemetry   []  Cardiology/Vascular   []  Pathology  []  Old records  []  Other:  CBC          5/20/2024    05:20 5/21/2024    05:16 5/22/2024    05:04   CBC   WBC 7.77  7.87  9.28    RBC 4.81  4.74  4.87    Hemoglobin 13.4  13.4  13.7    Hematocrit 43.7  43.4  44.1    MCV 90.9  91.6  90.6    MCH 27.9  28.3  28.1    MCHC 30.7  30.9  31.1    RDW 13.2  13.5  13.6    Platelets 154  156  165      CMP          5/20/2024    05:20 5/21/2024    05:16 5/22/2024    05:04   CMP   Glucose 100  114  88    BUN 26  25  27    Creatinine 0.55  0.57  0.58    EGFR 112.1  110.8  110.3    Sodium 140  140  140    Potassium 4.5  4.6  4.1    Chloride 94  96  95    Calcium 8.8  8.8  8.8    Total Protein 5.2  5.2  5.1    Albumin 3.3  3.4  3.4    Globulin 1.9  1.8  1.7    Total Bilirubin 0.3  0.2  0.2    Alkaline Phosphatase 121  122  132    AST (SGOT) 22  21  19    ALT (SGPT) 41  39  35    Albumin/Globulin Ratio 1.7  1.9  2.0    BUN/Creatinine Ratio 47.3  43.9  46.6    Anion Gap 4.1  2.3  1.3        Assessment & Plan   Assessment / Plan   COPD with acute exacerbation  Weight loss  Hypertension  Tobacco dependence  Pulmonary hypertension  CAD  Concern for renal mass, will need urologic follow-up    Continue to monitor in the hospital for workup and management of the above  Continue to wean prednisone today 30 mg  Continue with scheduled DuoNebs, Pulmicort and Brovana twice daily, albuterol as needed  Continue scheduled Mucinex and Tessalon Perles  Incentive spirometry at bedside  Continue daily azithromycin for COPD suppression completed course of Augmentin  Viral respiratory panel negative  Continue Daliresp and Singulair  CT scan of the chest personally reviewed, negative for any dense infiltrate or malignancy, CT scan of the abdomen and pelvis with subtle  hyperdense lesion in the right kidney, dedicated CT scan of the kidney with concerning 1.7 cm lesion, urology consulted, planning on close follow-up with repeat imaging in 3 months  Recommend colonoscopy on outpatient basis for routine screening  Continue home antihypertensives  Nicotine patch as needed  Pulmonology consulted and following, discussed management plan with Dr. Moya regarding hypoxemia, patient status post bronchoscopy with thick secretions and mucous plugging 5/22/2024   CBC, CMP reviewed 5/22/2024   Repeat CBC, CMP, mag and Phos in a.m. 5/22/2024      Discussed plan with RN.    DVT prophylaxis:  Medical DVT prophylaxis orders are present.        CODE STATUS:   Level Of Support Discussed With: Patient  Code Status (Patient has no pulse and is not breathing): No CPR (Do Not Attempt to Resuscitate)  Medical Interventions (Patient has pulse or is breathing): Full Support    Electronically signed by Trevin Wright MD, 5/22/2024, 10:42 EDT.

## 2024-05-22 NOTE — PROGRESS NOTES
Pulmonary / Critical Care Progress Note      Patient Name: Sumeet Gomes  : 1960  MRN: [id number]  Attending:  Trevin Wright MD  Date of admission: 2024    Subjective   Subjective   Follow-up for respiratory failure, mucous plugging, parainfluenza pneumonia.    Over past 24 hours:  Remained on supplemental oxygen at 3 L/min.  Continued with azithromycin.  Remained on Brovana, Pulmicort and DuoNeb.  Continued oral prednisone.  RT  on board.    No acute events overnight.  Did use astral vent overnight.    This morning,  Feeling better  Chest vest is helping.  Using NIPPV with sleep.  Has some chest tightness.  No fever or chills.  Continues to be on diuretics.  Has been moving around some in room.      Objective   Objective     Vitals:   Temp:  [97.7 °F (36.5 °C)-98.2 °F (36.8 °C)] 97.7 °F (36.5 °C)  Heart Rate:  [] 61  Resp:  [16-20] 18  BP: ()/(52-76) 88/52  Flow (L/min):  [3] 3    Physical Exam:  Vital Signs Reviewed   Pleasant male  Very thin in nature  Does have kyphosis  Has very poor air exchange, no wheezing, crackles or rhonchi, resonant percussion bilaterally  He is alert and oriented      Result Review    Result Review:  I have personally reviewed the results from the time of this admission to 2024 08:10 EDT and agree with these findings:  [x]  Laboratory  [x]  Microbiology  [x]  Radiology  [x]  EKG/Telemetry   []  Cardiology/Vascular   []  Pathology  []  Old records  []  Other:  Most notable findings include:       Lab 24  0504 24  0516 24  0520 24  0456 24  0550 24  0504 24  0533   WBC 9.28 7.87 7.77 6.08 6.24 5.78 6.45   HEMOGLOBIN 13.7 13.4 13.4 14.0 14.4 13.6 13.9   HEMATOCRIT 44.1 43.4 43.7 44.9 46.2 44.9 45.1   PLATELETS 165 156 154 143 163 142 145   SODIUM 140 140 140 139 142 142 141   POTASSIUM 4.1 4.6 4.5 4.6 4.4 4.4 4.5   CHLORIDE 95* 96* 94* 95* 96* 98 97*   CO2 43.7* 41.7* 41.9* 43.8* 43.5* 40.1*  43.6*   BUN 27* 25* 26* 27* 21 25* 23   CREATININE 0.58* 0.57* 0.55* 0.62* 0.58* 0.59* 0.56*   GLUCOSE 88 114* 100* 115* 119* 109* 131*   CALCIUM 8.8 8.8 8.8 8.7 9.1 9.1 9.2   PHOSPHORUS 3.1 3.7 3.2 2.9 3.0 2.3* 3.3   TOTAL PROTEIN 5.1* 5.2* 5.2* 5.4* 5.6* 5.2* 5.5*   ALBUMIN 3.4* 3.4* 3.3* 3.4* 3.6 3.4* 3.5   GLOBULIN 1.7 1.8 1.9 2.0 2.0 1.8 2.0     CT Chest Without Contrast Diagnostic    Result Date: 5/13/2024  CT Chest without Contrast  INDICATION: Hypoxia. Weight loss. History of COPD.  TECHNIQUE: CT of the thorax without contrast. Coronal and sagittal reconstructions were obtained.  Radiation dose reduction techniques included automated exposure control or exposure modulation based on body size.  COMPARISON: 3/9/2023  FINDINGS: Please see abdomen pelvis CT report dictated separately. There is bilateral gynecomastia. No pleural or pericardial effusion is seen. There is atherosclerotic disease and coronary artery disease. No definite adenopathy. The luis are poorly characterized in the absence of IV contrast.  Lung windows demonstrate emphysema. There is some chronic scarring in both lung bases that is similar to prior. No evidence of acute pneumonia or pulmonary edema. No suspicious pulmonary masses. No pneumothorax.      Impression:  COPD with no acute or suspicious findings in the chest  Electronically Signed By-Dr. Dawson Henslye MD On:5/13/2024 10:35 PM       All cultures so far negative.  Viral panel negative      Assessment & Plan   Assessment / Plan     Active Hospital Problems:  Active Hospital Problems    Diagnosis     **COPD exacerbation     Mucus plugging of bronchi     Tobacco abuse     Pulmonary hypertension     Fatigue     Essential hypertension        Impression:  Acute exacerbation of COPD/asthma overlap syndrome  Chronic hypoxemic and hypercapnic respiratory failure on 3 L of oxygen and NIPPV at home  Parainfluenza pneumonia  Mucous plugging/issues with airway  clearance  Hypophosphatemia  Pulmonary hypertension secondary to WHO class III left heart disease  Ongoing tobacco abuse of cigarettes     Plan:  Will continue with LABA LAMA inhaled corticosteroid  Continue Zithromax  Continue with Daliresp  Continue NIPPV  Continue chest vest  Continue with prednisone 30 mg once daily.  Will need slow taper over next 2 weeks.  Slowly improving respiratory status.  Continue with Lasix 40 mg orally daily.    Mr. Gomes has a history of bronchiectasis visualized by CT 5/13/2024 and daily productive cough for more than six months. He has been using the Aerobika OPEP and nebulized hypersaline, but treatment failed to mobilize secretions.  Mr. Gomes underwent bronchoscopy for mucous plugging with thick viscous purulent secretions which were obstructing the entire left and right tracheobronchial trees.  The patient will need to have a chest percussion vest at home to loosen and help mobilize his thick secretions.   We are trying to arrange chest vest for home.  Likely can be discharged home today or tomorrow.      DVT prophylaxis:  Medical DVT prophylaxis orders are present.    CODE STATUS:   Level Of Support Discussed With: Patient  Code Status (Patient has no pulse and is not breathing): No CPR (Do Not Attempt to Resuscitate)  Medical Interventions (Patient has pulse or is breathing): Full Support      I have reviewed labs, imaging, pertinent clinical data and provider notes.   I have discussed with bedside nurse and primary service.     Electronically signed by Ulices Moya MD, 05/22/24, 8:10 AM EDT.

## 2024-05-22 NOTE — PLAN OF CARE
Problem: Adult Inpatient Plan of Care  Goal: Plan of Care Review  Outcome: Ongoing, Progressing  Flowsheets  Taken 5/22/2024 1717 by Bhavana Parks RN  Progress: no change  Outcome Evaluation: pt had slight hypotension during shift, MD aware. lasix and metoprolol held per MD. pt rested well during shift. wound care completed per orders. no new concerns at this time.  Taken 5/22/2024 0441 by Abigail Good RN  Plan of Care Reviewed With: patient  Goal: Patient-Specific Goal (Individualized)  Outcome: Ongoing, Progressing  Goal: Absence of Hospital-Acquired Illness or Injury  Outcome: Ongoing, Progressing  Intervention: Identify and Manage Fall Risk  Recent Flowsheet Documentation  Taken 5/22/2024 1716 by Bhavana Parks RN  Safety Promotion/Fall Prevention:   safety round/check completed   clutter free environment maintained   assistive device/personal items within reach   fall prevention program maintained  Taken 5/22/2024 1537 by Bhavana Parks RN  Safety Promotion/Fall Prevention:   safety round/check completed   clutter free environment maintained   assistive device/personal items within reach   fall prevention program maintained  Taken 5/22/2024 1341 by Bhavana Parks RN  Safety Promotion/Fall Prevention:   safety round/check completed   assistive device/personal items within reach   clutter free environment maintained   fall prevention program maintained  Taken 5/22/2024 1147 by Bhavana Parks RN  Safety Promotion/Fall Prevention:   safety round/check completed   clutter free environment maintained   assistive device/personal items within reach   fall prevention program maintained  Taken 5/22/2024 0951 by Bhavana Parks RN  Safety Promotion/Fall Prevention:   safety round/check completed   clutter free environment maintained   assistive device/personal items within reach   fall prevention program maintained  Taken 5/22/2024 0729 by Bhavana Parks RN  Safety  Promotion/Fall Prevention:   safety round/check completed   assistive device/personal items within reach   clutter free environment maintained   fall prevention program maintained  Intervention: Prevent and Manage VTE (Venous Thromboembolism) Risk  Recent Flowsheet Documentation  Taken 5/22/2024 0729 by Bhavana Parks RN  Range of Motion: active ROM (range of motion) encouraged  Intervention: Prevent Infection  Recent Flowsheet Documentation  Taken 5/22/2024 1716 by Bhavana Parks RN  Infection Prevention:   rest/sleep promoted   personal protective equipment utilized   hand hygiene promoted   equipment surfaces disinfected  Taken 5/22/2024 1537 by Bhavana Parks RN  Infection Prevention:   rest/sleep promoted   personal protective equipment utilized   equipment surfaces disinfected   hand hygiene promoted  Taken 5/22/2024 1341 by Bhavana Parks RN  Infection Prevention:   rest/sleep promoted   personal protective equipment utilized   hand hygiene promoted   equipment surfaces disinfected  Taken 5/22/2024 1147 by Bhavana Parks RN  Infection Prevention:   rest/sleep promoted   personal protective equipment utilized   hand hygiene promoted   equipment surfaces disinfected  Taken 5/22/2024 0951 by Bhavana Parks RN  Infection Prevention:   rest/sleep promoted   personal protective equipment utilized   hand hygiene promoted   equipment surfaces disinfected  Taken 5/22/2024 0729 by Bhavana Parks RN  Infection Prevention:   rest/sleep promoted   personal protective equipment utilized   hand hygiene promoted   equipment surfaces disinfected  Goal: Optimal Comfort and Wellbeing  Outcome: Ongoing, Progressing  Goal: Readiness for Transition of Care  Outcome: Ongoing, Progressing     Problem: Gas Exchange Impaired  Goal: Optimal Gas Exchange  Outcome: Ongoing, Progressing     Problem: COPD (Chronic Obstructive Pulmonary Disease) Comorbidity  Goal: Maintenance of COPD Symptom  Control  Outcome: Ongoing, Progressing     Problem: Hypertension Comorbidity  Goal: Blood Pressure in Desired Range  Outcome: Ongoing, Progressing     Problem: Fall Injury Risk  Goal: Absence of Fall and Fall-Related Injury  Outcome: Ongoing, Progressing  Intervention: Promote Injury-Free Environment  Recent Flowsheet Documentation  Taken 5/22/2024 1716 by Bhavana Parks RN  Safety Promotion/Fall Prevention:   safety round/check completed   clutter free environment maintained   assistive device/personal items within reach   fall prevention program maintained  Taken 5/22/2024 1537 by Bhavana Parks RN  Safety Promotion/Fall Prevention:   safety round/check completed   clutter free environment maintained   assistive device/personal items within reach   fall prevention program maintained  Taken 5/22/2024 1341 by Bhavana Parks RN  Safety Promotion/Fall Prevention:   safety round/check completed   assistive device/personal items within reach   clutter free environment maintained   fall prevention program maintained  Taken 5/22/2024 1147 by Bhavana Parks RN  Safety Promotion/Fall Prevention:   safety round/check completed   clutter free environment maintained   assistive device/personal items within reach   fall prevention program maintained  Taken 5/22/2024 0951 by Bhavana Parks RN  Safety Promotion/Fall Prevention:   safety round/check completed   clutter free environment maintained   assistive device/personal items within reach   fall prevention program maintained  Taken 5/22/2024 0729 by Bhavana Parks RN  Safety Promotion/Fall Prevention:   safety round/check completed   assistive device/personal items within reach   clutter free environment maintained   fall prevention program maintained     Problem: Pain Acute  Goal: Acceptable Pain Control and Functional Ability  Outcome: Ongoing, Progressing   Goal Outcome Evaluation:           Progress: no change  Outcome Evaluation: pt had  slight hypotension during shift, MD aware. lasix and metoprolol held per MD. pt rested well during shift. wound care completed per orders. no new concerns at this time.

## 2024-05-23 LAB
ALBUMIN SERPL-MCNC: 3.4 G/DL (ref 3.5–5.2)
ALBUMIN/GLOB SERPL: 2.1 G/DL
ALP SERPL-CCNC: 143 U/L (ref 39–117)
ALT SERPL W P-5'-P-CCNC: 40 U/L (ref 1–41)
ANION GAP SERPL CALCULATED.3IONS-SCNC: 0.7 MMOL/L (ref 5–15)
AST SERPL-CCNC: 25 U/L (ref 1–40)
BASOPHILS # BLD AUTO: 0.01 10*3/MM3 (ref 0–0.2)
BASOPHILS NFR BLD AUTO: 0.1 % (ref 0–1.5)
BILIRUB SERPL-MCNC: 0.2 MG/DL (ref 0–1.2)
BUN SERPL-MCNC: 25 MG/DL (ref 8–23)
BUN/CREAT SERPL: 49 (ref 7–25)
CALCIUM SPEC-SCNC: 8.7 MG/DL (ref 8.6–10.5)
CHLORIDE SERPL-SCNC: 100 MMOL/L (ref 98–107)
CO2 SERPL-SCNC: 39.3 MMOL/L (ref 22–29)
CREAT SERPL-MCNC: 0.51 MG/DL (ref 0.76–1.27)
DEPRECATED RDW RBC AUTO: 46.9 FL (ref 37–54)
EGFRCR SERPLBLD CKD-EPI 2021: 114.6 ML/MIN/1.73
EOSINOPHIL # BLD AUTO: 0.01 10*3/MM3 (ref 0–0.4)
EOSINOPHIL NFR BLD AUTO: 0.1 % (ref 0.3–6.2)
ERYTHROCYTE [DISTWIDTH] IN BLOOD BY AUTOMATED COUNT: 13.8 % (ref 12.3–15.4)
GLOBULIN UR ELPH-MCNC: 1.6 GM/DL
GLUCOSE SERPL-MCNC: 87 MG/DL (ref 65–99)
HCT VFR BLD AUTO: 43.4 % (ref 37.5–51)
HGB BLD-MCNC: 13.3 G/DL (ref 13–17.7)
IMM GRANULOCYTES # BLD AUTO: 0.05 10*3/MM3 (ref 0–0.05)
IMM GRANULOCYTES NFR BLD AUTO: 0.5 % (ref 0–0.5)
LYMPHOCYTES # BLD AUTO: 1.22 10*3/MM3 (ref 0.7–3.1)
LYMPHOCYTES NFR BLD AUTO: 13.4 % (ref 19.6–45.3)
MAGNESIUM SERPL-MCNC: 2.2 MG/DL (ref 1.6–2.4)
MCH RBC QN AUTO: 28.1 PG (ref 26.6–33)
MCHC RBC AUTO-ENTMCNC: 30.6 G/DL (ref 31.5–35.7)
MCV RBC AUTO: 91.8 FL (ref 79–97)
MONOCYTES # BLD AUTO: 0.75 10*3/MM3 (ref 0.1–0.9)
MONOCYTES NFR BLD AUTO: 8.2 % (ref 5–12)
NEUTROPHILS NFR BLD AUTO: 7.09 10*3/MM3 (ref 1.7–7)
NEUTROPHILS NFR BLD AUTO: 77.7 % (ref 42.7–76)
NRBC BLD AUTO-RTO: 0 /100 WBC (ref 0–0.2)
PHOSPHATE SERPL-MCNC: 3.3 MG/DL (ref 2.5–4.5)
PLATELET # BLD AUTO: 170 10*3/MM3 (ref 140–450)
PMV BLD AUTO: 9.8 FL (ref 6–12)
POTASSIUM SERPL-SCNC: 4.1 MMOL/L (ref 3.5–5.2)
PROT SERPL-MCNC: 5 G/DL (ref 6–8.5)
RBC # BLD AUTO: 4.73 10*6/MM3 (ref 4.14–5.8)
SODIUM SERPL-SCNC: 140 MMOL/L (ref 136–145)
WBC NRBC COR # BLD AUTO: 9.13 10*3/MM3 (ref 3.4–10.8)

## 2024-05-23 PROCEDURE — 99232 SBSQ HOSP IP/OBS MODERATE 35: CPT | Performed by: FAMILY MEDICINE

## 2024-05-23 PROCEDURE — 94669 MECHANICAL CHEST WALL OSCILL: CPT

## 2024-05-23 PROCEDURE — 94660 CPAP INITIATION&MGMT: CPT

## 2024-05-23 PROCEDURE — 94799 UNLISTED PULMONARY SVC/PX: CPT

## 2024-05-23 PROCEDURE — 85025 COMPLETE CBC W/AUTO DIFF WBC: CPT | Performed by: INTERNAL MEDICINE

## 2024-05-23 PROCEDURE — 63710000001 PREDNISONE PER 1 MG: Performed by: INTERNAL MEDICINE

## 2024-05-23 PROCEDURE — 94664 DEMO&/EVAL PT USE INHALER: CPT

## 2024-05-23 PROCEDURE — 99233 SBSQ HOSP IP/OBS HIGH 50: CPT | Performed by: INTERNAL MEDICINE

## 2024-05-23 PROCEDURE — 84100 ASSAY OF PHOSPHORUS: CPT | Performed by: INTERNAL MEDICINE

## 2024-05-23 PROCEDURE — 63710000001 PREDNISONE PER 5 MG: Performed by: INTERNAL MEDICINE

## 2024-05-23 PROCEDURE — 94760 N-INVAS EAR/PLS OXIMETRY 1: CPT

## 2024-05-23 PROCEDURE — 83735 ASSAY OF MAGNESIUM: CPT | Performed by: INTERNAL MEDICINE

## 2024-05-23 PROCEDURE — 80053 COMPREHEN METABOLIC PANEL: CPT | Performed by: INTERNAL MEDICINE

## 2024-05-23 RX ADMIN — GUAIFENESIN 600 MG: 600 TABLET ORAL at 08:36

## 2024-05-23 RX ADMIN — ARFORMOTEROL TARTRATE 15 MCG: 15 SOLUTION RESPIRATORY (INHALATION) at 06:24

## 2024-05-23 RX ADMIN — GUAIFENESIN 600 MG: 600 TABLET ORAL at 20:51

## 2024-05-23 RX ADMIN — CETIRIZINE HYDROCHLORIDE 10 MG: 10 TABLET, FILM COATED ORAL at 20:52

## 2024-05-23 RX ADMIN — BUDESONIDE 0.5 MG: 0.5 SUSPENSION RESPIRATORY (INHALATION) at 19:22

## 2024-05-23 RX ADMIN — Medication 10 ML: at 20:52

## 2024-05-23 RX ADMIN — Medication 10 ML: at 08:37

## 2024-05-23 RX ADMIN — Medication 10 MG: at 20:51

## 2024-05-23 RX ADMIN — IPRATROPIUM BROMIDE AND ALBUTEROL SULFATE 3 ML: .5; 3 SOLUTION RESPIRATORY (INHALATION) at 06:24

## 2024-05-23 RX ADMIN — AZITHROMYCIN DIHYDRATE 250 MG: 250 TABLET ORAL at 08:36

## 2024-05-23 RX ADMIN — METOPROLOL TARTRATE 25 MG: 25 TABLET, FILM COATED ORAL at 20:51

## 2024-05-23 RX ADMIN — PREDNISONE 30 MG: 20 TABLET ORAL at 08:36

## 2024-05-23 RX ADMIN — IPRATROPIUM BROMIDE AND ALBUTEROL SULFATE 3 ML: .5; 3 SOLUTION RESPIRATORY (INHALATION) at 13:36

## 2024-05-23 RX ADMIN — ARFORMOTEROL TARTRATE 15 MCG: 15 SOLUTION RESPIRATORY (INHALATION) at 19:22

## 2024-05-23 RX ADMIN — BUDESONIDE 0.5 MG: 0.5 SUSPENSION RESPIRATORY (INHALATION) at 06:24

## 2024-05-23 RX ADMIN — MONTELUKAST 10 MG: 10 TABLET, FILM COATED ORAL at 20:51

## 2024-05-23 RX ADMIN — PANTOPRAZOLE SODIUM 40 MG: 40 TABLET, DELAYED RELEASE ORAL at 06:15

## 2024-05-23 RX ADMIN — IPRATROPIUM BROMIDE AND ALBUTEROL SULFATE 3 ML: .5; 3 SOLUTION RESPIRATORY (INHALATION) at 00:30

## 2024-05-23 RX ADMIN — ROFLUMILAST 500 MCG: 500 TABLET ORAL at 08:36

## 2024-05-23 RX ADMIN — IPRATROPIUM BROMIDE AND ALBUTEROL SULFATE 3 ML: .5; 3 SOLUTION RESPIRATORY (INHALATION) at 19:22

## 2024-05-23 NOTE — PLAN OF CARE
Goal Outcome Evaluation:  Plan of Care Reviewed With: patient        Progress: improving  Outcome Evaluation: BPs improved tonight. pt denies c/o. anterior wheezes noted in lung fields. pt hopes ot dc home today. slept well.

## 2024-05-23 NOTE — PLAN OF CARE
Goal Outcome Evaluation:  Plan of Care Reviewed With: patient        Progress: improving          Hypotensive at beginning of shift, Metoprolol & Lasix held- MD aware. BP trending up throughout shift. Continues on 3L NC. No further events. He has been pleasant, no complaints. Will cont POC

## 2024-05-23 NOTE — PROGRESS NOTES
Pulmonary / Critical Care Progress Note      Patient Name: Sumeet Gomes  : 1960  MRN: [id number]  Attending:  Len Lawrence DO  Date of admission: 2024    Subjective   Subjective   Follow-up for respiratory failure, mucous plugging, parainfluenza pneumonia.    Over past 24 hours:  Remained on supplemental oxygen at 3 L/min.  Continued with azithromycin.  Remained on Brovana, Pulmicort and DuoNeb.  Continued oral prednisone.  RT  on board.  Trying to arrange for home chest vest.    No acute events overnight.  Did use astral vent overnight.    This morning,  Feeling better  Continues to use chest vest.  Using NIPPV with sleep.  Has some chest tightness.  No fever or chills.  Able to move around.  Has been moving around some in room.      Objective   Objective     Vitals:   Temp:  [97.9 °F (36.6 °C)-98.3 °F (36.8 °C)] 98.3 °F (36.8 °C)  Heart Rate:  [66-84] 70  Resp:  [16-18] 18  BP: ()/(55-70) 95/58  Flow (L/min):  [3] 3    Physical Exam:  Vital Signs Reviewed   Pleasant male  Very thin in nature  Does have kyphosis  Has very poor air exchange, no wheezing, crackles or rhonchi, resonant percussion bilaterally  He is alert and oriented      Result Review    Result Review:  I have personally reviewed the results from the time of this admission to 2024 07:47 EDT and agree with these findings:  [x]  Laboratory  [x]  Microbiology  [x]  Radiology  [x]  EKG/Telemetry   []  Cardiology/Vascular   []  Pathology  []  Old records  []  Other:  Most notable findings include:       Lab 24  0655 24  0504 24  0516 24  0520 24  0456 24  0550 24  0504   WBC 9.13 9.28 7.87 7.77 6.08 6.24 5.78   HEMOGLOBIN 13.3 13.7 13.4 13.4 14.0 14.4 13.6   HEMATOCRIT 43.4 44.1 43.4 43.7 44.9 46.2 44.9   PLATELETS 170 165 156 154 143 163 142   SODIUM 140 140 140 140 139 142 142   POTASSIUM 4.1 4.1 4.6 4.5 4.6 4.4 4.4   CHLORIDE 100 95* 96* 94* 95* 96* 98   CO2 39.3* 43.7*  41.7* 41.9* 43.8* 43.5* 40.1*   BUN 25* 27* 25* 26* 27* 21 25*   CREATININE 0.51* 0.58* 0.57* 0.55* 0.62* 0.58* 0.59*   GLUCOSE 87 88 114* 100* 115* 119* 109*   CALCIUM 8.7 8.8 8.8 8.8 8.7 9.1 9.1   PHOSPHORUS 3.3 3.1 3.7 3.2 2.9 3.0 2.3*   TOTAL PROTEIN 5.0* 5.1* 5.2* 5.2* 5.4* 5.6* 5.2*   ALBUMIN 3.4* 3.4* 3.4* 3.3* 3.4* 3.6 3.4*   GLOBULIN 1.6 1.7 1.8 1.9 2.0 2.0 1.8     CT Chest Without Contrast Diagnostic    Result Date: 5/13/2024  CT Chest without Contrast  INDICATION: Hypoxia. Weight loss. History of COPD.  TECHNIQUE: CT of the thorax without contrast. Coronal and sagittal reconstructions were obtained.  Radiation dose reduction techniques included automated exposure control or exposure modulation based on body size.  COMPARISON: 3/9/2023  FINDINGS: Please see abdomen pelvis CT report dictated separately. There is bilateral gynecomastia. No pleural or pericardial effusion is seen. There is atherosclerotic disease and coronary artery disease. No definite adenopathy. The luis are poorly characterized in the absence of IV contrast.  Lung windows demonstrate emphysema. There is some chronic scarring in both lung bases that is similar to prior. No evidence of acute pneumonia or pulmonary edema. No suspicious pulmonary masses. No pneumothorax.      Impression:  COPD with no acute or suspicious findings in the chest  Electronically Signed By-Dr. Dawson Hensley MD On:5/13/2024 10:35 PM       All cultures so far negative.  Viral panel negative      Assessment & Plan   Assessment / Plan     Active Hospital Problems:  Active Hospital Problems    Diagnosis    • **COPD exacerbation    • Mucus plugging of bronchi    • Tobacco abuse    • Pulmonary hypertension    • Fatigue    • Essential hypertension        Impression:  Acute exacerbation of COPD/asthma overlap syndrome  Chronic hypoxemic and hypercapnic respiratory failure on 3 L of oxygen and NIPPV at home  Parainfluenza pneumonia  Mucous plugging/issues with airway  clearance  Hypophosphatemia  Pulmonary hypertension secondary to WHO class III left heart disease  Ongoing tobacco abuse of cigarettes     Plan:  Will continue with LABA LAMA inhaled corticosteroid  Continue Zithromax  Continue with Daliresp  Continue NIPPV  Continue chest vest  Continue with prednisone 30 mg once daily.  Will need slow taper over next 2 weeks.  Slowly improving respiratory status.  Continue with Lasix 40 mg orally daily.    Mr. Gomes has a history of bronchiectasis visualized by CT 5/13/2024 and daily productive cough for more than six months. He has been using the Aerobika OPEP and nebulized hypersaline, but treatment failed to mobilize secretions.  Mr. Gomes underwent bronchoscopy for mucous plugging with thick viscous purulent secretions which were obstructing the entire left and right tracheobronchial trees.  The patient will need to have a chest percussion vest at home to loosen and help mobilize his thick secretions.   Home chest vest to be delivered by the end of this week.  Stable for discharge from pulmonary standpoint.      DVT prophylaxis:  Medical DVT prophylaxis orders are present.    CODE STATUS:   Level Of Support Discussed With: Patient  Code Status (Patient has no pulse and is not breathing): No CPR (Do Not Attempt to Resuscitate)  Medical Interventions (Patient has pulse or is breathing): Full Support      I have reviewed labs, imaging, pertinent clinical data and provider notes.   I have discussed with bedside nurse and primary service.     Electronically signed by Ulices Moya MD, 05/23/24, 7:47 AM EDT.

## 2024-05-24 ENCOUNTER — READMISSION MANAGEMENT (OUTPATIENT)
Dept: CALL CENTER | Facility: HOSPITAL | Age: 64
End: 2024-05-24
Payer: MEDICAID

## 2024-05-24 VITALS
HEIGHT: 67 IN | TEMPERATURE: 98.1 F | DIASTOLIC BLOOD PRESSURE: 69 MMHG | WEIGHT: 113.54 LBS | SYSTOLIC BLOOD PRESSURE: 109 MMHG | OXYGEN SATURATION: 94 % | BODY MASS INDEX: 17.82 KG/M2 | RESPIRATION RATE: 18 BRPM | HEART RATE: 72 BPM

## 2024-05-24 LAB
ANION GAP SERPL CALCULATED.3IONS-SCNC: 2.6 MMOL/L (ref 5–15)
BASOPHILS # BLD AUTO: 0.01 10*3/MM3 (ref 0–0.2)
BASOPHILS NFR BLD AUTO: 0.1 % (ref 0–1.5)
BUN SERPL-MCNC: 23 MG/DL (ref 8–23)
BUN/CREAT SERPL: 39 (ref 7–25)
CALCIUM SPEC-SCNC: 8.8 MG/DL (ref 8.6–10.5)
CHLORIDE SERPL-SCNC: 99 MMOL/L (ref 98–107)
CO2 SERPL-SCNC: 39.4 MMOL/L (ref 22–29)
CREAT SERPL-MCNC: 0.59 MG/DL (ref 0.76–1.27)
DEPRECATED RDW RBC AUTO: 46.5 FL (ref 37–54)
EGFRCR SERPLBLD CKD-EPI 2021: 109.7 ML/MIN/1.73
EOSINOPHIL # BLD AUTO: 0.03 10*3/MM3 (ref 0–0.4)
EOSINOPHIL NFR BLD AUTO: 0.4 % (ref 0.3–6.2)
ERYTHROCYTE [DISTWIDTH] IN BLOOD BY AUTOMATED COUNT: 14 % (ref 12.3–15.4)
GLUCOSE SERPL-MCNC: 90 MG/DL (ref 65–99)
HCT VFR BLD AUTO: 40.1 % (ref 37.5–51)
HGB BLD-MCNC: 12.5 G/DL (ref 13–17.7)
IMM GRANULOCYTES # BLD AUTO: 0.04 10*3/MM3 (ref 0–0.05)
IMM GRANULOCYTES NFR BLD AUTO: 0.5 % (ref 0–0.5)
LYMPHOCYTES # BLD AUTO: 1.61 10*3/MM3 (ref 0.7–3.1)
LYMPHOCYTES NFR BLD AUTO: 19.3 % (ref 19.6–45.3)
MAGNESIUM SERPL-MCNC: 2.1 MG/DL (ref 1.6–2.4)
MCH RBC QN AUTO: 28.3 PG (ref 26.6–33)
MCHC RBC AUTO-ENTMCNC: 31.2 G/DL (ref 31.5–35.7)
MCV RBC AUTO: 90.9 FL (ref 79–97)
MONOCYTES # BLD AUTO: 0.76 10*3/MM3 (ref 0.1–0.9)
MONOCYTES NFR BLD AUTO: 9.1 % (ref 5–12)
MYCOBACTERIUM SPEC CULT: NORMAL
MYCOBACTERIUM SPEC CULT: NORMAL
NEUTROPHILS NFR BLD AUTO: 5.9 10*3/MM3 (ref 1.7–7)
NEUTROPHILS NFR BLD AUTO: 70.6 % (ref 42.7–76)
NIGHT BLUE STAIN TISS: NORMAL
NRBC BLD AUTO-RTO: 0 /100 WBC (ref 0–0.2)
PHOSPHATE SERPL-MCNC: 3.6 MG/DL (ref 2.5–4.5)
PLATELET # BLD AUTO: 158 10*3/MM3 (ref 140–450)
PMV BLD AUTO: 9.6 FL (ref 6–12)
POTASSIUM SERPL-SCNC: 4.7 MMOL/L (ref 3.5–5.2)
RBC # BLD AUTO: 4.41 10*6/MM3 (ref 4.14–5.8)
SODIUM SERPL-SCNC: 141 MMOL/L (ref 136–145)
WBC NRBC COR # BLD AUTO: 8.35 10*3/MM3 (ref 3.4–10.8)

## 2024-05-24 PROCEDURE — 99232 SBSQ HOSP IP/OBS MODERATE 35: CPT | Performed by: INTERNAL MEDICINE

## 2024-05-24 PROCEDURE — 63710000001 PREDNISONE PER 1 MG: Performed by: INTERNAL MEDICINE

## 2024-05-24 PROCEDURE — 83735 ASSAY OF MAGNESIUM: CPT | Performed by: INTERNAL MEDICINE

## 2024-05-24 PROCEDURE — 94799 UNLISTED PULMONARY SVC/PX: CPT

## 2024-05-24 PROCEDURE — 94664 DEMO&/EVAL PT USE INHALER: CPT

## 2024-05-24 PROCEDURE — 80048 BASIC METABOLIC PNL TOTAL CA: CPT | Performed by: INTERNAL MEDICINE

## 2024-05-24 PROCEDURE — 84100 ASSAY OF PHOSPHORUS: CPT | Performed by: INTERNAL MEDICINE

## 2024-05-24 PROCEDURE — 99239 HOSP IP/OBS DSCHRG MGMT >30: CPT | Performed by: FAMILY MEDICINE

## 2024-05-24 PROCEDURE — 85025 COMPLETE CBC W/AUTO DIFF WBC: CPT | Performed by: INTERNAL MEDICINE

## 2024-05-24 PROCEDURE — 63710000001 PREDNISONE PER 5 MG: Performed by: INTERNAL MEDICINE

## 2024-05-24 PROCEDURE — 94669 MECHANICAL CHEST WALL OSCILL: CPT

## 2024-05-24 RX ORDER — PREDNISONE 10 MG/1
TABLET ORAL
Qty: 30 TABLET | Refills: 0 | Status: SHIPPED | OUTPATIENT
Start: 2024-05-25 | End: 2024-06-09

## 2024-05-24 RX ORDER — AZITHROMYCIN 250 MG/1
250 TABLET, FILM COATED ORAL DAILY
Qty: 30 TABLET | Refills: 11 | Status: SHIPPED | OUTPATIENT
Start: 2024-05-24

## 2024-05-24 RX ADMIN — FUROSEMIDE 40 MG: 40 TABLET ORAL at 08:22

## 2024-05-24 RX ADMIN — AZITHROMYCIN DIHYDRATE 250 MG: 250 TABLET ORAL at 08:22

## 2024-05-24 RX ADMIN — IPRATROPIUM BROMIDE AND ALBUTEROL SULFATE 3 ML: .5; 3 SOLUTION RESPIRATORY (INHALATION) at 00:37

## 2024-05-24 RX ADMIN — IPRATROPIUM BROMIDE AND ALBUTEROL SULFATE 3 ML: .5; 3 SOLUTION RESPIRATORY (INHALATION) at 13:39

## 2024-05-24 RX ADMIN — METOPROLOL TARTRATE 25 MG: 25 TABLET, FILM COATED ORAL at 08:23

## 2024-05-24 RX ADMIN — Medication 10 ML: at 08:23

## 2024-05-24 RX ADMIN — ROFLUMILAST 500 MCG: 500 TABLET ORAL at 08:23

## 2024-05-24 RX ADMIN — GUAIFENESIN 600 MG: 600 TABLET ORAL at 08:23

## 2024-05-24 RX ADMIN — PANTOPRAZOLE SODIUM 40 MG: 40 TABLET, DELAYED RELEASE ORAL at 05:41

## 2024-05-24 RX ADMIN — PREDNISONE 30 MG: 20 TABLET ORAL at 08:22

## 2024-05-24 NOTE — DISCHARGE SUMMARY
[facility]         HOSPITALIST  DISCHARGE SUMMARY    Patient Name: Sumeet Gomes  : 1960  MRN: [id number]    Date of Admission: 2024  Date of Discharge:  ***  Primary Care Physician: Mirtha Alexander APRN    Consults       Date and Time Order Name Status Description    2024  7:42 AM Inpatient Urology Consult      2024  9:39 AM Inpatient Pulmonology Consult Completed     2024  4:53 PM Inpatient Hospitalist Consult              Active and Resolved Hospital Problems:  Active Hospital Problems    Diagnosis POA    **COPD exacerbation [J44.1] Yes    Mucus plugging of bronchi [T17.500A] Yes    Tobacco abuse [Z72.0] Yes    Pulmonary hypertension [I27.20] Yes    Fatigue [R53.83] Yes    Essential hypertension [I10] Yes      Resolved Hospital Problems   No resolved problems to display.       Hospital Course     Hospital Course:  Sumeet Gomes is a 63 y.o. male ***        DISCHARGE Follow Up Recommendations for labs and diagnostics: ***      Day of Discharge     Vital Signs:  Temp:  [97.3 °F (36.3 °C)-98.1 °F (36.7 °C)] 98.1 °F (36.7 °C)  Heart Rate:  [63-83] 72  Resp:  [18-22] 18  BP: ()/(60-71) 109/69  Flow (L/min):  [3] 3  Physical Exam: ***      Discharge Details        Discharge Medications        New Medications        Instructions Start Date   predniSONE 10 MG tablet  Commonly known as: DELTASONE   Take 3 tablets by mouth Daily With Breakfast for 5 days, THEN 2 tablets Daily With Breakfast for 5 days, THEN 1 tablet Daily With Breakfast for 5 days.   Start Date: May 25, 2024            Changes to Medications        Instructions Start Date   ipratropium-albuterol 0.5-2.5 mg/3 ml nebulizer  Commonly known as: DUO-NEB  What changed: See the new instructions.   INHALE 3 ML BY NEBULIZATION 4 TIMES A DAY      metoprolol tartrate 25 MG tablet  Commonly known as: LOPRESSOR  What changed:   how much to take  how to take this  when to take this   Half tab BID              Continue These Medications        Instructions Start Date   albuterol sulfate  (90 Base) MCG/ACT inhaler  Commonly known as: PROVENTIL HFA;VENTOLIN HFA;PROAIR HFA   2 puffs, Inhalation, Every 4 Hours PRN      arformoterol 15 MCG/2ML nebulizer solution  Commonly known as: Brovana   15 mcg, Nebulization, 2 Times Daily - RT      azithromycin 250 MG tablet  Commonly known as: ZITHROMAX   250 mg, Oral, Daily      budesonide 0.5 MG/2ML nebulizer solution  Commonly known as: Pulmicort   0.5 mg, Nebulization, 2 Times Daily      furosemide 20 MG tablet  Commonly known as: LASIX   40 mg, Oral, Daily      pantoprazole 40 MG EC tablet  Commonly known as: PROTONIX   40 mg, Oral, Every Early Morning      roflumilast 500 MCG tablet tablet  Commonly known as: DALIRESP   500 mcg, Oral, Daily      Yupelri 175 MCG/3ML nebulizer solution  Generic drug: revefenacin   175 mcg, Nebulization, Daily - RT               No Known Allergies    Discharge Disposition:  Home or Self Care    Diet:  Hospital:  Diet Order   Procedures    Diet: Regular/House; Fluid Consistency: Thin (IDDSI 0)       Discharge Activity:       CODE STATUS:  Code Status and Medical Interventions:   Ordered at: 05/12/24 1924     Level Of Support Discussed With:    Patient     Code Status (Patient has no pulse and is not breathing):    No CPR (Do Not Attempt to Resuscitate)     Medical Interventions (Patient has pulse or is breathing):    Full Support         Future Appointments   Date Time Provider Department Center   6/18/2024  1:30 PM NURSE/MA [place] [place] [facility] ON[facility] [id number] [facility]   6/18/2024  2:00 PM Elvis Pinzon MD [facility] ONC [id number] [facility]   7/25/2024  1:45 PM Drew Rodriguez MD [facility] CD [facility]           Pertinent  and/or Most Recent Results     PROCEDURES:   ***    LAB RESULTS:      Lab 05/24/24  0551 05/23/24  0655 05/22/24  0504 05/21/24  0516 05/20/24  0520   WBC 8.35 9.13 9.28 7.87 7.77   HEMOGLOBIN 12.5* 13.3 13.7 13.4 13.4   HEMATOCRIT 40.1 43.4 44.1 43.4  43.7   PLATELETS 158 170 165 156 154   NEUTROS ABS 5.90 7.09* 6.94 6.34 6.18   IMMATURE GRANS (ABS) 0.04 0.05 0.05 0.02 0.02   LYMPHS ABS 1.61 1.22 1.32 0.90 0.94   MONOS ABS 0.76 0.75 0.96* 0.61 0.63   EOS ABS 0.03 0.01 0.01 0.00 0.00   MCV 90.9 91.8 90.6 91.6 90.9         Lab 05/24/24  0551 05/23/24  0655 05/22/24  0504 05/21/24  0516 05/20/24  0520   SODIUM 141 140 140 140 140   POTASSIUM 4.7 4.1 4.1 4.6 4.5   CHLORIDE 99 100 95* 96* 94*   CO2 39.4* 39.3* 43.7* 41.7* 41.9*   ANION GAP 2.6* 0.7* 1.3* 2.3* 4.1*   BUN 23 25* 27* 25* 26*   CREATININE 0.59* 0.51* 0.58* 0.57* 0.55*   EGFR 109.7 114.6 110.3 110.8 112.1   GLUCOSE 90 87 88 114* 100*   CALCIUM 8.8 8.7 8.8 8.8 8.8   MAGNESIUM 2.1 2.2 2.2 2.1 2.1   PHOSPHORUS 3.6 3.3 3.1 3.7 3.2         Lab 05/23/24  0655 05/22/24  0504 05/21/24  0516 05/20/24  0520 05/19/24  0456   TOTAL PROTEIN 5.0* 5.1* 5.2* 5.2* 5.4*   ALBUMIN 3.4* 3.4* 3.4* 3.3* 3.4*   GLOBULIN 1.6 1.7 1.8 1.9 2.0   ALT (SGPT) 40 35 39 41 48*   AST (SGOT) 25 19 21 22 29   BILIRUBIN 0.2 0.2 0.2 0.3 0.3   ALK PHOS 143* 132* 122* 121* 117                     Brief Urine Lab Results       None          Microbiology Results (last 10 days)       Procedure Component Value - Date/Time    Fungus Culture - Wash, Bronchus [[id number]]  (Abnormal) Collected: 05/17/24 1433    Lab Status: Preliminary result Specimen: Wash from Bronchus Updated: 05/22/24 1116     Fungus Culture Scant growth (1+) Candida albicans    AFB Culture - Wash, Bronchus [[id number]] Collected: 05/17/24 1433    Lab Status: Preliminary result Specimen: Wash from Bronchus Updated: 05/24/24 1500     AFB Culture No AFB isolated at 1 week     AFB Stain No acid fast bacilli seen on direct smear      No acid fast bacilli seen on concentrated smear    Respiratory Culture - Wash, Bronchus [[id number]] Collected: 05/17/24 1433    Lab Status: Final result Specimen: Wash from Bronchus Updated: 05/19/24 1034     Respiratory Culture Scant growth (1+) Normal  respiratory jessica. No S. aureus or Pseudomonas aeruginosa detected. Final report.     Gram Stain Few (2+) WBCs seen      Rare (1+) Gram positive cocci in pairs and chains    Fungus Culture - Lavage, Lung, Right Lower Lobe [[id number]]  (Abnormal) Collected: 05/17/24 1431    Lab Status: Preliminary result Specimen: Lavage from Lung, Right Lower Lobe Updated: 05/22/24 1116     Fungus Culture Scant growth (1+) Candida albicans    AFB Culture - Lavage, Lung, Right Lower Lobe [[id number]] Collected: 05/17/24 1431    Lab Status: Preliminary result Specimen: Lavage from Lung, Right Lower Lobe Updated: 05/24/24 1500     AFB Culture No AFB isolated at 1 week     AFB Stain No acid fast bacilli seen on direct smear      No acid fast bacilli seen on concentrated smear    BAL Culture, Quantitative - Lavage, Lung, Right Lower Lobe [[id number]] Collected: 05/17/24 1431    Lab Status: Final result Specimen: Lavage from Lung, Right Lower Lobe Updated: 05/19/24 1034     BAL Culture 50,000 CFU/mL Normal respiratory jessica. No S. aureus or Pseudomonas aeruginosa detected. Final report.     Gram Stain Rare (1+) WBCs seen      Rare (1+) Gram positive cocci in pairs and chains    Pneumonia Panel - Lavage, Lung, Right Lower Lobe [[id number]]  (Abnormal) Collected: 05/17/24 1431    Lab Status: Final result Specimen: Lavage from Lung, Right Lower Lobe Updated: 05/17/24 1634     Escherichia coli PCR Not Detected     Acinetobacter calcoaceticus-baumannii complex PCR Not Detected     Enterobacter cloacae PCR Not Detected     Klebsiella oxytoca PCR Not Detected     Klebsiella pneumoniae group PCR Not Detected     Klebsiella aerogenes PCR Not Detected     Moraxella catarrhalis PCR Not Detected     Proteus species PCR Not Detected     Pseudomonas aeroginosa PCR Not Detected     Serratia marcescens PCR Not Detected     Staphylococcus aureus PCR Not Detected     Streptococcus pyogenes PCR Not Detected     Haemophilus influenzae PCR Not Detected      Streptococcus agalactiae PCR Not Detected     Streptococcus pneumoniae PCR Not Detected     Chlamydophila pneumoniae PCR Not Detected     Legionella pneumophilia PCR Not Detected     Mycoplasma pneumo by PCR Not Detected     ADENOVIRUS, PCR Not Detected     CTX-M Gene N/A     IMP Gene N/A     KPC Gene N/A     mecA/C and MREJ Gene N/A     NDM Gene N/A     OXA-48-like Gene N/A     VIM Gene N/A     Coronavirus Not Detected     Human Metapneumovirus Not Detected     Human Rhinovirus/Enterovirus Not Detected     Influenza A PCR Not Detected     Influenza B PCR Not Detected     RSV, PCR Not Detected     Parainfluenza virus PCR Detected            CT Abdomen Kidney With & Without Contrast    Result Date: 5/17/2024  Impression: Emphysematous changes of the lung bases. There is scarring and atelectasis similar to prior chest CT.  1.7 cm lesion in the interpolar cortex of the right kidney best seen delayed image 46 is  heterogeneous and demonstrate enhancement most concerning for neoplasm. Biopsy could be considered. There is no enlarged adenopathy identified.  No other change compared to recent noncontrast CT.   Electronically Signed By-GABRIEL MAGALLON MD On:5/17/2024 1:40 PM                Results for orders placed during the hospital encounter of 12/04/23    Adult Transthoracic Echo Complete W/ Cont if Necessary Per Protocol    Interpretation Summary    Left ventricular systolic function is normal. Estimated left ventricular EF = 55%    Left ventricular diastolic function was normal.    The right ventricular cavity is moderately dilated.    The right atrial cavity is moderately  dilated.    Estimated right ventricular systolic pressure from tricuspid regurgitation is moderately elevated (45-55 mmHg).      Labs Pending at Discharge:  Pending Labs       Order Current Status    AFB Culture - Lavage, Lung, Right Lower Lobe Preliminary result    AFB Culture - Wash, Bronchus Preliminary result    Fungus Culture - Lavage, Lung,  Right Lower Lobe Preliminary result    Fungus Culture - Wash, Bronchus Preliminary result              Time spent on Discharge including face to face service:  *** minutes    Electronically signed by Len Lawrence DO, 05/24/24, 6:24 PM EDT.      signed by Len Lawrence DO, 05/24/24, 6:24 PM EDT.

## 2024-05-24 NOTE — OUTREACH NOTE
Prep Survey      Flowsheet Row Responses   [facility] patient discharged from? [facility]   Is LACE score < 7 ? No   Eligibility [facility]   Date of Admission 05/12/24   Date of Discharge 05/24/24   Discharge diagnosis COPD exacerbation   Does the patient have one of the following disease processes/diagnoses(primary or secondary)? COPD   Prep survey completed? Yes            Cassie PRESTON - Registered Nurse

## 2024-05-24 NOTE — PROGRESS NOTES
[facility]   Hospitalist Progress Note  Date: 2024  Patient Name: Sumeet Gomes  : 1960  MRN: [id number]  Date of admission: 2024      Subjective   Subjective     Chief Complaint: Shortness of breath    Summary: 63 y.o. male with past medical history of hypertension, hyperlipidemia, COPD on home oxygen, pulmonary hypertension, CAD, and GERD presented to ED with complaint of shortness of breath over the past few weeks.  In the ED patient vitals were within normal limits on 3 L nasal cannula.  Labs were all relatively unremarkable given his chronic conditions including a normal lactic acid, proBNP, and negative COVID flu RSV.  Chest x-ray was negative for any acute findings.  He was admitted for COPD exacerbation, started on steroids, bronchodilators.  No evidence of pneumonia.  His [place] significant weight loss over the last 2 years, likely in setting of advanced COPD.  Patient went CT scan of the chest abdomen pelvis to rule out malignancy, there was an abnormal lesion found in the kidney, patient underwent dedicated scan with contrast, and this was significant for a 1.7 cm lesion for which urology was consulted, they are planning on repeat MRI of the kidney in 3 months to assess growth.  Patient's respiratory status is slow to improve but he is nearing his baseline.    Interval Followup: No acute events overnight, patient resting comfortably in bed, breathing improving, case management arranging percussion vest for outpatient.  Slowly making progress    Objective   Objective     Vitals:   Temp:  [97.7 °F (36.5 °C)-98.3 °F (36.8 °C)] 97.7 °F (36.5 °C)  Heart Rate:  [66-87] 82  Resp:  [16-20] 20  BP: ()/(56-71) 111/71  Flow (L/min):  [3] 3    Physical Exam   GEN: No acute distress  HEENT: Moist mucous membranes  LUNGS: Equal chest rise bilaterally  CARDIAC: Regular rate and rhythm  NEURO: Moving all 4 extremities spontaneously  SKIN: No obvious breakdown    Result Review    I have  personally reviewed the results below:  [x]  Laboratory personally viewed BMP, procalcitonin, CBC, personally reviewed chest x-ray with no cardiopulmonary abnormality  []  Microbiology  []  Radiology  []  EKG/Telemetry   []  Cardiology/Vascular   []  Pathology  []  Old records  []  Other:  CBC          5/21/2024    05:16 5/22/2024    05:04 5/23/2024    06:55   CBC   WBC 7.87  9.28  9.13    RBC 4.74  4.87  4.73    Hemoglobin 13.4  13.7  13.3    Hematocrit 43.4  44.1  43.4    MCV 91.6  90.6  91.8    MCH 28.3  28.1  28.1    MCHC 30.9  31.1  30.6    RDW 13.5  13.6  13.8    Platelets 156  165  170      CMP          5/21/2024    05:16 5/22/2024    05:04 5/23/2024    06:55   CMP   Glucose 114  88  87    BUN 25  27  25    Creatinine 0.57  0.58  0.51    EGFR 110.8  110.3  114.6    Sodium 140  140  140    Potassium 4.6  4.1  4.1    Chloride 96  95  100    Calcium 8.8  8.8  8.7    Total Protein 5.2  5.1  5.0    Albumin 3.4  3.4  3.4    Globulin 1.8  1.7  1.6    Total Bilirubin 0.2  0.2  0.2    Alkaline Phosphatase 122  132  143    AST (SGOT) 21  19  25    ALT (SGPT) 39  35  40    Albumin/Globulin Ratio 1.9  2.0  2.1    BUN/Creatinine Ratio 43.9  46.6  49.0    Anion Gap 2.3  1.3  0.7        Assessment & Plan   Assessment / Plan   COPD with acute exacerbation  Weight loss  Hypertension  Tobacco dependence  Pulmonary hypertension  CAD  Concern for renal mass, will need urologic follow-up    Continue to monitor in the hospital for workup and management of the above  Continue to wean prednisone today 30 mg  Continue with scheduled DuoNebs, Pulmicort and Brovana twice daily, albuterol as needed  Continue scheduled Mucinex and Tessalon Perles  Incentive spirometry at bedside  Continue daily azithromycin for COPD suppression completed course of Augmentin  Viral respiratory panel negative  Continue Daliresp and Singulair  CT scan of the chest personally reviewed, negative for any dense infiltrate or malignancy, CT scan of the abdomen  and pelvis with subtle hyperdense lesion in the right kidney, dedicated CT scan of the kidney with concerning 1.7 cm lesion, urology consulted, planning on close follow-up with repeat imaging in 3 months  Recommend colonoscopy on outpatient basis for routine screening  Continue home antihypertensives  Nicotine patch as needed  Pulmonology consulted and following, discussed management plan with Dr. Moya regarding hypoxemia, patient status post bronchoscopy with thick secretions and mucous plugging 5/23/2024   CBC, CMP reviewed 5/23/2024   Repeat CBC, CMP, mag and Phos in a.m. 5/23/2024      Discussed plan with RN.    DVT prophylaxis:  Medical DVT prophylaxis orders are present.        CODE STATUS:   Level Of Support Discussed With: Patient  Code Status (Patient has no pulse and is not breathing): No CPR (Do Not Attempt to Resuscitate)  Medical Interventions (Patient has pulse or is breathing): Full Support

## 2024-05-24 NOTE — PROGRESS NOTES
Pulmonary / Critical Care Progress Note      Patient Name: Sumeet Gomes  : 1960  MRN: [id number]  Attending:  Len Lawrence DO  Date of admission: 2024    Subjective   Subjective   Follow-up for respiratory failure, mucous plugging, parainfluenza pneumonia.    Over past 24 hours:  Remained on supplemental oxygen at 3 L/min.  Continued with azithromycin.  Remained on Brovana, Pulmicort and DuoNeb.  Continued oral prednisone.  RT  on board, arranging home chest vest.    No acute events overnight.  Did use astral vent overnight.    This morning,  Feeling better  Continues to use chest vest.  Using NIPPV with sleep.  Has some chest tightness.  No fever or chills.  Able to move around.  Has been moving around some in room.      Objective   Objective     Vitals:   Temp:  [97.7 °F (36.5 °C)-98.3 °F (36.8 °C)] 97.9 °F (36.6 °C)  Heart Rate:  [63-87] 74  Resp:  [18-20] 20  BP: ()/(56-71) 98/60  Flow (L/min):  [3] 3    Physical Exam:  Vital Signs Reviewed   Pleasant male  Very thin in nature  Does have kyphosis  Has very poor air exchange, no wheezing, crackles or rhonchi, resonant percussion bilaterally  He is alert and oriented      Result Review    Result Review:  I have personally reviewed the results from the time of this admission to 2024 07:13 EDT and agree with these findings:  [x]  Laboratory  [x]  Microbiology  [x]  Radiology  [x]  EKG/Telemetry   []  Cardiology/Vascular   []  Pathology  []  Old records  []  Other:  Most notable findings include:       Lab 24  0551 24  0655 24  0504 24  0516 24  0520 24  0456 24  0550   WBC 8.35 9.13 9.28 7.87 7.77 6.08 6.24   HEMOGLOBIN 12.5* 13.3 13.7 13.4 13.4 14.0 14.4   HEMATOCRIT 40.1 43.4 44.1 43.4 43.7 44.9 46.2   PLATELETS 158 170 165 156 154 143 163   SODIUM 141 140 140 140 140 139 142   POTASSIUM 4.7 4.1 4.1 4.6 4.5 4.6 4.4   CHLORIDE 99 100 95* 96* 94* 95* 96*   CO2 39.4* 39.3* 43.7* 41.7*  41.9* 43.8* 43.5*   BUN 23 25* 27* 25* 26* 27* 21   CREATININE 0.59* 0.51* 0.58* 0.57* 0.55* 0.62* 0.58*   GLUCOSE 90 87 88 114* 100* 115* 119*   CALCIUM 8.8 8.7 8.8 8.8 8.8 8.7 9.1   PHOSPHORUS 3.6 3.3 3.1 3.7 3.2 2.9 3.0   TOTAL PROTEIN  --  5.0* 5.1* 5.2* 5.2* 5.4* 5.6*   ALBUMIN  --  3.4* 3.4* 3.4* 3.3* 3.4* 3.6   GLOBULIN  --  1.6 1.7 1.8 1.9 2.0 2.0     CT Chest Without Contrast Diagnostic    Result Date: 5/13/2024  CT Chest without Contrast  INDICATION: Hypoxia. Weight loss. History of COPD.  TECHNIQUE: CT of the thorax without contrast. Coronal and sagittal reconstructions were obtained.  Radiation dose reduction techniques included automated exposure control or exposure modulation based on body size.  COMPARISON: 3/9/2023  FINDINGS: Please see abdomen pelvis CT report dictated separately. There is bilateral gynecomastia. No pleural or pericardial effusion is seen. There is atherosclerotic disease and coronary artery disease. No definite adenopathy. The luis are poorly characterized in the absence of IV contrast.  Lung windows demonstrate emphysema. There is some chronic scarring in both lung bases that is similar to prior. No evidence of acute pneumonia or pulmonary edema. No suspicious pulmonary masses. No pneumothorax.      Impression:  COPD with no acute or suspicious findings in the chest  Electronically Signed By-Dr. Dawson Hensley MD On:5/13/2024 10:35 PM       All cultures so far negative.  Viral panel negative      Assessment & Plan   Assessment / Plan     Active Hospital Problems:  Active Hospital Problems    Diagnosis     **COPD exacerbation     Mucus plugging of bronchi     Tobacco abuse     Pulmonary hypertension     Fatigue     Essential hypertension        Impression:  Acute exacerbation of COPD/asthma overlap syndrome  Chronic hypoxemic and hypercapnic respiratory failure on 3 L of oxygen and NIPPV at home  Parainfluenza pneumonia  Mucous plugging/issues with airway  clearance  Hypophosphatemia  Pulmonary hypertension secondary to WHO class III left heart disease  Ongoing tobacco abuse of cigarettes     Plan:  Will continue with LABA LAMA inhaled corticosteroid  Continue Zithromax  Continue with Daliresp  Continue NIPPV  Continue chest vest  Continue with prednisone 30 mg once daily.  Will need slow taper over next 2 weeks.  Slowly improving respiratory status.  Continue with Lasix 40 mg orally daily.    Mr. Gomes has a history of bronchiectasis visualized by CT 5/13/2024 and daily productive cough for more than six months. He has been using the Aerobika OPEP and nebulized hypersaline, but treatment failed to mobilize secretions.  Mr. Gomes underwent bronchoscopy for mucous plugging with thick viscous purulent secretions which were obstructing the entire left and right tracheobronchial trees.  The patient will need to have a chest percussion vest at home to loosen and help mobilize his thick secretions.   Home chest vest to be delivered by the end of this week.  Stable for discharge from pulmonary standpoint.    Improving clinically.  Can discharge from pulmonary standpoint.    We will sign off now, please call with questions.      DVT prophylaxis:  Medical DVT prophylaxis orders are present.    CODE STATUS:   Level Of Support Discussed With: Patient  Code Status (Patient has no pulse and is not breathing): No CPR (Do Not Attempt to Resuscitate)  Medical Interventions (Patient has pulse or is breathing): Full Support      I have reviewed labs, imaging, pertinent clinical data and provider notes.   I have discussed with bedside nurse and primary service.     Electronically signed by Ulices Moya MD, 05/24/24, 7:13 AM EDT.

## 2024-05-24 NOTE — CONSULTS
"Nutrition Services    Patient Name: Sumeet Gomes  YOB: 1960  MRN: [id number]  Admission date: 5/12/2024      CLINICAL NUTRITION ASSESSMENT      Reason for Assessment  Follow Up     H&P:  Past Medical History:   Diagnosis Date    CHF (congestive heart failure)     COPD (chronic obstructive pulmonary disease)     COPD (chronic obstructive pulmonary disease)     Coronary artery disease     Diastolic heart failure     Hyperlipidemia     Hypertension     Pulmonary hypertension         Current Problems:   Active Hospital Problems    Diagnosis     **COPD exacerbation     Mucus plugging of bronchi     Tobacco abuse     Pulmonary hypertension     Fatigue     Essential hypertension         Nutrition/Diet History         Narrative   Upon my visit pt reports a good appetite/intake. Per nursing documentation, pt is consuming 100% of meals. Pt is drinking the boost. +BM.      Anthropometrics        Current Height, Weight Height: 170.2 cm (67\")  Weight: 51.5 kg (113 lb 8.6 oz)   Current BMI Body mass index is 17.78 kg/m².   BMI Classification Underweight   % IBW 78%   Adjusted Body Weight (ABW)    Weight Hx  Wt Readings from Last 30 Encounters:   05/12/24 1844 51.5 kg (113 lb 8.6 oz)   05/12/24 1428 53.9 kg (118 lb 13.3 oz)   02/22/24 1543 59.8 kg (131 lb 12.8 oz)   02/12/24 1317 59.4 kg (130 lb 15.3 oz)   01/17/24 1344 59.4 kg (131 lb)   01/03/24 1303 55 kg (121 lb 3.2 oz)   12/28/23 0600 53.6 kg (118 lb 2.7 oz)   12/27/23 0451 52.9 kg (116 lb 10 oz)   12/26/23 2000 52.1 kg (114 lb 13.8 oz)   12/18/23 1350 55.7 kg (122 lb 12.7 oz)   12/14/23 1123 54.8 kg (120 lb 12.8 oz)   12/04/23 1207 61 kg (134 lb 7.7 oz)   12/04/23 1124 64.7 kg (142 lb 9.6 oz)   11/25/23 1144 63.1 kg (139 lb 1.8 oz)   08/14/23 1345 62.1 kg (137 lb)   08/02/23 0907 62.4 kg (137 lb 9.6 oz)   06/27/23 1128 61.2 kg (135 lb)   06/19/23 0820 64.2 kg (141 lb 8.6 oz)   05/26/23 0808 63 kg (138 lb 14.4 oz)   03/16/23 1059 63.5 kg (139 lb 15.9 oz) "   02/27/23 0856 63.6 kg (140 lb 4.8 oz)   02/01/23 1403 63.1 kg (139 lb 3.2 oz)   12/22/22 0807 63.1 kg (139 lb 3.2 oz)   11/30/22 1544 62.8 kg (138 lb 7.2 oz)   11/30/22 1457 63 kg (138 lb 12.8 oz)   11/14/22 1030 62.4 kg (137 lb 9.6 oz)   11/08/22 0817 62.5 kg (137 lb 12.8 oz)   10/21/22 1307 62.1 kg (136 lb 14.5 oz)   10/06/22 0855 63 kg (139 lb)   09/20/22 1445 57.7 kg (127 lb 3.2 oz)   09/18/22 0337 61.1 kg (134 lb 11.2 oz)   09/12/22 1628 60.6 kg (133 lb 9.6 oz)   06/14/22 1319 63.6 kg (140 lb 3.2 oz)   05/20/22 1431 66.2 kg (146 lb)          Wt Change Observation -10% x 3 months--clinically significant      Estimated/Assessed Needs  Estimated Needs based on: Ideal Body Weight       Energy Requirements 30-35 kcal/kg    EST Needs (kcal/day) 8839-4131       Protein Requirements 1.0-1.2 g/kg   EST Daily Needs (g/day) 66-79       Fluid Requirements 30 ml/kg    Estimated Needs (mL/day) 1980     Labs/Medications         Pertinent Labs Reviewed.   Results from last 7 days   Lab Units 05/24/24  0551 05/23/24  0655 05/22/24  0504 05/21/24  0516   SODIUM mmol/L 141 140 140 140   POTASSIUM mmol/L 4.7 4.1 4.1 4.6   CHLORIDE mmol/L 99 100 95* 96*   CO2 mmol/L 39.4* 39.3* 43.7* 41.7*   BUN mg/dL 23 25* 27* 25*   CREATININE mg/dL 0.59* 0.51* 0.58* 0.57*   CALCIUM mg/dL 8.8 8.7 8.8 8.8   BILIRUBIN mg/dL  --  0.2 0.2 0.2   ALK PHOS U/L  --  143* 132* 122*   ALT (SGPT) U/L  --  40 35 39   AST (SGOT) U/L  --  25 19 21   GLUCOSE mg/dL 90 87 88 114*     Results from last 7 days   Lab Units 05/24/24  0551 05/23/24  0655 05/22/24  0504   MAGNESIUM mg/dL 2.1 2.2 2.2   PHOSPHORUS mg/dL 3.6 3.3 3.1   HEMOGLOBIN g/dL 12.5* 13.3 13.7   HEMATOCRIT % 40.1 43.4 44.1     COVID19   Date Value Ref Range Status   05/14/2024 Not Detected Not Detected - Ref. Range Final     Lab Results   Component Value Date    HGBA1C 5.50 09/12/2023         Pertinent Medications Reviewed.     Malnutrition Severity Assessment      Patient meets criteria for :  Severe Malnutrition         Nutrition Diagnosis         Nutrition Dx Problem 1 Severe malnutrition related to decreased ability to consume sufficient energy as evidenced by unintended weight change., body composition changes., and COPD     Nutrition Intervention           Current Nutrition Orders & Evaluation of Intake       Current PO Diet Diet: Regular/House; Fluid Consistency: Thin (IDDSI 0)   Supplement Orders Placed This Encounter      Dietary Nutrition Supplements Boost Plus (Ensure Plus); chocolate           Nutrition Intervention/Prescription        Boost Plus (jeff) TID    +1080 kcal, 42 g pro        Medical Nutrition Therapy/Nutrition Education          Learner     Readiness Patient  Acceptance     Method     Response Explanation  Verbalizes understanding     Monitor/Evaluation        Monitor PO intake, Supplement intake     Nutrition Discharge Plan         Recommend to continue oral nutrition supplements on discharge.      Electronically signed by:  Concha Henning RD  05/24/24 10:24 EDT

## 2024-05-24 NOTE — PLAN OF CARE
Goal Outcome Evaluation:              Outcome Evaluation: VSS. No acute events. Call light within reach.

## 2024-05-25 DIAGNOSIS — I10 ESSENTIAL HYPERTENSION: ICD-10-CM

## 2024-05-26 ENCOUNTER — TRANSITIONAL CARE MANAGEMENT TELEPHONE ENCOUNTER (OUTPATIENT)
Dept: CALL CENTER | Facility: HOSPITAL | Age: 64
End: 2024-05-26
Payer: MEDICAID

## 2024-05-26 NOTE — OUTREACH NOTE
Call Center TCM Note      Flowsheet Row Responses   [facility] patient discharged from? [facility]   Does the patient have one of the following disease processes/diagnoses(primary or secondary)? COPD   TCM attempt successful? Yes   Call start time 1213   Call end time 1220   Meds reviewed with patient/caregiver? Yes   Is the patient having any side effects they believe may be caused by any medication additions or changes? No   Does the patient have all medications ordered at discharge? Yes   Is the patient taking all medications as directed (includes completed medication regime)? Yes   Comments Patient prefers to contact PCP office at his convenience to schedule hospital f/u appt. Oncology appt 06/18/24, cardiology appt 07/25/24. Encouraged to also f/u with his pulmonologist.   Does the patient have an appointment with their PCP within 7-14 days of discharge? No   Nursing Interventions Patient declined scheduling/rescheduling appointment at this time, Routed TCM call to PCP office   Has home health visited the patient within 72 hours of discharge? N/A   DME comments Wearing home O2 at 3L continuous. Now has chest vest.   Pulse Ox monitoring Intermittent   Pulse Ox device source Patient   O2 Sat comments Staying in the 90s.   O2 Sat: education provided Sat levels, When to seek care   Psychosocial issues? No   Did the patient receive a copy of their discharge instructions? Yes   Nursing interventions Reviewed instructions with patient   What is the patient's perception of their health status since discharge? Improving   Nursing Interventions Nurse provided patient education   Are the patient's immunizations up to date?  Yes   Nursing interventions Advised patient to discuss with provider at next visit   If the patient is a current smoker, are they able to teach back resources for cessation? Not a smoker   Is the patient/caregiver able to teach back the hierarchy of who to call/visit for symptoms/problems? PCP,  Specialist, Home health nurse, Urgent Care, ED, 911 Yes   Is the patient able to teach back COPD zones? No   Nursing interventions Education provided on various zones   Patient reports what zone on this call? Green Zone   Green Zone Reports doing well, Breathing without shortness of breath, Usual activity and exercise level, Usual amounts of cough and phlegm/mucous, Slept well last night, Appetite is good   Green Zone interventions: Take daily medications, Use oxygen as prescribed, Continue regular exercise/diet plan, At all times avoid cigarette smoking, vaping and inhaled irritants   TCM call completed? Yes   Wrap up additional comments Patient states is improving. States now has chest vest, and able to breathe deeper. Denies any needs or concerns today. TCM complete.   Call end time 1220   Would this patient benefit from a Referral to Amb Social Work? No   Is the patient interested in additional calls from an ambulatory ? No            Heidi Melendez RN    5/26/2024, 12:21 EDT

## 2024-05-30 ENCOUNTER — OFFICE VISIT (OUTPATIENT)
Dept: FAMILY MEDICINE CLINIC | Age: 64
End: 2024-05-30
Payer: MEDICAID

## 2024-05-30 VITALS
DIASTOLIC BLOOD PRESSURE: 68 MMHG | BODY MASS INDEX: 19.3 KG/M2 | TEMPERATURE: 97.7 F | HEIGHT: 67 IN | SYSTOLIC BLOOD PRESSURE: 91 MMHG | OXYGEN SATURATION: 93 % | HEART RATE: 72 BPM | WEIGHT: 123 LBS

## 2024-05-30 DIAGNOSIS — N28.9 RENAL LESION: ICD-10-CM

## 2024-05-30 DIAGNOSIS — Z12.11 SCREEN FOR COLON CANCER: ICD-10-CM

## 2024-05-30 DIAGNOSIS — R63.4 WEIGHT LOSS: ICD-10-CM

## 2024-05-30 DIAGNOSIS — J44.9 CHRONIC OBSTRUCTIVE PULMONARY DISEASE, UNSPECIFIED COPD TYPE: Primary | ICD-10-CM

## 2024-05-30 PROCEDURE — 3074F SYST BP LT 130 MM HG: CPT | Performed by: NURSE PRACTITIONER

## 2024-05-30 PROCEDURE — 99214 OFFICE O/P EST MOD 30 MIN: CPT | Performed by: NURSE PRACTITIONER

## 2024-05-30 PROCEDURE — 1126F AMNT PAIN NOTED NONE PRSNT: CPT | Performed by: NURSE PRACTITIONER

## 2024-05-30 PROCEDURE — 3078F DIAST BP <80 MM HG: CPT | Performed by: NURSE PRACTITIONER

## 2024-05-30 NOTE — ASSESSMENT & PLAN NOTE
Reviewed discharge summary, continue current rx's and follow up with Pulm; and to continue to not smoke

## 2024-05-30 NOTE — PROGRESS NOTES
Transitional Care Follow Up Visit  Subjective     Sumeet Gomes is a 63 y.o. male who presents for a transitional care management visit.    Here today with is daughter Monik     Within 48 business hours after discharge our office contacted him via telephone to coordinate his care and needs.      I reviewed and discussed the details of that call along with the discharge summary, hospital problems, inpatient lab results, inpatient diagnostic studies, and consultation reports with Sumeet.     Current outpatient and discharge medications have been reconciled for the patient.  Reviewed by: ZULY Segundo          9/18/2022     4:03 PM 12/9/2023     9:02 PM 12/29/2023     4:52 PM 5/24/2024     7:12 PM   Date of TCM Phone Call   [facility]   Date of Admission 9/12/2022 12/4/2023 12/26/2023 5/12/2024   Date of Discharge 9/18/2022 12/9/2023 12/29/2023 5/24/2024   Discharge Disposition Home or Self Care Home or Self Care Home or Self Care        Risk for Readmission (LACE) Score: 14 (5/24/2024  6:00 AM)      History of Present Illness  MILA:  COPD exacerbation   Admitted: 5-  Discharged: 5-24-24  Condition : improved   Reports he has not started smoking again  Eating three meals a day.     [facility] Course:  Sumeet Gomes is a 63 y.o. male with past medical history of hypertension, hyperlipidemia, COPD on home oxygen, pulmonary hypertension, CAD, and GERD presented to ED with complaint of shortness of breath over the past few weeks. In the ED patient vitals were within normal limits on 3 L nasal cannula. Labs were all relatively unremarkable given his chronic conditions including a normal lactic acid, proBNP, and negative COVID flu RSV. Chest x-ray was negative for any acute findings. He was admitted for COPD exacerbation, started on steroids, bronchodilators. No evidence of pneumonia. His [place] significant weight loss  over the last 2 years, likely in setting of advanced COPD. Patient went CT scan of the chest abdomen pelvis to rule out malignancy, there was an abnormal lesion found in the kidney, patient underwent dedicated scan with contrast, and this was significant for a 1.7 cm lesion for which urology was consulted, they are planning on repeat MRI of the kidney in 3 months to assess growth. Patient's respiratory status is slow to improve.     Patient ultimately improved enough to be able to be discharged home.  Case management arranged percussion vest for outpatient.  He completed a course of Augmentin.  Recommend colonoscopy on outpatient basis.  Patient reported he feels like he is back to baseline and would like to go home.  He was able to be discharged in stable condition and will follow-up closely with pulmonary     DISCHARGE Follow Up Recommendations for labs and diagnostics: Recommend follow-up colonoscopy as an outpatient         PMH changes since 1-3-2024:        Covid +  and       COPD /sees pulm        Hospitalizations:      covid and COPD 23  COPD , 2024 &   back surgery         PREVENTIVE HEALTH MAINTENANCE             Hepatitis C Medicare Screening: was last done ; negative         Surgical History:       Broncosocpy 24    Heart cath   Arthroscopy: left knee  and right shoulder ;     Vasectomy     Other Surgeries:    Laminectomy: lumbar region; ;     Procedures:    Colonoscopy ( 12 )     COLONOSCOPY: was last done 12 with normal results Haddad         Family History:       Father: Hypertension;  Dementia     Mother:  at age 54; Cause of death was stroke;  Hypertension;  COPD     Brother(s): Healthy; 3 brother(s) total     Sister(s): 2 sister(s) total         Social History:       Occupation: [employer] /retired     Marital Status:      Children: 3 children             Course During Hospital Stay(Copied from D/C Summary and  reviewed during encounter on 05/30/2024 by ZULY Segundo):      The following portions of the patient's history were reviewed and updated as appropriate: allergies, current medications, past family history, past medical history, past social history, past surgical history, and problem list.      Current Outpatient Medications:     albuterol sulfate  (90 Base) MCG/ACT inhaler, Inhale 2 puffs Every 4 (Four) Hours As Needed for Wheezing., Disp: 36 g, Rfl: 6    arformoterol (Brovana) 15 MCG/2ML nebulizer solution, Take 2 mL by nebulization 2 (Two) Times a Day., Disp: 120 mL, Rfl: 11    azithromycin (ZITHROMAX) 250 MG tablet, Take 1 tablet by mouth Daily., Disp: 30 tablet, Rfl: 11    budesonide (Pulmicort) 0.5 MG/2ML nebulizer solution, Take 2 mL by nebulization 2 (Two) Times a Day., Disp: 60 each, Rfl: 11    furosemide (LASIX) 20 MG tablet, Take 2 tablets by mouth Daily., Disp: 60 tablet, Rfl: 11    ipratropium-albuterol (DUO-NEB) 0.5-2.5 mg/3 ml nebulizer, INHALE 3 ML BY NEBULIZATION 4 TIMES A DAY (Patient taking differently: Take 3 mL by nebulization 4 (Four) Times a Day As Needed.), Disp: 360 mL, Rfl: 3    metoprolol tartrate (LOPRESSOR) 25 MG tablet, TAKE 1/2 TABLET BY MOUTH TWICE A DAY, Disp: 90 tablet, Rfl: 0    pantoprazole (PROTONIX) 40 MG EC tablet, Take 1 tablet by mouth Every Morning., Disp: 30 tablet, Rfl: 11    predniSONE (DELTASONE) 10 MG tablet, Take 3 tablets by mouth Daily With Breakfast for 5 days, THEN 2 tablets Daily With Breakfast for 5 days, THEN 1 tablet Daily With Breakfast for 5 days., Disp: 30 tablet, Rfl: 0    revefenacin (Yupelri) 175 MCG/3ML nebulizer solution, Take 3 mL by nebulization Daily., Disp: 90 mL, Rfl: 11    roflumilast (DALIRESP) 500 MCG tablet tablet, Take 1 tablet by mouth Daily., Disp: 30 tablet, Rfl: 5    O2 (OXYGEN), Inhale 3 L/min 1 (One) Time., Disp: , Rfl:      Vitals:    05/30/24 1125   BP: 91/68   BP Location: Right arm   Patient Position: Sitting  "  Cuff Size: Adult   Pulse: 72   Temp: 97.7 °F (36.5 °C)   TempSrc: Oral   SpO2: 93%   Weight: 55.8 kg (123 lb)   Height: 170.2 cm (67\")       Advance Care Planning     Review of Systems   Constitutional:  Negative for fatigue and fever.        Weight in 1-2024 was 121   Respiratory:  Negative for cough and shortness of breath (increased SOA, on portable oxygen).    Cardiovascular:  Negative for chest pain and leg swelling.   Gastrointestinal:  Negative for nausea.        Objective   Physical Exam  Vitals reviewed.   Constitutional:       General: He is not in acute distress.     Appearance: Normal appearance.      Comments: Thin    Neck:      Vascular: No carotid bruit.   Cardiovascular:      Rate and Rhythm: Normal rate and regular rhythm.      Heart sounds: Normal heart sounds. No murmur heard.  Pulmonary:      Effort: Pulmonary effort is normal. No respiratory distress.      Breath sounds: Normal breath sounds.   Musculoskeletal:      Right lower leg: No edema.      Left lower leg: No edema.   Neurological:      Mental Status: He is alert.   Psychiatric:         Mood and Affect: Mood normal.         Behavior: Behavior normal.         DATA REVIEWED:    Data Reviewed:    CT Abdomen Kidney With & Without Contrast    Result Date: 5/17/2024  Impression: Impression: Emphysematous changes of the lung bases. There is scarring and atelectasis similar to prior chest CT.  1.7 cm lesion in the interpolar cortex of the right kidney best seen delayed image 46 is  heterogeneous and demonstrate enhancement most concerning for neoplasm. Biopsy could be considered. There is no enlarged adenopathy identified.  No other change compared to recent noncontrast CT.   Electronically Signed By-GABRIEL MAGALLON MD On:5/17/2024 1:40 PM      CT Abdomen Pelvis Without Contrast    Result Date: 5/13/2024  Impression:  1. Subtle hyperdense lesion in the right kidney could potentially reflect a mass. Follow-up with renal protocol CT with " contrast is recommended. 2. No acute findings in the abdomen or pelvis. 3. Colonic diverticulosis without diverticulitis or colitis. Normal appendix and small bowel. 4. Mild prostate enlargement. Correlate with physical exam and PSA value.     Electronically Signed By-Dr. Dawson Hensley MD On:5/13/2024 10:43 PM      CT Chest Without Contrast Diagnostic    Result Date: 5/13/2024  Impression:  COPD with no acute or suspicious findings in the chest  Electronically Signed By-Dr. Dawson Hensley MD On:5/13/2024 10:35 PM      XR Chest 1 View    Result Date: 5/12/2024  Impression: Impression: 1.  No acute cardiopulmonary disease   Electronically Signed By-Ciro Martin MD On:5/12/2024 4:43 PM       Assessment & Plan     Diagnoses and all orders for this visit:    1. Chronic obstructive pulmonary disease, unspecified COPD type (Primary)  Assessment & Plan:  Reviewed discharge summary, continue current rx's and follow up with Pulm; and to continue to not smoke          2. Weight loss  Assessment & Plan:  Reviewed weight, Discussed healthy eating, reviewed labs, sending for CLN    Orders:  -     Ambulatory Referral For Screening Colonoscopy    3. Renal lesion  Assessment & Plan:  1.7 cm lesion for which urology was consulted, they are planning on repeat MRI of the kidney in 3 months to assess growth. Keep that appt      4. Screen for colon cancer  Assessment & Plan:  Reviewed last CLN 7-2012, will send to [facility] for screening CLN    Orders:  -     Ambulatory Referral For Screening Colonoscopy        Follow Up      Return if symptoms worsen or fail to improve, for Next scheduled follow up.

## 2024-05-30 NOTE — ASSESSMENT & PLAN NOTE
1.7 cm lesion for which urology was consulted, they are planning on repeat MRI of the kidney in 3 months to assess growth. Keep that appt

## 2024-05-31 LAB
MYCOBACTERIUM SPEC CULT: NORMAL
MYCOBACTERIUM SPEC CULT: NORMAL
NIGHT BLUE STAIN TISS: NORMAL

## 2024-06-05 ENCOUNTER — TELEPHONE (OUTPATIENT)
Dept: PULMONOLOGY | Facility: CLINIC | Age: 64
End: 2024-06-05

## 2024-06-05 NOTE — TELEPHONE ENCOUNTER
"      Hub staff attempted to follow warm transfer process and was unsuccessful     Caller: Sumeet Gomes \"Olman\"    Relationship to patient: Self        Patient is needing: PT NEEDING PRESSURE CHANGE ON MACHINE SAYS ITS BLOWING TOO MUCH, PLEASE CALL AND ADVISE        "

## 2024-06-07 LAB
MYCOBACTERIUM SPEC CULT: NORMAL
MYCOBACTERIUM SPEC CULT: NORMAL
NIGHT BLUE STAIN TISS: NORMAL

## 2024-06-09 DIAGNOSIS — J43.9 PULMONARY EMPHYSEMA, UNSPECIFIED EMPHYSEMA TYPE: ICD-10-CM

## 2024-06-10 RX ORDER — IPRATROPIUM BROMIDE AND ALBUTEROL SULFATE 2.5; .5 MG/3ML; MG/3ML
SOLUTION RESPIRATORY (INHALATION)
Qty: 360 ML | Refills: 3 | Status: SHIPPED | OUTPATIENT
Start: 2024-06-10

## 2024-06-12 ENCOUNTER — TELEPHONE (OUTPATIENT)
Dept: PULMONOLOGY | Facility: CLINIC | Age: 64
End: 2024-06-12

## 2024-06-12 LAB
FUNGUS WND CULT: ABNORMAL
FUNGUS WND CULT: ABNORMAL

## 2024-06-12 NOTE — TELEPHONE ENCOUNTER
"Caller: Sumeet Gomes \"Olman\"    Relationship to patient: Self    Best call back number: [phone]    Patient is needing: LONI CHANGED OUT BPAP MACHINE ON MONDAY. STATES THAT HE CAN'T WEAR IT NOW. HE STATES THAT AERELEN ADVISED HIM TO REACH OUT TO [facility] TO ORDER A PRESSURE CHANGE. HE STATES THAT HE HAS ALREADY CALLED ONCE ABOUT THIS AND NEVER HEARD BACK. PLEASE CALL BACK TO ADVISE.         "

## 2024-06-12 NOTE — TELEPHONE ENCOUNTER
Ssent original message to Dr. Moya on 6/7.  I have called pt and apologized about the extended wait.  I explained that Dr. Moya will be back in tomorrow and ill check in on it again.

## 2024-06-14 LAB
MYCOBACTERIUM SPEC CULT: NORMAL
MYCOBACTERIUM SPEC CULT: NORMAL
NIGHT BLUE STAIN TISS: NORMAL

## 2024-06-14 NOTE — TELEPHONE ENCOUNTER
Pressure changes sent.  Dr. Moya called me and gave me numbers.  Pt is aware.  Pt also states that new oxygen order needed sent.  Both sent .

## 2024-06-18 ENCOUNTER — LAB (OUTPATIENT)
Dept: ONCOLOGY | Facility: HOSPITAL | Age: 64
End: 2024-06-18
Payer: MEDICAID

## 2024-06-18 ENCOUNTER — OFFICE VISIT (OUTPATIENT)
Dept: ONCOLOGY | Facility: HOSPITAL | Age: 64
End: 2024-06-18
Payer: MEDICAID

## 2024-06-18 VITALS
HEART RATE: 78 BPM | DIASTOLIC BLOOD PRESSURE: 60 MMHG | TEMPERATURE: 98 F | RESPIRATION RATE: 18 BRPM | BODY MASS INDEX: 19.86 KG/M2 | OXYGEN SATURATION: 90 % | SYSTOLIC BLOOD PRESSURE: 105 MMHG | WEIGHT: 126.8 LBS

## 2024-06-18 DIAGNOSIS — D69.6 THROMBOCYTOPENIA: Primary | ICD-10-CM

## 2024-06-18 DIAGNOSIS — D69.6 THROMBOCYTOPENIA: ICD-10-CM

## 2024-06-18 LAB
BASOPHILS # BLD AUTO: 0.02 10*3/MM3 (ref 0–0.2)
BASOPHILS NFR BLD AUTO: 0.3 % (ref 0–1.5)
DEPRECATED RDW RBC AUTO: 47.2 FL (ref 37–54)
EOSINOPHIL # BLD AUTO: 0.15 10*3/MM3 (ref 0–0.4)
EOSINOPHIL NFR BLD AUTO: 1.9 % (ref 0.3–6.2)
ERYTHROCYTE [DISTWIDTH] IN BLOOD BY AUTOMATED COUNT: 13.9 % (ref 12.3–15.4)
HCT VFR BLD AUTO: 45.2 % (ref 37.5–51)
HGB BLD-MCNC: 14.1 G/DL (ref 13–17.7)
IMM GRANULOCYTES # BLD AUTO: 0 10*3/MM3 (ref 0–0.05)
IMM GRANULOCYTES NFR BLD AUTO: 0 % (ref 0–0.5)
LYMPHOCYTES # BLD AUTO: 1.21 10*3/MM3 (ref 0.7–3.1)
LYMPHOCYTES NFR BLD AUTO: 15.7 % (ref 19.6–45.3)
MCH RBC QN AUTO: 28.1 PG (ref 26.6–33)
MCHC RBC AUTO-ENTMCNC: 31.2 G/DL (ref 31.5–35.7)
MCV RBC AUTO: 90.2 FL (ref 79–97)
MONOCYTES # BLD AUTO: 0.52 10*3/MM3 (ref 0.1–0.9)
MONOCYTES NFR BLD AUTO: 6.7 % (ref 5–12)
NEUTROPHILS NFR BLD AUTO: 5.81 10*3/MM3 (ref 1.7–7)
NEUTROPHILS NFR BLD AUTO: 75.4 % (ref 42.7–76)
PLATELET # BLD AUTO: 206 10*3/MM3 (ref 140–450)
PMV BLD AUTO: 9.3 FL (ref 6–12)
RBC # BLD AUTO: 5.01 10*6/MM3 (ref 4.14–5.8)
WBC NRBC COR # BLD AUTO: 7.71 10*3/MM3 (ref 3.4–10.8)

## 2024-06-18 PROCEDURE — 1126F AMNT PAIN NOTED NONE PRSNT: CPT | Performed by: INTERNAL MEDICINE

## 2024-06-18 PROCEDURE — 3078F DIAST BP <80 MM HG: CPT | Performed by: INTERNAL MEDICINE

## 2024-06-18 PROCEDURE — G0463 HOSPITAL OUTPT CLINIC VISIT: HCPCS | Performed by: INTERNAL MEDICINE

## 2024-06-18 PROCEDURE — 36415 COLL VENOUS BLD VENIPUNCTURE: CPT

## 2024-06-18 PROCEDURE — 99213 OFFICE O/P EST LOW 20 MIN: CPT | Performed by: INTERNAL MEDICINE

## 2024-06-18 PROCEDURE — 85025 COMPLETE CBC W/AUTO DIFF WBC: CPT

## 2024-06-18 PROCEDURE — 3074F SYST BP LT 130 MM HG: CPT | Performed by: INTERNAL MEDICINE

## 2024-06-18 NOTE — PROGRESS NOTES
Chief Complaint/Reason for Referral:  Thrombocytopenia    Mirtha Alexander, *  Mirtha Alexander, APRN    Records Obtained:  Records of the patients history including those obtained from  were reviewed and summarized in detail.    Subjective    History of Present Illness   Mr. Sumeet Gomes is a very pleasant 62 year old  male presenting for follow up for mild thrombocytopenia. Recently discharged last month from hospital after admission for hypoxia. Feeling better. Has gained weight. No bleeding but does have easy bruising. Plts are normal. No fevers, chills.      Hematology History    He has co-morbid history of CAD, HLD, hypertension, CHF, COPD, hypoxia, pulmonary hypertension, tobacco abuse,  asperillosis pulmonary infection and has been taking Itraconazole for this. He has been following with Dr. Moya for his pulmonary issues. He has chronic hypoxia and uses continuous oxygen at 6 liters but his oxygen is sitting on 2 liters / NC current. Reports he is trying to quit smoking and is down to around 3 cigarettes a day using Chantix.     2/1/23: Plt count of 126,000 from 147,000 in November.     2/28/23: Plt 129,000. He has normal WBC and hemoglobin. Denies any persistent bleeding or bruising issues.     6/2023: B12, folate normal    12/9/23: Plt 98K, hgb 13.6, WBC 6.81    6/18/24: WBC 7.71, hgb 14.1, plt 206        Oncology/Hematology History    No history exists.       Review of Systems   Constitutional:  Positive for fatigue. Negative for appetite change, diaphoresis, fever, unexpected weight gain and unexpected weight loss.   HENT:  Negative for hearing loss, mouth sores, sore throat, swollen glands, trouble swallowing and voice change.    Eyes:  Negative for blurred vision, double vision, pain, redness and visual disturbance.   Respiratory:  Positive for shortness of breath. Negative for cough and wheezing.    Cardiovascular:  Negative for chest pain, palpitations and leg swelling.    Gastrointestinal:  Negative for abdominal pain, blood in stool, constipation, diarrhea, nausea and vomiting.   Endocrine: Negative for cold intolerance, heat intolerance, polydipsia and polyuria.   Genitourinary:  Negative for decreased urine volume, difficulty urinating, dysuria, frequency, hematuria and urinary incontinence.   Musculoskeletal:  Negative for arthralgias, back pain, joint swelling and myalgias.   Skin:  Negative for color change, rash, skin lesions and wound.   Neurological:  Negative for dizziness, seizures, weakness, numbness and headache.   Hematological:  Negative for adenopathy. Does not bruise/bleed easily.   Psychiatric/Behavioral:  Negative for depressed mood. The patient is not nervous/anxious.    All other systems reviewed and are negative.      Current Outpatient Medications on File Prior to Visit   Medication Sig Dispense Refill    albuterol sulfate  (90 Base) MCG/ACT inhaler Inhale 2 puffs Every 4 (Four) Hours As Needed for Wheezing. 36 g 6    arformoterol (Brovana) 15 MCG/2ML nebulizer solution Take 2 mL by nebulization 2 (Two) Times a Day. 120 mL 11    azithromycin (ZITHROMAX) 250 MG tablet Take 1 tablet by mouth Daily. 30 tablet 11    budesonide (Pulmicort) 0.5 MG/2ML nebulizer solution Take 2 mL by nebulization 2 (Two) Times a Day. 60 each 11    furosemide (LASIX) 20 MG tablet Take 2 tablets by mouth Daily. 60 tablet 11    ipratropium-albuterol (DUO-NEB) 0.5-2.5 mg/3 ml nebulizer INHALE 3 ML BY NEBULIZATION 4 TIMES A  mL 3    metoprolol tartrate (LOPRESSOR) 25 MG tablet TAKE 1/2 TABLET BY MOUTH TWICE A DAY 90 tablet 0    O2 (OXYGEN) Inhale 3 L/min 1 (One) Time.      pantoprazole (PROTONIX) 40 MG EC tablet Take 1 tablet by mouth Every Morning. 30 tablet 11    revefenacin (Yupelri) 175 MCG/3ML nebulizer solution Take 3 mL by nebulization Daily. 90 mL 11    roflumilast (DALIRESP) 500 MCG tablet tablet Take 1 tablet by mouth Daily. 30 tablet 5     No current  facility-administered medications on file prior to visit.       No Known Allergies  Past Medical History:   Diagnosis Date    CHF (congestive heart failure)     COPD (chronic obstructive pulmonary disease)     COPD (chronic obstructive pulmonary disease)     Coronary artery disease     Diastolic heart failure     Hyperlipidemia     Hypertension     Pulmonary hypertension      Past Surgical History:   Procedure Laterality Date    BACK SURGERY      BRONCHOSCOPY N/A 2022    Procedure: BRONCHOSCOPY WITH BRONCHOALVEOLAR LAVAGE AND BRONCHIAL WASHINGS;  Surgeon: Ulices Moya MD;  Location: [facility] ENDOSCOPY;  Service: Pulmonary;  Laterality: N/A;  MUCUS PLUGGING, COPD EXACERBATION    BRONCHOSCOPY      BRONCHOSCOPY N/A 2024    Procedure: BRONCHOSCOPY WITH BAL AND WASHINGS;  Surgeon: Collins Chapa MD;  Location: [facility] ENDOSCOPY;  Service: Pulmonary;  Laterality: N/A;  MUCUS PLUGGING    CARDIAC CATHETERIZATION N/A 2022    Procedure: Right Heart Cath;  Surgeon: Drew Rodriguez MD;  Location: [facility] CATH INVASIVE LOCATION;  Service: Cardiovascular;  Laterality: N/A;    OTHER SURGICAL HISTORY      knee     OTHER SURGICAL HISTORY      shoulder, rotator cuff    SHOULDER SURGERY Right 10/03/2022     Social History     Socioeconomic History    Marital status:    Tobacco Use    Smoking status: Former     Average packs/day: 0.2 packs/day for 48.9 years (12.2 ttl pk-yrs)     Types: Cigarettes     Start date: 1975     Quit date: 2023     Years since quittin.5     Passive exposure: Past (2 cigarettes per day, smokes some days)    Smokeless tobacco: Never   Vaping Use    Vaping status: Never Used   Substance and Sexual Activity    Alcohol use: Not Currently    Drug use: Never    Sexual activity: Defer     Family History   Problem Relation Age of Onset    Asthma Mother     Emphysema Mother             Stroke Mother      Immunization History   Administered Date(s) Administered     COVID-19 (MODERNA) 1st,2nd,3rd Dose Monovalent 06/18/2021, 06/18/2021, 07/23/2021, 07/23/2021    Flu Vaccine Quad PF >36MO 11/08/2019    Fluzone (or Fluarix & Flulaval for VFC) >6mos 11/08/2019, 10/25/2021, 10/06/2022, 12/14/2023    Hepatitis A 11/13/2018    Pneumococcal Conjugate 13-Valent (PCV13) 11/07/2018    Pneumococcal Conjugate 20-Valent (PCV20) 11/08/2022    Pneumococcal Polysaccharide (PPSV23) 02/13/2017    RSV, unspecified (Vaccine or MAB) 03/01/2024    Tdap 06/13/2012    Zostavax 06/13/2012       Tobacco Use: Medium Risk (6/18/2024)    Patient History     Smoking Tobacco Use: Former     Smokeless Tobacco Use: Never     Passive Exposure: Past       Objective     Physical Exam  Constitutional:       Appearance: Normal appearance.   HENT:      Head: Normocephalic and atraumatic.      Nose: Nose normal.   Eyes:      Conjunctiva/sclera: Conjunctivae normal.   Pulmonary:      Effort: Pulmonary effort is normal.   Skin:     Findings: Bruising present.   Neurological:      General: No focal deficit present.      Mental Status: He is alert. Mental status is at baseline.   Psychiatric:         Mood and Affect: Mood normal.         Behavior: Behavior normal.         Thought Content: Thought content normal.       There were no vitals filed for this visit.        Wt Readings from Last 3 Encounters:   05/30/24 55.8 kg (123 lb)   05/12/24 51.5 kg (113 lb 8.6 oz)   02/22/24 59.8 kg (131 lb 12.8 oz)        BMI is within normal parameters. No other follow-up for BMI required.                 ECOG: (1) Restricted in Physically Strenuous Activity, Ambulatory & Able to Do Work of Light Nature  Fall Risk Assessment was completed, and patient is at moderate risk for falls.  PHQ-9 Total Score:         The patient is  experiencing fatigue. Fatigue score: 5    PT/OT Functional Screening: PT fx screen: Difficulty Walking and No needs identified  Speech Functional Screening: Speech fx screen: No needs identified  Rehab to be  "ordered: Rehab to be ordered: No needs identified        Result Review :  The following data was reviewed by: Tessa Vergara MA on 06/18/24:  Lab Results   Component Value Date    HGB 12.5 (L) 05/24/2024    HCT 40.1 05/24/2024    MCV 90.9 05/24/2024     05/24/2024    WBC 8.35 05/24/2024    NEUTROABS 5.90 05/24/2024    LYMPHSABS 1.61 05/24/2024    MONOSABS 0.76 05/24/2024    EOSABS 0.03 05/24/2024    BASOSABS 0.01 05/24/2024     Lab Results   Component Value Date    GLUCOSE 90 05/24/2024    BUN 23 05/24/2024    CREATININE 0.59 (L) 05/24/2024     05/24/2024    K 4.7 05/24/2024    CL 99 05/24/2024    CO2 39.4 (H) 05/24/2024    CALCIUM 8.8 05/24/2024    PROTEINTOT 5.0 (L) 05/23/2024    ALBUMIN 3.4 (L) 05/23/2024    BILITOT 0.2 05/23/2024    ALKPHOS 143 (H) 05/23/2024    AST 25 05/23/2024    ALT 40 05/23/2024     Lab Results   Component Value Date     12/26/2023    FERRITIN 803.10 (H) 12/26/2023    [id number] 879 06/19/2023    FOLATE 14.00 06/19/2023     No results found for: \"IRON\", \"LABIRON\", \"TRANSFERRIN\", \"TIBC\"  Lab Results   Component Value Date     12/26/2023    FERRITIN 803.10 (H) 12/26/2023    [id number] 879 06/19/2023    FOLATE 14.00 06/19/2023     Lab Results   Component Value Date    PSA 0.361 02/01/2023     Labs personally reviewed and notable for normal platelets         Assessment and Plan:  Diagnoses and all orders for this visit:    1. Thrombocytopenia (Primary)          Denies any acute bleeding or persistent bleeding issues, but does have easy bruising. Thyroid function was normal on 2/1/23. B12, folate normal as of 6/2023. Smear with normal morphology of all cell lines 6/2023. 12/23 ct without splenomegaly. Was likely worse due to hospitalization. Platelet now normal and has been normal since 12/2023. Platelet count is acceptable for hemostasis. No scheduled hematology visit needed at this time. Counseled to return if he develops bleeding issues or low platelets on routine " blood work from PCP.  Patient agreeable.       I spent 20 minutes caring for Sumeet on this date of service. This time includes time spent by me in the following activities:preparing for the visit, reviewing tests, obtaining and/or reviewing a separately obtained history, performing a medically appropriate examination and/or evaluation , counseling and educating the patient/family/caregiver, ordering medications, tests, or procedures, referring and communicating with other health care professionals , documenting information in the medical record and independently interpreting results and communicating that information with the patient/family/caregiver    Patient Follow Up: PRN    Patient was given instructions and counseling regarding his condition or for health maintenance advice. Please see specific information pulled into the AVS if appropriate.

## 2024-06-20 ENCOUNTER — TELEPHONE (OUTPATIENT)
Dept: PULMONOLOGY | Facility: CLINIC | Age: 64
End: 2024-06-20
Payer: MEDICAID

## 2024-06-20 NOTE — TELEPHONE ENCOUNTER
Patient called in regards to his vent. He stated that he spoke with someone last week about his pressure being to strong and needing it changed. I informed him that Catherine had faxed over the order for the pressure change to be completed and the patient stated it had not been done yet.     I called [facility] and spoke with them and they stated that they had received the order to change the pressure on patients vent and would contact the RT and have her contact the patient to get the settings changed.     I called and informed patient that the RT should be contacting him today to get it scheduled to get his settings change. Patient verbalized understanding and stated he would contact us if he has not heard anything by next week he would call and let us know.

## 2024-06-21 LAB
MYCOBACTERIUM SPEC CULT: NORMAL
MYCOBACTERIUM SPEC CULT: NORMAL
NIGHT BLUE STAIN TISS: NORMAL

## 2024-06-25 ENCOUNTER — OFFICE VISIT (OUTPATIENT)
Dept: GASTROENTEROLOGY | Facility: CLINIC | Age: 64
End: 2024-06-25
Payer: MEDICAID

## 2024-06-25 ENCOUNTER — TELEPHONE (OUTPATIENT)
Dept: GASTROENTEROLOGY | Facility: CLINIC | Age: 64
End: 2024-06-25
Payer: MEDICAID

## 2024-06-25 VITALS
HEIGHT: 67 IN | BODY MASS INDEX: 19.81 KG/M2 | HEART RATE: 76 BPM | OXYGEN SATURATION: 91 % | SYSTOLIC BLOOD PRESSURE: 109 MMHG | DIASTOLIC BLOOD PRESSURE: 63 MMHG | WEIGHT: 126.2 LBS

## 2024-06-25 DIAGNOSIS — K21.9 GASTROESOPHAGEAL REFLUX DISEASE, UNSPECIFIED WHETHER ESOPHAGITIS PRESENT: ICD-10-CM

## 2024-06-25 DIAGNOSIS — R63.4 WEIGHT LOSS: Primary | ICD-10-CM

## 2024-06-25 DIAGNOSIS — Z12.11 ENCOUNTER FOR SCREENING FOR MALIGNANT NEOPLASM OF COLON: ICD-10-CM

## 2024-06-25 PROCEDURE — 3078F DIAST BP <80 MM HG: CPT

## 2024-06-25 PROCEDURE — 1160F RVW MEDS BY RX/DR IN RCRD: CPT

## 2024-06-25 PROCEDURE — 1159F MED LIST DOCD IN RCRD: CPT

## 2024-06-25 PROCEDURE — 3074F SYST BP LT 130 MM HG: CPT

## 2024-06-25 PROCEDURE — 99214 OFFICE O/P EST MOD 30 MIN: CPT

## 2024-06-25 RX ORDER — SODIUM PICOSULFATE, MAGNESIUM OXIDE, AND ANHYDROUS CITRIC ACID 12; 3.5; 1 G/175ML; G/175ML; MG/175ML
175 LIQUID ORAL TAKE AS DIRECTED
Qty: 175 ML | Refills: 0 | Status: SHIPPED | OUTPATIENT
Start: 2024-06-25

## 2024-06-25 NOTE — PROGRESS NOTES
Chief Complaint  Weight Loss    Sumeet Gomes is a 63 y.o. male who presents to [facility] on referral from [place] for a gastroenterology evaluation of weight loss.      History of Present Illness  New patient presents to the office for weight loss, GERD, and screening colonoscopy. Patient previously was losing weight but this has stabilized. He started losing weight about 2-3 years ago. Last colonoscopy was in 2012 that was normal. Denies family history of colon cancer. Reflux is well controlled with Protonix 40mg daily. Denies nausea, vomiting, epigastric pain, and dysphagia. He has had some diarrhea since starting Azithromycin. Prior to this medication he denies struggling with altered bowel habits. Denies melena and hematochezia.     CT abdomen/pelvis without contrast 5/12/2024-subtle hyperdense lesion in the right kidney, mild prostate enlargement, colonic diverticulosis    Colonoscopy 7/30/2012 by Dr. Haddad -normal    Past Medical History:   Diagnosis Date    CHF (congestive heart failure)     COPD (chronic obstructive pulmonary disease)     COPD (chronic obstructive pulmonary disease)     Coronary artery disease     Diastolic heart failure     Hyperlipidemia     Hypertension     Pulmonary hypertension        Past Surgical History:   Procedure Laterality Date    BACK SURGERY      BRONCHOSCOPY N/A 09/14/2022    Procedure: BRONCHOSCOPY WITH BRONCHOALVEOLAR LAVAGE AND BRONCHIAL WASHINGS;  Surgeon: Ulices Moya MD;  Location: [facility] ENDOSCOPY;  Service: Pulmonary;  Laterality: N/A;  MUCUS PLUGGING, COPD EXACERBATION    BRONCHOSCOPY      BRONCHOSCOPY N/A 5/17/2024    Procedure: BRONCHOSCOPY WITH BAL AND WASHINGS;  Surgeon: Collins Chapa MD;  Location: [facility] ENDOSCOPY;  Service: Pulmonary;  Laterality: N/A;  MUCUS PLUGGING    CARDIAC CATHETERIZATION N/A 09/16/2022    Procedure: Right Heart Cath;  Surgeon: Drew Rodriguez MD;  Location:   [facility];  Service: Cardiovascular;  Laterality: N/A;    OTHER SURGICAL HISTORY      knee     OTHER SURGICAL HISTORY      shoulder, rotator cuff    SHOULDER SURGERY Right 10/03/2022         Current Outpatient Medications:     albuterol sulfate  (90 Base) MCG/ACT inhaler, Inhale 2 puffs Every 4 (Four) Hours As Needed for Wheezing., Disp: 36 g, Rfl: 6    arformoterol (Brovana) 15 MCG/2ML nebulizer solution, Take 2 mL by nebulization 2 (Two) Times a Day., Disp: 120 mL, Rfl: 11    budesonide (Pulmicort) 0.5 MG/2ML nebulizer solution, Take 2 mL by nebulization 2 (Two) Times a Day., Disp: 60 each, Rfl: 11    furosemide (LASIX) 20 MG tablet, Take 2 tablets by mouth Daily., Disp: 60 tablet, Rfl: 11    ipratropium-albuterol (DUO-NEB) 0.5-2.5 mg/3 ml nebulizer, INHALE 3 ML BY NEBULIZATION 4 TIMES A DAY, Disp: 360 mL, Rfl: 3    metoprolol tartrate (LOPRESSOR) 25 MG tablet, TAKE 1/2 TABLET BY MOUTH TWICE A DAY, Disp: 90 tablet, Rfl: 0    O2 (OXYGEN), Inhale 3 L/min 1 (One) Time., Disp: , Rfl:     pantoprazole (PROTONIX) 40 MG EC tablet, Take 1 tablet by mouth Every Morning., Disp: 30 tablet, Rfl: 11    revefenacin (Yupelri) 175 MCG/3ML nebulizer solution, Take 3 mL by nebulization Daily., Disp: 90 mL, Rfl: 11    roflumilast (DALIRESP) 500 MCG tablet tablet, Take 1 tablet by mouth Daily., Disp: 30 tablet, Rfl: 5    azithromycin (ZITHROMAX) 250 MG tablet, Take 1 tablet by mouth Daily. (Patient not taking: Reported on 2024), Disp: 30 tablet, Rfl: 11    Sod Picosulfate-Mag Ox-Cit Acd (Clenpiq) 10-3.5-12 MG-GM -GM/175ML solution, Take 175 mL by mouth Take As Directed. Please follow colon prep instructions provided by office., Disp: 175 mL, Rfl: 0     No Known Allergies    Family History   Problem Relation Age of Onset    Asthma Mother     Emphysema Mother             Stroke Mother     Colon cancer Neg Hx         Social History     Social History Narrative    Not on file  "      Immunization:  Immunization History   Administered Date(s) Administered    COVID-19 (MODERNA) 1st,2nd,3rd Dose Monovalent 06/18/2021, 06/18/2021, 07/23/2021, 07/23/2021    Flu Vaccine Quad PF >36MO 11/08/2019    Fluzone (or Fluarix & Flulaval for VFC) >6mos 11/08/2019, 10/25/2021, 10/06/2022, 12/14/2023    Hepatitis A 11/13/2018    Pneumococcal Conjugate 13-Valent (PCV13) 11/07/2018    Pneumococcal Conjugate 20-Valent (PCV20) 11/08/2022    Pneumococcal Polysaccharide (PPSV23) 02/13/2017    RSV, unspecified (Vaccine or MAB) 03/01/2024    Tdap 06/13/2012    Zostavax 06/13/2012        Objective     Vital Signs:   /63 (BP Location: Right arm, Patient Position: Sitting, Cuff Size: Adult)   Pulse 76   Ht 170.2 cm (67.01\")   Wt 57.2 kg (126 lb 3.2 oz)   SpO2 91%   BMI 19.76 kg/m²       Physical Exam  Constitutional:       Appearance: Normal appearance. He is normal weight.   HENT:      Head: Normocephalic and atraumatic.      Nose: Nose normal.   Pulmonary:      Effort: Pulmonary effort is normal.      Comments: 3L NC  Skin:     General: Skin is warm and dry.   Neurological:      Mental Status: He is alert and oriented to person, place, and time. Mental status is at baseline.   Psychiatric:         Mood and Affect: Mood normal.         Behavior: Behavior normal.         Thought Content: Thought content normal.         Judgment: Judgment normal.         Result Review :     CBC w/diff          5/23/2024    06:55 5/24/2024    05:51 6/18/2024    13:52   CBC w/Diff   WBC 9.13  8.35  7.71    RBC 4.73  4.41  5.01    Hemoglobin 13.3  12.5  14.1    Hematocrit 43.4  40.1  45.2    MCV 91.8  90.9  90.2    MCH 28.1  28.3  28.1    MCHC 30.6  31.2  31.2    RDW 13.8  14.0  13.9    Platelets 170  158  206    Neutrophil Rel % 77.7  70.6  75.4    Immature Granulocyte Rel % 0.5  0.5  0.0    Lymphocyte Rel % 13.4  19.3  15.7    Monocyte Rel % 8.2  9.1  6.7    Eosinophil Rel % 0.1  0.4  1.9    Basophil Rel % 0.1  0.1  0.3  "     CMP          5/22/2024    05:04 5/23/2024    06:55 5/24/2024    05:51   CMP   Glucose 88  87  90    BUN 27  25  23    Creatinine 0.58  0.51  0.59    EGFR 110.3  114.6  109.7    Sodium 140  140  141    Potassium 4.1  4.1  4.7    Chloride 95  100  99    Calcium 8.8  8.7  8.8    Total Protein 5.1  5.0     Albumin 3.4  3.4     Globulin 1.7  1.6     Total Bilirubin 0.2  0.2     Alkaline Phosphatase 132  143     AST (SGOT) 19  25     ALT (SGPT) 35  40     Albumin/Globulin Ratio 2.0  2.1     BUN/Creatinine Ratio 46.6  49.0  39.0    Anion Gap 1.3  0.7  2.6              Assessment and Plan    Diagnoses and all orders for this visit:    1. Weight loss (Primary)    2. Gastroesophageal reflux disease, unspecified whether esophagitis present  -     Case Request; Standing  -     Verify NPO; Standing  -     Verify Bowel Prep Was Successful; Standing  -     Give Tap Water Enema If Bowel Prep Insufficient; Standing  -     Obtain Informed Consent; Standing  -     Case Request    3. Encounter for screening for malignant neoplasm of colon  -     Case Request; Standing  -     Verify NPO; Standing  -     Verify Bowel Prep Was Successful; Standing  -     Give Tap Water Enema If Bowel Prep Insufficient; Standing  -     Obtain Informed Consent; Standing  -     Case Request    Other orders  -     Sod Picosulfate-Mag Ox-Cit Acd (Clenpiq) 10-3.5-12 MG-GM -GM/175ML solution; Take 175 mL by mouth Take As Directed. Please follow colon prep instructions provided by office.  Dispense: 175 mL; Refill: 0    63 year old new patient presents to the office for weight loss, GERD, and screening colonoscopy. Patient previously was losing weight but this has stabilized. He started losing weight about 2-3 years ago. Last colonoscopy was in 2012 that was normal. Denies family history of colon cancer. Reflux is well controlled with Protonix 40mg daily.  Proceed with EGD and colonoscopy for further evaluation. Pulmonary clearance from Dr. Moya.  Patient is  agreeable to plan call office any questions or concerns.    EGD/COLONOSCOPY Surgical Risk and Benefits: Possible risk/complications, benefits, and alternatives to surgical or invasive procedure have been explained to patient and/or legal guardian. Risks include bleeding, infection, and perforation. Patient has been evaluated and can tolerate anesthesia and/or sedation. Risk, benefits, and alternatives to anesthesia and sedation have been explained to patient and/or legal guardian.     Follow Up   No follow-ups on file.  Patient was given instructions and counseling regarding his condition or for health maintenance advice. Please see specific information pulled into the AVS if appropriate.

## 2024-06-25 NOTE — TELEPHONE ENCOUNTER
2024    Dear Dr. Moya,      Patient Name: Sumeet Gomes  : 1960      This patient is waiting to have a Colonoscopy and/or Esophagogastroduodenoscopy which I will perform at [facility] on 2024. Please respond to this request noting your recommendations regarding clearance from a Pulmonary  standpoint.  You may contact our office at [phone] Option 1 with any questions. I appreciate your prompt response in this matter. Please return this form to our office as soon as possible to [phone].    ____ I approve my patient from a Pulmonary  standpoint    ____ I do NOT approve my patient from a Pulmonary  standpoint at this time    Please inform our office if the patient requires additional follow-up from your office prior to scheduled procedure date.      Please specify clearance expiration date:____________________________________      Approving physician name (please print): _____________________________________________      Approving physician signature: ________________________________ Date:________________  Sincerely,  [facility] - Gastroenterology   Dr. Obdulio Tripp          Please fax approval or denial to our office as soon as possible.

## 2024-06-27 ENCOUNTER — TELEPHONE (OUTPATIENT)
Dept: PULMONOLOGY | Facility: CLINIC | Age: 64
End: 2024-06-27

## 2024-06-27 NOTE — TELEPHONE ENCOUNTER
"    Caller: Sumeet Gmoes \"Olman\"    Relationship to patient: Self    Best call back number: [phone] (home)       Patient is needing: LONI NEEDS LETTER OF NECESSITY FOR PT TO GET OXYGEN HE HAS ORDERED. PLEASE LET PT KNOW WHEN THIS IS SENT.   "

## 2024-06-27 NOTE — TELEPHONE ENCOUNTER
I sent F2F from last office visit in Feb and sent hospital F2F from may.  Marcin Sandhu is trying to get these notes to work, she will let me know if she needs anything else.

## 2024-06-28 LAB
MYCOBACTERIUM SPEC CULT: NORMAL
MYCOBACTERIUM SPEC CULT: NORMAL
NIGHT BLUE STAIN TISS: NORMAL

## 2024-07-06 DIAGNOSIS — J44.9 CHRONIC OBSTRUCTIVE PULMONARY DISEASE, UNSPECIFIED COPD TYPE: ICD-10-CM

## 2024-07-08 RX ORDER — ROFLUMILAST 500 UG/1
500 TABLET ORAL DAILY
Qty: 30 TABLET | Refills: 5 | Status: SHIPPED | OUTPATIENT
Start: 2024-07-08

## 2024-07-12 NOTE — PRE-PROCEDURE INSTRUCTIONS
"Instructed on date and arrival time of 0700. Come to entrance \"C\". Must have  over age 18 to drive home.  May have two visitors; however, children under 12 must stay in waiting room.  Discussed clear liquid diet (no red or purple), bowel prep, and NPO.  May take medications as usual except for blood thinners, diabetic medications, and weight loss medications.  Verbalized understanding of instructions given.  Instructed to call for questions or concerns.  Pulmonary clearance noted in chart.  "

## 2024-07-18 ENCOUNTER — ANESTHESIA EVENT (OUTPATIENT)
Dept: GASTROENTEROLOGY | Facility: HOSPITAL | Age: 64
End: 2024-07-18
Payer: MEDICAID

## 2024-07-18 NOTE — ANESTHESIA PREPROCEDURE EVALUATION
Anesthesia Evaluation     Patient summary reviewed and Nursing notes reviewed   NPO Solid Status: > 8 hours  NPO Liquid Status: > 2 hours           Airway   Mallampati: I  TM distance: >3 FB  Neck ROM: full  No difficulty expected  Dental    (+) partials    Comment: Upper partial. Denies any existing teeth are loose    Pulmonary     breath sounds clear to auscultation  (+) a smoker Former, COPD (nebs daily and o2 continuously) severe,home oxygen (wears 3 lpm via nc continuously), shortness of breath  Cardiovascular - normal exam  Exercise tolerance: poor (<4 METS)    ECG reviewed  Patient on routine beta blocker  Rhythm: regular  Rate: normal    (+) hypertension well controlled, CAD, CHF , hyperlipidemia    ROS comment: Takes metoprolol       Neuro/Psych  GI/Hepatic/Renal/Endo    (+) GERD well controlled    Musculoskeletal     (+) joint swelling  Abdominal    Substance History      OB/GYN          Other        ROS/Med Hx Other: EKG, 5/12/2024:  HEART RATE= 82  bpm  RR Interval= 732  ms  DE Interval= 133  ms  P Horizontal Axis= 35  deg  P Front Axis= 79  deg  QRSD Interval= 93  ms  QT Interval= 394  ms  QTcB= 461  ms  QRS Axis= -74  deg  T Wave Axis= 63  deg  - ABNORMAL ECG -  Sinus rhythm  Atrial premature complex  Left axis deviation  Incomplete right bundle branch block  Borderline low voltage, extremity leads    ECHO, 12/05/2023:  ·  Left ventricular systolic function is normal. Estimated left ventricular EF = 55%  ·  Left ventricular diastolic function was normal.  ·  The right ventricular cavity is moderately dilated.  ·  The right atrial cavity is moderately  dilated.  ·  Estimated right ventricular systolic pressure from tricuspid regurgitation is moderately elevated (45-55 mmHg).    Pulmonary clearance received from Dr Moya on 06/25/24 with high risks associated         Phys Exam Other: 95-96% on 3 lpm via nc   Pt did neb at home just prior to arrival                       Anesthesia Plan    ASA 4      general   total IV anesthesia  (Total IV Anesthesia    Patient understands anesthesia not responsible for dental damage.  )  intravenous induction     Anesthetic plan, risks, benefits, and alternatives have been provided, discussed and informed consent has been obtained with: patient.  Pre-procedure education provided  Plan discussed with CRNA.        CODE STATUS:

## 2024-07-19 ENCOUNTER — ANESTHESIA (OUTPATIENT)
Dept: GASTROENTEROLOGY | Facility: HOSPITAL | Age: 64
End: 2024-07-19
Payer: MEDICAID

## 2024-07-22 ENCOUNTER — TELEPHONE (OUTPATIENT)
Dept: GASTROENTEROLOGY | Facility: CLINIC | Age: 64
End: 2024-07-22
Payer: MEDICAID

## 2024-07-22 RX ORDER — SODIUM PICOSULFATE, MAGNESIUM OXIDE, AND ANHYDROUS CITRIC ACID 12; 3.5; 1 G/175ML; G/175ML; MG/175ML
175 LIQUID ORAL TAKE AS DIRECTED
Qty: 175 ML | Refills: 0 | Status: SHIPPED | OUTPATIENT
Start: 2024-07-22

## 2024-07-22 NOTE — TELEPHONE ENCOUNTER
I spoke with pt. He was originally scheduled 7.19.24, but scope was cancelled due to power/computer outages.     I have pt rescheduled to 8.20.24, 8 am arrival time. Pt denies blood thinners or GLP-1 medications.  Will send over Rx prep to pharmacy. Pt stated he has the prep instructions.  Voiced  understanding. michelle

## 2024-07-24 ENCOUNTER — TELEPHONE (OUTPATIENT)
Dept: UROLOGY | Facility: CLINIC | Age: 64
End: 2024-07-24
Payer: MEDICAID

## 2024-07-24 NOTE — TELEPHONE ENCOUNTER
----- Message from Pratik ELLIS sent at 7/24/2024  1:35 PM EDT -----  PT  MRI ABD IS ON 8/2. HIS APPT ON 7/30 NEEDS TO BE MOVED TO AFTER THE MRI PLEASE. CAN PT DO 8/9 AT 2:15?

## 2024-07-24 NOTE — TELEPHONE ENCOUNTER
PT RETURNED CALL AND R/S APPT    Future Appointments         Provider Department Center    8/2/2024 8:00 AM [facility] MRI 1 [facility] MRI [facility]    8/9/2024 2:15 PM Mart Miller MD [facility] UROLOGY [facility]    8/12/2024 11:15 AM Drew Rodriguez MD [facility] CARDIOLOGY [facility]

## 2024-08-02 ENCOUNTER — HOSPITAL ENCOUNTER (OUTPATIENT)
Dept: MRI IMAGING | Facility: HOSPITAL | Age: 64
Discharge: HOME | End: 2024-08-02
Payer: MEDICAID

## 2024-08-02 DIAGNOSIS — N28.89 RENAL MASS: ICD-10-CM

## 2024-08-02 PROCEDURE — 0 GADOBENATE DIMEGLUMINE 529 MG/ML SOLUTION: Performed by: UROLOGY

## 2024-08-02 PROCEDURE — A9577 INJ MULTIHANCE: HCPCS | Performed by: UROLOGY

## 2024-08-02 PROCEDURE — 74183 MRI ABD W/O CNTR FLWD CNTR: CPT

## 2024-08-02 RX ADMIN — GADOBENATE DIMEGLUMINE 15 ML: 529 INJECTION, SOLUTION INTRAVENOUS at 08:58

## 2024-08-06 PROBLEM — N28.89 RIGHT RENAL MASS: Status: ACTIVE | Noted: 2024-08-06

## 2024-08-06 NOTE — PROGRESS NOTES
Chief Complaint: Urologic complaint    Subjective         History of Present Illness  Sumeet Gomes is a 63 y.o. male       Enhancing right renal mass  COPD on O2            past medical history of hypertension, hyperlipidemia, COPD on home oxygen, pulmonary hypertension, CAD, and GERD     Admitted for COPD exacerbation       On  O2 for the last year.    Breathing a lot worse over the last year.      8/2/2024 MRI abdomen with and without - 1.8 cm lesion interpolar region right kidney with apparent enhancement.  Concerning for neoplasm.  Possible compression fracture T11.  Several mild compression deformities in the lower thoracic and upper lumbar spine not significantly changed.      No CAD  Non-smoker  No anticoagulation          Previous      5/17/2024 CT abdomen with and without - 1.7 cm lesion interpolar cortex of the right kidney, heterogenous demonstrates enhancement.     5/24 0.5,      Voiding okay     No history of  surgery     No GH     No urologic family history.         Objective     Past Medical History:   Diagnosis Date    CHF (congestive heart failure)     COPD (chronic obstructive pulmonary disease)     COPD (chronic obstructive pulmonary disease)     Coronary artery disease     Diastolic heart failure     Hyperlipidemia     Hypertension     Pulmonary hypertension        Past Surgical History:   Procedure Laterality Date    BACK SURGERY      BRONCHOSCOPY N/A 09/14/2022    Procedure: BRONCHOSCOPY WITH BRONCHOALVEOLAR LAVAGE AND BRONCHIAL WASHINGS;  Surgeon: Ulices Moya MD;  Location: [facility] ENDOSCOPY;  Service: Pulmonary;  Laterality: N/A;  MUCUS PLUGGING, COPD EXACERBATION    BRONCHOSCOPY      BRONCHOSCOPY N/A 5/17/2024    Procedure: BRONCHOSCOPY WITH BAL AND WASHINGS;  Surgeon: Collins Chapa MD;  Location: [facility] ENDOSCOPY;  Service: Pulmonary;  Laterality: N/A;  MUCUS PLUGGING    CARDIAC CATHETERIZATION N/A 09/16/2022    Procedure: Right Heart Cath;  Surgeon: Drew Rodriguez MD;   Location: [facility] INVASIVE LOCATION;  Service: Cardiovascular;  Laterality: N/A;    OTHER SURGICAL HISTORY      knee     OTHER SURGICAL HISTORY      shoulder, rotator cuff    SHOULDER SURGERY Right 10/03/2022             Vital Signs:             Assessment and Plan    Diagnoses and all orders for this visit:    1. Right renal mass (Primary)      MRI reviewed with the patient.  Enhancing lesion about the same size.  We discussed he has about 80% chance of being a renal cell carcinoma.  That being said it is pretty stable.  Patient is dealing with fairly severe COPD.  At this time after discussion of risk and benefits I do think he is least moderate to high risk for any procedures.  We are going to monitor this with active surveillance at this time.    I do think we should plan on biopsy of this lesion if we decide to do anything  We did briefly discuss partial nephrectomy today      On review of imaging he would likely need a retropartial with intraoperative ultrasound as it is endophytic.    Follow-up in 6 months with MRI abdomen with and without contrast.

## 2024-08-09 ENCOUNTER — OFFICE VISIT (OUTPATIENT)
Dept: UROLOGY | Facility: CLINIC | Age: 64
End: 2024-08-09
Payer: MEDICAID

## 2024-08-09 VITALS — HEIGHT: 67 IN | WEIGHT: 126 LBS | RESPIRATION RATE: 16 BRPM | BODY MASS INDEX: 19.78 KG/M2

## 2024-08-09 DIAGNOSIS — N28.89 RIGHT RENAL MASS: Primary | ICD-10-CM

## 2024-08-12 ENCOUNTER — OFFICE VISIT (OUTPATIENT)
Dept: CARDIOLOGY | Facility: CLINIC | Age: 64
End: 2024-08-12
Payer: MEDICAID

## 2024-08-12 VITALS
BODY MASS INDEX: 19.46 KG/M2 | HEART RATE: 77 BPM | DIASTOLIC BLOOD PRESSURE: 63 MMHG | WEIGHT: 124 LBS | HEIGHT: 67 IN | SYSTOLIC BLOOD PRESSURE: 110 MMHG

## 2024-08-12 DIAGNOSIS — I27.20 PULMONARY HYPERTENSION: Primary | ICD-10-CM

## 2024-08-12 PROCEDURE — 3074F SYST BP LT 130 MM HG: CPT | Performed by: INTERNAL MEDICINE

## 2024-08-12 PROCEDURE — 99212 OFFICE O/P EST SF 10 MIN: CPT | Performed by: INTERNAL MEDICINE

## 2024-08-12 PROCEDURE — 1160F RVW MEDS BY RX/DR IN RCRD: CPT | Performed by: INTERNAL MEDICINE

## 2024-08-12 PROCEDURE — 1159F MED LIST DOCD IN RCRD: CPT | Performed by: INTERNAL MEDICINE

## 2024-08-12 PROCEDURE — 3078F DIAST BP <80 MM HG: CPT | Performed by: INTERNAL MEDICINE

## 2024-08-12 NOTE — ASSESSMENT & PLAN NOTE
Also has a component of right-sided/diastolic heart failure.  Clinically not volume overloaded.  Recommend to continue low-dose diuretics.    He is also on low-dose beta-blockers for systemic hypertension and tachycardia.  Blood pressure is well-controlled.  Continue metoprolol the current dose.

## 2024-08-12 NOTE — PROGRESS NOTES
CARDIOLOGY FOLLOW-UP PROGRESS NOTE        Chief Complaint  Follow-up and Shortness of Breath    Subjective            Sumeet Gomes presents to [facility] CARDIOLOGY  History of Present Illness    Mr. Gomes is here for routine annual follow-up visit.  He has severe COPD on home oxygen, pulmonary hypertension, diastolic and right-sided heart failure.  He had a recent admission to the hospital from 5/12/2024 to 5/24/2020 for due to increasing shortness of breath.  He was treated for COPD exacerbation, mucous plugging.  Oral diuretics were continued at the time of discharge.    Today he reports feeling at his baseline.  Not very active due to shortness of breath.  No recent chest pain or palpitations.  He is taking Lasix 1 tablet daily (unsure of the dose, 20 mg or 40 mg)       Past History:    Medical History:  Past Medical History:   Diagnosis Date    CHF (congestive heart failure)     COPD (chronic obstructive pulmonary disease)     COPD (chronic obstructive pulmonary disease)     Coronary artery disease     Diastolic heart failure     Hyperlipidemia     Hypertension     Pulmonary hypertension        Surgical History: has a past surgical history that includes Back surgery; Other surgical history; Other surgical history; Bronchoscopy (N/A, 09/14/2022); Cardiac catheterization (N/A, 09/16/2022); Shoulder surgery (Right, 10/03/2022); Bronchoscopy; and Bronchoscopy (N/A, 5/17/2024).     Family History: family history includes Asthma in his mother; Emphysema in his mother; Stroke in his mother.     Social History: reports that he quit smoking about 8 months ago. His smoking use included cigarettes. He started smoking about 49 years ago. He has a 12.2 pack-year smoking history. He has been exposed to tobacco smoke. He has never used smokeless tobacco. He reports that he does not currently use alcohol. He reports that he does not use drugs.    Allergies: Patient has no known allergies.    Current  "Outpatient Medications on File Prior to Visit   Medication Sig    albuterol sulfate  (90 Base) MCG/ACT inhaler Inhale 2 puffs Every 4 (Four) Hours As Needed for Wheezing.    arformoterol (Brovana) 15 MCG/2ML nebulizer solution Take 2 mL by nebulization 2 (Two) Times a Day.    budesonide (Pulmicort) 0.5 MG/2ML nebulizer solution Take 2 mL by nebulization 2 (Two) Times a Day.    furosemide (LASIX) 20 MG tablet Take 2 tablets by mouth Daily.    ipratropium-albuterol (DUO-NEB) 0.5-2.5 mg/3 ml nebulizer INHALE 3 ML BY NEBULIZATION 4 TIMES A DAY    metoprolol tartrate (LOPRESSOR) 25 MG tablet TAKE 1/2 TABLET BY MOUTH TWICE A DAY    O2 (OXYGEN) Inhale 3 L/min 1 (One) Time.    pantoprazole (PROTONIX) 40 MG EC tablet Take 1 tablet by mouth Every Morning.    revefenacin (Yupelri) 175 MCG/3ML nebulizer solution Take 3 mL by nebulization Daily.    roflumilast (DALIRESP) 500 MCG tablet tablet TAKE 1 TABLET BY MOUTH EVERY DAY    Sod Picosulfate-Mag Ox-Cit Acd (Clenpiq) 10-3.5-12 MG-GM -GM/175ML solution Take 175 mL by mouth Take As Directed. Please follow colon prep instructions provided by office.         Review of Systems   Respiratory:  Positive for cough, shortness of breath and wheezing.    Cardiovascular:  Negative for chest pain, palpitations and leg swelling.   Gastrointestinal:  Negative for nausea and vomiting.   Neurological:  Negative for dizziness and syncope.        Objective     /63   Pulse 77   Ht 170.2 cm (67\")   Wt 56.2 kg (124 lb)   BMI 19.42 kg/m²       Physical Exam    General : Alert, awake, no acute distress  Neck : Supple, no carotid bruit, no jugular venous distention  CVS : Regular rate and rhythm, no murmur, rubs or gallops  Lungs: Bilateral faint wheezing heard, no crackles  Abdomen: Soft, nontender, bowel sounds heard in all 4 quadrants  Extremities: Warm, well-perfused, no pedal edema    Result Review :     The following data was reviewed by: Drew Rodriguez MD on " 08/12/2024:    CMP          5/22/2024    05:04 5/23/2024    06:55 5/24/2024    05:51   CMP   Glucose 88  87  90    BUN 27  25  23    Creatinine 0.58  0.51  0.59    EGFR 110.3  114.6  109.7    Sodium 140  140  141    Potassium 4.1  4.1  4.7    Chloride 95  100  99    Calcium 8.8  8.7  8.8    Total Protein 5.1  5.0     Albumin 3.4  3.4     Globulin 1.7  1.6     Total Bilirubin 0.2  0.2     Alkaline Phosphatase 132  143     AST (SGOT) 19  25     ALT (SGPT) 35  40     Albumin/Globulin Ratio 2.0  2.1     BUN/Creatinine Ratio 46.6  49.0  39.0    Anion Gap 1.3  0.7  2.6      CBC          5/23/2024    06:55 5/24/2024    05:51 6/18/2024    13:52   CBC   WBC 9.13  8.35  7.71    RBC 4.73  4.41  5.01    Hemoglobin 13.3  12.5  14.1    Hematocrit 43.4  40.1  45.2    MCV 91.8  90.9  90.2    MCH 28.1  28.3  28.1    MCHC 30.6  31.2  31.2    RDW 13.8  14.0  13.9    Platelets 170  158  206               Data reviewed: Cardiology studies        Results for orders placed during the hospital encounter of 12/04/23    Adult Transthoracic Echo Complete W/ Cont if Necessary Per Protocol    Interpretation Summary    Left ventricular systolic function is normal. Estimated left ventricular EF = 55%    Left ventricular diastolic function was normal.    The right ventricular cavity is moderately dilated.    The right atrial cavity is moderately  dilated.    Estimated right ventricular systolic pressure from tricuspid regurgitation is moderately elevated (45-55 mmHg).                   Assessment and Plan        Diagnoses and all orders for this visit:    1. Pulmonary hypertension (Primary)  Assessment & Plan:  Also has a component of right-sided/diastolic heart failure.  Clinically not volume overloaded.  Recommend to continue low-dose diuretics.    He is also on low-dose beta-blockers for systemic hypertension and tachycardia.  Blood pressure is well-controlled.  Continue metoprolol the current dose.                Follow Up     Return if  symptoms worsen or fail to improve.    Patient was given instructions and counseling regarding his condition or for health maintenance advice. Please see specific information pulled into the AVS if appropriate.

## 2024-08-14 NOTE — PRE-PROCEDURE INSTRUCTIONS
"Instructed on date and arrival time of 0800. Instructed that arrival time is not their procedure time but allows time to prepare for procedure.  Come to entrance \"C\". Must have  over age 18 to drive home.  May have two visitors; however, children under 12 must stay in waiting room.  Discussed clear liquid diet (no red or purple), bowel prep, and NPO.  May take medications as usual except for blood thinners, diabetic medications, and weight loss medications.  Verbalized understanding of instructions given.  Instructed to call for questions or concerns.  Pulmonary clearance noted on chart.  "

## 2024-08-16 DIAGNOSIS — Z99.81 ON HOME OXYGEN THERAPY: ICD-10-CM

## 2024-08-16 DIAGNOSIS — B44.9 ASPERGILLUS: ICD-10-CM

## 2024-08-16 DIAGNOSIS — J44.1 CHRONIC OBSTRUCTIVE PULMONARY DISEASE WITH ACUTE EXACERBATION: ICD-10-CM

## 2024-08-16 DIAGNOSIS — R06.2 WHEEZING: ICD-10-CM

## 2024-08-16 DIAGNOSIS — R53.83 FATIGUE, UNSPECIFIED TYPE: ICD-10-CM

## 2024-08-16 DIAGNOSIS — R05.3 CHRONIC COUGH: ICD-10-CM

## 2024-08-16 DIAGNOSIS — R05.9 COUGH, UNSPECIFIED TYPE: ICD-10-CM

## 2024-08-16 DIAGNOSIS — E43 SEVERE MALNUTRITION: ICD-10-CM

## 2024-08-19 RX ORDER — ARFORMOTEROL TARTRATE 15 UG/2ML
15 SOLUTION RESPIRATORY (INHALATION)
Qty: 120 ML | Refills: 3 | Status: SHIPPED | OUTPATIENT
Start: 2024-08-19

## 2024-08-20 ENCOUNTER — HOSPITAL ENCOUNTER (OUTPATIENT)
Facility: HOSPITAL | Age: 64
Setting detail: HOSPITAL OUTPATIENT SURGERY
Discharge: HOME | End: 2024-08-20
Attending: INTERNAL MEDICINE | Admitting: INTERNAL MEDICINE
Payer: MEDICAID

## 2024-08-20 VITALS
HEART RATE: 82 BPM | WEIGHT: 122.58 LBS | SYSTOLIC BLOOD PRESSURE: 111 MMHG | BODY MASS INDEX: 19.2 KG/M2 | OXYGEN SATURATION: 100 % | RESPIRATION RATE: 23 BRPM | TEMPERATURE: 97.6 F | DIASTOLIC BLOOD PRESSURE: 80 MMHG

## 2024-08-20 DIAGNOSIS — Z12.11 ENCOUNTER FOR SCREENING FOR MALIGNANT NEOPLASM OF COLON: ICD-10-CM

## 2024-08-20 DIAGNOSIS — K21.9 GASTROESOPHAGEAL REFLUX DISEASE, UNSPECIFIED WHETHER ESOPHAGITIS PRESENT: ICD-10-CM

## 2024-08-20 PROCEDURE — 25010000002 PROPOFOL 10 MG/ML EMULSION: Performed by: NURSE ANESTHETIST, CERTIFIED REGISTERED

## 2024-08-20 PROCEDURE — 25810000003 LACTATED RINGERS PER 1000 ML

## 2024-08-20 RX ORDER — PROPOFOL 10 MG/ML
VIAL (ML) INTRAVENOUS AS NEEDED
Status: DISCONTINUED | OUTPATIENT
Start: 2024-08-20 | End: 2024-08-20 | Stop reason: SURG

## 2024-08-20 RX ORDER — EPHEDRINE SULFATE 50 MG/ML
INJECTION INTRAVENOUS AS NEEDED
Status: DISCONTINUED | OUTPATIENT
Start: 2024-08-20 | End: 2024-08-20 | Stop reason: SURG

## 2024-08-20 RX ORDER — PHENYLEPHRINE HCL IN 0.9% NACL 1 MG/10 ML
SYRINGE (ML) INTRAVENOUS AS NEEDED
Status: DISCONTINUED | OUTPATIENT
Start: 2024-08-20 | End: 2024-08-20 | Stop reason: SURG

## 2024-08-20 RX ORDER — LIDOCAINE HYDROCHLORIDE 20 MG/ML
INJECTION, SOLUTION EPIDURAL; INFILTRATION; INTRACAUDAL; PERINEURAL AS NEEDED
Status: DISCONTINUED | OUTPATIENT
Start: 2024-08-20 | End: 2024-08-20 | Stop reason: SURG

## 2024-08-20 RX ORDER — SODIUM CHLORIDE, SODIUM LACTATE, POTASSIUM CHLORIDE, CALCIUM CHLORIDE 600; 310; 30; 20 MG/100ML; MG/100ML; MG/100ML; MG/100ML
30 INJECTION, SOLUTION INTRAVENOUS CONTINUOUS
Status: DISCONTINUED | OUTPATIENT
Start: 2024-08-20 | End: 2024-08-20 | Stop reason: HOSPADM

## 2024-08-20 RX ADMIN — Medication 100 MCG: at 09:42

## 2024-08-20 RX ADMIN — Medication 100 MCG: at 09:31

## 2024-08-20 RX ADMIN — SODIUM CHLORIDE, POTASSIUM CHLORIDE, SODIUM LACTATE AND CALCIUM CHLORIDE 30 ML/HR: 600; 310; 30; 20 INJECTION, SOLUTION INTRAVENOUS at 08:22

## 2024-08-20 RX ADMIN — EPHEDRINE SULFATE 10 MG: 50 INJECTION INTRAVENOUS at 09:31

## 2024-08-20 RX ADMIN — PROPOFOL 200 MCG/KG/MIN: 10 INJECTION, EMULSION INTRAVENOUS at 09:15

## 2024-08-20 RX ADMIN — PROPOFOL 50 MG: 10 INJECTION, EMULSION INTRAVENOUS at 09:15

## 2024-08-20 RX ADMIN — LIDOCAINE HYDROCHLORIDE 40 MG: 20 INJECTION, SOLUTION INTRAVENOUS at 09:15

## 2024-08-20 NOTE — ANESTHESIA POSTPROCEDURE EVALUATION
Patient: Sumeet Gomes    Procedure Summary       Date: 08/20/24 Room / Location: [facility] ENDOSCOPY 2 / [facility] ENDOSCOPY    Anesthesia Start: 0913 Anesthesia Stop: 0953    Procedures:       ESOPHAGOGASTRODUODENOSCOPY WITH BIOPSIES      COLONOSCOPY WITH HOT SNARE POLYPECTOMIES Diagnosis:       Gastroesophageal reflux disease, unspecified whether esophagitis present      Encounter for screening for malignant neoplasm of colon      (Gastroesophageal reflux disease, unspecified whether esophagitis present [K21.9])      (Encounter for screening for malignant neoplasm of colon [Z12.11])    Surgeons: Obdulio Tripp MD Provider: Deena Villasenor CRNA    Anesthesia Type: general ASA Status: 4            Anesthesia Type: general    Vitals  Vitals Value Taken Time   /80 08/20/24 1007   Temp 36.4 °C (97.6 °F) 08/20/24 1006   Pulse 80 08/20/24 1008   Resp 23 08/20/24 1006   SpO2 100 % 08/20/24 1008   Vitals shown include unfiled device data.        Post Anesthesia Care and Evaluation    Patient location during evaluation: bedside  Patient participation: complete - patient participated  Level of consciousness: awake  Pain management: adequate    Airway patency: patent  Anesthetic complications: No anesthetic complications  PONV Status: controlled  Cardiovascular status: acceptable and stable  Respiratory status: acceptable

## 2024-08-20 NOTE — H&P
Pre Procedure History & Physical    Chief Complaint:   Gerd  Screening colonoscopy    Subjective     HPI:   As above    Past Medical History:   Past Medical History:   Diagnosis Date    CHF (congestive heart failure)     COPD (chronic obstructive pulmonary disease)     COPD (chronic obstructive pulmonary disease)     Coronary artery disease     Diastolic heart failure     Hyperlipidemia     Hypertension     Pulmonary hypertension        Past Surgical History:  Past Surgical History:   Procedure Laterality Date    BACK SURGERY      BRONCHOSCOPY N/A 2022    Procedure: BRONCHOSCOPY WITH BRONCHOALVEOLAR LAVAGE AND BRONCHIAL WASHINGS;  Surgeon: Ulices Moya MD;  Location: [facility] ENDOSCOPY;  Service: Pulmonary;  Laterality: N/A;  MUCUS PLUGGING, COPD EXACERBATION    BRONCHOSCOPY      BRONCHOSCOPY N/A 2024    Procedure: BRONCHOSCOPY WITH BAL AND WASHINGS;  Surgeon: Collins Chapa MD;  Location: [facility] ENDOSCOPY;  Service: Pulmonary;  Laterality: N/A;  MUCUS PLUGGING    CARDIAC CATHETERIZATION N/A 2022    Procedure: Right Heart Cath;  Surgeon: Drew Rodriguez MD;  Location: [facility] CATH INVASIVE LOCATION;  Service: Cardiovascular;  Laterality: N/A;    OTHER SURGICAL HISTORY      knee     OTHER SURGICAL HISTORY      shoulder, rotator cuff    SHOULDER SURGERY Right 10/03/2022       Family History:  Family History   Problem Relation Age of Onset    Asthma Mother     Emphysema Mother             Stroke Mother     Colon cancer Neg Hx        Social History:   reports that he quit smoking about 8 months ago. His smoking use included cigarettes. He started smoking about 49 years ago. He has a 12.2 pack-year smoking history. He has been exposed to tobacco smoke. He has never used smokeless tobacco. He reports that he does not currently use alcohol. He reports that he does not use drugs.    Medications:   Medications Prior to Admission   Medication Sig Dispense Refill Last Dose    albuterol sulfate   (90 Base) MCG/ACT inhaler Inhale 2 puffs Every 4 (Four) Hours As Needed for Wheezing. 36 g 6 8/20/2024    arformoterol (Brovana) 15 MCG/2ML nebulizer solution Take 2 mL by nebulization 2 (Two) Times a Day. 120 mL 3 8/20/2024    budesonide (Pulmicort) 0.5 MG/2ML nebulizer solution Take 2 mL by nebulization 2 (Two) Times a Day. 60 each 11 8/20/2024    furosemide (LASIX) 20 MG tablet Take 2 tablets by mouth Daily. 60 tablet 11 8/19/2024    ipratropium-albuterol (DUO-NEB) 0.5-2.5 mg/3 ml nebulizer INHALE 3 ML BY NEBULIZATION 4 TIMES A  mL 3 8/20/2024    metoprolol tartrate (LOPRESSOR) 25 MG tablet TAKE 1/2 TABLET BY MOUTH TWICE A DAY 90 tablet 0 8/19/2024    O2 (OXYGEN) Inhale 3 L/min 1 (One) Time.   8/20/2024    pantoprazole (PROTONIX) 40 MG EC tablet Take 1 tablet by mouth Every Morning. 30 tablet 11 8/19/2024    revefenacin (Yupelri) 175 MCG/3ML nebulizer solution Take 3 mL by nebulization Daily. 90 mL 11 8/20/2024    roflumilast (DALIRESP) 500 MCG tablet tablet TAKE 1 TABLET BY MOUTH EVERY DAY 30 tablet 5 8/19/2024       Allergies:  Patient has no known allergies.        Objective     Blood pressure 111/75, pulse 79, temperature 97.8 °F (36.6 °C), temperature source Temporal, resp. rate 18, weight 55.6 kg (122 lb 9.2 oz), SpO2 98%.    Physical Exam   Constitutional: Pt is oriented to person, place, and time and well-developed, well-nourished, and in no distress.   Mouth/Throat: Oropharynx is clear and moist.   Neck: Normal range of motion.   Cardiovascular: Normal rate, regular rhythm and normal heart sounds.    Pulmonary/Chest: Effort normal and breath sounds normal.   Abdominal: Soft. Nontender  Skin: Skin is warm and dry.   Psychiatric: Mood, memory, affect and judgment normal.     Assessment & Plan     Diagnosis:  Gerd  Wt loss  Screening colonoscopy    Anticipated Surgical Procedure:  Egd  colonoscopy    The risks, benefits, and alternatives of this procedure have been discussed with the patient  or the responsible party- the patient understands and agrees to proceed.

## 2024-08-21 ENCOUNTER — TELEPHONE (OUTPATIENT)
Dept: PULMONOLOGY | Facility: CLINIC | Age: 64
End: 2024-08-21
Payer: MEDICAID

## 2024-08-21 NOTE — TELEPHONE ENCOUNTER
Called and notified patient PA and appeal for Arformoterol was denied. Patient gave me okay to send this nebulizer medication to [facility]. Prescription sent to [facility].

## 2024-08-26 LAB
CYTO UR: NORMAL
LAB AP CASE REPORT: NORMAL
LAB AP CLINICAL INFORMATION: NORMAL
PATH REPORT.FINAL DX SPEC: NORMAL
PATH REPORT.GROSS SPEC: NORMAL

## 2024-08-27 ENCOUNTER — OFFICE VISIT (OUTPATIENT)
Dept: PULMONOLOGY | Facility: CLINIC | Age: 64
End: 2024-08-27
Payer: MEDICAID

## 2024-08-27 ENCOUNTER — TELEPHONE (OUTPATIENT)
Dept: GASTROENTEROLOGY | Facility: CLINIC | Age: 64
End: 2024-08-27
Payer: MEDICAID

## 2024-08-27 VITALS
OXYGEN SATURATION: 90 % | HEIGHT: 67 IN | SYSTOLIC BLOOD PRESSURE: 99 MMHG | TEMPERATURE: 98 F | BODY MASS INDEX: 19.62 KG/M2 | HEART RATE: 80 BPM | DIASTOLIC BLOOD PRESSURE: 58 MMHG | WEIGHT: 125 LBS | RESPIRATION RATE: 18 BRPM

## 2024-08-27 DIAGNOSIS — C80.1 CARCINOMA: Primary | ICD-10-CM

## 2024-08-27 DIAGNOSIS — K22.719 BARRETT'S ESOPHAGUS WITH DYSPLASIA: ICD-10-CM

## 2024-08-27 DIAGNOSIS — J96.12 CHRONIC RESPIRATORY FAILURE WITH HYPOXIA AND HYPERCAPNIA: ICD-10-CM

## 2024-08-27 DIAGNOSIS — J43.9 PULMONARY EMPHYSEMA, UNSPECIFIED EMPHYSEMA TYPE: ICD-10-CM

## 2024-08-27 DIAGNOSIS — J96.11 CHRONIC RESPIRATORY FAILURE WITH HYPOXIA AND HYPERCAPNIA: ICD-10-CM

## 2024-08-27 DIAGNOSIS — B37.81 THRUSH OF MOUTH AND ESOPHAGUS: ICD-10-CM

## 2024-08-27 DIAGNOSIS — B37.0 THRUSH OF MOUTH AND ESOPHAGUS: ICD-10-CM

## 2024-08-27 DIAGNOSIS — I27.20 PULMONARY HYPERTENSION: ICD-10-CM

## 2024-08-27 DIAGNOSIS — R06.09 DYSPNEA ON EXERTION: ICD-10-CM

## 2024-08-27 DIAGNOSIS — F17.211 NICOTINE DEPENDENCE, CIGARETTES, IN REMISSION: ICD-10-CM

## 2024-08-27 DIAGNOSIS — J44.89 ASTHMA-COPD OVERLAP SYNDROME: Primary | ICD-10-CM

## 2024-08-27 PROCEDURE — 3078F DIAST BP <80 MM HG: CPT | Performed by: NURSE PRACTITIONER

## 2024-08-27 PROCEDURE — 3074F SYST BP LT 130 MM HG: CPT | Performed by: NURSE PRACTITIONER

## 2024-08-27 PROCEDURE — 1159F MED LIST DOCD IN RCRD: CPT | Performed by: NURSE PRACTITIONER

## 2024-08-27 PROCEDURE — 99214 OFFICE O/P EST MOD 30 MIN: CPT | Performed by: NURSE PRACTITIONER

## 2024-08-27 PROCEDURE — 1160F RVW MEDS BY RX/DR IN RCRD: CPT | Performed by: NURSE PRACTITIONER

## 2024-08-27 RX ORDER — ARFORMOTEROL TARTRATE 15 UG/2ML
15 SOLUTION RESPIRATORY (INHALATION)
Start: 2024-08-27

## 2024-08-27 RX ORDER — NYSTATIN 100000/ML
500000 SUSPENSION, ORAL (FINAL DOSE FORM) ORAL 4 TIMES DAILY
Qty: 473 ML | Refills: 2 | Status: SHIPPED | OUTPATIENT
Start: 2024-08-27

## 2024-08-27 NOTE — TELEPHONE ENCOUNTER
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  I will refer the patient to Dr. Stephen at [facility] for further evaluation repeat EGD.  There is no obvious tumor or lesion seen within his Julian's esophagus.  I answered all of their questions.  They will call me in 1 week if they do not have a follow-up appointment arranged for at [facility].

## 2024-08-27 NOTE — PROGRESS NOTES
Primary Care Provider  Mirtha Alexander APRN     Referring Provider  No ref. provider found     Chief Complaint  Follow-up (5 Months) and COPD    Subjective          Sumeet Gomes presents to [facility]  History of Present Illness  Sumeet Gomes is a 63 y.o. male patient of Dr. Moya with hypoxic and hypercapnic respiratory failure, asthma/COPD overlap syndrome, emphysema, pulmonary hypertension and tobacco abuse in remission.    Patient states he is doing okay since his last office visit.  He denies using any antibiotics for his lungs.  He did recently start a steroid taper that he had on hand.  He denies any current fevers or chills.  He has noticed increasing congestion since being unable to wear his event.  Patient states that he believes his pressures need to be changed and I will speak with Dr. Moya.  He continues to use Brovana Pulmicort and Yupelri.  Patient states that he has been unable to get Brovana due to cost and we will try to resend it to [facility].  He states he has never received Yupelri. He continues to wear 3-4 L of oxygen and is benefiting from its use.  Overall, he has no additional concerns at this time.  He is able to perform his ADLs without difficulty.  He is up-to-date with his respiratory vaccinations.     His history of smoking is   Tobacco Use: Medium Risk (8/27/2024)    Patient History     Smoking Tobacco Use: Former     Smokeless Tobacco Use: Never     Passive Exposure: Past   .    Review of Systems   Constitutional:  Negative for chills, fatigue, fever, unexpected weight gain and unexpected weight loss.   HENT:  Positive for sore throat. Congestion: Nasal.   Respiratory:  Positive for cough and shortness of breath. Negative for apnea and wheezing.         Negative for Hemoptysis     Cardiovascular:  Negative for chest pain, palpitations and leg swelling.   Skin:         Negative for cyanosis      Sleep: Negative for  Excessive daytime sleepiness  Negative for morning headaches  Negative for Snoring    Family History   Problem Relation Age of Onset    Asthma Mother     Emphysema Mother             Stroke Mother     Colon cancer Neg Hx         Social History     Socioeconomic History    Marital status:    Tobacco Use    Smoking status: Former     Average packs/day: 0.2 packs/day for 48.9 years (12.2 ttl pk-yrs)     Types: Cigarettes     Start date: 1975     Quit date: 2023     Years since quittin.7     Passive exposure: Past (2 cigarettes per day, smokes some days)    Smokeless tobacco: Never   Vaping Use    Vaping status: Never Used   Substance and Sexual Activity    Alcohol use: Not Currently    Drug use: Never    Sexual activity: Defer        Past Medical History:   Diagnosis Date    CHF (congestive heart failure)     COPD (chronic obstructive pulmonary disease)     COPD (chronic obstructive pulmonary disease)     Coronary artery disease     Diastolic heart failure     Hyperlipidemia     Hypertension     Pulmonary hypertension         Immunization History   Administered Date(s) Administered    COVID-19 (MODERNA) 1st,2nd,3rd Dose Monovalent 2021, 2021, 2021, 2021    Flu Vaccine Quad PF >36MO 2019    Fluzone (or Fluarix & Flulaval for VFC) >6mos 2019, 10/25/2021, 10/06/2022, 2023    Hepatitis A 2018    Pneumococcal Conjugate 13-Valent (PCV13) 2018    Pneumococcal Conjugate 20-Valent (PCV20) 2022    Pneumococcal Polysaccharide (PPSV23) 2017    RSV, unspecified (Vaccine or MAB) 2024    Tdap 2012    Zostavax 2012         No Known Allergies       Current Outpatient Medications:     albuterol sulfate  (90 Base) MCG/ACT inhaler, Inhale 2 puffs Every 4 (Four) Hours As Needed for Wheezing., Disp: 36 g, Rfl: 6    arformoterol (Brovana) 15 MCG/2ML nebulizer solution, Take 2 mL by nebulization 2 (Two) Times a Day., Disp: ,  Rfl:     budesonide (Pulmicort) 0.5 MG/2ML nebulizer solution, Take 2 mL by nebulization 2 (Two) Times a Day., Disp: 60 each, Rfl: 11    furosemide (LASIX) 20 MG tablet, Take 2 tablets by mouth Daily., Disp: 60 tablet, Rfl: 11    ipratropium-albuterol (DUO-NEB) 0.5-2.5 mg/3 ml nebulizer, INHALE 3 ML BY NEBULIZATION 4 TIMES A DAY, Disp: 360 mL, Rfl: 3    metoprolol tartrate (LOPRESSOR) 25 MG tablet, TAKE 1/2 TABLET BY MOUTH TWICE A DAY, Disp: 90 tablet, Rfl: 0    O2 (OXYGEN), Inhale 3 L/min 1 (One) Time., Disp: , Rfl:     pantoprazole (PROTONIX) 40 MG EC tablet, Take 1 tablet by mouth Every Morning., Disp: 30 tablet, Rfl: 11    revefenacin (Yupelri) 175 MCG/3ML nebulizer solution, Take 3 mL by nebulization Daily., Disp: 90 mL, Rfl: 11    roflumilast (DALIRESP) 500 MCG tablet tablet, TAKE 1 TABLET BY MOUTH EVERY DAY, Disp: 30 tablet, Rfl: 5    amoxicillin-clavulanate (AUGMENTIN) 875-125 MG per tablet, Take 1 tablet by mouth 2 (Two) Times a Day., Disp: 14 tablet, Rfl: 0    nystatin (MYCOSTATIN) 100,000 unit/mL suspension, Take 5 mL by mouth 4 (Four) Times a Day., Disp: 473 mL, Rfl: 2     Objective   Physical Exam  Constitutional:       General: He is not in acute distress.     Appearance: Normal appearance. He is normal weight.   HENT:      Right Ear: Hearing normal.      Left Ear: Hearing normal.      Nose: No nasal tenderness or congestion.      Mouth/Throat:      Mouth: Mucous membranes are moist. No oral lesions.   Eyes:      Extraocular Movements: Extraocular movements intact.      Pupils: Pupils are equal, round, and reactive to light.   Cardiovascular:      Rate and Rhythm: Normal rate and regular rhythm.      Pulses: Normal pulses.      Heart sounds: Normal heart sounds. No murmur heard.  Pulmonary:      Effort: Pulmonary effort is normal.      Breath sounds: Decreased breath sounds present. No wheezing, rhonchi or rales.      Comments: Patient is on 3 L of oxygen.  He is able to speak full sentences without  "difficulty.  Musculoskeletal:      Right lower leg: No edema.      Left lower leg: No edema.   Skin:     General: Skin is warm and dry.      Findings: No lesion or rash.   Neurological:      General: No focal deficit present.      Mental Status: He is alert and oriented to person, place, and time.   Psychiatric:         Mood and Affect: Affect normal. Mood is not anxious or depressed.         Vital Signs:   BP 99/58 (BP Location: Left arm, Patient Position: Sitting, Cuff Size: Adult)   Pulse 80   Temp 98 °F (36.7 °C) (Temporal)   Resp 18   Ht 170.2 cm (67\")   Wt 56.7 kg (125 lb)   SpO2 90% Comment: 3L PD  BMI 19.58 kg/m²        Result Review :   The following data was reviewed by: ZULY Contreras on 08/27/2024:  CMP          5/22/2024    05:04 5/23/2024    06:55 5/24/2024    05:51   CMP   Glucose 88  87  90    BUN 27  25  23    Creatinine 0.58  0.51  0.59    EGFR 110.3  114.6  109.7    Sodium 140  140  141    Potassium 4.1  4.1  4.7    Chloride 95  100  99    Calcium 8.8  8.7  8.8    Total Protein 5.1  5.0     Albumin 3.4  3.4     Globulin 1.7  1.6     Total Bilirubin 0.2  0.2     Alkaline Phosphatase 132  143     AST (SGOT) 19  25     ALT (SGPT) 35  40     Albumin/Globulin Ratio 2.0  2.1     BUN/Creatinine Ratio 46.6  49.0  39.0    Anion Gap 1.3  0.7  2.6      CBC w/diff          5/23/2024    06:55 5/24/2024    05:51 6/18/2024    13:52   CBC w/Diff   WBC 9.13  8.35  7.71    RBC 4.73  4.41  5.01    Hemoglobin 13.3  12.5  14.1    Hematocrit 43.4  40.1  45.2    MCV 91.8  90.9  90.2    MCH 28.1  28.3  28.1    MCHC 30.6  31.2  31.2    RDW 13.8  14.0  13.9    Platelets 170  158  206    Neutrophil Rel % 77.7  70.6  75.4    Immature Granulocyte Rel % 0.5  0.5  0.0    Lymphocyte Rel % 13.4  19.3  15.7    Monocyte Rel % 8.2  9.1  6.7    Eosinophil Rel % 0.1  0.4  1.9    Basophil Rel % 0.1  0.1  0.3      Data reviewed : Radiologic studies chest CT 5/13/2024, pulmonary function test 10/13/2022 and my last office " note    Procedures        Assessment and Plan    Diagnoses and all orders for this visit:    1. Asthma-COPD overlap syndrome (Primary)  -     arformoterol (Brovana) 15 MCG/2ML nebulizer solution; Take 2 mL by nebulization 2 (Two) Times a Day.  -     amoxicillin-clavulanate (AUGMENTIN) 875-125 MG per tablet; Take 1 tablet by mouth 2 (Two) Times a Day.  Dispense: 14 tablet; Refill: 0    2. Chronic respiratory failure with hypoxia and hypercapnia    3. Dyspnea on exertion    4. Nicotine dependence, cigarettes, in remission    5. Pulmonary emphysema, unspecified emphysema type    6. Pulmonary hypertension    7. Thrush of mouth and esophagus  -     nystatin (MYCOSTATIN) 100,000 unit/mL suspension; Take 5 mL by mouth 4 (Four) Times a Day.  Dispense: 473 mL; Refill: 2          Follow Up   Return in about 4 months (around 12/27/2024) for Recheck with Drew.  Patient was given instructions and counseling regarding his condition or for health maintenance advice. Please see specific information pulled into the AVS if appropriate.

## 2024-08-28 ENCOUNTER — TELEPHONE (OUTPATIENT)
Dept: GASTROENTEROLOGY | Facility: CLINIC | Age: 64
End: 2024-08-28
Payer: MEDICAID

## 2024-08-28 NOTE — TELEPHONE ENCOUNTER
----- Message from Melissa Wilcox sent at 8/28/2024 11:37 AM EDT -----  See telephone encounter entered by Dr. Tripp 8/27/2024.  Patient has been notified of EGD biopsy results and referral to Dr. Stephen has been made.    I have reviewed the patient colonoscopy and pathology report. Patient has tubular adenoma polyps on pathology report that were completely removed. It is recommended the patient be on a 5 year recall. Please place in the recall system. Send letter.

## 2024-09-05 ENCOUNTER — TELEPHONE (OUTPATIENT)
Dept: PULMONOLOGY | Facility: CLINIC | Age: 64
End: 2024-09-05
Payer: MEDICAID

## 2024-09-05 DIAGNOSIS — J44.89 ASTHMA-COPD OVERLAP SYNDROME: ICD-10-CM

## 2024-09-05 NOTE — TELEPHONE ENCOUNTER
Patient called requesting a refill of Arformoterol (Brovana) please.  He is not sure which pharmacy provides it for him.

## 2024-09-18 DIAGNOSIS — B37.81 THRUSH OF MOUTH AND ESOPHAGUS: ICD-10-CM

## 2024-09-18 DIAGNOSIS — B37.0 THRUSH OF MOUTH AND ESOPHAGUS: ICD-10-CM

## 2024-09-18 RX ORDER — NYSTATIN 100000/ML
500000 SUSPENSION, ORAL (FINAL DOSE FORM) ORAL 4 TIMES DAILY
Qty: 473 ML | Refills: 2 | OUTPATIENT
Start: 2024-09-18

## 2024-09-27 ENCOUNTER — TELEPHONE (OUTPATIENT)
Dept: GASTROENTEROLOGY | Facility: CLINIC | Age: 64
End: 2024-09-27
Payer: MEDICAID

## 2024-10-08 DIAGNOSIS — J43.9 PULMONARY EMPHYSEMA, UNSPECIFIED EMPHYSEMA TYPE: ICD-10-CM

## 2024-10-08 RX ORDER — IPRATROPIUM BROMIDE AND ALBUTEROL SULFATE 2.5; .5 MG/3ML; MG/3ML
3 SOLUTION RESPIRATORY (INHALATION)
Qty: 360 ML | Refills: 3 | Status: SHIPPED | OUTPATIENT
Start: 2024-10-08

## 2024-11-01 NOTE — PLAN OF CARE
Goal Outcome Evaluation:  Plan of Care Reviewed With: patient        Progress: no change  Outcome Evaluation: Patient AAOx4. VSS. Patient vo complaints of discomfort or pain this shift.  Continue plan of care                                Breast implant/Pacemaker

## 2024-11-22 ENCOUNTER — TELEPHONE (OUTPATIENT)
Dept: PULMONOLOGY | Facility: CLINIC | Age: 64
End: 2024-11-22
Payer: MEDICAID

## 2024-11-22 DIAGNOSIS — R05.3 CHRONIC COUGH: ICD-10-CM

## 2024-11-22 DIAGNOSIS — J44.1 CHRONIC OBSTRUCTIVE PULMONARY DISEASE WITH ACUTE EXACERBATION: ICD-10-CM

## 2024-11-22 DIAGNOSIS — J44.89 ASTHMA-COPD OVERLAP SYNDROME: ICD-10-CM

## 2024-11-22 DIAGNOSIS — R05.9 COUGH, UNSPECIFIED TYPE: ICD-10-CM

## 2024-11-22 DIAGNOSIS — B44.9 ASPERGILLUS: ICD-10-CM

## 2024-11-22 DIAGNOSIS — R53.83 FATIGUE, UNSPECIFIED TYPE: ICD-10-CM

## 2024-11-22 DIAGNOSIS — R06.2 WHEEZING: ICD-10-CM

## 2024-11-22 DIAGNOSIS — E43 SEVERE MALNUTRITION: ICD-10-CM

## 2024-11-22 DIAGNOSIS — Z99.81 ON HOME OXYGEN THERAPY: ICD-10-CM

## 2024-11-22 RX ORDER — ARFORMOTEROL TARTRATE 15 UG/2ML
15 SOLUTION RESPIRATORY (INHALATION)
Qty: 360 ML | Refills: 3 | Status: SHIPPED | OUTPATIENT
Start: 2024-11-22

## 2024-11-22 RX ORDER — BUDESONIDE 0.5 MG/2ML
0.5 INHALANT ORAL 2 TIMES DAILY
Qty: 360 ML | Refills: 3 | Status: SHIPPED | OUTPATIENT
Start: 2024-11-22

## 2024-11-22 NOTE — TELEPHONE ENCOUNTER
Spoke to Yamileth Garcia [facility], patient needed to call about refills, so she put in a refill request for his neb medication.  M for patient with this info and also gave her number to call for refills to [facility] - [phone].

## 2024-11-22 NOTE — TELEPHONE ENCOUNTER
Spoke to patient, both medication PA's have been denied. Will try to send to [facility] for patient, if not - will have to switch to inhalers.

## 2024-11-22 NOTE — TELEPHONE ENCOUNTER
Patient came into the office asking about medication that he has not hd and waiting to get it refilled.  One of the medications are the nebulizer arformoterol tartrate and the other is budesonide.    Patient would like a phone call to discuss this as he has been trying to get these for several months and nothing has been solved.

## 2024-11-27 ENCOUNTER — TELEPHONE (OUTPATIENT)
Dept: PULMONOLOGY | Facility: CLINIC | Age: 64
End: 2024-11-27

## 2024-11-27 NOTE — TELEPHONE ENCOUNTER
"    Hub staff attempted to follow warm transfer process and was unsuccessful     Caller: Sumeet Gomes \"Olman\"    Relationship to patient: Self    Best call back number: [phone] (home)       Patient is needing: PT STATES HE SPOKE TO [facility] AND THEY DO NOT HAVE HIS RX IN THE SYSTEM. PLEASE CALL PT TO ADVISE.   "

## 2024-11-27 NOTE — TELEPHONE ENCOUNTER
Spoke with patient. Patient stated the Brovana needed to  be sent to [facility].   Sent request thru [facility]

## 2024-12-04 DIAGNOSIS — J44.89 ASTHMA-COPD OVERLAP SYNDROME: Primary | ICD-10-CM

## 2024-12-04 RX ORDER — BUDESONIDE AND FORMOTEROL FUMARATE DIHYDRATE 160; 4.5 UG/1; UG/1
2 AEROSOL RESPIRATORY (INHALATION) 2 TIMES DAILY
Qty: 1 EACH | Refills: 5 | Status: SHIPPED | OUTPATIENT
Start: 2024-12-04

## 2024-12-04 NOTE — TELEPHONE ENCOUNTER
Spoke with patient. [facility] out of network for his nebulizer medications. Will send symbicort. Patient aware

## 2024-12-14 DIAGNOSIS — I10 ESSENTIAL HYPERTENSION: ICD-10-CM

## 2024-12-16 RX ORDER — METOPROLOL TARTRATE 25 MG/1
TABLET, FILM COATED ORAL
Qty: 90 TABLET | Refills: 0 | OUTPATIENT
Start: 2024-12-16

## 2024-12-16 NOTE — TELEPHONE ENCOUNTER
Rx Refill Note  Requested Prescriptions     Pending Prescriptions Disp Refills    metoprolol tartrate (LOPRESSOR) 25 MG tablet [Pharmacy Med Name: METOPROLOL TARTRATE 25 MG TAB] 90 tablet 0     Sig: TAKE 1/2 TABLET BY MOUTH TWICE A DAY      Last office visit with prescribing clinician: 5/30/2024   Last telemedicine visit with prescribing clinician: Visit date not found   Next office visit with prescribing clinician: Visit date not found    Prescribed by     Ulices Moya MD   Denied.     Joseline Becerra LPN  12/16/24, 14:22 EST

## 2024-12-18 DIAGNOSIS — I10 ESSENTIAL HYPERTENSION: ICD-10-CM

## 2024-12-19 NOTE — TELEPHONE ENCOUNTER
[facility] PULMONARY     Dispensed Days Supply Quantity Provider Pharmacy    METOPROLOL TARTRATE 25 MG TAB 09/07/2024 90 90 each Ulices Moya MD [facility]/pharmacy #[id number] - ...     I recommended he contact Dr Moya's office.  Mirtha Alexander is out of the office until 01/02/2025.

## 2024-12-19 NOTE — TELEPHONE ENCOUNTER
Rx Refill Note  Requested Prescriptions     Pending Prescriptions Disp Refills    metoprolol tartrate (LOPRESSOR) 25 MG tablet [Pharmacy Med Name: METOPROLOL TARTRATE 25 MG TAB] 90 tablet 0     Sig: TAKE 1/2 TABLET BY MOUTH TWICE A DAY      Last office visit with prescribing clinician: 8/27/2024   Last telemedicine visit with prescribing clinician: Visit date not found   Next office visit with prescribing clinician: 12/27/2024       Farzana Hernández LPN  12/19/24, 09:57 EST    NOT FILLED BY YOU PREVIOUSLY. OK TO FILL UNDER YOU?

## 2024-12-20 RX ORDER — METOPROLOL TARTRATE 25 MG/1
TABLET, FILM COATED ORAL
Qty: 90 TABLET | Refills: 0 | Status: SHIPPED | OUTPATIENT
Start: 2024-12-20

## 2024-12-27 ENCOUNTER — OFFICE VISIT (OUTPATIENT)
Dept: PULMONOLOGY | Facility: CLINIC | Age: 64
End: 2024-12-27
Payer: MEDICAID

## 2024-12-27 VITALS
DIASTOLIC BLOOD PRESSURE: 68 MMHG | RESPIRATION RATE: 16 BRPM | WEIGHT: 123.8 LBS | TEMPERATURE: 97.6 F | HEIGHT: 67 IN | SYSTOLIC BLOOD PRESSURE: 111 MMHG | OXYGEN SATURATION: 92 % | BODY MASS INDEX: 19.43 KG/M2 | HEART RATE: 83 BPM

## 2024-12-27 DIAGNOSIS — J96.11 CHRONIC RESPIRATORY FAILURE WITH HYPOXIA: Primary | Chronic | ICD-10-CM

## 2024-12-27 DIAGNOSIS — R06.09 DYSPNEA ON EXERTION: ICD-10-CM

## 2024-12-27 DIAGNOSIS — F17.210 NICOTINE DEPENDENCE, CIGARETTES, UNCOMPLICATED: ICD-10-CM

## 2024-12-27 DIAGNOSIS — Z71.6 ENCOUNTER FOR SMOKING CESSATION COUNSELING: ICD-10-CM

## 2024-12-27 DIAGNOSIS — J44.9 CHRONIC OBSTRUCTIVE PULMONARY DISEASE, UNSPECIFIED COPD TYPE: Chronic | ICD-10-CM

## 2024-12-27 RX ORDER — ALBUTEROL SULFATE 90 UG/1
2 INHALANT RESPIRATORY (INHALATION) EVERY 4 HOURS PRN
Qty: 54 G | Refills: 3 | Status: SHIPPED | OUTPATIENT
Start: 2024-12-27

## 2024-12-27 RX ORDER — AZITHROMYCIN 250 MG/1
1 TABLET, FILM COATED ORAL DAILY
COMMUNITY
Start: 2024-10-11

## 2024-12-27 NOTE — PROGRESS NOTES
Primary Care Provider  Mirtha Alexander APRN     Referring Provider  No ref. provider found     Chief Complaint  Asthma, COPD, Emphysema, Shortness of Breath, Cough (Dry cough ), and Follow-up (4 month f/up )    Subjective          Sumeet Gomes presents to [facility]  History of Present Illness  Sumeet Gomes is a 64 y.o. male patient of Dr. Moya with hypoxic and hypercapnic respiratory failure, asthma/COPD overlap syndrome, emphysema, pulmonary hypertension and tobacco abuse in remission.     Patient states he is doing okay since his last office visit.  He denies using any antibiotics or steroids for his lungs.  Denies any current fevers or chills.  Shortness of breath is moderate in severity, worse with exertion and improved with rest.  Patient does still have Brovana Pulmicort and Yupelri.  Unfortunately, his insurance is not covering Brovana and Pulmicort and therefore he does have a Symbicort inhaler as well.  He continues to take daily Daliresp.  He is also on daily azithromycin.  Patient is wearing 3 to 4 L of oxygen and is benefiting from its use.  Patient did recently restart smoking 1 to 2 cigarettes a day and is strongly encouraged to quit.  Otherwise, he is doing okay and has no additional concerns at this time.     His history of smoking is   Tobacco Use: High Risk (12/27/2024)    Patient History     Smoking Tobacco Use: Some Days     Smokeless Tobacco Use: Never     Passive Exposure: Past   Sumeet Gomes  reports that he has been smoking cigarettes. He started smoking about 50 years ago. He has a 12.5 pack-year smoking history. He has been exposed to tobacco smoke. He has never used smokeless tobacco. I have educated him on the risk of diseases from using tobacco products such as cancer, COPD, and heart disease.     I advised him to quit and he is willing to quit. We have discussed the following method/s for tobacco cessation:  Education  Material, Counseling, and [place].  Encourage a quit date for  3 months .  He will follow up with me in 5 months or sooner to check on his progress.    I spent 5 minutes counseling the patient.      Review of Systems   Constitutional:  Negative for chills, fatigue, fever, unexpected weight gain and unexpected weight loss.   HENT:  Congestion: Nasal.    Respiratory:  Positive for cough and shortness of breath. Negative for apnea and wheezing.         Negative for Hemoptysis     Cardiovascular:  Negative for chest pain, palpitations and leg swelling.   Skin:         Negative for cyanosis      Sleep: Negative for Excessive daytime sleepiness  Negative for morning headaches  Negative for Snoring    Family History   Problem Relation Age of Onset    Asthma Mother     Emphysema Mother             Stroke Mother     Colon cancer Neg Hx         Social History     Socioeconomic History    Marital status:    Tobacco Use    Smoking status: Some Days     Current packs/day: 0.25     Average packs/day: 0.2 packs/day for 50.0 years (12.5 ttl pk-yrs)     Types: Cigarettes     Start date: 1975     Passive exposure: Past (2 cigarettes per day, smokes some days)    Smokeless tobacco: Never    Tobacco comments:     1-2 cigarettes daily as of 24 AL    Vaping Use    Vaping status: Never Used   Substance and Sexual Activity    Alcohol use: Not Currently    Drug use: Never    Sexual activity: Defer        Past Medical History:   Diagnosis Date    CHF (congestive heart failure)     COPD (chronic obstructive pulmonary disease)     COPD (chronic obstructive pulmonary disease)     Coronary artery disease     Diastolic heart failure     Hyperlipidemia     Hypertension     Pulmonary hypertension         Immunization History   Administered Date(s) Administered    COVID-19 (MODERNA) 1st,2nd,3rd Dose Monovalent 2021, 2021, 2021, 2021    Flu Vaccine Quad PF >36MO 2019    Fluzone (or Fluarix &  Flulaval for VFC) >6mos 11/08/2019, 10/25/2021, 10/06/2022, 12/14/2023    Hepatitis A 11/13/2018    Pneumococcal Conjugate 13-Valent (PCV13) 11/07/2018    Pneumococcal Conjugate 20-Valent (PCV20) 11/08/2022    Pneumococcal Polysaccharide (PPSV23) 02/13/2017    RSV, unspecified (Vaccine or MAB) 03/01/2024    Tdap 06/13/2012    Zostavax 06/13/2012         No Known Allergies       Current Outpatient Medications:     albuterol sulfate  (90 Base) MCG/ACT inhaler, Inhale 2 puffs Every 4 (Four) Hours As Needed for Wheezing., Disp: 54 g, Rfl: 3    azithromycin (ZITHROMAX) 250 MG tablet, Take 1 tablet by mouth Daily., Disp: , Rfl:     budesonide-formoterol (SYMBICORT) 160-4.5 MCG/ACT inhaler, Inhale 2 puffs 2 (Two) Times a Day., Disp: 1 each, Rfl: 5    furosemide (LASIX) 20 MG tablet, Take 2 tablets by mouth Daily., Disp: 60 tablet, Rfl: 11    ipratropium-albuterol (DUO-NEB) 0.5-2.5 mg/3 ml nebulizer, Take 3 mL by nebulization 4 (Four) Times a Day., Disp: 360 mL, Rfl: 3    metoprolol tartrate (LOPRESSOR) 25 MG tablet, TAKE 1/2 TABLET BY MOUTH TWICE A DAY, Disp: 90 tablet, Rfl: 0    O2 (OXYGEN), Inhale 3 L/min 1 (One) Time., Disp: , Rfl:     pantoprazole (PROTONIX) 40 MG EC tablet, Take 1 tablet by mouth Every Morning., Disp: 30 tablet, Rfl: 11    revefenacin (Yupelri) 175 MCG/3ML nebulizer solution, Take 3 mL by nebulization Daily., Disp: 90 mL, Rfl: 11    roflumilast (DALIRESP) 500 MCG tablet tablet, TAKE 1 TABLET BY MOUTH EVERY DAY, Disp: 30 tablet, Rfl: 5    arformoterol (Brovana) 15 MCG/2ML nebulizer solution, Take 2 mL by nebulization 2 (Two) Times a Day. (Patient not taking: Reported on 12/27/2024), Disp: 360 mL, Rfl: 3    budesonide (Pulmicort) 0.5 MG/2ML nebulizer solution, Take 2 mL by nebulization 2 (Two) Times a Day. (Patient not taking: Reported on 12/27/2024), Disp: 360 mL, Rfl: 3    nystatin (MYCOSTATIN) 100,000 unit/mL suspension, Take 5 mL by mouth 4 (Four) Times a Day. (Patient not taking: Reported on  "12/27/2024), Disp: 473 mL, Rfl: 2     Objective   Physical Exam  Constitutional:       General: He is not in acute distress.     Appearance: Normal appearance. He is normal weight.   HENT:      Right Ear: Hearing normal.      Left Ear: Hearing normal.      Nose: No nasal tenderness or congestion.      Mouth/Throat:      Mouth: Mucous membranes are moist. No oral lesions.   Eyes:      Extraocular Movements: Extraocular movements intact.      Pupils: Pupils are equal, round, and reactive to light.   Cardiovascular:      Rate and Rhythm: Normal rate and regular rhythm.      Pulses: Normal pulses.      Heart sounds: Normal heart sounds. No murmur heard.  Pulmonary:      Effort: Pulmonary effort is normal.      Breath sounds: Decreased breath sounds present. No wheezing, rhonchi or rales.      Comments: Patient is on 3 L of oxygen.  He is able to speak full sentences without difficulty.  Musculoskeletal:      Right lower leg: No edema.      Left lower leg: No edema.   Skin:     General: Skin is warm and dry.      Findings: No lesion or rash.   Neurological:      General: No focal deficit present.      Mental Status: He is alert and oriented to person, place, and time.   Psychiatric:         Mood and Affect: Affect normal. Mood is not anxious or depressed.         Vital Signs:   /68 (BP Location: Right arm, Patient Position: Sitting, Cuff Size: Adult)   Pulse 83   Temp 97.6 °F (36.4 °C) (Oral)   Resp 16   Ht 170.2 cm (67\")   Wt 56.2 kg (123 lb 12.8 oz)   SpO2 92% Comment: 3 L's PD  BMI 19.39 kg/m²        Result Review :   The following data was reviewed by: ZULY Contreras on 12/27/2024:  CMP          5/22/2024    05:04 5/23/2024    06:55 5/24/2024    05:51   CMP   Glucose 88  87  90    BUN 27  25  23    Creatinine 0.58  0.51  0.59    EGFR 110.3  114.6  109.7    Sodium 140  140  141    Potassium 4.1  4.1  4.7    Chloride 95  100  99    Calcium 8.8  8.7  8.8    Total Protein 5.1  5.0     Albumin 3.4  3.4  "    Globulin 1.7  1.6     Total Bilirubin 0.2  0.2     Alkaline Phosphatase 132  143     AST (SGOT) 19  25     ALT (SGPT) 35  40     Albumin/Globulin Ratio 2.0  2.1     BUN/Creatinine Ratio 46.6  49.0  39.0    Anion Gap 1.3  0.7  2.6      CBC w/diff          5/23/2024    06:55 5/24/2024    05:51 6/18/2024    13:52   CBC w/Diff   WBC 9.13  8.35  7.71    RBC 4.73  4.41  5.01    Hemoglobin 13.3  12.5  14.1    Hematocrit 43.4  40.1  45.2    MCV 91.8  90.9  90.2    MCH 28.1  28.3  28.1    MCHC 30.6  31.2  31.2    RDW 13.8  14.0  13.9    Platelets 170  158  206    Neutrophil Rel % 77.7  70.6  75.4    Immature Granulocyte Rel % 0.5  0.5  0.0    Lymphocyte Rel % 13.4  19.3  15.7    Monocyte Rel % 8.2  9.1  6.7    Eosinophil Rel % 0.1  0.4  1.9    Basophil Rel % 0.1  0.1  0.3      Data reviewed : Radiologic studies chest CT 5/13/2024, pulmonary function test 10/13/2022 and my last office note    Procedures        Assessment and Plan    Diagnoses and all orders for this visit:    1. Chronic respiratory failure with hypoxia (Primary)  Comments:  Continue oxygen    2. Chronic obstructive pulmonary disease, unspecified COPD type  Comments:  Continue Brovana, Pulmicort and Yupelri  Orders:  -     albuterol sulfate  (90 Base) MCG/ACT inhaler; Inhale 2 puffs Every 4 (Four) Hours As Needed for Wheezing.  Dispense: 54 g; Refill: 3    3. Dyspnea on exertion  Comments:  Continue albuterol    4. Nicotine dependence, cigarettes, uncomplicated  Comments:  Smoking cessation education    5. Encounter for smoking cessation counseling    Smoking cessation counseling provided.  I spent 5 minutes today counseling patient on the risks of smoking, including throat cancer, lung cancer, COPD, heart disease and death.  Also discussed the benefits of quitting.         Follow Up   Return in about 4 months (around 4/27/2025) for Recheck.  Patient was given instructions and counseling regarding his condition or for health maintenance advice.  Please see specific information pulled into the AVS if appropriate.

## 2024-12-27 NOTE — PATIENT INSTRUCTIONS
COPD and Physical Activity  Chronic obstructive pulmonary disease (COPD) is a long-term, or chronic, condition that affects the lungs. COPD is a general term that can be used to describe many problems that cause inflammation of the lungs and limit airflow. These conditions include chronic bronchitis and emphysema.  The main symptom of COPD is shortness of breath, which makes it harder to do even simple tasks. This can also make it harder to exercise and stay active. Talk with your health care provider about treatments to help you breathe better and actions you can take to prevent breathing problems during physical activity.  What are the benefits of exercising when you have COPD?  Exercising regularly is an important part of a healthy lifestyle. You can still exercise and do physical activities even though you have COPD. Exercise and physical activity improve your shortness of breath by increasing blood flow (circulation). This causes your heart to pump more oxygen through your body. Moderate exercise can:  Improve oxygen use.  Increase your energy level.  Help with shortness of breath.  Strengthen your breathing muscles.  Improve heart health.  Help with sleep.  Improve your self-esteem and feelings of self-worth.  Lower depression, stress, and anxiety.  Exercise can benefit everyone with COPD. The severity of your disease may affect how hard you can exercise, especially at first, but everyone can benefit. Talk with your health care provider about how much exercise is safe for you, and which activities and exercises are safe for you.  What actions can I take to prevent breathing problems during physical activity?  Sign up for a pulmonary rehabilitation program. This type of program may include:  Education about lung diseases.  Exercise classes that teach you how to exercise and be more active while improving your breathing. This usually involves:  Exercise using your lower extremities, such as a stationary  bicycle.  About 30 minutes of exercise, 2 to 5 times per week, for 6 to 12 weeks.  Strength training, such as push-ups or leg lifts.  Nutrition education.  Group classes in which you can talk with others who also have COPD and learn ways to manage stress.  If you use an oxygen tank, you should use it while you exercise. Work with your health care provider to adjust your oxygen for your physical activity. Your resting flow rate is different from your flow rate during physical activity.  How to manage your breathing while exercising  While you are exercising:  Take slow breaths.  Pace yourself, and do nottry to go too fast.  Purse your lips while breathing out. Pursing your lips is similar to a kissing or whistling position.  If doing exercise that uses a quick burst of effort, such as weight lifting:  Breathe in before starting the exercise.  Breathe out during the hardest part of the exercise, such as raising the weights.  Where to find support  You can find support for exercising with COPD from:  Your health care provider.  A pulmonary rehabilitation program.  Your local health department or community health programs.  Support groups, either online or in-person. Your health care provider may be able to recommend support groups.  Where to find more information  You can find more information about exercising with COPD from:  American Lung Association: lung.org  COPD Foundation: copdfoundation.org  Contact a health care provider if:  Your symptoms get worse.  You have nausea.  You have a fever.  You want to start a new exercise program or a new activity.  Get help right away if:  You have chest pain.  You cannot breathe.  These symptoms may represent a serious problem that is an emergency. Do not wait to see if the symptoms will go away. Get medical help right away. Call your local emergency services (911 in the [place] Do not drive yourself to the hospital.  Summary  COPD is a general term that can be used to describe  "many different lung problems that cause lung inflammation and limit airflow. This includes chronic bronchitis and emphysema.  Exercise and physical activity improve your shortness of breath by increasing blood flow (circulation). This causes your heart to provide more oxygen to your body.  Contact your health care provider before starting any exercise program or new activity. Ask your health care provider what exercises and activities are safe for you.  This information is not intended to replace advice given to you by your health care provider. Make sure you discuss any questions you have with your health care provider.  Document Revised: 10/26/2021 Document Reviewed: 10/26/2021  [employer] Patient Education © 2024 [employer]  Smoking Tobacco Information, Adult  Smoking tobacco can be harmful to your health. Tobacco contains a toxic colorless chemical called nicotine. Nicotine causes changes in your brain that make you want more and more. This is called addiction. This can make it hard to stop smoking once you start. Tobacco also has other toxic chemicals that can hurt your body and raise your risk of many cancers.  Menthol or \"lite\" tobacco or cigarette brands are not safer than regular brands.  How can smoking tobacco affect me?  Smoking tobacco puts you at risk for:  Cancer. Smoking is most commonly associated with lung cancer, but can also lead to cancer in other parts of the body.  Chronic obstructive pulmonary disease (COPD). This is a long-term lung condition that makes it hard to breathe. It also gets worse over time.  High blood pressure (hypertension), heart disease, stroke, heart attack, and lung infections, such as pneumonia.  Cataracts. This is when the lenses in the eyes become clouded.  Digestive problems. This may include peptic ulcers, heartburn, and gastroesophageal reflux disease (GERD).  Oral health problems, such as gum disease, mouth sores, and tooth loss.  Loss of taste and smell.  Smoking " also affects how you look and smell. Smoking may cause:  Wrinkles.  Yellow or stained teeth, fingers, and fingernails.  Bad breath.  Bad-smelling clothes and hair.  Smoking tobacco can also affect your social life, because:  It may be challenging to find places to smoke when away from home. Many workplaces, restaurants, hotels, and public places are tobacco-free.  Smoking is expensive. This is due to the cost of tobacco and the long-term costs of treating health problems from smoking.  Secondhand smoke may affect those around you. Secondhand smoke can cause lung cancer, breathing problems, and heart disease. Children of smokers have a higher risk for:  Sudden infant death syndrome (SIDS).  Ear infections.  Lung infections.  What actions can I take to prevent health problems?  Quit smoking    Do not start smoking. Quit if you already smoke.  Do not replace cigarette smoking with vaping devices, such as e-cigarettes.  Make a plan to quit smoking and commit to it. Look for programs to help you, and ask your health care provider for recommendations and ideas. Set a date and write down all the reasons you want to quit.  Let your friends and family know you are quitting so they can help and support you. Consider finding friends who also want to quit. It can be easier to quit with someone else, so that you can support each other.  Talk with your health care provider about using nicotine replacement medicines to help you quit. These include gum, lozenges, patches, sprays, or pills.  If you try to quit but return to smoking, stay positive. It is common to slip up when you first quit, so take it one day at a time.  Be prepared for cravings. When you feel the urge to smoke, chew gum or suck on hard candy.  Lifestyle  Stay busy.  Take care of your body. Get plenty of exercise, eat a healthy diet, and drink plenty of water.  Find ways to manage your stress, such as meditation, yoga, exercise, or time spent with friends and  family.  Ask your health care provider about having regular tests (screenings) to check for cancer. This may include blood tests, imaging tests, and other tests.  Where to find support  To get support to quit smoking, consider:  Asking your health care provider for more information and resources.  Joining a support group for people who want to quit smoking in your local community. There are many effective programs that may help you to quit.  Calling the smokefree.gov counselor helpline at 5-156-QUIT-NOW (1-424.125.8260).  Where to find more information  You may find more information about quitting smoking from:  Centers for Disease Control and Prevention: cdc.gov/tobacco  Smokefree.gov: smokefree.gov  American Lung Association: freedomfromsmoking.org  Contact a health care provider if:  You have problems breathing.  Your lips, nose, or fingers turn blue.  You have chest pain.  You are coughing up blood.  You feel like you will faint.  You have other health changes that cause you to worry.  Summary  Smoking tobacco can negatively affect your health, the health of those around you, your finances, and your social life.  Do not start smoking. Quit if you already smoke. If you need help quitting, ask your health care provider.  Consider joining a support group for people in your local community who want to quit smoking. There are many effective programs that may help you to quit.  This information is not intended to replace advice given to you by your health care provider. Make sure you discuss any questions you have with your health care provider.  Document Revised: 12/13/2022 Document Reviewed: 12/13/2022  Elsevier Patient Education © 2024 [employer].

## 2025-01-03 ENCOUNTER — TELEPHONE (OUTPATIENT)
Dept: PULMONOLOGY | Facility: CLINIC | Age: 65
End: 2025-01-03

## 2025-01-03 NOTE — TELEPHONE ENCOUNTER
"Caller: Sumeet Gomes \"Olman\"    Relationship to patient: Self    Best call back number: [phone]     Patient is needing: PATIENT WOULD LIKE TO SPEAK WITH SOMEONE ABOUT GETTING ANOTHER PRESCRIPTION FOR A NEBULIZER SENT TO [facility]. HIS CURRENT ONE MAKES A REALLY LOUD NOISE AND IS ABOUT TO GO OUT.           "

## 2025-01-12 DIAGNOSIS — K21.9 GASTROESOPHAGEAL REFLUX DISEASE, UNSPECIFIED WHETHER ESOPHAGITIS PRESENT: ICD-10-CM

## 2025-01-12 DIAGNOSIS — Z99.81 ON HOME OXYGEN THERAPY: ICD-10-CM

## 2025-01-14 RX ORDER — PANTOPRAZOLE SODIUM 40 MG/1
40 TABLET, DELAYED RELEASE ORAL EVERY MORNING
Qty: 90 TABLET | Refills: 3 | Status: SHIPPED | OUTPATIENT
Start: 2025-01-14

## 2025-01-14 RX ORDER — REVEFENACIN 175 UG/3ML
175 SOLUTION RESPIRATORY (INHALATION)
Qty: 90 ML | Refills: 11 | Status: SHIPPED | OUTPATIENT
Start: 2025-01-14

## 2025-01-14 RX ORDER — FUROSEMIDE 20 MG/1
40 TABLET ORAL DAILY
Qty: 180 TABLET | Refills: 3 | Status: SHIPPED | OUTPATIENT
Start: 2025-01-14

## 2025-01-15 ENCOUNTER — TELEPHONE (OUTPATIENT)
Dept: PULMONOLOGY | Facility: CLINIC | Age: 65
End: 2025-01-15

## 2025-01-15 NOTE — TELEPHONE ENCOUNTER
Did PA for medication, and medication available without PA. Informed patient and verbalized understanding.

## 2025-01-15 NOTE — TELEPHONE ENCOUNTER
"    Caller: Sumeet Gomes \"Olman\"    Relationship to patient: Self      Best call back number: [phone]     Provider: DEON GLYNN     Medication PA needed: ALBUTEROL INHALOR    Reason for call/Prior Auth: [facility] TOLD PATIENT THAT INSURANCE REQUIRED PA. PLEASE CALL PATIENT TO ADVISE HE IS OUT OF THE MEDICATION   "

## 2025-01-16 DIAGNOSIS — J44.9 CHRONIC OBSTRUCTIVE PULMONARY DISEASE, UNSPECIFIED COPD TYPE: ICD-10-CM

## 2025-01-16 RX ORDER — ROFLUMILAST 500 UG/1
500 TABLET ORAL DAILY
Qty: 30 TABLET | Refills: 5 | Status: SHIPPED | OUTPATIENT
Start: 2025-01-16

## 2025-01-27 ENCOUNTER — TELEPHONE (OUTPATIENT)
Dept: UROLOGY | Age: 65
End: 2025-01-27
Payer: MEDICAID

## 2025-01-27 NOTE — TELEPHONE ENCOUNTER
----- Message from Pratik ELLIS sent at 1/27/2025  8:12 AM EST -----  Need to move pt to 2/7 at 1:45pm please

## 2025-01-27 NOTE — TELEPHONE ENCOUNTER
CALLED PT TO R/S APPT 2/3 [place]/ [place] 2[place]7 @ 1:45 [place] DUE TO [facility] OUT IN THE AFTERNOON    NO ANSWER, LMOM

## 2025-01-27 NOTE — TELEPHONE ENCOUNTER
"        Hub staff attempted to follow warm transfer process and was unsuccessful     Caller: Sumeet Gomes \"Olman\"    Relationship to patient: Self    Best call back number: [phone] (home)     Patient is needing: PT CALLED BACK TO SPEAK WITH KENNEDY. PLEASE CALL PT BACK. THANK YOU.        "

## 2025-01-31 ENCOUNTER — TELEPHONE (OUTPATIENT)
Dept: UROLOGY | Age: 65
End: 2025-01-31
Payer: MEDICAID

## 2025-01-31 NOTE — TELEPHONE ENCOUNTER
"     Caller: Sumeet Gomes \"Olman\"     Relationship to patient: SELF    Best call back number: [phone]     Patient is needing: PT MISSED HIS MRI APPT AND IT'S BEEN RESCHEDULED FOR 2-13-25. PT'S APPT WAS RESCHEDULED TO 3-10-25. IF THAT IS TOO FAR OUT, PLEASE GIVE PT A CALL BACK TO RESCHEDULE.     "

## 2025-02-13 ENCOUNTER — HOSPITAL ENCOUNTER (OUTPATIENT)
Dept: MRI IMAGING | Facility: HOSPITAL | Age: 65
Discharge: HOME | End: 2025-02-13
Admitting: UROLOGY
Payer: MEDICAID

## 2025-02-13 DIAGNOSIS — N28.89 RIGHT RENAL MASS: ICD-10-CM

## 2025-02-13 LAB
CREAT BLDA-MCNC: 0.7 MG/DL (ref 0.6–1.3)
EGFRCR SERPLBLD CKD-EPI 2021: 103.5 ML/MIN/1.73

## 2025-02-13 PROCEDURE — 74183 MRI ABD W/O CNTR FLWD CNTR: CPT

## 2025-02-13 PROCEDURE — A9577 INJ MULTIHANCE: HCPCS | Performed by: UROLOGY

## 2025-02-13 PROCEDURE — 25510000002 GADOBENATE DIMEGLUMINE 529 MG/ML SOLUTION: Performed by: UROLOGY

## 2025-02-13 PROCEDURE — 82565 ASSAY OF CREATININE: CPT

## 2025-02-14 PROCEDURE — A9577 INJ MULTIHANCE: HCPCS | Performed by: UROLOGY

## 2025-02-14 PROCEDURE — 25510000002 GADOBENATE DIMEGLUMINE 529 MG/ML SOLUTION: Performed by: UROLOGY

## 2025-02-14 RX ADMIN — GADOBENATE DIMEGLUMINE 15 ML: 529 INJECTION, SOLUTION INTRAVENOUS at 10:46

## 2025-02-22 NOTE — PROGRESS NOTES
Chief Complaint: Urologic complaint    Subjective         History of Present Illness  Sumeet Gomes is a 64 y.o. male       Enhancing right renal mass  COPD on O2            past medical history of hypertension, hyperlipidemia, COPD on home oxygen, pulmonary hypertension, CAD, and GERD     Admitted for COPD exacerbation       On  O2 for the last year.    COPD stable this last year    He okay.  No straining  Void frequently secondary to diuretic      No GH      2/25 MRI abdomen with and without - 1.3 cm stable nonspecific intracortical right renal lesion.  Possible internal enhancement.      2/25   0.7,           No CAD  Non-smoker  No anticoagulation            Previous      8/2/2024 MRI abdomen with and without - 1.8 cm lesion interpolar region right kidney with apparent enhancement.  Concerning for neoplasm.  Possible compression fracture T11.  Several mild compression deformities in the lower thoracic and upper lumbar spine not significantly changed.    5/17/2024 CT abdomen with and without - 1.7 cm lesion interpolar cortex of the right kidney, heterogenous demonstrates enhancement.     5/24 0.5,         No history of  surgery        No urologic family history.         Objective                 Vital Signs:             Assessment and Plan    Diagnoses and all orders for this visit:    1. Right renal mass (Primary)      MRI reviewed with the patient. lesion stable. we discussed in he has about 80% chance of being a renal cell carcinoma.   Patient is dealing with fairly severe COPD.  At this time after discussion of risk and benefits I do think he is least moderate to high risk for any procedures.  We are going to monitor this with active surveillance at this time.      Follow-up in 18 months with MRI abdomen with and without    Patient understands we are rule out malignancy must follow-up

## 2025-02-25 ENCOUNTER — OFFICE VISIT (OUTPATIENT)
Dept: UROLOGY | Age: 65
End: 2025-02-25
Payer: MEDICAID

## 2025-02-25 VITALS — WEIGHT: 123 LBS | BODY MASS INDEX: 19.3 KG/M2 | HEIGHT: 67 IN

## 2025-02-25 DIAGNOSIS — N28.89 RIGHT RENAL MASS: Primary | ICD-10-CM

## 2025-02-25 PROCEDURE — 99213 OFFICE O/P EST LOW 20 MIN: CPT | Performed by: UROLOGY

## 2025-02-25 PROCEDURE — 1159F MED LIST DOCD IN RCRD: CPT | Performed by: UROLOGY

## 2025-02-25 PROCEDURE — 1160F RVW MEDS BY RX/DR IN RCRD: CPT | Performed by: UROLOGY

## 2025-02-26 DIAGNOSIS — B37.81 THRUSH OF MOUTH AND ESOPHAGUS: ICD-10-CM

## 2025-02-26 DIAGNOSIS — B37.0 THRUSH OF MOUTH AND ESOPHAGUS: ICD-10-CM

## 2025-02-26 RX ORDER — NYSTATIN 100000 [USP'U]/ML
500000 SUSPENSION ORAL 4 TIMES DAILY
Qty: 1419 ML | Refills: 2 | Status: SHIPPED | OUTPATIENT
Start: 2025-02-26

## 2025-04-24 ENCOUNTER — OFFICE VISIT (OUTPATIENT)
Dept: PULMONOLOGY | Facility: CLINIC | Age: 65
End: 2025-04-24
Payer: MEDICAID

## 2025-04-24 VITALS
BODY MASS INDEX: 18.96 KG/M2 | HEIGHT: 67 IN | DIASTOLIC BLOOD PRESSURE: 68 MMHG | HEART RATE: 86 BPM | WEIGHT: 120.8 LBS | RESPIRATION RATE: 16 BRPM | SYSTOLIC BLOOD PRESSURE: 104 MMHG | OXYGEN SATURATION: 90 % | TEMPERATURE: 97.6 F

## 2025-04-24 DIAGNOSIS — F17.210 NICOTINE DEPENDENCE, CIGARETTES, UNCOMPLICATED: ICD-10-CM

## 2025-04-24 DIAGNOSIS — J44.9 CHRONIC OBSTRUCTIVE PULMONARY DISEASE, UNSPECIFIED COPD TYPE: ICD-10-CM

## 2025-04-24 DIAGNOSIS — J43.9 PULMONARY EMPHYSEMA, UNSPECIFIED EMPHYSEMA TYPE: Chronic | ICD-10-CM

## 2025-04-24 DIAGNOSIS — J96.11 CHRONIC RESPIRATORY FAILURE WITH HYPOXIA AND HYPERCAPNIA: ICD-10-CM

## 2025-04-24 DIAGNOSIS — I10 ESSENTIAL HYPERTENSION: ICD-10-CM

## 2025-04-24 DIAGNOSIS — J96.12 CHRONIC RESPIRATORY FAILURE WITH HYPOXIA AND HYPERCAPNIA: ICD-10-CM

## 2025-04-24 DIAGNOSIS — J43.9 PULMONARY EMPHYSEMA, UNSPECIFIED EMPHYSEMA TYPE: ICD-10-CM

## 2025-04-24 DIAGNOSIS — J44.89 ASTHMA-COPD OVERLAP SYNDROME: Primary | Chronic | ICD-10-CM

## 2025-04-24 DIAGNOSIS — R06.09 DYSPNEA ON EXERTION: ICD-10-CM

## 2025-04-24 RX ORDER — METOPROLOL TARTRATE 25 MG/1
TABLET, FILM COATED ORAL
Qty: 90 TABLET | Refills: 2 | Status: SHIPPED | OUTPATIENT
Start: 2025-04-24

## 2025-04-24 RX ORDER — ROFLUMILAST 500 UG/1
500 TABLET ORAL DAILY
Qty: 30 TABLET | Refills: 5 | Status: SHIPPED | OUTPATIENT
Start: 2025-04-24

## 2025-04-24 RX ORDER — IPRATROPIUM BROMIDE AND ALBUTEROL SULFATE 2.5; .5 MG/3ML; MG/3ML
3 SOLUTION RESPIRATORY (INHALATION)
Qty: 360 ML | Refills: 3 | Status: SHIPPED | OUTPATIENT
Start: 2025-04-24

## 2025-04-24 RX ORDER — AZITHROMYCIN 250 MG/1
250 TABLET, FILM COATED ORAL DAILY
Qty: 30 TABLET | Refills: 5 | Status: SHIPPED | OUTPATIENT
Start: 2025-04-24

## 2025-04-24 NOTE — PROGRESS NOTES
Primary Care Provider  Mirtha Alexander APRN     Referring Provider  No ref. provider found         Patient or patient representative verbalized consent for the use of Ambient Listening during the visit with  ZULY Contreras for chart documentation. 4/24/2025  14:01 EDT    Chief Complaint  Asthma, COPD, Emphysema, Shortness of Breath (With exertion ), Cough (Clear thick mucus ), and Follow-up (4 month f/up )    Subjective          Sumeet Gomes presents to [facility]ULMON[facility]INE  History of Present Illness  Sumeet Gomes is a 64 y.o. male patient of Dr. Moya with chronic hypoxic and hypercapnic respiratory failure, asthma/COPD overlap syndrome, emphysema, pulmonary hypertension and tobacco abuse in remission.     History of Present Illness  The patient is a 64-year-old male who presents for evaluation of respiratory issues.    He has reduced his smoking habit to 1 or 2 cigarettes per day, with the intention of further reduction during the summer months. He experiences difficulty in breathing when performing tasks that require squatting, such as using a nail gun. He also reports a mild cough, which produces clear sputum. He has been utilizing a percussion massager for symptom relief, which he finds more effective than the previously used machine.  He continues to use Brovana, Pulmicort and Yupelri as prescribed.  Patient spouse states that he has not been as active.  He continues to use 3 L of oxygen and is benefiting from its use.      SOCIAL HISTORY  He smokes 1 to 2 cigarettes a day.       His history of smoking is   Tobacco Use: High Risk (4/24/2025)    Patient History     Smoking Tobacco Use: Some Days     Smokeless Tobacco Use: Never     Passive Exposure: Past   Sumeet Gomes  reports that he has been smoking cigarettes. He started smoking about 50 years ago. He has a 12.6 pack-year smoking history. He has been exposed to tobacco smoke. He has never used  smokeless tobacco. I have educated him on the risk of diseases from using tobacco products such as cancer, COPD, and heart disease.     I advised him to quit and he is willing to quit. We have discussed the following method/s for tobacco cessation:  Education Material, Counseling, and Cold Almo.  Encourage a quit date for  3 months .  He will follow up with me in 4 months or sooner to check on his progress.    I spent 5 minutes counseling the patient.           Review of Systems   Constitutional:  Negative for chills, fatigue, fever, unexpected weight gain and unexpected weight loss.   HENT:  Congestion: Nasal.    Respiratory:  Positive for cough and shortness of breath. Negative for apnea and wheezing.         Negative for Hemoptysis     Cardiovascular:  Negative for chest pain, palpitations and leg swelling.   Skin:         Negative for cyanosis      Sleep: Negative for Excessive daytime sleepiness  Negative for morning headaches  Negative for Snoring    Family History   Problem Relation Age of Onset    Asthma Mother     Emphysema Mother             Stroke Mother     Colon cancer Neg Hx         Social History     Socioeconomic History    Marital status:    Tobacco Use    Smoking status: Some Days     Current packs/day: 0.25     Average packs/day: 0.3 packs/day for 50.3 years (12.6 ttl pk-yrs)     Types: Cigarettes     Start date: 1975     Passive exposure: Past (2 cigarettes per day, smokes some days)    Smokeless tobacco: Never    Tobacco comments:     1-2 cigarettes daily as of 24 AL    Vaping Use    Vaping status: Never Used   Substance and Sexual Activity    Alcohol use: Not Currently    Drug use: Never    Sexual activity: Defer        Past Medical History:   Diagnosis Date    CHF (congestive heart failure)     COPD (chronic obstructive pulmonary disease)     COPD (chronic obstructive pulmonary disease)     Coronary artery disease     Diastolic heart failure     Hyperlipidemia      Hypertension     Pulmonary hypertension         Immunization History   Administered Date(s) Administered    COVID-19 (MODERNA) 1st,2nd,3rd Dose Monovalent 06/18/2021, 06/18/2021, 07/23/2021, 07/23/2021    Flu Vaccine Quad PF >36MO 11/08/2019    Fluzone (or Fluarix & Flulaval for VFC) >6mos 11/08/2019, 10/25/2021, 10/06/2022, 12/14/2023    Hepatitis A 11/13/2018    Pneumococcal Conjugate 13-Valent (PCV13) 11/07/2018    Pneumococcal Conjugate 20-Valent (PCV20) 11/08/2022    Pneumococcal Polysaccharide (PPSV23) 02/13/2017    RSV, unspecified (Vaccine or MAB) 03/01/2024    Tdap 06/13/2012    Zostavax 06/13/2012         No Known Allergies       Current Outpatient Medications:     albuterol sulfate  (90 Base) MCG/ACT inhaler, Inhale 2 puffs Every 4 (Four) Hours As Needed for Wheezing., Disp: 54 g, Rfl: 3    arformoterol (Brovana) 15 MCG/2ML nebulizer solution, Take 2 mL by nebulization 2 (Two) Times a Day., Disp: 360 mL, Rfl: 3    azithromycin (ZITHROMAX) 250 MG tablet, Take 1 tablet by mouth Daily., Disp: 30 tablet, Rfl: 5    budesonide (Pulmicort) 0.5 MG/2ML nebulizer solution, Take 2 mL by nebulization 2 (Two) Times a Day., Disp: 360 mL, Rfl: 3    furosemide (LASIX) 20 MG tablet, TAKE 2 TABLETS BY MOUTH EVERY DAY, Disp: 180 tablet, Rfl: 3    ipratropium-albuterol (DUO-NEB) 0.5-2.5 mg/3 ml nebulizer, Take 3 mL by nebulization 4 (Four) Times a Day., Disp: 360 mL, Rfl: 3    metoprolol tartrate (LOPRESSOR) 25 MG tablet, TAKE 1/2 TABLET BY MOUTH TWICE A DAY, Disp: 90 tablet, Rfl: 2    nystatin (MYCOSTATIN) 100,000 unit/mL suspension, Take 5 mL by mouth 4 (Four) Times a Day., Disp: 1419 mL, Rfl: 2    O2 (OXYGEN), Inhale 3 L/min 1 (One) Time., Disp: , Rfl:     pantoprazole (PROTONIX) 40 MG EC tablet, TAKE 1 TABLET BY MOUTH EVERY DAY IN THE MORNING, Disp: 90 tablet, Rfl: 3    roflumilast (DALIRESP) 500 MCG tablet tablet, Take 1 tablet by mouth Daily., Disp: 30 tablet, Rfl: 5    Yupelri 175 MCG/3ML nebulizer solution,  "TAKE 3 ML BY NEBULIZATION DAILY., Disp: 90 mL, Rfl: 11    budesonide-formoterol (SYMBICORT) 160-4.5 MCG/ACT inhaler, Inhale 2 puffs 2 (Two) Times a Day. (Patient not taking: Reported on 4/24/2025), Disp: 1 each, Rfl: 5     Objective   Physical Exam  Constitutional:       General: He is not in acute distress.     Appearance: Normal appearance. He is normal weight.   HENT:      Right Ear: Hearing normal.      Left Ear: Hearing normal.      Nose: No nasal tenderness or congestion.      Mouth/Throat:      Mouth: Mucous membranes are moist. No oral lesions.   Eyes:      Extraocular Movements: Extraocular movements intact.      Pupils: Pupils are equal, round, and reactive to light.   Cardiovascular:      Rate and Rhythm: Normal rate and regular rhythm.      Pulses: Normal pulses.      Heart sounds: Normal heart sounds. No murmur heard.  Pulmonary:      Effort: Pulmonary effort is normal.      Breath sounds: Decreased breath sounds and wheezing (Expiratory) present. No rhonchi or rales.      Comments: Patient is on 3 L of oxygen.  He is able to speak full sentences without difficulty.  Musculoskeletal:      Right lower leg: No edema.      Left lower leg: No edema.   Skin:     General: Skin is warm and dry.      Findings: No lesion or rash.   Neurological:      General: No focal deficit present.      Mental Status: He is alert and oriented to person, place, and time.   Psychiatric:         Mood and Affect: Affect normal. Mood is not anxious or depressed.         Vital Signs:   /68 (BP Location: Right arm, Patient Position: Sitting, Cuff Size: Adult)   Pulse 86   Temp 97.6 °F (36.4 °C) (Oral)   Resp 16   Ht 170.2 cm (67\")   Wt 54.8 kg (120 lb 12.8 oz)   SpO2 90% Comment: 4 l's PD/3 L's C  BMI 18.92 kg/m²        Result Review :   The following data was reviewed by: ZULY Contreras on 04/24/2025:  CMP          5/23/2024    06:55 5/24/2024    05:51 2/13/2025    15:34   CMP   Glucose 87  90     BUN 25  23   "   Creatinine 0.51  0.59  0.70    EGFR 114.6  109.7  103.5    Sodium 140  141     Potassium 4.1  4.7     Chloride 100  99     Calcium 8.7  8.8     Total Protein 5.0      Albumin 3.4      Globulin 1.6      Total Bilirubin 0.2      Alkaline Phosphatase 143      AST (SGOT) 25      ALT (SGPT) 40      Albumin/Globulin Ratio 2.1      BUN/Creatinine Ratio 49.0  39.0     Anion Gap 0.7  2.6       CBC w/diff          5/23/2024    06:55 5/24/2024    05:51 6/18/2024    13:52   CBC w/Diff   WBC 9.13  8.35  7.71    RBC 4.73  4.41  5.01    Hemoglobin 13.3  12.5  14.1    Hematocrit 43.4  40.1  45.2    MCV 91.8  90.9  90.2    MCH 28.1  28.3  28.1    MCHC 30.6  31.2  31.2    RDW 13.8  14.0  13.9    Platelets 170  158  206    Neutrophil Rel % 77.7  70.6  75.4    Immature Granulocyte Rel % 0.5  0.5  0.0    Lymphocyte Rel % 13.4  19.3  15.7    Monocyte Rel % 8.2  9.1  6.7    Eosinophil Rel % 0.1  0.4  1.9    Basophil Rel % 0.1  0.1  0.3      Data reviewed : Radiologic studies chest CT 5/13/2024, pulmonary function test 10/13/2022 and my last office note    Procedures        Assessment and Plan    Diagnoses and all orders for this visit:    1. Asthma-COPD overlap syndrome (Primary)  Comments:  Continue Brovana, Pulmicort and Yupelri.  Continue Daliresp  Orders:  -     roflumilast (DALIRESP) 500 MCG tablet tablet; Take 1 tablet by mouth Daily.  Dispense: 30 tablet; Refill: 5  -     ipratropium-albuterol (DUO-NEB) 0.5-2.5 mg/3 ml nebulizer; Take 3 mL by nebulization 4 (Four) Times a Day.  Dispense: 360 mL; Refill: 3  -     azithromycin (ZITHROMAX) 250 MG tablet; Take 1 tablet by mouth Daily.  Dispense: 30 tablet; Refill: 5    2. Pulmonary emphysema, unspecified emphysema type  Comments:  Continue Brovana, Pulmicort and Yupelri  Orders:  -     roflumilast (DALIRESP) 500 MCG tablet tablet; Take 1 tablet by mouth Daily.  Dispense: 30 tablet; Refill: 5  -     ipratropium-albuterol (DUO-NEB) 0.5-2.5 mg/3 ml nebulizer; Take 3 mL by nebulization 4  (Four) Times a Day.  Dispense: 360 mL; Refill: 3  -     azithromycin (ZITHROMAX) 250 MG tablet; Take 1 tablet by mouth Daily.  Dispense: 30 tablet; Refill: 5    3. Chronic respiratory failure with hypoxia and hypercapnia  Comments:  continue oxygen.  Patient using and benefiting    4. Nicotine dependence, cigarettes, uncomplicated    5. Dyspnea on exertion  Comments:  continue albuterol as needed    6. Essential hypertension  -     metoprolol tartrate (LOPRESSOR) 25 MG tablet; TAKE 1/2 TABLET BY MOUTH TWICE A DAY  Dispense: 90 tablet; Refill: 2    7. Chronic obstructive pulmonary disease, unspecified COPD type    8. Pulmonary emphysema, unspecified emphysema type  -     roflumilast (DALIRESP) 500 MCG tablet tablet; Take 1 tablet by mouth Daily.  Dispense: 30 tablet; Refill: 5  -     ipratropium-albuterol (DUO-NEB) 0.5-2.5 mg/3 ml nebulizer; Take 3 mL by nebulization 4 (Four) Times a Day.  Dispense: 360 mL; Refill: 3  -     azithromycin (ZITHROMAX) 250 MG tablet; Take 1 tablet by mouth Daily.  Dispense: 30 tablet; Refill: 5      Smoking cessation counseling provided.  I spent 5 minutes today counseling patient on the risks of smoking, including throat cancer, lung cancer, COPD, heart disease and death.  Also discussed the benefits of quitting.  Patient will be considered a former smoker after 3 months smoking cessation.   Assessment & Plan  1.  Asthma/COPD overlap syndrome  He reports a reduction in smoking to 1-2 cigarettes per day and plans to further reduce during the summer. He experiences difficulty breathing when performing tasks such as squatting. He has been using a percussion massager, which he finds more effective than the previous machine provided by Medicare.          Follow Up   Return in about 4 months (around 8/24/2025) for Recheck.  Patient was given instructions and counseling regarding his condition or for health maintenance advice. Please see specific information pulled into the AVS if appropriate.

## 2025-04-24 NOTE — PATIENT INSTRUCTIONS
"Smoking Tobacco Information, Adult  Smoking tobacco can be harmful to your health. Tobacco contains a toxic colorless chemical called nicotine. Nicotine causes changes in your brain that make you want more and more. This is called addiction. This can make it hard to stop smoking once you start. Tobacco also has other toxic chemicals that can hurt your body and raise your risk of many cancers.  Menthol or \"lite\" tobacco or cigarette brands are not safer than regular brands.  How can smoking tobacco affect me?  Smoking tobacco puts you at risk for:  Cancer. Smoking is most commonly associated with lung cancer, but can also lead to cancer in other parts of the body.  Chronic obstructive pulmonary disease (COPD). This is a long-term lung condition that makes it hard to breathe. It also gets worse over time.  High blood pressure (hypertension), heart disease, stroke, heart attack, and lung infections, such as pneumonia.  Cataracts. This is when the lenses in the eyes become clouded.  Digestive problems. This may include peptic ulcers, heartburn, and gastroesophageal reflux disease (GERD).  Oral health problems, such as gum disease, mouth sores, and tooth loss.  Loss of taste and smell.  Smoking also affects how you look and smell. Smoking may cause:  Wrinkles.  Yellow or stained teeth, fingers, and fingernails.  Bad breath.  Bad-smelling clothes and hair.  Smoking tobacco can also affect your social life, because:  It may be challenging to find places to smoke when away from home. Many workplaces, restaurants, hotels, and public places are tobacco-free.  Smoking is expensive. This is due to the cost of tobacco and the long-term costs of treating health problems from smoking.  Secondhand smoke may affect those around you. Secondhand smoke can cause lung cancer, breathing problems, and heart disease. Children of smokers have a higher risk for:  Sudden infant death syndrome (SIDS).  Ear infections.  Lung infections.  What " actions can I take to prevent health problems?  Quit smoking    Do not start smoking. Quit if you already smoke.  Do not replace cigarette smoking with vaping devices, such as e-cigarettes.  Make a plan to quit smoking and commit to it. Look for programs to help you, and ask your health care provider for recommendations and ideas. Set a date and write down all the reasons you want to quit.  Let your friends and family know you are quitting so they can help and support you. Consider finding friends who also want to quit. It can be easier to quit with someone else, so that you can support each other.  Talk with your health care provider about using nicotine replacement medicines to help you quit. These include gum, lozenges, patches, sprays, or pills.  If you try to quit but return to smoking, stay positive. It is common to slip up when you first quit, so take it one day at a time.  Be prepared for cravings. When you feel the urge to smoke, chew gum or suck on hard candy.  Lifestyle  Stay busy.  Take care of your body. Get plenty of exercise, eat a healthy diet, and drink plenty of water.  Find ways to manage your stress, such as meditation, yoga, exercise, or time spent with friends and family.  Ask your health care provider about having regular tests (screenings) to check for cancer. This may include blood tests, imaging tests, and other tests.  Where to find support  To get support to quit smoking, consider:  Asking your health care provider for more information and resources.  Joining a support group for people who want to quit smoking in your local community. There are many effective programs that may help you to quit.  Calling the smokefree.gov counselor helpline at 3-951-QUIT-NOW (1-167.527.8384).  Where to find more information  You may find more information about quitting smoking from:  Centers for Disease Control and Prevention: cdc.gov/tobacco  Smokefree.gov: smokefree.gov  American Lung Association:  DoocumentsfrYipit.org  Contact a health care provider if:  You have problems breathing.  Your lips, nose, or fingers turn blue.  You have chest pain.  You are coughing up blood.  You feel like you will faint.  You have other health changes that cause you to worry.  Summary  Smoking tobacco can negatively affect your health, the health of those around you, your finances, and your social life.  Do not start smoking. Quit if you already smoke. If you need help quitting, ask your health care provider.  Consider joining a support group for people in your local community who want to quit smoking. There are many effective programs that may help you to quit.  This information is not intended to replace advice given to you by your health care provider. Make sure you discuss any questions you have with your health care provider.  Document Revised: 12/13/2022 Document Reviewed: 12/13/2022  Elsevier Patient Education © 2024 [employer]

## 2025-05-16 ENCOUNTER — HOSPITAL ENCOUNTER (INPATIENT)
Facility: HOSPITAL | Age: 65
LOS: 4 days | Discharge: HOME | End: 2025-05-20
Attending: EMERGENCY MEDICINE | Admitting: STUDENT IN AN ORGANIZED HEALTH CARE EDUCATION/TRAINING PROGRAM
Payer: MEDICARE

## 2025-05-16 ENCOUNTER — APPOINTMENT (OUTPATIENT)
Dept: GENERAL RADIOLOGY | Facility: HOSPITAL | Age: 65
End: 2025-05-16
Payer: MEDICARE

## 2025-05-16 DIAGNOSIS — J44.1 COPD EXACERBATION: ICD-10-CM

## 2025-05-16 DIAGNOSIS — J44.9 CHRONIC OBSTRUCTIVE PULMONARY DISEASE (COPD): ICD-10-CM

## 2025-05-16 DIAGNOSIS — J96.02 ACUTE RESPIRATORY FAILURE WITH HYPOXIA AND HYPERCAPNIA: Primary | ICD-10-CM

## 2025-05-16 DIAGNOSIS — J96.21 ACUTE ON CHRONIC RESPIRATORY FAILURE WITH HYPOXIA AND HYPERCAPNIA: ICD-10-CM

## 2025-05-16 DIAGNOSIS — J96.10 CHRONIC RESPIRATORY FAILURE: ICD-10-CM

## 2025-05-16 DIAGNOSIS — J96.22 ACUTE ON CHRONIC RESPIRATORY FAILURE WITH HYPOXIA AND HYPERCAPNIA: ICD-10-CM

## 2025-05-16 DIAGNOSIS — J96.01 ACUTE RESPIRATORY FAILURE WITH HYPOXIA AND HYPERCAPNIA: Primary | ICD-10-CM

## 2025-05-16 LAB
ALBUMIN SERPL-MCNC: 4.9 G/DL (ref 3.5–5.2)
ALBUMIN/GLOB SERPL: 2.1 G/DL
ALP SERPL-CCNC: 126 U/L (ref 39–117)
ALT SERPL W P-5'-P-CCNC: 23 U/L (ref 1–41)
ANION GAP SERPL CALCULATED.3IONS-SCNC: 7.7 MMOL/L (ref 5–15)
ARTERIAL PATENCY WRIST A: POSITIVE
AST SERPL-CCNC: 36 U/L (ref 1–40)
ATMOSPHERIC PRESS: 733.7 MMHG
BASE EXCESS BLDA CALC-SCNC: 12.9 MMOL/L (ref -2–2)
BASOPHILS # BLD AUTO: 0.03 10*3/MM3 (ref 0–0.2)
BASOPHILS NFR BLD AUTO: 0.5 % (ref 0–1.5)
BDY SITE: ABNORMAL
BILIRUB SERPL-MCNC: 0.7 MG/DL (ref 0–1.2)
BUN BLDA-MCNC: 15 MG/DL (ref 8–23)
BUN SERPL-MCNC: 13 MG/DL (ref 8–23)
BUN/CREAT SERPL: 24.1 (ref 7–25)
CA-I BLDA-SCNC: 1.19 MMOL/L (ref 1.13–1.32)
CALCIUM SPEC-SCNC: 10.4 MG/DL (ref 8.6–10.5)
CHLORIDE BLDA-SCNC: 90 MMOL/L (ref 98–107)
CHLORIDE SERPL-SCNC: 92 MMOL/L (ref 98–107)
CO2 BLDA-SCNC: >34.54 MMOL/L (ref 23–27)
CO2 SERPL-SCNC: 44.3 MMOL/L (ref 22–29)
CREAT BLDA-MCNC: 0.61 MG/DL (ref 0.6–1.3)
CREAT SERPL-MCNC: 0.54 MG/DL (ref 0.76–1.27)
D-LACTATE SERPL-SCNC: 0.6 MMOL/L
D-LACTATE SERPL-SCNC: 0.7 MMOL/L (ref 0.5–2)
DEPRECATED RDW RBC AUTO: 46.3 FL (ref 37–54)
EGFRCR SERPLBLD CKD-EPI 2021: 112 ML/MIN/1.73
EOSINOPHIL # BLD AUTO: 0.05 10*3/MM3 (ref 0–0.4)
EOSINOPHIL NFR BLD AUTO: 0.9 % (ref 0.3–6.2)
ERYTHROCYTE [DISTWIDTH] IN BLOOD BY AUTOMATED COUNT: 12.7 % (ref 12.3–15.4)
FLUAV RNA RESP QL NAA+PROBE: NOT DETECTED
FLUBV RNA RESP QL NAA+PROBE: NOT DETECTED
GAS FLOW AIRWAY: 3 LPM
GEN 5 1HR TROPONIN T REFLEX: 14 NG/L
GLOBULIN UR ELPH-MCNC: 2.3 GM/DL
GLUCOSE BLDC GLUCOMTR-MCNC: 117 MG/DL (ref 70–99)
GLUCOSE SERPL-MCNC: 120 MG/DL (ref 65–99)
HCO3 BLDA-SCNC: 44 MMOL/L (ref 22–26)
HCT VFR BLD AUTO: 52.2 % (ref 37.5–51)
HCT VFR BLD CALC: 44 % (ref 38–51)
HEMODILUTION: NO
HGB BLD-MCNC: 15.6 G/DL (ref 13–17.7)
HGB BLDA-MCNC: 14.9 G/DL (ref 12–18)
HOLD SPECIMEN: NORMAL
IMM GRANULOCYTES # BLD AUTO: 0.01 10*3/MM3 (ref 0–0.05)
IMM GRANULOCYTES NFR BLD AUTO: 0.2 % (ref 0–0.5)
INHALED O2 CONCENTRATION: 32 %
LYMPHOCYTES # BLD AUTO: 0.91 10*3/MM3 (ref 0.7–3.1)
LYMPHOCYTES NFR BLD AUTO: 16 % (ref 19.6–45.3)
Lab: ABNORMAL
MCH RBC QN AUTO: 29.4 PG (ref 26.6–33)
MCHC RBC AUTO-ENTMCNC: 29.9 G/DL (ref 31.5–35.7)
MCV RBC AUTO: 98.5 FL (ref 79–97)
MODALITY: ABNORMAL
MONOCYTES # BLD AUTO: 0.46 10*3/MM3 (ref 0.1–0.9)
MONOCYTES NFR BLD AUTO: 8.1 % (ref 5–12)
NEUTROPHILS NFR BLD AUTO: 4.22 10*3/MM3 (ref 1.7–7)
NEUTROPHILS NFR BLD AUTO: 74.3 % (ref 42.7–76)
NOTIFIED WHO: ABNORMAL
NOTIFIED WHO: ABNORMAL
NRBC BLD AUTO-RTO: 0 /100 WBC (ref 0–0.2)
NT-PROBNP SERPL-MCNC: 1144 PG/ML (ref 0–900)
PCO2 BLDA: 90 MM HG (ref 35–45)
PH BLDA: 7.3 PH UNITS (ref 7.35–7.45)
PLATELET # BLD AUTO: 128 10*3/MM3 (ref 140–450)
PMV BLD AUTO: 10.7 FL (ref 6–12)
PO2 BLD: 197 MM[HG] (ref 0–500)
PO2 BLDA: 63.1 MM HG (ref 80–100)
POTASSIUM BLDA-SCNC: 4.5 MMOL/L (ref 3.5–5)
POTASSIUM SERPL-SCNC: 4.8 MMOL/L (ref 3.5–5.2)
PROCALCITONIN SERPL-MCNC: 0.02 NG/ML (ref 0–0.25)
PROT SERPL-MCNC: 7.2 G/DL (ref 6–8.5)
QT INTERVAL: 412 MS
QTC INTERVAL: 462 MS
RBC # BLD AUTO: 5.3 10*6/MM3 (ref 4.14–5.8)
READ BACK: YES
READ BACK: YES
RSV RNA RESP QL NAA+PROBE: NOT DETECTED
SAO2 % BLDCOA: 87.2 % (ref 95–99)
SARS-COV-2 RNA RESP QL NAA+PROBE: NOT DETECTED
SODIUM BLD-SCNC: 145 MMOL/L (ref 131–143)
SODIUM SERPL-SCNC: 144 MMOL/L (ref 136–145)
TROPONIN T NUMERIC DELTA: -1 NG/L
TROPONIN T SERPL HS-MCNC: 15 NG/L
WBC NRBC COR # BLD AUTO: 5.68 10*3/MM3 (ref 3.4–10.8)
WHOLE BLOOD HOLD COAG: NORMAL
WHOLE BLOOD HOLD SPECIMEN: NORMAL

## 2025-05-16 PROCEDURE — 94640 AIRWAY INHALATION TREATMENT: CPT

## 2025-05-16 PROCEDURE — 83605 ASSAY OF LACTIC ACID: CPT

## 2025-05-16 PROCEDURE — 25010000002 METHYLPREDNISOLONE PER 125 MG

## 2025-05-16 PROCEDURE — 82803 BLOOD GASES ANY COMBINATION: CPT

## 2025-05-16 PROCEDURE — 94799 UNLISTED PULMONARY SVC/PX: CPT

## 2025-05-16 PROCEDURE — 25010000002 METHYLPREDNISOLONE PER 125 MG: Performed by: EMERGENCY MEDICINE

## 2025-05-16 PROCEDURE — 99291 CRITICAL CARE FIRST HOUR: CPT

## 2025-05-16 PROCEDURE — 36415 COLL VENOUS BLD VENIPUNCTURE: CPT

## 2025-05-16 PROCEDURE — 87637 SARSCOV2&INF A&B&RSV AMP PRB: CPT | Performed by: STUDENT IN AN ORGANIZED HEALTH CARE EDUCATION/TRAINING PROGRAM

## 2025-05-16 PROCEDURE — 36600 WITHDRAWAL OF ARTERIAL BLOOD: CPT

## 2025-05-16 PROCEDURE — 80047 BASIC METABLC PNL IONIZED CA: CPT

## 2025-05-16 PROCEDURE — 71045 X-RAY EXAM CHEST 1 VIEW: CPT

## 2025-05-16 PROCEDURE — 85025 COMPLETE CBC W/AUTO DIFF WBC: CPT | Performed by: EMERGENCY MEDICINE

## 2025-05-16 PROCEDURE — 93005 ELECTROCARDIOGRAM TRACING: CPT | Performed by: EMERGENCY MEDICINE

## 2025-05-16 PROCEDURE — 93005 ELECTROCARDIOGRAM TRACING: CPT | Performed by: STUDENT IN AN ORGANIZED HEALTH CARE EDUCATION/TRAINING PROGRAM

## 2025-05-16 PROCEDURE — 99222 1ST HOSP IP/OBS MODERATE 55: CPT | Performed by: STUDENT IN AN ORGANIZED HEALTH CARE EDUCATION/TRAINING PROGRAM

## 2025-05-16 PROCEDURE — 84145 PROCALCITONIN (PCT): CPT | Performed by: STUDENT IN AN ORGANIZED HEALTH CARE EDUCATION/TRAINING PROGRAM

## 2025-05-16 PROCEDURE — 80053 COMPREHEN METABOLIC PANEL: CPT | Performed by: EMERGENCY MEDICINE

## 2025-05-16 PROCEDURE — 83605 ASSAY OF LACTIC ACID: CPT | Performed by: EMERGENCY MEDICINE

## 2025-05-16 PROCEDURE — 94660 CPAP INITIATION&MGMT: CPT

## 2025-05-16 PROCEDURE — 83880 ASSAY OF NATRIURETIC PEPTIDE: CPT | Performed by: EMERGENCY MEDICINE

## 2025-05-16 PROCEDURE — 84484 ASSAY OF TROPONIN QUANT: CPT | Performed by: EMERGENCY MEDICINE

## 2025-05-16 RX ORDER — IPRATROPIUM BROMIDE AND ALBUTEROL SULFATE 2.5; .5 MG/3ML; MG/3ML
6 SOLUTION RESPIRATORY (INHALATION) ONCE
Status: COMPLETED | OUTPATIENT
Start: 2025-05-16 | End: 2025-05-16

## 2025-05-16 RX ORDER — WATER 10 ML/10ML
INJECTION INTRAMUSCULAR; INTRAVENOUS; SUBCUTANEOUS
Status: DISCONTINUED
Start: 2025-05-16 | End: 2025-05-16 | Stop reason: WASHOUT

## 2025-05-16 RX ORDER — IPRATROPIUM BROMIDE AND ALBUTEROL SULFATE 2.5; .5 MG/3ML; MG/3ML
SOLUTION RESPIRATORY (INHALATION)
Status: DISPENSED
Start: 2025-05-16 | End: 2025-05-17

## 2025-05-16 RX ORDER — SODIUM CHLORIDE 0.9 % (FLUSH) 0.9 %
10 SYRINGE (ML) INJECTION AS NEEDED
Status: DISCONTINUED | OUTPATIENT
Start: 2025-05-16 | End: 2025-05-20 | Stop reason: HOSPADM

## 2025-05-16 RX ORDER — WATER 10 ML/10ML
INJECTION INTRAMUSCULAR; INTRAVENOUS; SUBCUTANEOUS
Status: COMPLETED
Start: 2025-05-16 | End: 2025-05-16

## 2025-05-16 RX ORDER — METHYLPREDNISOLONE SODIUM SUCCINATE 125 MG/2ML
INJECTION, POWDER, LYOPHILIZED, FOR SOLUTION INTRAMUSCULAR; INTRAVENOUS
Status: COMPLETED
Start: 2025-05-16 | End: 2025-05-16

## 2025-05-16 RX ADMIN — WATER 10 ML: 1 INJECTION INTRAMUSCULAR; INTRAVENOUS; SUBCUTANEOUS at 20:34

## 2025-05-16 RX ADMIN — METHYLPREDNISOLONE SODIUM SUCCINATE 125 MG: 125 INJECTION INTRAMUSCULAR; INTRAVENOUS at 20:33

## 2025-05-16 RX ADMIN — IPRATROPIUM BROMIDE AND ALBUTEROL SULFATE 6 ML: .5; 3 SOLUTION RESPIRATORY (INHALATION) at 20:38

## 2025-05-17 ENCOUNTER — APPOINTMENT (OUTPATIENT)
Dept: CT IMAGING | Facility: HOSPITAL | Age: 65
End: 2025-05-17
Payer: MEDICARE

## 2025-05-17 LAB
ANION GAP SERPL CALCULATED.3IONS-SCNC: 8.3 MMOL/L (ref 5–15)
ARTERIAL PATENCY WRIST A: POSITIVE
ATMOSPHERIC PRESS: 734.6 MMHG
BASE EXCESS BLDA CALC-SCNC: 16.7 MMOL/L (ref -2–2)
BASOPHILS # BLD AUTO: 0 10*3/MM3 (ref 0–0.2)
BASOPHILS NFR BLD AUTO: 0 % (ref 0–1.5)
BDY SITE: ABNORMAL
BUN SERPL-MCNC: 16 MG/DL (ref 8–23)
BUN/CREAT SERPL: 27.1 (ref 7–25)
CALCIUM SPEC-SCNC: 10 MG/DL (ref 8.6–10.5)
CHLORIDE SERPL-SCNC: 92 MMOL/L (ref 98–107)
CO2 SERPL-SCNC: 39.7 MMOL/L (ref 22–29)
CREAT SERPL-MCNC: 0.59 MG/DL (ref 0.76–1.27)
DEPRECATED RDW RBC AUTO: 44.4 FL (ref 37–54)
EGFRCR SERPLBLD CKD-EPI 2021: 109 ML/MIN/1.73
EOSINOPHIL # BLD AUTO: 0 10*3/MM3 (ref 0–0.4)
EOSINOPHIL NFR BLD AUTO: 0 % (ref 0.3–6.2)
ERYTHROCYTE [DISTWIDTH] IN BLOOD BY AUTOMATED COUNT: 12.5 % (ref 12.3–15.4)
GLUCOSE SERPL-MCNC: 120 MG/DL (ref 65–99)
HCO3 BLDA-SCNC: 45.6 MMOL/L (ref 22–26)
HCT VFR BLD AUTO: 46 % (ref 37.5–51)
HCT VFR BLD CALC: 41 % (ref 38–51)
HEMODILUTION: NO
HGB BLD-MCNC: 13.9 G/DL (ref 13–17.7)
HGB BLDA-MCNC: 13.9 G/DL (ref 12–18)
IMM GRANULOCYTES # BLD AUTO: 0 10*3/MM3 (ref 0–0.05)
IMM GRANULOCYTES NFR BLD AUTO: 0 % (ref 0–0.5)
INHALED O2 CONCENTRATION: 30 %
L PNEUMO1 AG UR QL IA: NEGATIVE
LYMPHOCYTES # BLD AUTO: 0.28 10*3/MM3 (ref 0.7–3.1)
LYMPHOCYTES NFR BLD AUTO: 7.4 % (ref 19.6–45.3)
Lab: ABNORMAL
MAGNESIUM SERPL-MCNC: 1.8 MG/DL (ref 1.6–2.4)
MCH RBC QN AUTO: 29.1 PG (ref 26.6–33)
MCHC RBC AUTO-ENTMCNC: 30.2 G/DL (ref 31.5–35.7)
MCV RBC AUTO: 96.2 FL (ref 79–97)
MODALITY: ABNORMAL
MONOCYTES # BLD AUTO: 0.03 10*3/MM3 (ref 0.1–0.9)
MONOCYTES NFR BLD AUTO: 0.8 % (ref 5–12)
MRSA DNA SPEC QL NAA+PROBE: NORMAL
NEUTROPHILS NFR BLD AUTO: 3.46 10*3/MM3 (ref 1.7–7)
NEUTROPHILS NFR BLD AUTO: 91.8 % (ref 42.7–76)
NOTIFIED WHO: ABNORMAL
NRBC BLD AUTO-RTO: 0 /100 WBC (ref 0–0.2)
PAW @ PEAK INSP FLOW SETTING VENT: 14 CMH2O
PCO2 BLDA: 74.6 MM HG (ref 35–45)
PEEP RESPIRATORY: 5 CM[H2O]
PH BLDA: 7.39 PH UNITS (ref 7.35–7.45)
PLATELET # BLD AUTO: 117 10*3/MM3 (ref 140–450)
PMV BLD AUTO: 10.4 FL (ref 6–12)
PO2 BLD: 236 MM[HG] (ref 0–500)
PO2 BLDA: 70.8 MM HG (ref 80–100)
POTASSIUM SERPL-SCNC: 5 MMOL/L (ref 3.5–5.2)
QT INTERVAL: 417 MS
QTC INTERVAL: 459 MS
RBC # BLD AUTO: 4.78 10*6/MM3 (ref 4.14–5.8)
READ BACK: YES
S PNEUM AG SPEC QL LA: NEGATIVE
SAO2 % BLDCOA: 92.8 % (ref 95–99)
SODIUM SERPL-SCNC: 140 MMOL/L (ref 136–145)
WBC NRBC COR # BLD AUTO: 3.77 10*3/MM3 (ref 3.4–10.8)

## 2025-05-17 PROCEDURE — 99291 CRITICAL CARE FIRST HOUR: CPT | Performed by: INTERNAL MEDICINE

## 2025-05-17 PROCEDURE — 87899 AGENT NOS ASSAY W/OPTIC: CPT | Performed by: INTERNAL MEDICINE

## 2025-05-17 PROCEDURE — 94799 UNLISTED PULMONARY SVC/PX: CPT

## 2025-05-17 PROCEDURE — 63710000001 REVEFENACIN 175 MCG/3ML SOLUTION: Performed by: STUDENT IN AN ORGANIZED HEALTH CARE EDUCATION/TRAINING PROGRAM

## 2025-05-17 PROCEDURE — 87641 MR-STAPH DNA AMP PROBE: CPT | Performed by: INTERNAL MEDICINE

## 2025-05-17 PROCEDURE — 85025 COMPLETE CBC W/AUTO DIFF WBC: CPT | Performed by: STUDENT IN AN ORGANIZED HEALTH CARE EDUCATION/TRAINING PROGRAM

## 2025-05-17 PROCEDURE — 36600 WITHDRAWAL OF ARTERIAL BLOOD: CPT | Performed by: STUDENT IN AN ORGANIZED HEALTH CARE EDUCATION/TRAINING PROGRAM

## 2025-05-17 PROCEDURE — 36415 COLL VENOUS BLD VENIPUNCTURE: CPT | Performed by: STUDENT IN AN ORGANIZED HEALTH CARE EDUCATION/TRAINING PROGRAM

## 2025-05-17 PROCEDURE — 82803 BLOOD GASES ANY COMBINATION: CPT | Performed by: STUDENT IN AN ORGANIZED HEALTH CARE EDUCATION/TRAINING PROGRAM

## 2025-05-17 PROCEDURE — 94761 N-INVAS EAR/PLS OXIMETRY MLT: CPT

## 2025-05-17 PROCEDURE — 25510000001 IOPAMIDOL PER 1 ML: Performed by: STUDENT IN AN ORGANIZED HEALTH CARE EDUCATION/TRAINING PROGRAM

## 2025-05-17 PROCEDURE — 25010000002 METHYLPREDNISOLONE PER 40 MG: Performed by: STUDENT IN AN ORGANIZED HEALTH CARE EDUCATION/TRAINING PROGRAM

## 2025-05-17 PROCEDURE — 94660 CPAP INITIATION&MGMT: CPT

## 2025-05-17 PROCEDURE — 80048 BASIC METABOLIC PNL TOTAL CA: CPT | Performed by: STUDENT IN AN ORGANIZED HEALTH CARE EDUCATION/TRAINING PROGRAM

## 2025-05-17 PROCEDURE — 83735 ASSAY OF MAGNESIUM: CPT | Performed by: STUDENT IN AN ORGANIZED HEALTH CARE EDUCATION/TRAINING PROGRAM

## 2025-05-17 PROCEDURE — 94664 DEMO&/EVAL PT USE INHALER: CPT

## 2025-05-17 PROCEDURE — 87449 NOS EACH ORGANISM AG IA: CPT | Performed by: INTERNAL MEDICINE

## 2025-05-17 PROCEDURE — 25010000002 ENOXAPARIN PER 10 MG: Performed by: STUDENT IN AN ORGANIZED HEALTH CARE EDUCATION/TRAINING PROGRAM

## 2025-05-17 PROCEDURE — 25810000003 LACTATED RINGERS PER 1000 ML: Performed by: STUDENT IN AN ORGANIZED HEALTH CARE EDUCATION/TRAINING PROGRAM

## 2025-05-17 PROCEDURE — 71260 CT THORAX DX C+: CPT

## 2025-05-17 RX ORDER — AMOXICILLIN 250 MG
2 CAPSULE ORAL 2 TIMES DAILY PRN
Status: DISCONTINUED | OUTPATIENT
Start: 2025-05-17 | End: 2025-05-20 | Stop reason: HOSPADM

## 2025-05-17 RX ORDER — IOPAMIDOL 755 MG/ML
100 INJECTION, SOLUTION INTRAVASCULAR
Status: COMPLETED | OUTPATIENT
Start: 2025-05-17 | End: 2025-05-17

## 2025-05-17 RX ORDER — PANTOPRAZOLE SODIUM 40 MG/1
40 TABLET, DELAYED RELEASE ORAL
Status: DISCONTINUED | OUTPATIENT
Start: 2025-05-17 | End: 2025-05-20 | Stop reason: HOSPADM

## 2025-05-17 RX ORDER — AZITHROMYCIN 250 MG/1
250 TABLET, FILM COATED ORAL DAILY
Status: DISCONTINUED | OUTPATIENT
Start: 2025-05-17 | End: 2025-05-20 | Stop reason: HOSPADM

## 2025-05-17 RX ORDER — SODIUM CHLORIDE 9 MG/ML
40 INJECTION, SOLUTION INTRAVENOUS AS NEEDED
Status: DISCONTINUED | OUTPATIENT
Start: 2025-05-17 | End: 2025-05-20 | Stop reason: HOSPADM

## 2025-05-17 RX ORDER — FUROSEMIDE 20 MG/1
20 TABLET ORAL DAILY
Status: DISCONTINUED | OUTPATIENT
Start: 2025-05-17 | End: 2025-05-20 | Stop reason: HOSPADM

## 2025-05-17 RX ORDER — METOPROLOL TARTRATE 25 MG/1
12.5 TABLET, FILM COATED ORAL EVERY 12 HOURS SCHEDULED
Status: DISCONTINUED | OUTPATIENT
Start: 2025-05-17 | End: 2025-05-20 | Stop reason: HOSPADM

## 2025-05-17 RX ORDER — ENOXAPARIN SODIUM 100 MG/ML
40 INJECTION SUBCUTANEOUS DAILY
Status: DISCONTINUED | OUTPATIENT
Start: 2025-05-17 | End: 2025-05-20 | Stop reason: HOSPADM

## 2025-05-17 RX ORDER — POLYETHYLENE GLYCOL 3350 17 G/17G
17 POWDER, FOR SOLUTION ORAL DAILY PRN
Status: DISCONTINUED | OUTPATIENT
Start: 2025-05-17 | End: 2025-05-20 | Stop reason: HOSPADM

## 2025-05-17 RX ORDER — ACETAMINOPHEN 325 MG/1
650 TABLET ORAL EVERY 6 HOURS PRN
Status: DISCONTINUED | OUTPATIENT
Start: 2025-05-17 | End: 2025-05-20 | Stop reason: HOSPADM

## 2025-05-17 RX ORDER — BISACODYL 10 MG
10 SUPPOSITORY, RECTAL RECTAL DAILY PRN
Status: DISCONTINUED | OUTPATIENT
Start: 2025-05-17 | End: 2025-05-20 | Stop reason: HOSPADM

## 2025-05-17 RX ORDER — BISACODYL 5 MG/1
5 TABLET, DELAYED RELEASE ORAL DAILY PRN
Status: DISCONTINUED | OUTPATIENT
Start: 2025-05-17 | End: 2025-05-20 | Stop reason: HOSPADM

## 2025-05-17 RX ORDER — IPRATROPIUM BROMIDE AND ALBUTEROL SULFATE 2.5; .5 MG/3ML; MG/3ML
3 SOLUTION RESPIRATORY (INHALATION)
Status: DISCONTINUED | OUTPATIENT
Start: 2025-05-17 | End: 2025-05-18

## 2025-05-17 RX ORDER — ROFLUMILAST 500 UG/1
500 TABLET ORAL DAILY
Status: DISCONTINUED | OUTPATIENT
Start: 2025-05-17 | End: 2025-05-20 | Stop reason: HOSPADM

## 2025-05-17 RX ORDER — SODIUM CHLORIDE, SODIUM LACTATE, POTASSIUM CHLORIDE, CALCIUM CHLORIDE 600; 310; 30; 20 MG/100ML; MG/100ML; MG/100ML; MG/100ML
75 INJECTION, SOLUTION INTRAVENOUS CONTINUOUS
Status: DISCONTINUED | OUTPATIENT
Start: 2025-05-17 | End: 2025-05-17

## 2025-05-17 RX ORDER — NITROGLYCERIN 0.4 MG/1
0.4 TABLET SUBLINGUAL
Status: DISCONTINUED | OUTPATIENT
Start: 2025-05-17 | End: 2025-05-20 | Stop reason: HOSPADM

## 2025-05-17 RX ORDER — ARFORMOTEROL TARTRATE 15 UG/2ML
15 SOLUTION RESPIRATORY (INHALATION)
Status: DISCONTINUED | OUTPATIENT
Start: 2025-05-17 | End: 2025-05-20 | Stop reason: HOSPADM

## 2025-05-17 RX ORDER — ALUMINA, MAGNESIA, AND SIMETHICONE 2400; 2400; 240 MG/30ML; MG/30ML; MG/30ML
15 SUSPENSION ORAL EVERY 6 HOURS PRN
Status: DISCONTINUED | OUTPATIENT
Start: 2025-05-17 | End: 2025-05-20 | Stop reason: HOSPADM

## 2025-05-17 RX ORDER — POLYETHYLENE GLYCOL 3350 17 G
2 POWDER IN PACKET (EA) ORAL
Status: DISCONTINUED | OUTPATIENT
Start: 2025-05-17 | End: 2025-05-20 | Stop reason: HOSPADM

## 2025-05-17 RX ORDER — NICOTINE 21 MG/24HR
1 PATCH, TRANSDERMAL 24 HOURS TRANSDERMAL EVERY 24 HOURS
Status: DISCONTINUED | OUTPATIENT
Start: 2025-05-17 | End: 2025-05-20 | Stop reason: HOSPADM

## 2025-05-17 RX ORDER — BUDESONIDE 0.5 MG/2ML
0.5 INHALANT ORAL
Status: DISCONTINUED | OUTPATIENT
Start: 2025-05-17 | End: 2025-05-20 | Stop reason: HOSPADM

## 2025-05-17 RX ORDER — SODIUM CHLORIDE 0.9 % (FLUSH) 0.9 %
10 SYRINGE (ML) INJECTION AS NEEDED
Status: DISCONTINUED | OUTPATIENT
Start: 2025-05-17 | End: 2025-05-20 | Stop reason: HOSPADM

## 2025-05-17 RX ORDER — SODIUM CHLORIDE 0.9 % (FLUSH) 0.9 %
10 SYRINGE (ML) INJECTION EVERY 12 HOURS SCHEDULED
Status: DISCONTINUED | OUTPATIENT
Start: 2025-05-17 | End: 2025-05-20 | Stop reason: HOSPADM

## 2025-05-17 RX ADMIN — METHYLPREDNISOLONE SODIUM SUCCINATE 40 MG: 40 INJECTION, POWDER, LYOPHILIZED, FOR SOLUTION INTRAMUSCULAR; INTRAVENOUS at 15:07

## 2025-05-17 RX ADMIN — AZITHROMYCIN DIHYDRATE 250 MG: 250 TABLET ORAL at 08:34

## 2025-05-17 RX ADMIN — REVEFENACIN 175 MCG: 175 SOLUTION RESPIRATORY (INHALATION) at 06:47

## 2025-05-17 RX ADMIN — SODIUM CHLORIDE, POTASSIUM CHLORIDE, SODIUM LACTATE AND CALCIUM CHLORIDE 75 ML/HR: 600; 310; 30; 20 INJECTION, SOLUTION INTRAVENOUS at 02:18

## 2025-05-17 RX ADMIN — NICOTINE 1 PATCH: 21 PATCH, EXTENDED RELEASE TRANSDERMAL at 02:16

## 2025-05-17 RX ADMIN — IPRATROPIUM BROMIDE AND ALBUTEROL SULFATE 3 ML: .5; 3 SOLUTION RESPIRATORY (INHALATION) at 23:50

## 2025-05-17 RX ADMIN — IPRATROPIUM BROMIDE AND ALBUTEROL SULFATE 3 ML: .5; 3 SOLUTION RESPIRATORY (INHALATION) at 15:16

## 2025-05-17 RX ADMIN — IPRATROPIUM BROMIDE AND ALBUTEROL SULFATE 3 ML: .5; 3 SOLUTION RESPIRATORY (INHALATION) at 04:10

## 2025-05-17 RX ADMIN — METOPROLOL TARTRATE 12.5 MG: 25 TABLET, FILM COATED ORAL at 21:29

## 2025-05-17 RX ADMIN — METHYLPREDNISOLONE SODIUM SUCCINATE 40 MG: 40 INJECTION, POWDER, LYOPHILIZED, FOR SOLUTION INTRAMUSCULAR; INTRAVENOUS at 21:28

## 2025-05-17 RX ADMIN — PANTOPRAZOLE SODIUM 40 MG: 40 TABLET, DELAYED RELEASE ORAL at 05:24

## 2025-05-17 RX ADMIN — ROFLUMILAST 500 MCG: 500 TABLET ORAL at 08:34

## 2025-05-17 RX ADMIN — ENOXAPARIN SODIUM 40 MG: 40 INJECTION SUBCUTANEOUS at 08:34

## 2025-05-17 RX ADMIN — ARFORMOTEROL TARTRATE 15 MCG: 15 SOLUTION RESPIRATORY (INHALATION) at 19:15

## 2025-05-17 RX ADMIN — IPRATROPIUM BROMIDE AND ALBUTEROL SULFATE 3 ML: .5; 3 SOLUTION RESPIRATORY (INHALATION) at 19:14

## 2025-05-17 RX ADMIN — METOPROLOL TARTRATE 12.5 MG: 25 TABLET, FILM COATED ORAL at 02:17

## 2025-05-17 RX ADMIN — BUDESONIDE 0.5 MG: 0.5 SUSPENSION RESPIRATORY (INHALATION) at 19:15

## 2025-05-17 RX ADMIN — BUDESONIDE 0.5 MG: 0.5 SUSPENSION RESPIRATORY (INHALATION) at 06:46

## 2025-05-17 RX ADMIN — Medication 10 ML: at 08:34

## 2025-05-17 RX ADMIN — Medication 10 ML: at 02:20

## 2025-05-17 RX ADMIN — IPRATROPIUM BROMIDE AND ALBUTEROL SULFATE 3 ML: .5; 3 SOLUTION RESPIRATORY (INHALATION) at 11:22

## 2025-05-17 RX ADMIN — ACETAMINOPHEN 650 MG: 325 TABLET ORAL at 17:47

## 2025-05-17 RX ADMIN — IOPAMIDOL 100 ML: 755 INJECTION, SOLUTION INTRAVENOUS at 17:43

## 2025-05-17 RX ADMIN — FUROSEMIDE 20 MG: 20 TABLET ORAL at 15:47

## 2025-05-17 RX ADMIN — ARFORMOTEROL TARTRATE 15 MCG: 15 SOLUTION RESPIRATORY (INHALATION) at 06:46

## 2025-05-17 RX ADMIN — METHYLPREDNISOLONE SODIUM SUCCINATE 40 MG: 40 INJECTION, POWDER, LYOPHILIZED, FOR SOLUTION INTRAMUSCULAR; INTRAVENOUS at 05:23

## 2025-05-17 RX ADMIN — Medication 10 ML: at 21:30

## 2025-05-17 RX ADMIN — IPRATROPIUM BROMIDE AND ALBUTEROL SULFATE 3 ML: .5; 3 SOLUTION RESPIRATORY (INHALATION) at 06:46

## 2025-05-17 NOTE — PROGRESS NOTES
RT EQUIPMENT DEVICE RELATED - SKIN ASSESSMENT    RT Medical Equipment/Device:     NIV Mask:  Under-the-nose   size:  B    Skin Assessment:      Cheek:  Intact  Chin:  Intact  Nose:  Intact    Device Skin Pressure Protection:  Positioning supports utilized    Nurse Notification:  Akua Andrew, RRT

## 2025-05-17 NOTE — PLAN OF CARE
Goal Outcome Evaluation:              Outcome Evaluation: Pt AxOx4. Family at bedside aided in admission. PT presents on continous bipap with crackles and cough noted. Bipap continued. Family reports pt is resistant to education and has had recent frequent admissions. Pt reports attempting to quit tobacco, but without long term success. Bipap was once utilized at home but, wife reports issues with settings and it being sent back.  consult placed to address home bipap situation. Up to bedside commode. HR sinus arrhythmia.

## 2025-05-17 NOTE — H&P
[facility]   HOSPITALIST HISTORY AND PHYSICAL  Date: 2025   Patient Name: Sumeet Gomes  : 1960  MRN: [id number]  Primary Care Physician:  Mirtha Alexander APRN  Date of admission: 2025    Subjective   Subjective     Chief Complaint: Shortness of breath, cough, low oxygen    HPI:    Sumeet Gomes is a 64 y.o. male past medical history of hypertension, hyperlipidemia, COPD on 3 L nasal cannula, pulmonary hypertension, CAD, GERD who presented to the ER due to worsening shortness of breath.  Patient is a relatively poor historian history [employer] by discussion with family at bedside.  Appears patient has been taking all his medications for known end-stage COPD/asthma overlap syndrome without any changes lately but in the last 1 to 2 weeks he has had progressive worsening in his exertional dyspnea to the point that in the last 2 days he has been minimally interactive or moving around due to his dyspnea.  In that time he is also had some increasing lethargy but been refusing to come to the ER for evaluation.  Ultimately today he was so fatigued that his wife was finally able to convince him to come to the ER    Upon arrival here he was found to be tachypneic and hypoxic requiring quick escalation of his oxygen up to Venturi mask and eventually an ABG returned showing acute hypoxic hypercapnic respiratory failure with a PCO2 of 90 and pH of 7.29.  Chest x-ray was unremarkable.  Patient was placed on BiPAP with rapid improvement in the hospitalist service and contacted for admission.  Patient was given Solu-Medrol 125 and a DuoNeb in the ER.  At the time my exam patient is resting comfortably on the BiPAP and pulling volumes anywhere from 300-400.  He is able to talk in complete sentences while on the BiPAP and states he feels significantly improved.  Denies having any chest pain.  No acute worsening in lower extremity edema although he does have chronic lower extremity edema bilaterally.   Family concerned as patient continues to smoke occasional cigarettes at home.  He has not been recently ill.  No significant change in his productive cough.  No fevers.  No recent travel.      Personal History     Past Medical History:  Past Medical History:   Diagnosis Date    CHF (congestive heart failure)     COPD (chronic obstructive pulmonary disease)     COPD (chronic obstructive pulmonary disease)     Coronary artery disease     Diastolic heart failure     Hyperlipidemia     Hypertension     Pulmonary hypertension          Past Surgical History:  Past Surgical History:   Procedure Laterality Date    BACK SURGERY      BRONCHOSCOPY N/A 09/14/2022    Procedure: BRONCHOSCOPY WITH BRONCHOALVEOLAR LAVAGE AND BRONCHIAL WASHINGS;  Surgeon: Ulices Moya MD;  Location: [facility] ENDOSCOPY;  Service: Pulmonary;  Laterality: N/A;  MUCUS PLUGGING, COPD EXACERBATION    BRONCHOSCOPY      BRONCHOSCOPY N/A 5/17/2024    Procedure: BRONCHOSCOPY WITH BAL AND WASHINGS;  Surgeon: Collins Chapa MD;  Location: [facility] ENDOSCOPY;  Service: Pulmonary;  Laterality: N/A;  MUCUS PLUGGING    CARDIAC CATHETERIZATION N/A 09/16/2022    Procedure: Right Heart Cath;  Surgeon: Drew Rodriguez MD;  Location: [facility] CATH INVASIVE LOCATION;  Service: Cardiovascular;  Laterality: N/A;    COLONOSCOPY N/A 8/20/2024    Procedure: COLONOSCOPY WITH HOT SNARE POLYPECTOMIES;  Surgeon: Obdulio Tripp MD;  Location: [facility] ENDOSCOPY;  Service: Gastroenterology;  Laterality: N/A;  COLON POLYPS, DIVERTICULOSIS    ENDOSCOPY N/A 8/20/2024    Procedure: ESOPHAGOGASTRODUODENOSCOPY WITH BIOPSIES;  Surgeon: Obdulio Tripp MD;  Location: [facility] ENDOSCOPY;  Service: Gastroenterology;  Laterality: N/A;  HIATAL HERNIA, BARRETTS    OTHER SURGICAL HISTORY      knee     OTHER SURGICAL HISTORY      shoulder, rotator cuff    SHOULDER SURGERY Right 10/03/2022         Family History:   Reviewed and noncontributory except as mentioned in HPI    Social  History:   Social Drivers of Health     Tobacco Use: High Risk (5/16/2025)    Patient History     Smoking Tobacco Use: Some Days     Smokeless Tobacco Use: Never     Passive Exposure: Past   Alcohol Use: Not At Risk (5/16/2024)    AUDIT-C     Frequency of Alcohol Consumption: Never     Average Number of Drinks: Patient does not drink     Frequency of Binge Drinking: Never   Financial Resource Strain: Low Risk  (5/16/2024)    Overall Financial Resource Strain (CARDIA)     Difficulty of Paying Living Expenses: Not very hard   Food Insecurity: No Food Insecurity (5/16/2024)    Hunger Vital Sign     Worried About Running Out of Food in the Last Year: Never true     Ran Out of Food in the Last Year: Never true   Transportation Needs: No Transportation Needs (5/16/2024)    PRAPARE - Transportation     Lack of Transportation (Medical): No     Lack of Transportation (Non-Medical): No   Physical Activity: Insufficiently Active (5/16/2024)    Exercise Vital Sign     Days of Exercise per Week: 1 day     Minutes of Exercise per Session: 20 min   Stress: Stress Concern Present (5/16/2024)    [employer] [place] [employer] - Occupational Stress Questionnaire     Feeling of Stress : To some extent   Social Connections: Not At Risk (5/16/2024)    Family and Community Support     Help with Day-to-Day Activities: I get all the help I need     Lonely or Isolated: Rarely   Interpersonal Safety: Not At Risk (5/16/2025)    Abuse Screen     Unsafe at Home or Work/School: no     Feels Threatened by Someone?: no     Does Anyone Keep You from Contacting Others or Doint Things Outside the Home?: no     Physical Sign of Abuse Present: no   Depression: Not at risk (5/16/2024)    PHQ-2     PHQ-2 Score: 0   Housing Stability: Not At Risk (5/16/2024)    Housing Stability     Current Living Arrangements: home     Potentially Unsafe Housing Conditions: none   Utilities: Not At Risk (5/16/2024)    [facility] Utilities     Threatened with loss of  utilities: No   Health Literacy: Not At Risk (5/16/2024)    Education     Help with school or training?: No     Preferred Language: English   Employment: Not At Risk (5/16/2024)    Employment     Do you want help finding or keeping work or a job?: I do not need or want help   Disabilities: Not At Risk (5/16/2024)    Disabilities     Concentrating, Remembering, or Making Decisions Difficulty: no     Doing Errands Independently Difficulty: no         Home Medications:  albuterol sulfate HFA, arformoterol, azithromycin, budesonide, furosemide, ipratropium-albuterol, metoprolol tartrate, nystatin, pantoprazole, revefenacin, and roflumilast    Allergies:  No Known Allergies    Review of Systems   All systems were reviewed and negative except for: Shortness of breath, cough    Objective   Objective     Vitals:   Temp:  [97.8 °F (36.6 °C)] 97.8 °F (36.6 °C)  Heart Rate:  [68-81] 68  Resp:  [20-24] 24  BP: (112-159)/(67-71) 115/67  Flow (L/min) (Oxygen Therapy):  [3-12] 3    Physical Exam    Constitutional: Awake, alert, no acute distress, frail and cachectic-appearing   Eyes: Pupils equal, sclerae anicteric, no conjunctival injection   HENT: NCAT, mucous membranes dry   Neck: Supple, no thyromegaly, no lymphadenopathy, trachea midline   Respiratory: Poor air entry bilaterally with mildly labored respirations   Cardiovascular: RRR, no murmurs, rubs, or gallops, palpable pedal pulses bilaterally   Gastrointestinal: Positive bowel sounds, soft, nontender, nondistended   Musculoskeletal: 1+ pitting edema to lower tibia bilaterally no clubbing or cyanosis to extremities   Psychiatric: Appropriate affect, cooperative   Neurologic: Oriented x 3, strength symmetric in all extremities, Cranial Nerves grossly intact to confrontation, speech clear   Skin: No rashes     Result Review    Result Review:  I have personally reviewed the results from the time of this admission to 5/16/2025 23:08 EDT and agree with these findings:  [x]   Laboratory  [x]  Microbiology  [x]  Radiology  [x]  EKG/Telemetry   [x]  Cardiology/Vascular   []  Pathology  [x]  Old records  []  Other:      Assessment & Plan   Assessment / Plan     Assessment/Plan:   Acute on chronic hypoxic hypercapnic respiratory failure secondary to acute COPD exacerbation  Acute COPD exacerbation  Hypernatremia  Chronic pulmonary hypertension secondary COPD  Hypertension  Hyperlipidemia  Nonobstructive coronary artery disease  GERD  Active tobacco use    Plan  - Admit to hospitalist service  - Continue scheduled bronchodilators and steroids.  Continues azithromycin at home dose for COPD exacerbation suppression.  I do not see acute pneumonia and thus antibiotics are not indicated.  Recheck ABG in the a.m., if not clinically improved can consider pulmonary consultation at that time.  Continuous pulse ox, titrate SO2 to 88 to 92%  - Will provide gentle hydration with LR overnight for some mild hypovolemia on exam.  Restart diuretics in a.m. for known pulmonary hypertension  -Patient counseled extensively on smoking cessation at bedside.  Nicotine patch ordered  - Clarify other chronic home meds and resume as appropriate      Discussed with ER Physician and Nurse    All labs/imaging studies were personally reviewed and findings are as noted above      DVT Prophylaxis: Lovenox    CODE STATUS:    Code Status (Patient has no pulse and is not breathing): No CPR (Do Not Attempt to Resuscitate)  Medical Interventions (Patient has pulse or is breathing): Limited Support  Medical Intervention Limits: No intubation (DNI)  Level Of Support Discussed With: Patient      Admission Status:  I believe this patient meets inpatient status.    Electronically signed by Obdulio Muir MD, 05/16/25, 11:08 PM EDT.

## 2025-05-17 NOTE — CONSULTS
Pulmonary / Critical Care Consult Note      Patient Name: Sumeet Gomes  : 1960  MRN: [id number]  Primary Care Physician:  Mirtha Alexander APRN  Referring Physician: No ref. provider found  Date of admission: 2025    Subjective   Subjective     Reason for Consult/ Chief Complaint: COPD with acute exacerbation, acute on chronic hypoxic and hypercapnic respiratory failure    HPI:  Sumeet Gomes is a 64 y.o. male with history of hypertension, hyperlipidemia, COPD on 3 L oxygen via nasal cannula, pulm hypertension, coronary disease, gastroesophageal reflux disease, presented to the hospital with worsening shortness of breath.  He was altered and confused.  ABG showed pH of 7.29, PaCO2 of 90, was started on Solu-Medrol and DuoNeb in the ER.  He was having increased work of breathing on BiPAP.  He has been on BiPAP continuously.  Pulmonary critical services consulted for assistance with management of his acute issues.  He has very severe COPD.  He used to have NIPPV at home but was not able to tolerate in past.  He continues to use oxygen with rest activities and sleep at 3 L/min.  He is supposed to be on Brovana, Pulmicort and DuoNeb, daily azithromycin.  He is currently on BiPAP with improved mentation.  Feels better with breathing.  Still has significant wheezing and chest tightness.  He reports losing 10 to 20 pounds over last several months.    Review of Systems  General:  No Fatigue, No Fever.  HEENT: No dysphagia, No Visual Changes, no rhinorrhea  Respiratory:  + Productive cough,+Dyspnea, mucoid phlegm, No Pleuritic Pain, + wheezing, no hemoptysis  Cardiovascular: Denies chest pain, denies palpitations,+AGUILAR, No Chest Pressure  Gastrointestinal:  No Abdominal Pain, No Nausea, No Vomiting, No Diarrhea  Genitourinary:  No Dysuria, No Frequency, No Hesitancy  Musculoskeletal: No muscle pain or swelling  Endocrine:  No Heat Intolerance, No Cold Intolerance  Hematologic:  No Bleeding, No  Bruising  Psychiatric:  No Anxiety, No Depression  Neurologic:  No Confusion, no Dysarthria, No Headaches  Skin:  No Rash, No Open Wounds        Personal History     Past Medical History:   Diagnosis Date    CHF (congestive heart failure)     COPD (chronic obstructive pulmonary disease)     COPD (chronic obstructive pulmonary disease)     Coronary artery disease     Diastolic heart failure     Hyperlipidemia     Hypertension     Pulmonary hypertension        Past Surgical History:   Procedure Laterality Date    BACK SURGERY      BRONCHOSCOPY N/A 09/14/2022    Procedure: BRONCHOSCOPY WITH BRONCHOALVEOLAR LAVAGE AND BRONCHIAL WASHINGS;  Surgeon: Ulices Moya MD;  Location: [facility] ENDOSCOPY;  Service: Pulmonary;  Laterality: N/A;  MUCUS PLUGGING, COPD EXACERBATION    BRONCHOSCOPY      BRONCHOSCOPY N/A 5/17/2024    Procedure: BRONCHOSCOPY WITH BAL AND WASHINGS;  Surgeon: Collins Chapa MD;  Location: [facility] ENDOSCOPY;  Service: Pulmonary;  Laterality: N/A;  MUCUS PLUGGING    CARDIAC CATHETERIZATION N/A 09/16/2022    Procedure: Right Heart Cath;  Surgeon: Drew Rodriguez MD;  Location: [facility] CATH INVASIVE LOCATION;  Service: Cardiovascular;  Laterality: N/A;    COLONOSCOPY N/A 8/20/2024    Procedure: COLONOSCOPY WITH HOT SNARE POLYPECTOMIES;  Surgeon: Obdulio Tripp MD;  Location: [facility] ENDOSCOPY;  Service: Gastroenterology;  Laterality: N/A;  COLON POLYPS, DIVERTICULOSIS    ENDOSCOPY N/A 8/20/2024    Procedure: ESOPHAGOGASTRODUODENOSCOPY WITH BIOPSIES;  Surgeon: Obdulio Tripp MD;  Location: [facility] ENDOSCOPY;  Service: Gastroenterology;  Laterality: N/A;  HIATAL HERNIA, BARRETTS    OTHER SURGICAL HISTORY      knee     OTHER SURGICAL HISTORY      shoulder, rotator cuff    SHOULDER SURGERY Right 10/03/2022       Family History: family history includes Asthma in his mother; Emphysema in his mother; Stroke in his mother.     Social History:  reports that he has been smoking cigarettes. He started  smoking about 50 years ago. He has a 12.6 pack-year smoking history. He has been exposed to tobacco smoke. He has never used smokeless tobacco. He reports that he does not currently use alcohol. He reports that he does not use drugs.    Home Medications:  albuterol sulfate HFA, arformoterol, azithromycin, budesonide, furosemide, ipratropium-albuterol, metoprolol tartrate, nystatin, pantoprazole, revefenacin, and roflumilast    Allergies:  No Known Allergies    Objective    Objective     Vitals:   Temp:  [97.8 °F (36.6 °C)-98.7 °F (37.1 °C)] 98.1 °F (36.7 °C)  Heart Rate:  [60-92] 78  Resp:  [18-28] 21  BP: ()/(67-82) 114/72  Flow (L/min) (Oxygen Therapy):  [3-12] 3    Physical Exam:  Vital Signs Reviewed   General: Thin built male, muscle wasting+, in mild distress, has conversational dyspnea, on BiPAP  HEENT:  PERRL, EOMI. on BiPAP  Neck:  Supple, no JVD, no thyromegaly  Lymph: no axillary, cervical, supraclavicular lymphadenopathy noted bilaterally  Chest: Poor aeration, bilateral scattered rhonchi, bilateral expiratory wheezing, no crackles, resonant percussion bilaterally, increased work of breathing  CV: RRR, no MGR, pulses 2+, equal.  Abd:  Soft, NT, ND, + BS, no HSM  EXT:  no clubbing, no cyanosis, no edema, no joint tenderness  Neuro:  A&Ox3, CN grossly intact, no focal deficits, has generalized weakness.  Skin: No rashes or lesions noted      Result Review    Result Review:  I have personally reviewed the results from the time of this admission to 5/17/2025 15:20 EDT and agree with these findings:  [x]  Laboratory  [x]  Microbiology  [x]  Radiology  [x]  EKG/Telemetry   [x]  Cardiology/Vascular   []  Pathology  []  Old records  []  Other:  Most notable findings include:         Lab 05/17/25  0307 05/16/25 2122 05/16/25 2028   WBC 3.77  --  5.68   HEMOGLOBIN 13.9  --  15.6   HEMATOCRIT 46.0  --  52.2*   PLATELETS 117*  --  128*   SODIUM 140  --  144   POTASSIUM 5.0  --  4.8   CHLORIDE 92*  --  92*    CO2 39.7*  --  44.3*   BUN 16  --  13   CREATININE 0.59* 0.61 0.54*   GLUCOSE 120*  --  120*   CALCIUM 10.0  --  10.4   TOTAL PROTEIN  --   --  7.2   ALBUMIN  --   --  4.9   GLOBULIN  --   --  2.3     XR Chest 1 View  Result Date: 5/16/2025  XR CHEST 1 VW Date of Exam: 5/16/2025 8:40 PM EDT Indication: SOA Triage Protocol Comparison: Portable chest 5/12/2024 Findings: The cardiomediastinal silhouette is within normal limits. Lungs are clear. No focal consolidation, pneumothorax, or significant pleural effusion. Osseous structures grossly intact. Emphysema.     Impression: Impression: 1. No acute process. 2. Emphysema. Electronically Signed: Herson Hicks MD  5/16/2025 9:10 PM EDT  Workstation ID: [id number]      CT-scan of the chest         Assessment & Plan   Assessment / Plan     Active Hospital Problems:  Active Hospital Problems    Diagnosis     **Acute on chronic respiratory failure with hypoxia and hypercapnia          Impression:  Very severe COPD with acute exacerbation  Acute on chronic hypoxic and hypercapnic respiratory failure  CO2 narcosis  Moderate malnutrition  Recurrent COPD with acute exacerbation  Increased work of breathing  Pulmonary hypertension, class II and class III, with possible component of class I  Chronic smoking      Plan:  Continue with BiPAP through the day today.  Continue Brovana, Pulmicort and Yupelri  Continue Daliresp daily  Pro-Saw is normal.  Respiratory viral panel is negative.  Check urine strep and Legionella antigen.  Check MRSA PCR.  Continue with Solu-Medrol 40 mg IV every 8 hours.  Continue with Lasix 20 mg orally daily.  Check sputum culture if able to obtain  ABG with improving hypercapnia.  Continue Protonix 40 mg once daily.  Nicotine patch.    Patient is critically ill with acute on chronic hypoxic and hypercapnic respiratory failure, COPD with acute exacerbation, increased work of breathing.  Critical care time spent 33 minutes excluding any  procedures.    Electronically signed by Ulices Moya MD, 5/17/2025, 15:20 EDT.

## 2025-05-17 NOTE — ED PROVIDER NOTES
Time: 10:24 PM EDT  Date of encounter:  5/16/2025  Independent Historian/Clinical History and Information was obtained by:   Patient and Family    Chief complaint: Shortness of breath, difficulty breathing    History is limited by: N/A    History of Present Illness:  Patient is a 64 y.o. year old male who presents to the emergency department for evaluation of shortness of breath difficulty breathing.  Patient has a history of COPD.  Patient is on home oxygen at 3 L chronically.  Patient reports that he has been having increasing difficulty breathing that began probably about a week ago but has gotten much worse over the last day or 2.  Patient's wife states that she tried to get him to come in earlier but he would refuse.  Patient has increased his oxygen up to 6 L and remains short of breath.  Patient denies any chest pain.  Patient denies any fevers.  Patient does admit to a nonproductive cough.    Patient Care Team  Primary Care Provider: Mirtha Alexander APRN    Past Medical History:     No Known Allergies  Past Medical History:   Diagnosis Date    CHF (congestive heart failure)     COPD (chronic obstructive pulmonary disease)     COPD (chronic obstructive pulmonary disease)     Coronary artery disease     Diastolic heart failure     Hyperlipidemia     Hypertension     Pulmonary hypertension      Past Surgical History:   Procedure Laterality Date    BACK SURGERY      BRONCHOSCOPY N/A 09/14/2022    Procedure: BRONCHOSCOPY WITH BRONCHOALVEOLAR LAVAGE AND BRONCHIAL WASHINGS;  Surgeon: Ulices Moya MD;  Location: [facility] ENDOSCOPY;  Service: Pulmonary;  Laterality: N/A;  MUCUS PLUGGING, COPD EXACERBATION    BRONCHOSCOPY      BRONCHOSCOPY N/A 5/17/2024    Procedure: BRONCHOSCOPY WITH BAL AND WASHINGS;  Surgeon: Collins Chapa MD;  Location: [facility] ENDOSCOPY;  Service: Pulmonary;  Laterality: N/A;  MUCUS PLUGGING    CARDIAC CATHETERIZATION N/A 09/16/2022    Procedure: Right Heart Cath;  Surgeon: Jennifer  Drew FREEMAN MD;  Location: [facility] CATH INVASIVE LOCATION;  Service: Cardiovascular;  Laterality: N/A;    COLONOSCOPY N/A 2024    Procedure: COLONOSCOPY WITH HOT SNARE POLYPECTOMIES;  Surgeon: Obdulio Tripp MD;  Location: [facility] ENDOSCOPY;  Service: Gastroenterology;  Laterality: N/A;  COLON POLYPS, DIVERTICULOSIS    ENDOSCOPY N/A 2024    Procedure: ESOPHAGOGASTRODUODENOSCOPY WITH BIOPSIES;  Surgeon: Obdulio Tripp MD;  Location: [facility] ENDOSCOPY;  Service: Gastroenterology;  Laterality: N/A;  HIATAL HERNIA, BARRETTS    OTHER SURGICAL HISTORY      knee     OTHER SURGICAL HISTORY      shoulder, rotator cuff    SHOULDER SURGERY Right 10/03/2022     Family History   Problem Relation Age of Onset    Asthma Mother     Emphysema Mother             Stroke Mother     Colon cancer Neg Hx        Home Medications:  Prior to Admission medications    Medication Sig Start Date End Date Taking? Authorizing Provider   albuterol sulfate  (90 Base) MCG/ACT inhaler Inhale 2 puffs Every 4 (Four) Hours As Needed for Wheezing. 24   Arleen Gillespie APRN   arformoterol (Brovana) 15 MCG/2ML nebulizer solution Take 2 mL by nebulization 2 (Two) Times a Day. 24   Arleen Gillespie APRN   azithromycin (ZITHROMAX) 250 MG tablet Take 1 tablet by mouth Daily. 25   Arleen Gillespie APRN   budesonide (Pulmicort) 0.5 MG/2ML nebulizer solution Take 2 mL by nebulization 2 (Two) Times a Day. 24   Arleen Gillespie APRN   budesonide-formoterol (SYMBICORT) 160-4.5 MCG/ACT inhaler Inhale 2 puffs 2 (Two) Times a Day.  Patient not taking: Reported on 2025   Arleen Gillespie APRN   furosemide (LASIX) 20 MG tablet TAKE 2 TABLETS BY MOUTH EVERY DAY 25   Arleen Gillespie APRN   ipratropium-albuterol (DUO-NEB) 0.5-2.5 mg/3 ml nebulizer Take 3 mL by nebulization 4 (Four) Times a Day. 25   Arleen Gillespie APRN   metoprolol tartrate (LOPRESSOR) 25 MG tablet TAKE 2 TABLET BY MOUTH  "TWICE A DAY 4/24/25   Arleen Gillespie APRN   nystatin (MYCOSTATIN) 100,000 unit/mL suspension Take 5 mL by mouth 4 (Four) Times a Day. 2/26/25   Arleen Gillespie APRN   O2 (OXYGEN) Inhale 3 L/min 1 (One) Time.    Provider, MD Tawnya   pantoprazole (PROTONIX) 40 MG EC tablet TAKE 1 TABLET BY MOUTH EVERY DAY IN THE MORNING 1/14/25   Arleen Gillespie APRN   roflumilast (DALIRESP) 500 MCG tablet tablet Take 1 tablet by mouth Daily. 4/24/25   Arleen Gillespie APRN   Yupelri 175 MCG/3ML nebulizer solution TAKE 3 ML BY NEBULIZATION DAILY. 1/14/25   Arleen Gillespie APRN        Social History:   Social History     Tobacco Use    Smoking status: Some Days     Current packs/day: 0.25     Average packs/day: 0.3 packs/day for 50.4 years (12.6 ttl pk-yrs)     Types: Cigarettes     Start date: 1/1/1975     Passive exposure: Past (2 cigarettes per day, smokes some days)    Smokeless tobacco: Never    Tobacco comments:     1-2 cigarettes daily as of 12/27/24 AL    Vaping Use    Vaping status: Never Used   Substance Use Topics    Alcohol use: Not Currently    Drug use: Never         Review of Systems:  Review of Systems   Constitutional:  Negative for chills and fever.   HENT:  Negative for congestion, ear pain and sore throat.    Eyes:  Negative for pain.   Respiratory:  Positive for cough and shortness of breath. Negative for chest tightness.    Cardiovascular:  Negative for chest pain.   Gastrointestinal:  Negative for abdominal pain, diarrhea, nausea and vomiting.   Genitourinary:  Negative for flank pain and hematuria.   Musculoskeletal:  Negative for joint swelling.   Skin:  Negative for pallor.   Neurological:  Negative for seizures and headaches.   All other systems reviewed and are negative.       Physical Exam:  /67 (BP Location: Right arm, Patient Position: Sitting)   Pulse 68   Temp 97.8 °F (36.6 °C) (Oral)   Resp 24   Ht 170.2 cm (67\")   Wt 56 kg (123 lb 7.3 oz)   SpO2 92%   BMI 19.34 kg/m² "     Physical Exam    Vital signs were reviewed under triage note.  General appearance - Patient appears well-developed and well-nourished.  Patient is in no acute distress.  Head - Normocephalic, atraumatic.  Pupils - Equal, round, reactive to light.  Extraocular muscles are intact.  Conjunctiva is clear.  Nasal - Normal inspection.  No evidence of trauma or epistaxis.  Tympanic membranes - Gray, intact without erythema or retractions.  Oral mucosa - Pink and moist without lesions or erythema.  Uvula is midline.  Chest wall - Atraumatic.  Chest wall is nontender.  There are no vesicular rashes noted.  Neck - Supple.  Trachea was midline.  There is no palpable lymphadenopathy or thyromegaly.  There are no meningeal signs  Lungs - The patient is tachypneic.  Patient is using accessory muscles including intercostal retractions, subdiaphragmatic breathing, nasal retractions.  Auscultation reveals minimal air exchange bilaterally.  Heart - Regular rate and rhythm without any murmurs, clicks, or gallops.  Abdomen - Soft.  Bowel sounds are present.  There is no palpable tenderness.  There is no rebound, guarding, or rigidity.  There are no palpable masses.  There are no pulsatile masses.  Back - Spine is straight and midline.  There is no CVA tenderness.  Extremities - Intact x4 with full range of motion.  There is no palpable edema.  Pulses are intact x4 and equal.  Neurologic - Patient is awake, alert, and oriented x3.  Cranial nerves II through XII are grossly intact.  Motor and sensory functions grossly intact.  Cerebellar function was normal.  Integument - There are no rashes.  There are no petechia or purpura lesions noted.  There are no vesicular lesions noted.           Procedures:  Procedures      Medical Decision Making:      Comorbidities that affect care:    COPD, hyperlipidemia, hypertension, pulmonary hypertension, diastolic heart failure, coronary artery disease    External Notes reviewed:    Previous Clinic  Note: Office visit with Dr. Miller on 2/25/2025 was reviewed by me.      The following orders were placed and all results were independently analyzed by me:  Orders Placed This Encounter   Procedures    COVID PRE-OP / PRE-PROCEDURE SCREENING ORDER (NO ISOLATION) - Swab, Nasopharynx    COVID-19, FLU A/B, RSV PCR 1 HR TAT - Swab, Nasopharynx    XR Chest 1 View    [place] Draw    Comprehensive Metabolic Panel    BNP    High Sensitivity Troponin T    CBC Auto Differential    Lactic Acid, Plasma    Arterial Blood Gas,H&H,Lytes,Lactate    High Sensitivity Troponin T 1Hr    Blood Gas, Arterial -    Procalcitonin    NPO Diet NPO Type: Strict NPO    Undress & Gown    Continuous Pulse Oximetry    Vital Signs    Hospitalist (on-call MD unless specified)    Oxygen Therapy- Nasal Cannula; Titrate 1-6 LPM Per SpO2; 90 - 95%    POC Chem Panel    POC Lactate    ECG 12 Lead ED Triage Standing Order; SOA    Insert Peripheral IV    CBC & Differential    Green Top (Gel)    Lavender Top    Gold Top - SST    Light Blue Top    Extra Tubes    Gray Top       Medications Given in the Emergency Department:  Medications   sodium chloride 0.9 % flush 10 mL (has no administration in time range)   ipratropium-albuterol (DUO-NEB) 0.5-2.5 mg/3 ml nebulizer solution  - ADS Override Pull (  Canceled Entry 5/16/25 2036)   methylPREDNISolone sodium succinate (SOLU-Medrol) 125 mg in sterile water (preservative free) 2 mL (125 mg Intravenous Given 5/16/25 2033)   methylPREDNISolone sodium succinate (SOLU-Medrol) 125 MG injection  - ADS Override Pull (125 mg  Given 5/16/25 2033)   sterile water (preservative free) injection  - ADS Override Pull (10 mL  Given 5/16/25 2034)   ipratropium-albuterol (DUO-NEB) nebulizer solution 6 mL (6 mL Nebulization Given 5/16/25 2038)        ED Course:    ED Course as of 05/16/25 2229   Fri May 16, 2025   2221 EKG performed at 2103 was interpreted by me to show a normal sinus rhythm with a ventricular rate 74 bpm.  The  LA interval was 145 ms.  P waves were normal.  QRS intervals normal.  Axis was leftward at -81 degrees.  There was no acute ischemic ST or T wave changes identified.  QT corrected was 462 ms. [TB]      ED Course User Index  [TB] Babatunde Looney DO       The patient was seen upon arrival.  History and physical examination was performed as document.  Patient initially was placed on supplemental oxygen in an effort to improve his oxygenation.  Patient was given IV Solu-Medrol and back to back-to-back DuoNeb breathing treatments.  Patient has responded well to this and we have been able to wean his O2 down to his 3 L.  Blood gas shows patient be hypercapnic.  Patient was started on BiPAP.  There is no signs of pneumonic infiltrate or infection.  Hospital service was consulted for admission.    Labs:    Lab Results (last 24 hours)       Procedure Component Value Units Date/Time    CBC & Differential [[id number]]  (Abnormal) Collected: 05/16/25 2028    Specimen: Blood Updated: 05/16/25 2043    Narrative:      The following orders were created for panel order CBC & Differential.  Procedure                               Abnormality         Status                     ---------                               -----------         ------                     CBC Auto Differential[[id number]]        Abnormal            Final result               Scan Slide[[id number]]                                                                    Please view results for these tests on the individual orders.    Comprehensive Metabolic Panel [[id number]]  (Abnormal) Collected: 05/16/25 2028    Specimen: Blood Updated: 05/16/25 2059     Glucose 120 mg/dL      BUN 13 mg/dL      Creatinine 0.54 mg/dL      Sodium 144 mmol/L      Potassium 4.8 mmol/L      Chloride 92 mmol/L      CO2 44.3 mmol/L      Calcium 10.4 mg/dL      Total Protein 7.2 g/dL      Albumin 4.9 g/dL      ALT (SGPT) 23 U/L      AST (SGOT) 36 U/L      Alkaline Phosphatase 126 U/L      Total  Bilirubin 0.7 mg/dL      Globulin 2.3 gm/dL      A/G Ratio 2.1 g/dL      BUN/Creatinine Ratio 24.1     Anion Gap 7.7 mmol/L      eGFR 112.0 mL/min/1.73     Narrative:      GFR Categories in Chronic Kidney Disease (CKD)              GFR Category          GFR (mL/min/1.73)    Interpretation  G1                    90 or greater        Normal or high (1)  G2                    60-89                Mild decrease (1)  G3a                   45-59                Mild to moderate decrease  G3b                   30-44                Moderate to severe decrease  G4                    15-29                Severe decrease  G5                    14 or less           Kidney failure    (1)In the absence of evidence of kidney disease, neither GFR category G1 or G2 fulfill the criteria for CKD.    eGFR calculation 2021 CKD-EPI creatinine equation, which does not include race as a factor    BNP [[id number]]  (Abnormal) Collected: 05/16/25 2028    Specimen: Blood Updated: 05/16/25 2056     proBNP 1,144.0 pg/mL     Narrative:      This assay is used as an aid in the diagnosis of individuals suspected of having heart failure. It can be used as an aid in the diagnosis of acute decompensated heart failure (ADHF) in patients presenting with signs and symptoms of ADHF to the emergency department (ED). In addition, NT-proBNP of <300 pg/mL indicates ADHF is not likely.    Age Range Result Interpretation  NT-proBNP Concentration (pg/mL:      <50             Positive            >450                   Gray                 300-450                    Negative             <300    50-75           Positive            >900                  Gray                300-900                  Negative            <300      >75             Positive            >1800                  Gray                300-1800                  Negative            <300    High Sensitivity Troponin T [[id number]]  (Normal) Collected: 05/16/25 2028    Specimen: Blood Updated: 05/16/25  2059     HS Troponin T 15 ng/L     Narrative:      High Sensitive Troponin T Reference Range:  <14.0 ng/L- Negative Female for AMI  <22.0 ng/L- Negative Male for AMI  >=14 - Abnormal Female indicating possible myocardial injury.  >=22 - Abnormal Male indicating possible myocardial injury.   Clinicians would have to utilize clinical acumen, EKG, Troponin, and serial changes to determine if it is an Acute Myocardial Infarction or myocardial injury due to an underlying chronic condition.         CBC Auto Differential [[id number]]  (Abnormal) Collected: 05/16/25 2028    Specimen: Blood Updated: 05/16/25 2043     WBC 5.68 10*3/mm3      RBC 5.30 10*6/mm3      Hemoglobin 15.6 g/dL      Hematocrit 52.2 %      MCV 98.5 fL      MCH 29.4 pg      MCHC 29.9 g/dL      RDW 12.7 %      RDW-SD 46.3 fl      MPV 10.7 fL      Platelets 128 10*3/mm3      Neutrophil % 74.3 %      Lymphocyte % 16.0 %      Monocyte % 8.1 %      Eosinophil % 0.9 %      Basophil % 0.5 %      Immature Grans % 0.2 %      Neutrophils, Absolute 4.22 10*3/mm3      Lymphocytes, Absolute 0.91 10*3/mm3      Monocytes, Absolute 0.46 10*3/mm3      Eosinophils, Absolute 0.05 10*3/mm3      Basophils, Absolute 0.03 10*3/mm3      Immature Grans, Absolute 0.01 10*3/mm3      nRBC 0.0 /100 WBC     Lactic Acid, Plasma [[id number]]  (Normal) Collected: 05/16/25 2028    Specimen: Blood Updated: 05/16/25 2055     Lactate 0.7 mmol/L     POC Chem Panel [[id number]]  (Abnormal) Collected: 05/16/25 2122    Specimen: Arterial Blood Updated: 05/16/25 2126     Glucose 117 mg/dL      Comment: Serial Number: [id number]:  [id number]        Sodium 145 mmol/L      POC Potassium 4.5 mmol/L      Ionized Calcium 1.19 mmol/L      Chloride 90 mmol/L      Creatinine 0.61 mg/dL      BUN 15 mg/dL      CO2 Content >34.54 mmol/L      Notified Who Dr. Looney     Read Back Yes    Blood Gas, Arterial - [[id number]]  (Abnormal) Collected: 05/16/25 2122    Specimen: Arterial Blood Updated: 05/16/25 2126     Site  Right Radial     Adebayo's Test Positive     pH, Arterial 7.298 pH units      pCO2, Arterial 90.0 mm Hg      pO2, Arterial 63.1 mm Hg      HCO3, Arterial 44.0 mmol/L      Base Excess, Arterial 12.9 mmol/L      Comment: Serial Number: [id number]:  [id number]        O2 Saturation, Arterial 87.2 %      Hemoglobin, Blood Gas 14.9 g/dL      Hematocrit, Blood Gas 44.0 %      Barometric Pressure for Blood Gas 733.7000 mmHg      Modality Cannula     FIO2 32 %      Flow Rate 3.0000 lpm      Notified Who Dr. Looney     Read Back Yes     Notified Time --     Hemodilution No     PO2/FIO2 197    POC Lactate [[id number]]  (Normal) Collected: 05/16/25 2122    Specimen: Arterial Blood Updated: 05/16/25 2126     Lactate 0.6 mmol/L      Comment: Serial Number: [id number]:  [id number]       High Sensitivity Troponin T 1Hr [[id number]]  (Normal) Collected: 05/16/25 2155    Specimen: Blood Updated: 05/16/25 2223     HS Troponin T 14 ng/L      Troponin T Numeric Delta -1 ng/L     Narrative:      High Sensitive Troponin T Reference Range:  <14.0 ng/L- Negative Female for AMI  <22.0 ng/L- Negative Male for AMI  >=14 - Abnormal Female indicating possible myocardial injury.  >=22 - Abnormal Male indicating possible myocardial injury.   Clinicians would have to utilize clinical acumen, EKG, Troponin, and serial changes to determine if it is an Acute Myocardial Infarction or myocardial injury due to an underlying chronic condition.         Procalcitonin [[id number]] Collected: 05/16/25 2155    Specimen: Blood Updated: 05/16/25 2229             Imaging:    XR Chest 1 View  Result Date: 5/16/2025  XR CHEST 1 VW Date of Exam: 5/16/2025 8:40 PM EDT Indication: SOA Triage Protocol Comparison: Portable chest 5/12/2024 Findings: The cardiomediastinal silhouette is within normal limits. Lungs are clear. No focal consolidation, pneumothorax, or significant pleural effusion. Osseous structures grossly intact. Emphysema.     Impression: 1. No acute process. 2.  Emphysema. Electronically Signed: Herson Hicks MD  5/16/2025 9:10 PM EDT  Workstation ID: [id number]        Differential Diagnosis and Discussion:    Dyspnea: Differential diagnosis includes but is not limited to metabolic acidosis, neurological disorders, psychogenic, asthma, pneumothorax, upper airway obstruction, COPD, pneumonia, noncardiogenic pulmonary edema, interstitial lung disease, anemia, congestive heart failure, and pulmonary embolism    Labs were collected in the emergency department and all labs were reviewed and interpreted by me.  X-ray were performed in the emergency department and all X-ray impressions were independently interpreted by me.  An EKG was performed and the EKG was interpreted by me.    MDM     Amount and/or Complexity of Data Reviewed  Clinical lab tests: reviewed  Tests in the radiology section of CPT®: reviewed  Tests in the medicine section of CPT®: reviewed         Total Critical Care time of 45 minutes. I provided the majority of the critical care time. Total critical care time documented does not include time spent on separately billed procedures for services of nurses or physician assistants. I have reviewed all diagnostic interpretations and treatment plans as written. I was present for the key portions of any procedures performed and the inclusive time noted in any critical care statement. Critical care time includes patient management by me and the GERMÁN, time spent at the patients bedside,  time to review lab and imaging results, discussing patient care, documentation in the medical record, and time spent with family or caregiver.    Patient Care Considerations:    SEPSIS IS NOT PRESENT IN THE EMERGENCY DEPARTMENT: Patient meets SIRS criteria in the emergency department however the patient does not have a known source of bacterial infection to confirm the diagnosis of sepsis.        Consultants/Shared Management Plan:    Hospitalist: I have discussed the case with Dr. Muir who  agrees to accept the patient for admission.    Social Determinants of Health:    Patient has presented with family members who are responsible, reliable and will ensure follow up care.      Disposition and Care Coordination:    Admit:   Through independent evaluation of the patient's history, physical, and imperical data, the patient meets criteria for inpatient admission to the hospital.        Final diagnoses:   Acute respiratory failure with hypoxia and hypercapnia   COPD exacerbation        ED Disposition       ED Disposition   Intended Admit    Condition   --    Comment   --               This medical record created using voice recognition software.             Babatunde Looney DO  05/18/25 1205

## 2025-05-17 NOTE — PLAN OF CARE
Goal Outcome Evaluation:  Plan of Care Reviewed With: patient        Progress: improving  Outcome Evaluation: Patient AOx4. Decrease in CO2 from admission ABG's but still elevated. Patient wore BIPAP after lunch for a couple of hours. Patient indenpendent in room. No complaints of pain. Continuing plan of care.

## 2025-05-17 NOTE — NURSING NOTE
Patient Sumeet Gomes admitted to 4MTU from the ED. Patient is alert and oriented to person, place, time, and situation. Patient oriented to unit, call light system and bed alarm use, teaching effective. Patient agreed to bed alarm use. Candida Auris screening revealed patient will NOT need tested, contact isolation and bleach cleaning due to no risk factors. Patient currently is not a concern for an active CDIFF infection.    4 eyes skin assessment completed with Other RN: Jas from [address] [place] [address]. Per policy, the following skin interventions were put in place as a result of the skin assessment below: Avoiding use of disposable briefs and Limit number of layers underneath patient    Skin Assessments (most recent)      Flowsheet Row Most Recent Value   Skin Integrity intact   Sensory Perception 3-->slightly limited   Moisture 4-->rarely moist   Activity 3-->walks occasionally   Mobility 3-->slightly limited   Nutrition 2-->probably inadequate   Friction and Shear 3-->no apparent problem   Gerard Score 18   Device Skin Pressure Protection positioning supports utilized            Fall assessment completed. Per policy, the patient was determined be a Low fall risk due to Razo Lc score 14 or less (only used within the first 24 hours of admission). Patient assessed to need the following mobility assistance: Standby Assist with the following assistive devices: None, and will be using a bedside commode for toileting. Per policy, the following fall interventions were put in place: Standard Fall Precautions - oriented to room and call light, bed low and locked, clutter free environment, adequate lighting, directed to call for assistance, non-skid footwear, hourly rounding and personal belongings within reach. and Bed Alarm.     Patient's belongings that were sent with family: Other: oxygen  Patient's belongings that were sent to security: None  Patient's belongings locked in safe box in room: None  Patient's belongings that  were sent to pharmacy: None  Patient's belongings kept at bedside per patient: Glasses, Clothing, and Other:phone

## 2025-05-17 NOTE — PLAN OF CARE
Goal Outcome Evaluation:  Plan of Care Reviewed With: patient        Progress: no change  Outcome Evaluation: Patient came in the ER was short of breath and his work of breathing was increased. Patient was then given a few breathing treatments and place on a Bipap 14/7, rate-20, 30% oxygen.

## 2025-05-17 NOTE — THERAPY EVALUATION
RT EQUIPMENT DEVICE RELATED - SKIN ASSESSMENT    RT Medical Equipment/Device:     NIV Mask:  Under-the-nose   size:  b    Skin Assessment:      Cheek:  Intact  Chin:  Intact  Ears:  Intact  Nares:  Intact  Neck:  Intact  Mouth:  Intact    Device Skin Pressure Protection:  Pressure points protected    Nurse Notification:  Akua Johnson, LALI

## 2025-05-17 NOTE — PLAN OF CARE
Goal Outcome Evaluation:           Progress: no change  Outcome Evaluation: Pt wore BiPAP overnight at 14/5 & 30%. Repeat ABG this morning showed compensated respiratory acidosis and a decrease in CO2 from the previous gas. Pt on 3L when not on unit, home O2. Pt taking txs in-line with BiPAP or via nebulizer.

## 2025-05-17 NOTE — PROGRESS NOTES
[facility]   Hospitalist Progress Note  Date: 2025  Patient Name: Sumeet Gomes  : 1960  MRN: [id number]  Date of admission: 2025  Room/Bed: [facility]/      Subjective   Subjective     Chief Complaint: shortness of breath    Summary:Sumeet Gomes is a 64 y.o. male past medical history of hypertension, hyperlipidemia, COPD on 3 L nasal cannula, pulmonary hypertension, CAD, GERD who presented to the ER due to worsening shortness of breath.  Patient is a relatively poor historian history Cellfina by discussion with family at bedside.  Appears patient has been taking all his medications for known end-stage COPD/asthma overlap syndrome without any changes lately but in the last 1 to 2 weeks he has had progressive worsening in his exertional dyspnea to the point that in the last 2 days he has been minimally interactive or moving around due to his dyspnea.  In that time he is also had some increasing lethargy but been refusing to come to the ER for evaluation.  Ultimately today he was so fatigued that his wife was finally able to convince him to come to the ER     Upon arrival here he was found to be tachypneic and hypoxic requiring quick escalation of his oxygen up to Venturi mask and eventually an ABG returned showing acute hypoxic hypercapnic respiratory failure with a PCO2 of 90 and pH of 7.29.  Chest x-ray was unremarkable.  Patient was placed on BiPAP with rapid improvement in the hospitalist service and contacted for admission.  Patient was given Solu-Medrol 125 and a DuoNeb in the ER.  At the time my exam patient is resting comfortably on the BiPAP and pulling volumes anywhere from 300-400.  He is able to talk in complete sentences while on the BiPAP and states he feels significantly improved.  Denies having any chest pain.  No acute worsening in lower extremity edema although he does have chronic lower extremity edema bilaterally.  Family concerned as patient continues to smoke occasional  cigarettes at home.  He has not been recently ill.  No significant change in his productive cough.  No fevers.  No recent travel.    Interval Followup:  Currently on 3L N/C  Patient used BIPAP at night time  Breathing improved, still has cough    Review of Systems    All systems reviewed and negative except for what is outlined above.      Objective   Objective     Vitals:   Temp:  [97.8 °F (36.6 °C)-98.7 °F (37.1 °C)] 98.1 °F (36.7 °C)  Heart Rate:  [60-92] 79  Resp:  [18-28] 18  BP: ()/(67-82) 114/72  Flow (L/min) (Oxygen Therapy):  [3-12] 3    Physical Exam          Constitutional: Awake, alert, no acute distress, frail and cachectic-appearing              Eyes: Pupils equal, sclerae anicteric, no conjunctival injection              HENT: NCAT, mucous membranes dry              Neck: Supple, no thyromegaly, no lymphadenopathy, trachea midline              Respiratory: Poor air entry bilaterally with mildly labored respirations              Cardiovascular: RRR, no murmurs, rubs, or gallops, palpable pedal pulses bilaterally              Gastrointestinal: Positive bowel sounds, soft, nontender, nondistended              Musculoskeletal: 1+ pitting edema to lower tibia bilaterally no clubbing or cyanosis to extremities              Psychiatric: Appropriate affect, cooperative              Neurologic: Oriented x 3, strength symmetric in all extremities, Cranial Nerves grossly intact to confrontation, speech clear              Skin: No rashes     Result Review    Result Review:  I have personally reviewed these results:  [x]  Laboratory      Lab 05/17/25  0307 05/16/25 2155 05/16/25 2122 05/16/25 2028   WBC 3.77  --   --  5.68   HEMOGLOBIN 13.9  --   --  15.6   HEMATOCRIT 46.0  --   --  52.2*   PLATELETS 117*  --   --  128*   NEUTROS ABS 3.46  --   --  4.22   IMMATURE GRANS (ABS) 0.00  --   --  0.01   LYMPHS ABS 0.28*  --   --  0.91   MONOS ABS 0.03*  --   --  0.46   EOS ABS 0.00  --   --  0.05   MCV 96.2  --    --  98.5*   PROCALCITONIN  --  0.02  --   --    LACTATE  --   --  0.6 0.7         Lab 05/17/25  0307 05/16/25 2122 05/16/25 2028   SODIUM 140  --  144   POTASSIUM 5.0  --  4.8   CHLORIDE 92*  --  92*   CO2 39.7*  --  44.3*   ANION GAP 8.3  --  7.7   BUN 16  --  13   CREATININE 0.59* 0.61 0.54*   EGFR 109.0  --  112.0   GLUCOSE 120*  --  120*   CALCIUM 10.0  --  10.4   MAGNESIUM 1.8  --   --          Lab 05/16/25 2028   TOTAL PROTEIN 7.2   ALBUMIN 4.9   GLOBULIN 2.3   ALT (SGPT) 23   AST (SGOT) 36   BILIRUBIN 0.7   ALK PHOS 126*         Lab 05/16/25 2155 05/16/25 2028   PROBNP  --  1,144.0*   HSTROP T 14 15                 Lab 05/17/25  0650 05/16/25 2122   PH, ARTERIAL 7.395 7.298*   PCO2, ARTERIAL 74.6* 90.0*   PO2 ART 70.8* 63.1*   O2 SATURATION ART 92.8* 87.2*   FIO2 30 32   HCO3 ART 45.6* 44.0*   BASE EXCESS ART 16.7* 12.9*     Brief Urine Lab Results       None          [x]  Microbiology   Microbiology Results (last 10 days)       Procedure Component Value - Date/Time    COVID PRE-OP / PRE-PROCEDURE SCREENING ORDER (NO ISOLATION) - Swab, Nasopharynx [[id number]]  (Normal) Collected: 05/16/25 2250    Lab Status: Final result Specimen: Swab from Nasopharynx Updated: 05/16/25 4076    Narrative:      The following orders were created for panel order COVID PRE-OP / PRE-PROCEDURE SCREENING ORDER (NO ISOLATION) - Swab, Nasopharynx.  Procedure                               Abnormality         Status                     ---------                               -----------         ------                     COVID-19, FLU A/B, RSV P...[[id number]]  Normal              Final result                 Please view results for these tests on the individual orders.    COVID-19, FLU A/B, RSV PCR 1 HR TAT - Swab, Nasopharynx [[id number]]  (Normal) Collected: 05/16/25 4870    Lab Status: Final result Specimen: Swab from Nasopharynx Updated: 05/16/25 8525     COVID19 Not Detected     Influenza A PCR Not Detected     Influenza B  PCR Not Detected     RSV, PCR Not Detected    Narrative:      Fact sheet for providers: https://www.fda.gov/media/470213/download    Fact sheet for patients: https://www.fda.gov/media/225745/download    Test performed by PCR.          [x]  Radiology  XR Chest 1 View  Result Date: 5/16/2025  Impression: 1. No acute process. 2. Emphysema. Electronically Signed: Herson Hicks MD  5/16/2025 9:10 PM EDT  Workstation ID: [id number]    []  EKG/Telemetry   []  Cardiology/Vascular   []  Pathology  []  Old records  []  Other:    Assessment & Plan   Assessment / Plan     Assessment/Plan:  Acute on chronic hypoxic hypercapnic respiratory failure secondary to acute COPD exacerbation  Acute COPD exacerbation  Hypernatremia  Chronic pulmonary hypertension secondary COPD  Hypertension  Hyperlipidemia  Nonobstructive coronary artery disease  GERD  Active tobacco use    - Continue scheduled bronchodilators and steroids.  Continues azithromycin at home dose for COPD exacerbation suppression.  I do not see acute pneumonia and thus antibiotics are not indicated.  Recheck ABG in the a.m. shows compensatory CO2  - Patient requuesting Pulm consult  - Resumed Lasix for pulmonary hypertension  -Patient counseled extensively on smoking cessation at bedside.  Nicotine patch ordered    VTE Prophylaxis:  Pharmacologic VTE prophylaxis orders are present.        CODE STATUS:   Code Status (Patient has no pulse and is not breathing): No CPR (Do Not Attempt to Resuscitate)  Medical Interventions (Patient has pulse or is breathing): Limited Support  Medical Intervention Limits: No intubation (DNI)  Level Of Support Discussed With: Patient      Electronically signed by Parker Ramirez MD, 5/17/2025, 14:00 EDT.

## 2025-05-17 NOTE — PROGRESS NOTES
RT EQUIPMENT DEVICE RELATED - SKIN ASSESSMENT    RT Medical Equipment/Device:     NIV Mask:  Under-the-nose   size:  b    Skin Assessment:      Cheek:  Intact  Chin:  Intact  Neck:  Intact  Nose:  Intact  Mouth:  Intact    Device Skin Pressure Protection:  Positioning supports utilized and Pressure points protected    Nurse Notification:  Akua Simpson, RRT

## 2025-05-18 LAB
ANION GAP SERPL CALCULATED.3IONS-SCNC: 7.2 MMOL/L (ref 5–15)
ARTERIAL PATENCY WRIST A: ABNORMAL
ATMOSPHERIC PRESS: 743 MMHG
BACTERIA SPEC RESP CULT: NORMAL
BACTERIA SPEC RESP CULT: NORMAL
BASE EXCESS BLDA CALC-SCNC: 14.2 MMOL/L (ref -2–2)
BASOPHILS # BLD AUTO: 0.01 10*3/MM3 (ref 0–0.2)
BASOPHILS NFR BLD AUTO: 0.1 % (ref 0–1.5)
BDY SITE: ABNORMAL
BUN SERPL-MCNC: 21 MG/DL (ref 8–23)
BUN/CREAT SERPL: 36.8 (ref 7–25)
CALCIUM SPEC-SCNC: 9.5 MG/DL (ref 8.6–10.5)
CHLORIDE SERPL-SCNC: 95 MMOL/L (ref 98–107)
CO2 SERPL-SCNC: 38.8 MMOL/L (ref 22–29)
CREAT SERPL-MCNC: 0.57 MG/DL (ref 0.76–1.27)
DEPRECATED RDW RBC AUTO: 44.8 FL (ref 37–54)
EGFRCR SERPLBLD CKD-EPI 2021: 110.2 ML/MIN/1.73
EOSINOPHIL # BLD AUTO: 0 10*3/MM3 (ref 0–0.4)
EOSINOPHIL NFR BLD AUTO: 0 % (ref 0.3–6.2)
ERYTHROCYTE [DISTWIDTH] IN BLOOD BY AUTOMATED COUNT: 13 % (ref 12.3–15.4)
GAS FLOW AIRWAY: 5 LPM
GLUCOSE SERPL-MCNC: 138 MG/DL (ref 65–99)
GRAM STN SPEC: NORMAL
HCO3 BLDA-SCNC: 42 MMOL/L (ref 22–26)
HCT VFR BLD AUTO: 41.3 % (ref 37.5–51)
HCT VFR BLD CALC: 39 % (ref 38–51)
HEMODILUTION: NO
HGB BLD-MCNC: 12.8 G/DL (ref 13–17.7)
HGB BLDA-MCNC: 13.4 G/DL (ref 12–18)
IMM GRANULOCYTES # BLD AUTO: 0.02 10*3/MM3 (ref 0–0.05)
IMM GRANULOCYTES NFR BLD AUTO: 0.2 % (ref 0–0.5)
INHALED O2 CONCENTRATION: 40 %
LYMPHOCYTES # BLD AUTO: 0.27 10*3/MM3 (ref 0.7–3.1)
LYMPHOCYTES NFR BLD AUTO: 3.1 % (ref 19.6–45.3)
Lab: ABNORMAL
MAGNESIUM SERPL-MCNC: 1.9 MG/DL (ref 1.6–2.4)
MCH RBC QN AUTO: 29.2 PG (ref 26.6–33)
MCHC RBC AUTO-ENTMCNC: 31 G/DL (ref 31.5–35.7)
MCV RBC AUTO: 94.3 FL (ref 79–97)
MODALITY: ABNORMAL
MONOCYTES # BLD AUTO: 0.26 10*3/MM3 (ref 0.1–0.9)
MONOCYTES NFR BLD AUTO: 3 % (ref 5–12)
NEUTROPHILS NFR BLD AUTO: 8.07 10*3/MM3 (ref 1.7–7)
NEUTROPHILS NFR BLD AUTO: 93.6 % (ref 42.7–76)
NOTIFIED WHO: ABNORMAL
NRBC BLD AUTO-RTO: 0 /100 WBC (ref 0–0.2)
PCO2 BLDA: 66.2 MM HG (ref 35–45)
PH BLDA: 7.41 PH UNITS (ref 7.35–7.45)
PLATELET # BLD AUTO: 116 10*3/MM3 (ref 140–450)
PMV BLD AUTO: 11.2 FL (ref 6–12)
PO2 BLD: 249 MM[HG] (ref 0–500)
PO2 BLDA: 99.6 MM HG (ref 80–100)
POTASSIUM SERPL-SCNC: 4.3 MMOL/L (ref 3.5–5.2)
RBC # BLD AUTO: 4.38 10*6/MM3 (ref 4.14–5.8)
READ BACK: YES
SAO2 % BLDCOA: 97.4 % (ref 95–99)
SODIUM SERPL-SCNC: 141 MMOL/L (ref 136–145)
WBC NRBC COR # BLD AUTO: 8.63 10*3/MM3 (ref 3.4–10.8)

## 2025-05-18 PROCEDURE — 94664 DEMO&/EVAL PT USE INHALER: CPT

## 2025-05-18 PROCEDURE — 80048 BASIC METABOLIC PNL TOTAL CA: CPT | Performed by: STUDENT IN AN ORGANIZED HEALTH CARE EDUCATION/TRAINING PROGRAM

## 2025-05-18 PROCEDURE — 82803 BLOOD GASES ANY COMBINATION: CPT | Performed by: INTERNAL MEDICINE

## 2025-05-18 PROCEDURE — 94799 UNLISTED PULMONARY SVC/PX: CPT

## 2025-05-18 PROCEDURE — 36600 WITHDRAWAL OF ARTERIAL BLOOD: CPT | Performed by: INTERNAL MEDICINE

## 2025-05-18 PROCEDURE — 25010000002 ENOXAPARIN PER 10 MG: Performed by: STUDENT IN AN ORGANIZED HEALTH CARE EDUCATION/TRAINING PROGRAM

## 2025-05-18 PROCEDURE — 83735 ASSAY OF MAGNESIUM: CPT | Performed by: STUDENT IN AN ORGANIZED HEALTH CARE EDUCATION/TRAINING PROGRAM

## 2025-05-18 PROCEDURE — 99233 SBSQ HOSP IP/OBS HIGH 50: CPT | Performed by: INTERNAL MEDICINE

## 2025-05-18 PROCEDURE — 63710000001 REVEFENACIN 175 MCG/3ML SOLUTION: Performed by: STUDENT IN AN ORGANIZED HEALTH CARE EDUCATION/TRAINING PROGRAM

## 2025-05-18 PROCEDURE — 25010000002 METHYLPREDNISOLONE PER 40 MG: Performed by: STUDENT IN AN ORGANIZED HEALTH CARE EDUCATION/TRAINING PROGRAM

## 2025-05-18 PROCEDURE — 85025 COMPLETE CBC W/AUTO DIFF WBC: CPT | Performed by: STUDENT IN AN ORGANIZED HEALTH CARE EDUCATION/TRAINING PROGRAM

## 2025-05-18 PROCEDURE — 87205 SMEAR GRAM STAIN: CPT | Performed by: STUDENT IN AN ORGANIZED HEALTH CARE EDUCATION/TRAINING PROGRAM

## 2025-05-18 PROCEDURE — 36415 COLL VENOUS BLD VENIPUNCTURE: CPT | Performed by: STUDENT IN AN ORGANIZED HEALTH CARE EDUCATION/TRAINING PROGRAM

## 2025-05-18 PROCEDURE — 94660 CPAP INITIATION&MGMT: CPT

## 2025-05-18 PROCEDURE — 94761 N-INVAS EAR/PLS OXIMETRY MLT: CPT

## 2025-05-18 PROCEDURE — 87205 SMEAR GRAM STAIN: CPT | Performed by: INTERNAL MEDICINE

## 2025-05-18 RX ORDER — PREDNISONE 20 MG/1
40 TABLET ORAL DAILY
Status: DISCONTINUED | OUTPATIENT
Start: 2025-05-19 | End: 2025-05-20 | Stop reason: HOSPADM

## 2025-05-18 RX ADMIN — ROFLUMILAST 500 MCG: 500 TABLET ORAL at 08:13

## 2025-05-18 RX ADMIN — REVEFENACIN 175 MCG: 175 SOLUTION RESPIRATORY (INHALATION) at 06:26

## 2025-05-18 RX ADMIN — METOPROLOL TARTRATE 12.5 MG: 25 TABLET, FILM COATED ORAL at 21:34

## 2025-05-18 RX ADMIN — FUROSEMIDE 20 MG: 20 TABLET ORAL at 08:13

## 2025-05-18 RX ADMIN — METHYLPREDNISOLONE SODIUM SUCCINATE 40 MG: 40 INJECTION, POWDER, LYOPHILIZED, FOR SOLUTION INTRAMUSCULAR; INTRAVENOUS at 21:35

## 2025-05-18 RX ADMIN — BUDESONIDE 0.5 MG: 0.5 SUSPENSION RESPIRATORY (INHALATION) at 18:54

## 2025-05-18 RX ADMIN — METHYLPREDNISOLONE SODIUM SUCCINATE 40 MG: 40 INJECTION, POWDER, LYOPHILIZED, FOR SOLUTION INTRAMUSCULAR; INTRAVENOUS at 14:51

## 2025-05-18 RX ADMIN — ENOXAPARIN SODIUM 40 MG: 40 INJECTION SUBCUTANEOUS at 08:13

## 2025-05-18 RX ADMIN — NICOTINE 1 PATCH: 21 PATCH, EXTENDED RELEASE TRANSDERMAL at 08:14

## 2025-05-18 RX ADMIN — Medication 10 ML: at 08:14

## 2025-05-18 RX ADMIN — ACETAMINOPHEN 650 MG: 325 TABLET ORAL at 21:34

## 2025-05-18 RX ADMIN — BUDESONIDE 0.5 MG: 0.5 SUSPENSION RESPIRATORY (INHALATION) at 06:26

## 2025-05-18 RX ADMIN — ARFORMOTEROL TARTRATE 15 MCG: 15 SOLUTION RESPIRATORY (INHALATION) at 06:26

## 2025-05-18 RX ADMIN — IPRATROPIUM BROMIDE AND ALBUTEROL SULFATE 3 ML: .5; 3 SOLUTION RESPIRATORY (INHALATION) at 06:25

## 2025-05-18 RX ADMIN — METHYLPREDNISOLONE SODIUM SUCCINATE 40 MG: 40 INJECTION, POWDER, LYOPHILIZED, FOR SOLUTION INTRAMUSCULAR; INTRAVENOUS at 05:26

## 2025-05-18 RX ADMIN — IPRATROPIUM BROMIDE AND ALBUTEROL SULFATE 3 ML: .5; 3 SOLUTION RESPIRATORY (INHALATION) at 04:44

## 2025-05-18 RX ADMIN — METOPROLOL TARTRATE 12.5 MG: 25 TABLET, FILM COATED ORAL at 08:13

## 2025-05-18 RX ADMIN — AZITHROMYCIN DIHYDRATE 250 MG: 250 TABLET ORAL at 08:13

## 2025-05-18 RX ADMIN — Medication 10 ML: at 21:36

## 2025-05-18 RX ADMIN — ARFORMOTEROL TARTRATE 15 MCG: 15 SOLUTION RESPIRATORY (INHALATION) at 18:54

## 2025-05-18 RX ADMIN — PANTOPRAZOLE SODIUM 40 MG: 40 TABLET, DELAYED RELEASE ORAL at 05:26

## 2025-05-18 NOTE — PROGRESS NOTES
[facility]   Hospitalist Progress Note  Date: 2025  Patient Name: Sumeet Gomes  : 1960  MRN: [id number]  Date of admission: 2025  Room/Bed: [facility]/      Subjective   Subjective     Chief Complaint: shortness of breath    Summary:Sumeet Gomes is a 64 y.o. male past medical history of hypertension, hyperlipidemia, COPD on 3 L nasal cannula, pulmonary hypertension, CAD, GERD who presented to the ER due to worsening shortness of breath.  Patient is a relatively poor historian history Cellfina by discussion with family at bedside.  Appears patient has been taking all his medications for known end-stage COPD/asthma overlap syndrome without any changes lately but in the last 1 to 2 weeks he has had progressive worsening in his exertional dyspnea to the point that in the last 2 days he has been minimally interactive or moving around due to his dyspnea.  In that time he is also had some increasing lethargy but been refusing to come to the ER for evaluation.  Ultimately today he was so fatigued that his wife was finally able to convince him to come to the ER     Upon arrival here he was found to be tachypneic and hypoxic requiring quick escalation of his oxygen up to Venturi mask and eventually an ABG returned showing acute hypoxic hypercapnic respiratory failure with a PCO2 of 90 and pH of 7.29.  Chest x-ray was unremarkable.  Patient was placed on BiPAP with rapid improvement in the hospitalist service and contacted for admission.  Patient was given Solu-Medrol 125 and a DuoNeb in the ER.  At the time my exam patient is resting comfortably on the BiPAP and pulling volumes anywhere from 300-400.  He is able to talk in complete sentences while on the BiPAP and states he feels significantly improved.  Denies having any chest pain.  No acute worsening in lower extremity edema although he does have chronic lower extremity edema bilaterally.  Family concerned as patient continues to smoke occasional  cigarettes at home.  He has not been recently ill.  No significant change in his productive cough.  No fevers.  No recent travel.    Interval Followup:  Currently on 3L N/C  Patient used BIPAP all night setttings 14/5, RR 20  Breathing improved, still has cough    Review of Systems    All systems reviewed and negative except for what is outlined above.      Objective   Objective     Vitals:   Temp:  [97.5 °F (36.4 °C)-98.1 °F (36.7 °C)] 98.1 °F (36.7 °C)  Heart Rate:  [60-83] 67  Resp:  [16-22] 18  BP: ()/(60-72) 118/72  Flow (L/min) (Oxygen Therapy):  [3] 3    Physical Exam          Constitutional: Awake, alert, no acute distress, frail and cachectic-appearing              Eyes: Pupils equal, sclerae anicteric, no conjunctival injection              HENT: NCAT, mucous membranes dry              Neck: Supple, no thyromegaly, no lymphadenopathy, trachea midline              Respiratory: Poor air entry bilaterally with mildly labored respirations              Cardiovascular: RRR, no murmurs, rubs, or gallops, palpable pedal pulses bilaterally              Gastrointestinal: Positive bowel sounds, soft, nontender, nondistended              Musculoskeletal: 1+ pitting edema to lower tibia bilaterally no clubbing or cyanosis to extremities              Psychiatric: Appropriate affect, cooperative              Neurologic: Oriented x 3, strength symmetric in all extremities, Cranial Nerves grossly intact to confrontation, speech clear              Skin: No rashes     Result Review    Result Review:  I have personally reviewed these results:  [x]  Laboratory      Lab 05/18/25  0501 05/17/25  0307 05/16/25 2155 05/16/25 2122 05/16/25 2028   WBC 8.63 3.77  --   --  5.68   HEMOGLOBIN 12.8* 13.9  --   --  15.6   HEMATOCRIT 41.3 46.0  --   --  52.2*   PLATELETS 116* 117*  --   --  128*   NEUTROS ABS 8.07* 3.46  --   --  4.22   IMMATURE GRANS (ABS) 0.02 0.00  --   --  0.01   LYMPHS ABS 0.27* 0.28*  --   --  0.91   MONOS  ABS 0.26 0.03*  --   --  0.46   EOS ABS 0.00 0.00  --   --  0.05   MCV 94.3 96.2  --   --  98.5*   PROCALCITONIN  --   --  0.02  --   --    LACTATE  --   --   --  0.6 0.7         Lab 05/18/25  0501 05/17/25  0307 05/16/25 2122 05/16/25 2028   SODIUM 141 140  --  144   POTASSIUM 4.3 5.0  --  4.8   CHLORIDE 95* 92*  --  92*   CO2 38.8* 39.7*  --  44.3*   ANION GAP 7.2 8.3  --  7.7   BUN 21 16  --  13   CREATININE 0.57* 0.59* 0.61 0.54*   EGFR 110.2 109.0  --  112.0   GLUCOSE 138* 120*  --  120*   CALCIUM 9.5 10.0  --  10.4   MAGNESIUM 1.9 1.8  --   --          Lab 05/16/25 2028   TOTAL PROTEIN 7.2   ALBUMIN 4.9   GLOBULIN 2.3   ALT (SGPT) 23   AST (SGOT) 36   BILIRUBIN 0.7   ALK PHOS 126*         Lab 05/16/25  2155 05/16/25 2028   PROBNP  --  1,144.0*   HSTROP T 14 15                 Lab 05/18/25  0739 05/17/25  0650 05/16/25 2122   PH, ARTERIAL 7.410 7.395 7.298*   PCO2, ARTERIAL 66.2* 74.6* 90.0*   PO2 ART 99.6 70.8* 63.1*   O2 SATURATION ART 97.4 92.8* 87.2*   FIO2 40 30 32   HCO3 ART 42.0* 45.6* 44.0*   BASE EXCESS ART 14.2* 16.7* 12.9*     Brief Urine Lab Results       None          [x]  Microbiology   Microbiology Results (last 10 days)       Procedure Component Value - Date/Time    S. Pneumo Ag Urine or CSF - Urine, Urine, Clean Catch [[id number]]  (Normal) Collected: 05/17/25 1809    Lab Status: Final result Specimen: Urine, Clean Catch Updated: 05/17/25 1923     Strep Pneumo Ag Negative    Legionella Antigen, Urine - Urine, Urine, Clean Catch [[id number]]  (Normal) Collected: 05/17/25 1809    Lab Status: Final result Specimen: Urine, Clean Catch Updated: 05/17/25 1923     LEGIONELLA ANTIGEN, URINE Negative    MRSA Screen, PCR (Inpatient) - Swab, Nares [[id number]]  (Normal) Collected: 05/17/25 1747    Lab Status: Final result Specimen: Swab from Nares Updated: 05/17/25 1948     MRSA PCR No MRSA Detected    Narrative:      The negative predictive value of this diagnostic test is high and should only be used  to consider de-escalating anti-MRSA therapy. A positive result may indicate colonization with MRSA and must be correlated clinically.    COVID PRE-OP / PRE-PROCEDURE SCREENING ORDER (NO ISOLATION) - Swab, Nasopharynx [[id number]]  (Normal) Collected: 05/16/25 2250    Lab Status: Final result Specimen: Swab from Nasopharynx Updated: 05/16/25 2343    Narrative:      The following orders were created for panel order COVID PRE-OP / PRE-PROCEDURE SCREENING ORDER (NO ISOLATION) - Swab, Nasopharynx.  Procedure                               Abnormality         Status                     ---------                               -----------         ------                     COVID-19, FLU A/B, RSV P...[[id number]]  Normal              Final result                 Please view results for these tests on the individual orders.    COVID-19, FLU A/B, RSV PCR 1 HR TAT - Swab, Nasopharynx [[id number]]  (Normal) Collected: 05/16/25 2250    Lab Status: Final result Specimen: Swab from Nasopharynx Updated: 05/16/25 2343     COVID19 Not Detected     Influenza A PCR Not Detected     Influenza B PCR Not Detected     RSV, PCR Not Detected    Narrative:      Fact sheet for providers: https://www.fda.gov/media/841443/download    Fact sheet for patients: https://www.fda.gov/media/352592/download    Test performed by PCR.          [x]  Radiology  CT Chest With Contrast Diagnostic  Result Date: 5/17/2025  Impression: 1.Moderate to severe emphysema with stable areas of scarring in the right lower lobe and lingula. No acute intrathoracic abnormality. 2.The esophagus is poorly distended and there is an air debris level in the proximal esophagus at the level of the thoracic inlet, likely due to gastroesophageal reflux or possibly esophageal dysmotility. The stomach is distended and debris-filled. A distinct mass at the GE junction is not identified on this exam. Electronically Signed: Hansel Triplett DO  5/17/2025 6:04 PM EDT  Workstation ID:  [id number]    XR Chest 1 View  Result Date: 5/16/2025  Impression: 1. No acute process. 2. Emphysema. Electronically Signed: Herson Hicks MD  5/16/2025 9:10 PM EDT  Workstation ID: [id number]    []  EKG/Telemetry   []  Cardiology/Vascular   []  Pathology  []  Old records  []  Other:    Assessment & Plan   Assessment / Plan     Assessment/Plan:  Acute on chronic hypoxic hypercapnic respiratory failure secondary to acute COPD exacerbation  Acute COPD exacerbation  Hypernatremia  Chronic pulmonary hypertension secondary COPD  Hypertension  Hyperlipidemia  Nonobstructive coronary artery disease  GERD  Active tobacco use    - Continue scheduled bronchodilators and IV steroids. Plan to transition to Prednisone tmrw    - Continues azithromycin at home dose for COPD exacerbation suppression.  -  Recheck ABG this AM shows compensatory CO2  - Resumed oral Lasix for pulmonary hypertension  -Patient counseled extensively on smoking cessation at bedside.  Nicotine patch ordered  - Case management consulted, patient will need BiPAP at home for discharge settings 14/5.    VTE Prophylaxis:  Pharmacologic VTE prophylaxis orders are present.        CODE STATUS:   Code Status (Patient has no pulse and is not breathing): No CPR (Do Not Attempt to Resuscitate)  Medical Interventions (Patient has pulse or is breathing): Limited Support  Medical Intervention Limits: No intubation (DNI)  Level Of Support Discussed With: Patient      Electronically signed by Parker Ramirez MD, 5/18/2025, 12:15 EDT.

## 2025-05-18 NOTE — PLAN OF CARE
Goal Outcome Evaluation:  Plan of Care Reviewed With: patient        Progress: improving  Outcome Evaluation: Patient AOx4. Repeat ABGs done this morning. Up adlib in room. Sputum sent. Continuing plan of care.

## 2025-05-18 NOTE — PROGRESS NOTES
RT EQUIPMENT DEVICE RELATED - SKIN ASSESSMENT    RT Medical Equipment/Device:     NIV Mask:  Under-the-nose   size:  b    Skin Assessment:      Cheek:  Intact  Nares:  Intact  Neck:  Intact  Nose:  Intact  Lips:  Intact  Mouth:  Intact    Device Skin Pressure Protection:  Pressure points protected    Nurse Notification:  Akua Bah, CRT

## 2025-05-18 NOTE — PLAN OF CARE
Goal Outcome Evaluation:   Pt wears bipap overnight consistently, states he can tell a difference when using. Has home unit but recently stopped using due to technical issues with the machine. Repeat ABG drawn this morning, showed improving CO2 and oxygenation. Pt wears 3 liters when off bipap, no changes at this time

## 2025-05-18 NOTE — PROGRESS NOTES
RT EQUIPMENT DEVICE RELATED - SKIN ASSESSMENT    RT Medical Equipment/Device:  NIV Mask: Under-the-nose  size: B    Skin Assessment:  neck/face: Intact    Device Skin Pressure Protection: Positioning supports utilized and Pressure points protected    Nurse Notification: Akua Mcduffie, CRT

## 2025-05-18 NOTE — PLAN OF CARE
Goal Outcome Evaluation:              Outcome Evaluation: Pt AxOx5. Bipap utilized overnight. 3 L for activity in room baseline. Up ab derek. Voiding spontaneously. VSS. No complaints of pain.

## 2025-05-18 NOTE — PLAN OF CARE
Goal Outcome Evaluation:   Patient has BiPAP settings of 14/5 with a rate of 20 with 30% oxygen. When not on the BiPAP Patient is on 3L nasal cannula. Will continue to monitor.

## 2025-05-19 LAB
ANION GAP SERPL CALCULATED.3IONS-SCNC: 3.9 MMOL/L (ref 5–15)
BASOPHILS # BLD AUTO: 0.01 10*3/MM3 (ref 0–0.2)
BASOPHILS NFR BLD AUTO: 0.1 % (ref 0–1.5)
BUN SERPL-MCNC: 19 MG/DL (ref 8–23)
BUN/CREAT SERPL: 42.2 (ref 7–25)
CALCIUM SPEC-SCNC: 9.3 MG/DL (ref 8.6–10.5)
CHLORIDE SERPL-SCNC: 95 MMOL/L (ref 98–107)
CO2 SERPL-SCNC: 39.1 MMOL/L (ref 22–29)
CREAT SERPL-MCNC: 0.45 MG/DL (ref 0.76–1.27)
DEPRECATED RDW RBC AUTO: 45.1 FL (ref 37–54)
EGFRCR SERPLBLD CKD-EPI 2021: 118.3 ML/MIN/1.73
EOSINOPHIL # BLD AUTO: 0 10*3/MM3 (ref 0–0.4)
EOSINOPHIL NFR BLD AUTO: 0 % (ref 0.3–6.2)
ERYTHROCYTE [DISTWIDTH] IN BLOOD BY AUTOMATED COUNT: 13.2 % (ref 12.3–15.4)
GLUCOSE SERPL-MCNC: 129 MG/DL (ref 65–99)
HCT VFR BLD AUTO: 39.7 % (ref 37.5–51)
HGB BLD-MCNC: 12.3 G/DL (ref 13–17.7)
IMM GRANULOCYTES # BLD AUTO: 0.03 10*3/MM3 (ref 0–0.05)
IMM GRANULOCYTES NFR BLD AUTO: 0.4 % (ref 0–0.5)
LYMPHOCYTES # BLD AUTO: 0.37 10*3/MM3 (ref 0.7–3.1)
LYMPHOCYTES NFR BLD AUTO: 4.9 % (ref 19.6–45.3)
MAGNESIUM SERPL-MCNC: 2 MG/DL (ref 1.6–2.4)
MCH RBC QN AUTO: 29.2 PG (ref 26.6–33)
MCHC RBC AUTO-ENTMCNC: 31 G/DL (ref 31.5–35.7)
MCV RBC AUTO: 94.3 FL (ref 79–97)
MONOCYTES # BLD AUTO: 0.32 10*3/MM3 (ref 0.1–0.9)
MONOCYTES NFR BLD AUTO: 4.2 % (ref 5–12)
NEUTROPHILS NFR BLD AUTO: 6.88 10*3/MM3 (ref 1.7–7)
NEUTROPHILS NFR BLD AUTO: 90.4 % (ref 42.7–76)
NRBC BLD AUTO-RTO: 0 /100 WBC (ref 0–0.2)
PLATELET # BLD AUTO: 108 10*3/MM3 (ref 140–450)
PMV BLD AUTO: 10.8 FL (ref 6–12)
POTASSIUM SERPL-SCNC: 4.7 MMOL/L (ref 3.5–5.2)
RBC # BLD AUTO: 4.21 10*6/MM3 (ref 4.14–5.8)
SODIUM SERPL-SCNC: 138 MMOL/L (ref 136–145)
WBC NRBC COR # BLD AUTO: 7.61 10*3/MM3 (ref 3.4–10.8)

## 2025-05-19 PROCEDURE — 25010000002 ENOXAPARIN PER 10 MG: Performed by: STUDENT IN AN ORGANIZED HEALTH CARE EDUCATION/TRAINING PROGRAM

## 2025-05-19 PROCEDURE — 63710000001 REVEFENACIN 175 MCG/3ML SOLUTION: Performed by: STUDENT IN AN ORGANIZED HEALTH CARE EDUCATION/TRAINING PROGRAM

## 2025-05-19 PROCEDURE — 94761 N-INVAS EAR/PLS OXIMETRY MLT: CPT

## 2025-05-19 PROCEDURE — 99232 SBSQ HOSP IP/OBS MODERATE 35: CPT | Performed by: INTERNAL MEDICINE

## 2025-05-19 PROCEDURE — 63710000001 PREDNISONE PER 1 MG: Performed by: STUDENT IN AN ORGANIZED HEALTH CARE EDUCATION/TRAINING PROGRAM

## 2025-05-19 PROCEDURE — 94762 N-INVAS EAR/PLS OXIMTRY CONT: CPT

## 2025-05-19 PROCEDURE — 94664 DEMO&/EVAL PT USE INHALER: CPT

## 2025-05-19 PROCEDURE — 94799 UNLISTED PULMONARY SVC/PX: CPT

## 2025-05-19 PROCEDURE — 80048 BASIC METABOLIC PNL TOTAL CA: CPT | Performed by: STUDENT IN AN ORGANIZED HEALTH CARE EDUCATION/TRAINING PROGRAM

## 2025-05-19 PROCEDURE — 85025 COMPLETE CBC W/AUTO DIFF WBC: CPT | Performed by: STUDENT IN AN ORGANIZED HEALTH CARE EDUCATION/TRAINING PROGRAM

## 2025-05-19 PROCEDURE — 94660 CPAP INITIATION&MGMT: CPT

## 2025-05-19 PROCEDURE — 83735 ASSAY OF MAGNESIUM: CPT | Performed by: STUDENT IN AN ORGANIZED HEALTH CARE EDUCATION/TRAINING PROGRAM

## 2025-05-19 RX ADMIN — REVEFENACIN 175 MCG: 175 SOLUTION RESPIRATORY (INHALATION) at 06:47

## 2025-05-19 RX ADMIN — BUDESONIDE 0.5 MG: 0.5 SUSPENSION RESPIRATORY (INHALATION) at 18:35

## 2025-05-19 RX ADMIN — METOPROLOL TARTRATE 12.5 MG: 25 TABLET, FILM COATED ORAL at 08:34

## 2025-05-19 RX ADMIN — Medication 10 ML: at 20:18

## 2025-05-19 RX ADMIN — Medication 10 ML: at 08:34

## 2025-05-19 RX ADMIN — ROFLUMILAST 500 MCG: 500 TABLET ORAL at 08:34

## 2025-05-19 RX ADMIN — PREDNISONE 40 MG: 20 TABLET ORAL at 08:34

## 2025-05-19 RX ADMIN — ARFORMOTEROL TARTRATE 15 MCG: 15 SOLUTION RESPIRATORY (INHALATION) at 06:47

## 2025-05-19 RX ADMIN — BUDESONIDE 0.5 MG: 0.5 SUSPENSION RESPIRATORY (INHALATION) at 06:47

## 2025-05-19 RX ADMIN — FUROSEMIDE 20 MG: 20 TABLET ORAL at 08:34

## 2025-05-19 RX ADMIN — METOPROLOL TARTRATE 12.5 MG: 25 TABLET, FILM COATED ORAL at 20:16

## 2025-05-19 RX ADMIN — AZITHROMYCIN DIHYDRATE 250 MG: 250 TABLET ORAL at 08:34

## 2025-05-19 RX ADMIN — PANTOPRAZOLE SODIUM 40 MG: 40 TABLET, DELAYED RELEASE ORAL at 05:41

## 2025-05-19 RX ADMIN — ENOXAPARIN SODIUM 40 MG: 40 INJECTION SUBCUTANEOUS at 08:34

## 2025-05-19 RX ADMIN — NICOTINE 1 PATCH: 21 PATCH, EXTENDED RELEASE TRANSDERMAL at 07:37

## 2025-05-19 RX ADMIN — ARFORMOTEROL TARTRATE 15 MCG: 15 SOLUTION RESPIRATORY (INHALATION) at 18:35

## 2025-05-19 RX ADMIN — ACETAMINOPHEN 650 MG: 325 TABLET ORAL at 20:12

## 2025-05-19 NOTE — CASE MANAGEMENT/SOCIAL WORK
Mr. Gomes has a history of chronic respiratory failure secondary to very severe COPD with asthma overlap. Patient was prescribed NIV with Astral in September of 2022, but device was picked up last October due to non adherence.Mr. Gomes states he felt like his breathing was not synchronous with the vent. Patient also has history of mucous plugging for which chest percussion vest was previously prescribed but did not tolerate and now uses hand percussor. Mr. Gomes continues to smoke but reports he has cut back to a few cigarettes per day. Patient completed phase II pulmonary rehab (36 sessions) last year. Mr. Gomes is currently admitted with acute on chronic hypoxic and hypercapnic respiratory failure. CO2 was 90 on admission. Current Bipap settings are 14/5, rate 20 and CO2 is now 66.2. Patient will need to fail overnight pulse oximetry on home oxygen flow of 3L to qualify for home bipap without a backup rate. Overnight oximetry orders placed.

## 2025-05-19 NOTE — PROGRESS NOTES
RT EQUIPMENT DEVICE RELATED - SKIN ASSESSMENT    RT Medical Equipment/Device:  NIV Mask: Under-the-nose  size: b    Skin Assessment: Cheek: Intact, Chin: Intact, Nares: Intact, Nose: Intact, and Lips: Intact    Device Skin Pressure Protection: Positioning supports utilized    Nurse Notification: Akua Craft, RRT

## 2025-05-19 NOTE — PROGRESS NOTES
Respiratory Therapist Broncho-Pulmonary Hygiene Progress Note      Patient Name:  Sumeet Gomes  YOB: 1960    Sumeet Gomes meets the qualification for Level 1 of the Bronco-Pulmonary Hygiene Protocol. This was based on my daily patient assessment and includes review of chest x-ray results, cough ability and quality, oxygenation, secretions or risk for secretion development and patient mobility.     Broncho-Pulmonary Hygiene Assessment:    Level of Movement: Actively changing positions without assistance  Alert/ oriented/ cooperative    Breath Sounds: Bilateral localized decreased air exchange and/or coarse rhonchi    Cough: Strong, effective    Chest X-Ray: Possible signs of consolidation and/or atelectasis or clear.     Sputum Productions: None or small amount of thin or watery secretions with effective cough    History and Physical: Chronic condition    SpO2 to Oxygen Need: greater than 92% on room air or  less than 3L nasal canula    Current SpO2 is: 99% on 3L    Based on this information, I have completed the following interventions: Teach/Instruct patient on cough and deep breathe      Electronically signed by Catrina Craft RRT, 05/19/25, 6:53 AM EDT.

## 2025-05-19 NOTE — PLAN OF CARE
Goal Outcome Evaluation:           Progress: improving  Outcome Evaluation: Patient alert and oriented x4 on 3L nc and bipap overnight. Pain treated per MAR. Patient resting between care.

## 2025-05-19 NOTE — PLAN OF CARE
Patient had requested to go on the Bipap at 2330 rounds. Patient was placed on the unit and stated that he intends on wearing all night until breakfast arrives. Continue to monitor and titrate as tolerated. No changes at this time.    Problem: Noninvasive Ventilation Acute  Goal: Effective Unassisted Ventilation and Oxygenation  Outcome: Progressing  Intervention: Monitor and Manage Noninvasive Ventilation  Recent Flowsheet Documentation  Taken 5/19/2025 0008 by Bela Bah, CRT  Airway/Ventilation Management:   airway patency maintained   position adjusted   oxygen therapy provided  NPPV/CPAP Maintenance:   adjusted   proper fit/secure   nasal mask     Problem: Skin Injury Risk Increased  Goal: Skin Health and Integrity  Intervention: Optimize Skin Protection  Recent Flowsheet Documentation  Taken 5/19/2025 0008 by Bela Bah, CRT  Head of Bed (HOB) Positioning: HOB elevated   Goal Outcome Evaluation:

## 2025-05-19 NOTE — PROGRESS NOTES
[facility]   Hospitalist Progress Note  Date: 2025  Patient Name: Sumeet Gomes  : 1960  MRN: [id number]  Date of admission: 2025  Room/Bed: [facility]/      Subjective   Subjective     Chief Complaint: shortness of breath    Summary:Sumeet Gomes is a 64 y.o. male past medical history of hypertension, hyperlipidemia, COPD on 3 L nasal cannula, pulmonary hypertension, CAD, GERD who presented to the ER due to worsening shortness of breath.  Patient is a relatively poor historian history [place] by discussion with family at bedside.  Appears patient has been taking all his medications for known end-stage COPD/asthma overlap syndrome without any changes lately but in the last 1 to 2 weeks he has had progressive worsening in his exertional dyspnea to the point that in the last 2 days he has been minimally interactive or moving around due to his dyspnea.  In that time he is also had some increasing lethargy but been refusing to come to the ER for evaluation.  Ultimately today he was so fatigued that his wife was finally able to convince him to come to the ER     Upon arrival here he was found to be tachypneic and hypoxic requiring quick escalation of his oxygen up to Venturi mask and eventually an ABG returned showing acute hypoxic hypercapnic respiratory failure with a PCO2 of 90 and pH of 7.29.  Chest x-ray was unremarkable.  Patient was placed on BiPAP with rapid improvement in the hospitalist service and contacted for admission.  Patient was given Solu-Medrol 125 and a DuoNeb in the ER.  At the time my exam patient is resting comfortably on the BiPAP and pulling volumes anywhere from 300-400.  He is able to talk in complete sentences while on the BiPAP and states he feels significantly improved.  Denies having any chest pain.  No acute worsening in lower extremity edema although he does have chronic lower extremity edema bilaterally.  Family concerned as patient continues to smoke occasional  cigarettes at home.  He has not been recently ill.  No significant change in his productive cough.  No fevers.  No recent travel.    Interval Followup:  Currently on 3L N/C  Patient used BIPAP all night setttings 14/5, RR 20  Breathing improved, still has cough  Case management to arrange BiPAP at home    Review of Systems    All systems reviewed and negative except for what is outlined above.      Objective   Objective     Vitals:   Temp:  [97.5 °F (36.4 °C)-98.2 °F (36.8 °C)] 97.9 °F (36.6 °C)  Heart Rate:  [52-76] 58  Resp:  [18-23] 22  BP: ()/(62-74) 93/64  Flow (L/min) (Oxygen Therapy):  [3] 3    Physical Exam          Constitutional: Awake, alert, no acute distress, frail and cachectic-appearing              Eyes: Pupils equal, sclerae anicteric, no conjunctival injection              HENT: NCAT, mucous membranes dry              Neck: Supple, no thyromegaly, no lymphadenopathy, trachea midline              Respiratory: Poor air entry bilaterally with mildly labored respirations              Cardiovascular: RRR, no murmurs, rubs, or gallops, palpable pedal pulses bilaterally              Gastrointestinal: Positive bowel sounds, soft, nontender, nondistended              Musculoskeletal: 1+ pitting edema to lower tibia bilaterally no clubbing or cyanosis to extremities              Psychiatric: Appropriate affect, cooperative              Neurologic: Oriented x 3, strength symmetric in all extremities, Cranial Nerves grossly intact to confrontation, speech clear              Skin: No rashes     Result Review    Result Review:  I have personally reviewed these results:  [x]  Laboratory      Lab 05/19/25  0500 05/18/25  0501 05/17/25  0307 05/16/25 2155 05/16/25 2122 05/16/25 2028   WBC 7.61 8.63 3.77  --   --  5.68   HEMOGLOBIN 12.3* 12.8* 13.9  --   --  15.6   HEMATOCRIT 39.7 41.3 46.0  --   --  52.2*   PLATELETS 108* 116* 117*  --   --  128*   NEUTROS ABS 6.88 8.07* 3.46  --   --  4.22   IMMATURE  GRANS (ABS) 0.03 0.02 0.00  --   --  0.01   LYMPHS ABS 0.37* 0.27* 0.28*  --   --  0.91   MONOS ABS 0.32 0.26 0.03*  --   --  0.46   EOS ABS 0.00 0.00 0.00  --   --  0.05   MCV 94.3 94.3 96.2  --   --  98.5*   PROCALCITONIN  --   --   --  0.02  --   --    LACTATE  --   --   --   --  0.6 0.7         Lab 05/19/25  0500 05/18/25  0501 05/17/25  0307   SODIUM 138 141 140   POTASSIUM 4.7 4.3 5.0   CHLORIDE 95* 95* 92*   CO2 39.1* 38.8* 39.7*   ANION GAP 3.9* 7.2 8.3   BUN 19 21 16   CREATININE 0.45* 0.57* 0.59*   EGFR 118.3 110.2 109.0   GLUCOSE 129* 138* 120*   CALCIUM 9.3 9.5 10.0   MAGNESIUM 2.0 1.9 1.8         Lab 05/16/25 2028   TOTAL PROTEIN 7.2   ALBUMIN 4.9   GLOBULIN 2.3   ALT (SGPT) 23   AST (SGOT) 36   BILIRUBIN 0.7   ALK PHOS 126*         Lab 05/16/25  2155 05/16/25 2028   PROBNP  --  1,144.0*   HSTROP T 14 15                 Lab 05/18/25  0739 05/17/25  0650 05/16/25  2122   PH, ARTERIAL 7.410 7.395 7.298*   PCO2, ARTERIAL 66.2* 74.6* 90.0*   PO2 ART 99.6 70.8* 63.1*   O2 SATURATION ART 97.4 92.8* 87.2*   FIO2 40 30 32   HCO3 ART 42.0* 45.6* 44.0*   BASE EXCESS ART 14.2* 16.7* 12.9*     Brief Urine Lab Results       None          [x]  Microbiology   Microbiology Results (last 10 days)       Procedure Component Value - Date/Time    Respiratory Culture - Sputum, Cough [[id number]] Collected: 05/18/25 1624    Lab Status: Final result Specimen: Sputum from Cough Updated: 05/18/25 1823     Respiratory Culture Rejected     Gram Stain Moderate (3+) Epithelial cells per low power field      Rare (1+) WBCs per low power field      Moderate (3+) Gram positive cocci in pairs, chains and clusters      Rare (1+) Fungal elements    Narrative:      Specimen rejected due to oropharyngeal contamination. Please reorder and recollect specimen if clinically necessary.    Respiratory Culture - Sputum, Cough [[id number]] Collected: 05/18/25 0829    Lab Status: Final result Specimen: Sputum from Cough Updated: 05/18/25 1300      Respiratory Culture Rejected     Gram Stain Few (2+) Epithelial cells per low power field      Rare (1+) WBCs per low power field      Few (2+) Gram positive cocci in pairs, chains and clusters    Narrative:      Specimen rejected due to oropharyngeal contamination. Please reorder and recollect specimen if clinically necessary.    S. Pneumo Ag Urine or CSF - Urine, Urine, Clean Catch [[id number]]  (Normal) Collected: 05/17/25 1809    Lab Status: Final result Specimen: Urine, Clean Catch Updated: 05/17/25 1923     Strep Pneumo Ag Negative    Legionella Antigen, Urine - Urine, Urine, Clean Catch [[id number]]  (Normal) Collected: 05/17/25 1809    Lab Status: Final result Specimen: Urine, Clean Catch Updated: 05/17/25 1923     LEGIONELLA ANTIGEN, URINE Negative    MRSA Screen, PCR (Inpatient) - Swab, Nares [[id number]]  (Normal) Collected: 05/17/25 1747    Lab Status: Final result Specimen: Swab from Nares Updated: 05/17/25 1948     MRSA PCR No MRSA Detected    Narrative:      The negative predictive value of this diagnostic test is high and should only be used to consider de-escalating anti-MRSA therapy. A positive result may indicate colonization with MRSA and must be correlated clinically.    COVID PRE-OP / PRE-PROCEDURE SCREENING ORDER (NO ISOLATION) - Swab, Nasopharynx [[id number]]  (Normal) Collected: 05/16/25 2250    Lab Status: Final result Specimen: Swab from Nasopharynx Updated: 05/16/25 8880    Narrative:      The following orders were created for panel order COVID PRE-OP / PRE-PROCEDURE SCREENING ORDER (NO ISOLATION) - Swab, Nasopharynx.  Procedure                               Abnormality         Status                     ---------                               -----------         ------                     COVID-19, FLU A/B, RSV P...[[id number]]  Normal              Final result                 Please view results for these tests on the individual orders.    COVID-19, FLU A/B, RSV PCR 1 HR TAT - Swab,  Nasopharynx [[id number]]  (Normal) Collected: 05/16/25 2250    Lab Status: Final result Specimen: Swab from Nasopharynx Updated: 05/16/25 0247     COVID19 Not Detected     Influenza A PCR Not Detected     Influenza B PCR Not Detected     RSV, PCR Not Detected    Narrative:      Fact sheet for providers: https://www.fda.gov/media/261747/download    Fact sheet for patients: https://www.fda.gov/media/817782/download    Test performed by PCR.          [x]  Radiology  CT Chest With Contrast Diagnostic  Result Date: 5/17/2025  Impression: 1.Moderate to severe emphysema with stable areas of scarring in the right lower lobe and lingula. No acute intrathoracic abnormality. 2.The esophagus is poorly distended and there is an air debris level in the proximal esophagus at the level of the thoracic inlet, likely due to gastroesophageal reflux or possibly esophageal dysmotility. The stomach is distended and debris-filled. A distinct mass at the GE junction is not identified on this exam. Electronically Signed: Hansel Triplett DO  5/17/2025 6:04 PM EDT  Workstation ID: [id number]    XR Chest 1 View  Result Date: 5/16/2025  Impression: 1. No acute process. 2. Emphysema. Electronically Signed: Herson Hicks MD  5/16/2025 9:10 PM EDT  Workstation ID: [id number]    []  EKG/Telemetry   []  Cardiology/Vascular   []  Pathology  []  Old records  []  Other:    Assessment & Plan   Assessment / Plan     Assessment/Plan:  Acute on chronic hypoxic hypercapnic respiratory failure secondary to acute COPD exacerbation  Acute COPD exacerbation  Hypernatremia  Chronic pulmonary hypertension secondary COPD  Hypertension  Hyperlipidemia  Nonobstructive coronary artery disease  GERD  Active tobacco use    - Continue scheduled bronchodilators and oral prednisone.    - Continues azithromycin at home dose for COPD exacerbation suppression.  - Resumed oral Lasix for pulmonary hypertension  - Continue Daliresep and Yupelri/Pulmicort/Brovana  - Bipap at  night  -Patient counseled extensively on smoking cessation at bedside.  Nicotine patch ordered  - Case management consulted, patient will need BiPAP at home for discharge settings 14/5.    VTE Prophylaxis:  Pharmacologic VTE prophylaxis orders are present.        CODE STATUS:   Code Status (Patient has no pulse and is not breathing): No CPR (Do Not Attempt to Resuscitate)  Medical Interventions (Patient has pulse or is breathing): Limited Support  Medical Intervention Limits: No intubation (DNI)  Level Of Support Discussed With: Patient      Electronically signed by Parker Ramirez MD, 5/19/2025, 14:21 EDT.

## 2025-05-19 NOTE — PLAN OF CARE
Goal Outcome Evaluation:         Patient wore bipap 14/5 throughout the night. Patient is currently receiving 3L via nasal cannula per home regimen. Patient is tolerating well and resting comfortably.

## 2025-05-19 NOTE — PLAN OF CARE
Goal Outcome Evaluation:  Plan of Care Reviewed With: patient        Progress: improving  Outcome Evaluation: Patient alert and oriented throughout shift. VSS. No complaints of pain or discomfort noted. Patient up to bedside commode independently throughout shift. Patient has rested quietly throughout shift. Antibiotics administered. Overnight sleep study ordered for tonight. Plan of care discussed with patient. Continue with plan of care.

## 2025-05-19 NOTE — PROGRESS NOTES
Pulmonary / Critical Care Progress Note      Patient Name: Sumeet Gomes  : 1960  MRN: [id number]  Primary Care Physician:  Mirtha Alexander APRN  Date of admission: 2025    Subjective   Subjective   Follow-up for COPD with acute exacerbation, acute on chronic hypoxic and hypercapnic respiratory failure    Over past 24 hours: Remains on nebulizers such as Brovana, Pulmicort, Yupelri.  Continues with Daliresp..  Continues with Lasix 20 mg oral daily.  Currently on Solu-Medrol 40 mg every 8    No acute events overnight.     This morning,   Currently on 3 L nasal cannula  Resting in bed  Reports feeling some better  No fever or chills  Denies any chest pain chest tightness  Wheezing continues to improve  Wore NIPPV    Objective   Objective     Vitals:   Temp:  [97.5 °F (36.4 °C)-98.2 °F (36.8 °C)] 98.2 °F (36.8 °C)  Heart Rate:  [52-76] 59  Resp:  [18-23] 22  BP: ()/(61-74) 109/69  Flow (L/min) (Oxygen Therapy):  [3] 3  Physical Exam   Vital Signs Reviewed   General:  WDWN, Alert, NAD.    HEENT:  PERRL, EOMI.  OP, nares clear, no sinus tenderness  Neck:  Supple, no JVD, no thyromegaly  Chest:  good aeration, clear to auscultation bilaterally, tympanic to percussion bilaterally, no work of breathing noted  CV: RRR, no MGR, pulses 2+, equal.  Abd:  Soft, NT, ND, + BS, no HSM  EXT:  no clubbing, no cyanosis, no edema  Neuro:  A&Ox3, CN grossly intact, no focal deficits.  Skin: No rashes or lesions noted      Result Review    Result Review:  I have personally reviewed the results from the time of this admission to 2025 10:22 EDT and agree with these findings:  [x]  Laboratory  [x]  Microbiology  [x]  Radiology  [x]  EKG/Telemetry   [x]  Cardiology/Vascular   []  Pathology  []  Old records  []  Other:  Most notable findings include:         Lab 25  0500 25  0501 25  0307 05/1625   WBC 7.61 8.63 3.77  --  5.68   HEMOGLOBIN 12.3* 12.8* 13.9  --  15.6    HEMATOCRIT 39.7 41.3 46.0  --  52.2*   PLATELETS 108* 116* 117*  --  128*   SODIUM 138 141 140  --  144   POTASSIUM 4.7 4.3 5.0  --  4.8   CHLORIDE 95* 95* 92*  --  92*   CO2 39.1* 38.8* 39.7*  --  44.3*   BUN 19 21 16  --  13   CREATININE 0.45* 0.57* 0.59* 0.61 0.54*   GLUCOSE 129* 138* 120*  --  120*   CALCIUM 9.3 9.5 10.0  --  10.4   TOTAL PROTEIN  --   --   --   --  7.2   ALBUMIN  --   --   --   --  4.9   GLOBULIN  --   --   --   --  2.3       XR Chest 1 View  Result Date: 5/16/2025  XR CHEST 1 VW Date of Exam: 5/16/2025 8:40 PM EDT Indication: SOA Triage Protocol Comparison: Portable chest 5/12/2024 Findings: The cardiomediastinal silhouette is within normal limits. Lungs are clear. No focal consolidation, pneumothorax, or significant pleural effusion. Osseous structures grossly intact. Emphysema.     Impression: Impression: 1. No acute process. 2. Emphysema. Electronically Signed: Herson Hicks MD  5/16/2025 9:10 PM EDT  Workstation ID: [id number]      CT Chest With Contrast Diagnostic  Result Date: 5/17/2025  CT CHEST W CONTRAST DIAGNOSTIC Date of Exam: 5/17/2025 5:35 PM EDT Indication: resp failure, increased work of breathing. Comparison: Chest CT without contrast dated 5/13/2024 and single view chest radiograph from 5/16/2025 Technique: Axial CT images were obtained of the chest after the uneventful intravenous administration of iodinated contrast.  Reconstructed coronal and sagittal images were also obtained. Automated exposure control and iterative construction methods were  used. Findings: There is moderate to severe emphysema and stable linear areas of scarring in the right lower lobe and lingula. No suspicious pulmonary nodules or consolidations. No interstitial thickening or bronchial wall thickening. The central tracheobronchial tree is widely patent. No pleural or pericardial effusions or pneumothorax. No pathologically enlarged thoracic or axillary adenopathy. Heart size is within normal limits.  Coronary artery calcifications are present. The thoracic aorta is not aneurysmal. Pulmonary arteries are normal in caliber and well-opacified. There is a air debris level in the proximal esophagus at the level of the thoracic inlet. The esophagus is poorly distended. The stomach is distended and debris-filled. A distinct mass at the GE junction is not identified on this exam. Limited images of the upper abdomen are otherwise unremarkable except for partially visualized cyst in the right kidney. No acute bony abnormalities or aggressive appearing focal osseous lesions.     Impression: Impression: 1.Moderate to severe emphysema with stable areas of scarring in the right lower lobe and lingula. No acute intrathoracic abnormality. 2.The esophagus is poorly distended and there is an air debris level in the proximal esophagus at the level of the thoracic inlet, likely due to gastroesophageal reflux or possibly esophageal dysmotility. The stomach is distended and debris-filled. A distinct mass at the GE junction is not identified on this exam. Electronically Signed: Hansel Triplett DO  5/17/2025 6:04 PM EDT  Workstation ID: [id number]        Assessment & Plan   Assessment / Plan     Active Hospital Problems:  Active Hospital Problems    Diagnosis     **Acute on chronic respiratory failure with hypoxia and hypercapnia          Impression:   Severe COPD with exacerbation  Acute on chronic hypoxic and hypercapnic respiratory failure  CO2 narcosis  Pulmonary hypertension  Tobacco abuse  Plan:   NIPPV with naps and at nighttime  Continue Pulmicort and Brovana  Continue Yupelri  Daliresp daily  Taper steroids  Continue Lasix  Bronchopulmonary hygiene with incentive spirometry  Smoking cessation counseling  Pulmonary follow-up postdischarge   VTE Prophylaxis:  Pharmacologic VTE prophylaxis orders are present.    CODE STATUS:   Code Status (Patient has no pulse and is not breathing): No CPR (Do Not Attempt to Resuscitate)  Medical  Interventions (Patient has pulse or is breathing): Limited Support  Medical Intervention Limits: No intubation (DNI)  Level Of Support Discussed With: Patient    I personally reviewed pertinent labs, imaging and provider notes. Discussed with bedside nurse and will discuss with primary service.     Electronically signed by ZULY Ames, 5/19/2025, 10:22 EDT.  This visit was performed by both a physician and an APC.  I personally evaluated and examined the patient.  I performed all aspects of MDM as documented.  I have reviewed and confirmed the accuracy of the patient's history as documented in this note and I have reexamined the patient and the results are consistent with the previously documented exam.  I have updated the documentation as necessary. Electronically signed by Uriel Root MD, 05/19/25, 10:43 AM EDT.

## 2025-05-20 ENCOUNTER — TELEPHONE (OUTPATIENT)
Dept: FAMILY MEDICINE CLINIC | Age: 65
End: 2025-05-20
Payer: MEDICARE

## 2025-05-20 ENCOUNTER — READMISSION MANAGEMENT (OUTPATIENT)
Dept: CALL CENTER | Facility: HOSPITAL | Age: 65
End: 2025-05-20
Payer: MEDICARE

## 2025-05-20 VITALS
BODY MASS INDEX: 18.58 KG/M2 | HEIGHT: 67 IN | HEART RATE: 61 BPM | OXYGEN SATURATION: 98 % | DIASTOLIC BLOOD PRESSURE: 54 MMHG | WEIGHT: 118.39 LBS | SYSTOLIC BLOOD PRESSURE: 96 MMHG | TEMPERATURE: 98.1 F | RESPIRATION RATE: 18 BRPM

## 2025-05-20 LAB
ANION GAP SERPL CALCULATED.3IONS-SCNC: 2.2 MMOL/L (ref 5–15)
BASOPHILS # BLD AUTO: 0.01 10*3/MM3 (ref 0–0.2)
BASOPHILS NFR BLD AUTO: 0.2 % (ref 0–1.5)
BUN SERPL-MCNC: 20 MG/DL (ref 8–23)
BUN/CREAT SERPL: 35.7 (ref 7–25)
CALCIUM SPEC-SCNC: 8.8 MG/DL (ref 8.6–10.5)
CHLORIDE SERPL-SCNC: 97 MMOL/L (ref 98–107)
CO2 SERPL-SCNC: 40.8 MMOL/L (ref 22–29)
CREAT SERPL-MCNC: 0.56 MG/DL (ref 0.76–1.27)
DEPRECATED RDW RBC AUTO: 46.5 FL (ref 37–54)
EGFRCR SERPLBLD CKD-EPI 2021: 110.8 ML/MIN/1.73
EOSINOPHIL # BLD AUTO: 0 10*3/MM3 (ref 0–0.4)
EOSINOPHIL NFR BLD AUTO: 0 % (ref 0.3–6.2)
ERYTHROCYTE [DISTWIDTH] IN BLOOD BY AUTOMATED COUNT: 13.1 % (ref 12.3–15.4)
GLUCOSE SERPL-MCNC: 85 MG/DL (ref 65–99)
HCT VFR BLD AUTO: 39.7 % (ref 37.5–51)
HGB BLD-MCNC: 12 G/DL (ref 13–17.7)
IMM GRANULOCYTES # BLD AUTO: 0.02 10*3/MM3 (ref 0–0.05)
IMM GRANULOCYTES NFR BLD AUTO: 0.3 % (ref 0–0.5)
LYMPHOCYTES # BLD AUTO: 0.98 10*3/MM3 (ref 0.7–3.1)
LYMPHOCYTES NFR BLD AUTO: 17 % (ref 19.6–45.3)
MCH RBC QN AUTO: 29 PG (ref 26.6–33)
MCHC RBC AUTO-ENTMCNC: 30.2 G/DL (ref 31.5–35.7)
MCV RBC AUTO: 95.9 FL (ref 79–97)
MONOCYTES # BLD AUTO: 0.45 10*3/MM3 (ref 0.1–0.9)
MONOCYTES NFR BLD AUTO: 7.8 % (ref 5–12)
NEUTROPHILS NFR BLD AUTO: 4.29 10*3/MM3 (ref 1.7–7)
NEUTROPHILS NFR BLD AUTO: 74.7 % (ref 42.7–76)
NRBC BLD AUTO-RTO: 0 /100 WBC (ref 0–0.2)
PLATELET # BLD AUTO: 95 10*3/MM3 (ref 140–450)
PMV BLD AUTO: 10.5 FL (ref 6–12)
POTASSIUM SERPL-SCNC: 4.4 MMOL/L (ref 3.5–5.2)
RBC # BLD AUTO: 4.14 10*6/MM3 (ref 4.14–5.8)
SODIUM SERPL-SCNC: 140 MMOL/L (ref 136–145)
WBC NRBC COR # BLD AUTO: 5.75 10*3/MM3 (ref 3.4–10.8)

## 2025-05-20 PROCEDURE — 94799 UNLISTED PULMONARY SVC/PX: CPT

## 2025-05-20 PROCEDURE — 80048 BASIC METABOLIC PNL TOTAL CA: CPT | Performed by: STUDENT IN AN ORGANIZED HEALTH CARE EDUCATION/TRAINING PROGRAM

## 2025-05-20 PROCEDURE — 25010000002 ENOXAPARIN PER 10 MG: Performed by: STUDENT IN AN ORGANIZED HEALTH CARE EDUCATION/TRAINING PROGRAM

## 2025-05-20 PROCEDURE — 99232 SBSQ HOSP IP/OBS MODERATE 35: CPT | Performed by: INTERNAL MEDICINE

## 2025-05-20 PROCEDURE — 63710000001 REVEFENACIN 175 MCG/3ML SOLUTION: Performed by: STUDENT IN AN ORGANIZED HEALTH CARE EDUCATION/TRAINING PROGRAM

## 2025-05-20 PROCEDURE — 94761 N-INVAS EAR/PLS OXIMETRY MLT: CPT

## 2025-05-20 PROCEDURE — 85025 COMPLETE CBC W/AUTO DIFF WBC: CPT | Performed by: STUDENT IN AN ORGANIZED HEALTH CARE EDUCATION/TRAINING PROGRAM

## 2025-05-20 PROCEDURE — 94664 DEMO&/EVAL PT USE INHALER: CPT

## 2025-05-20 PROCEDURE — 99239 HOSP IP/OBS DSCHRG MGMT >30: CPT | Performed by: FAMILY MEDICINE

## 2025-05-20 PROCEDURE — 63710000001 PREDNISONE PER 1 MG: Performed by: STUDENT IN AN ORGANIZED HEALTH CARE EDUCATION/TRAINING PROGRAM

## 2025-05-20 RX ORDER — PREDNISONE 10 MG/1
TABLET ORAL
Qty: 30 TABLET | Refills: 0 | Status: SHIPPED | OUTPATIENT
Start: 2025-05-20 | End: 2025-06-02

## 2025-05-20 RX ORDER — UMECLIDINIUM 62.5 UG/1
1 AEROSOL, POWDER ORAL DAILY
Qty: 30 EACH | Refills: 0 | Status: SHIPPED | OUTPATIENT
Start: 2025-05-20 | End: 2025-06-02 | Stop reason: SDUPTHER

## 2025-05-20 RX ORDER — NICOTINE 21 MG/24HR
1 PATCH, TRANSDERMAL 24 HOURS TRANSDERMAL EVERY 24 HOURS
Qty: 28 PATCH | Refills: 0 | Status: SHIPPED | OUTPATIENT
Start: 2025-05-21 | End: 2025-06-18

## 2025-05-20 RX ADMIN — ARFORMOTEROL TARTRATE 15 MCG: 15 SOLUTION RESPIRATORY (INHALATION) at 06:35

## 2025-05-20 RX ADMIN — PREDNISONE 40 MG: 20 TABLET ORAL at 09:20

## 2025-05-20 RX ADMIN — ENOXAPARIN SODIUM 40 MG: 40 INJECTION SUBCUTANEOUS at 09:20

## 2025-05-20 RX ADMIN — PANTOPRAZOLE SODIUM 40 MG: 40 TABLET, DELAYED RELEASE ORAL at 05:59

## 2025-05-20 RX ADMIN — REVEFENACIN 175 MCG: 175 SOLUTION RESPIRATORY (INHALATION) at 06:36

## 2025-05-20 RX ADMIN — NICOTINE 1 PATCH: 21 PATCH, EXTENDED RELEASE TRANSDERMAL at 09:20

## 2025-05-20 RX ADMIN — Medication 10 ML: at 09:20

## 2025-05-20 RX ADMIN — BUDESONIDE 0.5 MG: 0.5 SUSPENSION RESPIRATORY (INHALATION) at 06:36

## 2025-05-20 RX ADMIN — ROFLUMILAST 500 MCG: 500 TABLET ORAL at 09:20

## 2025-05-20 RX ADMIN — AZITHROMYCIN DIHYDRATE 250 MG: 250 TABLET ORAL at 09:20

## 2025-05-20 NOTE — DISCHARGE INSTR - OTHER ORDERS
Your prescription for Arformoterol and Budesonide nebulizer medications are being sent to [facility] pharmacy. They will mail these nebulizer medications to you when you discharge from the hospital. Continue your current maintenance inhaler until you receive these medications. If you have questions call Bailey @ [phone].    Green Zone: I am doing well today Actions  Nebulizer Arformoterol and Budesonide every 12 hours and Incruse once daily  Use rescue Albuterol mdi/nebulizer as needed  Use oxygen as prescribed  Use NIPPV with all sleep and naps  Yellow Zone: I am having a bad day or a COPD flare Actions  Continue Arformoterol, Budesonide and Incruse as described above   Use Albuterol every 4 hours, until back in the green zone  Use oxygen as prescribed  Use NIPPV with all sleep and naps and as needed for shortness of breath  Call provider immediately if symptoms do not improve  Red Zone: I need urgent medical care Actions  Call 911 or seek medical care immediately

## 2025-05-20 NOTE — PLAN OF CARE
Goal Outcome Evaluation:      Pt did not wear BIPAP due to sleep study being performed. Pt stated he may wear during day today while asleep. Pt is on 3L nc throughout sleep study.

## 2025-05-20 NOTE — PLAN OF CARE
Goal Outcome Evaluation:      Patient did not wear bipap last night due to sleep study. Patient is currently receiving 3L via nasal cannula per home regimen. Patient is tolerating well and resting comfortably

## 2025-05-20 NOTE — DISCHARGE SUMMARY
[facility]         HOSPITALIST  DISCHARGE SUMMARY    Patient Name: Sumeet Gomes  : 1960  MRN: [id number]    Date of Admission: 2025  Date of Discharge: 2025  Primary Care Physician: Mirtha Alexander APRN    Consults       Date and Time Order Name Status Description    2025  3:15 PM Inpatient Pulmonology Consult      2025 10:14 PM Hospitalist (on-call MD unless specified)              Active and Resolved Hospital Problems:  Acute on chronic hypoxic and hypercapnic respiratory failure secondary to acute COPD exacerbation  Acute COPD exacerbation  Hypernatremia  Chronic pulmonary hypertension secondary COPD  Hypertension  Hyperlipidemia  Nonobstructive coronary artery disease  GERD  Active tobacco use  Active Hospital Problems    Diagnosis POA    **Acute on chronic respiratory failure with hypoxia and hypercapnia [J96.21, J96.22] Yes      Resolved Hospital Problems   No resolved problems to display.       Hospital Course     Hospital Course:  Sumeet Gomes is a 64 y.o. male past medical history of hypertension, hyperlipidemia, COPD on 3 L nasal cannula, pulmonary hypertension, CAD, GERD who presented to the ER due to worsening shortness of breath.  Patient is a relatively poor historian history [place] by discussion with family at bedside.  Appears patient has been taking all his medications for known end-stage COPD/asthma overlap syndrome without any changes lately but in the last 1 to 2 weeks he has had progressive worsening in his exertional dyspnea to the point that in the last 2 days he has been minimally interactive or moving around due to his dyspnea.  In that time he is also had some increasing lethargy but been refusing to come to the ER for evaluation.  Ultimately today he was so fatigued that his wife was finally able to convince him to come to the ER     Upon arrival here he was found to be tachypneic and hypoxic requiring quick escalation of his  oxygen up to Venturi mask and eventually an ABG returned showing acute hypoxic hypercapnic respiratory failure with a PCO2 of 90 and pH of 7.29.  Chest x-ray was unremarkable.  Patient was placed on BiPAP with rapid improvement in the hospitalist service and contacted for admission.  Patient was given Solu-Medrol 125 and a DuoNeb in the ER.  At the time my exam patient is resting comfortably on the BiPAP and pulling volumes anywhere from 300-400.  He is able to talk in complete sentences while on the BiPAP and states he feels significantly improved.  Patient admitted for further evaluation and treatment.  Pulmonology consulted.  Continued on inhaled bronchodilators systemic steroids.  Respiratory function slowly improved back to baseline.  Respiratory case management arranged for NIPPV for home.  Patient seen and evaluated on date of discharge and discussed with pulmonology and thought stable for discharge home with NIPPV to follow-up with his primary care provider and pulmonology as an outpatient.  Patient to discharge on all formoterol, budesonide nebs as well as Incruse Ellipta  as arranged by respiratory case management instead of Yupelri nebs due to high co-pay.        DISCHARGE Follow Up Recommendations for labs and diagnostics: As above      Day of Discharge     Vital Signs:  Temp:  [97.7 °F (36.5 °C)-98.4 °F (36.9 °C)] 98.1 °F (36.7 °C)  Heart Rate:  [45-65] 61  Resp:  [18-22] 18  BP: ()/(54-65) 96/54  Flow (L/min) (Oxygen Therapy):  [3] 3  Physical Exam:   Gen. well-developed appearing stated age thin frail appearing muscle wasting bilateral upper and lower extremities in no acute distress  HEENT: Normocephalic atraumatic moist membranes pupils equal round reactive light, no scleral icterus no conjunctival injection  Cardiovascular: regular rate and rhythm no murmurs rubs or gallops S1-S2, no lower extremity edema appreciated  Pulmonary: Clear to auscultation bilaterally no wheezes rales or rhonchi  symmetric chest expansion, unlabored, no conversational dyspnea appreciated, increased AP diameter  Gastrointestinal: Soft nontender nondistended positive bowel sounds all 4 quadrants no rebound or guarding  Musculoskeletal: No clubbing cyanosis, warm and well-perfused, calves soft symmetric nontender bilaterally  Skin: Clean dry without rashs  Neuro: Cranial nerves II through XII intact grossly no sensorimotor deficits appreciated bilateral upper and lower extremities  Psych: Patient is calm cooperative and appropriate with exam not responding to internal stimuli  : No Sharpe catheter no bladder distention no suprapubic tenderness      Discharge Details        Discharge Medications        New Medications        Instructions Start Date   Incruse Ellipta 62.5 MCG/ACT aerosol powder   Generic drug: Umeclidinium Bromide   1 puff, Inhalation, Daily      nicotine 21 MG/24HR patch  Commonly known as: NICODERM CQ   1 patch, Transdermal, Every 24 Hours   Start Date: May 21, 2025     predniSONE 10 MG tablet  Commonly known as: DELTASONE   Take 4 tablets by mouth Daily for 3 days, THEN 3 tablets Daily for 3 days, THEN 2 tablets Daily for 3 days, THEN 1 tablet Daily for 3 days.   Start Date: May 20, 2025            Continue These Medications        Instructions Start Date   albuterol sulfate  (90 Base) MCG/ACT inhaler  Commonly known as: PROVENTIL HFA;VENTOLIN HFA;PROAIR HFA   2 puffs, Inhalation, Every 4 Hours PRN      arformoterol 15 MCG/2ML nebulizer solution  Commonly known as: Brovana   15 mcg, Nebulization, 2 Times Daily - RT      azithromycin 250 MG tablet  Commonly known as: ZITHROMAX   250 mg, Oral, Daily      budesonide 0.5 MG/2ML nebulizer solution  Commonly known as: Pulmicort   0.5 mg, Nebulization, 2 Times Daily      furosemide 20 MG tablet  Commonly known as: LASIX   40 mg, Oral, Daily      metoprolol tartrate 25 MG tablet  Commonly known as: LOPRESSOR   TAKE 1/2 TABLET BY MOUTH TWICE A DAY      nystatin  100,000 unit/mL suspension  Commonly known as: MYCOSTATIN   500,000 Units, Oral, 4 Times Daily      pantoprazole 40 MG EC tablet  Commonly known as: PROTONIX   40 mg, Oral, Every Morning      roflumilast 500 MCG tablet tablet  Commonly known as: DALIRESP   500 mcg, Oral, Daily             Stop These Medications      ipratropium-albuterol 0.5-2.5 mg/3 ml nebulizer  Commonly known as: DUO-NEB     Yupelri 175 MCG/3ML nebulizer solution  Generic drug: revefenacin              No Known Allergies    Discharge Disposition:  Home or Self Care    Diet:  Hospital:  Diet Order   Procedures    Diet: Cardiac; Healthy Heart (2-3 Na+); Fluid Consistency: Thin (IDDSI 0)       Discharge Activity:   Activity Instructions       Gradually Increase Activity Until at Pre-Hospitalization Level              CODE STATUS:  Code Status and Medical Interventions: No CPR (Do Not Attempt to Resuscitate); Limited Support; No intubation (DNI)   Ordered at: 05/16/25 2308     Code Status (Patient has no pulse and is not breathing):    No CPR (Do Not Attempt to Resuscitate)     Medical Interventions (Patient has pulse or is breathing):    Limited Support     Medical Intervention Limits:    No intubation (DNI)     Level Of Support Discussed With:    Patient         Future Appointments   Date Time Provider Department Center   8/29/2025  2:45 PM Alreen Gillespie APRN [facility]   8/25/2026  1:15 PM Mart Miller MD [facility]       Additional Instructions for the Follow-ups that You Need to Schedule       Discharge Follow-up with PCP   As directed       Currently Documented PCP:    Mirtha Alexander APRN    PCP Phone Number:    [phone]     Follow Up Details: HOSPITAL DISCHARGE FOLLOW UP 1-2 WEEKS                Pertinent  and/or Most Recent Results     PROCEDURES:   None    LAB RESULTS:      Lab 05/20/25  0452 05/19/25  0500 05/18/25  0501 05/17/25  0307 05/16/25 2155 05/16/25 2122 05/16/25 2028   WBC 5.75 7.61 8.63 3.77  --    --  5.68   HEMOGLOBIN 12.0* 12.3* 12.8* 13.9  --   --  15.6   HEMATOCRIT 39.7 39.7 41.3 46.0  --   --  52.2*   PLATELETS 95* 108* 116* 117*  --   --  128*   NEUTROS ABS 4.29 6.88 8.07* 3.46  --   --  4.22   IMMATURE GRANS (ABS) 0.02 0.03 0.02 0.00  --   --  0.01   LYMPHS ABS 0.98 0.37* 0.27* 0.28*  --   --  0.91   MONOS ABS 0.45 0.32 0.26 0.03*  --   --  0.46   EOS ABS 0.00 0.00 0.00 0.00  --   --  0.05   MCV 95.9 94.3 94.3 96.2  --   --  98.5*   PROCALCITONIN  --   --   --   --  0.02  --   --    LACTATE  --   --   --   --   --  0.6 0.7         Lab 05/20/25  0452 05/19/25  0500 05/18/25  0501 05/17/25  0307 05/16/25 2122 05/16/25 2028   SODIUM 140 138 141 140  --  144   POTASSIUM 4.4 4.7 4.3 5.0  --  4.8   CHLORIDE 97* 95* 95* 92*  --  92*   CO2 40.8* 39.1* 38.8* 39.7*  --  44.3*   ANION GAP 2.2* 3.9* 7.2 8.3  --  7.7   BUN 20 19 21 16  --  13   CREATININE 0.56* 0.45* 0.57* 0.59* 0.61 0.54*   EGFR 110.8 118.3 110.2 109.0  --  112.0   GLUCOSE 85 129* 138* 120*  --  120*   CALCIUM 8.8 9.3 9.5 10.0  --  10.4   MAGNESIUM  --  2.0 1.9 1.8  --   --          Lab 05/16/25 2028   TOTAL PROTEIN 7.2   ALBUMIN 4.9   GLOBULIN 2.3   ALT (SGPT) 23   AST (SGOT) 36   BILIRUBIN 0.7   ALK PHOS 126*         Lab 05/16/25 2155 05/16/25  2028   PROBNP  --  1,144.0*   HSTROP T 14 15                 Lab 05/18/25  0739 05/17/25  0650 05/16/25 2122   PH, ARTERIAL 7.410 7.395 7.298*   PCO2, ARTERIAL 66.2* 74.6* 90.0*   PO2 ART 99.6 70.8* 63.1*   O2 SATURATION ART 97.4 92.8* 87.2*   FIO2 40 30 32   HCO3 ART 42.0* 45.6* 44.0*   BASE EXCESS ART 14.2* 16.7* 12.9*     Brief Urine Lab Results       None          Microbiology Results (last 10 days)       Procedure Component Value - Date/Time    Respiratory Culture - Sputum, Cough [[id number]] Collected: 05/18/25 1624    Lab Status: Final result Specimen: Sputum from Cough Updated: 05/18/25 1823     Respiratory Culture Rejected     Gram Stain Moderate (3+) Epithelial cells per low power field       Rare (1+) WBCs per low power field      Moderate (3+) Gram positive cocci in pairs, chains and clusters      Rare (1+) Fungal elements    Narrative:      Specimen rejected due to oropharyngeal contamination. Please reorder and recollect specimen if clinically necessary.    Respiratory Culture - Sputum, Cough [[id number]] Collected: 05/18/25 0829    Lab Status: Final result Specimen: Sputum from Cough Updated: 05/18/25 1300     Respiratory Culture Rejected     Gram Stain Few (2+) Epithelial cells per low power field      Rare (1+) WBCs per low power field      Few (2+) Gram positive cocci in pairs, chains and clusters    Narrative:      Specimen rejected due to oropharyngeal contamination. Please reorder and recollect specimen if clinically necessary.    S. Pneumo Ag Urine or CSF - Urine, Urine, Clean Catch [[id number]]  (Normal) Collected: 05/17/25 1809    Lab Status: Final result Specimen: Urine, Clean Catch Updated: 05/17/25 1923     Strep Pneumo Ag Negative    Legionella Antigen, Urine - Urine, Urine, Clean Catch [[id number]]  (Normal) Collected: 05/17/25 1809    Lab Status: Final result Specimen: Urine, Clean Catch Updated: 05/17/25 1923     LEGIONELLA ANTIGEN, URINE Negative    MRSA Screen, PCR (Inpatient) - Swab, Nares [[id number]]  (Normal) Collected: 05/17/25 1747    Lab Status: Final result Specimen: Swab from Nares Updated: 05/17/25 1948     MRSA PCR No MRSA Detected    Narrative:      The negative predictive value of this diagnostic test is high and should only be used to consider de-escalating anti-MRSA therapy. A positive result may indicate colonization with MRSA and must be correlated clinically.    COVID PRE-OP / PRE-PROCEDURE SCREENING ORDER (NO ISOLATION) - Swab, Nasopharynx [[id number]]  (Normal) Collected: 05/16/25 2250    Lab Status: Final result Specimen: Swab from Nasopharynx Updated: 05/16/25 2343    Narrative:      The following orders were created for panel order COVID PRE-OP / PRE-PROCEDURE  SCREENING ORDER (NO ISOLATION) - Swab, Nasopharynx.  Procedure                               Abnormality         Status                     ---------                               -----------         ------                     COVID-19, FLU A/B, RSV P...[[id number]]  Normal              Final result                 Please view results for these tests on the individual orders.    COVID-19, FLU A/B, RSV PCR 1 HR TAT - Swab, Nasopharynx [[id number]]  (Normal) Collected: 05/16/25 2250    Lab Status: Final result Specimen: Swab from Nasopharynx Updated: 05/16/25 7106     COVID19 Not Detected     Influenza A PCR Not Detected     Influenza B PCR Not Detected     RSV, PCR Not Detected    Narrative:      Fact sheet for providers: https://www.fda.gov/media/667595/download    Fact sheet for patients: https://www.fda.gov/media/319822/download    Test performed by PCR.            CT Chest With Contrast Diagnostic  Result Date: 5/17/2025  Impression: Impression: 1.Moderate to severe emphysema with stable areas of scarring in the right lower lobe and lingula. No acute intrathoracic abnormality. 2.The esophagus is poorly distended and there is an air debris level in the proximal esophagus at the level of the thoracic inlet, likely due to gastroesophageal reflux or possibly esophageal dysmotility. The stomach is distended and debris-filled. A distinct mass at the GE junction is not identified on this exam. Electronically Signed: Hansel Triplett DO  5/17/2025 6:04 PM EDT  Workstation ID: [id number]    XR Chest 1 View  Result Date: 5/16/2025  Impression: Impression: 1. No acute process. 2. Emphysema. Electronically Signed: Herson Hicks MD  5/16/2025 9:10 PM EDT  Workstation ID: [id number]              Results for orders placed during the hospital encounter of 12/04/23    Adult Transthoracic Echo Complete W/ Cont if Necessary Per Protocol    Interpretation Summary    Left ventricular systolic function is normal. Estimated left ventricular EF  = 55%    Left ventricular diastolic function was normal.    The right ventricular cavity is moderately dilated.    The right atrial cavity is moderately  dilated.    Estimated right ventricular systolic pressure from tricuspid regurgitation is moderately elevated (45-55 mmHg).      Labs Pending at Discharge:        Time spent on Discharge including face to face service: Greater than 35 minutes    Electronically signed by Paco Covington MD, 05/20/25, 2:20 PM EDT.

## 2025-05-20 NOTE — TELEPHONE ENCOUNTER
[facility] called to schedule this pt a hospital follow up. He was seen for acute resp. Failure and is being discharged on 5/20/2025. I have him scheduled for 6/2 at 2:30 PM with Benjamin.

## 2025-05-20 NOTE — CASE MANAGEMENT/SOCIAL WORK
Discussion of the COPD zones (Green/Yellow/Red) was completed with emphasizes on what to do in the yellow and red zones. COPD action plan was reviewed with instruction on rescue and maintenance medications, the function of each and the importance of using them as prescribed. Home oxygen prescription was also reviewed with patient. Mr. Gomes was instructed to use NIPPV with all sleep and naps. Smoking cessation, enrollment in 1.800.quit now and [employer] participation encouraged. Pulmonary/COPD clinic follow up scheduled for 6/02.

## 2025-05-20 NOTE — PROGRESS NOTES
RT EQUIPMENT DEVICE RELATED - SKIN ASSESSMENT    RT Medical Equipment/Device:  NIV Mask: Under-the-nose  size: B    Skin Assessment: Cheek: Drain/device(s), Chin: Intact, Nares: Intact, Nose: Intact, Lips: Intact, and Mouth: Intact    Device Skin Pressure Protection: Positioning supports utilized    Nurse Notification: Akua Craft, RRT

## 2025-05-20 NOTE — PLAN OF CARE
Goal Outcome Evaluation:           Progress: improving  Outcome Evaluation: Patient alert and oriented x4 on 3L nasal cannula. Patient completing an overnight sleep study this shift. Pain treated per MAR. Patient up ad derek to bsc. Patient sleeping between care.

## 2025-05-20 NOTE — CONSULTS
Transition of Care Pharmacist Consult Note:     Consulted for evaluation of cost of Yupelri on prescription insurance benefit.     Sample claims ran on 5/20/2025:  Yupelri: $316.91 for 30-DS on part B (not covered under part D)  Budesonide: $126.48 for 30-DS on part B (not covered under part D)  Arformoterol: $98.16 for 30-DS on part B (not covered under part D)  Spiriva Respimat and Spiriva Handihaler: non-formulary on part D -- will cover one time fill only for $12.15  Incruse Ellipta: $12.15 per month on part D plan    Please contact me at [phone] for any further needs or questions.  Thank you for the consult.     Fabi Looney RPH  Transition of Care Pharmacist

## 2025-05-20 NOTE — PROGRESS NOTES
RT EQUIPMENT DEVICE RELATED - SKIN ASSESSMENT    Pt did not wear BIPAP tonight due to sleep study being performed.     Monik Lobato, RRT

## 2025-05-20 NOTE — OUTREACH NOTE
Prep Survey      Flowsheet Row Responses   [facility] discharged from? [facility]   Is LACE score < 7 ? No   Eligibility [facility]   Date of Admission 05/16/25   Date of Discharge 05/20/25   Discharge Disposition Home or Self Care   Discharge diagnosis Acute on chronic respiratory failure with hypoxia and hypercapnia   Does the patient have one of the following disease processes/diagnoses(primary or secondary)? COPD   Does the patient have Home health ordered? Yes   What is the Home health agency?  [facility], Meds send to [facility]   Medication alerts for this patient see avs--meds per [facility] due to cost   Prep survey completed? Yes            Denae TENORIO - Registered Nurse

## 2025-05-20 NOTE — CONSULTS
Mr. Gomes did not have 5 minutes of desaturation during overnight pulse oximetry study performed on his home liter flow.  Patient will still meet CMS criteria for noninvasive ventilator (Astral/Miley/Vpro) with the following verbiage:    Mr. Gomes will need noninvasive ventilation for acute on chronic hypercapnic respiratory failure (CO2 90) secondary to severe COPD, FEV1 18% of predicted. Bilevel, Bilevel ST, ASV and Bilevel with VAPS have been tried/considered and ruled out. The patient requires volume targeted mode of ventilation that is not available on less costly bilevel devices. A noninvasive ventilator is necessary to deliver targeted tidal/minute ventilation and provide a back up rate to ensure adequate ventilation and prevent hypoventilation. Alarms and battery backup (only available on noninvasive ventilator) are necessary for patient safety. Volume assured noninvasive ventilation for home use is crucial to decrease hospitalization and improve survival. Failure to provide NIV in the home will place the patient at increased risk of unplanned expensive rehospitalization, acute on chronic respiratory failure and mortality.

## 2025-05-21 ENCOUNTER — TRANSITIONAL CARE MANAGEMENT TELEPHONE ENCOUNTER (OUTPATIENT)
Dept: CALL CENTER | Facility: HOSPITAL | Age: 65
End: 2025-05-21
Payer: MEDICARE

## 2025-05-21 NOTE — OUTREACH NOTE
Call Center TCM Note      Flowsheet Row Responses   [facility] patient discharged from? [facility]   Does the patient have one of the following disease processes/diagnoses(primary or secondary)? COPD   TCM attempt successful? Yes   Call start time 1302   Call end time 1305   Discharge diagnosis Acute on chronic hypoxic and hypercapnic respiratory failure secondary to acute COPD exacerbation   Person spoke with today (if not patient) and relationship Patient   Meds reviewed with patient/caregiver? Yes   Does the patient have all medications ordered at discharge? Yes  [Patient reports wife picking up some meds today from pharmacy]   Is the patient taking all medications as directed (includes completed medication regime)? Yes   Comments PCP Mirtha CARUSO. Hospital follow up appt in place for 6/2  230pm with PCP   Does the patient have an appointment with their PCP within 7-14 days of discharge? Yes   Has home health visited the patient within 72 hours of discharge? N/A   Has all DME been delivered? Yes   DME comments Patient reports wearing O23L. Has home nebulizer.   Psychosocial issues? No   Did the patient receive a copy of their discharge instructions? Yes   Nursing interventions Reviewed instructions with patient   What is the patient's perception of their health status since discharge? Improving   If the patient is a current smoker, are they able to teach back resources for cessation? Smoking cessation medications  [Nicotine patches ordered at discharge.]   Is the patient/caregiver able to teach back the hierarchy of who to call/visit for symptoms/problems? PCP, Specialist, Home health nurse, Urgent Care, ED, 911 Yes   TCM call completed? Yes   Call end time 1305   Would this patient benefit from a Referral to Amb Social Work? No   Is the patient interested in additional calls from an ambulatory ? No            ANGELICA CLAIRE - Registered Nurse    5/21/2025, 13:08 EDT

## 2025-06-02 ENCOUNTER — LAB (OUTPATIENT)
Dept: LAB | Facility: HOSPITAL | Age: 65
End: 2025-06-02
Payer: MEDICARE

## 2025-06-02 ENCOUNTER — HOSPITAL ENCOUNTER (OUTPATIENT)
Facility: HOSPITAL | Age: 65
Discharge: HOME | End: 2025-06-02
Payer: MEDICARE

## 2025-06-02 ENCOUNTER — READMISSION MANAGEMENT (OUTPATIENT)
Dept: CALL CENTER | Facility: HOSPITAL | Age: 65
End: 2025-06-02
Payer: MEDICARE

## 2025-06-02 ENCOUNTER — OFFICE VISIT (OUTPATIENT)
Dept: FAMILY MEDICINE CLINIC | Age: 65
End: 2025-06-02
Payer: MEDICARE

## 2025-06-02 VITALS
DIASTOLIC BLOOD PRESSURE: 66 MMHG | WEIGHT: 118 LBS | HEART RATE: 73 BPM | HEIGHT: 67 IN | OXYGEN SATURATION: 95 % | TEMPERATURE: 98.2 F | SYSTOLIC BLOOD PRESSURE: 103 MMHG | BODY MASS INDEX: 18.52 KG/M2

## 2025-06-02 VITALS
OXYGEN SATURATION: 93 % | HEART RATE: 70 BPM | SYSTOLIC BLOOD PRESSURE: 109 MMHG | DIASTOLIC BLOOD PRESSURE: 69 MMHG | WEIGHT: 118 LBS | BODY MASS INDEX: 18.52 KG/M2 | TEMPERATURE: 97.6 F | HEIGHT: 67 IN | RESPIRATION RATE: 16 BRPM

## 2025-06-02 DIAGNOSIS — Z87.891 HISTORY OF TOBACCO ABUSE: ICD-10-CM

## 2025-06-02 DIAGNOSIS — J96.22 ACUTE ON CHRONIC RESPIRATORY FAILURE WITH HYPOXIA AND HYPERCAPNIA: ICD-10-CM

## 2025-06-02 DIAGNOSIS — Z72.0 TOBACCO ABUSE: ICD-10-CM

## 2025-06-02 DIAGNOSIS — J96.21 ACUTE ON CHRONIC RESPIRATORY FAILURE WITH HYPOXIA AND HYPERCAPNIA: ICD-10-CM

## 2025-06-02 DIAGNOSIS — I10 ESSENTIAL HYPERTENSION: ICD-10-CM

## 2025-06-02 DIAGNOSIS — D69.6 THROMBOCYTOPENIA: ICD-10-CM

## 2025-06-02 DIAGNOSIS — I27.20 PULMONARY HYPERTENSION: ICD-10-CM

## 2025-06-02 DIAGNOSIS — J96.01 ACUTE RESPIRATORY FAILURE WITH HYPOXIA AND HYPERCAPNIA: Primary | ICD-10-CM

## 2025-06-02 DIAGNOSIS — J44.1 COPD EXACERBATION: ICD-10-CM

## 2025-06-02 DIAGNOSIS — J43.9 PULMONARY EMPHYSEMA, UNSPECIFIED EMPHYSEMA TYPE: ICD-10-CM

## 2025-06-02 DIAGNOSIS — J96.02 ACUTE RESPIRATORY FAILURE WITH HYPOXIA AND HYPERCAPNIA: Primary | ICD-10-CM

## 2025-06-02 DIAGNOSIS — J96.11 CHRONIC HYPOXIC RESPIRATORY FAILURE: Primary | ICD-10-CM

## 2025-06-02 LAB
ALBUMIN SERPL-MCNC: 4.3 G/DL (ref 3.5–5.2)
ALBUMIN/GLOB SERPL: 2.3 G/DL
ALP SERPL-CCNC: 105 U/L (ref 39–117)
ALT SERPL W P-5'-P-CCNC: 40 U/L (ref 1–41)
ANION GAP SERPL CALCULATED.3IONS-SCNC: 7 MMOL/L (ref 5–15)
AST SERPL-CCNC: 39 U/L (ref 1–40)
BASOPHILS # BLD AUTO: 0.03 10*3/MM3 (ref 0–0.2)
BASOPHILS NFR BLD AUTO: 0.5 % (ref 0–1.5)
BILIRUB SERPL-MCNC: 0.4 MG/DL (ref 0–1.2)
BUN SERPL-MCNC: 16 MG/DL (ref 8–23)
BUN/CREAT SERPL: 18.4 (ref 7–25)
CALCIUM SPEC-SCNC: 9.9 MG/DL (ref 8.6–10.5)
CHLORIDE SERPL-SCNC: 97 MMOL/L (ref 98–107)
CO2 SERPL-SCNC: 40 MMOL/L (ref 22–29)
CREAT SERPL-MCNC: 0.87 MG/DL (ref 0.76–1.27)
DEPRECATED RDW RBC AUTO: 47.8 FL (ref 37–54)
EGFRCR SERPLBLD CKD-EPI 2021: 97 ML/MIN/1.73
EOSINOPHIL # BLD AUTO: 0.09 10*3/MM3 (ref 0–0.4)
EOSINOPHIL NFR BLD AUTO: 1.4 % (ref 0.3–6.2)
ERYTHROCYTE [DISTWIDTH] IN BLOOD BY AUTOMATED COUNT: 13.1 % (ref 12.3–15.4)
GLOBULIN UR ELPH-MCNC: 1.9 GM/DL
GLUCOSE SERPL-MCNC: 94 MG/DL (ref 65–99)
HCT VFR BLD AUTO: 48.2 % (ref 37.5–51)
HGB BLD-MCNC: 14.4 G/DL (ref 13–17.7)
IMM GRANULOCYTES # BLD AUTO: 0 10*3/MM3 (ref 0–0.05)
IMM GRANULOCYTES NFR BLD AUTO: 0 % (ref 0–0.5)
LYMPHOCYTES # BLD AUTO: 1.53 10*3/MM3 (ref 0.7–3.1)
LYMPHOCYTES NFR BLD AUTO: 24.1 % (ref 19.6–45.3)
MCH RBC QN AUTO: 29 PG (ref 26.6–33)
MCHC RBC AUTO-ENTMCNC: 29.9 G/DL (ref 31.5–35.7)
MCV RBC AUTO: 97.2 FL (ref 79–97)
MONOCYTES # BLD AUTO: 0.49 10*3/MM3 (ref 0.1–0.9)
MONOCYTES NFR BLD AUTO: 7.7 % (ref 5–12)
NEUTROPHILS NFR BLD AUTO: 4.21 10*3/MM3 (ref 1.7–7)
NEUTROPHILS NFR BLD AUTO: 66.3 % (ref 42.7–76)
PLATELET # BLD AUTO: 136 10*3/MM3 (ref 140–450)
PMV BLD AUTO: 9.8 FL (ref 6–12)
POTASSIUM SERPL-SCNC: 4.5 MMOL/L (ref 3.5–5.2)
PROT SERPL-MCNC: 6.2 G/DL (ref 6–8.5)
RBC # BLD AUTO: 4.96 10*6/MM3 (ref 4.14–5.8)
SODIUM SERPL-SCNC: 144 MMOL/L (ref 136–145)
WBC NRBC COR # BLD AUTO: 6.35 10*3/MM3 (ref 3.4–10.8)

## 2025-06-02 PROCEDURE — 3078F DIAST BP <80 MM HG: CPT | Performed by: NURSE PRACTITIONER

## 2025-06-02 PROCEDURE — 1111F DSCHRG MED/CURRENT MED MERGE: CPT | Performed by: NURSE PRACTITIONER

## 2025-06-02 PROCEDURE — 80053 COMPREHEN METABOLIC PANEL: CPT

## 2025-06-02 PROCEDURE — 85025 COMPLETE CBC W/AUTO DIFF WBC: CPT

## 2025-06-02 PROCEDURE — 1159F MED LIST DOCD IN RCRD: CPT | Performed by: NURSE PRACTITIONER

## 2025-06-02 PROCEDURE — 99495 TRANSJ CARE MGMT MOD F2F 14D: CPT | Performed by: NURSE PRACTITIONER

## 2025-06-02 PROCEDURE — 99214 OFFICE O/P EST MOD 30 MIN: CPT | Performed by: NURSE PRACTITIONER

## 2025-06-02 PROCEDURE — 36415 COLL VENOUS BLD VENIPUNCTURE: CPT

## 2025-06-02 PROCEDURE — G0463 HOSPITAL OUTPT CLINIC VISIT: HCPCS

## 2025-06-02 PROCEDURE — 1160F RVW MEDS BY RX/DR IN RCRD: CPT | Performed by: NURSE PRACTITIONER

## 2025-06-02 PROCEDURE — 3074F SYST BP LT 130 MM HG: CPT | Performed by: NURSE PRACTITIONER

## 2025-06-02 PROCEDURE — 1126F AMNT PAIN NOTED NONE PRSNT: CPT | Performed by: NURSE PRACTITIONER

## 2025-06-02 RX ORDER — UMECLIDINIUM 62.5 UG/1
1 AEROSOL, POWDER ORAL DAILY
Qty: 30 EACH | Refills: 11 | Status: SHIPPED | OUTPATIENT
Start: 2025-06-02

## 2025-06-02 NOTE — ASSESSMENT & PLAN NOTE
Reviewed his last hem/onc appt from 6-, repeat his CBC, after reviewing his recent hospital labs and past labs   Orders:    CBC w AUTO Differential; Future

## 2025-06-02 NOTE — PROGRESS NOTES
Transitional Care Follow Up Visit  Subjective     Sumeet Gomes is a 64 y.o. male who presents for a transitional care management visit.    Here today with his wife, Ida    Within 48 business hours after discharge our office contacted him via telephone to coordinate his care and needs.      I reviewed and discussed the details of that call along with the discharge summary, hospital problems, inpatient lab results, inpatient diagnostic studies, and consultation reports with Sumeet.     Current outpatient and discharge medications have been reconciled for the patient.  Reviewed by: ZULY Segundo          9/18/2022     4:03 PM 12/9/2023     9:02 PM 12/29/2023     4:52 PM 5/24/2024     7:12 PM 5/20/2025     3:57 PM   Date of TCM Phone Call   [facility]   Date of Admission 9/12/2022 12/4/2023 12/26/2023 5/12/2024 5/16/2025   Date of Discharge 9/18/2022 12/9/2023 12/29/2023 5/24/2024 5/20/2025   Discharge Disposition Home or Self Care Home or Self Care Home or Self Care  Home or Self Care       Risk for Readmission (LACE) Score: 11 (5/20/2025  6:00 AM)      History of Present Illness  MILA  Dx: Acute on chronic hypoxic and hypercapnic respiratory failure secondary to acute COPD exacerbation)  Admitted: 5-  Discharged : 5-20-25  Condition: improved    Hospital Course:  Sumeet Gomes is a 64 y.o. male past medical history of hypertension, hyperlipidemia, COPD on 3 L nasal cannula, pulmonary hypertension, CAD, GERD who presented to the ER due to worsening shortness of breath.  Patient is a relatively poor historian history [employer] by discussion with family at bedside.  Appears patient has been taking all his medications for known end-stage COPD/asthma overlap syndrome without any changes lately but in the last 1 to 2 weeks he has had progressive worsening in his exertional dyspnea to the point that in  the last 2 days he has been minimally interactive or moving around due to his dyspnea.  In that time he is also had some increasing lethargy but been refusing to come to the ER for evaluation.  Ultimately today he was so fatigued that his wife was finally able to convince him to come to the ER     Upon arrival here he was found to be tachypneic and hypoxic requiring quick escalation of his oxygen up to Venturi mask and eventually an ABG returned showing acute hypoxic hypercapnic respiratory failure with a PCO2 of 90 and pH of 7.29.  Chest x-ray was unremarkable.  Patient was placed on BiPAP with rapid improvement in the hospitalist service and contacted for admission.  Patient was given Solu-Medrol 125 and a DuoNeb in the ER.  At the time my exam patient is resting comfortably on the BiPAP and pulling volumes anywhere from 300-400.  He is able to talk in complete sentences while on the BiPAP and states he feels significantly improved.  Patient admitted for further evaluation and treatment.  Pulmonology consulted.  Continued on inhaled bronchodilators systemic steroids.  Respiratory function slowly improved back to baseline.  Respiratory case management arranged for NIPPV for home.  Patient seen and evaluated on date of discharge and discussed with pulmonology and thought stable for discharge home with NIPPV to follow-up with his primary care provider and pulmonology as an outpatient.  Patient to discharge on all formoterol, budesonide nebs as well as Incruse Ellipta  as arranged by respiratory case management instead of Yupelri nebs due to high co-pay.     Last visit with his oncologist was over a year ago.    Saw pulm NP earlier today,  next appt is in August    [facility] changes since 5-:        Covid + 1-2022 and       COPD /sees pulm        Hospitalizations:      covid and COPD 12-26-23  COPD , 5-, 5- & 9-2022  back surgery         PREVENTIVE HEALTH MAINTENANCE             Hepatitis C  Medicare Screening: was last done ; negative         Surgical History:       Broncosocpy 25    Heart cath   Arthroscopy: left knee  and right shoulder ;     Vasectomy     Other Surgeries:    Laminectomy: lumbar region; ;     Procedures:    Colonoscopy ( 12 )     COLONOSCOPY: was last done 12 with normal results Boo         Family History:        Father:  25 age 92,  heart valve disorder Hypertension;  Dementia     Mother:  at age 54; Cause of death was stroke;  Hypertension;  COPD     Brother(s): Healthy; 3 brother(s) total     Sister(s): 2 sister(s) total, HTN, DM, obesity         Social History:        Occupation: [employer] /retired     Marital Status:      Children: 3 children                Course During Hospital Stay(Copied from D/C Summary and reviewed during encounter on 2025 by ZULY Segundo):      The following portions of the patient's history were reviewed and updated as appropriate: allergies, current medications, past family history, past medical history, past social history, past surgical history, and problem list.      Current Outpatient Medications:     albuterol sulfate  (90 Base) MCG/ACT inhaler, Inhale 2 puffs Every 4 (Four) Hours As Needed for Wheezing., Disp: 54 g, Rfl: 3    arformoterol (Brovana) 15 MCG/2ML nebulizer solution, Take 2 mL by nebulization 2 (Two) Times a Day., Disp: 360 mL, Rfl: 3    azithromycin (ZITHROMAX) 250 MG tablet, Take 1 tablet by mouth Daily., Disp: 30 tablet, Rfl: 5    budesonide (Pulmicort) 0.5 MG/2ML nebulizer solution, Take 2 mL by nebulization 2 (Two) Times a Day., Disp: 360 mL, Rfl: 3    furosemide (LASIX) 20 MG tablet, TAKE 2 TABLETS BY MOUTH EVERY DAY, Disp: 180 tablet, Rfl: 3    metoprolol tartrate (LOPRESSOR) 25 MG tablet, TAKE 1/2 TABLET BY MOUTH TWICE A DAY, Disp: 90 tablet, Rfl: 2    nicotine (NICODERM CQ) 21 MG/24HR patch, Place 1 patch on the skin as directed by  "provider Daily for 28 days., Disp: 28 patch, Rfl: 0    nystatin (MYCOSTATIN) 100,000 unit/mL suspension, Take 5 mL by mouth 4 (Four) Times a Day., Disp: 1419 mL, Rfl: 2    O2 (OXYGEN), Inhale 3 L/min Continuous., Disp: , Rfl:     pantoprazole (PROTONIX) 40 MG EC tablet, TAKE 1 TABLET BY MOUTH EVERY DAY IN THE MORNING, Disp: 90 tablet, Rfl: 3    roflumilast (DALIRESP) 500 MCG tablet tablet, Take 1 tablet by mouth Daily., Disp: 30 tablet, Rfl: 5    Umeclidinium Bromide (Incruse Ellipta) 62.5 MCG/ACT aerosol powder , Inhale 1 puff Daily., Disp: 30 each, Rfl: 11     Vitals:    06/02/25 1428   BP: 103/66   BP Location: Left arm   Patient Position: Sitting   Cuff Size: Adult   Pulse: 73   Temp: 98.2 °F (36.8 °C)   TempSrc: Oral   SpO2: 95%  Comment: 3L/ Min of O2   Weight: 53.5 kg (118 lb)   Height: 170.2 cm (67\")       Advance Care Planning     Review of Systems   Constitutional:  Negative for fatigue and fever.   Respiratory:  Positive for shortness of breath. Negative for cough.    Cardiovascular:  Positive for leg swelling (slight). Negative for chest pain.        Objective   Physical Exam  Vitals reviewed.   Constitutional:       General: He is not in acute distress.     Comments: Thin    Neck:      Vascular: No carotid bruit.   Cardiovascular:      Rate and Rhythm: Normal rate and regular rhythm.      Heart sounds: Normal heart sounds. No murmur heard.  Pulmonary:      Effort: Pulmonary effort is normal. No respiratory distress.      Breath sounds: Normal breath sounds.   Musculoskeletal:      Right lower leg: Edema (trace) present.      Left lower leg: Edema (trace) present.   Neurological:      Mental Status: He is alert.   Psychiatric:         Mood and Affect: Mood normal.         Behavior: Behavior normal.         DATA REVIEWED:    Data Reviewed:    CT Chest With Contrast Diagnostic  Result Date: 5/17/2025  Impression: Impression: 1.Moderate to severe emphysema with stable areas of scarring in the right lower " lobe and lingula. No acute intrathoracic abnormality. 2.The esophagus is poorly distended and there is an air debris level in the proximal esophagus at the level of the thoracic inlet, likely due to gastroesophageal reflux or possibly esophageal dysmotility. The stomach is distended and debris-filled. A distinct mass at the GE junction is not identified on this exam. Electronically Signed: Hansel Triplett DO  5/17/2025 6:04 PM EDT  Workstation ID: [id number]    XR Chest 1 View  Result Date: 5/16/2025  Impression: Impression: 1. No acute process. 2. Emphysema. 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 equipment / his weight is down 5 pounds from the last visit with me over a year ago, discussed adequate water/nutrition, regular exercise; checking on his steroid taper and the zithromax he was given at discharge            Essential hypertension  Rechecking some labs today     Orders:    Comprehensive metabolic panel; Future    Thrombocytopenia  Reviewed his last hem/onc appt from 6-, repeat his CBC, after reviewing his recent hospital labs and past labs   Orders:    CBC w AUTO Differential; Future    History of tobacco abuse  He has not smoked since his hospitalization, encouraged him to continue to not SMOKE and use the nicotine patch he was given if needed.                    Follow Up      Return for followup pending lab results.

## 2025-06-02 NOTE — PROGRESS NOTES
Primary Care Provider  Mirtha Alexander APRN   Referring Provider  Paco Covington MD    Patient Complaint  Hospital Follow Up Visit, COPD, and Cough      Subjective       History of Presenting Illness  Sumeet Gomes is a pleasant 64 y.o. male  of  Dr. Moya who presents to [facility] with history of asthma COPD overlap here for hospital follow-up.  Patient is accompanied by his spouse.  Patient was at [facility] 5/16 through 5/20/2025 for acute on chronic hypoxic and hypercapnic respiratory failure secondary to acute COPD exacerbation.  Hospital course includes the following:Sumeet Gomes is a 64 y.o. male past medical history of hypertension, hyperlipidemia, COPD on 3 L nasal cannula, pulmonary hypertension, CAD, GERD who presented to the ER due to worsening shortness of breath.  Patient is a relatively poor historian history Cellfina by discussion with family at bedside.  Appears patient has been taking all his medications for known end-stage COPD/asthma overlap syndrome without any changes lately but in the last 1 to 2 weeks he has had progressive worsening in his exertional dyspnea to the point that in the last 2 days he has been minimally interactive or moving around due to his dyspnea.  In that time he is also had some increasing lethargy but been refusing to come to the ER for evaluation.  Ultimately today he was so fatigued that his wife was finally able to convince him to come to the ER     Upon arrival here he was found to be tachypneic and hypoxic requiring quick escalation of his oxygen up to Venturi mask and eventually an ABG returned showing acute hypoxic hypercapnic respiratory failure with a PCO2 of 90 and pH of 7.29.  Chest x-ray was unremarkable.  Patient was placed on BiPAP with rapid improvement in the hospitalist service and contacted for admission.  Patient was given Solu-Medrol 125 and a DuoNeb in the ER.  At the time my exam patient is resting  comfortably on the BiPAP and pulling volumes anywhere from 300-400.  He is able to talk in complete sentences while on the BiPAP and states he feels significantly improved.  Patient admitted for further evaluation and treatment.  Pulmonology consulted.  Continued on inhaled bronchodilators systemic steroids.  Respiratory function slowly improved back to baseline.  Respiratory case management arranged for NIPPV for home.  Patient seen and evaluated on date of discharge and discussed with pulmonology and thought stable for discharge home with NIPPV to follow-up with his primary care provider and pulmonology as an outpatient.  Patient to discharge on all formoterol, budesonide nebs as well as Incruse Ellipta  as arranged by respiratory case management instead of Yupelri nebs due to high co-pay.    NIPPV:using and benefiting from NIPPV, [facility] [place] [facility]; reports he feels better rested since his hospitalization  Oxygen:on 3 L pulse dose when he is out about it doctors appointments but on 2 L continuous at home on concentrator and he is benefiting from supplemental oxygen  Meds: He continues on Brovana, Pulmicort, Daliresp, albuterol, Incruse, daily azithromycin; he is currently completing steroid taper he was discharged home from the hospital.    While in hospital patient had a overnight pulse oximetry study done but he did not have 5 minutes of desaturation.  However he did continue to meet the criteria for the noninvasive ventilator.    At present time patient denies coughing, wheezing, headaches, chest pain, weight loss or hemoptysis. Patient denies fevers, chills and night sweats. Sumeet Gomes is able to perform ADLs.  Reports his weight has been stable.    Recently quit smoking with hospitalization.    I have personally reviewed the review of systems, past family, social, medical and surgical histories; and agree with their findings.      Review of Systems   Constitutional:  Negative.    HENT: Negative.     Respiratory:  Positive for shortness of breath.    Cardiovascular: Negative.    Musculoskeletal: Negative.    Neurological: Negative.    Psychiatric/Behavioral: Negative.           Family History   Problem Relation Age of Onset    Asthma Mother     Emphysema Mother             Stroke Mother     COPD Mother     Colon cancer Neg Hx         Social History     Socioeconomic History    Marital status:    Tobacco Use    Smoking status: Former     Current packs/day: 0.00     Average packs/day: 0.3 packs/day for 50.4 years (12.6 ttl pk-yrs)     Types: Cigarettes     Start date: 1975     Quit date: 2025     Years since quittin.0     Passive exposure: Past (2 cigarettes per day, smokes some days)    Smokeless tobacco: Never    Tobacco comments:     1-2 cigarettes daily as of 24 AL    Vaping Use    Vaping status: Never Used   Substance and Sexual Activity    Alcohol use: Not Currently    Drug use: Never    Sexual activity: Defer        Past Medical History:   Diagnosis Date    CHF (congestive heart failure)     COPD (chronic obstructive pulmonary disease)     COPD (chronic obstructive pulmonary disease)     Coronary artery disease     Diastolic heart failure     Emphysema of lung     Hyperlipidemia     Hypertension     Pulmonary hypertension         Immunization History   Administered Date(s) Administered    COVID-19 (MODERNA) 1st,2nd,3rd Dose Monovalent 2021, 2021, 2021, 2021    Flu Vaccine Quad PF >36MO 2019    Fluzone (or Fluarix & Flulaval for VFC) >6mos 2019, 10/25/2021, 10/06/2022, 2023    Hepatitis A 2018    Pneumococcal Conjugate 13-Valent (PCV13) 2018    Pneumococcal Conjugate 20-Valent (PCV20) 2022    Pneumococcal Polysaccharide (PPSV23) 2017    RSV, unspecified (Vaccine or MAB) 2024    Tdap 2012    Zostavax 2012       No Known Allergies       Current Outpatient  "Medications:     albuterol sulfate  (90 Base) MCG/ACT inhaler, Inhale 2 puffs Every 4 (Four) Hours As Needed for Wheezing., Disp: 54 g, Rfl: 3    arformoterol (Brovana) 15 MCG/2ML nebulizer solution, Take 2 mL by nebulization 2 (Two) Times a Day., Disp: 360 mL, Rfl: 3    azithromycin (ZITHROMAX) 250 MG tablet, Take 1 tablet by mouth Daily., Disp: 30 tablet, Rfl: 5    budesonide (Pulmicort) 0.5 MG/2ML nebulizer solution, Take 2 mL by nebulization 2 (Two) Times a Day., Disp: 360 mL, Rfl: 3    furosemide (LASIX) 20 MG tablet, TAKE 2 TABLETS BY MOUTH EVERY DAY, Disp: 180 tablet, Rfl: 3    metoprolol tartrate (LOPRESSOR) 25 MG tablet, TAKE 1/2 TABLET BY MOUTH TWICE A DAY, Disp: 90 tablet, Rfl: 2    nicotine (NICODERM CQ) 21 MG/24HR patch, Place 1 patch on the skin as directed by provider Daily for 28 days., Disp: 28 patch, Rfl: 0    nystatin (MYCOSTATIN) 100,000 unit/mL suspension, Take 5 mL by mouth 4 (Four) Times a Day., Disp: 1419 mL, Rfl: 2    pantoprazole (PROTONIX) 40 MG EC tablet, TAKE 1 TABLET BY MOUTH EVERY DAY IN THE MORNING, Disp: 90 tablet, Rfl: 3    predniSONE (DELTASONE) 10 MG tablet, Take 4 tablets by mouth Daily for 3 days, THEN 3 tablets Daily for 3 days, THEN 2 tablets Daily for 3 days, THEN 1 tablet Daily for 3 days., Disp: 30 tablet, Rfl: 0    roflumilast (DALIRESP) 500 MCG tablet tablet, Take 1 tablet by mouth Daily., Disp: 30 tablet, Rfl: 5    Umeclidinium Bromide (Incruse Ellipta) 62.5 MCG/ACT aerosol powder , Inhale 1 puff Daily., Disp: 30 each, Rfl: 11         Vital Signs   /69   Pulse 70   Temp 97.6 °F (36.4 °C)   Resp 16   Ht 170.2 cm (67\")   Wt 53.5 kg (118 lb)   SpO2 93%   BMI 18.48 kg/m²       Objective     Physical Exam  Vitals reviewed.   Constitutional:       General: He is not in acute distress.     Appearance: Normal appearance. He is not ill-appearing.   HENT:      Head: Normocephalic and atraumatic.      Nose: Nose normal.      Mouth/Throat:      Mouth: Mucous " membranes are moist.      Pharynx: Oropharynx is clear.   Eyes:      Extraocular Movements: Extraocular movements intact.      Conjunctiva/sclera: Conjunctivae normal.      Pupils: Pupils are equal, round, and reactive to light.   Cardiovascular:      Rate and Rhythm: Normal rate and regular rhythm.      Pulses: Normal pulses.      Heart sounds: Normal heart sounds.   Pulmonary:      Effort: Pulmonary effort is normal. No respiratory distress.      Breath sounds: No stridor. Examination of the right-lower field reveals decreased breath sounds. Examination of the left-lower field reveals decreased breath sounds. Decreased breath sounds present. No wheezing, rhonchi or rales.   Abdominal:      General: Bowel sounds are normal.   Musculoskeletal:         General: Normal range of motion.      Cervical back: Normal range of motion and neck supple.   Skin:     General: Skin is warm and dry.   Neurological:      Mental Status: He is alert and oriented to person, place, and time.   Psychiatric:         Behavior: Behavior normal.         Results Review  I have personally reviewed the prior office notes, hospital records, labs, and diagnostics.    CT Chest With Contrast Diagnostic [[id number]] (Order [id number])  Order  Status: Final result     Study Notes     Yamileth Leach on 5/17/2025  5:36 PM EDT   RESPIRATORY FAILURE, INCREASED WORK OF BREATHING   70 ML  CB     Appointment Information    PACS Images     Radiology Images  Study Result    Narrative & Impression   CT CHEST W CONTRAST DIAGNOSTIC     Date of Exam: 5/17/2025 5:35 PM EDT     Indication: resp failure, increased work of breathing.     Comparison: Chest CT without contrast dated 5/13/2024 and single view chest radiograph from 5/16/2025     Technique: Axial CT images were obtained of the chest after the uneventful intravenous administration of iodinated contrast.  Reconstructed coronal and sagittal images were also obtained. Automated exposure control and  iterative construction methods were   used.        Findings:  There is moderate to severe emphysema and stable linear areas of scarring in the right lower lobe and lingula. No suspicious pulmonary nodules or consolidations. No interstitial thickening or bronchial wall thickening. The central tracheobronchial tree   is widely patent. No pleural or pericardial effusions or pneumothorax. No pathologically enlarged thoracic or axillary adenopathy.     Heart size is within normal limits. Coronary artery calcifications are present. The thoracic aorta is not aneurysmal. Pulmonary arteries are normal in caliber and well-opacified.     There is a air debris level in the proximal esophagus at the level of the thoracic inlet. The esophagus is poorly distended. The stomach is distended and debris-filled. A distinct mass at the GE junction is not identified on this exam. Limited images of   the upper abdomen are otherwise unremarkable except for partially visualized cyst in the right kidney. No acute bony abnormalities or aggressive appearing focal osseous lesions.     IMPRESSION:  Impression:  1.Moderate to severe emphysema with stable areas of scarring in the right lower lobe and lingula. No acute intrathoracic abnormality.  2.The esophagus is poorly distended and there is an air debris level in the proximal esophagus at the level of the thoracic inlet, likely due to gastroesophageal reflux or possibly esophageal dysmotility. The stomach is distended and debris-filled. A   distinct mass at the GE junction is not identified on this exam.           Electronically Signed: Hansel Triplett DO    5/17/2025 6:04 PM EDT    Workstation ID: [id number]   Blood Gas, Arterial -  Order: [id number]   Status: Final result       Next appt: Today at 02:30 PM in Family Medicine ([facility])    Test Result Released: Yes (seen)    Specimen Information: Arterial Blood   0 Result Notes            Component  Ref Range & Units (hover) 2 wk  ago  (5/18/25) 2 wk ago  (5/17/25) 2 wk ago  (5/16/25) 2 wk ago  (5/16/25) 1 yr ago  (12/26/23) 1 yr ago  (12/9/23) 1 yr ago  (12/7/23)   Site Left Brachial Right Radial Right Radial  Arterial: right brachial Arterial: right brachial Arterial: right brachial   Adebayo's Test N/A Positive Positive  N/A N/A N/A   pH, Arterial 7.410 7.395 7.298 Low   7.395 7.418 7.311 Low    pCO2, Arterial 66.2 High Critical  74.6 High Critical  90.0 High Critical   61.9 High Critical  65.4 High Critical  72.7 High Critical    pO2, Arterial 99.6 70.8 Low  63.1 Low   67.0 Low  62.4 Low  47.8 Low Critical    HCO3, Arterial 42.0 High  45.6 High  44.0 High   37.1 High  41.3 High  35.9 High    Base Excess, Arterial 14.2 High  16.7 High  CM 12.9 High  CM  9.8 High  13.5 High  6.9 High    Comment: Serial Number: [id number]:  [id number]   O2 Saturation, Arterial 97.4 92.8 Low  87.2 Low   92.8 Low  92.3 Low  86.1 Low    Hemoglobin, Blood Gas 13.4 13.9 14.9  13.8 R 14.9 R 14.1 R   Hematocrit, Blood Gas 39.0 41.0 44.0       Barometric Pressure for Blood Gas 743.0000 734.6000 733.7000       Modality Cannula BiPap Cannula  Cannula Cannula Cannula   FIO2 40 30 32  30 32 32   Flow Rate 5.0000  3.0000  2.5 3 3   Notified Who earnest Min Dr., Dr.      Read Back Yes Yes Yes Yes      Notified Time          Hemodilution No No No       PO2/FIO2 249 236 197  223 195 149   Resulting Agency  [facility] RT  [facility]ELIZ[facility] RT  [facility] RT  [facility] RT  [facility] RT  [facility]ELIZA RT  MELIZA RT             Specimen Collected: 05/18/25 07:39 EDT       Assessment         Patient Active Problem List   Diagnosis    Other hyperlipidemia    Essential hypertension    COPD (chronic obstructive pulmonary disease)    Fatigue    Chest wall contusion, right, initial encounter    Closed fracture of coccyx    Contusion of right knee    Acute respiratory failure with hypoxia and hypercapnia    Acute CHF    Tobacco abuse    Hypoxia    Severe malnutrition    Pulmonary hypertension    Other fatigue     Dyspnea    Cough    Diastolic heart failure    Routine general medical examination at a health care facility    Screening for prostate cancer    Swelling of joint of right knee    Acute respiratory failure with hypoxia    COVID-19    Chronic hypoxic respiratory failure    COPD exacerbation    Acute-on-chronic respiratory failure    Mucus plugging of bronchi    Weight loss    Renal lesion    Screen for colon cancer    Gastroesophageal reflux disease    Encounter for screening for malignant neoplasm of colon    Right renal mass    Acute on chronic respiratory failure with hypoxia and hypercapnia        Plan     Diagnoses and all orders for this visit:    1. Acute respiratory failure with hypoxia and hypercapnia (Primary)  Comments:  Continue with NIPPV and oxygen as patient is using and benefiting  Orders:  -     Umeclidinium Bromide (Incruse Ellipta) 62.5 MCG/ACT aerosol powder ; Inhale 1 puff Daily.  Dispense: 30 each; Refill: 11    2. COPD exacerbation  Comments:  Complete steroid taper as prescribed; continues on daily azithromycin  Orders:  -     Umeclidinium Bromide (Incruse Ellipta) 62.5 MCG/ACT aerosol powder ; Inhale 1 puff Daily.  Dispense: 30 each; Refill: 11    3. Pulmonary emphysema, unspecified emphysema type  Comments:  Continue with Brovana Pulmicort Daliresp Incruse albuterol  Orders:  -     Umeclidinium Bromide (Incruse Ellipta) 62.5 MCG/ACT aerosol powder ; Inhale 1 puff Daily.  Dispense: 30 each; Refill: 11    4. Tobacco abuse  Comments:  Recently quit smoking with hospitalization  Orders:  -     Umeclidinium Bromide (Incruse Ellipta) 62.5 MCG/ACT aerosol powder ; Inhale 1 puff Daily.  Dispense: 30 each; Refill: 11    5. Pulmonary hypertension  Comments:  Continue with current medication regimen for his severe emphysema/COPD       Mr. Gomes will need noninvasive ventilation for acute on chronic hypercapnic respiratory failure (CO2 90) secondary to severe COPD, FEV1 18% of predicted. Bilevel,  Bilevel ST, ASV and Bilevel with VAPS have been tried/considered and ruled out. The patient requires volume targeted mode of ventilation that is not available on less costly bilevel devices. A noninvasive ventilator is necessary to deliver targeted tidal/minute ventilation and provide a back up rate to ensure adequate ventilation and prevent hypoventilation. Alarms and battery backup (only available on noninvasive ventilator) are necessary for patient safety. Volume assured noninvasive ventilation for home use is crucial to decrease hospitalization and improve survival. Failure to provide NIV in the home will place the patient at increased risk of unplanned expensive rehospitalization, acute on chronic respiratory failure and mortality.       Smoking status:  reports that he quit smoking 13 days ago. His smoking use included cigarettes. He started smoking about 50 years ago. He has a 12.6 pack-year smoking history. He has been exposed to tobacco smoke. He has never used smokeless tobacco.    Vaccination status: Reviewed  Immunization History   Administered Date(s) Administered    COVID-19 (MODERNA) 1st,2nd,3rd Dose Monovalent 06/18/2021, 06/18/2021, 07/23/2021, 07/23/2021    Flu Vaccine Quad PF >36MO 11/08/2019    Fluzone (or Fluarix & Flulaval for VFC) >6mos 11/08/2019, 10/25/2021, 10/06/2022, 12/14/2023    Hepatitis A 11/13/2018    Pneumococcal Conjugate 13-Valent (PCV13) 11/07/2018    Pneumococcal Conjugate 20-Valent (PCV20) 11/08/2022    Pneumococcal Polysaccharide (PPSV23) 02/13/2017    RSV, unspecified (Vaccine or MAB) 03/01/2024    Tdap 06/13/2012    Zostavax 06/13/2012        Medications personally reviewed    Follow Up  Return for Next scheduled follow up.    Patient was given instructions and counseling regarding his condition or for health maintenance advice. Please see specific information pulled into the AVS if appropriate.     I spent 22 minutes caring for Sumeet NAYANA Gomes on this date of service. This  time includes time spent by me in the following activities:preparing for the visit, reviewing tests, obtaining and/or reviewing a separately obtained history, performing a medically appropriate examination and/or evaluation, counseling and educating the patient/family/caregiver, ordering medications, tests, or procedures, documenting information in the medical record, independently interpreting results and communicating that information with the patient/family/caregiver and answered questions family members, discuss medications.

## 2025-06-02 NOTE — OUTREACH NOTE
COPD/PN Week 2 Survey      Flowsheet Row Responses   [facility] discharged from? [facility]   Does the patient have one of the following disease processes/diagnoses(primary or secondary)? COPD   Week 2 attempt successful? No   Unsuccessful attempts Attempt 1   Revoke Other            Denae H - Registered Nurse

## 2025-06-02 NOTE — ASSESSMENT & PLAN NOTE
He has not smoked since his hospitalization, encouraged him to continue to not SMOKE and use the nicotine patch he was given if needed.

## 2025-06-02 NOTE — ASSESSMENT & PLAN NOTE
Reviewed the note from pulm NP from earlier today, has a Follow up appt with DAVIDA riojas  on 8-29-25, keep that appt and his current pulm rx's,

## 2025-06-02 NOTE — ASSESSMENT & PLAN NOTE
Reviewed his hospital records/lab (he has [employer] coming tomorrow re his equipment / his weight is down 5 pounds from the last visit with me over a year ago, discussed adequate water/nutrition, regular exercise; checking on his steroid taper and the zithromax he was given at discharge

## 2025-06-03 ENCOUNTER — RESULTS FOLLOW-UP (OUTPATIENT)
Dept: LAB | Facility: HOSPITAL | Age: 65
End: 2025-06-03
Payer: MEDICARE

## 2025-06-16 LAB
QT INTERVAL: 412 MS
QT INTERVAL: 417 MS
QTC INTERVAL: 459 MS
QTC INTERVAL: 462 MS

## 2025-06-18 ENCOUNTER — TELEPHONE (OUTPATIENT)
Dept: FAMILY MEDICINE CLINIC | Age: 65
End: 2025-06-18
Payer: MEDICARE

## 2025-07-17 ENCOUNTER — TELEPHONE (OUTPATIENT)
Dept: PULMONOLOGY | Facility: CLINIC | Age: 65
End: 2025-07-17

## 2025-07-18 RX ORDER — PREDNISONE 20 MG/1
40 TABLET ORAL DAILY
Qty: 14 TABLET | Refills: 0 | Status: SHIPPED | OUTPATIENT
Start: 2025-07-18 | End: 2025-07-25

## 2025-07-21 DIAGNOSIS — J44.9 CHRONIC OBSTRUCTIVE PULMONARY DISEASE, UNSPECIFIED COPD TYPE: Chronic | ICD-10-CM

## 2025-07-21 RX ORDER — ALBUTEROL SULFATE 90 UG/1
2 INHALANT RESPIRATORY (INHALATION)
Qty: 54 G | Refills: 3 | Status: SHIPPED | OUTPATIENT
Start: 2025-07-21

## 2025-07-22 ENCOUNTER — APPOINTMENT (OUTPATIENT)
Dept: GENERAL RADIOLOGY | Facility: HOSPITAL | Age: 65
DRG: 190 | End: 2025-07-22
Payer: MEDICARE

## 2025-07-22 ENCOUNTER — HOSPITAL ENCOUNTER (INPATIENT)
Facility: HOSPITAL | Age: 65
LOS: 6 days | Discharge: HOME | DRG: 190 | End: 2025-07-28
Attending: EMERGENCY MEDICINE | Admitting: INTERNAL MEDICINE
Payer: MEDICARE

## 2025-07-22 DIAGNOSIS — J44.1 COPD WITH ACUTE EXACERBATION: ICD-10-CM

## 2025-07-22 DIAGNOSIS — J96.02 ACUTE RESPIRATORY FAILURE WITH HYPERCAPNIA: ICD-10-CM

## 2025-07-22 DIAGNOSIS — R26.2 DIFFICULTY WALKING: ICD-10-CM

## 2025-07-22 DIAGNOSIS — J44.1 ACUTE EXACERBATION OF CHRONIC OBSTRUCTIVE PULMONARY DISEASE (COPD): Primary | ICD-10-CM

## 2025-07-22 LAB
ALBUMIN SERPL-MCNC: 4.5 G/DL (ref 3.5–5.2)
ALBUMIN/GLOB SERPL: 2 G/DL
ALP SERPL-CCNC: 110 U/L (ref 39–117)
ALT SERPL W P-5'-P-CCNC: 32 U/L (ref 1–41)
ANION GAP SERPL CALCULATED.3IONS-SCNC: 10.3 MMOL/L (ref 5–15)
ARTERIAL PATENCY WRIST A: POSITIVE
AST SERPL-CCNC: 46 U/L (ref 1–40)
ATMOSPHERIC PRESS: 744.7 MMHG
BASE EXCESS BLDA CALC-SCNC: 14.9 MMOL/L (ref -2–2)
BASOPHILS # BLD AUTO: 0.02 10*3/MM3 (ref 0–0.2)
BASOPHILS NFR BLD AUTO: 0.3 % (ref 0–1.5)
BDY SITE: ABNORMAL
BILIRUB SERPL-MCNC: 0.6 MG/DL (ref 0–1.2)
BUN SERPL-MCNC: 12.2 MG/DL (ref 8–23)
BUN/CREAT SERPL: 19.1 (ref 7–25)
CALCIUM SPEC-SCNC: 10.2 MG/DL (ref 8.6–10.5)
CHLORIDE SERPL-SCNC: 95 MMOL/L (ref 98–107)
CO2 SERPL-SCNC: 35.7 MMOL/L (ref 22–29)
CREAT SERPL-MCNC: 0.64 MG/DL (ref 0.76–1.27)
DEPRECATED RDW RBC AUTO: 44.2 FL (ref 37–54)
EGFRCR SERPLBLD CKD-EPI 2021: 106.4 ML/MIN/1.73
EOSINOPHIL # BLD AUTO: 0.03 10*3/MM3 (ref 0–0.4)
EOSINOPHIL NFR BLD AUTO: 0.5 % (ref 0.3–6.2)
ERYTHROCYTE [DISTWIDTH] IN BLOOD BY AUTOMATED COUNT: 13 % (ref 12.3–15.4)
GAS FLOW AIRWAY: 6 LPM
GEN 5 1HR TROPONIN T REFLEX: 11 NG/L
GLOBULIN UR ELPH-MCNC: 2.3 GM/DL
GLUCOSE SERPL-MCNC: 107 MG/DL (ref 65–99)
HCO3 BLDA-SCNC: 45.7 MMOL/L (ref 22–26)
HCT VFR BLD AUTO: 48.5 % (ref 37.5–51)
HCT VFR BLD CALC: 45 % (ref 38–51)
HEMODILUTION: NO
HGB BLD-MCNC: 15.2 G/DL (ref 13–17.7)
HGB BLDA-MCNC: 15.2 G/DL (ref 12–18)
HOLD SPECIMEN: NORMAL
HOLD SPECIMEN: NORMAL
IMM GRANULOCYTES # BLD AUTO: 0.01 10*3/MM3 (ref 0–0.05)
IMM GRANULOCYTES NFR BLD AUTO: 0.2 % (ref 0–0.5)
INHALED O2 CONCENTRATION: 44 %
LYMPHOCYTES # BLD AUTO: 0.98 10*3/MM3 (ref 0.7–3.1)
LYMPHOCYTES NFR BLD AUTO: 16.8 % (ref 19.6–45.3)
Lab: ABNORMAL
MCH RBC QN AUTO: 29.1 PG (ref 26.6–33)
MCHC RBC AUTO-ENTMCNC: 31.3 G/DL (ref 31.5–35.7)
MCV RBC AUTO: 92.7 FL (ref 79–97)
MODALITY: ABNORMAL
MONOCYTES # BLD AUTO: 0.41 10*3/MM3 (ref 0.1–0.9)
MONOCYTES NFR BLD AUTO: 7 % (ref 5–12)
NEUTROPHILS NFR BLD AUTO: 4.38 10*3/MM3 (ref 1.7–7)
NEUTROPHILS NFR BLD AUTO: 75.2 % (ref 42.7–76)
NOTIFIED WHO: ABNORMAL
NRBC BLD AUTO-RTO: 0 /100 WBC (ref 0–0.2)
NT-PROBNP SERPL-MCNC: 608.7 PG/ML (ref 0–900)
PCO2 BLDA: 86.1 MM HG (ref 35–45)
PH BLDA: 7.33 PH UNITS (ref 7.35–7.45)
PLATELET # BLD AUTO: 163 10*3/MM3 (ref 140–450)
PMV BLD AUTO: 10.2 FL (ref 6–12)
PO2 BLD: 309 MM[HG] (ref 0–500)
PO2 BLDA: 135.9 MM HG (ref 80–100)
POTASSIUM SERPL-SCNC: 3.9 MMOL/L (ref 3.5–5.2)
PROT SERPL-MCNC: 6.8 G/DL (ref 6–8.5)
QT INTERVAL: 415 MS
QTC INTERVAL: 425 MS
RBC # BLD AUTO: 5.23 10*6/MM3 (ref 4.14–5.8)
READ BACK: YES
SAO2 % BLDCOA: 98.6 % (ref 95–99)
SODIUM SERPL-SCNC: 141 MMOL/L (ref 136–145)
TROPONIN T NUMERIC DELTA: -3 NG/L
TROPONIN T SERPL HS-MCNC: 14 NG/L
WBC NRBC COR # BLD AUTO: 5.83 10*3/MM3 (ref 3.4–10.8)
WHOLE BLOOD HOLD COAG: NORMAL
WHOLE BLOOD HOLD SPECIMEN: NORMAL

## 2025-07-22 PROCEDURE — 84484 ASSAY OF TROPONIN QUANT: CPT | Performed by: EMERGENCY MEDICINE

## 2025-07-22 PROCEDURE — 94799 UNLISTED PULMONARY SVC/PX: CPT

## 2025-07-22 PROCEDURE — 80053 COMPREHEN METABOLIC PANEL: CPT | Performed by: EMERGENCY MEDICINE

## 2025-07-22 PROCEDURE — 25010000002 METHYLPREDNISOLONE PER 40 MG: Performed by: INTERNAL MEDICINE

## 2025-07-22 PROCEDURE — 94640 AIRWAY INHALATION TREATMENT: CPT

## 2025-07-22 PROCEDURE — 25010000002 AZITHROMYCIN PER 500 MG: Performed by: INTERNAL MEDICINE

## 2025-07-22 PROCEDURE — 85025 COMPLETE CBC W/AUTO DIFF WBC: CPT | Performed by: EMERGENCY MEDICINE

## 2025-07-22 PROCEDURE — 99291 CRITICAL CARE FIRST HOUR: CPT

## 2025-07-22 PROCEDURE — 36600 WITHDRAWAL OF ARTERIAL BLOOD: CPT

## 2025-07-22 PROCEDURE — 71045 X-RAY EXAM CHEST 1 VIEW: CPT

## 2025-07-22 PROCEDURE — 82803 BLOOD GASES ANY COMBINATION: CPT

## 2025-07-22 PROCEDURE — 25810000003 SODIUM CHLORIDE 0.9 % SOLUTION 250 ML FLEX CONT: Performed by: INTERNAL MEDICINE

## 2025-07-22 PROCEDURE — 25010000002 METHYLPREDNISOLONE PER 125 MG: Performed by: EMERGENCY MEDICINE

## 2025-07-22 PROCEDURE — 25010000002 ENOXAPARIN PER 10 MG: Performed by: INTERNAL MEDICINE

## 2025-07-22 PROCEDURE — 99223 1ST HOSP IP/OBS HIGH 75: CPT | Performed by: INTERNAL MEDICINE

## 2025-07-22 PROCEDURE — 93005 ELECTROCARDIOGRAM TRACING: CPT | Performed by: EMERGENCY MEDICINE

## 2025-07-22 PROCEDURE — 83880 ASSAY OF NATRIURETIC PEPTIDE: CPT | Performed by: EMERGENCY MEDICINE

## 2025-07-22 PROCEDURE — 36415 COLL VENOUS BLD VENIPUNCTURE: CPT

## 2025-07-22 PROCEDURE — 94660 CPAP INITIATION&MGMT: CPT

## 2025-07-22 RX ORDER — ENOXAPARIN SODIUM 100 MG/ML
40 INJECTION SUBCUTANEOUS EVERY 24 HOURS
Status: DISCONTINUED | OUTPATIENT
Start: 2025-07-22 | End: 2025-07-28 | Stop reason: HOSPADM

## 2025-07-22 RX ORDER — ROFLUMILAST 500 UG/1
500 TABLET ORAL DAILY
Status: DISCONTINUED | OUTPATIENT
Start: 2025-07-23 | End: 2025-07-28 | Stop reason: HOSPADM

## 2025-07-22 RX ORDER — BUDESONIDE 0.5 MG/2ML
0.5 INHALANT ORAL
Status: DISCONTINUED | OUTPATIENT
Start: 2025-07-22 | End: 2025-07-28 | Stop reason: HOSPADM

## 2025-07-22 RX ORDER — BISACODYL 5 MG/1
5 TABLET, DELAYED RELEASE ORAL DAILY PRN
Status: DISCONTINUED | OUTPATIENT
Start: 2025-07-22 | End: 2025-07-28 | Stop reason: HOSPADM

## 2025-07-22 RX ORDER — BUDESONIDE 0.5 MG/2ML
0.5 INHALANT ORAL 2 TIMES DAILY
Status: DISCONTINUED | OUTPATIENT
Start: 2025-07-22 | End: 2025-07-22

## 2025-07-22 RX ORDER — NICOTINE 21 MG/24HR
1 PATCH, TRANSDERMAL 24 HOURS TRANSDERMAL
Status: CANCELLED | OUTPATIENT
Start: 2025-07-22

## 2025-07-22 RX ORDER — SODIUM CHLORIDE 0.9 % (FLUSH) 0.9 %
10 SYRINGE (ML) INJECTION AS NEEDED
Status: DISCONTINUED | OUTPATIENT
Start: 2025-07-22 | End: 2025-07-23

## 2025-07-22 RX ORDER — BISACODYL 10 MG
10 SUPPOSITORY, RECTAL RECTAL DAILY PRN
Status: DISCONTINUED | OUTPATIENT
Start: 2025-07-22 | End: 2025-07-28 | Stop reason: HOSPADM

## 2025-07-22 RX ORDER — ONDANSETRON 4 MG/1
4 TABLET, ORALLY DISINTEGRATING ORAL EVERY 6 HOURS PRN
Status: DISCONTINUED | OUTPATIENT
Start: 2025-07-22 | End: 2025-07-28 | Stop reason: HOSPADM

## 2025-07-22 RX ORDER — ARFORMOTEROL TARTRATE 15 UG/2ML
15 SOLUTION RESPIRATORY (INHALATION)
Status: DISCONTINUED | OUTPATIENT
Start: 2025-07-22 | End: 2025-07-28 | Stop reason: HOSPADM

## 2025-07-22 RX ORDER — SODIUM CHLORIDE 9 MG/ML
40 INJECTION, SOLUTION INTRAVENOUS AS NEEDED
Status: DISCONTINUED | OUTPATIENT
Start: 2025-07-22 | End: 2025-07-28 | Stop reason: HOSPADM

## 2025-07-22 RX ORDER — IPRATROPIUM BROMIDE AND ALBUTEROL SULFATE 2.5; .5 MG/3ML; MG/3ML
3 SOLUTION RESPIRATORY (INHALATION) ONCE
Status: COMPLETED | OUTPATIENT
Start: 2025-07-22 | End: 2025-07-22

## 2025-07-22 RX ORDER — NICOTINE 21 MG/24HR
1 PATCH, TRANSDERMAL 24 HOURS TRANSDERMAL
Status: DISCONTINUED | OUTPATIENT
Start: 2025-07-22 | End: 2025-07-28 | Stop reason: HOSPADM

## 2025-07-22 RX ORDER — METOPROLOL TARTRATE 25 MG/1
12.5 TABLET, FILM COATED ORAL 2 TIMES DAILY
Status: DISCONTINUED | OUTPATIENT
Start: 2025-07-22 | End: 2025-07-28 | Stop reason: HOSPADM

## 2025-07-22 RX ORDER — METHYLPREDNISOLONE SODIUM SUCCINATE 40 MG/ML
40 INJECTION, POWDER, LYOPHILIZED, FOR SOLUTION INTRAMUSCULAR; INTRAVENOUS EVERY 8 HOURS
Status: COMPLETED | OUTPATIENT
Start: 2025-07-23 | End: 2025-07-26

## 2025-07-22 RX ORDER — POLYETHYLENE GLYCOL 3350 17 G
2 POWDER IN PACKET (EA) ORAL
Status: DISCONTINUED | OUTPATIENT
Start: 2025-07-22 | End: 2025-07-28 | Stop reason: HOSPADM

## 2025-07-22 RX ORDER — AMOXICILLIN 250 MG
2 CAPSULE ORAL 2 TIMES DAILY PRN
Status: DISCONTINUED | OUTPATIENT
Start: 2025-07-22 | End: 2025-07-28 | Stop reason: HOSPADM

## 2025-07-22 RX ORDER — NITROGLYCERIN 0.4 MG/1
0.4 TABLET SUBLINGUAL
Status: DISCONTINUED | OUTPATIENT
Start: 2025-07-22 | End: 2025-07-28 | Stop reason: HOSPADM

## 2025-07-22 RX ORDER — ACETAMINOPHEN 325 MG/1
650 TABLET ORAL EVERY 4 HOURS PRN
Status: DISCONTINUED | OUTPATIENT
Start: 2025-07-22 | End: 2025-07-28 | Stop reason: HOSPADM

## 2025-07-22 RX ORDER — POLYETHYLENE GLYCOL 3350 17 G/17G
17 POWDER, FOR SOLUTION ORAL DAILY PRN
Status: DISCONTINUED | OUTPATIENT
Start: 2025-07-22 | End: 2025-07-28 | Stop reason: HOSPADM

## 2025-07-22 RX ORDER — SODIUM CHLORIDE 0.9 % (FLUSH) 0.9 %
10 SYRINGE (ML) INJECTION AS NEEDED
Status: DISCONTINUED | OUTPATIENT
Start: 2025-07-22 | End: 2025-07-28 | Stop reason: HOSPADM

## 2025-07-22 RX ORDER — SODIUM CHLORIDE 0.9 % (FLUSH) 0.9 %
10 SYRINGE (ML) INJECTION EVERY 12 HOURS SCHEDULED
Status: DISCONTINUED | OUTPATIENT
Start: 2025-07-22 | End: 2025-07-28 | Stop reason: HOSPADM

## 2025-07-22 RX ORDER — PANTOPRAZOLE SODIUM 40 MG/1
40 TABLET, DELAYED RELEASE ORAL EVERY MORNING
Status: DISCONTINUED | OUTPATIENT
Start: 2025-07-23 | End: 2025-07-28 | Stop reason: HOSPADM

## 2025-07-22 RX ADMIN — Medication 10 ML: at 21:21

## 2025-07-22 RX ADMIN — WATER 125 MG: 1 INJECTION INTRAMUSCULAR; INTRAVENOUS; SUBCUTANEOUS at 16:14

## 2025-07-22 RX ADMIN — METHYLPREDNISOLONE SODIUM SUCCINATE 40 MG: 40 INJECTION, POWDER, FOR SOLUTION INTRAMUSCULAR; INTRAVENOUS at 23:45

## 2025-07-22 RX ADMIN — NICOTINE 1 PATCH: 14 PATCH, EXTENDED RELEASE TRANSDERMAL at 18:39

## 2025-07-22 RX ADMIN — METOPROLOL TARTRATE 12.5 MG: 25 TABLET, FILM COATED ORAL at 21:21

## 2025-07-22 RX ADMIN — AZITHROMYCIN DIHYDRATE 500 MG: 500 INJECTION, POWDER, LYOPHILIZED, FOR SOLUTION INTRAVENOUS at 18:40

## 2025-07-22 RX ADMIN — IPRATROPIUM BROMIDE AND ALBUTEROL SULFATE 3 ML: .5; 3 SOLUTION RESPIRATORY (INHALATION) at 16:11

## 2025-07-22 RX ADMIN — ENOXAPARIN SODIUM 40 MG: 100 INJECTION SUBCUTANEOUS at 21:21

## 2025-07-22 RX ADMIN — ARFORMOTEROL TARTRATE 15 MCG: 15 SOLUTION RESPIRATORY (INHALATION) at 20:19

## 2025-07-22 RX ADMIN — BUDESONIDE 0.5 MG: 0.5 SUSPENSION RESPIRATORY (INHALATION) at 20:19

## 2025-07-22 NOTE — PLAN OF CARE
Goal Outcome Evaluation:         Patient currently receiving BIPAP, after ABG revealed acute respiratory failure pH 7.33 and Co2 86. Patient typically wear 2L nasal cannula at all times and is compliant with home BIPAP. Patients current BIPAP settings are 14/7, 30%. Patient is tolerating  settings well. Will continue to monitor.

## 2025-07-22 NOTE — ED PROVIDER NOTES
Time: 4:04 PM EDT  Date of encounter:  7/22/2025  Independent Historian/Clinical History and Information was obtained by:   Patient and Family    History is limited by: N/A    Chief Complaint: Shortness of breath      History of Present Illness:  Patient is a 64 y.o. year old male who presents to the emergency department for evaluation of shortness of breath.  Patient has a history of COPD and presents to the emergency room with for evaluation of shortness of breath and was oxygenating at 87% on 6 L nasal cannula.      Patient Care Team  Primary Care Provider: Mirtha Alexander APRN    Past Medical History:     No Known Allergies  Past Medical History:   Diagnosis Date    CHF (congestive heart failure)     COPD (chronic obstructive pulmonary disease)     COPD (chronic obstructive pulmonary disease)     Coronary artery disease     Diastolic heart failure     Emphysema of lung     Hyperlipidemia     Hypertension     Pulmonary hypertension      Past Surgical History:   Procedure Laterality Date    BACK SURGERY      BRONCHOSCOPY N/A 09/14/2022    Procedure: BRONCHOSCOPY WITH BRONCHOALVEOLAR LAVAGE AND BRONCHIAL WASHINGS;  Surgeon: Ulices Moya MD;  Location: [facility] ENDOSCOPY;  Service: Pulmonary;  Laterality: N/A;  MUCUS PLUGGING, COPD EXACERBATION    BRONCHOSCOPY      BRONCHOSCOPY N/A 5/17/2024    Procedure: BRONCHOSCOPY WITH BAL AND WASHINGS;  Surgeon: Collins Chapa MD;  Location: [facility] ENDOSCOPY;  Service: Pulmonary;  Laterality: N/A;  MUCUS PLUGGING    CARDIAC CATHETERIZATION N/A 09/16/2022    Procedure: Right Heart Cath;  Surgeon: Drew Rodriguez MD;  Location: [facility] CATH INVASIVE LOCATION;  Service: Cardiovascular;  Laterality: N/A;    COLONOSCOPY N/A 8/20/2024    Procedure: COLONOSCOPY WITH HOT SNARE POLYPECTOMIES;  Surgeon: Obdulio Tripp MD;  Location: [facility] ENDOSCOPY;  Service: Gastroenterology;  Laterality: N/A;  COLON POLYPS, DIVERTICULOSIS    ENDOSCOPY N/A 8/20/2024    Procedure:  ESOPHAGOGASTRODUODENOSCOPY WITH BIOPSIES;  Surgeon: Obdulio Tripp MD;  Location: [facility] ENDOSCOPY;  Service: Gastroenterology;  Laterality: N/A;  HIATAL HERNIA, BARRETTS    OTHER SURGICAL HISTORY      knee     OTHER SURGICAL HISTORY      shoulder, rotator cuff    SHOULDER SURGERY Right 10/03/2022     Family History   Problem Relation Age of Onset    Asthma Mother     Emphysema Mother             Stroke Mother     COPD Mother     Colon cancer Neg Hx        Home Medications:  Prior to Admission medications    Medication Sig Start Date End Date Taking? Authorizing Provider   albuterol sulfate  (90 Base) MCG/ACT inhaler TAKE 2 PUFFS BY MOUTH EVERY 4 HOURS AS NEEDED FOR WHEEZE 25   Arleen Gillespie APRN   amoxicillin-clavulanate (AUGMENTIN) 875-125 MG per tablet Take 1 tablet by mouth 2 (Two) Times a Day. 25   Arleen Gillespie APRN   arformoterol (Brovana) 15 MCG/2ML nebulizer solution Take 2 mL by nebulization 2 (Two) Times a Day. 24   Arleen Gillespie APRN   azithromycin (ZITHROMAX) 250 MG tablet Take 1 tablet by mouth Daily. 25   Arleen Gillespie APRN   budesonide (Pulmicort) 0.5 MG/2ML nebulizer solution Take 2 mL by nebulization 2 (Two) Times a Day. 24   Arleen Gillespie APRN   furosemide (LASIX) 20 MG tablet TAKE 2 TABLETS BY MOUTH EVERY DAY 25   Arleen Gillespie APRN   metoprolol tartrate (LOPRESSOR) 25 MG tablet TAKE 1/2 TABLET BY MOUTH TWICE A DAY 25   Arleen Gillespie APRN   nystatin (MYCOSTATIN) 100,000 unit/mL suspension Take 5 mL by mouth 4 (Four) Times a Day. 25   Arleen Gillespie APRN   O2 (OXYGEN) Inhale 3 L/min Continuous.    Provider, MD Tawnya   pantoprazole (PROTONIX) 40 MG EC tablet TAKE 1 TABLET BY MOUTH EVERY DAY IN THE MORNING 25   Arleen Gillespie APRN   predniSONE (DELTASONE) 20 MG tablet Take 2 tablets by mouth Daily for 7 days. 25  Arleen Gillespie APRN   roflumilast (DALIRESP) 500 MCG tablet tablet Take 1  "tablet by mouth Daily. 25   Arleen Gillespie APRN   Umeclidinium Bromide (Incruse Ellipta) 62.5 MCG/ACT aerosol powder  Inhale 1 puff Daily. 25   Julia Chin APRN        Social History:   Social History     Tobacco Use    Smoking status: Former     Current packs/day: 0.00     Average packs/day: 0.3 packs/day for 50.4 years (12.6 ttl pk-yrs)     Types: Cigarettes     Start date: 1975     Quit date: 2025     Years since quittin.1     Passive exposure: Past (2 cigarettes per day, smokes some days)    Smokeless tobacco: Never    Tobacco comments:     1-2 cigarettes daily as of 24 AL    Vaping Use    Vaping status: Never Used   Substance Use Topics    Alcohol use: Not Currently    Drug use: Never         Review of Systems:  Review of Systems   Constitutional:  Negative for chills and fever.   HENT:  Negative for congestion, rhinorrhea and sore throat.    Eyes:  Negative for pain and visual disturbance.   Respiratory:  Positive for shortness of breath and wheezing. Negative for apnea, cough and chest tightness.    Cardiovascular:  Negative for chest pain and palpitations.   Gastrointestinal:  Negative for abdominal pain, diarrhea, nausea and vomiting.   Genitourinary:  Negative for difficulty urinating and dysuria.   Musculoskeletal:  Negative for joint swelling and myalgias.   Skin:  Negative for color change.   Neurological:  Negative for seizures and headaches.   Psychiatric/Behavioral: Negative.     All other systems reviewed and are negative.       Physical Exam:  /79   Pulse 79   Temp 97.7 °F (36.5 °C)   Resp 20   Ht 170.2 cm (67\")   Wt 53.5 kg (118 lb)   SpO2 94%   BMI 18.48 kg/m²     Physical Exam  Vitals and nursing note reviewed.   Constitutional:       General: He is not in acute distress.     Appearance: Normal appearance. He is not toxic-appearing.   HENT:      Head: Normocephalic and atraumatic.      Jaw: There is normal jaw occlusion.   Eyes:      General: Lids " are normal.      Extraocular Movements: Extraocular movements intact.      Conjunctiva/sclera: Conjunctivae normal.      Pupils: Pupils are equal, round, and reactive to light.   Cardiovascular:      Rate and Rhythm: Normal rate and regular rhythm.      Pulses: Normal pulses.      Heart sounds: Normal heart sounds.   Pulmonary:      Effort: Tachypnea and respiratory distress present.      Breath sounds: Wheezing present. No rhonchi.   Abdominal:      General: Abdomen is flat.      Palpations: Abdomen is soft.      Tenderness: There is no abdominal tenderness. There is no guarding or rebound.   Musculoskeletal:         General: Normal range of motion.      Cervical back: Normal range of motion and neck supple.      Right lower leg: No edema.      Left lower leg: No edema.   Skin:     General: Skin is warm and dry.   Neurological:      Mental Status: He is alert and oriented to person, place, and time. Mental status is at baseline.   Psychiatric:         Mood and Affect: Mood normal.                    Medical Decision Making:      Comorbidities that affect care:    COPD, hypertension, CHF    External Notes reviewed:    Previous Clinic Note: Family medicine office visit for respiratory management      The following orders were placed and all results were independently analyzed by me:  Orders Placed This Encounter   Procedures    XR Chest 1 View    [place] Draw    Comprehensive Metabolic Panel    BNP    High Sensitivity Troponin T    CBC Auto Differential    Blood Gas, Arterial -    Blood Gas, Arterial -    High Sensitivity Troponin T 1Hr    NPO Diet NPO Type: Strict NPO    Undress & Gown    Vital Signs    Continuous Pulse Oximetry    Inpatient Hospitalist Consult    Oxygen Therapy- Nasal Cannula; Titrate 1-6 LPM Per SpO2; 90 - 95%    NIPPV - Provider Settings    ECG 12 Lead ED Triage Standing Order; SOA    Insert Peripheral IV    CBC & Differential    Green Top (Gel)    Lavender Top    Gold Top - SST    Light Blue Top        Medications Given in the Emergency Department:  Medications   sodium chloride 0.9 % flush 10 mL (has no administration in time range)   methylPREDNISolone sodium succinate (SOLU-Medrol) 125 mg in sterile water (preservative free) 2 mL (125 mg Intravenous Given 7/22/25 1614)   ipratropium-albuterol (DUO-NEB) nebulizer solution 3 mL (3 mL Nebulization Given 7/22/25 1611)        ED Course:         Labs:    Lab Results (last 24 hours)       Procedure Component Value Units Date/Time    CBC & Differential [[id number]]  (Abnormal) Collected: 07/22/25 1239    Specimen: Blood from Arm, Right Updated: 07/22/25 1246    Narrative:      The following orders were created for panel order CBC & Differential.  Procedure                               Abnormality         Status                     ---------                               -----------         ------                     CBC Auto Differential[[id number]]        Abnormal            Final result                 Please view results for these tests on the individual orders.    Comprehensive Metabolic Panel [[id number]]  (Abnormal) Collected: 07/22/25 1239    Specimen: Blood from Arm, Right Updated: 07/22/25 1307     Glucose 107 mg/dL      BUN 12.2 mg/dL      Creatinine 0.64 mg/dL      Sodium 141 mmol/L      Potassium 3.9 mmol/L      Comment: Slight hemolysis detected by analyzer. Result may be falsely elevated.        Chloride 95 mmol/L      CO2 35.7 mmol/L      Calcium 10.2 mg/dL      Total Protein 6.8 g/dL      Albumin 4.5 g/dL      ALT (SGPT) 32 U/L      AST (SGOT) 46 U/L      Alkaline Phosphatase 110 U/L      Total Bilirubin 0.6 mg/dL      Globulin 2.3 gm/dL      A/G Ratio 2.0 g/dL      BUN/Creatinine Ratio 19.1     Anion Gap 10.3 mmol/L      eGFR 106.4 mL/min/1.73     Narrative:      GFR Categories in Chronic Kidney Disease (CKD)              GFR Category          GFR (mL/min/1.73)    Interpretation  G1                    90 or greater        Normal or high (1)  G2                     60-89                Mild decrease (1)  G3a                   45-59                Mild to moderate decrease  G3b                   30-44                Moderate to severe decrease  G4                    15-29                Severe decrease  G5                    14 or less           Kidney failure    (1)In the absence of evidence of kidney disease, neither GFR category G1 or G2 fulfill the criteria for CKD.    eGFR calculation 2021 CKD-EPI creatinine equation, which does not include race as a factor    BNP [[id number]]  (Normal) Collected: 07/22/25 1239    Specimen: Blood from Arm, Right Updated: 07/22/25 1306     proBNP 608.7 pg/mL     Narrative:      This assay is used as an aid in the diagnosis of individuals suspected of having heart failure. It can be used as an aid in the diagnosis of acute decompensated heart failure (ADHF) in patients presenting with signs and symptoms of ADHF to the emergency department (ED). In addition, NT-proBNP of <300 pg/mL indicates ADHF is not likely.    Age Range Result Interpretation  NT-proBNP Concentration (pg/mL:      <50             Positive            >450                   Gray                 300-450                    Negative             <300    50-75           Positive            >900                  Gray                300-900                  Negative            <300      >75             Positive            >1800                  Gray                300-1800                  Negative            <300    High Sensitivity Troponin T [[id number]]  (Normal) Collected: 07/22/25 1239    Specimen: Blood from Arm, Right Updated: 07/22/25 1307     HS Troponin T 14 ng/L     Narrative:      High Sensitive Troponin T Reference Range:  <14.0 ng/L- Negative Female for AMI  <22.0 ng/L- Negative Male for AMI  >=14 - Abnormal Female indicating possible myocardial injury.  >=22 - Abnormal Male indicating possible myocardial injury.   Clinicians would have to utilize  clinical acumen, EKG, Troponin, and serial changes to determine if it is an Acute Myocardial Infarction or myocardial injury due to an underlying chronic condition.         CBC Auto Differential [[id number]]  (Abnormal) Collected: 07/22/25 1239    Specimen: Blood from Arm, Right Updated: 07/22/25 1246     WBC 5.83 10*3/mm3      RBC 5.23 10*6/mm3      Hemoglobin 15.2 g/dL      Hematocrit 48.5 %      MCV 92.7 fL      MCH 29.1 pg      MCHC 31.3 g/dL      RDW 13.0 %      RDW-SD 44.2 fl      MPV 10.2 fL      Platelets 163 10*3/mm3      Neutrophil % 75.2 %      Lymphocyte % 16.8 %      Monocyte % 7.0 %      Eosinophil % 0.5 %      Basophil % 0.3 %      Immature Grans % 0.2 %      Neutrophils, Absolute 4.38 10*3/mm3      Lymphocytes, Absolute 0.98 10*3/mm3      Monocytes, Absolute 0.41 10*3/mm3      Eosinophils, Absolute 0.03 10*3/mm3      Basophils, Absolute 0.02 10*3/mm3      Immature Grans, Absolute 0.01 10*3/mm3      nRBC 0.0 /100 WBC     Blood Gas, Arterial - [[id number]]  (Abnormal) Collected: 07/22/25 1250    Specimen: Arterial Blood Updated: 07/22/25 1255     Site Right Radial     Adebayo's Test Positive     pH, Arterial 7.333 pH units      pCO2, Arterial 86.1 mm Hg      pO2, Arterial 135.9 mm Hg      HCO3, Arterial 45.7 mmol/L      Base Excess, Arterial 14.9 mmol/L      Comment: Serial Number: [id number]:  [id number]        O2 Saturation, Arterial 98.6 %      Hemoglobin, Blood Gas 15.2 g/dL      Hematocrit, Blood Gas 45.0 %      Barometric Pressure for Blood Gas 744.7000 mmHg      Modality Cannula     FIO2 44 %      Flow Rate 6.0000 lpm      Notified Who Jos Phan MD     Read Back Yes     Notified Time --     Hemodilution No     PO2/FIO2 309    High Sensitivity Troponin T 1Hr [[id number]]  (Normal) Collected: 07/22/25 1430    Specimen: Blood from Arm, Left Updated: 07/22/25 1530     HS Troponin T 11 ng/L      Troponin T Numeric Delta -3 ng/L     Narrative:      High Sensitive Troponin T Reference Range:  <14.0 ng/L-  Negative Female for AMI  <22.0 ng/L- Negative Male for AMI  >=14 - Abnormal Female indicating possible myocardial injury.  >=22 - Abnormal Male indicating possible myocardial injury.   Clinicians would have to utilize clinical acumen, EKG, Troponin, and serial changes to determine if it is an Acute Myocardial Infarction or myocardial injury due to an underlying chronic condition.                  Imaging:    XR Chest 1 View  Result Date: 7/22/2025  XR CHEST 1 VW Date of Exam: 7/22/2025 12:40 PM EDT Indication: SOA Triage Protocol Comparison: 5/16/2025 Findings: Heart size and pulmonary vasculature are within normal limits prominent central bronchial markings. No suspicious consolidation. Scarring in the lung bases. Costophrenic angle sharp     Impression: Findings suggest possible bronchitis, correlate clinically Electronically Signed: Mauro Sun  7/22/2025 12:56 PM EDT  Workstation ID: [id number]        Differential Diagnosis and Discussion:    Dyspnea: Differential diagnosis includes but is not limited to metabolic acidosis, neurological disorders, psychogenic, asthma, pneumothorax, upper airway obstruction, COPD, pneumonia, noncardiogenic pulmonary edema, interstitial lung disease, anemia, congestive heart failure, and pulmonary embolism    PROCEDURES:    Labs were collected in the emergency department and all labs were reviewed and interpreted by me.  X-ray were performed in the emergency department and all X-ray impressions were independently interpreted by me.  An EKG was performed and the EKG was interpreted by me.    ECG 12 Lead ED Triage Standing Order; SOA   Preliminary Result   HEART RATE=63  bpm   RR Vynxbnwr=915  ms   VT Qcbubqiu=463  ms   P Horizontal Axis=49  deg   P Front Axis=80  deg   QRSD Kfzfehjx=099  ms   QT Xutxzizv=608  ms   KBbS=243  ms   QRS Axis=252  deg   T Wave Axis=67  deg   - OTHERWISE NORMAL ECG -   Sinus rhythm   Right superior axis   Date and Time of Study:2025-07-22 12:38:19           My Interpretation of the EKG shows normal sinus rhythm, normal rate, normal QT, no acute ischemia    Procedures    MDM  Number of Diagnoses or Management Options  Acute exacerbation of chronic obstructive pulmonary disease (COPD)  Acute respiratory failure with hypercapnia  Diagnosis management comments: In summary this is a 64-year-old male patient with a history of COPD who presents to the emergency department for evaluation of shortness of breath.  On arrival he was in respiratory distress and was immediately placed on BiPAP as he was only oxygenating at 87% on 6 L nasal cannula.  CBC independently reviewed and interpreted by me and shows no critical abnormalities.  CMP independently reviewed and interpreted by me and shows no critical abnormalities.  Chest x-ray read by me is unremarkable negative for acute pathology.  ABG independently reviewed interpreted by me shows hypercapnia.  Breathing treatments and steroids given.  Patient case has been discussed with the hospitalist team who will admit to the hospital for further evaluation and continuation of treatment.             The patient presents with respiratory distress.  The patient does require supplemental oxygen.  Patient was placed on the cardiac monitor after given breathing treatment, BiPAP.  They were monitored for ventricular ectopy, arrhythmia, tachycardia, hypoxia, and changes in blood pressure.  Continuous pulse oximetry was placed in waveform with corresponding oxygen saturation was assessed throughout their stay in the emergency department.  Patient was rechecked several times in the ED for decline in mental status and worsening distress.    Total Critical Care time of 45 minutes. Total critical care time documented does not include time spent on separately billed procedures for services of nurses or physician assistants. I personally saw and examined the patient. I have reviewed all diagnostic interpretations and treatment plans as  written. I was present for the key portions of any procedures performed and the inclusive time noted in any critical care statement. Critical care time includes patient management by me, time spent at the patients bedside,  time to review lab and imaging results, discussing patient care, documentation in the medical record, and time spent with family or caregiver.          Patient Care Considerations:    SEPSIS IS NOT PRESENT IN THE EMERGENCY DEPARTMENT: Patient meets SIRS criteria in the emergency department however the patient does not have a known source of bacterial infection to confirm the diagnosis of sepsis.        Consultants/Shared Management Plan:    Hospitalist: I have discussed the case with Dr. Smith who agrees to accept the patient for admission.    Social Determinants of Health:    Patient is independent, reliable, and has access to care.       Disposition and Care Coordination:    Admit:   Through independent evaluation of the patient's history, physical, and imperical data, the patient meets criteria for inpatient admission to the hospital.        Final diagnoses:   Acute exacerbation of chronic obstructive pulmonary disease (COPD)   Acute respiratory failure with hypercapnia        ED Disposition       ED Disposition   Decision to Admit    Condition   --    Comment   --               This medical record created using voice recognition software.             Jos Phan MD  07/22/25 [id number]

## 2025-07-22 NOTE — ED NOTES
Patient wheeled from triage to room 16, patient arrives c.o shortness of breath, wears 2-3 l at home nasal cannula. Arrives to ED satting 86% on 6l nasal cannula. In room patient continued on 6l nasal canula, RT to bedside, provider notified by triage nurse.

## 2025-07-22 NOTE — H&P
[facility]   HOSPITALIST HISTORY AND PHYSICAL  Date: 2025   Patient Name: Sumeet Gomes  : 1960  MRN: [id number]  Primary Care Physician:  Mirtha Alexander APRN  Date of admission: 2025    Subjective   Subjective     Chief Complaint: Shortness of breath    HPI:  Sumeet Gomes is a 64 y.o. male with with COPD on 3 L nasal cannula, pulmonary hypertension, CAD, GERD, hypertension and hyperlipidemia that presented to the ED with worsening shortness of breath.  Patient reports that his breathing having worsening for several days and on 2025 he had contacted the pulmonology office reporting his symptoms and was prescribed antibiotics and steroids which he has been taking but per the patient his breathing has not improved at all.  Patient also does have NIPPV at home that he wears throughout the night.  Given lack of improvement in patient symptoms he came to the ED for further evaluation and management.  Eval in the ED significant for patient being hypoxic and labs showing significantly elevated pCO2.  In the ED patient was placed on BiPAP therapy, given DuoNeb breathing treatment and IV Solu-Medrol.  Hospitalist service contacted for further evaluation and management.    Personal History     Past Medical History:  COPD  Pulmonary hypertension  CAD  GERD  Hypertension  Hyperlipidemia    Past Surgical History:  Right shoulder surgery  Colonoscopy with polypectomy  Cardiac catheterization  Bronchoscopy    Family History:   Patient's mother had history of emphysema    Social History:   Tobacco: Current smoker.  Smokes 1 to 2 cigarettes every couple days, patient used to smoke more than this.  Patient has been smoking since .  Alcohol: Denies use  Illicit Substances: Denies use    Home Medications:  O2, Umeclidinium Bromide, albuterol sulfate HFA, amoxicillin-clavulanate, arformoterol, azithromycin, budesonide, furosemide, metoprolol tartrate, nystatin, pantoprazole, predniSONE, and  roflumilast    Allergies:  No Known Allergies    Review of Systems  All systems were reviewed and negative except for:   -Respiratory ROS: positive for - cough, shortness of breath, sputum changes, tachypnea, and wheezing    Objective   Objective     Vitals:   Temp:  [97.7 °F (36.5 °C)-97.9 °F (36.6 °C)] 97.7 °F (36.5 °C)  Heart Rate:  [63-79] 79  Resp:  [20-23] 20  BP: (105-123)/(68-79) 109/79  Flow (L/min) (Oxygen Therapy):  [6] 6    Physical Exam    Constitutional: Awake, alert, acutely ill-appearing male, appears older than stated age   Eyes: Pupils equal, sclerae anicteric, no conjunctival injection   HENT: NCAT, mucous membranes moist   Neck: Supple, no thyromegaly, no lymphadenopathy, trachea midline   Respiratory: Poor aeration, wheezing noted throughout, increased work of breathing appreciated, conversational dyspnea noted, accessory muscle use noted   Cardiovascular: RRR, no murmurs, rubs, or gallops, palpable pedal pulses bilaterally   Gastrointestinal: Positive bowel sounds, soft, nontender, nondistended   Musculoskeletal: No bilateral ankle edema, no clubbing or cyanosis to extremities   Psychiatric: Appropriate affect, cooperative   Neurologic: Oriented x 3, strength symmetric in all extremities, Cranial Nerves grossly intact, speech clear   Skin: No rashes     Result Review    Result Review:  I have personally reviewed the results from the time of this admission to 7/22/2025 16:23 EDT and agree with these findings:  [x]  Laboratory:  CMP          5/20/2025    04:52 6/2/2025    15:29 7/22/2025    12:39   CMP   Glucose 85  94  107    BUN 20  16.0  12.2    Creatinine 0.56  0.87  0.64    EGFR 110.8  97.0  106.4    Sodium 140  144  141    Potassium 4.4  4.5  3.9    Chloride 97  97  95    Calcium 8.8  9.9  10.2    Total Protein  6.2  6.8    Albumin  4.3  4.5    Globulin  1.9  2.3    Total Bilirubin  0.4  0.6    Alkaline Phosphatase  105  110    AST (SGOT)  39  46    ALT (SGPT)  40  32    Albumin/Globulin  Ratio  2.3  2.0    BUN/Creatinine Ratio 35.7  18.4  19.1    Anion Gap 2.2  7.0  10.3      CBC          2025    04:52 2025    15:29 2025    12:39   CBC   WBC 5.75  6.35  5.83    RBC 4.14  4.96  5.23    Hemoglobin 12.0  14.4  15.2    Hematocrit 39.7  48.2  48.5    MCV 95.9  97.2  92.7    MCH 29.0  29.0  29.1    MCHC 30.2  29.9  31.3    RDW 13.1  13.1  13.0    Platelets 95  136  163    proBNP: 608.7.  AB.33/86.1/135.9 on FiO2 of 44%  []  Microbiology:  [x]  Radiology: CXR imaging personally reviewed.  No infiltrate noted.  No pneumothorax.  [x]  EKG/Telemetry:   []  Cardiology/Vascular:   []  Pathology:  []  Old records:  []  Other:      Assessment & Plan   Assessment / Plan     Assessment:  Acute COPD exacerbation  Acute on chronic hypoxic and hypercapnic respiratory failure due to above  Chronic pulmonary hypertension secondary to COPD  Hypertension  Hyperlipidemia  Nonobstructive CAD  GERD  Chronic ongoing active tobacco abuse with cigarettes  Moderate malnutrition    Plan:  -Admit to monitored bed  -Patient to wear continuous NIPPV at this time.  Repeat ABG in the morning  -Start Brovana, Pulmicort and Yupelri  -Start IV Solu-Medrol 40 mg every 8 hours  -Pulmonology consulted to assist in management of the patient  -Continue supplemental O2 to maintain sats greater than 90%, wean as tolerated  -Start azithromycin for COPD exacerbation  -Bronchopulmonary hygiene protocol ordered  -Nicotine patch and lozenge ordered  -Start appropriate home medications once medication list is reconciled and reviewed  -Depending on patient's progression may require transfer to ICU level of care  -Monitor electrolytes and renal function with BMP and magnesium level in the AM  -Monitor WBC and Hgb with CBC in the AM  -Clinical course will dictate further management     DVT Prophylaxis: Lovenox  GI Prophylaxis: Pantoprazole  Diet: N.p.o.  Dispo: Admit to monitored bed  Code Status: Full code     Personally reviewed  patients labs and imaging, discussed with patient and nurse at bedside. Discussed management with the following consultants: Pulmonology.     Part of this note may be an electronic transcription/translation of spoken language to printed text using the Dragon dictation system.      VTE Prophylaxis:  Pharmacologic VTE prophylaxis orders are signed & held.          CODE STATUS:    Code Status (Patient has no pulse and is not breathing): CPR (Attempt to Resuscitate)  Medical Interventions (Patient has pulse or is breathing): Full Support      Admission Status: I believe this patient meets inpatient status.    Electronically signed by Tone Smith MD, 07/22/25, 4:23 PM EDT.

## 2025-07-23 LAB
ANION GAP SERPL CALCULATED.3IONS-SCNC: 6 MMOL/L (ref 5–15)
BUN SERPL-MCNC: 15.6 MG/DL (ref 8–23)
BUN/CREAT SERPL: 27.9 (ref 7–25)
CALCIUM SPEC-SCNC: 9.6 MG/DL (ref 8.6–10.5)
CHLORIDE SERPL-SCNC: 99 MMOL/L (ref 98–107)
CO2 SERPL-SCNC: 37 MMOL/L (ref 22–29)
CREAT SERPL-MCNC: 0.56 MG/DL (ref 0.76–1.27)
DEPRECATED RDW RBC AUTO: 42.5 FL (ref 37–54)
EGFRCR SERPLBLD CKD-EPI 2021: 110.8 ML/MIN/1.73
ERYTHROCYTE [DISTWIDTH] IN BLOOD BY AUTOMATED COUNT: 12.9 % (ref 12.3–15.4)
GLUCOSE SERPL-MCNC: 119 MG/DL (ref 65–99)
HCT VFR BLD AUTO: 42.6 % (ref 37.5–51)
HGB BLD-MCNC: 13.6 G/DL (ref 13–17.7)
MAGNESIUM SERPL-MCNC: 2 MG/DL (ref 1.6–2.4)
MCH RBC QN AUTO: 29.1 PG (ref 26.6–33)
MCHC RBC AUTO-ENTMCNC: 31.9 G/DL (ref 31.5–35.7)
MCV RBC AUTO: 91 FL (ref 79–97)
PLATELET # BLD AUTO: 161 10*3/MM3 (ref 140–450)
PMV BLD AUTO: 10.3 FL (ref 6–12)
POTASSIUM SERPL-SCNC: 4.4 MMOL/L (ref 3.5–5.2)
RBC # BLD AUTO: 4.68 10*6/MM3 (ref 4.14–5.8)
SODIUM SERPL-SCNC: 142 MMOL/L (ref 136–145)
WBC NRBC COR # BLD AUTO: 4.42 10*3/MM3 (ref 3.4–10.8)

## 2025-07-23 PROCEDURE — 99233 SBSQ HOSP IP/OBS HIGH 50: CPT | Performed by: INTERNAL MEDICINE

## 2025-07-23 PROCEDURE — 85027 COMPLETE CBC AUTOMATED: CPT | Performed by: INTERNAL MEDICINE

## 2025-07-23 PROCEDURE — 25810000003 SODIUM CHLORIDE 0.9 % SOLUTION 250 ML FLEX CONT: Performed by: INTERNAL MEDICINE

## 2025-07-23 PROCEDURE — 94799 UNLISTED PULMONARY SVC/PX: CPT

## 2025-07-23 PROCEDURE — 25010000002 ENOXAPARIN PER 10 MG: Performed by: INTERNAL MEDICINE

## 2025-07-23 PROCEDURE — 94761 N-INVAS EAR/PLS OXIMETRY MLT: CPT

## 2025-07-23 PROCEDURE — 36415 COLL VENOUS BLD VENIPUNCTURE: CPT | Performed by: INTERNAL MEDICINE

## 2025-07-23 PROCEDURE — 63710000001 REVEFENACIN 175 MCG/3ML SOLUTION: Performed by: INTERNAL MEDICINE

## 2025-07-23 PROCEDURE — 94762 N-INVAS EAR/PLS OXIMTRY CONT: CPT

## 2025-07-23 PROCEDURE — 25010000002 AZITHROMYCIN PER 500 MG: Performed by: INTERNAL MEDICINE

## 2025-07-23 PROCEDURE — 99223 1ST HOSP IP/OBS HIGH 75: CPT | Performed by: INTERNAL MEDICINE

## 2025-07-23 PROCEDURE — 94664 DEMO&/EVAL PT USE INHALER: CPT

## 2025-07-23 PROCEDURE — 83735 ASSAY OF MAGNESIUM: CPT | Performed by: INTERNAL MEDICINE

## 2025-07-23 PROCEDURE — 80048 BASIC METABOLIC PNL TOTAL CA: CPT | Performed by: INTERNAL MEDICINE

## 2025-07-23 PROCEDURE — 94660 CPAP INITIATION&MGMT: CPT

## 2025-07-23 PROCEDURE — 25010000002 METHYLPREDNISOLONE PER 40 MG: Performed by: INTERNAL MEDICINE

## 2025-07-23 RX ORDER — ACETYLCYSTEINE 200 MG/ML
2 SOLUTION ORAL; RESPIRATORY (INHALATION)
Status: DISCONTINUED | OUTPATIENT
Start: 2025-07-23 | End: 2025-07-26

## 2025-07-23 RX ORDER — ALBUTEROL SULFATE 0.83 MG/ML
2.5 SOLUTION RESPIRATORY (INHALATION) EVERY 6 HOURS PRN
Status: DISCONTINUED | OUTPATIENT
Start: 2025-07-23 | End: 2025-07-28 | Stop reason: HOSPADM

## 2025-07-23 RX ADMIN — Medication 10 ML: at 21:08

## 2025-07-23 RX ADMIN — ENOXAPARIN SODIUM 40 MG: 100 INJECTION SUBCUTANEOUS at 21:08

## 2025-07-23 RX ADMIN — AZITHROMYCIN DIHYDRATE 500 MG: 500 INJECTION, POWDER, LYOPHILIZED, FOR SOLUTION INTRAVENOUS at 09:15

## 2025-07-23 RX ADMIN — METHYLPREDNISOLONE SODIUM SUCCINATE 40 MG: 40 INJECTION, POWDER, FOR SOLUTION INTRAMUSCULAR; INTRAVENOUS at 09:15

## 2025-07-23 RX ADMIN — BUDESONIDE 0.5 MG: 0.5 SUSPENSION RESPIRATORY (INHALATION) at 21:20

## 2025-07-23 RX ADMIN — METOPROLOL TARTRATE 12.5 MG: 25 TABLET, FILM COATED ORAL at 09:15

## 2025-07-23 RX ADMIN — ROFLUMILAST 500 MCG: 500 TABLET ORAL at 09:15

## 2025-07-23 RX ADMIN — ARFORMOTEROL TARTRATE 15 MCG: 15 SOLUTION RESPIRATORY (INHALATION) at 09:51

## 2025-07-23 RX ADMIN — METOPROLOL TARTRATE 12.5 MG: 25 TABLET, FILM COATED ORAL at 21:08

## 2025-07-23 RX ADMIN — BUDESONIDE 0.5 MG: 0.5 SUSPENSION RESPIRATORY (INHALATION) at 09:51

## 2025-07-23 RX ADMIN — ALBUTEROL SULFATE 2.5 MG: 2.5 SOLUTION RESPIRATORY (INHALATION) at 15:01

## 2025-07-23 RX ADMIN — REVEFENACIN 175 MCG: 175 SOLUTION RESPIRATORY (INHALATION) at 09:51

## 2025-07-23 RX ADMIN — NICOTINE 1 PATCH: 14 PATCH, EXTENDED RELEASE TRANSDERMAL at 09:20

## 2025-07-23 RX ADMIN — ACETYLCYSTEINE 2 ML: 200 SOLUTION ORAL; RESPIRATORY (INHALATION) at 15:05

## 2025-07-23 RX ADMIN — METHYLPREDNISOLONE SODIUM SUCCINATE 40 MG: 40 INJECTION, POWDER, FOR SOLUTION INTRAMUSCULAR; INTRAVENOUS at 16:58

## 2025-07-23 RX ADMIN — ARFORMOTEROL TARTRATE 15 MCG: 15 SOLUTION RESPIRATORY (INHALATION) at 21:20

## 2025-07-23 RX ADMIN — ACETYLCYSTEINE 2 ML: 200 SOLUTION ORAL; RESPIRATORY (INHALATION) at 21:20

## 2025-07-23 RX ADMIN — PANTOPRAZOLE SODIUM 40 MG: 40 TABLET, DELAYED RELEASE ORAL at 06:29

## 2025-07-23 NOTE — PLAN OF CARE
Goal Outcome Evaluation:           Progress: improving  Outcome Evaluation: Alert and oriented x 4, VSS, significant drop in O2 sats with minimal activity-recovers in moderate amout of time, denies pain, falls precautions maintained, no significant changes in status during shift, plan of care ongoing.

## 2025-07-23 NOTE — PROGRESS NOTES
RT EQUIPMENT DEVICE RELATED - SKIN ASSESSMENT    RT Medical Equipment/Device:  NIV Mask: Under-the-nose  size: A    Skin Assessment: Cheek: Intact, Chin: Intact, Forehead: Intact, Nares: Intact, Nose: Intact, and Lips: Intact    Device Skin Pressure Protection: Positioning supports utilized and Pressure points protected    Nurse Notification: Akua Craft RRT

## 2025-07-23 NOTE — THERAPY EVALUATION
Respiratory Therapist Broncho-Pulmonary Hygiene Progress Note      Patient Name:  Sumeet Gomes  YOB: 1960    Sumeet Gomes meets the qualification for Level 2 of the Bronco-Pulmonary Hygiene Protocol. This was based on my daily patient assessment and includes review of chest x-ray results, cough ability and quality, oxygenation, secretions or risk for secretion development and patient mobility.     Broncho-Pulmonary Hygiene Assessment:    Level of Movement: Actively changing positions without assistance  Alert/ oriented/ cooperative    Breath Sounds: Diminished and/or coarse rhonchi    Cough: Strong, effective    Chest X-Ray: Possible signs of consolidation and/or atelectasis or clear.     Sputum Productions: None or small amount of thin or watery secretions with effective cough    History and Physical: Chronic condition    SpO2 to Oxygen Need: greater than 92% on room air or  less than 3L nasal canula    Current SpO2 is: 95 on 3Lnc    Based on this information, I have completed the following interventions: Aerobika with bronchodialtor medication or TID      Electronically signed by Lang Hurtado RRT, 07/22/25, 11:02 PM EDT.

## 2025-07-23 NOTE — PLAN OF CARE
Goal Outcome Evaluation:      Patient wore BIPAP throughout the night. Patient currently receiving 2L NC per home regimen. Patient tolerating well.

## 2025-07-23 NOTE — CONSULTS
Patient Name: Sumeet Gomes  YOB: 1960  MRN: [id number]  Admission date: 7/22/2025  Reason for Encounter: Low BMI    [facility] Clinical Nutrition Assessment     Subjective    Subjective Information     07/23: Nutrition assessment completed for BMI 18.5 kg/m2. Pt reports slight decrease in po intake d/t COPD exacerbation x2 weeks PTA. Receiving a cardiac diet w/ no intakes appreciated at present, pt with c/o no salt at meals and requesting one pkt daily to help improve intakes. Wt remains stable at present per bed scale 53.5 kg, previous wt per EMR review 53.7 kg 5/17. NFPE preformed w/ severe muscle wasting and fat loss. Provided nutrition education on healthy ways to maintain/gain weight with catabolic illness      Objective   H&P and Current Problems      H&P  Past Medical History:   Diagnosis Date    CHF (congestive heart failure)     COPD (chronic obstructive pulmonary disease)     COPD (chronic obstructive pulmonary disease)     Coronary artery disease     Diastolic heart failure     Emphysema of lung     Hyperlipidemia     Hypertension     Pulmonary hypertension       Past Surgical History:   Procedure Laterality Date    BACK SURGERY      BRONCHOSCOPY N/A 09/14/2022    Procedure: BRONCHOSCOPY WITH BRONCHOALVEOLAR LAVAGE AND BRONCHIAL WASHINGS;  Surgeon: Ulices Moya MD;  Location: [facility] ENDOSCOPY;  Service: Pulmonary;  Laterality: N/A;  MUCUS PLUGGING, COPD EXACERBATION    BRONCHOSCOPY      BRONCHOSCOPY N/A 5/17/2024    Procedure: BRONCHOSCOPY WITH BAL AND WASHINGS;  Surgeon: Collins Chapa MD;  Location: [facility] ENDOSCOPY;  Service: Pulmonary;  Laterality: N/A;  MUCUS PLUGGING    CARDIAC CATHETERIZATION N/A 09/16/2022    Procedure: Right Heart Cath;  Surgeon: Drew Rodriguez MD;  Location: [facility] CATH INVASIVE LOCATION;  Service: Cardiovascular;  Laterality: N/A;    COLONOSCOPY N/A 8/20/2024    Procedure: COLONOSCOPY WITH HOT SNARE POLYPECTOMIES;  Surgeon: Obdulio Tripp,  "MD;  Location: [facility] ENDOSCOPY;  Service: Gastroenterology;  Laterality: N/A;  COLON POLYPS, DIVERTICULOSIS    ENDOSCOPY N/A 8/20/2024    Procedure: ESOPHAGOGASTRODUODENOSCOPY WITH BIOPSIES;  Surgeon: Obdulio Tripp MD;  Location: [facility] ENDOSCOPY;  Service: Gastroenterology;  Laterality: N/A;  HIATAL HERNIA, BARRETTS    OTHER SURGICAL HISTORY      knee     OTHER SURGICAL HISTORY      shoulder, rotator cuff    SHOULDER SURGERY Right 10/03/2022      Current Problems   Admission Diagnosis:  Acute exacerbation of chronic obstructive pulmonary disease (COPD) [J44.1]  COPD with acute exacerbation [J44.1]  Acute respiratory failure with hypercapnia [J96.02]    Problem List:    COPD with acute exacerbation      Other Applicable Nutrition Information:        Anthropometrics       Height: 170.2 cm (67\")  Weight: 53.5 kg (118 lb) (07/22/25 1231)  Weight Method: Standing scale  BMI (Calculated): 18.5       Trending Weight Changes 07/23/25: No significant changes       Weight History  Wt Readings from Last 20 Encounters:   07/22/25 1231 53.5 kg (118 lb)   06/02/25 0908 53.5 kg (118 lb)   06/02/25 1428 53.5 kg (118 lb)   05/17/25 0100 53.7 kg (118 lb 6.2 oz)   05/16/25 2046 56 kg (123 lb 7.3 oz)   04/24/25 1303 54.8 kg (120 lb 12.8 oz)   02/25/25 0803 55.8 kg (123 lb)   12/27/24 1050 56.2 kg (123 lb 12.8 oz)   08/27/24 1449 56.7 kg (125 lb)   08/20/24 0754 55.6 kg (122 lb 9.2 oz)   08/12/24 1100 56.2 kg (124 lb)   02/13/25 1501 55.8 kg (123 lb)   08/09/24 1359 57.2 kg (126 lb)   06/25/24 1346 57.2 kg (126 lb 3.2 oz)   06/18/24 1357 57.5 kg (126 lb 12.8 oz)   05/30/24 1125 55.8 kg (123 lb)   05/12/24 1844 51.5 kg (113 lb 8.6 oz)   05/12/24 1428 53.9 kg (118 lb 13.3 oz)   02/22/24 1543 59.8 kg (131 lb 12.8 oz)   02/12/24 1317 59.4 kg (130 lb 15.3 oz)   01/17/24 1344 59.4 kg (131 lb)   01/03/24 1303 55 kg (121 lb 3.2 oz)            Labs      Comment: Reviewed     Results from last 7 days   Lab Units 07/23/25  0539 " 07/22/25  1239   SODIUM mmol/L 142 141   POTASSIUM mmol/L 4.4 3.9   GLUCOSE mg/dL 119* 107*   BUN mg/dL 15.6 12.2   CREATININE mg/dL 0.56* 0.64*   CALCIUM mg/dL 9.6 10.2   MAGNESIUM mg/dL 2.0  --    ALBUMIN g/dL  --  4.5   BILIRUBIN mg/dL  --  0.6   ALK PHOS U/L  --  110   AST (SGOT) U/L  --  46*   ALT (SGPT) U/L  --  32   PROBNP pg/mL  --  608.7     Results from last 7 days   Lab Units 07/23/25  0539 07/22/25  1239   PLATELETS 10*3/mm3 161 163   HEMOGLOBIN g/dL 13.6 15.2   HEMATOCRIT % 42.6 48.5     Lab Results   Component Value Date    HGBA1C 5.50 09/12/2023          Medications       Scheduled Medications arformoterol, 15 mcg, Nebulization, BID - RT  azithromycin, 500 mg, Intravenous, Daily  budesonide, 0.5 mg, Nebulization, BID - RT  enoxaparin sodium, 40 mg, Subcutaneous, Q24H  methylPREDNISolone sodium succinate, 40 mg, Intravenous, Q8H  metoprolol tartrate, 12.5 mg, Oral, BID  nicotine, 1 patch, Transdermal, Q24H  pantoprazole, 40 mg, Oral, QAM  revefenacin, 175 mcg, Nebulization, Daily - RT  roflumilast, 500 mcg, Oral, Daily  sodium chloride, 10 mL, Intravenous, Q12H        Infusions      PRN Medications   acetaminophen    senna-docusate sodium **AND** polyethylene glycol **AND** bisacodyl **AND** bisacodyl    nicotine polacrilex    nitroglycerin    ondansetron ODT    sodium chloride    sodium chloride    sodium chloride     Physical Findings      Chewing/Swallowing  Teeth Status: Mouth/Teeth WDL: .WDL except, teeth Teeth Symptoms: tooth/teeth missing  Chewing/Swallowing Issues: No issues identified at this time   Edema                            Bowel Function         I/Os  Intake & Output (last 3 days)       None           Lines, Drains, Airways, & Wounds       Active LDAs       Name Placement date Placement time Site Days Last dressing change    Peripheral IV 07/22/25 1254 20 G Left;Upper Arm 07/22/25  1254  Arm  less than 1 07/22/25 1254 (21.93 hrs)                      Nutrition Focused Physical Exam      Trending NFPE 07/23/25:Severe muscle wasting and fat loss appreciated      Malnutrition Severity Assessment      Patient meets criteria for : Severe Malnutrition  Malnutrition Type (Last 8 Hours)       Malnutrition Severity Assessment       Row Name 07/23/25 1112       Malnutrition Severity Assessment    Malnutrition Type Chronic Disease - Related Malnutrition      Row Name 07/23/25 1112       Insufficient Energy Intake     Insufficient Energy Intake  --  <75% intake x2 weeks PTA per pt report      Row Name 07/23/25 1112       Unintentional Weight Loss     Unintentional Weight Loss Findings None    Unintentional Weight Loss  --  Stable at present per EMR      Row Name 07/23/25 1112       Muscle Loss    Loss of Muscle Mass Findings Severe    [place] Region Severe - deep hollowing/scooping, lack of muscle to touch, facial bones well defined    Clavicle Bone Region Severe - protruding prominent bone    Acromion Bone Region Severe - squared shoulders, bones, and acromion process protrusion prominent    Scapular Bone Region Severe - prominent bones, depressions easily visible between ribs, scapula, spine, shoulders    Patellar Region Severe - prominent bone, square looking, very little muscle definition    Anterior Thigh Region Severe - line/depression along thigh, obviously thin      Row Name 07/23/25 1112       Fat Loss    Subcutaneous Fat Loss Findings Severe    Orbital Region  Severe - pronounced hollowness/depression, dark circles, loose saggy skin    Upper Arm Region Severe - mostly skin, very little space between folds, fingers touch      Row Name 07/23/25 1112       Criteria Met (Must meet criteria for severity in at least 2 of these categories: M Wasting, Fat Loss, Fluid, Secondary Signs, Wt. Status, Intake)    Patient meets criteria for  Severe Malnutrition                     Estimated Needs      Energy Requirements    Weight for Calculation 53.5 kg CBW   Method for Estimation  30-35 kcals/kg   Daily Needs  (kcal/day) 9772-9917   Protein Requirements    Weight for Calculation 53.5 kg CBW   Method for Estimation 1.2-2.0 gm/kg   Daily Needs (g/day)    Fluid Requirements     Method for Estimation 1 mL/kcal    Daily Needs (mL/day) 6414-1440     Current Nutrition Orders & Evaluation of Intake      Oral Nutrition     Food Allergies  and Intolerances NKFA   Current PO Diet Diet: Cardiac; Healthy Heart (2-3 Na+); Fluid Consistency: Thin (IDDSI 0)   Oral Nutrition Supplement None     Trending % PO Intake 07/23/25:No intakes documented     Enteral Nutrition     Current EN Order Patient isn't on Tube Feeding  Patient doesn't have any tube feeding modular orders     EN Route      EN Tolerance     EN Observation/Intake         Parenteral Nutrition     Current TPN Order    TPN Route    Lipids (mL/%/frequency)     Total # Days on TPN    TPN Observation/Intake       Assessment & Plan   Nutrition Diagnosis and Goals       Nutrition Diagnosis 1 Severe chronic disease or condition related malnutrition related to inability to consume sufficient nutritions d/t catabolic illness as evidence by chronic BMI <19 w/ severe muscle wasting and fat loss             Goal(s) Average Meal Intake at Least 75%, Accepts Oral Nutrition Supplement, Maintain Weight, and Weight goal of 66.1 kg     Nutrition Intervention and Prescription       Intervention  Start oral nutrition supplement and Provide Education      Diet Prescription Cardiac Diet w/ 1 salt pkt daily     Supplement Prescription Boost VHC TID, Magic Cup daily at Dinner     Education Provided  Provided healthy ways to maintain/gain weight including small,frequent meals 5-7x daily to help meet increased calorie and protein needs w/ increased satiety. Incorporating more high calorie/protein food choices into eating occasions to help preserve/build muscle mass for strength and energy. Encouraged continued use of ONS at dc at least BID to help meet nutrition needs     Enteral Prescription         TPN Prescription      Monitoring/Evaluation       Monitor/Evaluation PO Intake, Oral Nutrition Supplement Intake, Pertinent Labs, and Weight     RD Follow-Up Encounter 3-5 days     Electronically signed by:  Lyric Thayer RD  07/23/25 11:13 EDT

## 2025-07-23 NOTE — CONSULTS
[facility]   Consult Note    Patient Name: Sumeet Gomes  : 1960  MRN: [id number]  Primary Care Physician:  Mirtha Alexander APRN  Referring Physician: No ref. provider found  Date of admission: 2025    Inpatient Pulmonology Consult  Consult performed by: Lang Willis DO  Consult ordered by: Tone Smith MD        Subjective   Subjective     Reason for Consult/ Chief Complaint: Hypercapnic respiratory failure, COPD exacerbation, mucous plugging    History of Present Illness  Sumeet Gomes is a 64 y.o. male with past medical history of asthma/COPD overlap syndrome, tobacco abuse of cigarettes, pulmonary hypertension send to the ED for evaluation of worsening shortness of breath and failure of outpatient antibiotics and steroids.  CXR obtained demonstrating bronchitis.  Was placed on 6 L nasal cannula and BiPAP.  The service was consulted to assist in the management during the patient's acute issues.  Upon assessment, patient lying in bed on 3 L nasal cannula.  Findings and plan were discussed with the patient.  Patient reporting worsening shortness of breath over the last day or so.  Patient reporting cough with sputum production.  Patient denies any chest pain or chest tightness.  Patient currently wheezing.  Denies any fever or chills, nausea or vomiting.    Personal History     Past Medical History:   Diagnosis Date    CHF (congestive heart failure)     COPD (chronic obstructive pulmonary disease)     COPD (chronic obstructive pulmonary disease)     Coronary artery disease     Diastolic heart failure     Emphysema of lung     Hyperlipidemia     Hypertension     Pulmonary hypertension        Past Surgical History:   Procedure Laterality Date    BACK SURGERY      BRONCHOSCOPY N/A 2022    Procedure: BRONCHOSCOPY WITH BRONCHOALVEOLAR LAVAGE AND BRONCHIAL WASHINGS;  Surgeon: Ulices Moya MD;  Location: [facility] ENDOSCOPY;  Service: Pulmonary;  Laterality: N/A;  MUCUS  PLUGGING, COPD EXACERBATION    BRONCHOSCOPY      BRONCHOSCOPY N/A 5/17/2024    Procedure: BRONCHOSCOPY WITH BAL AND WASHINGS;  Surgeon: Collins Chapa MD;  Location: [facility] ENDOSCOPY;  Service: Pulmonary;  Laterality: N/A;  MUCUS PLUGGING    CARDIAC CATHETERIZATION N/A 09/16/2022    Procedure: Right Heart Cath;  Surgeon: Drew Rodriguez MD;  Location: [facility] CATH INVASIVE LOCATION;  Service: Cardiovascular;  Laterality: N/A;    COLONOSCOPY N/A 8/20/2024    Procedure: COLONOSCOPY WITH HOT SNARE POLYPECTOMIES;  Surgeon: Obdulio Tripp MD;  Location: [facility] ENDOSCOPY;  Service: Gastroenterology;  Laterality: N/A;  COLON POLYPS, DIVERTICULOSIS    ENDOSCOPY N/A 8/20/2024    Procedure: ESOPHAGOGASTRODUODENOSCOPY WITH BIOPSIES;  Surgeon: Obdulio Tripp MD;  Location: [facility] ENDOSCOPY;  Service: Gastroenterology;  Laterality: N/A;  HIATAL HERNIA, BARRETTS    OTHER SURGICAL HISTORY      knee     OTHER SURGICAL HISTORY      shoulder, rotator cuff    SHOULDER SURGERY Right 10/03/2022       Family History: family history includes Asthma in his mother; COPD in his mother; Emphysema in his mother; Stroke in his mother. Otherwise pertinent FHx was reviewed and not pertinent to current issue.    Social History:  reports that he quit smoking about 2 months ago. His smoking use included cigarettes. He started smoking about 50 years ago. He has a 12.6 pack-year smoking history. He has been exposed to tobacco smoke. He has never used smokeless tobacco. He reports that he does not currently use alcohol. He reports that he does not use drugs.    Home Medications:   Umeclidinium Bromide, albuterol sulfate HFA, amoxicillin-clavulanate, arformoterol, azithromycin, budesonide, furosemide, metoprolol tartrate, nystatin, pantoprazole, predniSONE, and roflumilast    Allergies:  No Known Allergies    Objective    Objective     Vitals:  Temp:  [97.5 °F (36.4 °C)-98.2 °F (36.8 °C)] 98.2 °F (36.8 °C)  Heart Rate:  []  82  Resp:  [16-22] 16  BP: ()/(68-86) 106/79  Flow (L/min) (Oxygen Therapy):  [2-3] 2    Physical Exam  Frail  On 2 L of oxygen  Scattered expiratory wheezing  Result Review    Result Review:  I have personally reviewed the results from the time of this admission to 7/23/2025 15:50 EDT and agree with these findings:  []  Laboratory  []  Microbiology  []  Radiology  []  EKG/Telemetry   []  Cardiology/Vascular   []  Pathology  []  Old records  []  Other:    Most notable findings include:         Lab 07/23/25  0539 07/22/25  1239   WBC 4.42 5.83   HEMOGLOBIN 13.6 15.2   HEMATOCRIT 42.6 48.5   PLATELETS 161 163   SODIUM 142 141   POTASSIUM 4.4 3.9   CHLORIDE 99 95*   CO2 37.0* 35.7*   BUN 15.6 12.2   CREATININE 0.56* 0.64*   GLUCOSE 119* 107*   CALCIUM 9.6 10.2   TOTAL PROTEIN  --  6.8   ALBUMIN  --  4.5   GLOBULIN  --  2.3       Assessment & Plan   Assessment / Plan     Brief Patient Summary:  Sumeet Gomes is a 64 y.o. male who presents with COPD exacerbation, and hypercapnic respiratory failure    Active Hospital Problems:  Active Hospital Problems    Diagnosis     **COPD with acute exacerbation    Impression  COPD exacerbation  Chronic respiratory failure with hypercapnia      Plan:   Does have diffuse wheezing  Will start patient on Mucomyst  Continue with IV Solu-Medrol continue Daliresp  Will discontinue ipratropium  Transition albuterol given thicknesses secretion  Continue provide  Continue Pulmicort  Azithromycin for COPD exacerbation      Electronically signed by ZULY Ames, 07/23/25, 3:59 PM EDT.    I personally seen  examined this patient and the medical decision making and assessment and plan reflect my and I assume responsibility for the care of this patient  Electronically signed by Lang Willis DO, 07/23/25, 3:50 PM EDT.

## 2025-07-23 NOTE — THERAPY EVALUATION
RT EQUIPMENT DEVICE RELATED - SKIN ASSESSMENT    RT Medical Equipment/Device:     NIV Mask:  Full-face    size: A    Skin Assessment:      LIPS, NOSE, CHEEKS:  Intact    Device Skin Pressure Protection:  Positioning supports utilized    Nurse Notification:  Akua Hurtado, RRT

## 2025-07-23 NOTE — PLAN OF CARE
Goal Outcome Evaluation:  Plan of Care Reviewed With: patient        Progress: improving  Outcome Evaluation: AAOx4, on 3L NC while awake and BiPAP while asleep. VSS. No complaints of pain. IV solu-medrol. Continue plan of care.

## 2025-07-23 NOTE — PLAN OF CARE
Goal Outcome Evaluation:   Patient wore V60 unit down in the ER until he came up to 3west. Patient currently on 3Lnc tolerating well before bed. Patient to wear through the night and repeat ABG in am. No issues or changes @ this time.

## 2025-07-23 NOTE — CASE MANAGEMENT/SOCIAL WORK
Mr. Gomes is adherent with his NIV usage. Min PS is set to 9.   High leak was noted between July 9-13th.  Patient currently on V60 with rate of 22. Home Vt average is 14; consider increasing set rate at home to improve Ve.          Home regimen discussed with patient and spouse. Patient uses his maintenance medications as scheduled, but does not have any albuterol nebulizer solution.  Patient does have rescue inhaler but would benefit from nebulized albuterol.      Patient states he is having difficulty clearing, thick mucous. Mr. Gomes no longer has his chest percussion vest as it was no longer affordable once he changed from medicare to medicaid. Patient states he uses a hand percussor at home. Dr. Willis is starting patient on mucomyst.     Mr. Gomes has been smoking cigarettes. Patient aware of the need to completely abstain from cigarettes.

## 2025-07-23 NOTE — PROGRESS NOTES
[facility]   Hospitalist Progress Note  Date: 2025  Patient Name: Sumeet Gomes  : 1960  MRN: [id number]  Date of admission: 2025  Consultants:   - Pulmonology: Dr. Lang Willis    Subjective   Subjective     Chief Complaint: Shortness of breath    Summary:   Sumeet Gomes is a 64 y.o. male with COPD on 3 L nasal cannula, pulmonary hypertension, CAD, GERD, hypertension and hyperlipidemia that presented to the ED with worsening shortness of breath despite use of Augmentin and steroid therapy.  Patient noted to be hypoxic and hypercapnic in the ED and started on BiPAP therapy.  Hospitalist service contacted.  Pulmonology consulted.  Nebulizer breathing treatments and IV steroid therapy started.    Interval Followup:   Patient still having complaints of shortness of breath.  Says that he did wear BiPAP throughout the night.  Says that with very minimal exertion he becomes significantly short of breath and has difficulty catching his breath.  Endorsed productive cough.    Antibiotics:   - Azithromycin    Objective   Objective     Vitals:   Temp:  [97.5 °F (36.4 °C)-98.2 °F (36.8 °C)] 97.9 °F (36.6 °C)  Heart Rate:  [] 93  Resp:  [18-23] 18  BP: ()/(68-86) 110/70  Flow (L/min) (Oxygen Therapy):  [2-6] 2  Physical Exam   Gen: Chronically ill-appearing male, appears older than stated age, sitting up in bed  Resp: Poor aeration, diffuse wheezing noted, conversational dyspnea appreciated  Card: Regular rhythm, tachycardic, No m/r/g  Abd: Soft, Nontender, Nondistended, + bowel sounds    Result Review    Result Review:  I have personally reviewed the results as below and agree with these findings:  []  Laboratory:   CMP          2025    15:29 2025    12:39 2025    05:39   CMP   Glucose 94  107  119    BUN 16.0  12.2  15.6    Creatinine 0.87  0.64  0.56    EGFR 97.0  106.4  110.8    Sodium 144  141  142    Potassium 4.5  3.9  4.4    Chloride 97  95  99    Calcium 9.9  10.2   9.6    Total Protein 6.2  6.8     Albumin 4.3  4.5     Globulin 1.9  2.3     Total Bilirubin 0.4  0.6     Alkaline Phosphatase 105  110     AST (SGOT) 39  46     ALT (SGPT) 40  32     Albumin/Globulin Ratio 2.3  2.0     BUN/Creatinine Ratio 18.4  19.1  27.9    Anion Gap 7.0  10.3  6.0      CBC          6/2/2025    15:29 7/22/2025    12:39 7/23/2025    05:39   CBC   WBC 6.35  5.83  4.42    RBC 4.96  5.23  4.68    Hemoglobin 14.4  15.2  13.6    Hematocrit 48.2  48.5  42.6    MCV 97.2  92.7  91.0    MCH 29.0  29.1  29.1    MCHC 29.9  31.3  31.9    RDW 13.1  13.0  12.9    Platelets 136  163  161    Magnesium within normal limits  []  Microbiology:   []  Radiology:   [x]  EKG/Telemetry:    []  Cardiology/Vascular:    []  Pathology:  []  Old records:  []  Other:    Assessment & Plan   Assessment / Plan     Assessment:  Acute COPD exacerbation  Acute on chronic hypoxic and hypercapnic respiratory failure due to above  Chronic pulmonary hypertension secondary to COPD  Hypertension  Hyperlipidemia  Nonobstructive CAD  GERD  Chronic ongoing active tobacco abuse with cigarettes  Moderate malnutrition    Plan:  -Pulmonology consulted and following, appreciate assistance and recommendations in the care of this patient.  -Continue Brovana, Pulmicort and Yupelri  -Continue Daliresp  -Bronchopulmonary hygiene protocol ordered  -Continue IV Solu-Medrol given persistent wheezing  -Patient is to wear NIPPV at night and with naps  -RT case management consulted  -Continue appropriate home medications  -Continue nicotine patch for smoking cessation  -Will monitor electrolytes and renal function with BMP and magnesium level in the AM  -Will monitor WBC and Hgb with CBC in the AM  -Clinical course will dictate further management    Smoking cessation counseling provided to patient for 7 minutes.  Patient has 12.6 pack year smoking history.  Discussed the risks of continued smoking including increased risk of cancer, lung disease, blood  clots and worsening cardiovascular health.  Discussed options to help with smoking cessation including nicotine patches, lozenges, and pharmacotherapy.  Instructed patient on 1800-quit-now phone number to obtain additional resources and free patches/smoking cessation aids.   Patient is willing to quit smoking.  Stop date set for 07/22/2025.  Patient will use DT patches and lozenges to aid in smoking cessation.       DVT Prophylaxis: Lovenox  GI Prophylaxis: Pantoprazole  Diet:   Diet Order   Procedures    Diet: Cardiac; Healthy Heart (2-3 Na+); Fluid Consistency: Thin (IDDSI 0)     Dispo: Home when medically appropriate for discharge     Time spent personally reviewing patient's chart, labs and imaging, evaluating/examining the patient, discussing care plan with patient and nurse at bedside and discussing management with consultants (pulmonology): 51 minutes.     Part of this note may be an electronic transcription/translation of spoken language to printed text using the Dragon dictation system.    VTE Prophylaxis:  Pharmacologic VTE prophylaxis orders are present.        CODE STATUS:   Code Status (Patient has no pulse and is not breathing): CPR (Attempt to Resuscitate)  Medical Interventions (Patient has pulse or is breathing): Full Support        Electronically signed by Tone Smith MD, 7/23/2025, 12:07 EDT.

## 2025-07-24 LAB
ANION GAP SERPL CALCULATED.3IONS-SCNC: 4.1 MMOL/L (ref 5–15)
BUN SERPL-MCNC: 17.8 MG/DL (ref 8–23)
BUN/CREAT SERPL: 32.4 (ref 7–25)
CALCIUM SPEC-SCNC: 9.6 MG/DL (ref 8.6–10.5)
CHLORIDE SERPL-SCNC: 100 MMOL/L (ref 98–107)
CO2 SERPL-SCNC: 37.9 MMOL/L (ref 22–29)
CREAT SERPL-MCNC: 0.55 MG/DL (ref 0.76–1.27)
DEPRECATED RDW RBC AUTO: 43.1 FL (ref 37–54)
EGFRCR SERPLBLD CKD-EPI 2021: 111.4 ML/MIN/1.73
ERYTHROCYTE [DISTWIDTH] IN BLOOD BY AUTOMATED COUNT: 12.9 % (ref 12.3–15.4)
GLUCOSE SERPL-MCNC: 126 MG/DL (ref 65–99)
HCT VFR BLD AUTO: 43.1 % (ref 37.5–51)
HGB BLD-MCNC: 13.6 G/DL (ref 13–17.7)
MAGNESIUM SERPL-MCNC: 2.1 MG/DL (ref 1.6–2.4)
MCH RBC QN AUTO: 28.9 PG (ref 26.6–33)
MCHC RBC AUTO-ENTMCNC: 31.6 G/DL (ref 31.5–35.7)
MCV RBC AUTO: 91.7 FL (ref 79–97)
PHOSPHATE SERPL-MCNC: 3 MG/DL (ref 2.5–4.5)
PLATELET # BLD AUTO: 165 10*3/MM3 (ref 140–450)
PMV BLD AUTO: 10.5 FL (ref 6–12)
POTASSIUM SERPL-SCNC: 4.6 MMOL/L (ref 3.5–5.2)
RBC # BLD AUTO: 4.7 10*6/MM3 (ref 4.14–5.8)
SODIUM SERPL-SCNC: 142 MMOL/L (ref 136–145)
WBC NRBC COR # BLD AUTO: 7.56 10*3/MM3 (ref 3.4–10.8)

## 2025-07-24 PROCEDURE — 84100 ASSAY OF PHOSPHORUS: CPT | Performed by: INTERNAL MEDICINE

## 2025-07-24 PROCEDURE — 63710000001 REVEFENACIN 175 MCG/3ML SOLUTION: Performed by: INTERNAL MEDICINE

## 2025-07-24 PROCEDURE — 94799 UNLISTED PULMONARY SVC/PX: CPT

## 2025-07-24 PROCEDURE — 83735 ASSAY OF MAGNESIUM: CPT | Performed by: INTERNAL MEDICINE

## 2025-07-24 PROCEDURE — 94664 DEMO&/EVAL PT USE INHALER: CPT

## 2025-07-24 PROCEDURE — 94761 N-INVAS EAR/PLS OXIMETRY MLT: CPT

## 2025-07-24 PROCEDURE — 99233 SBSQ HOSP IP/OBS HIGH 50: CPT | Performed by: INTERNAL MEDICINE

## 2025-07-24 PROCEDURE — 25010000002 ENOXAPARIN PER 10 MG: Performed by: INTERNAL MEDICINE

## 2025-07-24 PROCEDURE — 25810000003 SODIUM CHLORIDE 0.9 % SOLUTION 250 ML FLEX CONT: Performed by: INTERNAL MEDICINE

## 2025-07-24 PROCEDURE — 25010000002 AZITHROMYCIN PER 500 MG: Performed by: INTERNAL MEDICINE

## 2025-07-24 PROCEDURE — 25010000002 METHYLPREDNISOLONE PER 40 MG: Performed by: INTERNAL MEDICINE

## 2025-07-24 PROCEDURE — 80048 BASIC METABOLIC PNL TOTAL CA: CPT | Performed by: INTERNAL MEDICINE

## 2025-07-24 PROCEDURE — 94660 CPAP INITIATION&MGMT: CPT

## 2025-07-24 PROCEDURE — 85027 COMPLETE CBC AUTOMATED: CPT | Performed by: INTERNAL MEDICINE

## 2025-07-24 RX ADMIN — NICOTINE 1 PATCH: 14 PATCH, EXTENDED RELEASE TRANSDERMAL at 09:04

## 2025-07-24 RX ADMIN — BUDESONIDE 0.5 MG: 0.5 SUSPENSION RESPIRATORY (INHALATION) at 20:40

## 2025-07-24 RX ADMIN — AZITHROMYCIN DIHYDRATE 500 MG: 500 INJECTION, POWDER, LYOPHILIZED, FOR SOLUTION INTRAVENOUS at 09:02

## 2025-07-24 RX ADMIN — PANTOPRAZOLE SODIUM 40 MG: 40 TABLET, DELAYED RELEASE ORAL at 06:02

## 2025-07-24 RX ADMIN — ACETYLCYSTEINE 2 ML: 200 SOLUTION ORAL; RESPIRATORY (INHALATION) at 09:20

## 2025-07-24 RX ADMIN — Medication 10 ML: at 20:54

## 2025-07-24 RX ADMIN — REVEFENACIN 175 MCG: 175 SOLUTION RESPIRATORY (INHALATION) at 09:20

## 2025-07-24 RX ADMIN — METHYLPREDNISOLONE SODIUM SUCCINATE 40 MG: 40 INJECTION, POWDER, FOR SOLUTION INTRAMUSCULAR; INTRAVENOUS at 00:27

## 2025-07-24 RX ADMIN — ARFORMOTEROL TARTRATE 15 MCG: 15 SOLUTION RESPIRATORY (INHALATION) at 09:20

## 2025-07-24 RX ADMIN — ENOXAPARIN SODIUM 40 MG: 100 INJECTION SUBCUTANEOUS at 20:54

## 2025-07-24 RX ADMIN — METHYLPREDNISOLONE SODIUM SUCCINATE 40 MG: 40 INJECTION, POWDER, FOR SOLUTION INTRAMUSCULAR; INTRAVENOUS at 09:02

## 2025-07-24 RX ADMIN — METHYLPREDNISOLONE SODIUM SUCCINATE 40 MG: 40 INJECTION, POWDER, FOR SOLUTION INTRAMUSCULAR; INTRAVENOUS at 23:09

## 2025-07-24 RX ADMIN — ACETYLCYSTEINE 2 ML: 200 SOLUTION ORAL; RESPIRATORY (INHALATION) at 20:40

## 2025-07-24 RX ADMIN — Medication 10 ML: at 09:02

## 2025-07-24 RX ADMIN — BUDESONIDE 0.5 MG: 0.5 SUSPENSION RESPIRATORY (INHALATION) at 09:20

## 2025-07-24 RX ADMIN — ROFLUMILAST 500 MCG: 500 TABLET ORAL at 09:02

## 2025-07-24 RX ADMIN — ALBUTEROL SULFATE 2.5 MG: 2.5 SOLUTION RESPIRATORY (INHALATION) at 20:40

## 2025-07-24 RX ADMIN — METOPROLOL TARTRATE 12.5 MG: 25 TABLET, FILM COATED ORAL at 09:02

## 2025-07-24 RX ADMIN — ARFORMOTEROL TARTRATE 15 MCG: 15 SOLUTION RESPIRATORY (INHALATION) at 20:40

## 2025-07-24 RX ADMIN — METOPROLOL TARTRATE 12.5 MG: 25 TABLET, FILM COATED ORAL at 20:53

## 2025-07-24 RX ADMIN — METHYLPREDNISOLONE SODIUM SUCCINATE 40 MG: 40 INJECTION, POWDER, FOR SOLUTION INTRAMUSCULAR; INTRAVENOUS at 15:59

## 2025-07-24 NOTE — PLAN OF CARE
Goal Outcome Evaluation:            Pt wore bipap last night and tolerated well. [facility]

## 2025-07-24 NOTE — PLAN OF CARE
Goal Outcome Evaluation:              Outcome Evaluation: Alert & oriented x4; vital signs remain stable for patient throughout shift. Patient NPO at midnight for planned procedure on 7/25. Informed consent obtained & signed by patient, in patient chart. Denies further needs at this time, plan of care ongoing.

## 2025-07-24 NOTE — PROGRESS NOTES
[facility]   Hospitalist Progress Note  Date: 2025  Patient Name: Sumeet Gomes  : 1960  MRN: [id number]  Date of admission: 2025  Consultants:   - Pulmonology: Dr. Lang Willis    Subjective   Subjective     Chief Complaint: Shortness of breath    Summary:   Sumeet Gomes is a 64 y.o. male with COPD on 3 L nasal cannula, pulmonary hypertension, CAD, GERD, hypertension and hyperlipidemia that presented to the ED with worsening shortness of breath despite use of Augmentin and steroid therapy.  Patient noted to be hypoxic and hypercapnic in the ED and started on BiPAP therapy.  Hospitalist service contacted.  Pulmonology consulted.  Nebulizer breathing treatments and IV steroid therapy started.    Interval Followup:     2025  Resting comfortably in bed  Respiratory status improving but not back to baseline.  Still dyspneic and desats easily.  Still with cough.  Patient remains weak.    He is alert and conversational.  No fevers recorded  On 3 L O2 (3L at home)  NSR on telemetry  105/68  -  115/77  Antibiotics:   - Azithromycin    Objective   Objective     Vitals:   Temp:  [97.5 °F (36.4 °C)-98.2 °F (36.8 °C)] 98.1 °F (36.7 °C)  Heart Rate:  [62-82] 64  Resp:  [16-26] 20  BP: (104-117)/(68-79) 105/68  Flow (L/min) (Oxygen Therapy):  [2-3] 3  Physical Exam   Gen: Chronically ill-appearing male, appears older than stated age, sitting up in bed  Resp: Diffuse wheezing noted, conversational dyspnea better  Card: Regular rhythm, tachycardic, No m/r/g  Abd: Soft, Nontender, Nondistended, + bowel sounds    Result Review    Result Review:  I have personally reviewed the results as below and agree with these findings:  []  Laboratory:   CMP          2025    12:39 2025    05:39 2025    05:48   CMP   Glucose 107  119  126    BUN 12.2  15.6  17.8    Creatinine 0.64  0.56  0.55    EGFR 106.4  110.8  111.4    Sodium 141  142  142    Potassium 3.9  4.4  4.6    Chloride 95  99  100     Calcium 10.2  9.6  9.6    Total Protein 6.8      Albumin 4.5      Globulin 2.3      Total Bilirubin 0.6      Alkaline Phosphatase 110      AST (SGOT) 46      ALT (SGPT) 32      Albumin/Globulin Ratio 2.0      BUN/Creatinine Ratio 19.1  27.9  32.4    Anion Gap 10.3  6.0  4.1      CBC          7/22/2025    12:39 7/23/2025    05:39 7/24/2025    05:48   CBC   WBC 5.83  4.42  7.56    RBC 5.23  4.68  4.70    Hemoglobin 15.2  13.6  13.6    Hematocrit 48.5  42.6  43.1    MCV 92.7  91.0  91.7    MCH 29.1  29.1  28.9    MCHC 31.3  31.9  31.6    RDW 13.0  12.9  12.9    Platelets 163  161  165    Magnesium within normal limits  []  Microbiology:   []  Radiology:   [x]  EKG/Telemetry:    []  Cardiology/Vascular:    []  Pathology:  []  Old records:  []  Other:    Assessment & Plan   Assessment / Plan     Assessment:  Acute COPD exacerbation  Acute on chronic hypoxic and hypercapnic respiratory failure due to above  Chronic pulmonary hypertension secondary to COPD (3L at home)  Hypertension  Hyperlipidemia  Nonobstructive CAD  GERD  Chronic ongoing active tobacco abuse with cigarettes  Moderate malnutrition    Plan:    -Pulmonology consulted and following, appreciate assistance and recommendations in the care of this patient.  -Continue IV Solu-Medrol given persistent wheezing .... Transition to oral in 24-48 hours  -Continue Mucomyst.  Ipratropium DC'd to help mobilize dry secretions.  -Continue Brovana, Pulmicort and Yupelri  -Continue Daliresp  -Bronchopulmonary hygiene protocol ordered  -Patient is to wear NIPPV at night and with naps  -RT case management consulted  -Continue appropriate home medications  -Continue nicotine patch for smoking cessation  -Will monitor electrolytes and renal function with BMP and magnesium level in the AM  -Will monitor WBC and Hgb with CBC in the AM  -Clinical course will dictate further management  -Smoking cessation counseling provided. Stop date set for 07/22/2025.  Patient will use patches  and lozenges to aid in smoking cessation.     DVT Prophylaxis: Lovenox  GI Prophylaxis: Pantoprazole  Diet:   Diet Order   Procedures    Diet: Cardiac; Healthy Heart (2-3 Na+); Fluid Consistency: Thin (IDDSI 0)     Dispo: Home when medically appropriate for discharge    VTE Prophylaxis:  Pharmacologic VTE prophylaxis orders are present.    CODE STATUS:   Code Status (Patient has no pulse and is not breathing): CPR (Attempt to Resuscitate)  Medical Interventions (Patient has pulse or is breathing): Full Support       Patient independently seen and evaluated, agree with assessment and plan, above documentation reflects plan put forth during bedside rounds.  More than 51% of the time of this patient encounter was performed by me.    I have reviewed this documentation and agree.    Interval history  No acute issues overnight.  Patient still with shortness of breath.  Says it is a little bit better but has not returned to his baseline yet.  Not any active chest pain.  Nursing with no additional acute issues to report.    Physical exam  Gen: Pleasant, conversant, sitting up in bed  Resp: Wheezing noted bilaterally, poor aeration, equal chest rise bilaterally  Card: RRR, No m/r/g  Abd: Soft, Nontender, Nondistended, + bowel sounds    Personally reviewed labs, imaging.  Creatinine stable.  Magnesium and phosphorus within normal limits.  Hemoglobin stable.    Assessment  Acute COPD exacerbation  Acute on chronic hypoxic and hypercapnic respiratory failure due to above  Chronic pulmonary hypertension secondary to COPD  Hypertension  Hyperlipidemia  Nonobstructive CAD  GERD  Chronic ongoing active tobacco abuse with cigarettes  Moderate malnutrition    Plan  -Pulmonology consulted and following, appreciate assistance and recommendations in the care of this patient.  -Continue Brovana, Pulmicort and Yupelri  -Patient started on Mucomyst per pulmonology  -Management discussed with pulmonology.  Plan for bronchoscopy tomorrow  (07/25/2025)  -Patient to wear NIPPV at night and with naps  -Continue IV Solu-Medrol with plans to transition to oral prednisone in the next 24 to 48 hours  -Continue appropriate home medications  -DVT prophylaxis with Lovenox.  GI prophylaxis with pantoprazole.  -Monitor electrolytes and renal function with BMP in the a.m.  -Monitor WBC and hemoglobin CBC in a.m.  -Clinical course will dictate further management    Time spent personally reviewing patient's chart, labs and imaging, evaluating/examining the patient, discussing care plan with patient and nurse at bedside and discussing management with consultants (pulmonology): 51 minutes.

## 2025-07-24 NOTE — PLAN OF CARE
Goal Outcome Evaluation:Pt wore BIPAP well tonight @ 14/7, 22, 28%. He is on 3 L when not on BIPAP. Pt is 3 L baseline and was on 2 L at beginning of shift. His sats drop quickly with any movement and pt had asked to be placed back on 3 L.

## 2025-07-24 NOTE — PLAN OF CARE
Goal Outcome Evaluation:              Outcome Evaluation: no significant changes. pt wore bipap all night. plan of care ongoing.

## 2025-07-24 NOTE — PROGRESS NOTES
[facility]     Progress Note    Patient Name: Sumeet Gomes  : 1960  MRN: [id number]  Primary Care Physician:  Mirtha Alexander APRN  Date of admission: 2025    Subjective   Subjective     Chief Complaint: Reason for consultation COPD    History of Present Illness  Patient Reports difficulty breathing  Difficulty with expectorating mucus  This is present despite airway clearance    Review of Systems  Positive shortness of breath  Objective   Objective     Vitals:   Temp:  [97.5 °F (36.4 °C)-98.1 °F (36.7 °C)] 97.9 °F (36.6 °C)  Heart Rate:  [60-78] 60  Resp:  [18-26] 20  BP: (104-117)/(68-77) 111/71  Flow (L/min) (Oxygen Therapy):  [3] 3    Physical Exam   High-pitched expiratory wheezing  Result Review    Result Review:  I have personally reviewed the results from the time of this admission to 2025 15:41 EDT and agree with these findings:  []  Laboratory list / accordion  []  Microbiology  []  Radiology  []  EKG/Telemetry   []  Cardiology/Vascular   []  Pathology  []  Old records  []  Other:  Most notable findings include: PET/CT imaging showing emphysema      Assessment & Plan   Assessment / Plan     Brief Patient Summary:  Sumeet Gomes is a 64 y.o. male who with severe emphysema with COPD exacerbation    Active Hospital Problems:  Active Hospital Problems    Diagnosis     **COPD with acute exacerbation      Plan:   Given persistent symptoms proceed with bronchoscopy  Discussed risk versus benefits  Risks versus benefits of bronchoscopy explained to the patient including death, respiratory failure requiring intubation mechanical ventilation, pneumothorax requiring chest tube placement, bleeding.  Patient voices understanding of the risks of the procedure and wishes to proceed.  Continue Mucomyst  Continue Brovana  Continue Pulmicort  Continue IV Solu-Medrol  Continue Yupelri  Continue oxygen  N.p.o. after midnight  VTE Prophylaxis:  Pharmacologic VTE prophylaxis orders are  present.        CODE STATUS:    Code Status (Patient has no pulse and is not breathing): CPR (Attempt to Resuscitate)  Medical Interventions (Patient has pulse or is breathing): Full Support        Lang Willis DO    Electronically signed by Lang Willis DO, 07/24/25, 3:42 PM EDT.

## 2025-07-24 NOTE — THERAPY EVALUATION
RT EQUIPMENT DEVICE RELATED - SKIN ASSESSMENT    RT Medical Equipment/Device:     NIV Mask:  Under-the-nose   size:  a    Skin Assessment:      Cheek:  Intact  Chin:  Intact  Nose:  Intact    Device Skin Pressure Protection:  Positioning supports utilized    Nurse Notification:  Akua Kern, RRT

## 2025-07-24 NOTE — PROGRESS NOTES
RT EQUIPMENT DEVICE RELATED - SKIN ASSESSMENT    RT Medical Equipment/Device:     NIV Mask:  Full-face    size: m    Skin Assessment:      Nose:  Intact    Device Skin Pressure Protection:  Skin-to skin areas padded    Nurse Notification:  Akua Tamayo, RRT

## 2025-07-25 ENCOUNTER — ANESTHESIA EVENT (OUTPATIENT)
Dept: PERIOP | Facility: HOSPITAL | Age: 65
End: 2025-07-25
Payer: MEDICARE

## 2025-07-25 ENCOUNTER — ANESTHESIA (OUTPATIENT)
Dept: PERIOP | Facility: HOSPITAL | Age: 65
End: 2025-07-25
Payer: MEDICARE

## 2025-07-25 LAB
ACB CMPLX DNA BAL NAA+NON-PRB-NCNCRNG: NOT DETECTED
ANION GAP SERPL CALCULATED.3IONS-SCNC: 3.2 MMOL/L (ref 5–15)
BLACTX-M ISLT/SPM QL: ABNORMAL
BLAIMP ISLT/SPM QL: ABNORMAL
BLAKPC ISLT/SPM QL: ABNORMAL
BLAOXA-48-LIKE ISLT/SPM QL: ABNORMAL
BLAVIM ISLT/SPM QL: ABNORMAL
BUN SERPL-MCNC: 19.7 MG/DL (ref 8–23)
BUN/CREAT SERPL: 37.2 (ref 7–25)
C PNEUM DNA NPH QL NAA+NON-PROBE: NOT DETECTED
CALCIUM SPEC-SCNC: 9.5 MG/DL (ref 8.6–10.5)
CHLORIDE SERPL-SCNC: 99 MMOL/L (ref 98–107)
CILIATED BAL QL: 1 %
CO2 SERPL-SCNC: 38.8 MMOL/L (ref 22–29)
CREAT SERPL-MCNC: 0.53 MG/DL (ref 0.76–1.27)
DEPRECATED RDW RBC AUTO: 43.8 FL (ref 37–54)
E CLOAC COMP DNA BAL NAA+NON-PRB-NCNCRNG: NOT DETECTED
E COLI DNA BAL NAA+NON-PRB-NCNCRNG: NOT DETECTED
EGFRCR SERPLBLD CKD-EPI 2021: 112.6 ML/MIN/1.73
ERYTHROCYTE [DISTWIDTH] IN BLOOD BY AUTOMATED COUNT: 12.9 % (ref 12.3–15.4)
FLUAV SUBTYP SPEC NAA+PROBE: NOT DETECTED
FLUBV RNA NPH QL NAA+NON-PROBE: NOT DETECTED
GLUCOSE SERPL-MCNC: 146 MG/DL (ref 65–99)
GP B STREP DNA BAL NAA+NON-PRB-NCNCRNG: NOT DETECTED
HADV DNA SPEC NAA+PROBE: NOT DETECTED
HAEM INFLU DNA BAL NAA+NON-PRB-NCNCRNG: NOT DETECTED
HCOV RNA LOWER RESP QL NAA+NON-PROBE: NOT DETECTED
HCT VFR BLD AUTO: 43.5 % (ref 37.5–51)
HGB BLD-MCNC: 13.6 G/DL (ref 13–17.7)
HMPV RNA NPH QL NAA+NON-PROBE: NOT DETECTED
HPIV RNA LOWER RESP QL NAA+NON-PROBE: NOT DETECTED
K AEROGENES DNA BAL NAA+NON-PRB-NCNCRNG: NOT DETECTED
K OXYTOCA DNA BAL NAA+NON-PRB-NCNCRNG: NOT DETECTED
K PNEU GRP DNA BAL NAA+NON-PRB-NCNCRNG: NOT DETECTED
L PNEUMO DNA LOWER RESP QL NAA+NON-PROBE: NOT DETECTED
LYMPHOCYTES NFR FLD MANUAL: 4 %
M CATARRHALIS DNA BAL NAA+NON-PRB-NCNCRNG: NOT DETECTED
M PNEUMO IGG SER IA-ACNC: NOT DETECTED
MACROPHAGE FLUID %: 6 %
MAGNESIUM SERPL-MCNC: 2.1 MG/DL (ref 1.6–2.4)
MCH RBC QN AUTO: 29 PG (ref 26.6–33)
MCHC RBC AUTO-ENTMCNC: 31.3 G/DL (ref 31.5–35.7)
MCV RBC AUTO: 92.8 FL (ref 79–97)
MECA+MECC ISLT/SPM QL: ABNORMAL
NDM GENE: ABNORMAL
NEUTROPHILS NFR FLD MANUAL: 89 %
P AERUGINOSA DNA BAL NAA+NON-PRB-NCNCRNG: NOT DETECTED
PHOSPHATE SERPL-MCNC: 2.9 MG/DL (ref 2.5–4.5)
PLATELET # BLD AUTO: 171 10*3/MM3 (ref 140–450)
PMV BLD AUTO: 10.4 FL (ref 6–12)
POTASSIUM SERPL-SCNC: 4.6 MMOL/L (ref 3.5–5.2)
PROTEUS SP DNA BAL NAA+NON-PRB-NCNCRNG: NOT DETECTED
RBC # BLD AUTO: 4.69 10*6/MM3 (ref 4.14–5.8)
RHINOVIRUS RNA SPEC NAA+PROBE: DETECTED
RSV RNA NPH QL NAA+NON-PROBE: NOT DETECTED
S AUREUS DNA BAL NAA+NON-PRB-NCNCRNG: NOT DETECTED
S MARCESCENS DNA BAL NAA+NON-PRB-NCNCRNG: NOT DETECTED
S PNEUM DNA BAL NAA+NON-PRB-NCNCRNG: NOT DETECTED
S PYO DNA BAL NAA+NON-PRB-NCNCRNG: NOT DETECTED
SODIUM SERPL-SCNC: 141 MMOL/L (ref 136–145)
VISUAL PRESENCE OF BLOOD: NORMAL
WBC NRBC COR # BLD AUTO: 7.54 10*3/MM3 (ref 3.4–10.8)

## 2025-07-25 PROCEDURE — 25810000003 LACTATED RINGERS PER 1000 ML: Performed by: ANESTHESIOLOGY

## 2025-07-25 PROCEDURE — 84100 ASSAY OF PHOSPHORUS: CPT | Performed by: PHYSICIAN ASSISTANT

## 2025-07-25 PROCEDURE — 25010000002 METHYLPREDNISOLONE PER 40 MG: Performed by: INTERNAL MEDICINE

## 2025-07-25 PROCEDURE — 25010000002 ENOXAPARIN PER 10 MG: Performed by: INTERNAL MEDICINE

## 2025-07-25 PROCEDURE — 99233 SBSQ HOSP IP/OBS HIGH 50: CPT | Performed by: INTERNAL MEDICINE

## 2025-07-25 PROCEDURE — 25010000002 FENTANYL CITRATE (PF) 50 MCG/ML SOLUTION: Performed by: NURSE ANESTHETIST, CERTIFIED REGISTERED

## 2025-07-25 PROCEDURE — 87071 CULTURE AEROBIC QUANT OTHER: CPT | Performed by: INTERNAL MEDICINE

## 2025-07-25 PROCEDURE — 25010000002 PROPOFOL 10 MG/ML EMULSION: Performed by: NURSE ANESTHETIST, CERTIFIED REGISTERED

## 2025-07-25 PROCEDURE — 25010000002 ONDANSETRON PER 1 MG: Performed by: NURSE ANESTHETIST, CERTIFIED REGISTERED

## 2025-07-25 PROCEDURE — 25010000002 DEXAMETHASONE PER 1 MG: Performed by: NURSE ANESTHETIST, CERTIFIED REGISTERED

## 2025-07-25 PROCEDURE — 80048 BASIC METABOLIC PNL TOTAL CA: CPT | Performed by: PHYSICIAN ASSISTANT

## 2025-07-25 PROCEDURE — 99232 SBSQ HOSP IP/OBS MODERATE 35: CPT | Performed by: INTERNAL MEDICINE

## 2025-07-25 PROCEDURE — 94799 UNLISTED PULMONARY SVC/PX: CPT

## 2025-07-25 PROCEDURE — 87102 FUNGUS ISOLATION CULTURE: CPT | Performed by: INTERNAL MEDICINE

## 2025-07-25 PROCEDURE — 25010000002 LIDOCAINE PF 2% 2 % SOLUTION: Performed by: NURSE ANESTHETIST, CERTIFIED REGISTERED

## 2025-07-25 PROCEDURE — 87206 SMEAR FLUORESCENT/ACID STAI: CPT | Performed by: INTERNAL MEDICINE

## 2025-07-25 PROCEDURE — 94761 N-INVAS EAR/PLS OXIMETRY MLT: CPT

## 2025-07-25 PROCEDURE — 89051 BODY FLUID CELL COUNT: CPT | Performed by: INTERNAL MEDICINE

## 2025-07-25 PROCEDURE — 87116 MYCOBACTERIA CULTURE: CPT | Performed by: INTERNAL MEDICINE

## 2025-07-25 PROCEDURE — 63710000001 REVEFENACIN 175 MCG/3ML SOLUTION: Performed by: INTERNAL MEDICINE

## 2025-07-25 PROCEDURE — 94660 CPAP INITIATION&MGMT: CPT

## 2025-07-25 PROCEDURE — 31624 DX BRONCHOSCOPE/LAVAGE: CPT | Performed by: INTERNAL MEDICINE

## 2025-07-25 PROCEDURE — 87205 SMEAR GRAM STAIN: CPT | Performed by: INTERNAL MEDICINE

## 2025-07-25 PROCEDURE — 94664 DEMO&/EVAL PT USE INHALER: CPT

## 2025-07-25 PROCEDURE — 88108 CYTOPATH CONCENTRATE TECH: CPT | Performed by: INTERNAL MEDICINE

## 2025-07-25 PROCEDURE — 0B9D8ZX DRAINAGE OF RIGHT MIDDLE LUNG LOBE, VIA NATURAL OR ARTIFICIAL OPENING ENDOSCOPIC, DIAGNOSTIC: ICD-10-PCS | Performed by: INTERNAL MEDICINE

## 2025-07-25 PROCEDURE — 85027 COMPLETE CBC AUTOMATED: CPT | Performed by: PHYSICIAN ASSISTANT

## 2025-07-25 PROCEDURE — 83735 ASSAY OF MAGNESIUM: CPT | Performed by: PHYSICIAN ASSISTANT

## 2025-07-25 PROCEDURE — 87633 RESP VIRUS 12-25 TARGETS: CPT | Performed by: INTERNAL MEDICINE

## 2025-07-25 RX ORDER — DEXAMETHASONE SODIUM PHOSPHATE 4 MG/ML
INJECTION, SOLUTION INTRA-ARTICULAR; INTRALESIONAL; INTRAMUSCULAR; INTRAVENOUS; SOFT TISSUE AS NEEDED
Status: DISCONTINUED | OUTPATIENT
Start: 2025-07-25 | End: 2025-07-25 | Stop reason: SURG

## 2025-07-25 RX ORDER — SODIUM CHLORIDE, SODIUM LACTATE, POTASSIUM CHLORIDE, CALCIUM CHLORIDE 600; 310; 30; 20 MG/100ML; MG/100ML; MG/100ML; MG/100ML
9 INJECTION, SOLUTION INTRAVENOUS CONTINUOUS PRN
Status: ACTIVE | OUTPATIENT
Start: 2025-07-25 | End: 2025-07-26

## 2025-07-25 RX ORDER — LIDOCAINE HYDROCHLORIDE 20 MG/ML
INJECTION, SOLUTION EPIDURAL; INFILTRATION; INTRACAUDAL; PERINEURAL AS NEEDED
Status: DISCONTINUED | OUTPATIENT
Start: 2025-07-25 | End: 2025-07-25 | Stop reason: SURG

## 2025-07-25 RX ORDER — PROPOFOL 10 MG/ML
VIAL (ML) INTRAVENOUS AS NEEDED
Status: DISCONTINUED | OUTPATIENT
Start: 2025-07-25 | End: 2025-07-25 | Stop reason: SURG

## 2025-07-25 RX ORDER — ONDANSETRON 2 MG/ML
4 INJECTION INTRAMUSCULAR; INTRAVENOUS ONCE AS NEEDED
Status: DISCONTINUED | OUTPATIENT
Start: 2025-07-25 | End: 2025-07-25 | Stop reason: HOSPADM

## 2025-07-25 RX ORDER — OXYCODONE HYDROCHLORIDE 5 MG/1
5 TABLET ORAL
Status: DISCONTINUED | OUTPATIENT
Start: 2025-07-25 | End: 2025-07-25 | Stop reason: HOSPADM

## 2025-07-25 RX ORDER — ONDANSETRON 2 MG/ML
INJECTION INTRAMUSCULAR; INTRAVENOUS AS NEEDED
Status: DISCONTINUED | OUTPATIENT
Start: 2025-07-25 | End: 2025-07-25 | Stop reason: SURG

## 2025-07-25 RX ORDER — ACETAMINOPHEN 500 MG
1000 TABLET ORAL ONCE
Status: COMPLETED | OUTPATIENT
Start: 2025-07-25 | End: 2025-07-25

## 2025-07-25 RX ORDER — FENTANYL CITRATE 50 UG/ML
INJECTION, SOLUTION INTRAMUSCULAR; INTRAVENOUS AS NEEDED
Status: DISCONTINUED | OUTPATIENT
Start: 2025-07-25 | End: 2025-07-25 | Stop reason: SURG

## 2025-07-25 RX ADMIN — ACETAMINOPHEN 1000 MG: 500 TABLET ORAL at 14:14

## 2025-07-25 RX ADMIN — METOPROLOL TARTRATE 12.5 MG: 25 TABLET, FILM COATED ORAL at 08:25

## 2025-07-25 RX ADMIN — LIDOCAINE HYDROCHLORIDE 80 MG: 20 INJECTION, SOLUTION INTRAVENOUS at 14:26

## 2025-07-25 RX ADMIN — ARFORMOTEROL TARTRATE 15 MCG: 15 SOLUTION RESPIRATORY (INHALATION) at 21:33

## 2025-07-25 RX ADMIN — ONDANSETRON 4 MG: 2 INJECTION INTRAMUSCULAR; INTRAVENOUS at 14:35

## 2025-07-25 RX ADMIN — METHYLPREDNISOLONE SODIUM SUCCINATE 40 MG: 40 INJECTION, POWDER, FOR SOLUTION INTRAMUSCULAR; INTRAVENOUS at 15:49

## 2025-07-25 RX ADMIN — ACETYLCYSTEINE 2 ML: 200 SOLUTION ORAL; RESPIRATORY (INHALATION) at 21:33

## 2025-07-25 RX ADMIN — PROPOFOL 50 MG: 10 INJECTION, EMULSION INTRAVENOUS at 14:26

## 2025-07-25 RX ADMIN — DEXAMETHASONE SODIUM PHOSPHATE 8 MG: 4 INJECTION, SOLUTION INTRA-ARTICULAR; INTRALESIONAL; INTRAMUSCULAR; INTRAVENOUS; SOFT TISSUE at 14:35

## 2025-07-25 RX ADMIN — ACETYLCYSTEINE 2 ML: 200 SOLUTION ORAL; RESPIRATORY (INHALATION) at 08:48

## 2025-07-25 RX ADMIN — Medication 10 ML: at 08:30

## 2025-07-25 RX ADMIN — ENOXAPARIN SODIUM 40 MG: 100 INJECTION SUBCUTANEOUS at 21:50

## 2025-07-25 RX ADMIN — ARFORMOTEROL TARTRATE 15 MCG: 15 SOLUTION RESPIRATORY (INHALATION) at 08:48

## 2025-07-25 RX ADMIN — NICOTINE 1 PATCH: 14 PATCH, EXTENDED RELEASE TRANSDERMAL at 08:31

## 2025-07-25 RX ADMIN — SODIUM CHLORIDE, POTASSIUM CHLORIDE, SODIUM LACTATE AND CALCIUM CHLORIDE 9 ML/HR: 600; 310; 30; 20 INJECTION, SOLUTION INTRAVENOUS at 14:14

## 2025-07-25 RX ADMIN — BUDESONIDE 0.5 MG: 0.5 SUSPENSION RESPIRATORY (INHALATION) at 08:47

## 2025-07-25 RX ADMIN — BUDESONIDE 0.5 MG: 0.5 SUSPENSION RESPIRATORY (INHALATION) at 21:33

## 2025-07-25 RX ADMIN — FENTANYL CITRATE 100 MCG: 50 INJECTION, SOLUTION INTRAMUSCULAR; INTRAVENOUS at 14:26

## 2025-07-25 RX ADMIN — PROPOFOL 175 MCG/KG/MIN: 10 INJECTION, EMULSION INTRAVENOUS at 14:27

## 2025-07-25 RX ADMIN — REVEFENACIN 175 MCG: 175 SOLUTION RESPIRATORY (INHALATION) at 08:47

## 2025-07-25 RX ADMIN — METHYLPREDNISOLONE SODIUM SUCCINATE 40 MG: 40 INJECTION, POWDER, FOR SOLUTION INTRAMUSCULAR; INTRAVENOUS at 08:29

## 2025-07-25 RX ADMIN — Medication 10 ML: at 21:52

## 2025-07-25 RX ADMIN — PROPOFOL 3 MG: 10 INJECTION, EMULSION INTRAVENOUS at 14:31

## 2025-07-25 NOTE — PLAN OF CARE
Goal Outcome Evaluation:  Plan of Care Reviewed With: patient           Outcome Evaluation: Patient wore BIPAP 14/7, RR22, 28% throughout the night and tolerated it well. Unit is removed at this time and patient is placed back on 2l nasal cannula.

## 2025-07-25 NOTE — PLAN OF CARE
Goal Outcome Evaluation:  Plan of Care Reviewed With: patient        Progress: no change  Outcome Evaluation: alert and oriented x4.  wears bipap at night. NPO after midnight for bronchoscopy at 1325 today. plan of care ongoing.

## 2025-07-25 NOTE — PLAN OF CARE
Problem: Noninvasive Ventilation Acute  Goal: Effective Unassisted Ventilation and Oxygenation  Outcome: Progressing  Intervention: Monitor and Manage Noninvasive Ventilation  Recent Flowsheet Documentation  Taken 7/24/2025 2304 by Xochilt Morocho RRT  Airway/Ventilation Management:   airway patency maintained   positive pressure ventilation provided  NPPV/CPAP Maintenance: proper fit/secure     Problem: Adult Inpatient Plan of Care  Goal: Plan of Care Review  Outcome: Progressing     Problem: Adult Inpatient Plan of Care  Goal: Patient-Specific Goal (Individualized)  Outcome: Progressing     Problem: Adult Inpatient Plan of Care  Goal: Absence of Hospital-Acquired Illness or Injury  Outcome: Progressing     Problem: Adult Inpatient Plan of Care  Goal: Optimal Comfort and Wellbeing  Outcome: Progressing     Problem: Adult Inpatient Plan of Care  Goal: Readiness for Transition of Care  Outcome: Progressing     Problem: Skin Injury Risk Increased  Goal: Skin Health and Integrity  Outcome: Progressing  Intervention: Optimize Skin Protection  Recent Flowsheet Documentation  Taken 7/24/2025 2304 by Xochilt Morocho, LALI  Head of Bed (HOB) Positioning: HOB elevated     Problem: Gas Exchange Impaired  Goal: Optimal Gas Exchange  Outcome: Progressing  Intervention: Optimize Oxygenation and Ventilation  Recent Flowsheet Documentation  Taken 7/24/2025 2304 by Xochilt Morocho RRT  Head of Bed (HOB) Positioning: HOB elevated  Airway/Ventilation Management:   airway patency maintained   positive pressure ventilation provided   Goal Outcome Evaluation:      Patient wore bipap 14/7 28% overnight. No adverse events. Patient to wear 3LNC during the day.

## 2025-07-25 NOTE — PROGRESS NOTES
Patient Name: Sumeet Gomes  YOB: 1960  MRN: [id number]  Admission date: 7/22/2025  Reason for Encounter: Low BMI    [facility] Clinical Nutrition Assessment     Subjective    Subjective Information     07/25: F/u for po intake. Pt not available during attempted visit, NPO for scheduled bronchoscopy. Pt previously receiving a cardiac diet w/ 50-75% intake at meals per nutrition ADLs - appetite and intake WNL at present.     07/23: Nutrition assessment completed for BMI 18.5 kg/m2. Pt reports slight decrease in po intake d/t COPD exacerbation x2 weeks PTA. Receiving a cardiac diet w/ no intakes appreciated at present, pt with c/o no salt at meals and requesting one pkt daily to help improve intakes. Wt remains stable at present per bed scale 53.5 kg, previous wt per EMR review 53.7 kg 5/17. NFPE preformed w/ severe muscle wasting and fat loss. Provided nutrition education on healthy ways to maintain/gain weight with catabolic illness      Objective   H&P and Current Problems      H&P  Past Medical History:   Diagnosis Date    CHF (congestive heart failure)     COPD (chronic obstructive pulmonary disease)     COPD (chronic obstructive pulmonary disease)     Coronary artery disease     Diastolic heart failure     Emphysema of lung     Hyperlipidemia     Hypertension     Pulmonary hypertension       Past Surgical History:   Procedure Laterality Date    BACK SURGERY      BRONCHOSCOPY N/A 09/14/2022    Procedure: BRONCHOSCOPY WITH BRONCHOALVEOLAR LAVAGE AND BRONCHIAL WASHINGS;  Surgeon: Ulices Moya MD;  Location: [facility] ENDOSCOPY;  Service: Pulmonary;  Laterality: N/A;  MUCUS PLUGGING, COPD EXACERBATION    BRONCHOSCOPY      BRONCHOSCOPY N/A 5/17/2024    Procedure: BRONCHOSCOPY WITH BAL AND WASHINGS;  Surgeon: Collins Chapa MD;  Location: [facility] ENDOSCOPY;  Service: Pulmonary;  Laterality: N/A;  MUCUS PLUGGING    CARDIAC CATHETERIZATION N/A 09/16/2022    Procedure: Right Heart Cath;  Surgeon:  "Drew Rodriguez MD;  Location: [facility] CATH INVASIVE LOCATION;  Service: Cardiovascular;  Laterality: N/A;    COLONOSCOPY N/A 8/20/2024    Procedure: COLONOSCOPY WITH HOT SNARE POLYPECTOMIES;  Surgeon: Obdulio Tripp MD;  Location: [facility] ENDOSCOPY;  Service: Gastroenterology;  Laterality: N/A;  COLON POLYPS, DIVERTICULOSIS    ENDOSCOPY N/A 8/20/2024    Procedure: ESOPHAGOGASTRODUODENOSCOPY WITH BIOPSIES;  Surgeon: Obdulio Tripp MD;  Location: [facility] ENDOSCOPY;  Service: Gastroenterology;  Laterality: N/A;  HIATAL HERNIA, BARRETTS    OTHER SURGICAL HISTORY      knee     OTHER SURGICAL HISTORY      shoulder, rotator cuff    SHOULDER SURGERY Right 10/03/2022      Current Problems   Admission Diagnosis:  Acute exacerbation of chronic obstructive pulmonary disease (COPD) [J44.1]  COPD with acute exacerbation [J44.1]  Acute respiratory failure with hypercapnia [J96.02]    Problem List:    COPD with acute exacerbation      Other Applicable Nutrition Information:        Anthropometrics       Height: 170.2 cm (67\")  Weight: 53.9 kg (118 lb 13.3 oz) (07/25/25 0500)  Weight Method: Bed scale  BMI (Calculated): 18.6       Trending Weight Changes 07/23/25: No significant changes       Weight History  Wt Readings from Last 20 Encounters:   07/25/25 0500 53.9 kg (118 lb 13.3 oz)   07/24/25 0018 54.2 kg (119 lb 7.8 oz)   07/22/25 1231 53.5 kg (118 lb)   06/02/25 0908 53.5 kg (118 lb)   06/02/25 1428 53.5 kg (118 lb)   05/17/25 0100 53.7 kg (118 lb 6.2 oz)   05/16/25 2046 56 kg (123 lb 7.3 oz)   04/24/25 1303 54.8 kg (120 lb 12.8 oz)   02/25/25 0803 55.8 kg (123 lb)   12/27/24 1050 56.2 kg (123 lb 12.8 oz)   08/27/24 1449 56.7 kg (125 lb)   08/20/24 0754 55.6 kg (122 lb 9.2 oz)   08/12/24 1100 56.2 kg (124 lb)   02/13/25 1501 55.8 kg (123 lb)   08/09/24 1359 57.2 kg (126 lb)   06/25/24 1346 57.2 kg (126 lb 3.2 oz)   06/18/24 1357 57.5 kg (126 lb 12.8 oz)   05/30/24 1125 55.8 kg (123 lb)   05/12/24 1844 51.5 kg " (113 lb 8.6 oz)   05/12/24 1428 53.9 kg (118 lb 13.3 oz)   02/22/24 1543 59.8 kg (131 lb 12.8 oz)   02/12/24 1317 59.4 kg (130 lb 15.3 oz)   01/17/24 1344 59.4 kg (131 lb)   01/03/24 1303 55 kg (121 lb 3.2 oz)            Labs      Comment: Reviewed     Results from last 7 days   Lab Units 07/25/25  0611 07/24/25  0548 07/23/25  0539 07/22/25  1239   SODIUM mmol/L 141 142 142 141   POTASSIUM mmol/L 4.6 4.6 4.4 3.9   GLUCOSE mg/dL 146* 126* 119* 107*   BUN mg/dL 19.7 17.8 15.6 12.2   CREATININE mg/dL 0.53* 0.55* 0.56* 0.64*   CALCIUM mg/dL 9.5 9.6 9.6 10.2   PHOSPHORUS mg/dL 2.9 3.0  --   --    MAGNESIUM mg/dL 2.1 2.1 2.0  --    ALBUMIN g/dL  --   --   --  4.5   BILIRUBIN mg/dL  --   --   --  0.6   ALK PHOS U/L  --   --   --  110   AST (SGOT) U/L  --   --   --  46*   ALT (SGPT) U/L  --   --   --  32   PROBNP pg/mL  --   --   --  608.7     Results from last 7 days   Lab Units 07/25/25  0611 07/24/25  0548 07/23/25  0539   PLATELETS 10*3/mm3 171 165 161   HEMOGLOBIN g/dL 13.6 13.6 13.6   HEMATOCRIT % 43.5 43.1 42.6     Lab Results   Component Value Date    HGBA1C 5.50 09/12/2023          Medications       Scheduled Medications [Transfer Hold] acetylcysteine, 2 mL, Nebulization, BID - RT  [Transfer Hold] arformoterol, 15 mcg, Nebulization, BID - RT  [Transfer Hold] budesonide, 0.5 mg, Nebulization, BID - RT  [Transfer Hold] enoxaparin sodium, 40 mg, Subcutaneous, Q24H  [Transfer Hold] methylPREDNISolone sodium succinate, 40 mg, Intravenous, Q8H  metoprolol tartrate, 12.5 mg, Oral, BID  [Transfer Hold] nicotine, 1 patch, Transdermal, Q24H  [Transfer Hold] pantoprazole, 40 mg, Oral, QAM  [Transfer Hold] revefenacin, 175 mcg, Nebulization, Daily - RT  [Transfer Hold] roflumilast, 500 mcg, Oral, Daily  [Transfer Hold] sodium chloride, 10 mL, Intravenous, Q12H        Infusions lactated ringers, 9 mL/hr, Last Rate: 9 mL/hr (07/25/25 1414)        PRN Medications   [Transfer Hold] acetaminophen    [Transfer Hold] albuterol     [Transfer Hold] senna-docusate sodium **AND** [Transfer Hold] polyethylene glycol **AND** [Transfer Hold] bisacodyl **AND** [Transfer Hold] bisacodyl    lactated ringers    [Transfer Hold] nicotine polacrilex    [Transfer Hold] nitroglycerin    [Transfer Hold] ondansetron ODT    [Transfer Hold] sodium chloride    [Transfer Hold] sodium chloride     Physical Findings      Chewing/Swallowing  Teeth Status: Mouth/Teeth WDL: .WDL except, teeth Teeth Symptoms: dental appliance present, tooth/teeth missing  Chewing/Swallowing Issues: No issues identified at this time   Edema                            Bowel Function  Stool Output  Stool Unmeasured Occurrence: 1 (07/24/25 1239)  Bowel Incontinence: No (07/24/25 1239)  Perineal Care: perineum cleansed (07/24/25 1800)      I/Os  Intake & Output (last 3 days)         07/22 0701 07/23 0700 07/23 0701  07/24 0700 07/24 0701  07/25 0700 07/25 0701  07/26 0700    P.O.   600 0    Total Intake(mL/kg)   600 (11.1) 0 (0)    Urine (mL/kg/hr)  975 (0.7) 1050 (0.8) 500 (1.2)    Stool   0     Blood    0    Total Output  975 1050 500    Net  -975 -450 -500            Urine Unmeasured Occurrence   5 x     Stool Unmeasured Occurrence   2 x            Lines, Drains, Airways, & Wounds       Active LDAs       Name Placement date Placement time Site Days Last dressing change    Peripheral IV 07/22/25 1254 20 G Left;Upper Arm 07/22/25  1254  Arm  less than 1 07/22/25 1254 (21.93 hrs)                      Nutrition Focused Physical Exam     Trending NFPE 07/23/25:Severe muscle wasting and fat loss appreciated      Malnutrition Severity Assessment      Patient meets criteria for : Severe Malnutrition         Estimated Needs      Energy Requirements    Weight for Calculation 53.5 kg CBW   Method for Estimation  30-35 kcals/kg   Daily Needs (kcal/day) 7624-1899   Protein Requirements    Weight for Calculation 53.5 kg CBW   Method for Estimation 1.2-2.0 gm/kg   Daily Needs (g/day)    Fluid  Requirements     Method for Estimation 1 mL/kcal    Daily Needs (mL/day) 9945-2070     Current Nutrition Orders & Evaluation of Intake      Oral Nutrition     Food Allergies  and Intolerances NKFA   Current PO Diet NPO Diet NPO Type: Strict NPO   Oral Nutrition Supplement None     Trending % PO Intake 07/25/25:50-75%     Enteral Nutrition     Current EN Order Patient isn't on Tube Feeding  Patient doesn't have any tube feeding modular orders     EN Route      EN Tolerance     EN Observation/Intake         Parenteral Nutrition     Current TPN Order    TPN Route    Lipids (mL/%/frequency)     Total # Days on TPN    TPN Observation/Intake       Assessment & Plan   Nutrition Diagnosis and Goals       Nutrition Diagnosis 1 Severe chronic disease or condition related malnutrition related to inability to consume sufficient nutritions d/t catabolic illness as evidence by chronic BMI <19 w/ severe muscle wasting and fat loss             Goal(s) Average Meal Intake at Least 75%, Accepts Oral Nutrition Supplement, Maintain Weight, and Weight goal of 66.1 kg     Nutrition Intervention and Prescription       Intervention  Continue with current interventions      Diet Prescription Cardiac Diet w/ 1 salt pkt daily     Supplement Prescription Boost VHC TID, Magic Cup daily at Dinner     Education Provided  Provided healthy ways to maintain/gain weight including small,frequent meals 5-7x daily to help meet increased calorie and protein needs w/ increased satiety. Incorporating more high calorie/protein food choices into eating occasions to help preserve/build muscle mass for strength and energy. Encouraged continued use of ONS at dc at least BID to help meet nutrition needs     Enteral Prescription        TPN Prescription      Monitoring/Evaluation       Monitor/Evaluation PO Intake, Oral Nutrition Supplement Intake, Pertinent Labs, and Weight     RD Follow-Up Encounter 3-5 days     Electronically signed by:  Lyric Thayer  RD  07/25/25 14:34 EDT

## 2025-07-25 NOTE — PROGRESS NOTES
[facility]     Progress Note    Patient Name: Sumeet Gomes  : 1960  MRN: [id number]  Primary Care Physician:  Mirtha Alexander APRN  Date of admission: 2025    Subjective   Subjective     Chief Complaint: Reason for consultation COPD    History of Present Illness  Agreeable to bronchoscopy  Remains n.p.o.    Review of Systems  Positive shortness of breath  Objective   Objective     Vitals:   Temp:  [97.3 °F (36.3 °C)-98.2 °F (36.8 °C)] 98.2 °F (36.8 °C)  Heart Rate:  [60-78] 61  Resp:  [16-20] 16  BP: ()/(66-72) 99/68  Flow (L/min) (Oxygen Therapy):  [2-3] 3    Physical Exam   High-pitched expiratory wheezing  Result Review    Result Review:  I have personally reviewed the results from the time of this admission to 2025 14:03 EDT and agree with these findings:  []  Laboratory list / accordion  []  Microbiology  []  Radiology  []  EKG/Telemetry   []  Cardiology/Vascular   []  Pathology  []  Old records  []  Other:  Most notable findings include: PET/CT imaging showing emphysema      Assessment & Plan   Assessment / Plan     Brief Patient Summary:  Sumeet Gomes is a 64 y.o. male who with severe emphysema with COPD exacerbation    Active Hospital Problems:  Active Hospital Problems    Diagnosis     **COPD with acute exacerbation      Plan:   Patient's persistent symptoms despite optimal medical  Smothering effect sensation  We will proceed with bronchoscopy today with BAL  Patient is high risk given his severe COPD  Discussed risk versus benefits with the  Risks versus benefits of bronchoscopy explained to the patient including death, respiratory failure requiring intubation mechanical ventilation, pneumothorax requiring chest tube placement, bleeding.  Patient voices understanding of the risks of the procedure and wishes to proceed.  Continue LABA  ICS  Continue Solu-Medrol  Continue Mucomyst  Likely transition to 3% hypertonic saline tomorrow  Antibiotics per BAL  VTE  Prophylaxis:  Pharmacologic VTE prophylaxis orders are present.        CODE STATUS:    Code Status (Patient has no pulse and is not breathing): CPR (Attempt to Resuscitate)  Medical Interventions (Patient has pulse or is breathing): Full Support        Lang Willis DO    Electronically signed by Lang Willis DO, 07/25/25, 2:03 PM EDT.

## 2025-07-25 NOTE — OP NOTE
Procedure name: bronchoscopy with bronchoalveolar lavage     Indication: COPD exacerbation with mucous plugging difficulties with expectorating mucus     Sedation-IV MAC per anesthesia service     Procedure details:  Patient was brought back to the operative suite, a bite block was placed in the oral cavity, and a therapeutic bronchoscope was then used to intubate the trachea. The vocal cords inspected and appeared to have normal motion with inhalation and ventilation.  Inspection was performed of the left tracheobronchial tree and there was no endobronchial lesion seen to the segmental level.  Inspection of the right tracheobronchial tree showed no endobronchial lesions to the segmental level.  There was copious amounts of thin secretions seen throughout the entire tracheobronchial tree, there is evidence of diffuse erythema of the airway consistent with bronchitis A bronchoalveolar lavage was obtained from right middle lobe bronchus        Estimated blood loss:  none     Postoperative diagnosis: Extensive mucous plug    Patient tolerated procedure well, no immediate complications        Plan  Patient is to follow up in my office as scheduled to go over the results of the pathology/cytology and microbiology

## 2025-07-25 NOTE — PROGRESS NOTES
RT EQUIPMENT DEVICE RELATED - SKIN ASSESSMENT    RT Medical Equipment/Device:     NIV Mask:  Full-face    size: m    Skin Assessment:      Cheek:  Redness  Chin:  Intact  Forehead:  Intact  Nose:  Redness  Mouth:  Intact    Device Skin Pressure Protection:  Positioning supports utilized    Nurse Notification:  Akua Bobby, RRT

## 2025-07-25 NOTE — ANESTHESIA POSTPROCEDURE EVALUATION
Patient: Sumeet Gomes    Procedure Summary       Date: 07/25/25 Room / Location: [facility] OR 01 / [facility] MAIN OR    Anesthesia Start: 1424 Anesthesia Stop: 1448    Procedure: BRONCHOSCOPY WITH BRONCHOALVEOLAR LAVAGE (Bronchus) Diagnosis:       COPD with acute exacerbation      (COPD with acute exacerbation [J44.1])    Surgeons: Lang Willis DO Provider: Nanda Sims MD    Anesthesia Type: general ASA Status: 4            Anesthesia Type: general    Vitals  Vitals Value Taken Time   BP 99/77 07/25/25 15:23   Temp 36.7 °C (98 °F) 07/25/25 15:20   Pulse 61 07/25/25 15:26   Resp 18 07/25/25 15:20   SpO2 92 % 07/25/25 15:26   Vitals shown include unfiled device data.        Post Anesthesia Care and Evaluation    Patient location during evaluation: bedside  Patient participation: complete - patient participated  Level of consciousness: awake  Pain management: adequate    Airway patency: patent  PONV Status: none  Cardiovascular status: acceptable and stable  Respiratory status: acceptable  Hydration status: acceptable

## 2025-07-25 NOTE — PROGRESS NOTES
[facility]   Hospitalist Progress Note  Date: 2025  Patient Name: Sumeet Gomes  : 1960  MRN: [id number]  Date of admission: 2025  Consultants:   - Pulmonology: Dr. Lang Willis    Subjective   Subjective     Chief Complaint: Shortness of breath    Summary:   Sumeet Gomes is a 64 y.o. male with COPD on 3 L nasal cannula, pulmonary hypertension, CAD, GERD, hypertension and hyperlipidemia that presented to the ED with worsening shortness of breath despite use of Augmentin and steroid therapy.  Patient noted to be hypoxic and hypercapnic in the ED and started on BiPAP therapy.  Hospitalist service contacted.  Pulmonology consulted.  Nebulizer breathing treatments and IV steroid therapy started.    Interval Followup:     2025  Resting comfortably in bed  Respiratory status improving but not back to baseline.  Still dyspneic and desats easily.  Still with cough.  Patient remains weak.    He is alert and conversational.  No fevers recorded  On 3 L O2 (3L at home)  NSR on telemetry  105/68  -  115/77  N.p.o. today for bronchoscopy.  Has been tolerating oral intake and nutritional supplements otherwise  Antibiotics:   Completed    Objective   Objective     Vitals:   Temp:  [97.3 °F (36.3 °C)-98.1 °F (36.7 °C)] 97.3 °F (36.3 °C)  Heart Rate:  [60-78] 60  Resp:  [16-20] 16  BP: ()/(66-72) 114/72  Flow (L/min) (Oxygen Therapy):  [2-3] 2  Physical Exam   Gen: Chronically ill-appearing male, malnourished, older than stated age, alert  Resp: Diffuse wheezing noted, conversational dyspnea improved on 3 L  Card: Regular rhythm, tachycardic, No m/r/g  Abd: Soft, Nontender, Nondistended, + bowel sounds  EXT: No edema    Result Review    Result Review:  I have personally reviewed the results as below and agree with these findings:  []  Laboratory:   CMP          2025    05:39 2025    05:48 2025    06:11   CMP   Glucose 119  126  146    BUN 15.6  17.8  19.7    Creatinine 0.56  0.55   0.53    EGFR 110.8  111.4  112.6    Sodium 142  142  141    Potassium 4.4  4.6  4.6    Chloride 99  100  99    Calcium 9.6  9.6  9.5    BUN/Creatinine Ratio 27.9  32.4  37.2    Anion Gap 6.0  4.1  3.2      CBC          7/23/2025    05:39 7/24/2025    05:48 7/25/2025    06:11   CBC   WBC 4.42  7.56  7.54    RBC 4.68  4.70  4.69    Hemoglobin 13.6  13.6  13.6    Hematocrit 42.6  43.1  43.5    MCV 91.0  91.7  92.8    MCH 29.1  28.9  29.0    MCHC 31.9  31.6  31.3    RDW 12.9  12.9  12.9    Platelets 161  165  171    Magnesium within normal limits  []  Microbiology:   []  Radiology:   [x]  EKG/Telemetry:    []  Cardiology/Vascular:    []  Pathology:  []  Old records:  []  Other:    Assessment & Plan   Assessment / Plan     Assessment:    Acute COPD exacerbation  Acute on chronic hypoxic and hypercapnic respiratory failure due to above  Chronic pulmonary hypertension secondary to COPD (3L at home)  Hypertension  Hyperlipidemia  Nonobstructive CAD  GERD  Chronic ongoing active tobacco abuse with cigarettes  Moderate malnutrition  Status post bronchoscopy 7/25/25, this admission  Pulmonary cachexia    Plan:    - Bronchoscopy today  - Pulmonology consulted and following, appreciate assistance and recommendations in the care of this patient.  -Dietitian consult for severe malnutrition/pulmonary cachexia  -Continue steroids .... Transition to oral   -Continue Mucomyst.  Ipratropium DC'd to help mobilize dry secretions.  -Continue Brovana, Pulmicort and Yupelri  -Continue Daliresp  -Bronchopulmonary hygiene protocol ordered  -Patient is to wear NIPPV at night and with naps  -RT case management consulted  -Continue appropriate home medications  -Continue nicotine patch for smoking cessation  -Will monitor electrolytes and renal function with BMP and magnesium level in the AM  -Will monitor WBC and Hgb with CBC in the AM  -Clinical course will dictate further management  -Smoking cessation counseling provided. Stop date set for  07/22/2025.  Patient will use patches and lozenges to aid in smoking cessation.     DVT Prophylaxis: Lovenox  GI Prophylaxis: Pantoprazole  Diet:   Diet Order   Procedures    NPO Diet NPO Type: Strict NPO   Dispo: Home when medically appropriate for discharge  VTE Prophylaxis:  Pharmacologic VTE prophylaxis orders are present.    CODE STATUS:   Code Status (Patient has no pulse and is not breathing): CPR (Attempt to Resuscitate)  Medical Interventions (Patient has pulse or is breathing): Full Support         Patient independently seen and evaluated, agree with assessment and plan, above documentation reflects plan put forth during bedside rounds.  More than 51% of the time of this patient encounter was performed by me.    I have reviewed this documentation and agree.    Interval history  No acute events overnight.  Patient's Rester status improving but not back to baseline just yet.  Desats with minimal exertion.  Continues to have cough.  Patient is been afebrile.  Nursing with no additional acute issues to report.    Physical exam  Gen: Conversant, Pleasant, lying in bed watching TV  Resp: Diffuse wheezing noted, equal chest rise bilaterally  Card: RRR, No m/r/g  Abd: Soft, Nontender, Nondistended, + bowel sounds    Personally reviewed labs, imaging.  Phosphorus and magnesium within normal limits.  Renal function stable.  Hemoglobin stable.    Assessment  Acute COPD exacerbation  Acute on chronic hypoxic and hypercapnic respiratory failure due to above  Chronic pulmonary hypertension secondary to COPD  Hypertension  Hyperlipidemia  Nonobstructive CAD  GERD  Chronic ongoing active tobacco abuse with cigarettes  Moderate malnutrition    Plan  -Pulmonology consulted and following, appreciate assistance and recommendations in the care of this patient.  -Continue Brovana, Pulmicort and Yupelri  - Continue Mucomyst  - Care discussed with pulmonology.  Plan for bronchoscopy today (07/25/2025).  Likely transition patient  to 3% hypertonic saline tomorrow.  -Patient to wear NIPPV at night and with naps  -Continue IV Solu-Medrol with plans to transition to oral prednisone in the next 24 to 48 hours  -Continue appropriate home medications  -DVT prophylaxis with Lovenox.  GI prophylaxis with pantoprazole.  -Will monitor electrolytes and renal function with BMP in the a.m.  -Will monitor WBC and hemoglobin CBC in a.m.  -Clinical course will dictate further management     Personally reviewed patients labs and imaging, discussed with patient and nurse at bedside. Discussed management with the following consultants: Pulmonology.     Part of this note may be an electronic transcription/translation of spoken language to printed text using the Dragon dictation system.    Electronically signed by Tone Smith MD, 7/25/2025, 14:40 EDT.

## 2025-07-25 NOTE — PLAN OF CARE
Goal Outcome Evaluation:  Plan of Care Reviewed With: patient           Outcome Evaluation: Alert & oriented x4; BP soft this shift but vital signs remain stable for patient otherwise. Denies c/o pain throughout shift. Patient recieved bronchoscopy this shift, see MD note. Denies further needs at this time, plan of care ongoing.

## 2025-07-25 NOTE — ANESTHESIA PREPROCEDURE EVALUATION
Anesthesia Evaluation     Patient summary reviewed and Nursing notes reviewed   NPO Solid Status: > 8 hours  NPO Liquid Status: > 2 hours           Airway   Mallampati: I  TM distance: >3 FB  Neck ROM: full  No difficulty expected  Dental    (+) partials    Comment: Upper partial. Denies any existing teeth are loose    Pulmonary    (+) a smoker Current, Abstained day of surgery, cigarettes, COPD (nebs daily and o2 continuously) severe,home oxygen (wears 3 lpm via nc continuously), shortness of breath, decreased breath sounds  Cardiovascular - normal exam  Exercise tolerance: poor (<4 METS)    ECG reviewed  Patient on routine beta blocker and Beta blocker given within 24 hours of surgery  Rhythm: regular  Rate: normal    (+) hypertension (metoprolol) well controlled, CAD, CHF , hyperlipidemia    ROS comment:   ECHO (12/05/2023):  ·  Left ventricular systolic function is normal. Estimated left ventricular EF = 55%  ·  Left ventricular diastolic function was normal.  ·  The right ventricular cavity is moderately dilated.  ·  The right atrial cavity is moderately  dilated.  ·  Estimated right ventricular systolic pressure from tricuspid regurgitation is moderately elevated (45-55 mmHg).    Neuro/Psych  GI/Hepatic/Renal/Endo    (+) GERD well controlled    Musculoskeletal     (+) joint swelling  Abdominal  - normal exam   Substance History      OB/GYN          Other                          Anesthesia Plan    ASA 4     general   total IV anesthesia  (Total IV Anesthesia    Patient understands anesthesia not responsible for dental damage.  )  intravenous induction     Anesthetic plan, risks, benefits, and alternatives have been provided, discussed and informed consent has been obtained with: patient.  Pre-procedure education provided  Plan discussed with CRNA.      CODE STATUS:    Code Status (Patient has no pulse and is not breathing): CPR (Attempt to Resuscitate)  Medical Interventions (Patient has pulse or is  breathing): Full Support

## 2025-07-25 NOTE — PROGRESS NOTES
RT EQUIPMENT DEVICE RELATED - SKIN ASSESSMENT    RT Medical Equipment/Device:     NIV Mask:  Full-face    size: M    Skin Assessment:      Cheek:  Intact  Chin:  Intact  Nose:  Intact    Device Skin Pressure Protection:  Positioning supports utilized    Nurse Notification:  Akua Morocho, RRT

## 2025-07-26 LAB
ANION GAP SERPL CALCULATED.3IONS-SCNC: 5.6 MMOL/L (ref 5–15)
BUN SERPL-MCNC: 19.2 MG/DL (ref 8–23)
BUN/CREAT SERPL: 33.1 (ref 7–25)
CALCIUM SPEC-SCNC: 9.4 MG/DL (ref 8.6–10.5)
CHLORIDE SERPL-SCNC: 95 MMOL/L (ref 98–107)
CO2 SERPL-SCNC: 37.4 MMOL/L (ref 22–29)
CREAT SERPL-MCNC: 0.58 MG/DL (ref 0.76–1.27)
DEPRECATED RDW RBC AUTO: 43.9 FL (ref 37–54)
EGFRCR SERPLBLD CKD-EPI 2021: 109.6 ML/MIN/1.73
ERYTHROCYTE [DISTWIDTH] IN BLOOD BY AUTOMATED COUNT: 12.7 % (ref 12.3–15.4)
GLUCOSE SERPL-MCNC: 153 MG/DL (ref 65–99)
HCT VFR BLD AUTO: 44.4 % (ref 37.5–51)
HGB BLD-MCNC: 13.7 G/DL (ref 13–17.7)
MAGNESIUM SERPL-MCNC: 2.1 MG/DL (ref 1.6–2.4)
MCH RBC QN AUTO: 29.1 PG (ref 26.6–33)
MCHC RBC AUTO-ENTMCNC: 30.9 G/DL (ref 31.5–35.7)
MCV RBC AUTO: 94.3 FL (ref 79–97)
PLATELET # BLD AUTO: 167 10*3/MM3 (ref 140–450)
PMV BLD AUTO: 10.6 FL (ref 6–12)
POTASSIUM SERPL-SCNC: 4.8 MMOL/L (ref 3.5–5.2)
RBC # BLD AUTO: 4.71 10*6/MM3 (ref 4.14–5.8)
SODIUM SERPL-SCNC: 138 MMOL/L (ref 136–145)
WBC NRBC COR # BLD AUTO: 6.56 10*3/MM3 (ref 3.4–10.8)

## 2025-07-26 PROCEDURE — 25010000002 ENOXAPARIN PER 10 MG: Performed by: INTERNAL MEDICINE

## 2025-07-26 PROCEDURE — 94799 UNLISTED PULMONARY SVC/PX: CPT

## 2025-07-26 PROCEDURE — 94664 DEMO&/EVAL PT USE INHALER: CPT

## 2025-07-26 PROCEDURE — 25010000002 METHYLPREDNISOLONE PER 40 MG

## 2025-07-26 PROCEDURE — 94761 N-INVAS EAR/PLS OXIMETRY MLT: CPT

## 2025-07-26 PROCEDURE — 63710000001 REVEFENACIN 175 MCG/3ML SOLUTION: Performed by: INTERNAL MEDICINE

## 2025-07-26 PROCEDURE — 83735 ASSAY OF MAGNESIUM: CPT | Performed by: INTERNAL MEDICINE

## 2025-07-26 PROCEDURE — 85027 COMPLETE CBC AUTOMATED: CPT | Performed by: INTERNAL MEDICINE

## 2025-07-26 PROCEDURE — 99232 SBSQ HOSP IP/OBS MODERATE 35: CPT | Performed by: INTERNAL MEDICINE

## 2025-07-26 PROCEDURE — 99233 SBSQ HOSP IP/OBS HIGH 50: CPT | Performed by: INTERNAL MEDICINE

## 2025-07-26 PROCEDURE — 94660 CPAP INITIATION&MGMT: CPT

## 2025-07-26 PROCEDURE — 25010000002 METHYLPREDNISOLONE PER 40 MG: Performed by: INTERNAL MEDICINE

## 2025-07-26 PROCEDURE — 80048 BASIC METABOLIC PNL TOTAL CA: CPT | Performed by: INTERNAL MEDICINE

## 2025-07-26 RX ORDER — PREDNISONE 20 MG/1
40 TABLET ORAL
Status: DISCONTINUED | OUTPATIENT
Start: 2025-07-27 | End: 2025-07-28 | Stop reason: HOSPADM

## 2025-07-26 RX ORDER — SODIUM CHLORIDE FOR INHALATION 3 %
4 VIAL, NEBULIZER (ML) INHALATION
Status: DISCONTINUED | OUTPATIENT
Start: 2025-07-26 | End: 2025-07-28 | Stop reason: HOSPADM

## 2025-07-26 RX ADMIN — Medication 4 ML: at 13:38

## 2025-07-26 RX ADMIN — Medication 10 ML: at 08:19

## 2025-07-26 RX ADMIN — METOPROLOL TARTRATE 12.5 MG: 25 TABLET, FILM COATED ORAL at 08:18

## 2025-07-26 RX ADMIN — Medication 4 ML: at 20:30

## 2025-07-26 RX ADMIN — METHYLPREDNISOLONE SODIUM SUCCINATE 40 MG: 40 INJECTION, POWDER, FOR SOLUTION INTRAMUSCULAR; INTRAVENOUS at 15:15

## 2025-07-26 RX ADMIN — NICOTINE 1 PATCH: 14 PATCH, EXTENDED RELEASE TRANSDERMAL at 08:20

## 2025-07-26 RX ADMIN — REVEFENACIN 175 MCG: 175 SOLUTION RESPIRATORY (INHALATION) at 09:37

## 2025-07-26 RX ADMIN — PANTOPRAZOLE SODIUM 40 MG: 40 TABLET, DELAYED RELEASE ORAL at 08:18

## 2025-07-26 RX ADMIN — ROFLUMILAST 500 MCG: 500 TABLET ORAL at 08:19

## 2025-07-26 RX ADMIN — ENOXAPARIN SODIUM 40 MG: 100 INJECTION SUBCUTANEOUS at 21:58

## 2025-07-26 RX ADMIN — METHYLPREDNISOLONE SODIUM SUCCINATE 40 MG: 40 INJECTION, POWDER, FOR SOLUTION INTRAMUSCULAR; INTRAVENOUS at 08:19

## 2025-07-26 RX ADMIN — ACETYLCYSTEINE 2 ML: 200 SOLUTION ORAL; RESPIRATORY (INHALATION) at 09:37

## 2025-07-26 RX ADMIN — BUDESONIDE 0.5 MG: 0.5 SUSPENSION RESPIRATORY (INHALATION) at 20:30

## 2025-07-26 RX ADMIN — METHYLPREDNISOLONE SODIUM SUCCINATE 40 MG: 40 INJECTION, POWDER, FOR SOLUTION INTRAMUSCULAR; INTRAVENOUS at 00:31

## 2025-07-26 RX ADMIN — ARFORMOTEROL TARTRATE 15 MCG: 15 SOLUTION RESPIRATORY (INHALATION) at 20:30

## 2025-07-26 RX ADMIN — ARFORMOTEROL TARTRATE 15 MCG: 15 SOLUTION RESPIRATORY (INHALATION) at 09:37

## 2025-07-26 RX ADMIN — METOPROLOL TARTRATE 12.5 MG: 25 TABLET, FILM COATED ORAL at 21:59

## 2025-07-26 RX ADMIN — BUDESONIDE 0.5 MG: 0.5 SUSPENSION RESPIRATORY (INHALATION) at 09:37

## 2025-07-26 NOTE — PROGRESS NOTES
Primary Care Provider  Mirtha Alexander APRN     Referring Provider  No ref. provider found     Chief Complaint  Shortness of Breath    Subjective            Sumeet Gomes presents to [address] [facility]  History of Present Illness  Sumeet Gomes is a 64 y.o. male patient with severe COPD, hypoxic and hypercapnic respiratory failure, found to have rhinovirus infection  History of Present Illness    Over the last 24 hours, underwent bronchoscopy.  Remains on Solu-Medrol 40 mg IV every 8 hours.  Remains on nebulizers such as Brovana, Pulmicort, Mucomyst, Yupelri.  Remains on Daliresp    No acute events overnight    This morning,  Currently on 3 L nasal cannula  Wore NIPPV overnight  Reports feeling slightly better today after bronchoscopy  No fever or chills  No nausea or vomiting  Cough slowly improving    Review of Systems     General:  No Fatigue, No Fever No weight loss or loss of appetite  HEENT: No dysphagia, No Visual Changes, no rhinorrhea  Respiratory:  + cough,+Dyspnea, scant phlegm, No Pleuritic Pain, no wheezing, no hemoptysis.  Cardiovascular: Denies chest pain, denies palpitations,+AGUILAR, No Chest Pressure  Gastrointestinal:  No Abdominal Pain, No Nausea, No Vomiting, No Diarrhea  Genitourinary:  No Dysuria, No Frequency, No Hesitancy  Musculoskeletal: No muscle pain or swelling  Endocrine:  No Heat Intolerance, No Cold Intolerance  Hematologic:  No Bleeding, No Bruising  Psychiatric:  No Anxiety, No Depression  Neurologic:  No Confusion, no Dysarthria, No Headaches  Skin:  No Rash, No Open Wounds    Family History   Problem Relation Age of Onset    Asthma Mother     Emphysema Mother             Stroke Mother     COPD Mother     Colon cancer Neg Hx         Social History     Socioeconomic History    Marital status:    Tobacco Use    Smoking status: Former     Current packs/day: 0.00     Average packs/day: 0.3 packs/day for 50.4 years (12.6 ttl pk-yrs)      Types: Cigarettes     Start date: 1975     Quit date: 2025     Years since quittin.1     Passive exposure: Past (2 cigarettes per day, smokes some days)    Smokeless tobacco: Never    Tobacco comments:     1-2 cigarettes daily as of 24 AL    Vaping Use    Vaping status: Never Used   Substance and Sexual Activity    Alcohol use: Not Currently    Drug use: Never    Sexual activity: Defer        Past Medical History:   Diagnosis Date    CHF (congestive heart failure)     COPD (chronic obstructive pulmonary disease)     COPD (chronic obstructive pulmonary disease)     Coronary artery disease     Diastolic heart failure     Emphysema of lung     Hyperlipidemia     Hypertension     Pulmonary hypertension         Immunization History   Administered Date(s) Administered    COVID-19 (MODERNA) 1st,2nd,3rd Dose Monovalent 2021, 2021, 2021, 2021    Flu Vaccine Quad PF >36MO 2019    Fluzone (or Fluarix & Flulaval for VFC) >6mos 2019, 10/25/2021, 10/06/2022, 2023    Hepatitis A 2018    Pneumococcal Conjugate 13-Valent (PCV13) 2018    Pneumococcal Conjugate 20-Valent (PCV20) 2022    Pneumococcal Polysaccharide (PPSV23) 2017    RSV, unspecified (Vaccine or MAB) 2024    Tdap 2012    Zostavax 2012         No Known Allergies       Current Facility-Administered Medications:     acetaminophen (TYLENOL) tablet 650 mg, 650 mg, Oral, Q4H PRN, Lang Willis DO    acetylcysteine (MUCOMYST) 20 % nebulizer solution 2 mL, 2 mL, Nebulization, BID - RT, Lang Willis DO, 2 mL at 25    albuterol (PROVENTIL) nebulizer solution 0.083% 2.5 mg/3mL, 2.5 mg, Nebulization, Q6H PRN, Lang Willis DO, 2.5 mg at 25    arformoterol (BROVANA) nebulizer solution 15 mcg, 15 mcg, Nebulization, BID - RT, Lang Willis DO, 15 mcg at 25    sennosides-docusate (PERICOLACE) 8.6-50  MG per tablet 2 tablet, 2 tablet, Oral, BID PRN **AND** polyethylene glycol (MIRALAX) packet 17 g, 17 g, Oral, Daily PRN **AND** bisacodyl (DULCOLAX) EC tablet 5 mg, 5 mg, Oral, Daily PRN **AND** bisacodyl (DULCOLAX) suppository 10 mg, 10 mg, Rectal, Daily PRN, Lang Willis DO    budesonide (PULMICORT) nebulizer solution 0.5 mg, 0.5 mg, Nebulization, BID - RT, Lang Willis, , 0.5 mg at 07/25/25 2133    enoxaparin sodium (LOVENOX) syringe 40 mg, 40 mg, Subcutaneous, Q24H, Lang Willis DO, 40 mg at 07/25/25 2150    lactated ringers infusion, 9 mL/hr, Intravenous, Continuous PRN, Lang Willis DO, Last Rate: 9 mL/hr at 07/25/25 1414, Restarted at 07/25/25 1424    methylPREDNISolone sodium succinate (SOLU-Medrol) injection 40 mg, 40 mg, Intravenous, Q8H, Lang Willis DO, 40 mg at 07/26/25 0031    metoprolol tartrate (LOPRESSOR) tablet 12.5 mg, 12.5 mg, Oral, BID, Lang Willis DO, 12.5 mg at 07/25/25 0825    nicotine (NICODERM CQ) 14 MG/24HR patch 1 patch, 1 patch, Transdermal, Q24H, Lang Willis DO, 1 patch at 07/25/25 0831    nicotine polacrilex (COMMIT) lozenge 2 mg, 2 mg, Mouth/Throat, Q2H PRN, Lang Willis DO    nitroglycerin (NITROSTAT) SL tablet 0.4 mg, 0.4 mg, Sublingual, Q5 Min PRN, Lang Willis DO    ondansetron ODT (ZOFRAN-ODT) disintegrating tablet 4 mg, 4 mg, Oral, Q6H PRN, Lang Willis DO    pantoprazole (PROTONIX) EC tablet 40 mg, 40 mg, Oral, QAM, Lang Willis DO, 40 mg at 07/24/25 0602    revefenacin (YUPELRI) nebulizer solution 175 mcg, 175 mcg, Nebulization, Daily - RT, Lang Willis DO, 175 mcg at 07/25/25 0847    roflumilast (DALIRESP) tablet 500 mcg, 500 mcg, Oral, Daily, Lang Willis DO, 500 mcg at 07/24/25 0902    sodium chloride 0.9 % flush 10 mL, 10 mL, Intravenous, Q12H, Lang Willis DO, 10 mL at  "07/25/25 2152    sodium chloride 0.9 % flush 10 mL, 10 mL, Intravenous, PRN, Lang Willis, DO    sodium chloride 0.9 % infusion 40 mL, 40 mL, Intravenous, PRN, Lang Willis, DO     Objective   Physical Exam  Physical Exam      Vital Signs:   /65 (BP Location: Left arm, Patient Position: Lying)   Pulse 59   Temp 97.3 °F (36.3 °C) (Oral)   Resp 18   Ht 170.2 cm (67\")   Wt 61.6 kg (135 lb 12.9 oz)   SpO2 96%   BMI 21.27 kg/m²       Vital Signs Reviewed  WDWN, Alert, NAD.  Chronically ill-appearing male, lying in bed  HEENT:  PERRL, EOMI.  OP, nares clear, no sinus tenderness  Mallampatti classification : 1  Neck:  Supple, no JVD, no thyromegaly  Lymph: no axillary, cervical, supraclavicular lymphadenopathy noted bilaterally  Chest: Proved aeration with diminished breath sounds bilaterally, scattered rhonchi, currently on 3 L  CV: RRR, no MGR, pulses 2+, equal.  Abd:  Soft, NT, ND, + BS, no HSM  EXT:  no clubbing, no cyanosis, No BLE edema  Neuro:  A&Ox3, CN grossly intact, no focal deficits.  Skin: No rashes or lesions noted         No results found for: \"SITE\", \"ALLENTEST\", \"PHART\", \"IJD1TCZ\", \"PO2ART\", \"SLO0HQE\", \"BASEEXCESS\", \"O3LJTZEQ\", \"HGBBG\", \"HCTABG\", \"OXYHEMOGLOBI\", \"METHHGBN\", \"CARBOXYHGB\", \"CO2CT\", \"BAROMETRIC\", \"MODALITY\", \"FIO2\"   Results for orders placed during the hospital encounter of 12/04/23    Adult Transthoracic Echo Complete W/ Cont if Necessary Per Protocol    Interpretation Summary    Left ventricular systolic function is normal. Estimated left ventricular EF = 55%    Left ventricular diastolic function was normal.    The right ventricular cavity is moderately dilated.    The right atrial cavity is moderately  dilated.    Estimated right ventricular systolic pressure from tricuspid regurgitation is moderately elevated (45-55 mmHg).              Results               Assessment and Plan    Diagnoses and all orders for this visit:    1. Acute exacerbation of " chronic obstructive pulmonary disease (COPD) (Primary)    2. Acute respiratory failure with hypercapnia    3. COPD with acute exacerbation  -     Case Request; Standing  -     Case Request  -     Non-gynecologic Cytology; Standing  -     Non-gynecologic Cytology  -     Fungus Culture - Lavage, Lung, Right Middle Lobe; Standing  -     Fungus Culture - Lavage, Lung, Right Middle Lobe  -     AFB Culture - Lavage, Lung, Right Middle Lobe; Standing  -     AFB Culture - Lavage, Lung, Right Middle Lobe  -     BAL Culture, Quantitative - Lavage, Lung, Right Middle Lobe; Standing  -     BAL Culture, Quantitative - Lavage, Lung, Right Middle Lobe  -     Pneumonia Panel - Lavage, Lung, Right Middle Lobe; Standing  -     Pneumonia Panel - Lavage, Lung, Right Middle Lobe  -     Bronch Wash/BAL Differential - Lavage, Lung, Right Middle Lobe; Standing  -     Bronch Wash/BAL Differential - Lavage, Lung, Right Middle Lobe    Other orders  -     Cancel: NPO Diet NPO Type: Strict NPO; Standing  -     Undress & Gown; Standing  -     Cancel: Cardiac Monitoring; Standing  -     Cancel: Continuous Pulse Oximetry; Standing  -     Cancel: Oxygen Therapy- Nasal Cannula; Titrate 1-6 LPM Per SpO2; 90 - 95%; Standing  -     Vital Signs; Standing  -     ECG 12 Lead ED Triage Standing Order; SOA; Standing  -     XR Chest 1 View; Standing  -     Insert Peripheral IV; Standing  -     Discontinue: sodium chloride 0.9 % flush 10 mL  -     Hamilton Draw; Standing  -     CBC & Differential; Standing  -     Comprehensive Metabolic Panel; Standing  -     BNP; Standing  -     High Sensitivity Troponin T; Standing  -     Cancel: NPO Diet NPO Type: Strict NPO  -     Undress & Gown  -     Cancel: Cardiac Monitoring  -     Cancel: Continuous Pulse Oximetry  -     Vital Signs  -     ECG 12 Lead ED Triage Standing Order; SOA  -     XR Chest 1 View  -     Insert Peripheral IV  -     Hamilton Draw  -     CBC & Differential  -     Comprehensive Metabolic Panel  -      BNP  -     High Sensitivity Troponin T  -     Blood Gas, Arterial -; Standing  -     Blood Gas, Arterial -  -     Cancel: NIPPV - Provider Settings; Standing  -     Cancel: Continuous Pulse Oximetry; Standing  -     Cancel: NIPPV - Provider Settings  -     Cancel: Continuous Pulse Oximetry  -     Blood Gas, Arterial -; Standing  -     Blood Gas, Arterial -  -     High Sensitivity Troponin T 1Hr; Standing  -     High Sensitivity Troponin T 1Hr  -     NIPPV - Provider Settings; Standing  -     NIPPV - Provider Settings  -     methylPREDNISolone sodium succinate (SOLU-Medrol) 125 mg in sterile water (preservative free) 2 mL  -     ipratropium-albuterol (DUO-NEB) nebulizer solution 3 mL  -     Cancel: Inpatient Hospitalist Consult; Standing  -     Cancel: Inpatient Hospitalist Consult  -     arformoterol (BROVANA) nebulizer solution 15 mcg  -     Discontinue: budesonide (PULMICORT) nebulizer solution 0.5 mg  -     metoprolol tartrate (LOPRESSOR) tablet 12.5 mg  -     pantoprazole (PROTONIX) EC tablet 40 mg  -     roflumilast (DALIRESP) tablet 500 mcg  -     Cancel: Oxygen Therapy- Nasal Cannula; Titrate 1-6 LPM Per SpO2; 90% or Greater; Standing  -     Inpatient Admission; Standing  -     Vital Signs; Standing  -     Notify Provider (With Default Parameters); Standing  -     Ambulate Patient; Standing  -     Up in Chair; Standing  -     Up With Assistance; Standing  -     Intake & Output; Standing  -     Weigh Patient; Standing  -     Daily Weights; Standing  -     Oral Care; Standing  -     Basic Metabolic Panel; Standing  -     CBC (No Diff); Standing  -     Magnesium; Standing  -     Insert Peripheral IV; Standing  -     Cancel: Saline Lock & Maintain IV Access; Standing  -     sodium chloride 0.9 % flush 10 mL  -     sodium chloride 0.9 % flush 10 mL  -     sodium chloride 0.9 % infusion 40 mL  -     ondansetron ODT (ZOFRAN-ODT) disintegrating tablet 4 mg  -     Code Status and Medical Interventions: CPR (Attempt  to Resuscitate); Full Support; Standing  -     Discontinue: Pharmacy to Dose enoxaparin (LOVENOX)  -     Continuous Cardiac Monitoring; Standing  -     nitroglycerin (NITROSTAT) SL tablet 0.4 mg  -     Maintain IV Access; Standing  -     Telemetry - Place Orders & Notify Provider of Results When Patient Experiences Acute Chest Pain, Dysrhythmia or Respiratory Distress; Standing  -     Aspiration Precautions; Standing  -     Inpatient Pulmonology Consult; Standing  -     acetaminophen (TYLENOL) tablet 650 mg  -     sennosides-docusate (PERICOLACE) 8.6-50 MG per tablet 2 tablet  -     polyethylene glycol (MIRALAX) packet 17 g  -     bisacodyl (DULCOLAX) EC tablet 5 mg  -     bisacodyl (DULCOLAX) suppository 10 mg  -     methylPREDNISolone sodium succinate (SOLU-Medrol) injection 40 mg  -     azithromycin (ZITHROMAX) 500 mg in sodium chloride 0.9 % 250 mL IVPB-VTB  -     revefenacin (YUPELRI) nebulizer solution 175 mcg  -     RT to Initiate Bronchopulmonary Hygiene Protocol; Standing  -     Cancel: nicotine (NICODERM CQ) 21 MG/24HR patch 1 patch  -     nicotine polacrilex (COMMIT) lozenge 2 mg  -     Inpatient Admission  -     Code Status and Medical Interventions: CPR (Attempt to Resuscitate); Full Support  -     Inpatient Pulmonology Consult  -     nicotine (NICODERM CQ) 14 MG/24HR patch 1 patch  -     Vital Signs  -     Vital Signs  -     Notify Provider (With Default Parameters)  -     Ambulate Patient  -     Intake & Output  -     Weigh Patient  -     Daily Weights  -     Oral Care  -     Insert Peripheral IV  -     Cancel: Saline Lock & Maintain IV Access  -     Continuous Cardiac Monitoring  -     Maintain IV Access  -     Telemetry - Place Orders & Notify Provider of Results When Patient Experiences Acute Chest Pain, Dysrhythmia or Respiratory Distress  -     Aspiration Precautions  -     RT to Initiate Bronchopulmonary Hygiene Protocol  -     budesonide (PULMICORT) nebulizer solution 0.5 mg  -     enoxaparin  sodium (LOVENOX) syringe 40 mg  -     Cancel: Diet: Cardiac; Healthy Heart (2-3 Na+); Fluid Consistency: Thin (IDDSI 0); Standing  -     Cancel: Diet: Cardiac; Healthy Heart (2-3 Na+); Fluid Consistency: Thin (IDDSI 0)  -     Basic Metabolic Panel  -     CBC (No Diff)  -     Magnesium  -     Vital Signs  -     Vital Signs  -     Vital Signs  -     Vital Signs  -     Vital Signs  -     Vital Signs  -     Ambulate Patient  -     Ambulate Patient  -     Intake & Output  -     Intake & Output  -     Daily Weights  -     Oral Care  -     Oral Care  -     Incentive Spirometry; Standing  -     Oscillating Positive Expiratory Pressure (OPEP); Standing  -     Incentive Spirometry  -     Incentive Spirometry  -     Incentive Spirometry  -     Incentive Spirometry  -     Incentive Spirometry  -     Oscillating Positive Expiratory Pressure (OPEP)  -     Incentive Spirometry  -     Dietary Nutrition Supplements Boost VHC; Vanilla; Standing  -     Dietary Nutrition Supplements Magic Cup; orange; Standing  -     Dietary Nutrition Supplements Boost VHC; Vanilla  -     Dietary Nutrition Supplements Boost VHC; Vanilla  -     Dietary Nutrition Supplements Magic Cup; orange  -     Inpatient RT Case Management Consult; Standing  -     Inpatient RT Case Management Consult  -     Basic Metabolic Panel; Standing  -     CBC (No Diff); Standing  -     Magnesium; Standing  -     Phosphorus; Standing  -     acetylcysteine (MUCOMYST) 20 % nebulizer solution 2 mL  -     albuterol (PROVENTIL) nebulizer solution 0.083% 2.5 mg/3mL  -     Incentive Spirometry  -     Incentive Spirometry  -     Incentive Spirometry  -     Basic Metabolic Panel  -     CBC (No Diff)  -     Magnesium  -     Phosphorus  -     Vital Signs  -     Vital Signs  -     Vital Signs  -     Vital Signs  -     Vital Signs  -     Vital Signs  -     Ambulate Patient  -     Ambulate Patient  -     Intake & Output  -     Intake & Output  -     Daily Weights  -     Oral Care  -      Oral Care  -     Dietary Nutrition Supplements Boost VHC; Vanilla  -     Dietary Nutrition Supplements Boost VHC; Vanilla  -     Dietary Nutrition Supplements Boost VHC; Vanilla  -     Dietary Nutrition Supplements Magic Cup; orange  -     Incentive Spirometry  -     Incentive Spirometry  -     CBC (No Diff); Standing  -     Basic Metabolic Panel; Standing  -     Magnesium; Standing  -     Phosphorus; Standing  -     Cancel: Follow Anesthesia Guidlines / Standing Orders; Standing  -     Incentive Spirometry  -     Incentive Spirometry  -     Incentive Spirometry  -     CBC (No Diff)  -     Basic Metabolic Panel  -     Magnesium  -     Phosphorus  -     Vital Signs  -     Vital Signs  -     Vital Signs  -     Vital Signs  -     Vital Signs  -     Vital Signs  -     Ambulate Patient  -     Ambulate Patient  -     Intake & Output  -     Intake & Output  -     Daily Weights  -     Oral Care  -     Oral Care  -     Dietary Nutrition Supplements Boost VHC; Vanilla  -     Dietary Nutrition Supplements Boost VHC; Vanilla  -     Dietary Nutrition Supplements Boost VHC; Vanilla  -     Dietary Nutrition Supplements Magic Cup; orange  -     Cancel: NPO Diet NPO Type: Strict NPO; Standing  -     Cancel: NPO Diet NPO Type: Strict NPO  -     Incentive Spirometry  -     CBC (No Diff); Standing  -     Basic Metabolic Panel; Standing  -     Magnesium; Standing  -     Incentive Spirometry  -     Incentive Spirometry  -     Follow Anesthesia Guidlines / Standing Orders  -     Cancel: Vital Signs - Per Anesthesia Protocol; Standing  -     Cancel: Remove Scopolamine Patch 24 Hours After Application; Standing  -     Cancel: Continuous Pulse Oximetry; Standing  -     Cancel: Insert Peripheral IV; Standing  -     lactated ringers infusion  -     acetaminophen (TYLENOL) tablet 1,000 mg  -     Remove Scopolamine Patch 24 Hours After Application  -     Cancel: Continuous Pulse Oximetry  -     Insert Peripheral IV  -     Cancel: Oxygen Therapy-  Nasal Cannula; Titrate 1-6 LPM Per SpO2; 90 - 95%; Standing  -     Continuous Pulse Oximetry; Standing  -     Cancel: POC Glucose STAT; Standing  -     Cancel: Vital Signs Every 5 Minutes for 15 Minutes, Every 15 Minutes Thereafter.; Standing  -     Cancel: Apply Warming Noble; Standing  -     Cancel: Suction; Standing  -     Cancel: Notify Anesthesia of Any Acute Changes in Patient Condition; Standing  -     Cancel: Notify Anesthesia for Unrelieved Pain; Standing  -     Discontinue: oxyCODONE (ROXICODONE) immediate release tablet 5 mg  -     Discontinue: HYDROmorphone (DILAUDID) injection 0.25 mg  -     Discontinue: HYDROmorphone (DILAUDID) injection 0.5 mg  -     Discontinue: ondansetron (ZOFRAN) injection 4 mg  -     Cancel: Once Discharge Criteria to Floor Met, Follow Surgeon Orders; Standing  -     Cancel: Discharge Patient From PACU When Discharge Criteria Met; Standing  -     Continuous Pulse Oximetry  -     Oxygen Therapy- Nasal Cannula; Titrate 1-6 LPM Per SpO2; 90 - 95%  -     POC Glucose STAT  -     Vital Signs Every 5 Minutes for 15 Minutes, Every 15 Minutes Thereafter.  -     Apply Warming Noble  -     Notify Anesthesia of Any Acute Changes in Patient Condition  -     Notify Anesthesia for Unrelieved Pain  -     Once Discharge Criteria to Floor Met, Follow Surgeon Orders  -     Discharge Patient From PACU When Discharge Criteria Met  -     Diet: Cardiac; Healthy Heart (2-3 Na+); Fluid Consistency: Thin (IDDSI 0); Standing  -     Diet: Cardiac; Healthy Heart (2-3 Na+); Fluid Consistency: Thin (IDDSI 0)  -     Incentive Spirometry  -     Incentive Spirometry  -     CBC (No Diff)  -     Basic Metabolic Panel  -     Magnesium  -     Vital Signs  -     Vital Signs  -     Vital Signs  -     Vital Signs  -     Vital Signs  -     Vital Signs  -     Ambulate Patient  -     Ambulate Patient  -     Intake & Output  -     Intake & Output  -     Daily Weights  -     Oral Care  -     Oral Care  -     Dietary  Nutrition Supplements Boost VHC; Vanilla  -     Dietary Nutrition Supplements Boost VHC; Vanilla  -     Dietary Nutrition Supplements Boost VHC; Vanilla  -     Dietary Nutrition Supplements Magic Cup; orange  -     Incentive Spirometry      Assessment & Plan  Acute on chronic COPD exacerbation  Acute on chronic hypoxic respiratory failure, 3 L at home  Rhinovirus respiratory tract infection  Tobacco abuse of cigarettes, ongoing    Plan:  -Continue to maintain SpO2 greater than 90%  - S/p bronchoscopy.  Micro positive for rhinovirus  - Continue supportive care  - Continue nebulizers such as  Brovana, Pulmicort, and Yupelri  - Continue Daliresp  - Discontinue Mucomyst and start 3% saline nebs  -Continue Solu-Medrol for today and transition to prednisone tomorrow and continue for a total 5-day  - Be cautious with LR  - Continue bronchopulmonary hygiene.  Encourage I-S and flutter  - Follow-up pulmonary clinic 1 to 2 weeks after discharge    I have reviewed labs, imaging, pertinent clinical data and provider notes.   I have discussed with bedside nurse and primary service.       Electronically signed by ZULY Ames, 07/26/25, 11:07 AM EDT.    This visit was performed by BOTH a physician and an APC. I personally evaluated and examined the patient. I performed all aspects of MDM as documented. , I have reviewed and confirmed the accuracy of the patient's history as documented in this note., and I have reexamined the patient and the results are consistent with the previously documented exam. I have updated the documentation as necessary.     Electronically signed by Ulices Moya MD, 07/26/25, 4:17 PM EDT.     Electronically signed by Ulices Moya MD, 7/26/2025, 07:36 EDT.

## 2025-07-26 NOTE — PROGRESS NOTES
[facility]   Hospitalist Progress Note  Date: 2025  Patient Name: Sumeet Gomes  : 1960  MRN: [id number]  Date of admission: 2025  Consultants:   - Pulmonology: Dr. Lang Willis    Subjective   Subjective     Chief Complaint: Shortness of breath    Summary:   Sumeet Gomes is a 64 y.o. male with COPD on 3 L nasal cannula, pulmonary hypertension, CAD, GERD, hypertension and hyperlipidemia that presented to the ED with worsening shortness of breath despite use of Augmentin and steroid therapy.  Patient noted to be hypoxic and hypercapnic in the ED and started on BiPAP therapy.  Hospitalist service contacted.  Pulmonology consulted.  Nebulizer breathing treatments and IV steroid therapy started.    Interval Followup:     2025  Resting comfortably in bed  Reports breathing more easily; able to take deep breaths better  Weak overall but slowly improving  No fevers  On 3 L O2 (3L at home), sats 99%  NSR on telemetry  118/68, 104/65  Discussed bronchoscopy results.  Human rhinovirus/enterovirus noted  Antibiotics:   Completed    Objective   Objective     Vitals:   Temp:  [97.1 °F (36.2 °C)-98.4 °F (36.9 °C)] 98.4 °F (36.9 °C)  Heart Rate:  [55-80] 76  Resp:  [15-23] 18  BP: ()/(56-78) 118/68  Flow (L/min) (Oxygen Therapy):  [2.5-4] 3  Physical Exam   Gen: Chronically ill-appearing male, malnourished, older than stated age, alert  Resp: Diffuse wheezing noted, conversational dyspnea improved on 3 L  Card: Regular rhythm, tachycardic, No m/r/g  Abd: Soft, Nontender, Nondistended, + bowel sounds  EXT: No edema    Result Review    Result Review:  I have personally reviewed the results as below and agree with these findings:  []  Laboratory:   CMP          2025    05:48 2025    06:11 2025    04:39   CMP   Glucose 126  146  153    BUN 17.8  19.7  19.2    Creatinine 0.55  0.53  0.58    EGFR 111.4  112.6  109.6    Sodium 142  141  138    Potassium 4.6  4.6  4.8    Chloride 100   99  95    Calcium 9.6  9.5  9.4    BUN/Creatinine Ratio 32.4  37.2  33.1    Anion Gap 4.1  3.2  5.6      CBC          7/24/2025    05:48 7/25/2025    06:11 7/26/2025    04:39   CBC   WBC 7.56  7.54  6.56    RBC 4.70  4.69  4.71    Hemoglobin 13.6  13.6  13.7    Hematocrit 43.1  43.5  44.4    MCV 91.7  92.8  94.3    MCH 28.9  29.0  29.1    MCHC 31.6  31.3  30.9    RDW 12.9  12.9  12.7    Platelets 165  171  167    Magnesium within normal limits  []  Microbiology:   []  Radiology:   [x]  EKG/Telemetry:    []  Cardiology/Vascular:    []  Pathology:  []  Old records:  []  Other:    Assessment & Plan   Assessment / Plan     Assessment:    Acute COPD exacerbation (human Rhinovirus/Enterovirus on pneumonia panel)  Acute on chronic hypoxic and hypercapnic respiratory failure due to above  Chronic pulmonary hypertension secondary to COPD (3L at home)  Hypertension  Hyperlipidemia  Nonobstructive CAD  GERD  Chronic ongoing active tobacco abuse with cigarettes  Moderate malnutrition  Status post bronchoscopy 7/25/25, this admission  Pulmonary cachexia    Plan:    - Bronchoscopy July 25 ... copious secretions and diffuse erythema noted.  - Pulmonology consulted and following, appreciate input  - Dietitian consult for severe malnutrition/pulmonary cachexia  - Continue steroids .... Transition to oral in the a.m.  - Continue Mucomyst.  Ipratropium DC'd to help mobilize dry secretions.  - Continue Brovana, Pulmicort and Yupelri  - Continue Daliresp  - Bronchopulmonary hygiene protocol ordered  - Patient is to wear NIPPV at night and with naps  - RT case management consulted  - Continue appropriate home medications  - Continue nicotine patch for smoking cessation  - Will monitor electrolytes and renal function with BMP and magnesium level in the AM  - Will monitor WBC and Hgb with CBC in the AM  - Clinical course will dictate further management  - Smoking cessation counseling provided. Stop date set for 07/22/2025.  Patient will  use patches and lozenges to aid in smoking cessation.     DVT Prophylaxis: Lovenox  GI Prophylaxis: Pantoprazole  Diet:   Diet Order   Procedures    Diet: Cardiac; Healthy Heart (2-3 Na+); Fluid Consistency: Thin (IDDSI 0)   Dispo: Home when medically appropriate for discharge  VTE Prophylaxis:  Pharmacologic VTE prophylaxis orders are present.    CODE STATUS:   Code Status (Patient has no pulse and is not breathing): CPR (Attempt to Resuscitate)  Medical Interventions (Patient has pulse or is breathing): Full Support         Patient independently seen and evaluated, agree with assessment and plan, above documentation reflects plan put forth during bedside rounds.  More than 51% of the time of this patient encounter was performed by me.     I have reviewed this documentation and agree.     Interval history  Patient had bronchoscopy yesterday and tolerated well.  Continues have some shortness of breath but he does feel like it is improving some today.  No overall patient does feel weak but feels like he is slowly improving.  Patient has been afebrile.     Physical exam  Gen: Lying in bed watching TV, pleasant, conversant  Resp: Normal respiratory effort, expiratory wheezing noted but improved compared to prior exams  Card: RRR, No m/r/g  Abd: Soft, Nontender, Nondistended, + bowel sounds     Personally reviewed labs, imaging.  Magnesium within normal limits.  Creatinine stable.  Hemoglobin and platelets stable.     Assessment  Acute COPD exacerbation  Human rhinovirus/enterovirus infection  Acute on chronic hypoxic and hypercapnic respiratory failure due to above  Chronic pulmonary hypertension secondary to COPD  Hypertension  Hyperlipidemia  Nonobstructive CAD  GERD  Chronic ongoing active tobacco abuse with cigarettes  Moderate malnutrition     Plan  -Pulmonology consulted and following, appreciate assistance and recommendations in the care of this patient.  -Continue Brovana, Pulmicort and Yupelri  -Continue  Mucomyst  -Transition patient to oral prednisone  -Start hypertonic saline nebs per discussion with pulmonology  -Continue supplemental O2 to maintain sats greater than 90%, wean as tolerated  -Patient is to wear NIPPV at night and with naps  -Continue appropriate home medications  -DVT prophylaxis: Lovenox  -GI prophylaxis: Pantoprazole  -Monitor electrolytes and renal function with BMP in the a.m.  -Monitor WBC and hemoglobin with CBC in a.m.  -Clinical course will dictate further management       Personally reviewed patients labs and imaging, discussed with patient and nurse at bedside. Discussed management with the following consultants: Pulmonology.     Part of this note may be an electronic transcription/translation of spoken language to printed text using the Dragon dictation system.

## 2025-07-26 NOTE — PROGRESS NOTES
RT EQUIPMENT DEVICE RELATED - SKIN ASSESSMENT    RT Medical Equipment/Device:     NIV Mask:  Full-face    size: S    Skin Assessment:      Cheek:  Intact  Chin:  Intact  Forehead:  Intact  Neck:  Intact  Nose:  Intact  Mouth:  Intact    Device Skin Pressure Protection:  Positioning supports utilized and Pressure points protected    Nurse Notification:  Akua Lobato, RRT

## 2025-07-26 NOTE — PROGRESS NOTES
RT EQUIPMENT DEVICE RELATED - SKIN ASSESSMENT    RT Medical Equipment/Device:     NIV Mask:  Full-face    size:      Skin Assessment:      Cheek:  Redness  Chin:  Intact  Forehead:  Intact  Nose:  Redness  Mouth:  Intact    Device Skin Pressure Protection:  Positioning supports utilized    Nurse Notification:  Akua Bobby, RRT

## 2025-07-26 NOTE — PLAN OF CARE
Goal Outcome Evaluation:              Outcome Evaluation: Patient alert and oriented x4, VSS. BP continues to run soft this shift. Patient continues to get up ad derek to bathroom and takes portable oxygen take with him. Patient remains on 3L NC. Patient slept well with Bipap on. Patient denies any pain or needs at this time. Plan of care ongoing.

## 2025-07-26 NOTE — PLAN OF CARE
Goal Outcome Evaluation:  Plan of Care Reviewed With: patient        Progress: improving  Outcome Evaluation: Pt A&Ox4, VSS on 3LNC, O2 drops significantly upon excertion and recovers slowly. Skin tear noted on L arm covered with gauze and tape, pt states it occured yesterday when IV in L forearm was removed before bronch. Added wound and photo to LDA and ordered wound care standing orders, then completed the wound care. No c/o pain. No other significant changes. Ongoing care.

## 2025-07-26 NOTE — PLAN OF CARE
Goal Outcome Evaluation:         Pt wears BIPAP while asleep. Pt BIPAP settings are 14/7 R22 32%. Pt tolerates current settings well at this time. Pt is on 3L nc while not on BIPAP. Pt home/base line O2 is 3L.       Problem: Noninvasive Ventilation Acute  Goal: Effective Unassisted Ventilation and Oxygenation  Intervention: Monitor and Manage Noninvasive Ventilation  Flowsheets (Taken 7/25/2025 9386)  Airway/Ventilation Management:   airway patency maintained   oxygen therapy provided   positive pressure ventilation provided  NPPV/CPAP Maintenance:   proper fit/secure   full face mask

## 2025-07-26 NOTE — PLAN OF CARE
Goal Outcome Evaluation:  Plan of Care Reviewed With: patient        Progress: improving  Outcome Evaluation: Patient wore BIPAP 14/7, RR 22, 32% last night with no issues. He is on 3l nasal cannula his home settings during the day.

## 2025-07-27 LAB
ANION GAP SERPL CALCULATED.3IONS-SCNC: 3.5 MMOL/L (ref 5–15)
BACTERIA SPEC AEROBE CULT: NORMAL
BUN SERPL-MCNC: 19.3 MG/DL (ref 8–23)
BUN/CREAT SERPL: 42.9 (ref 7–25)
CALCIUM SPEC-SCNC: 9.1 MG/DL (ref 8.6–10.5)
CHLORIDE SERPL-SCNC: 96 MMOL/L (ref 98–107)
CO2 SERPL-SCNC: 39.5 MMOL/L (ref 22–29)
CREAT SERPL-MCNC: 0.45 MG/DL (ref 0.76–1.27)
DEPRECATED RDW RBC AUTO: 44.6 FL (ref 37–54)
EGFRCR SERPLBLD CKD-EPI 2021: 118.3 ML/MIN/1.73
ERYTHROCYTE [DISTWIDTH] IN BLOOD BY AUTOMATED COUNT: 12.9 % (ref 12.3–15.4)
GLUCOSE SERPL-MCNC: 98 MG/DL (ref 65–99)
GRAM STN SPEC: NORMAL
HCT VFR BLD AUTO: 43.7 % (ref 37.5–51)
HGB BLD-MCNC: 13.4 G/DL (ref 13–17.7)
MAGNESIUM SERPL-MCNC: 2.1 MG/DL (ref 1.6–2.4)
MCH RBC QN AUTO: 29.1 PG (ref 26.6–33)
MCHC RBC AUTO-ENTMCNC: 30.7 G/DL (ref 31.5–35.7)
MCV RBC AUTO: 95 FL (ref 79–97)
PHOSPHATE SERPL-MCNC: 2.6 MG/DL (ref 2.5–4.5)
PLATELET # BLD AUTO: 151 10*3/MM3 (ref 140–450)
PMV BLD AUTO: 10.4 FL (ref 6–12)
POTASSIUM SERPL-SCNC: 4.5 MMOL/L (ref 3.5–5.2)
RBC # BLD AUTO: 4.6 10*6/MM3 (ref 4.14–5.8)
SODIUM SERPL-SCNC: 139 MMOL/L (ref 136–145)
WBC NRBC COR # BLD AUTO: 7.64 10*3/MM3 (ref 3.4–10.8)

## 2025-07-27 PROCEDURE — 83735 ASSAY OF MAGNESIUM: CPT | Performed by: PHYSICIAN ASSISTANT

## 2025-07-27 PROCEDURE — 94799 UNLISTED PULMONARY SVC/PX: CPT

## 2025-07-27 PROCEDURE — 25010000002 ENOXAPARIN PER 10 MG: Performed by: INTERNAL MEDICINE

## 2025-07-27 PROCEDURE — 94660 CPAP INITIATION&MGMT: CPT

## 2025-07-27 PROCEDURE — 63710000001 REVEFENACIN 175 MCG/3ML SOLUTION: Performed by: INTERNAL MEDICINE

## 2025-07-27 PROCEDURE — 99233 SBSQ HOSP IP/OBS HIGH 50: CPT | Performed by: INTERNAL MEDICINE

## 2025-07-27 PROCEDURE — 99232 SBSQ HOSP IP/OBS MODERATE 35: CPT | Performed by: INTERNAL MEDICINE

## 2025-07-27 PROCEDURE — 80048 BASIC METABOLIC PNL TOTAL CA: CPT | Performed by: PHYSICIAN ASSISTANT

## 2025-07-27 PROCEDURE — 85027 COMPLETE CBC AUTOMATED: CPT | Performed by: PHYSICIAN ASSISTANT

## 2025-07-27 PROCEDURE — 94761 N-INVAS EAR/PLS OXIMETRY MLT: CPT

## 2025-07-27 PROCEDURE — 84100 ASSAY OF PHOSPHORUS: CPT | Performed by: PHYSICIAN ASSISTANT

## 2025-07-27 PROCEDURE — 94664 DEMO&/EVAL PT USE INHALER: CPT

## 2025-07-27 PROCEDURE — 63710000001 PREDNISONE PER 1 MG

## 2025-07-27 RX ORDER — AZITHROMYCIN 250 MG/1
250 TABLET, FILM COATED ORAL 3 TIMES WEEKLY
Status: DISCONTINUED | OUTPATIENT
Start: 2025-07-28 | End: 2025-07-28 | Stop reason: HOSPADM

## 2025-07-27 RX ADMIN — BUDESONIDE 0.5 MG: 0.5 SUSPENSION RESPIRATORY (INHALATION) at 21:49

## 2025-07-27 RX ADMIN — ROFLUMILAST 500 MCG: 500 TABLET ORAL at 08:01

## 2025-07-27 RX ADMIN — BUDESONIDE 0.5 MG: 0.5 SUSPENSION RESPIRATORY (INHALATION) at 09:05

## 2025-07-27 RX ADMIN — NICOTINE 1 PATCH: 14 PATCH, EXTENDED RELEASE TRANSDERMAL at 08:01

## 2025-07-27 RX ADMIN — PREDNISONE 40 MG: 20 TABLET ORAL at 08:01

## 2025-07-27 RX ADMIN — Medication 10 ML: at 02:17

## 2025-07-27 RX ADMIN — METOPROLOL TARTRATE 12.5 MG: 25 TABLET, FILM COATED ORAL at 20:33

## 2025-07-27 RX ADMIN — REVEFENACIN 175 MCG: 175 SOLUTION RESPIRATORY (INHALATION) at 09:05

## 2025-07-27 RX ADMIN — Medication 10 ML: at 20:33

## 2025-07-27 RX ADMIN — ARFORMOTEROL TARTRATE 15 MCG: 15 SOLUTION RESPIRATORY (INHALATION) at 21:49

## 2025-07-27 RX ADMIN — Medication 4 ML: at 09:06

## 2025-07-27 RX ADMIN — Medication 4 ML: at 21:49

## 2025-07-27 RX ADMIN — ENOXAPARIN SODIUM 40 MG: 100 INJECTION SUBCUTANEOUS at 20:33

## 2025-07-27 RX ADMIN — PANTOPRAZOLE SODIUM 40 MG: 40 TABLET, DELAYED RELEASE ORAL at 08:01

## 2025-07-27 RX ADMIN — METOPROLOL TARTRATE 12.5 MG: 25 TABLET, FILM COATED ORAL at 08:01

## 2025-07-27 RX ADMIN — Medication 10 ML: at 08:01

## 2025-07-27 RX ADMIN — ARFORMOTEROL TARTRATE 15 MCG: 15 SOLUTION RESPIRATORY (INHALATION) at 09:05

## 2025-07-27 NOTE — PLAN OF CARE
Goal Outcome Evaluation:      Pt wears BIPAP while asleep. Pt BIPAP settings are 14/7 R22 32%. Pt tolerates current settings well at this time. Pt is on 3L nc while not on BIPAP. Pt home/base line O2 is 3L.       Problem: Noninvasive Ventilation Acute  Goal: Effective Unassisted Ventilation and Oxygenation  Intervention: Monitor and Manage Noninvasive Ventilation  Flowsheets (Taken 7/26/2025 6789)  Airway/Ventilation Management:   airway patency maintained   oxygen therapy provided   positive pressure ventilation provided  NPPV/CPAP Maintenance:   proper fit/secure   full face mask

## 2025-07-27 NOTE — PLAN OF CARE
Goal Outcome Evaluation:  Plan of Care Reviewed With: patient        Progress: improving  Outcome Evaluation: Pt A&Ox4, VSS, pt desats upon ambulation, but is improving with that and recovering faster than yesterday. No c/o pain. No significant changes. Ongoing care.

## 2025-07-27 NOTE — PLAN OF CARE
Goal Outcome Evaluation:  Plan of Care Reviewed With: patient        Progress: improving  Outcome Evaluation: Patient did wear BIPAP 14/7 R 22 last night. Remains on 3lpm nasal cannula (patient's home O2) throughout the day. BIPAP will remain in room today as PRN/nightly use.

## 2025-07-27 NOTE — PROGRESS NOTES
Pulmonary / Critical Care Progress Note      Patient Name: Sumeet Gomes  : 1960  MRN: [id number]  Primary Care Physician:  Mirtha Alexander APRN  Date of admission: 2025    Subjective   Subjective   Follow-up for COPD exacerbation, rhinovirus respiratory tract infection    Over past 24 hours: Remains on nebulizers such as Brovana, Pulmicort, and Yupelri.  Transitioned to prednisone.  Remains on Daliresp    No acute events overnight.     This morning,   Currently on 3 L nasal cannula  Wore NIPPV overnight  Ports feeling like he is close to his baseline  Still with some secretions  No fever or chills  Denies any chest pain chest tightness    Objective   Objective     Vitals:   Temp:  [97.5 °F (36.4 °C)-98.4 °F (36.9 °C)] 97.5 °F (36.4 °C)  Heart Rate:  [61-83] 61  Resp:  [18-24] 23  BP: (103-118)/(60-70) 103/69  Flow (L/min) (Oxygen Therapy):  [3] 3  Physical Exam   Vital Signs Reviewed   General:  WDWN, Alert, NAD.  Chronically ill appearing male, lying in bed  HEENT:  PERRL, EOMI.  OP, nares clear, no sinus tenderness  Neck:  Supple, no JVD, no thyromegaly  Chest:  good aeration, diminished breath sounds with resolving wheezing, scattered rhonchi, currently on 3 L  CV: RRR, no MGR, pulses 2+, equal.  Abd:  Soft, NT, ND, + BS, no HSM  EXT:  no clubbing, no cyanosis, no edema  Neuro:  A&Ox3, CN grossly intact, no focal deficits.  Skin: No rashes or lesions noted      Result Review    Result Review:  I have personally reviewed the results from the time of this admission to 2025 07:48 EDT and agree with these findings:  [x]  Laboratory  [x]  Microbiology  [x]  Radiology  [x]  EKG/Telemetry   []  Cardiology/Vascular   []  Pathology  []  Old records  []  Other:  Most notable findings include:         Lab 25  0553 25  0439 25  0611 25  0548 25  0539 25  1239   WBC 7.64 6.56 7.54 7.56 4.42 5.83   HEMOGLOBIN 13.4 13.7 13.6 13.6 13.6 15.2   HEMATOCRIT 43.7 44.4  43.5 43.1 42.6 48.5   PLATELETS 151 167 171 165 161 163   SODIUM 139 138 141 142 142 141   POTASSIUM 4.5 4.8 4.6 4.6 4.4 3.9   CHLORIDE 96* 95* 99 100 99 95*   CO2 39.5* 37.4* 38.8* 37.9* 37.0* 35.7*   BUN 19.3 19.2 19.7 17.8 15.6 12.2   CREATININE 0.45* 0.58* 0.53* 0.55* 0.56* 0.64*   GLUCOSE 98 153* 146* 126* 119* 107*   CALCIUM 9.1 9.4 9.5 9.6 9.6 10.2   PHOSPHORUS 2.6  --  2.9 3.0  --   --    TOTAL PROTEIN  --   --   --   --   --  6.8   ALBUMIN  --   --   --   --   --  4.5   GLOBULIN  --   --   --   --   --  2.3                       Results for orders placed during the hospital encounter of 12/04/23    Adult Transthoracic Echo Complete W/ Cont if Necessary Per Protocol    Interpretation Summary    Left ventricular systolic function is normal. Estimated left ventricular EF = 55%    Left ventricular diastolic function was normal.    The right ventricular cavity is moderately dilated.    The right atrial cavity is moderately  dilated.    Estimated right ventricular systolic pressure from tricuspid regurgitation is moderately elevated (45-55 mmHg).       Assessment & Plan   Assessment / Plan     Active Hospital Problems:  Active Hospital Problems    Diagnosis     **COPD with acute exacerbation          Impression:   Acute on chronic COPD exacerbation  Acute on chronic hypoxic respiratory failure, 3 L at home  Rhinovirus respiratory tract infection  Tobacco abuse of cigarettes, ongoing    Plan:   -Continue to maintain SpO2 greater than 90%  - S/p bronchoscopy.  Micro positive for rhinovirus  - Continue supportive care  - Continue nebulizers such as  Brovana, Pulmicort, and Yupelri  - Continue Daliresp  - Continue start 3% saline nebs  - Continue prednisone for total 5-day  - Continue bronchopulmonary hygiene.  Encourage I-S and flutter  - Start long-term azithromycin therapy, 3 times weekly  - Question if patient is a candidate for biologicals  - Patient would benefit from lung transplant workup but patient is still  actively smoking  - Follow-up pulmonary clinic 1 to 2 weeks after discharge  - Patient likely home in the next day or so       VTE Prophylaxis:  Pharmacologic VTE prophylaxis orders are present.    CODE STATUS:   Code Status (Patient has no pulse and is not breathing): CPR (Attempt to Resuscitate)  Medical Interventions (Patient has pulse or is breathing): Full Support      I personally reviewed pertinent labs, imaging and provider notes. Discussed with bedside nurse and will discuss with primary service.     Electronically signed by ZULY Ames, 07/27/25, 12:05 PM EDT.    This visit was performed by BOTH a physician and an APC. I personally evaluated and examined the patient. I performed all aspects of MDM as documented. , I have reviewed and confirmed the accuracy of the patient's history as documented in this note., and I have reexamined the patient and the results are consistent with the previously documented exam. I have updated the documentation as necessary.     Electronically signed by Ulices Moya MD, 07/27/25, 2:41 PM EDT.       Electronically signed by Ulices Moya MD, 7/27/2025, 07:48 EDT.

## 2025-07-27 NOTE — PROGRESS NOTES
[facility]   Hospitalist Progress Note  Date: 2025  Patient Name: Sumeet Gomes  : 1960  MRN: [id number]  Date of admission: 2025  Consultants:   - Pulmonology: Dr. Lang Willis    Subjective   Subjective     Chief Complaint: Shortness of breath    Summary:   Sumeet Gomes is a 64 y.o. male with COPD on 3 L nasal cannula, pulmonary hypertension, CAD, GERD, hypertension and hyperlipidemia that presented to the ED with worsening shortness of breath despite use of Augmentin and steroid therapy.  Patient noted to be hypoxic and hypercapnic in the ED and started on BiPAP therapy.  Hospitalist service contacted.  Pulmonology consulted.  Nebulizer breathing treatments and IV steroid therapy started.    Interval Followup:     2025  Alert and conversational.  Still tired.  Reports breathing deeper, more easily  No fevers  On 3 L O2 (3L at home), sats 99%  NSR on telemetry.  Brief run NSVT/PSVT.  On beta-blocker.  97/63, 103/69  Discussed bronchoscopy results ...Human rhinovirus  Antibiotics:   Completed    Objective   Objective     Vitals:   Temp:  [97.5 °F (36.4 °C)-98.1 °F (36.7 °C)] 98.1 °F (36.7 °C)  Heart Rate:  [60-83] 72  Resp:  [18-24] 18  BP: ()/(60-70) 97/63  Flow (L/min) (Oxygen Therapy):  [3] 3  Physical Exam   Gen: Chronically ill-appearing male, malnourished, older than stated age, alert  Resp: Diffuse wheezing noted, conversational dyspnea improved on 3 L  Card: Regular rhythm, tachycardic, No m/r/g  Abd: Soft, Nontender, Nondistended, + bowel sounds  EXT: No edema    Result Review    Result Review:  I have personally reviewed the results as below and agree with these findings:  []  Laboratory:   CMP          2025    06:11 2025    04:39 2025    05:53   CMP   Glucose 146  153  98    BUN 19.7  19.2  19.3    Creatinine 0.53  0.58  0.45    EGFR 112.6  109.6  118.3    Sodium 141  138  139    Potassium 4.6  4.8  4.5    Chloride 99  95  96    Calcium 9.5  9.4  9.1     BUN/Creatinine Ratio 37.2  33.1  42.9    Anion Gap 3.2  5.6  3.5      CBC          7/25/2025    06:11 7/26/2025    04:39 7/27/2025    05:53   CBC   WBC 7.54  6.56  7.64    RBC 4.69  4.71  4.60    Hemoglobin 13.6  13.7  13.4    Hematocrit 43.5  44.4  43.7    MCV 92.8  94.3  95.0    MCH 29.0  29.1  29.1    MCHC 31.3  30.9  30.7    RDW 12.9  12.7  12.9    Platelets 171  167  151    Magnesium within normal limits  []  Microbiology:   []  Radiology:   [x]  EKG/Telemetry:    []  Cardiology/Vascular:    []  Pathology:  []  Old records:  []  Other:    Assessment & Plan   Assessment / Plan     Assessment:    Acute COPD exacerbation (human Rhinovirus/Enterovirus on pneumonia panel)  Acute on chronic hypoxic and hypercapnic respiratory failure due to above  Chronic pulmonary hypertension secondary to COPD (3L at home)  Hypertension  Hyperlipidemia  Nonobstructive CAD  GERD  Chronic ongoing active tobacco abuse with cigarettes  Moderate malnutrition  Status post bronchoscopy 7/25/25, this admission  Pulmonary cachexia  NSVT/PSVT    Plan:    - Continue current bronchopulmonary plan including nebs, steroids, nutritional support, NIPPV, Habitrol patch.  - Patient will need to quit smoking if he wants to get on transplant list  - Bronchoscopy July 25 ... copious secretions and diffuse erythema noted.  - Pulmonology consulted and following, appreciate input  - Dietitian consult for severe malnutrition/pulmonary cachexia  - Continue steroids .... Transition to oral in the a.m.  - Continue Mucomyst.  Ipratropium DC'd to help mobilize dry secretions.  - Continue Brovana, Pulmicort and Yupelri  - Continue Daliresp  - Bronchopulmonary hygiene protocol ordered  - Patient is to wear NIPPV at night and with naps  - RT case management consulted  - Continue appropriate home medications  - Continue nicotine patch for smoking cessation  - Will monitor electrolytes and renal function with BMP and magnesium level in the AM  - Will monitor WBC  and Hgb with CBC in the AM  - Clinical course will dictate further management  - Smoking cessation counseling provided. Stop date set for 07/22/2025.  Patient will use patches and lozenges to aid in smoking cessation.     DVT Prophylaxis: Lovenox  GI Prophylaxis: Pantoprazole  Diet:   Diet Order   Procedures    Diet: Cardiac; Healthy Heart (2-3 Na+); Fluid Consistency: Thin (IDDSI 0)   Dispo: Home when medically appropriate for discharge  VTE Prophylaxis:  Pharmacologic VTE prophylaxis orders are present.    CODE STATUS:   Code Status (Patient has no pulse and is not breathing): CPR (Attempt to Resuscitate)  Medical Interventions (Patient has pulse or is breathing): Full Support           Patient independently seen and evaluated, agree with assessment and plan, above documentation reflects plan put forth during bedside rounds.  More than 51% of the time of this patient encounter was performed by me.     I have reviewed this documentation and agree.     Interval history  No acute events overnight.  Patient feels like her breathing has improved some.  Patient has been afebrile.  Denied any active chest pain.  Nursing with no additional acute issues to report.     Physical exam  Gen: Conversant, pleasant, lying in bed watching TV  Resp: Expiratory wheezing noted but continued improvement compared to prior exams, equal chest rise bilaterally  Card: RRR, No m/r/g  Abd: Soft, Nontender, Nondistended, + bowel sounds     Personally reviewed labs, imaging.  Phosphorus and magnesium within normal limits.  Renal function stable.  Hemoglobin and platelets stable.     Assessment  Acute COPD exacerbation  Human rhinovirus/enterovirus infection  Acute on chronic hypoxic and hypercapnic respiratory failure due to above  Chronic pulmonary hypertension secondary to COPD  Hypertension  Hyperlipidemia  Nonobstructive CAD  GERD  Chronic ongoing active tobacco abuse with cigarettes  Severe malnutrition/pulmonary cachexia      Plan  -Pulmonology consulted and following, appreciate assistance and recommendations in the care of this patient.  -Continue Brovana, Pulmicort and Yupelri  -Continue Mucomyst  -Continue oral prednisone  -Continue hypertonic saline nebs per discussion with pulmonology  -Continue supplemental O2 to maintain sats greater than 90%, wean as tolerated  -Patient is to wear NIPPV at night and with naps  -Encourage p.o. intake  -Continue appropriate home medications  -DVT prophylaxis: Lovenox  -GI prophylaxis: Pantoprazole  -Monitor electrolytes and renal function with BMP in the a.m.  -Monitor WBC and hemoglobin with CBC in a.m.  -Clinical course will dictate further management        Personally reviewed patients labs and imaging, discussed with patient and nurse at bedside. Discussed management with the following consultants: Pulmonology.     Part of this note may be an electronic transcription/translation of spoken language to printed text using the Dragon dictation system.

## 2025-07-27 NOTE — THERAPY EVALUATION
RT EQUIPMENT DEVICE RELATED - SKIN ASSESSMENT    RT Medical Equipment/Device:     NIV Mask:  Full-face    size: S    Skin Assessment:      Cheek:  Intact  Nose:  Intact    Device Skin Pressure Protection:  Pressure points protected    Nurse Notification:  Akua Garnica, RRT

## 2025-07-28 ENCOUNTER — READMISSION MANAGEMENT (OUTPATIENT)
Dept: CALL CENTER | Facility: HOSPITAL | Age: 65
End: 2025-07-28
Payer: MEDICARE

## 2025-07-28 VITALS
TEMPERATURE: 98.2 F | DIASTOLIC BLOOD PRESSURE: 64 MMHG | HEART RATE: 63 BPM | OXYGEN SATURATION: 96 % | WEIGHT: 123.68 LBS | HEIGHT: 67 IN | RESPIRATION RATE: 18 BRPM | SYSTOLIC BLOOD PRESSURE: 107 MMHG | BODY MASS INDEX: 19.41 KG/M2

## 2025-07-28 LAB
ANION GAP SERPL CALCULATED.3IONS-SCNC: 2.8 MMOL/L (ref 5–15)
BUN SERPL-MCNC: 19.1 MG/DL (ref 8–23)
BUN/CREAT SERPL: 37.5 (ref 7–25)
CALCIUM SPEC-SCNC: 9 MG/DL (ref 8.6–10.5)
CHLORIDE SERPL-SCNC: 95 MMOL/L (ref 98–107)
CO2 SERPL-SCNC: 43.2 MMOL/L (ref 22–29)
CREAT SERPL-MCNC: 0.51 MG/DL (ref 0.76–1.27)
DEPRECATED RDW RBC AUTO: 44.5 FL (ref 37–54)
EGFRCR SERPLBLD CKD-EPI 2021: 113.9 ML/MIN/1.73
ERYTHROCYTE [DISTWIDTH] IN BLOOD BY AUTOMATED COUNT: 12.9 % (ref 12.3–15.4)
GLUCOSE SERPL-MCNC: 87 MG/DL (ref 65–99)
HCT VFR BLD AUTO: 42.5 % (ref 37.5–51)
HGB BLD-MCNC: 13 G/DL (ref 13–17.7)
MAGNESIUM SERPL-MCNC: 2.1 MG/DL (ref 1.6–2.4)
MCH RBC QN AUTO: 28.8 PG (ref 26.6–33)
MCHC RBC AUTO-ENTMCNC: 30.6 G/DL (ref 31.5–35.7)
MCV RBC AUTO: 94.2 FL (ref 79–97)
PLATELET # BLD AUTO: 149 10*3/MM3 (ref 140–450)
PMV BLD AUTO: 10.4 FL (ref 6–12)
POTASSIUM SERPL-SCNC: 4.3 MMOL/L (ref 3.5–5.2)
RBC # BLD AUTO: 4.51 10*6/MM3 (ref 4.14–5.8)
SODIUM SERPL-SCNC: 141 MMOL/L (ref 136–145)
WBC NRBC COR # BLD AUTO: 6.69 10*3/MM3 (ref 3.4–10.8)

## 2025-07-28 PROCEDURE — 85027 COMPLETE CBC AUTOMATED: CPT | Performed by: PHYSICIAN ASSISTANT

## 2025-07-28 PROCEDURE — 94799 UNLISTED PULMONARY SVC/PX: CPT

## 2025-07-28 PROCEDURE — 63710000001 PREDNISONE PER 1 MG

## 2025-07-28 PROCEDURE — 94660 CPAP INITIATION&MGMT: CPT

## 2025-07-28 PROCEDURE — 63710000001 REVEFENACIN 175 MCG/3ML SOLUTION: Performed by: INTERNAL MEDICINE

## 2025-07-28 PROCEDURE — 80048 BASIC METABOLIC PNL TOTAL CA: CPT | Performed by: PHYSICIAN ASSISTANT

## 2025-07-28 PROCEDURE — 94761 N-INVAS EAR/PLS OXIMETRY MLT: CPT

## 2025-07-28 PROCEDURE — 99232 SBSQ HOSP IP/OBS MODERATE 35: CPT | Performed by: INTERNAL MEDICINE

## 2025-07-28 PROCEDURE — 83735 ASSAY OF MAGNESIUM: CPT | Performed by: PHYSICIAN ASSISTANT

## 2025-07-28 PROCEDURE — 94664 DEMO&/EVAL PT USE INHALER: CPT

## 2025-07-28 PROCEDURE — 97161 PT EVAL LOW COMPLEX 20 MIN: CPT

## 2025-07-28 PROCEDURE — 99239 HOSP IP/OBS DSCHRG MGMT >30: CPT | Performed by: INTERNAL MEDICINE

## 2025-07-28 RX ORDER — ALBUTEROL SULFATE 0.83 MG/ML
2.5 SOLUTION RESPIRATORY (INHALATION) EVERY 6 HOURS PRN
Qty: 60 ML | Refills: 0 | Status: SHIPPED | OUTPATIENT
Start: 2025-07-28 | End: 2025-08-27

## 2025-07-28 RX ORDER — NICOTINE 21 MG/24HR
1 PATCH, TRANSDERMAL 24 HOURS TRANSDERMAL
Qty: 30 EACH | Refills: 0 | Status: SHIPPED | OUTPATIENT
Start: 2025-07-28

## 2025-07-28 RX ORDER — AZITHROMYCIN 250 MG/1
250 TABLET, FILM COATED ORAL 3 TIMES WEEKLY
Qty: 15 TABLET | Refills: 0 | Status: SHIPPED | OUTPATIENT
Start: 2025-07-28 | End: 2025-09-01

## 2025-07-28 RX ORDER — PREDNISONE 20 MG/1
TABLET ORAL
Qty: 10 TABLET | Refills: 0 | Status: SHIPPED | OUTPATIENT
Start: 2025-07-28 | End: 2025-08-04

## 2025-07-28 RX ADMIN — ROFLUMILAST 500 MCG: 500 TABLET ORAL at 08:22

## 2025-07-28 RX ADMIN — REVEFENACIN 175 MCG: 175 SOLUTION RESPIRATORY (INHALATION) at 09:08

## 2025-07-28 RX ADMIN — PANTOPRAZOLE SODIUM 40 MG: 40 TABLET, DELAYED RELEASE ORAL at 08:22

## 2025-07-28 RX ADMIN — Medication 4 ML: at 09:08

## 2025-07-28 RX ADMIN — BUDESONIDE 0.5 MG: 0.5 SUSPENSION RESPIRATORY (INHALATION) at 09:08

## 2025-07-28 RX ADMIN — ARFORMOTEROL TARTRATE 15 MCG: 15 SOLUTION RESPIRATORY (INHALATION) at 09:08

## 2025-07-28 RX ADMIN — Medication 10 ML: at 08:22

## 2025-07-28 RX ADMIN — AZITHROMYCIN DIHYDRATE 250 MG: 250 TABLET ORAL at 08:22

## 2025-07-28 RX ADMIN — PREDNISONE 40 MG: 20 TABLET ORAL at 08:22

## 2025-07-28 RX ADMIN — NICOTINE 1 PATCH: 14 PATCH, EXTENDED RELEASE TRANSDERMAL at 08:22

## 2025-07-28 RX ADMIN — METOPROLOL TARTRATE 12.5 MG: 25 TABLET, FILM COATED ORAL at 08:22

## 2025-07-28 NOTE — PROGRESS NOTES
Pulmonary / Critical Care Progress Note      Patient Name: Sumeet Gomes  : 1960  MRN: [id number]  Primary Care Physician:  Mirtha Alexander APRN  Date of admission: 2025    Subjective   Subjective   Follow-up for COPD exacerbation, rhinovirus respiratory tract infection    Wore NIPPV overnight  On 3 L of oxygen  Slowly improving  Feels he is near baseline  No fevers or chills  No chest pain  Dyspnea and cough at baseline  Minimal secretions    Objective   Objective     Vitals:   Temp:  [97.5 °F (36.4 °C)-98.2 °F (36.8 °C)] 98.2 °F (36.8 °C)  Heart Rate:  [58-75] 63  Resp:  [18-20] 18  BP: ()/(58-64) 107/64  Flow (L/min) (Oxygen Therapy):  [3] 3  Physical Exam   Vital Signs Reviewed   General:  WDWN, Alert, NAD.  Chronically ill appearing male, lying in bed  HEENT:  PERRL, EOMI.  OP, nares clear,  Chest:  good aeration, diminished bilaterally, pursed lip breathing noted  CV: RRR, no MGR, pulses 2+, equal.  Abd:  Soft, NT, ND, + BS, no HSM  EXT:  no clubbing, no cyanosis, no edema  Neuro:  A&Ox3, CN grossly intact, no focal deficits.  Skin: No rashes or lesions noted      Result Review    Result Review:  I have personally reviewed the results from the time of this admission to 2025 13:35 EDT and agree with these findings:  [x]  Laboratory  [x]  Microbiology  [x]  Radiology  [x]  EKG/Telemetry   []  Cardiology/Vascular   []  Pathology  []  Old records  []  Other:  Most notable findings include:         Lab 25  0536 25  0553 25  0439 25  0611 25  0548 25  0539 25  1239   WBC 6.69 7.64 6.56 7.54 7.56 4.42 5.83   HEMOGLOBIN 13.0 13.4 13.7 13.6 13.6 13.6 15.2   HEMATOCRIT 42.5 43.7 44.4 43.5 43.1 42.6 48.5   PLATELETS 149 151 167 171 165 161 163   SODIUM 141 139 138 141 142 142 141   POTASSIUM 4.3 4.5 4.8 4.6 4.6 4.4 3.9   CHLORIDE 95* 96* 95* 99 100 99 95*   CO2 43.2* 39.5* 37.4* 38.8* 37.9* 37.0* 35.7*   BUN 19.1 19.3 19.2 19.7 17.8 15.6 12.2    CREATININE 0.51* 0.45* 0.58* 0.53* 0.55* 0.56* 0.64*   GLUCOSE 87 98 153* 146* 126* 119* 107*   CALCIUM 9.0 9.1 9.4 9.5 9.6 9.6 10.2   PHOSPHORUS  --  2.6  --  2.9 3.0  --   --    TOTAL PROTEIN  --   --   --   --   --   --  6.8   ALBUMIN  --   --   --   --   --   --  4.5   GLOBULIN  --   --   --   --   --   --  2.3        Viral panel positive for rhinovirus               Results for orders placed during the hospital encounter of 12/04/23    Adult Transthoracic Echo Complete W/ Cont if Necessary Per Protocol    Interpretation Summary    Left ventricular systolic function is normal. Estimated left ventricular EF = 55%    Left ventricular diastolic function was normal.    The right ventricular cavity is moderately dilated.    The right atrial cavity is moderately  dilated.    Estimated right ventricular systolic pressure from tricuspid regurgitation is moderately elevated (45-55 mmHg).       Assessment & Plan   Assessment / Plan     Active Hospital Problems:  Active Hospital Problems    Diagnosis     **COPD with acute exacerbation          Impression:   Acute on chronic COPD exacerbation  Acute on chronic hypoxic respiratory failure, 3 L at home  Rhinovirus respiratory tract infection  Tobacco abuse of cigarettes, ongoing    Plan:   -Continue to maintain SpO2 greater than 90%  - S/p bronchoscopy.  Micro positive for rhinovirus  - Continue nebulizers and bronchopulmonary hygiene  - Charged on Daliresp and thrice weekly azithromycin  - Pleated 5 days of prednisone  - Question if patient is a candidate for biologicals.  Can reevaluate as outpatient  - Patient would benefit from lung transplant workup but patient is still actively smoking  - Follow-up pulmonary clinic 1 to 2 weeks after discharge    Okay to discharge from my perspective     VTE Prophylaxis:  Pharmacologic VTE prophylaxis orders are present.    CODE STATUS:   Code Status (Patient has no pulse and is not breathing): CPR (Attempt to Resuscitate)  Medical  Interventions (Patient has pulse or is breathing): Full Support      Labs, imaging, microbiology, notes and medications personally reviewed  Discussed with primary    Electronically signed by Collins Chapa MD, 7/28/2025, 13:35 EDT.

## 2025-07-28 NOTE — NURSING NOTE
Discharge orders placed at 0859, CHIRAG Collazo stated to wait to d/c until MD Luis rounds on pt. Awaiting MD to clear for d/c.

## 2025-07-28 NOTE — THERAPY EVALUATION
RT EQUIPMENT DEVICE RELATED - SKIN ASSESSMENT    RT Medical Equipment/Device:     NIV Mask:  Full-face    size: s    Skin Assessment:      Cheek:  Intact  Chin:  Intact  Nose:  Intact    Device Skin Pressure Protection:  Positioning supports utilized    Nurse Notification:  Akua Kern, RRT

## 2025-07-28 NOTE — PROGRESS NOTES
RT EQUIPMENT DEVICE RELATED - SKIN ASSESSMENT    RT Medical Equipment/Device:     Nasal Cannula    Skin Assessment:      Cheek:  Intact  Neck:  Intact  Nose:  Intact    Device Skin Pressure Protection:  Positioning supports utilized    Nurse Notification:  Akua Robledo, RRT

## 2025-07-28 NOTE — PLAN OF CARE
Goal Outcome Evaluation:  Plan of Care Reviewed With: patient        Progress: no change  Outcome Evaluation: Patient was on 3L nasal cannula this morning tolerating well at this time. Bipap was on standby at bedside.

## 2025-07-28 NOTE — OUTREACH NOTE
Prep Survey      Flowsheet Row Responses   [facility] discharged from? [facility]   Is LACE score < 7 ? No   Eligibility [facility]   Date of Admission 07/22/25   Date of Discharge 07/28/25   Discharge Disposition Home or Self Care   Discharge diagnosis COPD with acute exacerbation   Does the patient have one of the following disease processes/diagnoses(primary or secondary)? COPD   Does the patient have Home health ordered? No   Is there a DME ordered? No   Prep survey completed? Yes            SIM A - Registered Nurse

## 2025-07-28 NOTE — THERAPY EVALUATION
Acute Care - Physical Therapy Initial Evaluation  [facility]     Patient Name: Sumeet Gomes  : 1960  MRN: [id number]  Today's Date: 2025      Visit Dx:     ICD-10-CM ICD-9-CM   1. Acute exacerbation of chronic obstructive pulmonary disease (COPD)  J44.1 491.21   2. Acute respiratory failure with hypercapnia  J96.02 518.81   3. COPD with acute exacerbation  J44.1 491.21   4. Difficulty walking  R26.2 719.7     Patient Active Problem List   Diagnosis    Other hyperlipidemia    Essential hypertension    COPD (chronic obstructive pulmonary disease)    Fatigue    Chest wall contusion, right, initial encounter    Closed fracture of coccyx    Contusion of right knee    Acute respiratory failure with hypoxia and hypercapnia    Acute CHF    Tobacco abuse    Hypoxia    Severe malnutrition    Pulmonary hypertension    Other fatigue    Dyspnea    Cough    Diastolic heart failure    Routine general medical examination at a health care facility    Screening for prostate cancer    Swelling of joint of right knee    Acute respiratory failure with hypoxia    COVID-19    Chronic hypoxic respiratory failure    COPD exacerbation    Acute-on-chronic respiratory failure    Mucus plugging of bronchi    Weight loss    Renal lesion    Screen for colon cancer    Gastroesophageal reflux disease    Encounter for screening for malignant neoplasm of colon    Right renal mass    Acute on chronic respiratory failure with hypoxia and hypercapnia    Thrombocytopenia    History of tobacco abuse    COPD with acute exacerbation     Past Medical History:   Diagnosis Date    CHF (congestive heart failure)     COPD (chronic obstructive pulmonary disease)     COPD (chronic obstructive pulmonary disease)     Coronary artery disease     Diastolic heart failure     Emphysema of lung     Hyperlipidemia     Hypertension     Pulmonary hypertension      Past Surgical History:   Procedure Laterality Date    BACK SURGERY      BRONCHOSCOPY N/A  09/14/2022    Procedure: BRONCHOSCOPY WITH BRONCHOALVEOLAR LAVAGE AND BRONCHIAL WASHINGS;  Surgeon: Ulices Moya MD;  Location: [facility] ENDOSCOPY;  Service: Pulmonary;  Laterality: N/A;  MUCUS PLUGGING, COPD EXACERBATION    BRONCHOSCOPY      BRONCHOSCOPY N/A 5/17/2024    Procedure: BRONCHOSCOPY WITH BAL AND WASHINGS;  Surgeon: Collins Chapa MD;  Location: [facility] ENDOSCOPY;  Service: Pulmonary;  Laterality: N/A;  MUCUS PLUGGING    BRONCHOSCOPY N/A 7/25/2025    Procedure: BRONCHOSCOPY WITH BRONCHOALVEOLAR LAVAGE;  Surgeon: Lang Willis DO;  Location: [facility] MAIN OR;  Service: Pulmonary;  Laterality: N/A;    CARDIAC CATHETERIZATION N/A 09/16/2022    Procedure: Right Heart Cath;  Surgeon: Drew Rodriguez MD;  Location: [facility] CATH INVASIVE LOCATION;  Service: Cardiovascular;  Laterality: N/A;    COLONOSCOPY N/A 8/20/2024    Procedure: COLONOSCOPY WITH HOT SNARE POLYPECTOMIES;  Surgeon: Obdulio Tripp MD;  Location: [facility] ENDOSCOPY;  Service: Gastroenterology;  Laterality: N/A;  COLON POLYPS, DIVERTICULOSIS    ENDOSCOPY N/A 8/20/2024    Procedure: ESOPHAGOGASTRODUODENOSCOPY WITH BIOPSIES;  Surgeon: Obdulio Tripp MD;  Location: [facility] ENDOSCOPY;  Service: Gastroenterology;  Laterality: N/A;  HIATAL HERNIA, BARRETTS    OTHER SURGICAL HISTORY      knee     OTHER SURGICAL HISTORY      shoulder, rotator cuff    SHOULDER SURGERY Right 10/03/2022     PT Assessment (Last 12 Hours)       PT Evaluation and Treatment       Row Name 07/28/25 0900          Physical Therapy Time and Intention    Subjective Information no complaints  -DP     Document Type evaluation  -DP     Mode of Treatment individual therapy;physical therapy  -DP     Patient Effort good  -DP       Row Name 07/28/25 0900          General Information    Patient Profile Reviewed yes  -DP     Patient Observations alert;cooperative;agree to therapy  -DP     Prior Level of Function independent:;gait;transfer;bed mobility;ADL's  -DP      Existing Precautions/Restrictions no known precautions/restrictions  -DP     Barriers to Rehab none identified  -DP       Row Name 07/28/25 0900          Living Environment    Current Living Arrangements home  -DP     People in Home spouse  -DP       Row Name 07/28/25 0900          Cognition    Orientation Status (Cognition) oriented x 3  -DP       Row Name 07/28/25 0900          Range of Motion Comprehensive    General Range of Motion bilateral lower extremity ROM WFL  -DP       Row Name 07/28/25 0900          Strength (Manual Muscle Testing)    Strength (Manual Muscle Testing) bilateral lower extremities;strength is WFL  -DP       Row Name 07/28/25 0900          Bed Mobility    Bed Mobility supine-sit-supine  -DP     Supine-Sit-Supine [place] (Bed Mobility) independent  -DP       Row Name 07/28/25 0900          Transfers    Transfers sit-stand transfer  -DP       Row Name 07/28/25 0900          Sit-Stand Transfer    Sit-Stand [place] (Transfers) independent  -DP       Row Name 07/28/25 0900          Gait/Stairs (Locomotion)    Gait/Stairs Locomotion gait/ambulation independence  -DP     [place] Level (Gait) modified independence  -DP     Comment, (Gait/Stairs) pt able to ambulate ad derek in the room  -DP       Row Name 07/28/25 0900          Balance    Balance Assessment standing dynamic balance  -DP     Dynamic Standing Balance supervision  -DP       Row Name             Wound 07/25/25 Left lower arm Traumatic Skin Tear    Wound - Properties Group Placement Date: 07/25/25  -HM Present on Original Admission: N  -HM Side: Left  -HM Orientation: lower  -HM Location: arm  -HM Primary Wound Type: Traumatic  -HM Secondary Wound Type - Traumatic: Skin Tear  -HM Additional Comments: pt stated skin tear occured yesterday 07/25 when IV was removed from L forearm  -HM    Retired Wound - Properties Group Placement Date: 07/25/25  -HM Present on Original Admission: N  -HM Side: Left  -HM Orientation: lower   -HM Location: arm  -HM Additional Comments: pt stated skin tear occured yesterday 07/25 when IV was removed from L forearm  -HM    Retired Wound - Properties Group Placement Date: 07/25/25  -HM Present on Original Admission: N  -HM Side: Left  -HM Orientation: lower  -HM Location: arm  -HM Additional Comments: pt stated skin tear occured yesterday 07/25 when IV was removed from L forearm  -HM    Retired Wound - Properties Group Date first assessed: 07/25/25  -HM Present on Original Admission: N  -HM Side: Left  -HM Location: arm  -HM Additional Comments: pt stated skin tear occured yesterday 07/25 when IV was removed from L forearm  -HM      Row Name 07/28/25 0900          Plan of Care Review    Plan of Care Reviewed With patient  -DP     Outcome Evaluation Patient is able to complete all transfers and bed mobilities indepedently in the room. Pt is able to ambulate ad derek in the room. Pt does not need inpatient PT services. Recommend DC home.  -DP       Row Name 07/28/25 0900          Therapy Assessment/Plan (PT)    Criteria for Skilled Interventions Met (PT) no problems identified which require skilled intervention  -DP     Therapy Frequency (PT) evaluation only  -DP       Row Name 07/28/25 0900          PT Evaluation Complexity    History, PT Evaluation Complexity no personal factors and/or comorbidities  -DP     Examination of Body Systems (PT Eval Complexity) total of 4 or more elements  -DP     Clinical Presentation (PT Evaluation Complexity) stable  -DP     Clinical Decision Making (PT Evaluation Complexity) low complexity  -DP     Overall Complexity (PT Evaluation Complexity) low complexity  -DP       Row Name 07/28/25 0900          Physical Therapy Goals    Problem Specific Goal Selection (PT) problem specific goal 1, PT  -DP       Row Name 07/28/25 0900          Problem Specific Goal 1 (PT)    Problem Specific Goal 1 (PT) Complete PT evaluation.  -DP     Time Frame (Problem Specific Goal 1, PT) 1 day  -DP      Progress/Outcome (Problem Specific Goal 1, PT) goal met  -DP               User Key  (r) = Recorded By, (t) = Taken By, (c) = Cosigned By      Initials Name Provider Type    Ruslan Thomas PT Physical Therapist     Rose King, RN Registered Nurse                    Physical Therapy Education       Title: PT OT SLP Therapies (Resolved)       Topic: Physical Therapy (Resolved)       Point: Mobility training (Resolved)       Learner Progress:  Not documented in this visit.              Point: Home exercise program (Resolved)       Learner Progress:  Not documented in this visit.              Point: Body mechanics (Resolved)       Learner Progress:  Not documented in this visit.              Point: Precautions (Resolved)       Learner Progress:  Not documented in this visit.                                  PT Recommendation and Plan  Anticipated Discharge Disposition (PT): home  Therapy Frequency (PT): evaluation only  Plan of Care Reviewed With: patient  Outcome Evaluation: Patient is able to complete all transfers and bed mobilities indepedently in the room. Pt is able to ambulate ad derek in the room. Pt does not need inpatient PT services. Recommend DC home.   Outcome Measures       Row Name 07/28/25 0900             How much help from another person do you currently need...    Turning from your back to your side while in flat bed without using bedrails? 4  -DP      Moving from lying on back to sitting on the side of a flat bed without bedrails? 4  -DP      Moving to and from a bed to a chair (including a wheelchair)? 4  -DP      Standing up from a chair using your arms (e.g., wheelchair, bedside chair)? 4  -DP      Climbing 3-5 steps with a railing? 3  -DP      To walk in hospital room? 3  -DP      AM-PAC 6 Clicks Score (PT) 22  -DP         Functional Assessment    Outcome Measure Options AM-PAC 6 Clicks Daily Activity (OT)  -DP                User Key  (r) = Recorded By, (t) = Taken By, (c) = Cosigned  By      Initials Name Provider Type    Ruslan Thomas, PT Physical Therapist                     Time Calculation:    PT Charges       Row Name 07/28/25 1355             Time Calculation    PT Received On 07/28/25  -DP         Untimed Charges    PT Eval/Re-eval Minutes 10  -DP         Total Minutes    Untimed Charges Total Minutes 10  -DP       Total Minutes 10  -DP                User Key  (r) = Recorded By, (t) = Taken By, (c) = Cosigned By      Initials Name Provider Type    Ruslan Thomas, PT Physical Therapist                      PT G-Codes  Outcome Measure Options: AM-PAC 6 Clicks Daily Activity (OT)  AM-PAC 6 Clicks Score (PT): 22    Ruslan Brink, PT  7/28/2025

## 2025-07-28 NOTE — DISCHARGE SUMMARY
[facility]  HOSPITALIST  DISCHARGE SUMMARY       Patient Name: Sumeet Gomes  : 1960  MRN: [id number]  Primary Care Physician: Mirtha Alexander APRN    Date of Admission: 2025  Date of Discharge: 2025  Discharge Diagnoses   Acute COPD exacerbation (human Rhinovirus/Enterovirus on pneumonia panel)  Acute on chronic hypoxic and hypercapnic respiratory failure due to above  Chronic pulmonary hypertension secondary to COPD (3L at home)  Severe COPD, pulm HTN  Hypertension  Hyperlipidemia  Nonobstructive CAD  GERD  Chronic ongoing active tobacco abuse with cigarettes  Moderate malnutrition  Status post bronchoscopy 25, this admission  Pulmonary cachexia  NSVT/PSVT  Hospital Course   Hospital Course:  Sumeet Gomes is a pleasant 64 y.o. male with a history of advanced COPD.  He presented to the ED with worsening shortness of breath.  He was noted to be hypoxic and hypercapnic in the ED.  He was started on BiPAP therapy.  Subsequently admitted to the hospitalist service.  Pulmonology consulted.  He was started on empiric IV antibiotic therapy, IV steroids, and around-the-clock bronchodilator nebulizer treatments.  His respiratory panel came back positive for human rhinovirus.  During the hospitalization, pulmonology performed bronchoscopy on 25.  This showed copious secretions and diffuse erythema.  Over the following days, he had gradual improvement of his respiratory status.  He was approaching baseline.  Smoking cessation was discussed daily during the hospitalization.  Blood pressure soft, home low-dose Lasix on hold.  Had rare episodes of brief NSVT/PSVT, asymptomatic, on telemetry.  Maintained on beta-blocker.  Patient is now being discharged home with close pulmonary follow-up.  Also PCP.  Azithromycin .  Nicotine patch.  Prednisone taper x 1 week.  See medications as listed below.      Discharge Follow Up / Recommendations (labs, diagnostics,  meds, etc):   As above  Future Appointments   Date Time Provider Department Center   8/29/2025  2:45 PM Arleen Gillespie APRN [facility] ETW [facility]   12/9/2025  1:00 PM Mirtha Alexander APRN [facility] BARDS [facility]   8/25/2026  1:15 PM Mart Miller MD [facility] ETRING [facility]     Consultants     Consults       Date and Time Order Name Status Description    7/22/2025  4:48 PM Inpatient Pulmonology Consult Completed           On Day of Discharge   VS: Temp:  [97.5 °F (36.4 °C)-98.2 °F (36.8 °C)] 98.2 °F (36.8 °C)  Heart Rate:  [58-75] 63  Resp:  [18-20] 18  BP: ()/(58-64) 107/64  Flow (L/min) (Oxygen Therapy):  [3] 3  EXAM:   Frail, cachectic.  Older than stated age.  Able to carry on conversation without dyspnea.  Appears weak.  Faint late expiratory wheeze  Heart tones normal S1-S2.  No edema.     Discharge Medications        New Medications        Instructions Start Date   nicotine 14 MG/24HR patch  Commonly known as: NICODERM CQ   1 patch, Transdermal, Every 24 Hours Scheduled             Changes to Medications        Instructions Start Date   albuterol sulfate  (90 Base) MCG/ACT inhaler  Commonly known as: PROVENTIL HFA;VENTOLIN HFA;PROAIR HFA  What changed: Another medication with the same name was added. Make sure you understand how and when to take each.   2 puffs, Every 4 to 6 Hours PRN      albuterol (2.5 MG/3ML) 0.083% nebulizer solution  Commonly known as: PROVENTIL  What changed: You were already taking a medication with the same name, and this prescription was added. Make sure you understand how and when to take each.   2.5 mg, Nebulization, Every 6 Hours PRN      azithromycin 250 MG tablet  Commonly known as: ZITHROMAX  What changed: when to take this   250 mg, Oral, 3 Times Weekly      predniSONE 20 MG tablet  Commonly known as: DELTASONE  What changed: See the new instructions.   Take 2 tablets by mouth Daily With Breakfast for 2 days, THEN 1.5 tablets Daily With Breakfast for 2 days, THEN 1  tablet Daily With Breakfast for 2 days, THEN 0.5 tablets Daily With Breakfast for 2 days.   Start Date: July 28, 2025            Continue These Medications        Instructions Start Date   arformoterol 15 MCG/2ML nebulizer solution  Commonly known as: Brovana   15 mcg, Nebulization, 2 Times Daily - RT      budesonide 0.5 MG/2ML nebulizer solution  Commonly known as: Pulmicort   0.5 mg, Nebulization, 2 Times Daily      Incruse Ellipta 62.5 MCG/ACT aerosol powder   Generic drug: Umeclidinium Bromide   1 puff, Inhalation, Daily      metoprolol tartrate 25 MG tablet  Commonly known as: LOPRESSOR   TAKE 1/2 TABLET BY MOUTH TWICE A DAY      pantoprazole 40 MG EC tablet  Commonly known as: PROTONIX   40 mg, Oral, Every Morning      roflumilast 500 MCG tablet tablet  Commonly known as: DALIRESP   500 mcg, Oral, Daily             Stop These Medications      amoxicillin-clavulanate 875-125 MG per tablet  Commonly known as: AUGMENTIN     furosemide 20 MG tablet  Commonly known as: LASIX     nystatin 100,000 unit/mL suspension  Commonly known as: MYCOSTATIN            Procedures   Bronchoscopy  Imaging     XR Chest 1 View  Result Date: 7/22/2025  XR CHEST 1 VW Date of Exam: 7/22/2025 12:40 PM EDT Indication: SOA Triage Protocol Comparison: 5/16/2025 Findings: Heart size and pulmonary vasculature are within normal limits prominent central bronchial markings. No suspicious consolidation. Scarring in the lung bases. Costophrenic angle sharp     Impression: Findings suggest possible bronchitis, correlate clinically Electronically Signed: Mauro Sun  7/22/2025 12:56 PM EDT  Workstation ID: [id number]    Discharge Details   Hospital Diet:     Diet Order   Procedures    Diet: Cardiac; Healthy Heart (2-3 Na+); Fluid Consistency: Thin (IDDSI 0)     CODE STATUS:    Code Status and Medical Interventions: CPR (Attempt to Resuscitate); Full Support   Ordered at: 07/22/25 3358     Code Status (Patient has no pulse and is not breathing):     CPR (Attempt to Resuscitate)     Medical Interventions (Patient has pulse or is breathing):    Full Support     Additional Instructions for the Follow-ups that You Need to Schedule       Discharge Follow-up with PCP   As directed       Currently Documented PCP:    Mirtha Alexander APRN    PCP Phone Number:    [phone]     Follow Up Details: 1 week        Discharge Follow-up with Specified Provider: Pulmonology clinic; 2 Weeks   As directed      To: Pulmonology clinic   Follow Up: 2 Weeks              Discharge Disposition: Home or Self Care  Pertinent  Labs   LAB RESULTS:      Lab 07/28/25  0536 07/27/25  0553 07/26/25  0439 07/25/25  0611 07/24/25  0548 07/23/25  0539 07/22/25  1239   WBC 6.69 7.64 6.56 7.54 7.56   < > 5.83   HEMOGLOBIN 13.0 13.4 13.7 13.6 13.6   < > 15.2   HEMATOCRIT 42.5 43.7 44.4 43.5 43.1   < > 48.5   PLATELETS 149 151 167 171 165   < > 163   NEUTROS ABS  --   --   --   --   --   --  4.38   IMMATURE GRANS (ABS)  --   --   --   --   --   --  0.01   LYMPHS ABS  --   --   --   --   --   --  0.98   MONOS ABS  --   --   --   --   --   --  0.41   EOS ABS  --   --   --   --   --   --  0.03   MCV 94.2 95.0 94.3 92.8 91.7   < > 92.7    < > = values in this interval not displayed.         Lab 07/28/25  0536 07/27/25  0553 07/26/25  0439 07/25/25  0611 07/24/25  0548   SODIUM 141 139 138 141 142   POTASSIUM 4.3 4.5 4.8 4.6 4.6   CHLORIDE 95* 96* 95* 99 100   CO2 43.2* 39.5* 37.4* 38.8* 37.9*   ANION GAP 2.8* 3.5* 5.6 3.2* 4.1*   BUN 19.1 19.3 19.2 19.7 17.8   CREATININE 0.51* 0.45* 0.58* 0.53* 0.55*   EGFR 113.9 118.3 109.6 112.6 111.4   GLUCOSE 87 98 153* 146* 126*   CALCIUM 9.0 9.1 9.4 9.5 9.6   MAGNESIUM 2.1 2.1 2.1 2.1 2.1   PHOSPHORUS  --  2.6  --  2.9 3.0         Lab 07/22/25  1239   TOTAL PROTEIN 6.8   ALBUMIN 4.5   GLOBULIN 2.3   ALT (SGPT) 32   AST (SGOT) 46*   BILIRUBIN 0.6   ALK PHOS 110         Lab 07/22/25  1430 07/22/25  1239   PROBNP  --  608.7   HSTROP T 11 14                 Lab  07/22/25  1250   PH, ARTERIAL 7.333*   PCO2, ARTERIAL 86.1*   PO2 .9*   O2 SATURATION ART 98.6   FIO2 44   HCO3 ART 45.7*   BASE EXCESS ART 14.9*     Brief Urine Lab Results       None          Microbiology Results (last 10 days)       Procedure Component Value - Date/Time    AFB Culture - Lavage, Lung, Right Middle Lobe [[id number]] Collected: 07/25/25 1432    Lab Status: Preliminary result Specimen: Lavage from Lung, Right Middle Lobe Updated: 07/26/25 1039     AFB Stain No acid fast bacilli seen on direct smear    BAL Culture, Quantitative - Lavage, Lung, Right Middle Lobe [[id number]] Collected: 07/25/25 1432    Lab Status: Final result Specimen: Lavage from Lung, Right Middle Lobe Updated: 07/27/25 1010     BAL Culture >100,000 CFU/mL Normal respiratory jesisca. No S. aureus or Pseudomonas aeruginosa detected. Final report.     Gram Stain Occasional Gram positive cocci in pairs and clusters    Pneumonia Panel - Lavage, Lung, Right Middle Lobe [[id number]]  (Abnormal) Collected: 07/25/25 1432    Lab Status: Final result Specimen: Lavage from Lung, Right Middle Lobe Updated: 07/25/25 1606     Escherichia coli PCR Not Detected     Acinetobacter calcoaceticus-baumannii complex PCR Not Detected     Enterobacter cloacae PCR Not Detected     Klebsiella oxytoca PCR Not Detected     Klebsiella pneumoniae group PCR Not Detected     Klebsiella aerogenes PCR Not Detected     Moraxella catarrhalis PCR Not Detected     Proteus species PCR Not Detected     Pseudomonas aeroginosa PCR Not Detected     Serratia marcescens PCR Not Detected     Staphylococcus aureus PCR Not Detected     Streptococcus pyogenes PCR Not Detected     Haemophilus influenzae PCR Not Detected     Streptococcus agalactiae PCR Not Detected     Streptococcus pneumoniae PCR Not Detected     Chlamydophila pneumoniae PCR Not Detected     Legionella pneumophilia PCR Not Detected     Mycoplasma pneumo by PCR Not Detected     ADENOVIRUS, PCR Not Detected      CTX-M Gene N/A     IMP Gene N/A     KPC Gene N/A     mecA/C and MREJ Gene N/A     NDM Gene N/A     OXA-48-like Gene N/A     VIM Gene N/A     Coronavirus Not Detected     Human Metapneumovirus Not Detected     Human Rhinovirus/Enterovirus Detected     Influenza A PCR Not Detected     Influenza B PCR Not Detected     RSV, PCR Not Detected     Parainfluenza virus PCR Not Detected          Labs Pending at Discharge:   Pending Labs       Order Current Status    Fungus Culture - Lavage, Lung, Right Middle Lobe In process    Non-gynecologic Cytology In process    AFB Culture - Lavage, Lung, Right Middle Lobe Preliminary result            Electronically signed by CHIRAG Funk, 07/28/25, 11:01 AM EDT.       Patient independently seen and evaluated, agree with assessment and plan, above documentation reflects plan put forth during bedside rounds.  More than 70% of the time of this patient encounter was performed by me.     I have reviewed this documentation and agree.    Assessment  Acute COPD exacerbation  Human rhinovirus/enterovirus infection  Acute on chronic hypoxic and hypercapnic respiratory failure due to above  Chronic pulmonary hypertension secondary to COPD  Hypertension  Hyperlipidemia  Nonobstructive CAD  GERD  Chronic ongoing active tobacco abuse with cigarettes  Severe malnutrition/pulmonary cachexia     Hospital course  64-year-old male with COPD on 3 L nasal cannula, pulmonary hypertension, CAD, GERD, hypertension hyperlipidemia presented to ED with complaint of worsening shortness of breath despite use of Augmentin and steroid therapy.  Patient noted be hypoxic and hypercapnic.  Started on BiPAP therapy.  Pulmonology consulted.  Nebulized breathing treatments and steroid therapy initiated.  There was some improvement in patient's respiratory status, however, continued to complain of some shortness of breath.  Patient underwent bronchoscopy on 07/25/2025 and tolerated well.  Patient found to have  mucous plugging.  After bronchoscopy patient Rester status did improve to his baseline.  Importance of smoking cessation discussed at length with the patient who voiced understanding.  Patient will discharge on home nebulizer treatments, steroid taper as well as azithromycin on Monday/Wednesday/Friday per discussion with pulmonology service.  Will do discharge patient hemodynamically stable with no additional inpatient evaluation or workup necessary at this time, patient will discharge home with outpatient follow-up with PCP and pulmonology.     Physical exam  Gen: Lying in bed watching TV, pleasant, conversant  Resp: Good aeration, normal respiratory effort  Card: RRR, No m/r/g  Abd: Soft, Nontender, Nondistended, + bowel sounds     Personally reviewed labs, imaging.  Magnesium within normal limits.  Creatinine stable.  Hemoglobin and platelets stable.     Total time spent on discharge including face-to-face service: 49 minutes    Electronically signed by Tone Smith MD, 7/28/2025, 12:29 EDT.       Part of this note may be an electronic transcription/translation of spoken language to printed text using the Dragon dictation system.

## 2025-07-28 NOTE — PLAN OF CARE
Goal Outcome Evaluation:              Outcome Evaluation: Patient alert and oriented x4, VSS. Pt had 1 episode of minimal desat when ambulating to the bathroom. Patient quickly recovered and tolerated desat and recovery well. No complaints of pain or needs. No significant changes this shift. Patient remains on 3L NC. Plan of care ongoing.

## 2025-07-28 NOTE — NURSING NOTE
Pt d/c to home with family this afternoon. Iv removed without complication. Tele monitor removed and sent back to central monitoring. Discharge instruction and teachings complete with verbal understanding from pt. Pt wheeled down to private vehicle with family and home O2.

## 2025-07-28 NOTE — PLAN OF CARE
Goal Outcome Evaluation:Pt wore BIPAP approx 4.5 hrs @ 14/7, rr 22, 32%. He is on 3 L when not on BIPAP which is his baseline.

## 2025-07-29 ENCOUNTER — TRANSITIONAL CARE MANAGEMENT TELEPHONE ENCOUNTER (OUTPATIENT)
Dept: CALL CENTER | Facility: HOSPITAL | Age: 65
End: 2025-07-29
Payer: MEDICARE

## 2025-07-29 NOTE — OUTREACH NOTE
Call Center TCM Note      Flowsheet Row Responses   [facility] patient discharged from? [facility]   Does the patient have one of the following disease processes/diagnoses(primary or secondary)? COPD   TCM attempt successful? Yes   Call start time 1015   Discharge diagnosis COPD with acute exacerbation   Person spoke with today (if not patient) and relationship spouse   Meds reviewed with patient/caregiver? Yes   Is the patient having any side effects they believe may be caused by any medication additions or changes? No   Does the patient have all medications ordered at discharge? Yes   Is the patient taking all medications as directed (includes completed medication regime)? Yes   Comments Pulmonologist 8/29/25   Does the patient have an appointment with their PCP within 7-14 days of discharge? Yes  [8/4/2025  8:30 AM]   Psychosocial issues? No   Did the patient receive a copy of their discharge instructions? Yes   Nursing interventions Reviewed instructions with patient   What is the patient's perception of their health status since discharge? Improving   Nursing Interventions Nurse provided patient education   If the patient is a current smoker, are they able to teach back resources for cessation? Not a smoker   Is the patient/caregiver able to teach back the hierarchy of who to call/visit for symptoms/problems? PCP, Specialist, Home health nurse, Urgent Care, ED, 911 Yes   Is the patient able to teach back COPD zones? Yes   Nursing interventions Education provided on various zones   Patient reports what zone on this call? Yellow Zone   Yellow Zone Reports having a bad day or a COPD flare, More breathless than usual, Using quick relief inhaler/nebulizer more often   Yellow interventions Use quick relief inhaler, Continue taking daily medications, Start an oral corticosteroid if ordered, Use oxygen as prescribed, Get plenty of rest, Call provider immediately if symptoms don't improve: they may indicate that an  adjustment in medication or oxygen therapy is needed   TCM call completed? Yes   Wrap up additional comments Spouse states pt is better, and still not doing too good as pt is having SOB. Reviewed meds/inhaler with spouse. Spouse advised to call Pulmonologist for an earlier appt and to inquire about maintenance prednisone until pt is seen. Spouse verified PCP/Pulmonary fu appts   Would this patient benefit from a Referral to [facility] Social Work? No   Is the patient interested in additional calls from an ambulatory ? No            Jane ELLIS - Registered Nurse    7/29/2025, 10:24 EDT

## 2025-07-30 LAB — FUNGUS WND CULT: ABNORMAL

## 2025-08-01 LAB
MYCOBACTERIUM SPEC CULT: NORMAL
NIGHT BLUE STAIN TISS: NORMAL
NIGHT BLUE STAIN TISS: NORMAL

## 2025-08-04 ENCOUNTER — OFFICE VISIT (OUTPATIENT)
Dept: FAMILY MEDICINE CLINIC | Age: 65
End: 2025-08-04
Payer: MEDICARE

## 2025-08-04 VITALS
WEIGHT: 119 LBS | BODY MASS INDEX: 18.68 KG/M2 | HEIGHT: 67 IN | DIASTOLIC BLOOD PRESSURE: 71 MMHG | HEART RATE: 57 BPM | OXYGEN SATURATION: 93 % | SYSTOLIC BLOOD PRESSURE: 115 MMHG | TEMPERATURE: 97.7 F

## 2025-08-04 DIAGNOSIS — J96.21 ACUTE ON CHRONIC RESPIRATORY FAILURE WITH HYPOXIA AND HYPERCAPNIA: Primary | ICD-10-CM

## 2025-08-04 DIAGNOSIS — R05.3 CHRONIC COUGH: ICD-10-CM

## 2025-08-04 DIAGNOSIS — J96.22 ACUTE ON CHRONIC RESPIRATORY FAILURE WITH HYPOXIA AND HYPERCAPNIA: Primary | ICD-10-CM

## 2025-08-04 PROCEDURE — 1111F DSCHRG MED/CURRENT MED MERGE: CPT | Performed by: NURSE PRACTITIONER

## 2025-08-04 PROCEDURE — 1160F RVW MEDS BY RX/DR IN RCRD: CPT | Performed by: NURSE PRACTITIONER

## 2025-08-04 PROCEDURE — 1159F MED LIST DOCD IN RCRD: CPT | Performed by: NURSE PRACTITIONER

## 2025-08-04 PROCEDURE — 3078F DIAST BP <80 MM HG: CPT | Performed by: NURSE PRACTITIONER

## 2025-08-04 PROCEDURE — 3074F SYST BP LT 130 MM HG: CPT | Performed by: NURSE PRACTITIONER

## 2025-08-04 PROCEDURE — 1126F AMNT PAIN NOTED NONE PRSNT: CPT | Performed by: NURSE PRACTITIONER

## 2025-08-04 PROCEDURE — 99495 TRANSJ CARE MGMT MOD F2F 14D: CPT | Performed by: NURSE PRACTITIONER

## 2025-08-08 LAB
MYCOBACTERIUM SPEC CULT: NORMAL
NIGHT BLUE STAIN TISS: NORMAL
NIGHT BLUE STAIN TISS: NORMAL

## 2025-08-13 LAB
QT INTERVAL: 415 MS
QTC INTERVAL: 425 MS

## 2025-08-15 LAB
MYCOBACTERIUM SPEC CULT: NORMAL
NIGHT BLUE STAIN TISS: NORMAL
NIGHT BLUE STAIN TISS: NORMAL

## 2025-08-20 LAB — FUNGUS WND CULT: ABNORMAL

## 2025-08-22 LAB
MYCOBACTERIUM SPEC CULT: NORMAL
NIGHT BLUE STAIN TISS: NORMAL
NIGHT BLUE STAIN TISS: NORMAL

## 2025-08-29 ENCOUNTER — OFFICE VISIT (OUTPATIENT)
Dept: PULMONOLOGY | Facility: CLINIC | Age: 65
End: 2025-08-29
Payer: MEDICARE

## 2025-08-29 VITALS
TEMPERATURE: 97.6 F | RESPIRATION RATE: 18 BRPM | HEIGHT: 67 IN | WEIGHT: 124.2 LBS | OXYGEN SATURATION: 93 % | HEART RATE: 79 BPM | BODY MASS INDEX: 19.49 KG/M2 | SYSTOLIC BLOOD PRESSURE: 131 MMHG | DIASTOLIC BLOOD PRESSURE: 85 MMHG

## 2025-08-29 DIAGNOSIS — J96.11 CHRONIC RESPIRATORY FAILURE WITH HYPOXIA AND HYPERCAPNIA: ICD-10-CM

## 2025-08-29 DIAGNOSIS — F17.211 NICOTINE DEPENDENCE, CIGARETTES, IN REMISSION: ICD-10-CM

## 2025-08-29 DIAGNOSIS — J96.12 CHRONIC RESPIRATORY FAILURE WITH HYPOXIA AND HYPERCAPNIA: ICD-10-CM

## 2025-08-29 DIAGNOSIS — R06.09 DYSPNEA ON EXERTION: ICD-10-CM

## 2025-08-29 DIAGNOSIS — J43.9 PULMONARY EMPHYSEMA, UNSPECIFIED EMPHYSEMA TYPE: ICD-10-CM

## 2025-08-29 DIAGNOSIS — J44.89 ASTHMA-COPD OVERLAP SYNDROME: Primary | Chronic | ICD-10-CM

## 2025-08-29 LAB
MYCOBACTERIUM SPEC CULT: NORMAL
NIGHT BLUE STAIN TISS: NORMAL
NIGHT BLUE STAIN TISS: NORMAL

## 2025-08-29 RX ORDER — AZITHROMYCIN 250 MG/1
250 TABLET, FILM COATED ORAL 3 TIMES WEEKLY
Qty: 36 TABLET | Refills: 3 | Status: SHIPPED | OUTPATIENT
Start: 2025-08-29

## 2025-08-29 RX ORDER — ARFORMOTEROL TARTRATE 15 UG/2ML
15 SOLUTION RESPIRATORY (INHALATION)
Qty: 360 ML | Refills: 3 | Status: SHIPPED | OUTPATIENT
Start: 2025-08-29

## 2025-08-29 RX ORDER — ALBUTEROL SULFATE 0.83 MG/ML
2.5 SOLUTION RESPIRATORY (INHALATION) EVERY 6 HOURS PRN
Qty: 360 EACH | Refills: 3 | Status: SHIPPED | OUTPATIENT
Start: 2025-08-29

## 2025-08-29 RX ORDER — BUDESONIDE 0.5 MG/2ML
0.5 INHALANT ORAL
Qty: 180 EACH | Refills: 3 | Status: SHIPPED | OUTPATIENT
Start: 2025-08-29

## 2025-08-29 RX ORDER — ROFLUMILAST 500 UG/1
500 TABLET ORAL DAILY
Qty: 90 TABLET | Refills: 3 | Status: SHIPPED | OUTPATIENT
Start: 2025-08-29

## 2025-08-29 RX ORDER — FUROSEMIDE 20 MG/1
2 TABLET ORAL DAILY
COMMUNITY
Start: 2025-08-12

## (undated) DEVICE — DECANTER: Brand: UNBRANDED

## (undated) DEVICE — Device

## (undated) DEVICE — BLCK/BITE BLOX WO/DENTL/RIM W/STRAP 54F

## (undated) DEVICE — SOLIDIFIER LIQLOC PLS 1500CC BT

## (undated) DEVICE — SINGLE USE BIOPSY VALVE MAJ-210: Brand: SINGLE USE BIOPSY VALVE (STERILE)

## (undated) DEVICE — LINER SURG CANSTR SXN S/RIGD 1500CC

## (undated) DEVICE — CUP SPECI 4OZ LF STRL

## (undated) DEVICE — CANNULA,OXY,ADULT,SUPER SOFT,W/14'TUB,UC: Brand: MEDLINE INDUSTRIES, INC.

## (undated) DEVICE — SHT AIR TRANSFR COMFRT GLIDE LAT 40X80IN

## (undated) DEVICE — MODEL AT P65, P/N 701554-001KIT CONTENTS: HAND CONTROLLER, 3-WAY HIGH-PRESSURE STOPCOCK WITH ROTATING END AND PREMIUM HIGH-PRESSURE TUBING: Brand: ANGIOTOUCH® KIT

## (undated) DEVICE — FRCP BX RADJAW4 PULM WO NDL STD1.8X2 100

## (undated) DEVICE — PAD GRND REM POLYHESIVE A/ DISP

## (undated) DEVICE — Device: Brand: DEFENDO AIR/WATER/SUCTION AND BIOPSY VALVE

## (undated) DEVICE — BRONCH KIT: Brand: MEDLINE INDUSTRIES, INC.

## (undated) DEVICE — THE SINGLE USE ETRAP – POLYP TRAP IS USED FOR SUCTION RETRIEVAL OF ENDOSCOPICALLY REMOVED POLYPS.: Brand: ETRAP

## (undated) DEVICE — ADAPT SWVL FIBROPTIC BRONCH

## (undated) DEVICE — MODEL BT2000 P/N 700287-012KIT CONTENTS: MANIFOLD WITH SALINE AND CONTRAST PORTS, SALINE TUBING WITH SPIKE AND HAND SYRINGE, TRANSDUCER: Brand: BT2000 AUTOMATED MANIFOLD KIT

## (undated) DEVICE — BLD CLIP UNIV SURG GRY

## (undated) DEVICE — APPL CHLORAPREP HI/LITE TINTED 10.5ML ORNG

## (undated) DEVICE — SNAR POLYP CAPTIFLEX XS/OVL 11X2.4MM 240CM 1P/U

## (undated) DEVICE — SYR SLP TP 10ML DISP

## (undated) DEVICE — SENSR O2 OXIMAX FNGR ADHS A/ 1P/U

## (undated) DEVICE — SWAN-GANZ TRUE SIZE THERMODILUTION "S" TIP: Brand: SWAN-GANZ TRUE SIZE

## (undated) DEVICE — KT COMPLIANCE ENDO PREV INFCT

## (undated) DEVICE — SINGLE USE SUCTION VALVE MAJ-209: Brand: SINGLE USE SUCTION VALVE (STERILE)

## (undated) DEVICE — NDL FLTR BLNT 18G 1 1/2IN

## (undated) DEVICE — CONN JET HYDRA H20 AUXILIARY DISP

## (undated) DEVICE — DEV ATOMIZATION MUCOSAL/NASALTRACH

## (undated) DEVICE — GLV SURG SENSICARE SLT PF LF 7.5 STRL

## (undated) DEVICE — CATH LAB PACK: Brand: MEDLINE INDUSTRIES, INC.

## (undated) DEVICE — TRAP,MUCUS SPECIMEN,40CC: Brand: MEDLINE

## (undated) DEVICE — CONTRST ISOVUE370 76PCT 100ML

## (undated) DEVICE — MIT PREP PRE/OP 5.5X7IN

## (undated) DEVICE — SI AVANTI+ 7F STD W/GW  NO OBT: Brand: AVANTI

## (undated) DEVICE — SYR LUER SLPTP 50ML

## (undated) DEVICE — SINGLE-USE BIOPSY FORCEPS: Brand: RADIAL JAW 4

## (undated) DEVICE — SOL IRRG H2O PL/BG 1000ML STRL

## (undated) DEVICE — SOL IRR NACL 0.9PCT BO 500ML

## (undated) DEVICE — CONTAINER,SPEC,PNEUM TUBE,4OZ,STRL PATH: Brand: MEDLINE

## (undated) DEVICE — A2000 MULTI-USE SYRINGE KIT, P/N 701277-003KIT CONTENTS: 100ML CONTRAST RESERVOIR AND TUBING WITH CONTRAST SPIKE AND CLAMP: Brand: A2000 MULTI-USE SYRINGE KIT